# Patient Record
Sex: MALE | Race: WHITE | NOT HISPANIC OR LATINO | URBAN - METROPOLITAN AREA
[De-identification: names, ages, dates, MRNs, and addresses within clinical notes are randomized per-mention and may not be internally consistent; named-entity substitution may affect disease eponyms.]

---

## 2018-04-10 VITALS
DIASTOLIC BLOOD PRESSURE: 73 MMHG | HEART RATE: 82 BPM | OXYGEN SATURATION: 95 % | TEMPERATURE: 99 F | SYSTOLIC BLOOD PRESSURE: 158 MMHG | RESPIRATION RATE: 18 BRPM

## 2018-04-10 LAB
ALBUMIN SERPL ELPH-MCNC: 3.7 G/DL — SIGNIFICANT CHANGE UP (ref 3.3–5)
ALP SERPL-CCNC: 85 U/L — SIGNIFICANT CHANGE UP (ref 40–120)
ALT FLD-CCNC: 40 U/L — SIGNIFICANT CHANGE UP (ref 10–45)
ANION GAP SERPL CALC-SCNC: 22 MMOL/L — HIGH (ref 5–17)
APTT BLD: 28.4 SEC — SIGNIFICANT CHANGE UP (ref 27.5–37.4)
AST SERPL-CCNC: 32 U/L — SIGNIFICANT CHANGE UP (ref 10–40)
BILIRUB SERPL-MCNC: 0.4 MG/DL — SIGNIFICANT CHANGE UP (ref 0.2–1.2)
BUN SERPL-MCNC: 81 MG/DL — HIGH (ref 7–23)
CALCIUM SERPL-MCNC: 9 MG/DL — SIGNIFICANT CHANGE UP (ref 8.4–10.5)
CHLORIDE SERPL-SCNC: 91 MMOL/L — LOW (ref 96–108)
CK SERPL-CCNC: 49 U/L — SIGNIFICANT CHANGE UP (ref 30–200)
CO2 SERPL-SCNC: 19 MMOL/L — LOW (ref 22–31)
CREAT SERPL-MCNC: 3.55 MG/DL — HIGH (ref 0.5–1.3)
GLUCOSE SERPL-MCNC: 250 MG/DL — HIGH (ref 70–99)
HCT VFR BLD CALC: 27 % — LOW (ref 39–50)
HGB BLD-MCNC: 9.1 G/DL — LOW (ref 13–17)
INR BLD: 1.31 — HIGH (ref 0.88–1.16)
LIDOCAIN IGE QN: 22 U/L — SIGNIFICANT CHANGE UP (ref 7–60)
MCHC RBC-ENTMCNC: 28.3 PG — SIGNIFICANT CHANGE UP (ref 27–34)
MCHC RBC-ENTMCNC: 33.7 G/DL — SIGNIFICANT CHANGE UP (ref 32–36)
MCV RBC AUTO: 83.9 FL — SIGNIFICANT CHANGE UP (ref 80–100)
PLATELET # BLD AUTO: 137 K/UL — LOW (ref 150–400)
POTASSIUM SERPL-MCNC: 3.4 MMOL/L — LOW (ref 3.5–5.3)
POTASSIUM SERPL-SCNC: 3.4 MMOL/L — LOW (ref 3.5–5.3)
PROT SERPL-MCNC: 7.2 G/DL — SIGNIFICANT CHANGE UP (ref 6–8.3)
PROTHROM AB SERPL-ACNC: 14.6 SEC — HIGH (ref 9.8–12.7)
RBC # BLD: 3.22 M/UL — LOW (ref 4.2–5.8)
RBC # FLD: 13.6 % — SIGNIFICANT CHANGE UP (ref 10.3–16.9)
SODIUM SERPL-SCNC: 132 MMOL/L — LOW (ref 135–145)
TROPONIN T SERPL-MCNC: 0.02 NG/ML — HIGH (ref 0–0.01)
WBC # BLD: 7.8 K/UL — SIGNIFICANT CHANGE UP (ref 3.8–10.5)
WBC # FLD AUTO: 7.8 K/UL — SIGNIFICANT CHANGE UP (ref 3.8–10.5)

## 2018-04-10 PROCEDURE — 93010 ELECTROCARDIOGRAM REPORT: CPT

## 2018-04-10 PROCEDURE — 99285 EMERGENCY DEPT VISIT HI MDM: CPT | Mod: 25

## 2018-04-10 RX ORDER — SODIUM CHLORIDE 9 MG/ML
1000 INJECTION INTRAMUSCULAR; INTRAVENOUS; SUBCUTANEOUS ONCE
Qty: 0 | Refills: 0 | Status: COMPLETED | OUTPATIENT
Start: 2018-04-10 | End: 2018-04-10

## 2018-04-10 RX ORDER — IOHEXOL 300 MG/ML
50 INJECTION, SOLUTION INTRAVENOUS ONCE
Qty: 0 | Refills: 0 | Status: COMPLETED | OUTPATIENT
Start: 2018-04-10 | End: 2018-04-11

## 2018-04-10 NOTE — ED ADULT NURSE NOTE - CHIEF COMPLAINT QUOTE
Patient BIBA from [facility] for weakness x 2 days. Patient states he was dealing with a stomach virus and was nauseous and having intermittent bouts of diarrhea.

## 2018-04-10 NOTE — ED ADULT NURSE NOTE - OBJECTIVE STATEMENT
pt. presented with c/o nausea, poor po intake, diarrhea and weakness since last thursday. pt. is A&Ox3, communicates w/o difficulty, skin warm, dry, yellowish tint noted, ppt. states he has been jaundiced for "several weeks". pt. presented with c/o nausea, poor po intake, diarrhea and weakness since last thursday. pt. is A&Ox3, communicates w/o difficulty, skin warm, dry, yellowish tint noted, pt. states he has been jaundiced for "several weeks". pt. denies chest pain, shortness of breath, vomiting, fever, chills, blood in stool or urine.

## 2018-04-10 NOTE — ED ADULT TRIAGE NOTE - CHIEF COMPLAINT QUOTE
Patient BIBA from [facility] for weakness x 2 days. Patient states he was dealing with a stomach virus and was nauseous and having intermittent bouts of diarrhea.

## 2018-04-10 NOTE — ED ADULT NURSE NOTE - CHPI ED SYMPTOMS NEG
no headache/no dizziness/no fever/no chills/no pain/no vomiting/no loss of consciousness/no back pain

## 2018-04-11 ENCOUNTER — INPATIENT (INPATIENT)
Facility: HOSPITAL | Age: 73
LOS: 7 days | Discharge: HOME HEALTH | DRG: 683 | End: 2018-04-19
Attending: INTERNAL MEDICINE | Admitting: INTERNAL MEDICINE
Payer: MEDICARE

## 2018-04-11 ENCOUNTER — TRANSCRIPTION ENCOUNTER (OUTPATIENT)
Age: 73
End: 2018-04-11

## 2018-04-11 DIAGNOSIS — E87.1 HYPO-OSMOLALITY AND HYPONATREMIA: ICD-10-CM

## 2018-04-11 DIAGNOSIS — N17.9 ACUTE KIDNEY FAILURE, UNSPECIFIED: ICD-10-CM

## 2018-04-11 DIAGNOSIS — Z98.890 OTHER SPECIFIED POSTPROCEDURAL STATES: Chronic | ICD-10-CM

## 2018-04-11 DIAGNOSIS — D64.9 ANEMIA, UNSPECIFIED: ICD-10-CM

## 2018-04-11 DIAGNOSIS — N40.0 BENIGN PROSTATIC HYPERPLASIA WITHOUT LOWER URINARY TRACT SYMPTOMS: ICD-10-CM

## 2018-04-11 DIAGNOSIS — E11.9 TYPE 2 DIABETES MELLITUS WITHOUT COMPLICATIONS: ICD-10-CM

## 2018-04-11 DIAGNOSIS — I10 ESSENTIAL (PRIMARY) HYPERTENSION: ICD-10-CM

## 2018-04-11 DIAGNOSIS — R63.8 OTHER SYMPTOMS AND SIGNS CONCERNING FOOD AND FLUID INTAKE: ICD-10-CM

## 2018-04-11 DIAGNOSIS — N39.0 URINARY TRACT INFECTION, SITE NOT SPECIFIED: ICD-10-CM

## 2018-04-11 DIAGNOSIS — E87.2 ACIDOSIS: ICD-10-CM

## 2018-04-11 DIAGNOSIS — N13.9 OBSTRUCTIVE AND REFLUX UROPATHY, UNSPECIFIED: ICD-10-CM

## 2018-04-11 DIAGNOSIS — Z29.9 ENCOUNTER FOR PROPHYLACTIC MEASURES, UNSPECIFIED: ICD-10-CM

## 2018-04-11 LAB
ALBUMIN SERPL ELPH-MCNC: 3.6 G/DL — SIGNIFICANT CHANGE UP (ref 3.3–5)
ALP SERPL-CCNC: 68 U/L — SIGNIFICANT CHANGE UP (ref 40–120)
ALT FLD-CCNC: 35 U/L — SIGNIFICANT CHANGE UP (ref 10–45)
ANION GAP SERPL CALC-SCNC: 15 MMOL/L — SIGNIFICANT CHANGE UP (ref 5–17)
ANION GAP SERPL CALC-SCNC: 19 MMOL/L — HIGH (ref 5–17)
ANION GAP SERPL CALC-SCNC: 21 MMOL/L — HIGH (ref 5–17)
ANION GAP SERPL CALC-SCNC: 22 MMOL/L — HIGH (ref 5–17)
ANISOCYTOSIS BLD QL: SLIGHT — SIGNIFICANT CHANGE UP
APPEARANCE UR: CLEAR — SIGNIFICANT CHANGE UP
APPEARANCE UR: CLEAR — SIGNIFICANT CHANGE UP
AST SERPL-CCNC: 22 U/L — SIGNIFICANT CHANGE UP (ref 10–40)
B-OH-BUTYR SERPL-SCNC: 2.1 MMOL/L — HIGH
BASOPHILS NFR BLD AUTO: 0.1 % — SIGNIFICANT CHANGE UP (ref 0–2)
BILIRUB SERPL-MCNC: 0.3 MG/DL — SIGNIFICANT CHANGE UP (ref 0.2–1.2)
BILIRUB UR-MCNC: NEGATIVE — SIGNIFICANT CHANGE UP
BILIRUB UR-MCNC: NEGATIVE — SIGNIFICANT CHANGE UP
BLD GP AB SCN SERPL QL: NEGATIVE — SIGNIFICANT CHANGE UP
BUN SERPL-MCNC: 57 MG/DL — HIGH (ref 7–23)
BUN SERPL-MCNC: 67 MG/DL — HIGH (ref 7–23)
BUN SERPL-MCNC: 72 MG/DL — HIGH (ref 7–23)
BUN SERPL-MCNC: 76 MG/DL — HIGH (ref 7–23)
CALCIUM SERPL-MCNC: 8 MG/DL — LOW (ref 8.4–10.5)
CALCIUM SERPL-MCNC: 8.6 MG/DL — SIGNIFICANT CHANGE UP (ref 8.4–10.5)
CALCIUM SERPL-MCNC: 8.8 MG/DL — SIGNIFICANT CHANGE UP (ref 8.4–10.5)
CALCIUM SERPL-MCNC: 8.8 MG/DL — SIGNIFICANT CHANGE UP (ref 8.4–10.5)
CHLORIDE SERPL-SCNC: 90 MMOL/L — LOW (ref 96–108)
CHLORIDE SERPL-SCNC: 95 MMOL/L — LOW (ref 96–108)
CHLORIDE SERPL-SCNC: 96 MMOL/L — SIGNIFICANT CHANGE UP (ref 96–108)
CHLORIDE SERPL-SCNC: 97 MMOL/L — SIGNIFICANT CHANGE UP (ref 96–108)
CHLORIDE UR-SCNC: 53 MMOL/L — SIGNIFICANT CHANGE UP
CK MB CFR SERPL CALC: 1.6 NG/ML — SIGNIFICANT CHANGE UP (ref 0–6.7)
CK SERPL-CCNC: 33 U/L — SIGNIFICANT CHANGE UP (ref 30–200)
CO2 SERPL-SCNC: 17 MMOL/L — LOW (ref 22–31)
CO2 SERPL-SCNC: 18 MMOL/L — LOW (ref 22–31)
CO2 SERPL-SCNC: 19 MMOL/L — LOW (ref 22–31)
CO2 SERPL-SCNC: 21 MMOL/L — LOW (ref 22–31)
COLOR SPEC: YELLOW — SIGNIFICANT CHANGE UP
COLOR SPEC: YELLOW — SIGNIFICANT CHANGE UP
CREAT ?TM UR-MCNC: 64 MG/DL — SIGNIFICANT CHANGE UP
CREAT SERPL-MCNC: 2.43 MG/DL — HIGH (ref 0.5–1.3)
CREAT SERPL-MCNC: 2.77 MG/DL — HIGH (ref 0.5–1.3)
CREAT SERPL-MCNC: 3.19 MG/DL — HIGH (ref 0.5–1.3)
CREAT SERPL-MCNC: 3.21 MG/DL — HIGH (ref 0.5–1.3)
DIFF PNL FLD: (no result)
DIFF PNL FLD: (no result)
E COLI DNA BLD POS QL NAA+NON-PROBE: SIGNIFICANT CHANGE UP
EOSINOPHIL NFR BLD AUTO: 0.8 % — SIGNIFICANT CHANGE UP (ref 0–6)
FERRITIN SERPL-MCNC: 81 NG/ML — SIGNIFICANT CHANGE UP (ref 30–400)
GAS PNL BLDV: SIGNIFICANT CHANGE UP
GLUCOSE BLDC GLUCOMTR-MCNC: 201 MG/DL — HIGH (ref 70–99)
GLUCOSE BLDC GLUCOMTR-MCNC: 210 MG/DL — HIGH (ref 70–99)
GLUCOSE BLDC GLUCOMTR-MCNC: 218 MG/DL — HIGH (ref 70–99)
GLUCOSE BLDC GLUCOMTR-MCNC: 238 MG/DL — HIGH (ref 70–99)
GLUCOSE BLDC GLUCOMTR-MCNC: 253 MG/DL — HIGH (ref 70–99)
GLUCOSE BLDC GLUCOMTR-MCNC: 283 MG/DL — HIGH (ref 70–99)
GLUCOSE SERPL-MCNC: 201 MG/DL — HIGH (ref 70–99)
GLUCOSE SERPL-MCNC: 210 MG/DL — HIGH (ref 70–99)
GLUCOSE SERPL-MCNC: 214 MG/DL — HIGH (ref 70–99)
GLUCOSE SERPL-MCNC: 263 MG/DL — HIGH (ref 70–99)
GLUCOSE UR QL: NEGATIVE — SIGNIFICANT CHANGE UP
GLUCOSE UR QL: NEGATIVE — SIGNIFICANT CHANGE UP
GRAM STN FLD: SIGNIFICANT CHANGE UP
HBA1C BLD-MCNC: 7.2 % — HIGH (ref 4–5.6)
HCT VFR BLD CALC: 25.5 % — LOW (ref 39–50)
HCT VFR BLD CALC: 26.2 % — LOW (ref 39–50)
HCT VFR BLD CALC: 26.9 % — LOW (ref 39–50)
HGB BLD-MCNC: 8.7 G/DL — LOW (ref 13–17)
HGB BLD-MCNC: 8.8 G/DL — LOW (ref 13–17)
HGB BLD-MCNC: 9.1 G/DL — LOW (ref 13–17)
HYPOCHROMIA BLD QL: SLIGHT — SIGNIFICANT CHANGE UP
INR BLD: 1.4 — HIGH (ref 0.88–1.16)
IRON SATN MFR SERPL: 23 UG/DL — LOW (ref 45–165)
IRON SATN MFR SERPL: 8 % — LOW (ref 16–55)
KETONES UR-MCNC: NEGATIVE — SIGNIFICANT CHANGE UP
KETONES UR-MCNC: NEGATIVE — SIGNIFICANT CHANGE UP
LACTATE SERPL-SCNC: 1.1 MMOL/L — SIGNIFICANT CHANGE UP (ref 0.5–2)
LEUKOCYTE ESTERASE UR-ACNC: (no result)
LEUKOCYTE ESTERASE UR-ACNC: (no result)
LYMPHOCYTES # BLD AUTO: 8.1 % — LOW (ref 13–44)
LYMPHOCYTES # BLD AUTO: 9 % — LOW (ref 13–44)
MACROCYTES BLD QL: SLIGHT — SIGNIFICANT CHANGE UP
MAGNESIUM SERPL-MCNC: 1.6 MG/DL — SIGNIFICANT CHANGE UP (ref 1.6–2.6)
MAGNESIUM SERPL-MCNC: 1.7 MG/DL — SIGNIFICANT CHANGE UP (ref 1.6–2.6)
MAGNESIUM SERPL-MCNC: 1.9 MG/DL — SIGNIFICANT CHANGE UP (ref 1.6–2.6)
MANUAL DIF COMMENT BLD-IMP: SIGNIFICANT CHANGE UP
MANUAL SMEAR VERIFICATION: SIGNIFICANT CHANGE UP
MCHC RBC-ENTMCNC: 27.7 PG — SIGNIFICANT CHANGE UP (ref 27–34)
MCHC RBC-ENTMCNC: 28.2 PG — SIGNIFICANT CHANGE UP (ref 27–34)
MCHC RBC-ENTMCNC: 28.8 PG — SIGNIFICANT CHANGE UP (ref 27–34)
MCHC RBC-ENTMCNC: 33.2 G/DL — SIGNIFICANT CHANGE UP (ref 32–36)
MCHC RBC-ENTMCNC: 33.8 G/DL — SIGNIFICANT CHANGE UP (ref 32–36)
MCHC RBC-ENTMCNC: 34.5 G/DL — SIGNIFICANT CHANGE UP (ref 32–36)
MCV RBC AUTO: 83.3 FL — SIGNIFICANT CHANGE UP (ref 80–100)
MCV RBC AUTO: 83.3 FL — SIGNIFICANT CHANGE UP (ref 80–100)
MCV RBC AUTO: 83.4 FL — SIGNIFICANT CHANGE UP (ref 80–100)
METHOD TYPE: SIGNIFICANT CHANGE UP
MICROCYTES BLD QL: SLIGHT — SIGNIFICANT CHANGE UP
MONOCYTES NFR BLD AUTO: 10 % — SIGNIFICANT CHANGE UP (ref 2–14)
MONOCYTES NFR BLD AUTO: 8 % — SIGNIFICANT CHANGE UP (ref 2–14)
NEUTROPHILS NFR BLD AUTO: 77 % — SIGNIFICANT CHANGE UP (ref 43–77)
NEUTROPHILS NFR BLD AUTO: 81 % — HIGH (ref 43–77)
NEUTS BAND # BLD: 6 % — SIGNIFICANT CHANGE UP
NITRITE UR-MCNC: POSITIVE
NITRITE UR-MCNC: POSITIVE
OSMOLALITY SERPL: 306 MOSM/KG — HIGH (ref 280–301)
OSMOLALITY UR: 318 MOSMOL/KG — SIGNIFICANT CHANGE UP (ref 100–650)
OVALOCYTES BLD QL SMEAR: SLIGHT — SIGNIFICANT CHANGE UP
PH UR: 5 — SIGNIFICANT CHANGE UP (ref 5–8)
PH UR: 5 — SIGNIFICANT CHANGE UP (ref 5–8)
PHOSPHATE SERPL-MCNC: 3 MG/DL — SIGNIFICANT CHANGE UP (ref 2.5–4.5)
PHOSPHATE SERPL-MCNC: 3.6 MG/DL — SIGNIFICANT CHANGE UP (ref 2.5–4.5)
PLAT MORPH BLD: (no result)
PLATELET # BLD AUTO: 121 K/UL — LOW (ref 150–400)
PLATELET # BLD AUTO: 123 K/UL — LOW (ref 150–400)
PLATELET # BLD AUTO: 128 K/UL — LOW (ref 150–400)
PLATELET CLUMP BLD QL SMEAR: PRESENT
POIKILOCYTOSIS BLD QL AUTO: SLIGHT — SIGNIFICANT CHANGE UP
POLYCHROMASIA BLD QL SMEAR: SLIGHT — SIGNIFICANT CHANGE UP
POTASSIUM SERPL-MCNC: 3 MMOL/L — LOW (ref 3.5–5.3)
POTASSIUM SERPL-MCNC: 3.4 MMOL/L — LOW (ref 3.5–5.3)
POTASSIUM SERPL-MCNC: 3.7 MMOL/L — SIGNIFICANT CHANGE UP (ref 3.5–5.3)
POTASSIUM SERPL-MCNC: 3.7 MMOL/L — SIGNIFICANT CHANGE UP (ref 3.5–5.3)
POTASSIUM SERPL-SCNC: 3 MMOL/L — LOW (ref 3.5–5.3)
POTASSIUM SERPL-SCNC: 3.4 MMOL/L — LOW (ref 3.5–5.3)
POTASSIUM SERPL-SCNC: 3.7 MMOL/L — SIGNIFICANT CHANGE UP (ref 3.5–5.3)
POTASSIUM SERPL-SCNC: 3.7 MMOL/L — SIGNIFICANT CHANGE UP (ref 3.5–5.3)
POTASSIUM UR-SCNC: 19 MMOL/L — SIGNIFICANT CHANGE UP
PROT SERPL-MCNC: 6.8 G/DL — SIGNIFICANT CHANGE UP (ref 6–8.3)
PROT UR-MCNC: NEGATIVE MG/DL — SIGNIFICANT CHANGE UP
PROT UR-MCNC: NEGATIVE MG/DL — SIGNIFICANT CHANGE UP
PROTHROM AB SERPL-ACNC: 15.6 SEC — HIGH (ref 9.8–12.7)
RBC # BLD: 3.06 M/UL — LOW (ref 4.2–5.8)
RBC # BLD: 3.14 M/UL — LOW (ref 4.2–5.8)
RBC # BLD: 3.23 M/UL — LOW (ref 4.2–5.8)
RBC # BLD: 3.23 M/UL — LOW (ref 4.2–5.8)
RBC # FLD: 13.3 % — SIGNIFICANT CHANGE UP (ref 10.3–16.9)
RBC # FLD: 13.5 % — SIGNIFICANT CHANGE UP (ref 10.3–16.9)
RBC # FLD: 13.6 % — SIGNIFICANT CHANGE UP (ref 10.3–16.9)
RBC BLD AUTO: (no result)
RETICS/RBC NFR: 2.2 % — SIGNIFICANT CHANGE UP (ref 0.5–2.5)
RH IG SCN BLD-IMP: POSITIVE — SIGNIFICANT CHANGE UP
SODIUM SERPL-SCNC: 129 MMOL/L — LOW (ref 135–145)
SODIUM SERPL-SCNC: 132 MMOL/L — LOW (ref 135–145)
SODIUM SERPL-SCNC: 134 MMOL/L — LOW (ref 135–145)
SODIUM SERPL-SCNC: 135 MMOL/L — SIGNIFICANT CHANGE UP (ref 135–145)
SODIUM UR-SCNC: 65 MMOL/L — SIGNIFICANT CHANGE UP
SP GR SPEC: 1.01 — SIGNIFICANT CHANGE UP (ref 1–1.03)
SP GR SPEC: <=1.005 — SIGNIFICANT CHANGE UP (ref 1–1.03)
SPHEROCYTES BLD QL SMEAR: SLIGHT — SIGNIFICANT CHANGE UP
STOMATOCYTES BLD QL SMEAR: SLIGHT — SIGNIFICANT CHANGE UP
TIBC SERPL-MCNC: 271 UG/DL — SIGNIFICANT CHANGE UP (ref 220–430)
TRANSFERRIN SERPL-MCNC: 238 MG/DL — SIGNIFICANT CHANGE UP (ref 200–360)
TROPONIN T SERPL-MCNC: 0.02 NG/ML — HIGH (ref 0–0.01)
TSH SERPL-MCNC: 0.96 UIU/ML — SIGNIFICANT CHANGE UP (ref 0.35–4.94)
UIBC SERPL-MCNC: 248 UG/DL — SIGNIFICANT CHANGE UP (ref 110–370)
UROBILINOGEN FLD QL: 0.2 E.U./DL — SIGNIFICANT CHANGE UP
UROBILINOGEN FLD QL: 0.2 E.U./DL — SIGNIFICANT CHANGE UP
UUN UR-MCNC: 338 MG/DL — SIGNIFICANT CHANGE UP
WBC # BLD: 7.8 K/UL — SIGNIFICANT CHANGE UP (ref 3.8–10.5)
WBC # BLD: 9.2 K/UL — SIGNIFICANT CHANGE UP (ref 3.8–10.5)
WBC # BLD: 9.3 K/UL — SIGNIFICANT CHANGE UP (ref 3.8–10.5)
WBC # FLD AUTO: 7.8 K/UL — SIGNIFICANT CHANGE UP (ref 3.8–10.5)
WBC # FLD AUTO: 9.2 K/UL — SIGNIFICANT CHANGE UP (ref 3.8–10.5)
WBC # FLD AUTO: 9.3 K/UL — SIGNIFICANT CHANGE UP (ref 3.8–10.5)

## 2018-04-11 PROCEDURE — 93306 TTE W/DOPPLER COMPLETE: CPT | Mod: 26

## 2018-04-11 PROCEDURE — 74176 CT ABD & PELVIS W/O CONTRAST: CPT | Mod: 26

## 2018-04-11 PROCEDURE — 99223 1ST HOSP IP/OBS HIGH 75: CPT | Mod: GC

## 2018-04-11 PROCEDURE — 76770 US EXAM ABDO BACK WALL COMP: CPT | Mod: 26

## 2018-04-11 RX ORDER — POTASSIUM CHLORIDE 20 MEQ
10 PACKET (EA) ORAL
Qty: 0 | Refills: 0 | Status: COMPLETED | OUTPATIENT
Start: 2018-04-11 | End: 2018-04-11

## 2018-04-11 RX ORDER — LANOLIN ALCOHOL/MO/W.PET/CERES
5 CREAM (GRAM) TOPICAL AT BEDTIME
Qty: 0 | Refills: 0 | Status: DISCONTINUED | OUTPATIENT
Start: 2018-04-11 | End: 2018-04-19

## 2018-04-11 RX ORDER — INSULIN LISPRO 100/ML
VIAL (ML) SUBCUTANEOUS
Qty: 0 | Refills: 0 | Status: DISCONTINUED | OUTPATIENT
Start: 2018-04-11 | End: 2018-04-19

## 2018-04-11 RX ORDER — CARVEDILOL PHOSPHATE 80 MG/1
3.12 CAPSULE, EXTENDED RELEASE ORAL EVERY 12 HOURS
Qty: 0 | Refills: 0 | Status: DISCONTINUED | OUTPATIENT
Start: 2018-04-11 | End: 2018-04-11

## 2018-04-11 RX ORDER — TAMSULOSIN HYDROCHLORIDE 0.4 MG/1
0.4 CAPSULE ORAL AT BEDTIME
Qty: 0 | Refills: 0 | Status: DISCONTINUED | OUTPATIENT
Start: 2018-04-11 | End: 2018-04-19

## 2018-04-11 RX ORDER — DEXTROSE 50 % IN WATER 50 %
25 SYRINGE (ML) INTRAVENOUS ONCE
Qty: 0 | Refills: 0 | Status: DISCONTINUED | OUTPATIENT
Start: 2018-04-11 | End: 2018-04-19

## 2018-04-11 RX ORDER — HEPARIN SODIUM 5000 [USP'U]/ML
5000 INJECTION INTRAVENOUS; SUBCUTANEOUS EVERY 8 HOURS
Qty: 0 | Refills: 0 | Status: DISCONTINUED | OUTPATIENT
Start: 2018-04-11 | End: 2018-04-11

## 2018-04-11 RX ORDER — CEFTRIAXONE 500 MG/1
1000 INJECTION, POWDER, FOR SOLUTION INTRAMUSCULAR; INTRAVENOUS ONCE
Qty: 0 | Refills: 0 | Status: COMPLETED | OUTPATIENT
Start: 2018-04-11 | End: 2018-04-11

## 2018-04-11 RX ORDER — SODIUM CHLORIDE 9 MG/ML
1000 INJECTION INTRAMUSCULAR; INTRAVENOUS; SUBCUTANEOUS ONCE
Qty: 0 | Refills: 0 | Status: COMPLETED | OUTPATIENT
Start: 2018-04-11 | End: 2018-04-11

## 2018-04-11 RX ORDER — CEFTRIAXONE 500 MG/1
1000 INJECTION, POWDER, FOR SOLUTION INTRAMUSCULAR; INTRAVENOUS EVERY 24 HOURS
Qty: 0 | Refills: 0 | Status: DISCONTINUED | OUTPATIENT
Start: 2018-04-12 | End: 2018-04-13

## 2018-04-11 RX ORDER — DEXTROSE 50 % IN WATER 50 %
1 SYRINGE (ML) INTRAVENOUS ONCE
Qty: 0 | Refills: 0 | Status: DISCONTINUED | OUTPATIENT
Start: 2018-04-11 | End: 2018-04-19

## 2018-04-11 RX ORDER — POTASSIUM CHLORIDE 20 MEQ
20 PACKET (EA) ORAL ONCE
Qty: 0 | Refills: 0 | Status: COMPLETED | OUTPATIENT
Start: 2018-04-11 | End: 2018-04-11

## 2018-04-11 RX ORDER — CEFTRIAXONE 500 MG/1
INJECTION, POWDER, FOR SOLUTION INTRAMUSCULAR; INTRAVENOUS
Qty: 0 | Refills: 0 | Status: DISCONTINUED | OUTPATIENT
Start: 2018-04-11 | End: 2018-04-11

## 2018-04-11 RX ORDER — CEFTRIAXONE 500 MG/1
INJECTION, POWDER, FOR SOLUTION INTRAMUSCULAR; INTRAVENOUS
Qty: 0 | Refills: 0 | Status: DISCONTINUED | OUTPATIENT
Start: 2018-04-11 | End: 2018-04-13

## 2018-04-11 RX ORDER — POTASSIUM CHLORIDE 20 MEQ
40 PACKET (EA) ORAL ONCE
Qty: 0 | Refills: 0 | Status: COMPLETED | OUTPATIENT
Start: 2018-04-11 | End: 2018-04-11

## 2018-04-11 RX ORDER — SODIUM CHLORIDE 9 MG/ML
1000 INJECTION, SOLUTION INTRAVENOUS
Qty: 0 | Refills: 0 | Status: DISCONTINUED | OUTPATIENT
Start: 2018-04-11 | End: 2018-04-19

## 2018-04-11 RX ORDER — ASPIRIN/CALCIUM CARB/MAGNESIUM 324 MG
81 TABLET ORAL DAILY
Qty: 0 | Refills: 0 | Status: DISCONTINUED | OUTPATIENT
Start: 2018-04-11 | End: 2018-04-19

## 2018-04-11 RX ORDER — ACETAMINOPHEN 500 MG
650 TABLET ORAL ONCE
Qty: 0 | Refills: 0 | Status: COMPLETED | OUTPATIENT
Start: 2018-04-11 | End: 2018-04-11

## 2018-04-11 RX ORDER — POLYETHYLENE GLYCOL 3350 17 G/17G
17 POWDER, FOR SOLUTION ORAL
Qty: 0 | Refills: 0 | Status: DISCONTINUED | OUTPATIENT
Start: 2018-04-11 | End: 2018-04-11

## 2018-04-11 RX ORDER — SIMVASTATIN 20 MG/1
10 TABLET, FILM COATED ORAL AT BEDTIME
Qty: 0 | Refills: 0 | Status: DISCONTINUED | OUTPATIENT
Start: 2018-04-11 | End: 2018-04-19

## 2018-04-11 RX ORDER — MAGNESIUM SULFATE 500 MG/ML
2 VIAL (ML) INJECTION ONCE
Qty: 0 | Refills: 0 | Status: COMPLETED | OUTPATIENT
Start: 2018-04-11 | End: 2018-04-11

## 2018-04-11 RX ORDER — INSULIN HUMAN 100 [IU]/ML
4 INJECTION, SOLUTION SUBCUTANEOUS ONCE
Qty: 0 | Refills: 0 | Status: COMPLETED | OUTPATIENT
Start: 2018-04-11 | End: 2018-04-11

## 2018-04-11 RX ORDER — SODIUM CHLORIDE 9 MG/ML
1000 INJECTION INTRAMUSCULAR; INTRAVENOUS; SUBCUTANEOUS
Qty: 0 | Refills: 0 | Status: DISCONTINUED | OUTPATIENT
Start: 2018-04-11 | End: 2018-04-14

## 2018-04-11 RX ORDER — SENNA PLUS 8.6 MG/1
2 TABLET ORAL AT BEDTIME
Qty: 0 | Refills: 0 | Status: DISCONTINUED | OUTPATIENT
Start: 2018-04-11 | End: 2018-04-11

## 2018-04-11 RX ORDER — POTASSIUM CHLORIDE 20 MEQ
40 PACKET (EA) ORAL ONCE
Qty: 0 | Refills: 0 | Status: DISCONTINUED | OUTPATIENT
Start: 2018-04-11 | End: 2018-04-11

## 2018-04-11 RX ORDER — FERROUS SULFATE 325(65) MG
325 TABLET ORAL THREE TIMES A DAY
Qty: 0 | Refills: 0 | Status: DISCONTINUED | OUTPATIENT
Start: 2018-04-11 | End: 2018-04-15

## 2018-04-11 RX ORDER — INSULIN HUMAN 100 [IU]/ML
4 INJECTION, SOLUTION SUBCUTANEOUS ONCE
Qty: 0 | Refills: 0 | Status: DISCONTINUED | OUTPATIENT
Start: 2018-04-11 | End: 2018-04-11

## 2018-04-11 RX ORDER — INFLUENZA VIRUS VACCINE 15; 15; 15; 15 UG/.5ML; UG/.5ML; UG/.5ML; UG/.5ML
0.5 SUSPENSION INTRAMUSCULAR ONCE
Qty: 0 | Refills: 0 | Status: DISCONTINUED | OUTPATIENT
Start: 2018-04-11 | End: 2018-04-19

## 2018-04-11 RX ORDER — POTASSIUM CHLORIDE 20 MEQ
20 PACKET (EA) ORAL ONCE
Qty: 0 | Refills: 0 | Status: DISCONTINUED | OUTPATIENT
Start: 2018-04-11 | End: 2018-04-11

## 2018-04-11 RX ORDER — GLUCAGON INJECTION, SOLUTION 0.5 MG/.1ML
1 INJECTION, SOLUTION SUBCUTANEOUS ONCE
Qty: 0 | Refills: 0 | Status: DISCONTINUED | OUTPATIENT
Start: 2018-04-11 | End: 2018-04-19

## 2018-04-11 RX ORDER — DEXTROSE 50 % IN WATER 50 %
12.5 SYRINGE (ML) INTRAVENOUS ONCE
Qty: 0 | Refills: 0 | Status: DISCONTINUED | OUTPATIENT
Start: 2018-04-11 | End: 2018-04-19

## 2018-04-11 RX ADMIN — Medication 20 MILLIEQUIVALENT(S): at 03:27

## 2018-04-11 RX ADMIN — Medication 100 MILLIEQUIVALENT(S): at 08:07

## 2018-04-11 RX ADMIN — CARVEDILOL PHOSPHATE 3.12 MILLIGRAM(S): 80 CAPSULE, EXTENDED RELEASE ORAL at 12:29

## 2018-04-11 RX ADMIN — Medication 25 GRAM(S): at 12:30

## 2018-04-11 RX ADMIN — Medication 40 MILLIEQUIVALENT(S): at 05:59

## 2018-04-11 RX ADMIN — Medication 6: at 13:33

## 2018-04-11 RX ADMIN — Medication 100 MILLIEQUIVALENT(S): at 15:02

## 2018-04-11 RX ADMIN — SODIUM CHLORIDE 1000 MILLILITER(S): 9 INJECTION INTRAMUSCULAR; INTRAVENOUS; SUBCUTANEOUS at 00:48

## 2018-04-11 RX ADMIN — CEFTRIAXONE 1000 MILLIGRAM(S): 500 INJECTION, POWDER, FOR SOLUTION INTRAMUSCULAR; INTRAVENOUS at 08:07

## 2018-04-11 RX ADMIN — Medication 4: at 21:45

## 2018-04-11 RX ADMIN — Medication 325 MILLIGRAM(S): at 21:44

## 2018-04-11 RX ADMIN — SIMVASTATIN 10 MILLIGRAM(S): 20 TABLET, FILM COATED ORAL at 22:58

## 2018-04-11 RX ADMIN — TAMSULOSIN HYDROCHLORIDE 0.4 MILLIGRAM(S): 0.4 CAPSULE ORAL at 13:34

## 2018-04-11 RX ADMIN — Medication 5 MILLIGRAM(S): at 22:58

## 2018-04-11 RX ADMIN — Medication 100 MILLIEQUIVALENT(S): at 21:45

## 2018-04-11 RX ADMIN — SODIUM CHLORIDE 1000 MILLILITER(S): 9 INJECTION INTRAMUSCULAR; INTRAVENOUS; SUBCUTANEOUS at 03:13

## 2018-04-11 RX ADMIN — SODIUM CHLORIDE 100 MILLILITER(S): 9 INJECTION INTRAMUSCULAR; INTRAVENOUS; SUBCUTANEOUS at 23:37

## 2018-04-11 RX ADMIN — Medication 100 MILLIEQUIVALENT(S): at 23:35

## 2018-04-11 RX ADMIN — Medication 6: at 17:59

## 2018-04-11 RX ADMIN — TAMSULOSIN HYDROCHLORIDE 0.4 MILLIGRAM(S): 0.4 CAPSULE ORAL at 21:44

## 2018-04-11 RX ADMIN — IOHEXOL 50 MILLILITER(S): 300 INJECTION, SOLUTION INTRAVENOUS at 00:44

## 2018-04-11 RX ADMIN — Medication 650 MILLIGRAM(S): at 05:59

## 2018-04-11 RX ADMIN — Medication 4: at 10:26

## 2018-04-11 RX ADMIN — Medication 100 MILLIEQUIVALENT(S): at 13:34

## 2018-04-11 RX ADMIN — Medication 100 MILLIEQUIVALENT(S): at 22:58

## 2018-04-11 RX ADMIN — Medication 81 MILLIGRAM(S): at 12:35

## 2018-04-11 RX ADMIN — SODIUM CHLORIDE 100 MILLILITER(S): 9 INJECTION INTRAMUSCULAR; INTRAVENOUS; SUBCUTANEOUS at 12:30

## 2018-04-11 RX ADMIN — INSULIN HUMAN 4 UNIT(S): 100 INJECTION, SOLUTION SUBCUTANEOUS at 03:40

## 2018-04-11 NOTE — ED PROVIDER NOTE - OBJECTIVE STATEMENT
72M with 1 month of weakness, states he has yellowing of his skin, abd distension. Has vomiting, diarrhea, inability to dudley PO, came in today because he feels weak. No blood in stool, No dysuria. No vomiting. Pt denies alcohol, denies hepatitis. Pt feels increasingly weak, denies cp, sob. No hx of abd surg States he can't get up out of bed any more lives alone. Pt has hx of diabetes, on glipizide, not on insulin.

## 2018-04-11 NOTE — PROGRESS NOTE ADULT - PROBLEM SELECTOR PLAN 7
-Intermittently hypertensive, asymptomatic.  Continue holding home lasix in the setting of AAMIR.  Holding HCTZ in the setting of hyponatremia.  -C/w coreg.

## 2018-04-11 NOTE — DISCHARGE NOTE ADULT - ADDITIONAL INSTRUCTIONS
You are being discharged with an indwelling palacio catheter.  IT IS IMPERATIVE THAT YOU FOLLOW UP WITH UROLOGY (Dr. Courtney) WITHIN 2 WEEKS.  INDWELLING CATHETERS CAN PREDISPOSE YOU TO RECURRENT URINARY TRACT INFECTIONS.  Continue taking your Proscar and flomax.  Follow up with your PCP as well as with Dr. Roberts. Continue taking your Proscar and flomax.  Follow up with your PCP as well as with Dr. Roberts.

## 2018-04-11 NOTE — CONSULT NOTE ADULT - SUBJECTIVE AND OBJECTIVE BOX
HPI:  73yo M with PMH of HTN, HLD, DMT2, prostatomegaly, h/o SVT s/p ablation (2011) presents  here with progressive weakness for one month a/w abdominal distension. Pt reports no known history of kidney disease and was asked by his PMD to see a urologist that he never did.   +urinary frequency, urgency, and hesitancy at home prior to admission. No dysuria/hematuria. No fever/chills.   CT abd/pelvis was performed. (11 Apr 2018 06:02)- c/w pancolitis, prostomegally, bilat severe hydro, and prostomegally.  Had palacio placed this am by nurse- #[id number] fr. Drained out 900 cc clear urine initially and then 2 litres over next two hours (clear). Patient now noted to have +heme. Patient c/o some discomfort with palacio catheter. Patient takes supplement beta prostate at home.     PAST MEDICAL & SURGICAL HISTORY:  Enlarged prostate  DM (diabetes mellitus)  HTN (hypertension)  H/O prior ablation treatment      MEDICATIONS  (STANDING):  aspirin  chewable 81 milliGRAM(s) Oral daily  carvedilol 3.125 milliGRAM(s) Oral every 12 hours  cefTRIAXone Injectable.      dextrose 5%. 1000 milliLiter(s) (50 mL/Hr) IV Continuous <Continuous>  dextrose 50% Injectable 12.5 Gram(s) IV Push once  dextrose 50% Injectable 25 Gram(s) IV Push once  dextrose 50% Injectable 25 Gram(s) IV Push once  influenza   Vaccine 0.5 milliLiter(s) IntraMuscular once  insulin lispro (HumaLOG) corrective regimen sliding scale   SubCutaneous Before meals and at bedtime  magnesium sulfate  IVPB 2 Gram(s) IV Intermittent once  potassium chloride  10 mEq/100 mL IVPB 10 milliEquivalent(s) IV Intermittent every 1 hour  simvastatin 10 milliGRAM(s) Oral at bedtime  sodium chloride 0.9%. 1000 milliLiter(s) (100 mL/Hr) IV Continuous <Continuous>  tamsulosin 0.4 milliGRAM(s) Oral at bedtime    MEDICATIONS  (PRN):  dextrose Gel 1 Dose(s) Oral once PRN Blood Glucose LESS THAN 70 milliGRAM(s)/deciliter  glucagon  Injectable 1 milliGRAM(s) IntraMuscular once PRN Glucose LESS THAN 70 milligrams/deciliter      Allergies    No Known Allergies    Intolerances        SOCIAL HISTORY:    FAMILY HISTORY:  No pertinent family history in first degree relatives      Vital Signs Last 24 Hrs  T(C): 37.4 (11 Apr 2018 11:27), Max: 38 (11 Apr 2018 05:44)  T(F): 99.3 (11 Apr 2018 11:27), Max: 100.4 (11 Apr 2018 05:44)  HR: 92 (11 Apr 2018 11:27) (82 - 92)  BP: 153/66 (11 Apr 2018 11:27) (149/68 - 160/74)  BP(mean): --  RR: 18 (11 Apr 2018 11:27) (18 - 18)  SpO2: 98% (11 Apr 2018 11:27) (95% - 99%)    On PE:  General: alert and awake  Abdomen: soft, mild lower abdominal discomfort    : 16fr palacio intact +heme    EXT: no edema    LABS:                        8.7    7.8   )-----------( 121      ( 11 Apr 2018 06:15 )             26.2     04-11    134<L>  |  95<L>  |  72<H>  ----------------------------<  201<H>  3.4<L>   |  18<L>  |  3.21<H>    Ca    8.6      11 Apr 2018 06:15  Phos  3.6     04-11  Mg     1.6     04-11    TPro  6.8  /  Alb  3.6  /  TBili  0.3  /  DBili  x   /  AST  22  /  ALT  35  /  AlkPhos  68  04-11    PT/INR - ( 10 Apr 2018 23:08 )   PT: 14.6 sec;   INR: 1.31          PTT - ( 10 Apr 2018 23:08 )  PTT:28.4 sec  Urinalysis Basic - ( 11 Apr 2018 09:09 )    Color: Yellow / Appearance: Clear / SG: <=1.005 / pH: x  Gluc: x / Ketone: NEGATIVE  / Bili: Negative / Urobili: 0.2 E.U./dL   Blood: x / Protein: NEGATIVE mg/dL / Nitrite: POSITIVE   Leuk Esterase: Moderate / RBC: Many /HPF / WBC Many /HPF   Sq Epi: x / Non Sq Epi: 0-5 /HPF / Bacteria: Many /HPF        RADIOLOGY & ADDITIONAL STUDIES:

## 2018-04-11 NOTE — PROGRESS NOTE ADULT - PROBLEM SELECTOR PLAN 2
Unclear baseline.  Creatinine on arrival 3.55, slightly improved with IVF.  Likely AAMIR on CKD.  Etiology of AAMIR likely postobstructive + prerenal (diarrhea + lasix).  Left message without PCP to obtain collateral on baseline.  -urology consulted.  started flomax.  c/w indwelling palacio.  -frequent BMPs, next at noon.  Watch for postobstructive diuresis.  On NS at 100/hr for now.  Will match output by 50%.  Cautious electrolyte repletion with AAMIR.  -f/u renal U/S   -f/u urine lytes  -hold lasix

## 2018-04-11 NOTE — PROGRESS NOTE ADULT - SUBJECTIVE AND OBJECTIVE BOX
INTERVAL/OVERNIGHT EVENTS:  Admitted for UTI + urinary retention leading to obstructive uropathy.  s/p palacio draining 900 cc's.        SUBJECTIVE:  Seen and examined at bedside.  Prior to palacio placement, patient reported no discomfort.  He said that he did not feel the urge to urinate.  He seems to be a questionable historian.  He says that he recalls being told he has prostate enlargement, but does not know the etiology.  He was recently taking a supplement called "beta prostate" to help him urinate, but he discontinued it 2/2 urinary frequency.  He reports dysuria.  Bilateral low back pain x 6 months.  No trauma.  He has lost ~15 lbs recently, unclear if diet related.  No night sweats.  No hematuria.  No hx UTIs.      He reports ~3-4 days loose stools.  He cannot quantify them but says it is >5/day.  No hematochezia or melena.  No abd pain, N/V.  No recent travel.  No hx c diff.  No recent abx.  No contacts with c diff.      His presenting complaint was weakness which he attributes to poor PO intake.  He says that he has not been eating because his BS was elevated (300s) ~5 days ago.  Since then, he has not eaten "anything."      He denies cough, runny nose, sore throat, SOB, CP, headache, neck stiffness, leg pain, edema.    ROS otherwise negative.    VITAL SIGNS:  T(F): 99.1 (18 @ 06:45)  HR: 83 (18 @ 06:45)  BP: 149/68 (18 @ 06:45)  RR: 18 (18 @ 06:45)  SpO2: 97% (18 @ 06:45)    PHYSICAL EXAM:    General:  NAD, nontoxic appearing, WDWN  HENT:  EOMI, R pupil reactive / deformed 2/2 cataract sx.  L pupil pinpoint, unclear if reactive.  No sinus tenderness.  oropharynx WNL.  MMM  Neck:  Trachea midline.  No JVD, LAD, or thyromegaly.  Heart:  S1S2 no M/R/G, regular  Lungs:  CTAB no wheezing, rhonchi or rales.  No accessory muscle use.  No respiratory distress.  Abdomen:  NABS.  soft, nontender, nondistended.  no guarding.  no ascites.  no organomegaly.  Vascular:  Peripheral pulses palpable  Extremities:  Trace RLE edema.  Calves nontender.    Back:  Mild bilateral low back TTP.  No CVA tenderness  Neuro:  AOx3, no facial asymmetry, nonfocal, no slurred speech  Skin:  No rash    MEDICATIONS  (STANDING):  aspirin  chewable 81 milliGRAM(s) Oral daily  cefTRIAXone Injectable.      dextrose 5%. 1000 milliLiter(s) (50 mL/Hr) IV Continuous <Continuous>  dextrose 50% Injectable 12.5 Gram(s) IV Push once  dextrose 50% Injectable 25 Gram(s) IV Push once  dextrose 50% Injectable 25 Gram(s) IV Push once  heparin  Injectable 5000 Unit(s) SubCutaneous every 8 hours  influenza   Vaccine 0.5 milliLiter(s) IntraMuscular once  insulin lispro (HumaLOG) corrective regimen sliding scale   SubCutaneous Before meals and at bedtime  magnesium sulfate  IVPB 2 Gram(s) IV Intermittent once  potassium chloride  10 mEq/100 mL IVPB 10 milliEquivalent(s) IV Intermittent every 1 hour  simvastatin 10 milliGRAM(s) Oral at bedtime    MEDICATIONS  (PRN):  dextrose Gel 1 Dose(s) Oral once PRN Blood Glucose LESS THAN 70 milliGRAM(s)/deciliter  glucagon  Injectable 1 milliGRAM(s) IntraMuscular once PRN Glucose LESS THAN 70 milligrams/deciliter      Allergies    No Known Allergies    Intolerances      aspirin  chewable 81 milliGRAM(s) Oral daily  cefTRIAXone Injectable.      dextrose 5%. 1000 milliLiter(s) IV Continuous <Continuous>  dextrose 50% Injectable 12.5 Gram(s) IV Push once  dextrose 50% Injectable 25 Gram(s) IV Push once  dextrose 50% Injectable 25 Gram(s) IV Push once  dextrose Gel 1 Dose(s) Oral once PRN  glucagon  Injectable 1 milliGRAM(s) IntraMuscular once PRN  heparin  Injectable 5000 Unit(s) SubCutaneous every 8 hours  influenza   Vaccine 0.5 milliLiter(s) IntraMuscular once  insulin lispro (HumaLOG) corrective regimen sliding scale   SubCutaneous Before meals and at bedtime  magnesium sulfate  IVPB 2 Gram(s) IV Intermittent once  potassium chloride  10 mEq/100 mL IVPB 10 milliEquivalent(s) IV Intermittent every 1 hour  simvastatin 10 milliGRAM(s) Oral at bedtime      LABS:                          8.7    7.8   )-----------( 121      ( 2018 06:15 )             26.2     04-11    134<L>  |  95<L>  |  72<H>  ----------------------------<  201<H>  3.4<L>   |  18<L>  |  3.21<H>    Ca    8.6      2018 06:15  Phos  3.6       Mg     1.6         TPro  6.8  /  Alb  3.6  /  TBili  0.3  /  DBili  x   /  AST  22  /  ALT  35  /  AlkPhos  68      PT/INR - ( 10 Apr 2018 23:08 )   PT: 14.6 sec;   INR: 1.31          PTT - ( 10 Apr 2018 23:08 )  PTT:28.4 sec  Urinalysis Basic - ( 2018 04:17 )    Color: Yellow / Appearance: Clear / S.015 / pH: x  Gluc: x / Ketone: NEGATIVE  / Bili: Negative / Urobili: 0.2 E.U./dL   Blood: x / Protein: NEGATIVE mg/dL / Nitrite: POSITIVE   Leuk Esterase: Large / RBC: < 5 /HPF / WBC Many /HPF   Sq Epi: x / Non Sq Epi: 0-5 /HPF / Bacteria: Many /HPF        RADIOLOGY & ADDITIONAL TESTS:  All imaging reviewed.

## 2018-04-11 NOTE — PROGRESS NOTE ADULT - PROBLEM SELECTOR PLAN 4
100.4 (oral) in ED with pyuria  - f/u blood and urine cultures  - c/w Ceftriaxone qd  - No CVA tenderness despite perirenal fat stranding on CT

## 2018-04-11 NOTE — H&P ADULT - NSHPREVIEWOFSYSTEMS_GEN_ALL_CORE
Review of system:  General: No malaise, fever, chills.  Eyes: No eye pain, visual disturbances, or discharge  ENMT:  No difficulty hearing, tinnitus, vertigo; No sinus or throat pain  Neck: No pain or stiffness  Respiratory: No cough, wheezing, chills or hemoptysis  Cardiovascular: (+) shortness of breath. No chest pain, palpitations, dizziness or leg swelling  Gastrointestinal: (+ abdominal distension,(+) diarrhea and constipation. No abdominal or epigastric pain. no nausea, vomiting or hematemesis;   No melena or hematochezia.  Genitourinary: Decreased urine flow and increased frequency  Skin: No lesion or rash

## 2018-04-11 NOTE — H&P ADULT - PROBLEM SELECTOR PLAN 2
Anion gap metabolic acidosis from elevated beta-hydroxybutyrate from starvation as pt reports not eating and drinking properly for the past month, also from insulin-resistance state with diabetes and decreased renal excretion from chronic kidney disease and likely tubular dysfunction from post-renal obstruction.  In addition, he takes Januvia which is known to cause euglycemic DKA  - Rodriguez placement  - IVF

## 2018-04-11 NOTE — DISCHARGE NOTE ADULT - PLAN OF CARE
prevent complications You are being discharged with an indwelling palacio catheter.  IT IS IMPERATIVE THAT YOU FOLLOW UP WITH UROLOGY WITHIN 2 WEEKS.  INDWELLING CATHETERS CAN PREDISPOSE YOU TO RECURRENT URINARY TRACT INFECTIONS.  Continue taking your Proscar and flomax. Continue taking your antibiotics as directed after discharge.  Follow up with urology (Dr. Courtney) Follow up with your PCP Dr. Quintero within one week.  Continue taking your medications as directed and check your fingersticks before meals and at bedtime.  Return to an ER with any blood sugar level >400.  Follow up with Dr. Campbell. You have anemia due to chronic disease.  Follow up with Dr. Roberts (hematology/oncology).  You will benefit from further erythropoeitin injections.  Continue taking iron supplement as directed. Continue taking your Proscar and flomax and please follow up with your urologist Dr. Courtney within one week. Continue taking your antibiotics as directed after discharge -  1 Ciprofloxacin 500 mg oral tablet twice a day for two more weeks.  Follow up with urology (Dr. Courtney) Continue taking your Proscar and Flomax and please follow up with your urologist Dr. Courtney within one week.

## 2018-04-11 NOTE — DISCHARGE NOTE ADULT - HOSPITAL COURSE
73yo M with PMH of HTN, HLD, DM2, prostatomegaly, CKD3, h/o SVT s/p ablation (2011) presented with progressive weakness for one month a/w abdominal distension.  He has had diarrhea for several weaks and was seen by his PCP recently.  He has a history of urinary retention and was referred to urology but he had not followed up.  He reported dysuria and frequency.  In the ED, VSS. Labs with anemia Hb 9.1/27, thrombocytopenia (137). mild 1yponatremia, hypokalemia, anion gap metabolic acidosis with elevated beta-hydroxybutyrate, lactate 1.1, UA with pyuria, leukocyte esterase with bacteria. Pt was given insulin 4u once and 2l NS bolus. A CT abd/pelvis was performed showing severe bladder distension, bilateral hydronephrosis, and prostatomegaly.  It also showed colitis.  He was started on Rocephin.  Palacio was placed draining 2L urine over 1 hour.  He only had one low grade temp of 100.4.  No leukocytosis.  His creatinine initially was ~3.5 but this improved with palacio placement and IVF.  He was monitored closely for post obstructive diuresis.  He was started on flomax.  His AGMA improved. 71yo M with PMH of HTN, HLD, DM2, prostatomegaly, CKD3, h/o SVT s/p ablation (2011) presented with progressive weakness for one month a/w abdominal distension.  He has had diarrhea for several weaks and was seen by his PCP recently.  He has a history of urinary retention and was referred to urology but he had not followed up.  He reported dysuria and frequency.  In the ED, VSS. Labs with anemia Hb 9.1/27, thrombocytopenia (137). mild 1yponatremia, hypokalemia, anion gap metabolic acidosis with elevated beta-hydroxybutyrate, lactate 1.1, UA with pyuria, leukocyte esterase with bacteria. Pt was given insulin 4u once and 2l NS bolus. A CT abd/pelvis was performed showing severe bladder distension, bilateral hydronephrosis, and prostatomegaly.  It also showed colitis.  He was started on Rocephin.  Palacio was placed draining 2L urine over 1 hour.  He only had one low grade temp of 100.4.  No leukocytosis.  His creatinine initially was ~3.5 but this improved with palacio placement and IVF.  He was monitored closely for post obstructive diuresis.  He was started on flomax.  His AGMA improved.  Endocrine was consulted for hyperglycemia and he was maintained on Lantus qam (14 units) + mealtime 6-8 units TID.  Urology was consulted recommending outpatient follow with discharge with indwelling palacio.  PSA elevated to 9, most likely in the setting of palacio manipulation.  Blood cx at admission and urine cx grew e coli sensitive to cipro.  Surveillance blood cx negative.  He was discharged on _____. 73yo M with PMH of HTN, HLD, DM2, prostatomegaly, CKD3, h/o SVT s/p ablation (2011) presented with progressive weakness for one month a/w abdominal distension.  He has had diarrhea for several weaks and was seen by his PCP recently.  He has a history of urinary retention and was referred to urology but he had not followed up.  He reported dysuria and frequency.  In the ED, VSS. Labs with anemia Hb 9.1/27, thrombocytopenia (137). mild 1yponatremia, hypokalemia, anion gap metabolic acidosis with elevated beta-hydroxybutyrate, lactate 1.1, UA with pyuria, leukocyte esterase with bacteria. Pt was given insulin 4u once and 2l NS bolus. A CT abd/pelvis was performed showing severe bladder distension, bilateral hydronephrosis, and prostatomegaly.  It also showed colitis.  He was started on Rocephin.  Palacio was placed draining 2L urine over 1 hour.  He only had one low grade temp of 100.4.  No leukocytosis.  His creatinine initially was ~3.5 but this improved with palacio placement and IVF.  He was monitored closely for post obstructive diuresis.  He was started on flomax.  His AGMA improved.  Endocrine was consulted for hyperglycemia and he was maintained on Lantus qam (14 units) + mealtime 6-8 units TID.  Urology was consulted recommending outpatient follow with discharge with indwelling palacio.  PSA elevated to 9, most likely in the setting of palacio manipulation.  Blood cx at admission and urine cx grew e coli sensitive to cipro.  Surveillance blood cx negative.  He underwent colonoscopy for colitis observed on CT which showed _____.   He was discharged on _____. 73yo M with PMH of HTN, HLD, DM2, prostatomegaly, CKD3, h/o SVT s/p ablation (2011) presented with progressive weakness for one month a/w abdominal distension.  He has had diarrhea for several weaks and was seen by his PCP recently.  He has a history of urinary retention and was referred to urology but he had not followed up.  He reported dysuria and frequency.  In the ED, VSS. Labs with anemia Hb 9.1/27, thrombocytopenia (137). mild 1yponatremia, hypokalemia, anion gap metabolic acidosis with elevated beta-hydroxybutyrate, lactate 1.1, UA with pyuria, leukocyte esterase with bacteria. Pt was given insulin 4u once and 2l NS bolus. A CT abd/pelvis was performed showing severe bladder distension, bilateral hydronephrosis, and prostatomegaly.  It also showed colitis.  He was started on Rocephin.  Palacio was placed draining 2L urine over 1 hour.  He only had one low grade temp of 100.4.  No leukocytosis.  His creatinine initially was ~3.5 but this improved with palacio placement and IVF.  He was monitored closely for post obstructive diuresis.  He was started on flomax.  His AGMA improved.  Endocrine was consulted for hyperglycemia and he was maintained on Lantus qam (14 units) + mealtime 6-8 units TID.  Urology was consulted recommending outpatient follow with discharge with indwelling palacio.  PSA elevated to 9, most likely in the setting of palacio manipulation.  Blood cx at admission and urine cx grew e coli sensitive to cipro.  Surveillance blood cx negative.  He underwent colonoscopy for colitis observed on CT which showed _____.   Patient was trial of voided and found not to have significant retention and safe for discharge without a Palacio. Patient was discharged home and will continue on po Cipro 500 mg q12h for two weeks for completion of treatment for bacteremia  2/2 UTI d/t E Coli. 73yo M with PMH of HTN, HLD, DM2, prostatomegaly, CKD3, h/o SVT s/p ablation (2011) presented with progressive weakness for one month a/w abdominal distension.  He has had diarrhea for several weaks and was seen by his PCP recently.  He has a history of urinary retention and was referred to urology but he had not followed up.  He reported dysuria and frequency.  In the ED, VSS. Labs with anemia Hb 9.1/27, thrombocytopenia (137). mild 1yponatremia, hypokalemia, anion gap metabolic acidosis with elevated beta-hydroxybutyrate, lactate 1.1, UA with pyuria, leukocyte esterase with bacteria. Pt was given insulin 4u once and 2l NS bolus. A CT abd/pelvis was performed showing severe bladder distension, bilateral hydronephrosis, and prostatomegaly.  It also showed colitis.  He was started on Rocephin.  Palacio was placed draining 2L urine over 1 hour.  He only had one low grade temp of 100.4.  No leukocytosis.  His creatinine initially was ~3.5 but this improved with palacio placement and IVF.  He was monitored closely for post obstructive diuresis.  He was started on flomax.  His AGMA improved. Blood cx at admission and urine cx grew e coli sensitive to cipro.  Surveillance blood cx negative.  Endocrine was consulted for hyperglycemia and he was maintained on Lantus qam (14 units initially, later 8 units) + mealtime 6-8 units TID.  Urology was consulted initially recommending outpatient follow with discharge with indwelling palacio.  PSA elevated to 9, most likely in the setting of palacio manipulation. However, patient was trial of voided and found not to have significant retention and deemed safe for discharge without a Palacio. He underwent colonoscopy for colitis observed on CT which showed mild segmental colitis and polyps. EGD showed gastritis and shallow ulcers. Patient was started on PPI per GI and has biopsy results pending.  Patient was discharged home with home PT and will continue on po Cipro 500 mg q12h for two weeks for completion of treatment for bacteremia  2/2 UTI d/t E Coli.

## 2018-04-11 NOTE — PROGRESS NOTE ADULT - PROBLEM SELECTOR PLAN 1
-Presenting with urinary retention.  severely enlarged prostate, bladder distension, and bilateral hydronephrosis observed on CT.    -s/p palacio  -unclear diagnosis, likely BPH.  Left message with PCP for collateral.  No outpt urologist according to pt.    -started flomax  -uro consulted  -monitor for postobstructive diuresis (see below)  -treatment of UTI as below

## 2018-04-11 NOTE — ED PROVIDER NOTE - PHYSICAL EXAMINATION
Gen: well appearing no acute distress conversant  HEENT: dry mucous membranes  CV: rrr no murmur  Resp: ctab  Abd: jaundice, mild abd distension, no rebound/guarding, no ttp  Ext: no edema to ext  MSK: no cva TTP, no midline back ttp c-s spine

## 2018-04-11 NOTE — H&P ADULT - ASSESSMENT
71yo M with PMH of HTN, HLD, DMT2, prostatomegaly, h/o SVT s/p ablation (2011) presents  here with progressive weakness for one month a/w abdominal distension, found to have AAMIR, bilateral nephrosis and urinary retention likely from an enlarged prostate

## 2018-04-11 NOTE — DISCHARGE NOTE ADULT - PATIENT PORTAL LINK FT
You can access the [facility] Patient Portal, offered by [facility], by registering with the following website: [email or web address]

## 2018-04-11 NOTE — DISCHARGE NOTE ADULT - SECONDARY DIAGNOSIS.
Essential hypertension Urinary tract infection with hematuria, site unspecified Type 2 diabetes mellitus with complication, without long-term current use of insulin Anemia, unspecified type

## 2018-04-11 NOTE — ED PROVIDER NOTE - PROGRESS NOTE DETAILS
Pt is back from cT scan pancolitis, hydronephrosis on prelim read of CT scan no surg intervention as per radiology.

## 2018-04-11 NOTE — PROGRESS NOTE ADULT - PROBLEM SELECTOR PLAN 3
Anion gap metabolic acidosis from uremia + starvation ketosis  -trend BMP  -c/w IVF.  Encourage PO.    -monitor FS.  SSI

## 2018-04-11 NOTE — H&P ADULT - PROBLEM SELECTOR PLAN 1
Elderly man with history of poorly controlled diabetes (as per pt), HTN, enlarged prostate here with generalized weakness likely from uremia. His AAMIR is likely multifactorial - poor oral intake plus being on diuretics plus Elderly man with history of poorly controlled diabetes (as per pt), HTN, enlarged prostate here with generalized weakness likely from uremia. His AAMIR is likely multifactorial - poor oral intake and being on diuretics and post-renal obstruction from enlarged prostate Elderly man with history of poorly controlled diabetes (as per pt), HTN, enlarged prostate here with generalized weakness likely from uremia. His AAMIR is likely multifactorial - poor oral intake and being on diuretics and post-renal obstruction from enlarged prostate  - Call urology consult  - Once palacio is placed, will start normal saline (See below for hyponatremia)  - f/u urine lytes  - Renal US post-palacio to see any improvement

## 2018-04-11 NOTE — H&P ADULT - HISTORY OF PRESENT ILLNESS
73yo M with PMH of HTN, HLD, DMT2, prostatomegaly, h/o SVT s/p ablation (2011) presents  here with progressive weakness for one month a/w abdominal distension. Pt reports no known history of kidney disease and was asked by his PMD to see a urologist that he never did. He reports increased dyspnea on exertion, usually can climb more than one flight of stairs but get SOB with less than one flight now a/w anorexia, denies any F/C, chest pain, cough, abdominal pain, N/V. He has constipation and small amount of non-bloody diarrhea. He denies alcohol abuse, never had colonoscopy.     ED course: VSS. Labs with anemia Hb 9.1/27, thrombocytopenia (137). Hyponatremia, hypokalemia, anion gap metabolic acidosis with elevated beta-hydroxybutyrate, lactate 1.1, UA with pyuria, leukocyte esterase with bacteria. Pt was given insulin 4u once and 2l NS bolus. A CT abd/pelvis was performed.

## 2018-04-11 NOTE — DISCHARGE NOTE ADULT - CARE PLAN
Principal Discharge DX:	Obstructive uropathy  Secondary Diagnosis:	Essential hypertension  Secondary Diagnosis:	Urinary tract infection with hematuria, site unspecified  Secondary Diagnosis:	Type 2 diabetes mellitus with complication, without long-term current use of insulin  Secondary Diagnosis:	Anemia, unspecified type Principal Discharge DX:	Obstructive uropathy  Goal:	prevent complications  Assessment and plan of treatment:	You are being discharged with an indwelling palacio catheter.  IT IS IMPERATIVE THAT YOU FOLLOW UP WITH UROLOGY WITHIN 2 WEEKS.  INDWELLING CATHETERS CAN PREDISPOSE YOU TO RECURRENT URINARY TRACT INFECTIONS.  Continue taking your Proscar and flomax.  Secondary Diagnosis:	Essential hypertension  Secondary Diagnosis:	Urinary tract infection with hematuria, site unspecified  Assessment and plan of treatment:	Continue taking your antibiotics as directed after discharge.  Follow up with urology (Dr. Courtney)  Secondary Diagnosis:	Type 2 diabetes mellitus with complication, without long-term current use of insulin  Assessment and plan of treatment:	Follow up with your PCP Dr. Quintero within one week.  Continue taking your medications as directed and check your fingersticks before meals and at bedtime.  Return to an ER with any blood sugar level >400.  Follow up with Dr. Campbell.  Secondary Diagnosis:	Anemia, unspecified type  Assessment and plan of treatment:	You have anemia due to chronic disease.  Follow up with Dr. Roberts (hematology/oncology).  You will benefit from further erythropoeitin injections.  Continue taking iron supplement as directed. Principal Discharge DX:	Obstructive uropathy  Goal:	prevent complications  Assessment and plan of treatment:	Continue taking your Proscar and flomax and please follow up with your urologist Dr. Courtney within one week.  Secondary Diagnosis:	Essential hypertension  Secondary Diagnosis:	Urinary tract infection with hematuria, site unspecified  Assessment and plan of treatment:	Continue taking your antibiotics as directed after discharge -  1 Ciprofloxacin 500 mg oral tablet twice a day for two more weeks.  Follow up with urology (Dr. Courtney)  Secondary Diagnosis:	Type 2 diabetes mellitus with complication, without long-term current use of insulin  Assessment and plan of treatment:	Follow up with your PCP Dr. Quintero within one week.  Continue taking your medications as directed and check your fingersticks before meals and at bedtime.  Return to an ER with any blood sugar level >400.  Follow up with Dr. Campbell.  Secondary Diagnosis:	Anemia, unspecified type  Assessment and plan of treatment:	You have anemia due to chronic disease.  Follow up with Dr. Roberts (hematology/oncology).  You will benefit from further erythropoeitin injections.  Continue taking iron supplement as directed.

## 2018-04-11 NOTE — H&P ADULT - NSHPPHYSICALEXAM_GEN_ALL_CORE
Vital Signs   T(C): 38 (11 Apr 2018 05:44), Max: 38 (11 Apr 2018 05:44)  T(F): 100.4 (11 Apr 2018 05:44), Max: 100.4 (11 Apr 2018 05:44)  HR: 87 (11 Apr 2018 05:44) (82 - 91)  BP: 160/74 (11 Apr 2018 05:44) (158/73 - 160/74)  RR: 18 (11 Apr 2018 05:44) (18 - 18)  SpO2: 99% (11 Apr 2018 05:44) (95% - 99%)    General: Tired appearing, NAD  Head: Normocephalic; atraumatic  Eyes: PERRL, EOM intact; conjunctiva and sclera clear  ENMT: No nasal discharge; airway clear  Neck: Supple; non tender; no masses  Respiratory: No wheezes, rales or rhonchi  Cardiovascular: Regular rate and rhythm. S1 and S2 Normal; No murmurs, gallops or rubs  Gastrointestinal: Soft, non-tender, distended with suprapubic fullness and dullness on percussion at LLQ; Normal bowel sounds; No hepatosplenomegaly  Genitourinary: No costovertebral angle tenderness  Extremities: Normal range of motion. Trace bipedal edema  Neuro -  - Mental Status:  Alert, awake, oriented to person, place, and time; Speech is fluent with intact comprehension  - Cranial Nerves II-XII intact. No focal deficit.

## 2018-04-11 NOTE — DISCHARGE NOTE ADULT - MEDICATION SUMMARY - MEDICATIONS TO TAKE
I will START or STAY ON the medications listed below when I get home from the hospital:    finasteride 5 mg oral tablet  -- 1 tab(s) by mouth once a day MDD:1 tab  -- Indication: For BPH    carvedilol 3.125 mg oral tablet  -- 1 tab(s) by mouth 2 times a day  -- Indication: For Essential hypertension    hydroCHLOROthiazide 25 mg oral tablet  -- 1 tab(s) by mouth once a day  -- Indication: For Essential hypertension    Lasix 40 mg oral tablet  -- 1 tab(s) by mouth once a day  -- Indication: For Essential hypertension    Lutein 20 mg oral capsule  -- 1 cap(s) by mouth once a day  -- Indication: For Prophylactic measure    Cipro 500 mg oral tablet  -- 1 tab(s) by mouth 2 times a day   -- Avoid prolonged or excessive exposure to direct and/or artificial sunlight while taking this medication.  Check with your doctor before becoming pregnant.  Do not take dairy products, antacids, or iron preparations within one hour of this medication.  Finish all this medication unless otherwise directed by prescriber.  Medication should be taken with plenty of water.    -- Indication: For UTI (urinary tract infection)

## 2018-04-11 NOTE — PROGRESS NOTE ADULT - PROBLEM SELECTOR PLAN 5
Normocytic.  Hgb 9.1 at presentation.  Repeat 8.7 (likely dilutional).  VSS.  Denies lightheadedness, SOB, CP, palpitations.    -No e/o bleeding.  Likely 2/2 anemia of chronic disease and possible iron deficiency.  f/u iron studies, retic count and EPO level.  obtain collateral from PCP. Normocytic.  Hgb 9.1 at presentation.  Repeat 8.7 (likely dilutional).  VSS.  Denies lightheadedness, SOB, CP, palpitations.    -Mild bleeding post-palacio placement but likely 2/2 anemia of chronic disease and possible iron deficiency.  f/u iron studies, retic count and EPO level.  obtain collateral from PCP.  -CBC at 12.  Hep SQ d/c'ed, unnecessary

## 2018-04-11 NOTE — CONSULT NOTE ADULT - PROBLEM SELECTOR RECOMMENDATION 9
-16 fr palacio removed. #18 fr 6 eye catheter placed and bladder irrigated. Moderate amount clots. #20 Turkmen then placed. Urine light pink.   -continue palacio  -irrigate PRN  -NO CBI at this time  -start flomax  -f/u Ucx's  -will follow

## 2018-04-11 NOTE — ED PROVIDER NOTE - MEDICAL DECISION MAKING DETAILS
72M with 1 month of decreased PO intake causing weakness, dehydration concern for malignancy with jaundice, e 72M with 1 month of decreased PO intake causing weakness, dehydration concern for malignancy with jaundice. Pt's abd nontender, able to dudley PO however pt with jaundice, weakness, found to have pancolitis on CT scan. Plan for IV fluids, no indication for IV abx at this time, no stool at this time. No active diarrhea. Doubt c. diff. Will admit for further w/u of abnormalities of labs, weakness.

## 2018-04-11 NOTE — H&P ADULT - PROBLEM SELECTOR PLAN 5
Hyperosmolar hyponatremia likely from hyperglycemia with patient's history of insulin resistance, and elevated BUN from CKD, his serum osmolal gap is 1, so there is low suspicion of additional toxins   - Will consider NS with 40meq KCl and monitor BMP q8h  - f/u urine lytes  - Hold any diuretics   - expect post-obstruction diuresis after palacio is placed, so pt will need aggressive IV fluid. With his severe hydronephrosis, we expect renal tubular damage and malfunction of sodium channel and vasopressin response. We should anticipate to keep his intake about 75% of his output

## 2018-04-12 DIAGNOSIS — R78.81 BACTEREMIA: ICD-10-CM

## 2018-04-12 LAB
ANION GAP SERPL CALC-SCNC: 19 MMOL/L — HIGH (ref 5–17)
BUN SERPL-MCNC: 49 MG/DL — HIGH (ref 7–23)
CALCIUM SERPL-MCNC: 8.8 MG/DL — SIGNIFICANT CHANGE UP (ref 8.4–10.5)
CHLORIDE SERPL-SCNC: 98 MMOL/L — SIGNIFICANT CHANGE UP (ref 96–108)
CO2 SERPL-SCNC: 17 MMOL/L — LOW (ref 22–31)
CREAT SERPL-MCNC: 2.24 MG/DL — HIGH (ref 0.5–1.3)
CULTURE RESULTS: NO GROWTH — SIGNIFICANT CHANGE UP
EPO SERPL-MCNC: 10.9 MIU/ML — SIGNIFICANT CHANGE UP (ref 2.6–18.5)
FIBRINOGEN PPP-MCNC: 936 MG/DL — HIGH (ref 310–510)
GLUCOSE BLDC GLUCOMTR-MCNC: 266 MG/DL — HIGH (ref 70–99)
GLUCOSE BLDC GLUCOMTR-MCNC: 280 MG/DL — HIGH (ref 70–99)
GLUCOSE BLDC GLUCOMTR-MCNC: 286 MG/DL — HIGH (ref 70–99)
GLUCOSE BLDC GLUCOMTR-MCNC: 341 MG/DL — HIGH (ref 70–99)
GLUCOSE SERPL-MCNC: 218 MG/DL — HIGH (ref 70–99)
HCT VFR BLD CALC: 25.4 % — LOW (ref 39–50)
HGB BLD-MCNC: 8.6 G/DL — LOW (ref 13–17)
INR BLD: 1.43 — HIGH (ref 0.88–1.16)
MAGNESIUM SERPL-MCNC: 1.9 MG/DL — SIGNIFICANT CHANGE UP (ref 1.6–2.6)
MCHC RBC-ENTMCNC: 27.8 PG — SIGNIFICANT CHANGE UP (ref 27–34)
MCHC RBC-ENTMCNC: 33.9 G/DL — SIGNIFICANT CHANGE UP (ref 32–36)
MCV RBC AUTO: 82.2 FL — SIGNIFICANT CHANGE UP (ref 80–100)
PHOSPHATE SERPL-MCNC: 2.8 MG/DL — SIGNIFICANT CHANGE UP (ref 2.5–4.5)
PLATELET # BLD AUTO: 120 K/UL — LOW (ref 150–400)
POTASSIUM SERPL-MCNC: 3.6 MMOL/L — SIGNIFICANT CHANGE UP (ref 3.5–5.3)
POTASSIUM SERPL-SCNC: 3.6 MMOL/L — SIGNIFICANT CHANGE UP (ref 3.5–5.3)
PROTHROM AB SERPL-ACNC: 16 SEC — HIGH (ref 9.8–12.7)
PT 50/50: 12.1 SECS — SIGNIFICANT CHANGE UP (ref 9.7–13.5)
RBC # BLD: 3.09 M/UL — LOW (ref 4.2–5.8)
RBC # FLD: 14 % — SIGNIFICANT CHANGE UP (ref 10.3–16.9)
SODIUM SERPL-SCNC: 134 MMOL/L — LOW (ref 135–145)
SPECIMEN SOURCE: SIGNIFICANT CHANGE UP
THROMBIN TIME: 22.8 SEC — SIGNIFICANT CHANGE UP (ref 17.6–24)
WBC # BLD: 9 K/UL — SIGNIFICANT CHANGE UP (ref 3.8–10.5)
WBC # FLD AUTO: 9 K/UL — SIGNIFICANT CHANGE UP (ref 3.8–10.5)

## 2018-04-12 RX ORDER — INSULIN LISPRO 100/ML
3 VIAL (ML) SUBCUTANEOUS
Qty: 0 | Refills: 0 | Status: DISCONTINUED | OUTPATIENT
Start: 2018-04-12 | End: 2018-04-13

## 2018-04-12 RX ORDER — ACETAMINOPHEN 500 MG
650 TABLET ORAL EVERY 6 HOURS
Qty: 0 | Refills: 0 | Status: DISCONTINUED | OUTPATIENT
Start: 2018-04-12 | End: 2018-04-19

## 2018-04-12 RX ORDER — MAGNESIUM SULFATE 500 MG/ML
1 VIAL (ML) INJECTION ONCE
Qty: 0 | Refills: 0 | Status: COMPLETED | OUTPATIENT
Start: 2018-04-12 | End: 2018-04-12

## 2018-04-12 RX ORDER — POTASSIUM CHLORIDE 20 MEQ
40 PACKET (EA) ORAL ONCE
Qty: 0 | Refills: 0 | Status: COMPLETED | OUTPATIENT
Start: 2018-04-12 | End: 2018-04-12

## 2018-04-12 RX ORDER — INSULIN GLARGINE 100 [IU]/ML
12 INJECTION, SOLUTION SUBCUTANEOUS EVERY MORNING
Qty: 0 | Refills: 0 | Status: DISCONTINUED | OUTPATIENT
Start: 2018-04-12 | End: 2018-04-15

## 2018-04-12 RX ORDER — INSULIN GLARGINE 100 [IU]/ML
6 INJECTION, SOLUTION SUBCUTANEOUS ONCE
Qty: 0 | Refills: 0 | Status: COMPLETED | OUTPATIENT
Start: 2018-04-12 | End: 2018-04-12

## 2018-04-12 RX ORDER — FINASTERIDE 5 MG/1
5 TABLET, FILM COATED ORAL DAILY
Qty: 0 | Refills: 0 | Status: DISCONTINUED | OUTPATIENT
Start: 2018-04-12 | End: 2018-04-19

## 2018-04-12 RX ORDER — DOCUSATE SODIUM 100 MG
100 CAPSULE ORAL
Qty: 0 | Refills: 0 | Status: DISCONTINUED | OUTPATIENT
Start: 2018-04-12 | End: 2018-04-19

## 2018-04-12 RX ADMIN — Medication 325 MILLIGRAM(S): at 06:27

## 2018-04-12 RX ADMIN — Medication 100 GRAM(S): at 07:42

## 2018-04-12 RX ADMIN — INSULIN GLARGINE 6 UNIT(S): 100 INJECTION, SOLUTION SUBCUTANEOUS at 08:58

## 2018-04-12 RX ADMIN — Medication 5 MILLIGRAM(S): at 21:42

## 2018-04-12 RX ADMIN — Medication 325 MILLIGRAM(S): at 21:42

## 2018-04-12 RX ADMIN — Medication 6: at 13:09

## 2018-04-12 RX ADMIN — Medication 6: at 08:58

## 2018-04-12 RX ADMIN — TAMSULOSIN HYDROCHLORIDE 0.4 MILLIGRAM(S): 0.4 CAPSULE ORAL at 21:42

## 2018-04-12 RX ADMIN — Medication 40 MILLIEQUIVALENT(S): at 07:42

## 2018-04-12 RX ADMIN — Medication 650 MILLIGRAM(S): at 22:37

## 2018-04-12 RX ADMIN — Medication 81 MILLIGRAM(S): at 13:10

## 2018-04-12 RX ADMIN — Medication 100 MILLIGRAM(S): at 18:37

## 2018-04-12 RX ADMIN — SIMVASTATIN 10 MILLIGRAM(S): 20 TABLET, FILM COATED ORAL at 21:42

## 2018-04-12 RX ADMIN — SODIUM CHLORIDE 100 MILLILITER(S): 9 INJECTION INTRAMUSCULAR; INTRAVENOUS; SUBCUTANEOUS at 23:40

## 2018-04-12 RX ADMIN — Medication 325 MILLIGRAM(S): at 13:10

## 2018-04-12 RX ADMIN — CEFTRIAXONE 1000 MILLIGRAM(S): 500 INJECTION, POWDER, FOR SOLUTION INTRAMUSCULAR; INTRAVENOUS at 06:27

## 2018-04-12 RX ADMIN — Medication 3 UNIT(S): at 21:42

## 2018-04-12 RX ADMIN — Medication 6: at 18:37

## 2018-04-12 RX ADMIN — FINASTERIDE 5 MILLIGRAM(S): 5 TABLET, FILM COATED ORAL at 13:10

## 2018-04-12 RX ADMIN — Medication 8: at 21:42

## 2018-04-12 NOTE — CONSULT NOTE ADULT - ASSESSMENT
73yo M with PMH of HTN, HLD, DMT2, prostatomegaly, h/o SVT s/p ablation (2011) presenting with progressive weakness admitted with obstructive uropathy, AAMIR, UTI, and colitis.    Problem/Plan - 1:  ·  Problem: Obstructive uropathy.  Plan: -Presenting with urinary retention.  severely enlarged prostate, bladder distension, and bilateral hydronephrosis observed on CT.    -s/p palacio  -unclear diagnosis, likely BPH.  Left message with PCP for collateral.  No outpt urologist according to pt.    -started flomax  -uro consulted  -monitor for postobstructive diuresis (see below)  -treatment of UTI as below.     Problem/Plan - 2:  ·  Problem: AAMIR (acute kidney injury).  Plan: Unclear baseline.  Creatinine on arrival 3.55, slightly improved with IVF.  Likely AAMIR on CKD.  Etiology of AAMIR likely postobstructive + prerenal (diarrhea + lasix).  Left message without PCP to obtain collateral on baseline.  -urology consulted.  started flomax.  c/w indwelling palacio.  -frequent BMPs, next at noon.  Watch for postobstructive diuresis.  On NS at 100/hr for now.  Will match output by 50%.  Cautious electrolyte repletion with AAMIR.  -f/u renal U/S   -f/u urine lytes  -hold lasix.     Problem/Plan - 3:  ·  Problem: Metabolic acidosis, increased anion gap.  Plan: Anion gap metabolic acidosis from uremia + starvation ketosis  -trend BMP  -c/w IVF.  Encourage PO.    -monitor FS.  SSI.     Problem/Plan - 4:  ·  Problem: UTI (urinary tract infection).  Plan: 100.4 (oral) in ED with pyuria  - f/u blood and urine cultures  - c/w Ceftriaxone qd  - No CVA tenderness despite perirenal fat stranding on CT.

## 2018-04-12 NOTE — CONSULT NOTE ADULT - PROBLEM SELECTOR RECOMMENDATION 9
1) Continue Ceftriaxone 1gr IV q24h  2) Follow Ucx, Bcx ID/sensitivity  3) Send Stool cx and C diff toxin

## 2018-04-12 NOTE — PROGRESS NOTE ADULT - PROBLEM SELECTOR PLAN 1
-Presenting with urinary retention.  severely enlarged prostate, bladder distension, and bilateral hydronephrosis observed on CT.    -s/p palacio  -unclear diagnosis, likely BPH.  Had been referred to uro outpt but did not follow up.  -started flomax and proscar  -f/u uro reccs  -monitor for postobstructive diuresis (see below)  -treatment of UTI as below  -f/u PSA

## 2018-04-12 NOTE — PROGRESS NOTE ADULT - PROBLEM SELECTOR PLAN 3
Anion gap metabolic acidosis from uremia + starvation ketosis  -trend BMP  -c/w IVF.  Encourage PO.    -monitor FS.  SSI Resolved, back at baseline Cr 2.2.  Creatinine on arrival 3.55, improved with IVF.  Likely AAMIR on CKD.  Etiology of AAMIR likely postobstructive + prerenal (diarrhea + lasix).   -urology following.  started flomax and proscar.  c/w indwelling palacio.  -frequent BMPs.  Watch for postobstructive diuresis.  On NS at 100/hr for now.  Will match output by 50%.  Cautious electrolyte repletion with CKD  -renal U/S with moderate b/l hydro.  -pre renal on lytes  -hold lasix

## 2018-04-12 NOTE — PROGRESS NOTE ADULT - SUBJECTIVE AND OBJECTIVE BOX
INTERVAL/OVERNIGHT EVENTS:        SUBJECTIVE:  Seen and examined at bedside.    ROS otherwise negative.    VITAL SIGNS:  T(F): 98 (04-11-18 @ 21:15)  HR: 82 (04-11-18 @ 21:15)  BP: 135/74 (04-11-18 @ 21:15)  RR: 18 (04-11-18 @ 21:15)  SpO2: 98% (04-11-18 @ 21:15)  Wt(kg): --    PHYSICAL EXAM:        MEDICATIONS  (STANDING):  aspirin  chewable 81 milliGRAM(s) Oral daily  cefTRIAXone Injectable. 1000 milliGRAM(s) IV Push every 24 hours  cefTRIAXone Injectable.      dextrose 5%. 1000 milliLiter(s) (50 mL/Hr) IV Continuous <Continuous>  dextrose 50% Injectable 12.5 Gram(s) IV Push once  dextrose 50% Injectable 25 Gram(s) IV Push once  dextrose 50% Injectable 25 Gram(s) IV Push once  ferrous    sulfate 325 milliGRAM(s) Oral three times a day  influenza   Vaccine 0.5 milliLiter(s) IntraMuscular once  insulin lispro (HumaLOG) corrective regimen sliding scale   SubCutaneous Before meals and at bedtime  melatonin 5 milliGRAM(s) Oral at bedtime  simvastatin 10 milliGRAM(s) Oral at bedtime  sodium chloride 0.9%. 1000 milliLiter(s) (100 mL/Hr) IV Continuous <Continuous>  tamsulosin 0.4 milliGRAM(s) Oral at bedtime    MEDICATIONS  (PRN):  dextrose Gel 1 Dose(s) Oral once PRN Blood Glucose LESS THAN 70 milliGRAM(s)/deciliter  glucagon  Injectable 1 milliGRAM(s) IntraMuscular once PRN Glucose LESS THAN 70 milligrams/deciliter      Allergies    No Known Allergies    Intolerances      aspirin  chewable 81 milliGRAM(s) Oral daily  cefTRIAXone Injectable. 1000 milliGRAM(s) IV Push every 24 hours  cefTRIAXone Injectable.      dextrose 5%. 1000 milliLiter(s) IV Continuous <Continuous>  dextrose 50% Injectable 12.5 Gram(s) IV Push once  dextrose 50% Injectable 25 Gram(s) IV Push once  dextrose 50% Injectable 25 Gram(s) IV Push once  dextrose Gel 1 Dose(s) Oral once PRN  ferrous    sulfate 325 milliGRAM(s) Oral three times a day  glucagon  Injectable 1 milliGRAM(s) IntraMuscular once PRN  influenza   Vaccine 0.5 milliLiter(s) IntraMuscular once  insulin lispro (HumaLOG) corrective regimen sliding scale   SubCutaneous Before meals and at bedtime  melatonin 5 milliGRAM(s) Oral at bedtime  simvastatin 10 milliGRAM(s) Oral at bedtime  sodium chloride 0.9%. 1000 milliLiter(s) IV Continuous <Continuous>  tamsulosin 0.4 milliGRAM(s) Oral at bedtime      LABS:                          8.8    9.3   )-----------( 123      ( 11 Apr 2018 20:11 )             25.5     04-11    132<L>  |  96  |  57<H>  ----------------------------<  210<H>  3.7   |  21<L>  |  2.43<H>    Ca    8.8      11 Apr 2018 20:11  Phos  3.0     04-11  Mg     1.9     04-11    TPro  6.8  /  Alb  3.6  /  TBili  0.3  /  DBili  x   /  AST  22  /  ALT  35  /  AlkPhos  68  04-11    PT/INR - ( 11 Apr 2018 12:56 )   PT: 15.6 sec;   INR: 1.40          PTT - ( 10 Apr 2018 23:08 )  PTT:28.4 sec  Urinalysis Basic - ( 11 Apr 2018 09:09 )    Color: Yellow / Appearance: Clear / SG: <=1.005 / pH: x  Gluc: x / Ketone: NEGATIVE  / Bili: Negative / Urobili: 0.2 E.U./dL   Blood: x / Protein: NEGATIVE mg/dL / Nitrite: POSITIVE   Leuk Esterase: Moderate / RBC: Many /HPF / WBC Many /HPF   Sq Epi: x / Non Sq Epi: 0-5 /HPF / Bacteria: Many /HPF        RADIOLOGY & ADDITIONAL TESTS:  All imaging reviewed. INTERVAL/OVERNIGHT EVENTS:  1.9 L output overnight.  Received 1.2L NS.  Hgb stable >8.  Urine colored improved.  BCx returned positive for Ecoli.    SUBJECTIVE:  Seen and examined at bedside.  Reports feeling weak and tired, same as before.  Able to sleep 3-4 hours overnight with melatonin.  Denies F/C, CP, SOB, cough, abd pain, N/V/D/C.  No BMs overnight.  (+) chronic LBP.  No leg pain.      ROS otherwise negative.    VITAL SIGNS:  T(F): 98 (04-11-18 @ 21:15)  HR: 82 (04-11-18 @ 21:15)  BP: 135/74 (04-11-18 @ 21:15)  RR: 18 (04-11-18 @ 21:15)  SpO2: 98% (04-11-18 @ 21:15)  Wt(kg): --    PHYSICAL EXAM:    General:  NAD, nontoxic appearing, WDWN  HENT:  EOMI, R pupil reactive / deformed 2/2 cataract sx.  L pupil pinpoint, unclear if reactive.  No sinus tenderness.  oropharynx WNL.  MMM  Neck:  Trachea midline.  No JVD, LAD, or thyromegaly.  Heart:  S1S2 no M/R/G, regular  Lungs:  CTAB no wheezing, rhonchi or rales.  No accessory muscle use.  No respiratory distress.  Abdomen:  NABS.  soft, nontender, nondistended.  no guarding.  no ascites.  no organomegaly.  Vascular:  Peripheral pulses palpable  Extremities:  Trace RLE edema.  Calves nontender.    Back:  Mild bilateral low back TTP.  No CVA tenderness  Neuro:  AOx3, no facial asymmetry, nonfocal, no slurred speech  Skin:  No rash  (+) palacio with yellow urine, mild blood    MEDICATIONS  (STANDING):  aspirin  chewable 81 milliGRAM(s) Oral daily  cefTRIAXone Injectable. 1000 milliGRAM(s) IV Push every 24 hours  cefTRIAXone Injectable.      dextrose 5%. 1000 milliLiter(s) (50 mL/Hr) IV Continuous <Continuous>  dextrose 50% Injectable 12.5 Gram(s) IV Push once  dextrose 50% Injectable 25 Gram(s) IV Push once  dextrose 50% Injectable 25 Gram(s) IV Push once  ferrous    sulfate 325 milliGRAM(s) Oral three times a day  influenza   Vaccine 0.5 milliLiter(s) IntraMuscular once  insulin lispro (HumaLOG) corrective regimen sliding scale   SubCutaneous Before meals and at bedtime  melatonin 5 milliGRAM(s) Oral at bedtime  simvastatin 10 milliGRAM(s) Oral at bedtime  sodium chloride 0.9%. 1000 milliLiter(s) (100 mL/Hr) IV Continuous <Continuous>  tamsulosin 0.4 milliGRAM(s) Oral at bedtime    MEDICATIONS  (PRN):  dextrose Gel 1 Dose(s) Oral once PRN Blood Glucose LESS THAN 70 milliGRAM(s)/deciliter  glucagon  Injectable 1 milliGRAM(s) IntraMuscular once PRN Glucose LESS THAN 70 milligrams/deciliter      Allergies    No Known Allergies    Intolerances      aspirin  chewable 81 milliGRAM(s) Oral daily  cefTRIAXone Injectable. 1000 milliGRAM(s) IV Push every 24 hours  cefTRIAXone Injectable.      dextrose 5%. 1000 milliLiter(s) IV Continuous <Continuous>  dextrose 50% Injectable 12.5 Gram(s) IV Push once  dextrose 50% Injectable 25 Gram(s) IV Push once  dextrose 50% Injectable 25 Gram(s) IV Push once  dextrose Gel 1 Dose(s) Oral once PRN  ferrous    sulfate 325 milliGRAM(s) Oral three times a day  glucagon  Injectable 1 milliGRAM(s) IntraMuscular once PRN  influenza   Vaccine 0.5 milliLiter(s) IntraMuscular once  insulin lispro (HumaLOG) corrective regimen sliding scale   SubCutaneous Before meals and at bedtime  melatonin 5 milliGRAM(s) Oral at bedtime  simvastatin 10 milliGRAM(s) Oral at bedtime  sodium chloride 0.9%. 1000 milliLiter(s) IV Continuous <Continuous>  tamsulosin 0.4 milliGRAM(s) Oral at bedtime      LABS:                          8.8    9.3   )-----------( 123      ( 11 Apr 2018 20:11 )             25.5     04-11    132<L>  |  96  |  57<H>  ----------------------------<  210<H>  3.7   |  21<L>  |  2.43<H>    Ca    8.8      11 Apr 2018 20:11  Phos  3.0     04-11  Mg     1.9     04-11    TPro  6.8  /  Alb  3.6  /  TBili  0.3  /  DBili  x   /  AST  22  /  ALT  35  /  AlkPhos  68  04-11    PT/INR - ( 11 Apr 2018 12:56 )   PT: 15.6 sec;   INR: 1.40          PTT - ( 10 Apr 2018 23:08 )  PTT:28.4 sec  Urinalysis Basic - ( 11 Apr 2018 09:09 )    Color: Yellow / Appearance: Clear / SG: <=1.005 / pH: x  Gluc: x / Ketone: NEGATIVE  / Bili: Negative / Urobili: 0.2 E.U./dL   Blood: x / Protein: NEGATIVE mg/dL / Nitrite: POSITIVE   Leuk Esterase: Moderate / RBC: Many /HPF / WBC Many /HPF   Sq Epi: x / Non Sq Epi: 0-5 /HPF / Bacteria: Many /HPF        RADIOLOGY & ADDITIONAL TESTS:  All imaging reviewed.

## 2018-04-12 NOTE — PHYSICAL THERAPY INITIAL EVALUATION ADULT - DIAGNOSIS, PT EVAL
5A: Primary Prevention/Risk Reduction for Loss of Balance and Falling; 6B: Impaired Aerobic Capacity/Endurance Associated with Deconditioning

## 2018-04-12 NOTE — PROGRESS NOTE ADULT - PROBLEM SELECTOR PLAN 2
Resolved, back at baseline Cr 2.2.  Creatinine on arrival 3.55, improved with IVF.  Likely AAMIR on CKD.  Etiology of AAMIR likely postobstructive + prerenal (diarrhea + lasix).   -urology following.  started flomax and proscar.  c/w indwelling palacio.  -frequent BMPs.  Watch for postobstructive diuresis.  On NS at 100/hr for now.  Will match output by 50%.  Cautious electrolyte repletion with CKD  -renal U/S with moderate b/l hydro.  -pre renal on lytes  -hold lasix -Positive blood cx on admission.  C/w Rocephin.  F/u sensitivities. Treatment of UTI as below.  -Afebrile.  Nontoxic appearing.  Normotensive.  Lactate <2 on admission.

## 2018-04-12 NOTE — CONSULT NOTE ADULT - SUBJECTIVE AND OBJECTIVE BOX
HPI:  71yo M with PMH of HTN, HLD, DMT2, prostatomegaly, h/o SVT s/p ablation (2011) presents  here with progressive weakness for one month a/w abdominal distension. Pt reports no known history of kidney disease and was asked by his PMD to see a urologist that he never did. He reports increased dyspnea on exertion, usually can climb more than one flight of stairs but get SOB with less than one flight now a/w anorexia, denies any F/C, chest pain, cough, abdominal pain, N/V. He has constipation and small amount of non-bloody diarrhea. He denies alcohol abuse, never had colonoscopy.     ED course: VSS. Labs with anemia Hb 9.1/27, thrombocytopenia (137). Hyponatremia, hypokalemia, anion gap metabolic acidosis with elevated beta-hydroxybutyrate, lactate 1.1, UA with pyuria, leukocyte esterase with bacteria. Pt was given insulin 4u once and 2l NS bolus. A CT abd/pelvis was performed. (11 Apr 2018 06:02)      PAST MEDICAL & SURGICAL HISTORY:  Enlarged prostate  DM (diabetes mellitus)  HTN (hypertension)  H/O prior ablation treatment        REVIEW OF SYSTEMS:    General: (+)weakness; no fevers, no chills  Skin/Breast: no rash  Respiratory and Thorax: (+) SOB, no cough  Cardiovascular:	No chest pain, (+) SHAFER  Gastrointestinal:	 no nausea, vomiting , diarrhea  Genitourinary:	no dysuria, (+) difficulty urinating, no hematuria  Musculoskeletal:	no weakness, no joint swelling/pain  Neurological:no focal weakness/numbness  Endocrine: no polyuria, no polydipsia      ANTIBIOTICS:  MEDICATIONS  (STANDING):  aspirin  chewable 81 milliGRAM(s) Oral daily  cefTRIAXone Injectable. 1000 milliGRAM(s) IV Push every 24 hours  cefTRIAXone Injectable.      dextrose 5%. 1000 milliLiter(s) (50 mL/Hr) IV Continuous <Continuous>  dextrose 50% Injectable 12.5 Gram(s) IV Push once  dextrose 50% Injectable 25 Gram(s) IV Push once  dextrose 50% Injectable 25 Gram(s) IV Push once  docusate sodium 100 milliGRAM(s) Oral two times a day  ferrous    sulfate 325 milliGRAM(s) Oral three times a day  finasteride 5 milliGRAM(s) Oral daily  influenza   Vaccine 0.5 milliLiter(s) IntraMuscular once  insulin lispro (HumaLOG) corrective regimen sliding scale   SubCutaneous Before meals and at bedtime  melatonin 5 milliGRAM(s) Oral at bedtime  simvastatin 10 milliGRAM(s) Oral at bedtime  sodium chloride 0.9%. 1000 milliLiter(s) (100 mL/Hr) IV Continuous <Continuous>  tamsulosin 0.4 milliGRAM(s) Oral at bedtime    MEDICATIONS  (PRN):  dextrose Gel 1 Dose(s) Oral once PRN Blood Glucose LESS THAN 70 milliGRAM(s)/deciliter  glucagon  Injectable 1 milliGRAM(s) IntraMuscular once PRN Glucose LESS THAN 70 milligrams/deciliter      Allergies: No Known Allergies    SOCIAL HISTORY: former smoker    FAMILY HISTORY:  No pertinent family history in first degree relatives      Vital Signs Last 24 Hrs  T(C): 37.1 (12 Apr 2018 15:58), Max: 37.3 (12 Apr 2018 06:25)  T(F): 98.7 (12 Apr 2018 15:58), Max: 99.2 (12 Apr 2018 06:25)  HR: 81 (12 Apr 2018 15:58) (81 - 90)  BP: 121/69 (12 Apr 2018 15:58) (121/69 - 135/74)  BP(mean): --  RR: 18 (12 Apr 2018 15:58) (18 - 19)  SpO2: 98% (12 Apr 2018 15:58) (98% - 99%)    04-11-18 @ 07:01  -  04-12-18 @ 07:00  --------------------------------------------------------  IN: 1400 mL / OUT: 5600 mL / NET: -4200 mL    04-12-18 @ 07:01  -  04-12-18 @ 16:12  --------------------------------------------------------  IN: 900 mL / OUT: 0 mL / NET: 900 mL        PHYSICAL EXAM:  Constitutional: Well-developed, well nourished  Eyes: POLO, EOMI  Ear/Nose/Throat: no oral lesion, no sinus tenderness on percussion	  Neck: no JVD, no lymphadenopathy, supple  Respiratory: CTA chuck  Cardiovascular: S1S2 RRR, no murmurs  Gastrointestinal: soft, (+) BS, no HSM  Extremities: no e/e/c  Vascular: DP Pulse:right normal; left normal            LABS:                        8.6    9.0   )-----------( 120      ( 12 Apr 2018 06:37 )             25.4     04-12    134<L>  |  98  |  49<H>  ----------------------------<  218<H>  3.6   |  17<L>  |  2.24<H>    Ca    8.8      12 Apr 2018 06:36  Phos  2.8     04-12  Mg     1.9     04-12    TPro  6.8  /  Alb  3.6  /  TBili  0.3  /  DBili  x   /  AST  22  /  ALT  35  /  AlkPhos  68  04-11    PT/INR - ( 12 Apr 2018 15:50 )   PT: 16.0 sec;   INR: 1.43          PTT - ( 10 Apr 2018 23:08 )  PTT:28.4 sec  Urinalysis Basic - ( 11 Apr 2018 09:09 )    Color: Yellow / Appearance: Clear / SG: <=1.005 / pH: x  Gluc: x / Ketone: NEGATIVE  / Bili: Negative / Urobili: 0.2 E.U./dL   Blood: x / Protein: NEGATIVE mg/dL / Nitrite: POSITIVE   Leuk Esterase: Moderate / RBC: Many /HPF / WBC Many /HPF   Sq Epi: x / Non Sq Epi: 0-5 /HPF / Bacteria: Many /HPF        MICROBIOLOGY:  Culture - Urine (04.11.18 @ 10:20)    Specimen Source: .Urine None    Culture Results:   >100,000 CFU/ml Gram Negative Rods  Identification and susceptibility to follow.    Culture - Blood (04.11.18 @ 08:08)    -  Escherichia coli: Detec    Gram Stain:   Aerobic Bottle: Gram Negative Rods  Result called to and read back by_ Ms. Katy Srena RN([id number])  04/11/2018 22:22:35  "Due to technical problems, Proteus sp. will Not be reported as part of  the BCID panel until further notice"  ***Blood Panel PCR results on this specimen are available  approximately 3 hours after the Gram stain result.***  Gram stain, PCR, and/or culture results may not always  correspond due to difference in methodologies.  ************************************************************  This PCR assay was performed using [employer].  The following targets are tested for: Enterococcus,  vancomycin resistant enterococci, Listeria monocytogenes,  coagulase negative staphylococci, S. aureus,  methicillin resistant S. aureus, Streptococcus agalactiae  (Group B), S. pneumoniae, S. pyogenes (Group A),  Acinetobacter baumannii, Enterobacter cloacae, E. coli,  Klebsiella oxytoca, K. pneumoniae, Proteus sp.,  Serratia marcescens, Haemophilus influenzae,  Neisseria meningitidis, Pseudomonas aeruginosa, Candida  albicans, C. glabrata, C krusei, C parapsilosis,  C. tropicalis and the KPC resistance gene.    Specimen Source: .Blood Blood-Peripheral    Organism: Blood Culture PCR    Culture Results:   Growth in aerobic bottle: Gram Negative Rods  Identification and susceptibility to follow.    Organism Identification: Blood Culture PCR    Method Type: PCR      RADIOLOGY & ADDITIONAL STUDIES:  < from: CT Abdomen and Pelvis w/ Oral Cont (04.11.18 @ 01:53) >    INTERPRETATION:  CT of the ABDOMEN and PELVIS with intravenous contrast   dated 4/11/2018 1:53 AM    INDICATION: Abdominal discomfort. Jaundice. Weakness.    TECHNIQUE: CT of the abdomen and pelvis with oral contrast only. Axial,   sagittal, and coronal images were obtained and reviewed.    COMPARISON: None.    FINDINGS:    Lower chest: Grossly clear lungs. Partially imaged trace pericardial  effusion. Partially imaged aortic valve calcifications    Liver: Smooth in contour. No definite focal lesion.    Biliary system: Gallbladder is normal in size. Few small gallstones. No   biliary ductal dilatation.    Pancreas: Atrophic. No main duct dilatation. No definite pancreatic mass   on this noncontrast study.    Spleen: Borderline splenomegaly with a length of 14 cm. No definite focal   lesion.    Adrenal glands: Unremarkable.    Kidneys: Severe bilateral hydroureteronephrosis. Mild bilateral   perinephric stranding. No renal or ureteral stone. 2.6 cm exophytic cyst   upper pole right kidney.     Urinary Bladder: Markedly distended urinary bladder.    Reproductive organs: Marked prostatomegaly with lobular contour   superiorly whichdeforms the bladder floor. Prostate measures   approximately 4.2 x 5.2 x 6.1 cm for volume of 70 cc. Grossly symmetric   seminal vesicles.    Bowel/Peritoneum: Marked diffuse bowel wall thickening is seen throughout   the colon, more pronounced in the rectum and sigmoid. Borderline terminal   ileal wall thickening. No adjacent fluid collection. No bowel   obstruction. Normal appendix. No ascites or extraluminal gas.     Lymph nodes: No lymphadenopathy.    < end of copied text >  < from: CT Abdomen and Pelvis w/ Oral Cont (04.11.18 @ 01:53) >    INTERPRETATION:  CT of the ABDOMEN and PELVIS with intravenous contrast   dated 4/11/2018 1:53 AM    INDICATION: Abdominal discomfort. Jaundice. Weakness.    TECHNIQUE: CT of the abdomen and pelvis with oral contrast only. Axial,   sagittal, and coronal images were obtained and reviewed.    COMPARISON: None.    FINDINGS:    Lower chest: Grossly clear lungs. Partially imaged trace pericardial  effusion. Partially imaged aortic valve calcifications    Liver: Smooth in contour. No definite focal lesion.    Biliary system: Gallbladder is normal in size. Few small gallstones. No   biliary ductal dilatation.    Pancreas: Atrophic. No main duct dilatation. No definite pancreatic mass   on this noncontrast study.    Spleen: Borderline splenomegaly with a length of 14 cm. No definite focal   lesion.    Adrenal glands: Unremarkable.    Kidneys: Severe bilateral hydroureteronephrosis. Mild bilateral   perinephric stranding. No renal or ureteral stone. 2.6 cm exophytic cyst   upper pole right kidney.     Urinary Bladder: Markedly distended urinary bladder.    Reproductive organs: Marked prostatomegaly with lobular contour   superiorly whichdeforms the bladder floor. Prostate measures   approximately 4.2 x 5.2 x 6.1 cm for volume of 70 cc. Grossly symmetric   seminal vesicles.    Bowel/Peritoneum: Marked diffuse bowel wall thickening is seen throughout   the colon, more pronounced in the rectum and sigmoid. Borderline terminal   ileal wall thickening. No adjacent fluid collection. No bowel   obstruction. Normal appendix. No ascites or extraluminal gas.     Lymph nodes: No lymphadenopathy.    Aorta/IVC: Normal caliber. Mild aortoiliac calcifications.    Abdominal wall: Small bilateral fat-containing inguinal hernias.    Bones/Soft tissues: No acute abnormality. Mild degenerative osseous   changes.      IMPRESSION:     Pancolitis of infectious or inflammatory etiology.    Bilateral severe hydroureteronephrosis, as well as markedly distended   urinary bladder. Findings consistent with chronic outlet obstruction.   Marked prostatomegaly.    No biliary dilatation.

## 2018-04-12 NOTE — PHYSICAL THERAPY INITIAL EVALUATION ADULT - ADDITIONAL COMMENTS
No steps, elevator buidling. No prior use of DME inside, however uses loftstrand crutch when ambulating within community. Denies hx of falls. To note, pt reports previous hx of Left achilles tear with no intervention causing persistent functional issues.

## 2018-04-12 NOTE — PHYSICAL THERAPY INITIAL EVALUATION ADULT - CRITERIA FOR SKILLED THERAPEUTIC INTERVENTIONS
anticipated discharge recommendation/impairments found/therapy frequency/risk reduction/prevention/rehab potential

## 2018-04-12 NOTE — PROGRESS NOTE ADULT - PROBLEM SELECTOR PLAN 6
-A1c 7.2.  Not insulin dependent.  Holding home janumet and glyburide.    -ISS.  monitor FS Normocytic.  Hgb 9.1 at presentation.  Repeat 8.7 (likely dilutional).  VSS.  Denies lightheadedness, SOB, CP, palpitations.    -Mild bleeding post-palacio placement but likely 2/2 anemia of chronic disease and possible iron deficiency.  f/u iron studies, retic count and EPO level.  obtain collateral from PCP.  -CBC at 12.  Hep SQ d/c'ed, unnecessary

## 2018-04-12 NOTE — CONSULT NOTE ADULT - SUBJECTIVE AND OBJECTIVE BOX
Patient is a 72y old  Male who presents with a chief complaint of Weakness (11 Apr 2018 19:12)       HPI:  71yo M with PMH of HTN, HLD, DMT2, prostatomegaly, h/o SVT s/p ablation (2011) presents  here with progressive weakness for one month a/w abdominal distension. Pt reports no known history of kidney disease and was asked by his PMD to see a urologist that he never did. He reports increased dyspnea on exertion, usually can climb more than one flight of stairs but get SOB with less than one flight now a/w anorexia, denies any F/C, chest pain, cough, abdominal pain, N/V. He has constipation and small amount of non-bloody diarrhea. He denies alcohol abuse, never had colonoscopy.     ED course: VSS. Labs with anemia Hb 9.1/27, thrombocytopenia (137). Hyponatremia, hypokalemia, anion gap metabolic acidosis with elevated beta-hydroxybutyrate, lactate 1.1, UA with pyuria, leukocyte esterase with bacteria. Pt was given insulin 4u once and 2l NS bolus. A CT abd/pelvis was performed. (11 Apr 2018 06:02)      PAST MEDICAL & SURGICAL HISTORY:  Enlarged prostate  DM (diabetes mellitus)  HTN (hypertension)  H/O prior ablation treatment      MEDICATIONS  (STANDING):  aspirin  chewable 81 milliGRAM(s) Oral daily  cefTRIAXone Injectable. 1000 milliGRAM(s) IV Push every 24 hours  cefTRIAXone Injectable.      dextrose 5%. 1000 milliLiter(s) (50 mL/Hr) IV Continuous <Continuous>  dextrose 50% Injectable 12.5 Gram(s) IV Push once  dextrose 50% Injectable 25 Gram(s) IV Push once  dextrose 50% Injectable 25 Gram(s) IV Push once  docusate sodium 100 milliGRAM(s) Oral two times a day  ferrous    sulfate 325 milliGRAM(s) Oral three times a day  influenza   Vaccine 0.5 milliLiter(s) IntraMuscular once  insulin lispro (HumaLOG) corrective regimen sliding scale   SubCutaneous Before meals and at bedtime  melatonin 5 milliGRAM(s) Oral at bedtime  simvastatin 10 milliGRAM(s) Oral at bedtime  sodium chloride 0.9%. 1000 milliLiter(s) (100 mL/Hr) IV Continuous <Continuous>  tamsulosin 0.4 milliGRAM(s) Oral at bedtime    MEDICATIONS  (PRN):  dextrose Gel 1 Dose(s) Oral once PRN Blood Glucose LESS THAN 70 milliGRAM(s)/deciliter  glucagon  Injectable 1 milliGRAM(s) IntraMuscular once PRN Glucose LESS THAN 70 milligrams/deciliter      Social History: retired , lives alone in an elevator accessible apartment building, no home care services      Functional Level Prior to Admission: ADL independent, walks without assistive devices    FAMILY HISTORY:  No pertinent family history in first degree relatives      CBC Full  -  ( 12 Apr 2018 06:37 )  WBC Count : 9.0 K/uL  Hemoglobin : 8.6 g/dL  Hematocrit : 25.4 %  Platelet Count - Automated : 120 K/uL  Mean Cell Volume : 82.2 fL  Mean Cell Hemoglobin : 27.8 pg  Mean Cell Hemoglobin Concentration : 33.9 g/dL  Auto Neutrophil # : x  Auto Lymphocyte # : x  Auto Monocyte # : x  Auto Eosinophil # : x  Auto Basophil # : x  Auto Neutrophil % : x  Auto Lymphocyte % : x  Auto Monocyte % : x  Auto Eosinophil % : x  Auto Basophil % : x      04-12    134<L>  |  98  |  49<H>  ----------------------------<  218<H>  3.6   |  17<L>  |  2.24<H>    Ca    8.8      12 Apr 2018 06:36  Phos  2.8     04-12  Mg     1.9     04-12    TPro  6.8  /  Alb  3.6  /  TBili  0.3  /  DBili  x   /  AST  22  /  ALT  35  /  AlkPhos  68  04-11      Urinalysis Basic - ( 11 Apr 2018 09:09 )    Color: Yellow / Appearance: Clear / SG: <=1.005 / pH: x  Gluc: x / Ketone: NEGATIVE  / Bili: Negative / Urobili: 0.2 E.U./dL   Blood: x / Protein: NEGATIVE mg/dL / Nitrite: POSITIVE   Leuk Esterase: Moderate / RBC: Many /HPF / WBC Many /HPF   Sq Epi: x / Non Sq Epi: 0-5 /HPF / Bacteria: Many /HPF          Radiology:    < from: CT Abdomen and Pelvis w/ Oral Cont (04.11.18 @ 01:53) >  EXAM:  CT ABDOMEN AND PELVIS OC                          PROCEDURE DATE:  04/11/2018                     INTERPRETATION:  CT of the ABDOMEN and PELVIS with intravenous contrast   dated 4/11/2018 1:53 AM    INDICATION: Abdominal discomfort. Jaundice. Weakness.    TECHNIQUE: CT of the abdomen and pelvis with oral contrast only. Axial,   sagittal, and coronal images were obtained and reviewed.    COMPARISON: None.    FINDINGS:    Lower chest: Grossly clear lungs. Partially imaged trace pericardial  effusion. Partially imaged aortic valve calcifications    Liver: Smooth in contour. No definite focal lesion.    Biliary system: Gallbladder is normal in size. Few small gallstones. No   biliary ductal dilatation.    Pancreas: Atrophic. No main duct dilatation. No definite pancreatic mass   on this noncontrast study.    Spleen: Borderline splenomegaly with a length of 14 cm. No definite focal   lesion.    Adrenal glands: Unremarkable.    Kidneys: Severe bilateral hydroureteronephrosis. Mild bilateral   perinephric stranding. No renal or ureteral stone. 2.6 cm exophytic cyst   upper pole right kidney.     Urinary Bladder: Markedly distended urinary bladder.    Reproductive organs: Marked prostatomegaly with lobular contour   superiorly whichdeforms the bladder floor. Prostate measures   approximately 4.2 x 5.2 x 6.1 cm for volume of 70 cc. Grossly symmetric   seminal vesicles.    Bowel/Peritoneum: Marked diffuse bowel wall thickening is seen throughout   the colon, more pronounced in the rectum and sigmoid. Borderline terminal   ileal wall thickening. No adjacent fluid collection. No bowel   obstruction. Normal appendix. No ascites or extraluminal gas.     Lymph nodes: No lymphadenopathy.    Aorta/IVC: Normal caliber. Mild aortoiliac calcifications.    Abdominal wall: Small bilateral fat-containing inguinal hernias.    Bones/Soft tissues: No acute abnormality. Mild degenerative osseous   changes.      IMPRESSION:     Pancolitis of infectious or inflammatory etiology.    Bilateral severe hydroureteronephrosis, as well as markedly distended   urinary bladder. Findings consistent with chronic outlet obstruction.   Marked prostatomegaly.    No biliary dilatation.            Vital Signs Last 24 Hrs  T(C): 37.3 (12 Apr 2018 06:25), Max: 37.4 (11 Apr 2018 11:27)  T(F): 99.2 (12 Apr 2018 06:25), Max: 99.3 (11 Apr 2018 11:27)  HR: 90 (12 Apr 2018 06:25) (80 - 92)  BP: 133/66 (12 Apr 2018 06:25) (133/66 - 153/66)  BP(mean): 92 (11 Apr 2018 15:00) (92 - 92)  RR: 19 (12 Apr 2018 06:25) (18 - 19)  SpO2: 99% (12 Apr 2018 06:25) (97% - 99%)    REVIEW OF SYSTEMS:    CONSTITUTIONAL:  fatigue/weakness  EYES: No eye pain, visual disturbances, or discharge  ENMT:  No difficulty hearing, tinnitus, vertigo; No sinus or throat pain  NECK: No pain or stiffness  BREASTS: No pain, masses, or nipple discharge  RESPIRATORY: No cough, wheezing, chills or hemoptysis; No shortness of breath  CARDIOVASCULAR: No chest pain, palpitations, dizziness, or leg swelling  GASTROINTESTINAL: No abdominal or epigastric pain. No nausea, vomiting, or hematemesis; No diarrhea or constipation. No melena or hematochezia.  GENITOURINARY: No dysuria, frequency, hematuria, or incontinence  NEUROLOGICAL: No headaches, memory loss, loss of strength, numbness, or tremors  SKIN: No itching, burning, rashes, or lesions   LYMPH NODES: No enlarged glands  ENDOCRINE: No heat or cold intolerance; No hair loss  MUSCULOSKELETAL: No joint pain or swelling; No muscle, back, or extremity pain  PSYCHIATRIC: No depression, anxiety, mood swings, or difficulty sleeping  HEME/LYMPH: No easy bruising, or bleeding gums  ALLERGY AND IMMUNOLOGIC: No hives or eczema  VASCULAR: no swelling, erythema    Physical Exam: WDWN  gentleman lying in semi Fowlers position, c/o generalized weakness    Head: normocephalic, atraumatic    Eyes: PERRLA, EOMI, no nystagmus, sclera anicteric    ENT: nasal discharge, uvula midline, no oropharyngeal erythema/exudate    Neck: supple, negative JVD, negative carotid bruits, no thyromegaly    Chest: CTA bilaterally, neg wheeze, rhonchi, rales, crackles, egophany    Cardiovascular: regular rate and rhythm, neg murmurs/rubs/gallops    Abdomen: softly distended, non tender, negative rebound/guarding, normal bowel sounds, neg hepatosplenomegaly    Extremities: trace pedal edema, negative calf tenderness to palpation, negative Darlene's sign    :  + palacio with yellow urine    Neurologic Exam:    Alert and oriented to person, place, date/year, speech fluent w/o dysarthria, repetition intact, comprehension intact,     Cranial Nerves:     II:                       pupils equal, round and reactive to light, visual fields intact   III/ IV/VI:             extraocular movements intact, neg nystagmus, ptosis  V:                       facial sensation intact, V1-3 normal  VII:                     face symmetric, no droop, normal eye closure and smile  VIII:                    hearing intact to finger rub bilaterally  IX/ X:                 soft palate rise symmetrical  XI:                      head turning, shoulder shrug normal  XII:                     tongue midline    Motor Exam:    Upper Extremities:        Right:    5/5 /intrinsics                                                 5/5 deltoid, 5/5 biceps, 5/5 triceps, 5/5 wrist extensors-flexors                                                 negative pronator drift                                        Left:      5/5 /intrinsics                                                 5/5 deltoid,  5/5 biceps,  5/5 triceps,   5/5 wrist extensors/flexors      Lower Extremities:         Right      5/5 hip flexors/adductors/abductors                                                   5/5 quadriceps/hamstrings                                                   5/5 dorsiflexors/plantar flexors/invertors-evertors                                       Left:       5/5 hip flexors/adductors/abductors                                                  5/5 quadriceps/hamstrings                                                  5/5 dorsiflexors/plantar flexors/invertors-evertors    Sensory:              intact to LT/PP in all UE/LE dermatomes    DTR:                   = biceps/     triceps/     brachioradialis                            = patella/   medial hamstring/    ankle                            neg clonus                            neg Babinski                            neg Hoffmans    Finger to Nose:  wnl    Heel to Shin:       wnl    Rapid Alternating movements:   wnl    Joint Position Sense:  intact    Romberg:  not tested    Tandem Walking:  not tested    Gait:  not tested        PM&R Impression:    1) deconditioned  2) no focal weakness  3_ AAMIR/ UTI/ hyponatremia/ obstructive uropathy      Recommendations:    1) Physical therapy focusing on therapeutic exercises, bed mobility/transfer out of bed evaluation, progressive ambulation with assistive devices.    2) Anticipated Disposition Plan/Recommendations: d/c home

## 2018-04-12 NOTE — PROGRESS NOTE ADULT - PROBLEM SELECTOR PLAN 4
100.4 (oral) in ED with pyuria  - f/u blood and urine cultures  - c/w Ceftriaxone qd  - No CVA tenderness despite perirenal fat stranding on CT Anion gap metabolic acidosis from uremia + starvation ketosis  -trend BMP  -c/w IVF.  Encourage PO.    -monitor FS.  SSI

## 2018-04-12 NOTE — CONSULT NOTE ADULT - ASSESSMENT
71yo M with PMH of HTN, HLD, DMT2, prostatomegaly, h/o SVT s/p ablation (2011) presents  here with progressive weakness for one month a/w abdominal distension, found to have AAMIR, bilateral nephrosis and urinary retention likely from an enlarged prostate, CT (+) Hydronephrosis and pancolitis, now E coli bacteremia, likely E coli UTI

## 2018-04-12 NOTE — PROGRESS NOTE ADULT - PROBLEM SELECTOR PLAN 7
-Intermittently hypertensive, asymptomatic.  Continue holding home lasix in the setting of AAMIR.  Holding HCTZ in the setting of hyponatremia.  -C/w coreg. -Intermittently hypertensive, asymptomatic.  Continue holding home lasix in the setting of AAMIR.  Holding HCTZ in the setting of hyponatremia.  -C/w coreg.    #DM  -A1c 7.2.  Not insulin dependent.  Holding home janumet and glyburide.    -ISS.  monitor FS

## 2018-04-12 NOTE — PROGRESS NOTE ADULT - PROBLEM SELECTOR PLAN 5
Normocytic.  Hgb 9.1 at presentation.  Repeat 8.7 (likely dilutional).  VSS.  Denies lightheadedness, SOB, CP, palpitations.    -Mild bleeding post-palacio placement but likely 2/2 anemia of chronic disease and possible iron deficiency.  f/u iron studies, retic count and EPO level.  obtain collateral from PCP.  -CBC at 12.  Hep SQ d/c'ed, unnecessary 100.4 (oral) in ED with pyuria  - f/u blood and urine cultures  - c/w Ceftriaxone qd  - No CVA tenderness despite perirenal fat stranding on CT

## 2018-04-12 NOTE — PHYSICAL THERAPY INITIAL EVALUATION ADULT - PERTINENT HX OF CURRENT PROBLEM, REHAB EVAL
72 year old male presents  here with progressive weakness for one month a/w abdominal distension.  He reports increased dyspnea on exertion, usually can climb more than one flight of stairs but get SOB with less than one flight now a/w anorexia.

## 2018-04-12 NOTE — PROGRESS NOTE ADULT - SUBJECTIVE AND OBJECTIVE BOX
HPI:  73yo M with PMH of HTN, HLD, DMT2, prostatomegaly, h/o SVT s/p ablation (2011) presents  here with progressive weakness for one month a/w abdominal distension. Pt reports no known history of kidney disease and was asked by his PMD to see a urologist that he never did. He reports increased dyspnea on exertion, usually can climb more than one flight of stairs but get SOB with less than one flight now a/w anorexia, denies any F/C, chest pain, cough, abdominal pain, N/V. He has constipation and small amount of non-bloody diarrhea. He denies alcohol abuse, never had colonoscopy.     ED course: VSS. Labs with anemia Hb 9.1/27, thrombocytopenia (137). Hyponatremia, hypokalemia, anion gap metabolic acidosis with elevated beta-hydroxybutyrate, lactate 1.1, UA with pyuria, leukocyte esterase with bacteria. Pt was given insulin 4u once and 2l NS bolus. A CT abd/pelvis was performed. (11 Apr 2018 06:02)    FAMILY HISTORY:  No pertinent family history in first degree relatives    MEDICATIONS  (STANDING):  aspirin  chewable 81 milliGRAM(s) Oral daily  cefTRIAXone Injectable. 1000 milliGRAM(s) IV Push every 24 hours  cefTRIAXone Injectable.      dextrose 5%. 1000 milliLiter(s) (50 mL/Hr) IV Continuous <Continuous>  dextrose 50% Injectable 12.5 Gram(s) IV Push once  dextrose 50% Injectable 25 Gram(s) IV Push once  dextrose 50% Injectable 25 Gram(s) IV Push once  docusate sodium 100 milliGRAM(s) Oral two times a day  ferrous    sulfate 325 milliGRAM(s) Oral three times a day  finasteride 5 milliGRAM(s) Oral daily  influenza   Vaccine 0.5 milliLiter(s) IntraMuscular once  insulin lispro (HumaLOG) corrective regimen sliding scale   SubCutaneous Before meals and at bedtime  melatonin 5 milliGRAM(s) Oral at bedtime  simvastatin 10 milliGRAM(s) Oral at bedtime  sodium chloride 0.9%. 1000 milliLiter(s) (100 mL/Hr) IV Continuous <Continuous>  tamsulosin 0.4 milliGRAM(s) Oral at bedtime    MEDICATIONS  (PRN):  dextrose Gel 1 Dose(s) Oral once PRN Blood Glucose LESS THAN 70 milliGRAM(s)/deciliter  glucagon  Injectable 1 milliGRAM(s) IntraMuscular once PRN Glucose LESS THAN 70 milligrams/deciliter    Vital Signs Last 24 Hrs  T(C): 37.3 (12 Apr 2018 06:25), Max: 37.4 (11 Apr 2018 11:27)  T(F): 99.2 (12 Apr 2018 06:25), Max: 99.3 (11 Apr 2018 11:27)  HR: 90 (12 Apr 2018 06:25) (80 - 92)  BP: 133/66 (12 Apr 2018 06:25) (133/66 - 153/66)  BP(mean): 92 (11 Apr 2018 15:00) (92 - 92)  RR: 19 (12 Apr 2018 06:25) (18 - 19)  SpO2: 99% (12 Apr 2018 06:25) (97% - 99%)    Physical exam:    Overall impression  Lymphadenopathy  Liver  spleen    Labs:  CBC Full  -  ( 12 Apr 2018 06:37 )  WBC Count : 9.0 K/uL  Hemoglobin : 8.6 g/dL  Hematocrit : 25.4 %  Platelet Count - Automated : 120 K/uL  Mean Cell Volume : 82.2 fL  Mean Cell Hemoglobin : 27.8 pg  Mean Cell Hemoglobin Concentration : 33.9 g/dL  Auto Neutrophil # : x  Auto Lymphocyte # : x  Auto Monocyte # : x  Auto Eosinophil # : x  Auto Basophil # : x  Auto Neutrophil % : x  Auto Lymphocyte % : x  Auto Monocyte % : x  Auto Eosinophil % : x  Auto Basophil % : x    04-12    134<L>  |  98  |  49<H>  ----------------------------<  218<H>  3.6   |  17<L>  |  2.24<H>    Ca    8.8      12 Apr 2018 06:36  Phos  2.8     04-12  Mg     1.9     04-12    TPro  6.8  /  Alb  3.6  /  TBili  0.3  /  DBili  x   /  AST  22  /  ALT  35  /  AlkPhos  68  04-11      Radiology:  HEALTH ISSUES - R/O PROBLEM Dx:      Assessmant / Problems  1)Urosepsis gram negative bacteria covered Ceftriaxone , low grade fever  Dr Dori Huerta called  2) Bilateral hydronephrosis. After relieving urinary retention Cr slowly coming down  Urology on case  3) waisting  tumor markers sent out    Thank you  Oilvia Roberts MD HPI:  73yo M with PMH of HTN, HLD, DMT2, prostatomegaly, h/o SVT s/p ablation (2011) presents  here with progressive weakness for one month a/w abdominal distension. Pt reports no known history of kidney disease and was asked by his PMD to see a urologist that he never did. He reports increased dyspnea on exertion, usually can climb more than one flight of stairs but get SOB with less than one flight now a/w anorexia, denies any F/C, chest pain, cough, abdominal pain, N/V. He has constipation and small amount of non-bloody diarrhea. He denies alcohol abuse, never had colonoscopy.     ED course: VSS. Labs with anemia Hb 9.1/27, thrombocytopenia (137). Hyponatremia, hypokalemia, anion gap metabolic acidosis with elevated beta-hydroxybutyrate, lactate 1.1, UA with pyuria, leukocyte esterase with bacteria. Pt was given insulin 4u once and 2l NS bolus. A CT abd/pelvis was performed. (11 Apr 2018 06:02)    FAMILY HISTORY:  No pertinent family history in first degree relatives    MEDICATIONS  (STANDING):  aspirin  chewable 81 milliGRAM(s) Oral daily  cefTRIAXone Injectable. 1000 milliGRAM(s) IV Push every 24 hours  cefTRIAXone Injectable.      dextrose 5%. 1000 milliLiter(s) (50 mL/Hr) IV Continuous <Continuous>  dextrose 50% Injectable 12.5 Gram(s) IV Push once  dextrose 50% Injectable 25 Gram(s) IV Push once  dextrose 50% Injectable 25 Gram(s) IV Push once  docusate sodium 100 milliGRAM(s) Oral two times a day  ferrous    sulfate 325 milliGRAM(s) Oral three times a day  finasteride 5 milliGRAM(s) Oral daily  influenza   Vaccine 0.5 milliLiter(s) IntraMuscular once  insulin lispro (HumaLOG) corrective regimen sliding scale   SubCutaneous Before meals and at bedtime  melatonin 5 milliGRAM(s) Oral at bedtime  simvastatin 10 milliGRAM(s) Oral at bedtime  sodium chloride 0.9%. 1000 milliLiter(s) (100 mL/Hr) IV Continuous <Continuous>  tamsulosin 0.4 milliGRAM(s) Oral at bedtime    MEDICATIONS  (PRN):  dextrose Gel 1 Dose(s) Oral once PRN Blood Glucose LESS THAN 70 milliGRAM(s)/deciliter  glucagon  Injectable 1 milliGRAM(s) IntraMuscular once PRN Glucose LESS THAN 70 milligrams/deciliter    Vital Signs Last 24 Hrs  T(C): 37.3 (12 Apr 2018 06:25), Max: 37.4 (11 Apr 2018 11:27)  T(F): 99.2 (12 Apr 2018 06:25), Max: 99.3 (11 Apr 2018 11:27)  HR: 90 (12 Apr 2018 06:25) (80 - 92)  BP: 133/66 (12 Apr 2018 06:25) (133/66 - 153/66)  BP(mean): 92 (11 Apr 2018 15:00) (92 - 92)  RR: 19 (12 Apr 2018 06:25) (18 - 19)  SpO2: 99% (12 Apr 2018 06:25) (97% - 99%)    Physical exam:    Overall impression  Lymphadenopathy  Liver  spleen    Labs:  CBC Full  -  ( 12 Apr 2018 06:37 )  WBC Count : 9.0 K/uL  Hemoglobin : 8.6 g/dL  Hematocrit : 25.4 %  Platelet Count - Automated : 120 K/uL  Mean Cell Volume : 82.2 fL  Mean Cell Hemoglobin : 27.8 pg  Mean Cell Hemoglobin Concentration : 33.9 g/dL  Auto Neutrophil # : x  Auto Lymphocyte # : x  Auto Monocyte # : x  Auto Eosinophil # : x  Auto Basophil # : x  Auto Neutrophil % : x  Auto Lymphocyte % : x  Auto Monocyte % : x  Auto Eosinophil % : x  Auto Basophil % : x    04-12    134<L>  |  98  |  49<H>  ----------------------------<  218<H>  3.6   |  17<L>  |  2.24<H>    Ca    8.8      12 Apr 2018 06:36  Phos  2.8     04-12  Mg     1.9     04-12    TPro  6.8  /  Alb  3.6  /  TBili  0.3  /  DBili  x   /  AST  22  /  ALT  35  /  AlkPhos  68  04-11      Radiology:  HEALTH ISSUES - R/O PROBLEM Dx:      Assessmant / Problems  1)Urosepsis gram negative bacteria covered Ceftriaxone , low grade fever  Dr Dori Huerta called  2) Bilateral hydronephrosis. After relieving urinary retention Cr slowly coming down  Urology on case  3) waisting  tumor markers sent out  4) DM  5) HTN    Thank you  Olivia Roberts MD

## 2018-04-13 DIAGNOSIS — K51.00 ULCERATIVE (CHRONIC) PANCOLITIS WITHOUT COMPLICATIONS: ICD-10-CM

## 2018-04-13 LAB
-  AMPICILLIN/SULBACTAM: SIGNIFICANT CHANGE UP
-  AMPICILLIN/SULBACTAM: SIGNIFICANT CHANGE UP
-  AMPICILLIN: SIGNIFICANT CHANGE UP
-  AMPICILLIN: SIGNIFICANT CHANGE UP
-  CEFAZOLIN: SIGNIFICANT CHANGE UP
-  CEFAZOLIN: SIGNIFICANT CHANGE UP
-  CEFTRIAXONE: SIGNIFICANT CHANGE UP
-  CEFTRIAXONE: SIGNIFICANT CHANGE UP
-  CIPROFLOXACIN: SIGNIFICANT CHANGE UP
-  CIPROFLOXACIN: SIGNIFICANT CHANGE UP
-  GENTAMICIN: SIGNIFICANT CHANGE UP
-  GENTAMICIN: SIGNIFICANT CHANGE UP
-  NITROFURANTOIN: SIGNIFICANT CHANGE UP
-  PIPERACILLIN/TAZOBACTAM: SIGNIFICANT CHANGE UP
-  PIPERACILLIN/TAZOBACTAM: SIGNIFICANT CHANGE UP
-  TOBRAMYCIN: SIGNIFICANT CHANGE UP
-  TOBRAMYCIN: SIGNIFICANT CHANGE UP
-  TRIMETHOPRIM/SULFAMETHOXAZOLE: SIGNIFICANT CHANGE UP
-  TRIMETHOPRIM/SULFAMETHOXAZOLE: SIGNIFICANT CHANGE UP
ANION GAP SERPL CALC-SCNC: 16 MMOL/L — SIGNIFICANT CHANGE UP (ref 5–17)
BUN SERPL-MCNC: 38 MG/DL — HIGH (ref 7–23)
CALCIUM SERPL-MCNC: 8.7 MG/DL — SIGNIFICANT CHANGE UP (ref 8.4–10.5)
CEA SERPL-MCNC: 3.6 NG/ML — SIGNIFICANT CHANGE UP (ref 0–3.8)
CHLORIDE SERPL-SCNC: 100 MMOL/L — SIGNIFICANT CHANGE UP (ref 96–108)
CO2 SERPL-SCNC: 19 MMOL/L — LOW (ref 22–31)
CREAT SERPL-MCNC: 1.9 MG/DL — HIGH (ref 0.5–1.3)
CULTURE RESULTS: SIGNIFICANT CHANGE UP
FACT II INHIB PPP-ACNC: 82 % — SIGNIFICANT CHANGE UP (ref 74–142)
FACT IX PPP CHRO-ACNC: 143 % — SIGNIFICANT CHANGE UP (ref 69–167)
FACT V ACT/NOR PPP: 79 % — SIGNIFICANT CHANGE UP (ref 70–143)
FACT VII ACT/NOR PPP: 43 % — LOW (ref 53–149)
FACT VIII ACT/NOR PPP: 252 % — HIGH (ref 51–138)
FACT X ACT/NOR PPP: 109 % — SIGNIFICANT CHANGE UP (ref 68–149)
GLUCOSE BLDC GLUCOMTR-MCNC: 235 MG/DL — HIGH (ref 70–99)
GLUCOSE BLDC GLUCOMTR-MCNC: 248 MG/DL — HIGH (ref 70–99)
GLUCOSE BLDC GLUCOMTR-MCNC: 260 MG/DL — HIGH (ref 70–99)
GLUCOSE BLDC GLUCOMTR-MCNC: 299 MG/DL — HIGH (ref 70–99)
GLUCOSE SERPL-MCNC: 238 MG/DL — HIGH (ref 70–99)
HCT VFR BLD CALC: 23.3 % — LOW (ref 39–50)
HGB BLD-MCNC: 7.8 G/DL — LOW (ref 13–17)
MAGNESIUM SERPL-MCNC: 1.8 MG/DL — SIGNIFICANT CHANGE UP (ref 1.6–2.6)
MCHC RBC-ENTMCNC: 28.4 PG — SIGNIFICANT CHANGE UP (ref 27–34)
MCHC RBC-ENTMCNC: 33.5 G/DL — SIGNIFICANT CHANGE UP (ref 32–36)
MCV RBC AUTO: 84.7 FL — SIGNIFICANT CHANGE UP (ref 80–100)
METHOD TYPE: SIGNIFICANT CHANGE UP
METHOD TYPE: SIGNIFICANT CHANGE UP
ORGANISM # SPEC MICROSCOPIC CNT: SIGNIFICANT CHANGE UP
ORGANISM # SPEC MICROSCOPIC CNT: SIGNIFICANT CHANGE UP
PHOSPHATE SERPL-MCNC: 2.6 MG/DL — SIGNIFICANT CHANGE UP (ref 2.5–4.5)
PLATELET # BLD AUTO: 120 K/UL — LOW (ref 150–400)
POTASSIUM SERPL-MCNC: 3.6 MMOL/L — SIGNIFICANT CHANGE UP (ref 3.5–5.3)
POTASSIUM SERPL-SCNC: 3.6 MMOL/L — SIGNIFICANT CHANGE UP (ref 3.5–5.3)
PSA FLD-MCNC: 9.85 NG/ML — HIGH (ref 0–4)
RBC # BLD: 2.75 M/UL — LOW (ref 4.2–5.8)
RBC # FLD: 13.6 % — SIGNIFICANT CHANGE UP (ref 10.3–16.9)
SODIUM SERPL-SCNC: 135 MMOL/L — SIGNIFICANT CHANGE UP (ref 135–145)
SPECIMEN SOURCE: SIGNIFICANT CHANGE UP
WBC # BLD: 5.5 K/UL — SIGNIFICANT CHANGE UP (ref 3.8–10.5)
WBC # FLD AUTO: 5.5 K/UL — SIGNIFICANT CHANGE UP (ref 3.8–10.5)

## 2018-04-13 PROCEDURE — 76770 US EXAM ABDO BACK WALL COMP: CPT | Mod: 26

## 2018-04-13 RX ORDER — MAGNESIUM SULFATE 500 MG/ML
1 VIAL (ML) INJECTION ONCE
Qty: 0 | Refills: 0 | Status: COMPLETED | OUTPATIENT
Start: 2018-04-13 | End: 2018-04-13

## 2018-04-13 RX ORDER — CEFTRIAXONE 500 MG/1
2 INJECTION, POWDER, FOR SOLUTION INTRAMUSCULAR; INTRAVENOUS EVERY 24 HOURS
Qty: 0 | Refills: 0 | Status: DISCONTINUED | OUTPATIENT
Start: 2018-04-14 | End: 2018-04-16

## 2018-04-13 RX ORDER — INSULIN LISPRO 100/ML
5 VIAL (ML) SUBCUTANEOUS
Qty: 0 | Refills: 0 | Status: DISCONTINUED | OUTPATIENT
Start: 2018-04-13 | End: 2018-04-14

## 2018-04-13 RX ORDER — CEFTRIAXONE 500 MG/1
1 INJECTION, POWDER, FOR SOLUTION INTRAMUSCULAR; INTRAVENOUS ONCE
Qty: 0 | Refills: 0 | Status: COMPLETED | OUTPATIENT
Start: 2018-04-13 | End: 2018-04-13

## 2018-04-13 RX ORDER — POTASSIUM CHLORIDE 20 MEQ
40 PACKET (EA) ORAL ONCE
Qty: 0 | Refills: 0 | Status: COMPLETED | OUTPATIENT
Start: 2018-04-13 | End: 2018-04-13

## 2018-04-13 RX ADMIN — Medication 4: at 21:43

## 2018-04-13 RX ADMIN — FINASTERIDE 5 MILLIGRAM(S): 5 TABLET, FILM COATED ORAL at 11:47

## 2018-04-13 RX ADMIN — TAMSULOSIN HYDROCHLORIDE 0.4 MILLIGRAM(S): 0.4 CAPSULE ORAL at 21:43

## 2018-04-13 RX ADMIN — Medication 4: at 08:37

## 2018-04-13 RX ADMIN — SODIUM CHLORIDE 100 MILLILITER(S): 9 INJECTION INTRAMUSCULAR; INTRAVENOUS; SUBCUTANEOUS at 21:43

## 2018-04-13 RX ADMIN — Medication 3 UNIT(S): at 08:36

## 2018-04-13 RX ADMIN — Medication 325 MILLIGRAM(S): at 21:43

## 2018-04-13 RX ADMIN — Medication 1 TABLET(S): at 17:38

## 2018-04-13 RX ADMIN — Medication 100 GRAM(S): at 10:44

## 2018-04-13 RX ADMIN — Medication 3 UNIT(S): at 17:38

## 2018-04-13 RX ADMIN — INSULIN GLARGINE 12 UNIT(S): 100 INJECTION, SOLUTION SUBCUTANEOUS at 09:36

## 2018-04-13 RX ADMIN — Medication 81 MILLIGRAM(S): at 11:46

## 2018-04-13 RX ADMIN — SIMVASTATIN 10 MILLIGRAM(S): 20 TABLET, FILM COATED ORAL at 21:43

## 2018-04-13 RX ADMIN — Medication 40 MILLIEQUIVALENT(S): at 09:40

## 2018-04-13 RX ADMIN — Medication 100 MILLIGRAM(S): at 06:14

## 2018-04-13 RX ADMIN — Medication 100 MILLIGRAM(S): at 17:38

## 2018-04-13 RX ADMIN — CEFTRIAXONE 100 GRAM(S): 500 INJECTION, POWDER, FOR SOLUTION INTRAMUSCULAR; INTRAVENOUS at 09:41

## 2018-04-13 RX ADMIN — Medication 325 MILLIGRAM(S): at 06:14

## 2018-04-13 RX ADMIN — Medication 6: at 17:39

## 2018-04-13 RX ADMIN — Medication 325 MILLIGRAM(S): at 14:37

## 2018-04-13 RX ADMIN — Medication 3 UNIT(S): at 12:47

## 2018-04-13 RX ADMIN — Medication 5 MILLIGRAM(S): at 21:43

## 2018-04-13 RX ADMIN — Medication 6: at 12:47

## 2018-04-13 RX ADMIN — CEFTRIAXONE 1000 MILLIGRAM(S): 500 INJECTION, POWDER, FOR SOLUTION INTRAMUSCULAR; INTRAVENOUS at 06:14

## 2018-04-13 RX ADMIN — SODIUM CHLORIDE 100 MILLILITER(S): 9 INJECTION INTRAMUSCULAR; INTRAVENOUS; SUBCUTANEOUS at 09:36

## 2018-04-13 NOTE — PROGRESS NOTE ADULT - SUBJECTIVE AND OBJECTIVE BOX
Patient seen and examined. Indwelling Leiva is now clear without hematuria. Labs and imaging reviewed, US after 48 hours still shows b/l hydronephrosis, likely needs more time for decompression. PSA 9.8, however this is likely elevated from multiple Leiva catheter insertions. Discussed need for followup and need for Leiva catheter. Leiva catheter should remain on discharge. Patient can follow up at urology clinic [phone]. Discussed with patient need for repeat PSA and possibility of surgical procedure to relieve obstruction. Continue abx for total 2 weeks for bacteremia. Dr. Greer is covering for weekend.     ICU Vital Signs Last 24 Hrs  T(C): 37.1 (13 Apr 2018 21:10), Max: 38.4 (12 Apr 2018 23:38)  T(F): 98.7 (13 Apr 2018 21:10), Max: 101.2 (12 Apr 2018 23:38)  HR: 79 (13 Apr 2018 21:10) (79 - 83)  BP: 137/63 (13 Apr 2018 21:10) (133/68 - 151/69)  BP(mean): --  ABP: --  ABP(mean): --  RR: 18 (13 Apr 2018 21:10) (17 - 18)  SpO2: 79% (13 Apr 2018 21:10) (79% - 100%)                            7.8    5.5   )-----------( 120      ( 13 Apr 2018 08:13 )             23.3       04-13    135  |  100  |  38<H>  ----------------------------<  238<H>  3.6   |  19<L>  |  1.90<H>    Ca    8.7      13 Apr 2018 08:13  Phos  2.6     04-13  Mg     1.8     04-13

## 2018-04-13 NOTE — PROGRESS NOTE ADULT - PROBLEM SELECTOR PLAN 1
-Presenting with urinary retention.  severely enlarged prostate, bladder distension, and bilateral hydronephrosis observed on CT.    -s/p palacio  -unclear diagnosis, likely BPH.  Had been referred to uro outpt but did not follow up.  -started flomax and proscar  -f/u uro reccs  -treatment of UTI as below  -PSA elevated 9.8.

## 2018-04-13 NOTE — PROGRESS NOTE ADULT - PROBLEM SELECTOR PLAN 6
Normocytic.  Hgb ~9 at presentation.  Now 7.8.  Dilutional.  VSS.  Denies lightheadedness, SOB, CP, palpitations.    -Mild bleeding post-palacio placement resolved.  iron studies c/w SERA.  started PO supplementation.  -EPO level 10.9 (WNL, inappropriately normal).  consider EPO. obtain collateral from PCP.

## 2018-04-13 NOTE — PROGRESS NOTE ADULT - SUBJECTIVE AND OBJECTIVE BOX
Physical Medicine and Rehabilitation Progress Note:    Patient is a 72y old  Male who presents with a chief complaint of Weakness (11 Apr 2018 19:12)      HPI:  73yo M with PMH of HTN, HLD, DMT2, prostatomegaly, h/o SVT s/p ablation (2011) presents  here with progressive weakness for one month a/w abdominal distension. Pt reports no known history of kidney disease and was asked by his PMD to see a urologist that he never did. He reports increased dyspnea on exertion, usually can climb more than one flight of stairs but get SOB with less than one flight now a/w anorexia, denies any F/C, chest pain, cough, abdominal pain, N/V. He has constipation and small amount of non-bloody diarrhea. He denies alcohol abuse, never had colonoscopy.     ED course: VSS. Labs with anemia Hb 9.1/27, thrombocytopenia (137). Hyponatremia, hypokalemia, anion gap metabolic acidosis with elevated beta-hydroxybutyrate, lactate 1.1, UA with pyuria, leukocyte esterase with bacteria. Pt was given insulin 4u once and 2l NS bolus. A CT abd/pelvis was performed. (11 Apr 2018 06:02)                            7.8    5.5   )-----------( 120      ( 13 Apr 2018 08:13 )             23.3       04-13    135  |  100  |  38<H>  ----------------------------<  238<H>  3.6   |  19<L>  |  1.90<H>    Ca    8.7      13 Apr 2018 08:13  Phos  2.6     04-13  Mg     1.8     04-13      Vital Signs Last 24 Hrs  T(C): 37.4 (13 Apr 2018 09:15), Max: 38.4 (12 Apr 2018 23:38)  T(F): 99.3 (13 Apr 2018 09:15), Max: 101.2 (12 Apr 2018 23:38)  HR: 83 (13 Apr 2018 09:15) (82 - 89)  BP: 133/68 (13 Apr 2018 09:15) (133/68 - 153/74)  BP(mean): --  RR: 18 (13 Apr 2018 09:15) (17 - 18)  SpO2: 98% (13 Apr 2018 09:15) (98% - 100%)    MEDICATIONS  (STANDING):  aspirin  chewable 81 milliGRAM(s) Oral daily  dextrose 5%. 1000 milliLiter(s) (50 mL/Hr) IV Continuous <Continuous>  dextrose 50% Injectable 12.5 Gram(s) IV Push once  dextrose 50% Injectable 25 Gram(s) IV Push once  dextrose 50% Injectable 25 Gram(s) IV Push once  docusate sodium 100 milliGRAM(s) Oral two times a day  ferrous    sulfate 325 milliGRAM(s) Oral three times a day  finasteride 5 milliGRAM(s) Oral daily  influenza   Vaccine 0.5 milliLiter(s) IntraMuscular once  insulin glargine Injectable (LANTUS) 12 Unit(s) SubCutaneous every morning  insulin lispro (HumaLOG) corrective regimen sliding scale   SubCutaneous Before meals and at bedtime  insulin lispro Injectable (HumaLOG) 3 Unit(s) SubCutaneous three times a day before meals  melatonin 5 milliGRAM(s) Oral at bedtime  multivitamin 1 Tablet(s) Oral daily  simvastatin 10 milliGRAM(s) Oral at bedtime  sodium chloride 0.9%. 1000 milliLiter(s) (100 mL/Hr) IV Continuous <Continuous>  tamsulosin 0.4 milliGRAM(s) Oral at bedtime    MEDICATIONS  (PRN):  acetaminophen   Tablet 650 milliGRAM(s) Oral every 6 hours PRN For Temp greater than 38 C (100.4 F)  dextrose Gel 1 Dose(s) Oral once PRN Blood Glucose LESS THAN 70 milliGRAM(s)/deciliter  glucagon  Injectable 1 milliGRAM(s) IntraMuscular once PRN Glucose LESS THAN 70 milligrams/deciliter    Currently Undergoing Physical Therapy at bedside.    Functional Status Assessment:    Previous Level of Function:     · Ambulation Skills	independent; needs device	  · Transfer Skills	independent; needs device	  · ADL Skills	independent; needs device	  · Work/Leisure Activity	independent; needs device	  · Additional Comments	No steps, elevator buidling. No prior use of DME inside, however uses loftstrand crutch when ambulating within community. Denies hx of falls. To note, pt reports previous hx of Left achilles tear with no intervention causing persistent functional issues.	    Cognitive Status Examination:   · Orientation	oriented to person, place, time and situation	  · Level of Consciousness	alert	  · Follows Commands and Answers Questions	100% of the time	  · Personal Safety and Judgment	intact	  · Short Term Memory	intact	  · Long Term Memory	intact	    Peripheral Neurovascular:     Neurovascular Assessment:   · LLE	warm; no discoloration; no tingling; no numbness	  · RLE	warm; no discoloration; no numbness; no tingling	    Skin:   Skin:  · Skin	WDL	    Range of Motion Exam:   · Active Range of Motion Examination	bilateral  lower extremity Active ROM was WFL (within functional limits); bilateral upper extremity Active ROM was WFL (within functional limits)	    Manual Muscle Testing:   · Manual Muscle Testing Results	no strength deficits were identified	    Bed Mobility: Rolling/Turning:     · Level of Brooke	independent	    Bed Mobility: Scooting/Bridging:     · Level of Brooke	independent	    Bed Mobility: Sit to Supine:     · Level of Brooke	Pt left OOB in chair	    Bed Mobility: Supine to Sit:     · Level of Brooke	independent	    Transfer: Sit to Stand:     · Level of Brooke	supervision	  · Weight-Bearing Restrictions	full weight-bearing	    Transfer: Stand to Sit:     · Level of Brooke	supervision	  · Weight-Bearing Restrictions	full weight-bearing	    Sit/Stand Transfer Safety Analysis:     · Transfer Safety Concerns Noted	losing balance	  · Impairments Contributing to Impaired Transfers	impaired balance	    Gait Skills:     · Level of Brooke	supervision; contact guard	  · Weight-Bearing Restrictions	full weight-bearing	  · Assistive Device	unilateral loftstrand crutch	  · Gait Distance	75 feet	    Gait Analysis:     · Gait Pattern Used	3-point gait	  · Gait Deviations Noted	decreased lissette; decreased stride length; decreased weight-shifting ability	  · Impairments Contributing to Gait Deviations	impaired balance	    Balance Skills Assessment:     · Sitting Balance: Static	normal balance	  · Sitting Balance: Dynamic	normal balance	  · Sit-to-Stand Balance	good balance	  · Standing Balance: Static	fair minus	  · Standing Balance: Dynamic	fair minus	  · Systems Impairment Contributing to Balance Disturbance	musculoskeletal	  · Identified Impairments Contributing to Balance Disturbance	impaired postural control	    Sensory Examination:   Sensory Examination:    Grossly Intact:   · Gross Sensory Examination	Grossly Intact	      Clinical Impressions:   · Criteria for Skilled Therapeutic Interventions	impairments found; rehab potential; therapy frequency; risk reduction/prevention; anticipated discharge recommendation	  · Impairments Found (describe specific impairments)	gait, locomotion, and balance	  · Risk Reduction/Prevention (Describe Specific Areas of risk reduction/prevention)	risk factors	  · Risk Areas	fall	  · Rehab Potential	good, to achieve stated therapy goals	  · Therapy Frequency	2-3x/week	      PM&R Impression: as above    Disposition Plan Recommendations: d/c home with home physical therapy

## 2018-04-13 NOTE — CONSULT NOTE ADULT - SUBJECTIVE AND OBJECTIVE BOX
HPI:  73yo M with PMH of HTN, HLD, DMT2, prostatomegaly, h/o SVT s/p ablation (2011) presents  here with progressive weakness for one month a/w abdominal distension. Pt reports no known history of kidney disease and was asked by his PMD to see a urologist that he never did. He reports increased dyspnea on exertion, usually can climb more than one flight of stairs but get SOB with less than one flight now a/w anorexia, denies any F/C, chest pain, cough, abdominal pain, N/V. He has constipation and small amount of non-bloody diarrhea. He denies alcohol abuse, never had colonoscopy.     ED course: VSS. Labs with anemia Hb 9.1/27, thrombocytopenia (137). Hyponatremia, hypokalemia, anion gap metabolic acidosis with elevated beta-hydroxybutyrate, lactate 1.1, UA with pyuria, leukocyte esterase with bacteria. Pt was given insulin 4u once and 2l NS bolus. A CT abd/pelvis was performed. (11 Apr 2018 06:02)      PAST MEDICAL & SURGICAL HISTORY:  Enlarged prostate  DM (diabetes mellitus)  HTN (hypertension)  H/O prior ablation treatment      FAMILY HISTORY:  No pertinent family history in first degree relatives      SOCIAL HISTORY:  Smoking:  NONE  ETOH:  NONE  Caffeine:  2 cups of coffee/day  Illicit drug use: NONE    HOME MEDICATIONS:  Janumet 50/500 mg po bid  Glyburide 2.5 mg po qAM    MEDICATIONS  (STANDING):  aspirin  chewable 81 milliGRAM(s) Oral daily  dextrose 5%. 1000 milliLiter(s) (50 mL/Hr) IV Continuous <Continuous>  dextrose 50% Injectable 12.5 Gram(s) IV Push once  dextrose 50% Injectable 25 Gram(s) IV Push once  dextrose 50% Injectable 25 Gram(s) IV Push once  docusate sodium 100 milliGRAM(s) Oral two times a day  ferrous    sulfate 325 milliGRAM(s) Oral three times a day  finasteride 5 milliGRAM(s) Oral daily  influenza   Vaccine 0.5 milliLiter(s) IntraMuscular once  insulin glargine Injectable (LANTUS) 12 Unit(s) SubCutaneous every morning  insulin lispro (HumaLOG) corrective regimen sliding scale   SubCutaneous Before meals and at bedtime  insulin lispro Injectable (HumaLOG) 5 Unit(s) SubCutaneous three times a day before meals  melatonin 5 milliGRAM(s) Oral at bedtime  multivitamin 1 Tablet(s) Oral daily  simvastatin 10 milliGRAM(s) Oral at bedtime  sodium chloride 0.9%. 1000 milliLiter(s) (100 mL/Hr) IV Continuous <Continuous>  tamsulosin 0.4 milliGRAM(s) Oral at bedtime    MEDICATIONS  (PRN):  acetaminophen   Tablet 650 milliGRAM(s) Oral every 6 hours PRN For Temp greater than 38 C (100.4 F)  dextrose Gel 1 Dose(s) Oral once PRN Blood Glucose LESS THAN 70 milliGRAM(s)/deciliter  glucagon  Injectable 1 milliGRAM(s) IntraMuscular once PRN Glucose LESS THAN 70 milligrams/deciliter      Allergies    No Known Allergies    Intolerances        REVIEW OF SYSTEMS  CONSTITUTIONAL:  Negative fever or chills  EYES:  Negative visual field deficit, blurry vision or double vision  ENT:  Negative for sore throat, runny nose, or earache  CARDIOVASCULAR:  Negative for chest pain or palpitations  RESPIRATORY:  Negative for cough, wheezing, sputum production, or SOB   GASTROINTESTINAL:  Negative for nausea, vomiting, diarrhea, or abdominal pain  GENITOURINARY:  Negative frequency, urgency or dysuria  MUSCULOSKELETAL:  No joint pain or stiffness  INTEGUMENTARY: no rashes  NEUROLOGIC:  No headache, confusion, syncope or seizures  PSYCHIATRIC: No depression or anxiety  HEMATOLOGY AND ONCOLOGY:  No unusual infections or bleeding    CAPILLARY GLUCOSE:  CAPILLARY BLOOD GLUCOSE        LABS:                        7.8    5.5   )-----------( 120      ( 13 Apr 2018 08:13 )             23.3     04-13    135  |  100  |  38<H>  ----------------------------<  238<H>  3.6   |  19<L>  |  1.90<H>    Ca    8.7      13 Apr 2018 08:13  Phos  2.6     04-13  Mg     1.8     04-13      PT/INR - ( 12 Apr 2018 15:50 )   PT: 16.0 sec;   INR: 1.43                RADIOLOGY & ADDITIONAL STUDIES:      Physical Examination:  Vital Signs Last 24 Hrs  T(C): 37.1 (13 Apr 2018 21:10), Max: 38.4 (12 Apr 2018 23:38)  T(F): 98.7 (13 Apr 2018 21:10), Max: 101.2 (12 Apr 2018 23:38)  HR: 79 (13 Apr 2018 21:10) (79 - 83)  BP: 137/63 (13 Apr 2018 21:10) (133/68 - 151/69)  BP(mean): --  RR: 18 (13 Apr 2018 21:10) (17 - 18)  SpO2: 79% (13 Apr 2018 21:10) (79% - 100%)    Constitutional: wn/wd in NAD.   HEENT: NCAT, MMM, OP clear, EOMI, no proptosis or lid retraction  Neck: no thyromegaly or palpable thyroid nodules   Respiratory: lungs CTAB.  Cardiovascular: regular rhythm, normal S1 and S2, no audible murmurs, no peripheral edema  GI: soft, NT/ND  Neurology: no tremors, legally blind.   Skin: no visible rashes/lesions  Psychiatric: AAO x 3, normal affect/mood.      ASSESSMENT/RECOMMENDATIONS:  Patient is a 72y old  Male who presents with a chief complaint of Weakness (11 Apr 2018 19:12) S/p food poisoning, urinary retention with prostatic hypertrophy.  Consulted for management of diabetes type 2.    Recommend:  Diabetic diet  Hold oral antiidabetic medications in hospital  Lantus 12 units sc qAM  Lispro 6 units sc tid premeals  Lispro moderate dose sliding scale for fingersticks glucose qid AC and HS  Glycemic target  mg/dl  Outpatient f/u in endocrine practice with Dr. Campbell, tel [phone]        Plan discussed with medical housestaff.  Management of other medical issues per primary team.    Attending attestation:  I have seen and evaluated the patient at the bedside.  Nurse Practitioner/Fellow/Resident’s note reviewed, including vital signs, PE and laboratory results.  Direct personal management and extensive interpretation of the data conducted.   Assessment and recommendations as above.

## 2018-04-13 NOTE — PROGRESS NOTE ADULT - SUBJECTIVE AND OBJECTIVE BOX
INTERVAL/OVERNIGHT EVENTS:  Febrile to 101.1 last night.  Given Tylenol.  Defervesced.  UCx with Ecoli sensitive to cipro    SUBJECTIVE:  Seen and examined at bedside.  Reports dissatisfaction with medical team.  He says that he feels that he is getting the run-around regarding his urinary retention.  He would like to speak with a urologist as soon as possible.  Told him that a urologist is coming this morning.  He denies current F/C, cough, SOB, CP, palpitations, abd pain, N/V/D.  No BM in ~2 days.  Persistent mild LBP (chronic).  Denies LE pain/swelling.    ROS otherwise negative.    VITAL SIGNS:  T(F): 99.3 (04-13-18 @ 09:15)  HR: 83 (04-13-18 @ 09:15)  BP: 133/68 (04-13-18 @ 09:15)  RR: 18 (04-13-18 @ 09:15)  SpO2: 98% (04-13-18 @ 09:15)  Wt(kg): --    PHYSICAL EXAM:    General:  NAD, nontoxic appearing, WDWN  HENT:  EOMI, R pupil reactive / deformed 2/2 cataract sx.  L pupil pinpoint, unclear if reactive.  No sinus tenderness.  oropharynx WNL.  MMM  Neck:  Trachea midline.  No JVD, LAD, or thyromegaly.  Heart:  S1S2 no M/R/G, regular  Lungs:  CTAB no wheezing, rhonchi or rales.  No accessory muscle use.  No respiratory distress.  Abdomen:  NABS.  soft, nontender, nondistended.  no guarding.  no ascites.  no organomegaly.  Vascular:  Peripheral pulses palpable  Extremities:  Trace pitting edema BLE.  Calves nontender.    Back:  Mild bilateral low back TTP.  No CVA tenderness  Neuro:  AOx3, no facial asymmetry, nonfocal, no slurred speech  Skin:  No rash  (+) palacio with yellow urine    MEDICATIONS  (STANDING):  aspirin  chewable 81 milliGRAM(s) Oral daily  dextrose 5%. 1000 milliLiter(s) (50 mL/Hr) IV Continuous <Continuous>  dextrose 50% Injectable 12.5 Gram(s) IV Push once  dextrose 50% Injectable 25 Gram(s) IV Push once  dextrose 50% Injectable 25 Gram(s) IV Push once  docusate sodium 100 milliGRAM(s) Oral two times a day  ferrous    sulfate 325 milliGRAM(s) Oral three times a day  finasteride 5 milliGRAM(s) Oral daily  influenza   Vaccine 0.5 milliLiter(s) IntraMuscular once  insulin glargine Injectable (LANTUS) 12 Unit(s) SubCutaneous every morning  insulin lispro (HumaLOG) corrective regimen sliding scale   SubCutaneous Before meals and at bedtime  insulin lispro Injectable (HumaLOG) 3 Unit(s) SubCutaneous three times a day before meals  magnesium sulfate  IVPB 1 Gram(s) IV Intermittent once  melatonin 5 milliGRAM(s) Oral at bedtime  simvastatin 10 milliGRAM(s) Oral at bedtime  sodium chloride 0.9%. 1000 milliLiter(s) (100 mL/Hr) IV Continuous <Continuous>  tamsulosin 0.4 milliGRAM(s) Oral at bedtime    MEDICATIONS  (PRN):  acetaminophen   Tablet 650 milliGRAM(s) Oral every 6 hours PRN For Temp greater than 38 C (100.4 F)  dextrose Gel 1 Dose(s) Oral once PRN Blood Glucose LESS THAN 70 milliGRAM(s)/deciliter  glucagon  Injectable 1 milliGRAM(s) IntraMuscular once PRN Glucose LESS THAN 70 milligrams/deciliter      Allergies    No Known Allergies    Intolerances      acetaminophen   Tablet 650 milliGRAM(s) Oral every 6 hours PRN  aspirin  chewable 81 milliGRAM(s) Oral daily  dextrose 5%. 1000 milliLiter(s) IV Continuous <Continuous>  dextrose 50% Injectable 12.5 Gram(s) IV Push once  dextrose 50% Injectable 25 Gram(s) IV Push once  dextrose 50% Injectable 25 Gram(s) IV Push once  dextrose Gel 1 Dose(s) Oral once PRN  docusate sodium 100 milliGRAM(s) Oral two times a day  ferrous    sulfate 325 milliGRAM(s) Oral three times a day  finasteride 5 milliGRAM(s) Oral daily  glucagon  Injectable 1 milliGRAM(s) IntraMuscular once PRN  influenza   Vaccine 0.5 milliLiter(s) IntraMuscular once  insulin glargine Injectable (LANTUS) 12 Unit(s) SubCutaneous every morning  insulin lispro (HumaLOG) corrective regimen sliding scale   SubCutaneous Before meals and at bedtime  insulin lispro Injectable (HumaLOG) 3 Unit(s) SubCutaneous three times a day before meals  magnesium sulfate  IVPB 1 Gram(s) IV Intermittent once  melatonin 5 milliGRAM(s) Oral at bedtime  simvastatin 10 milliGRAM(s) Oral at bedtime  sodium chloride 0.9%. 1000 milliLiter(s) IV Continuous <Continuous>  tamsulosin 0.4 milliGRAM(s) Oral at bedtime      LABS:                          7.8    5.5   )-----------( 120      ( 13 Apr 2018 08:13 )             23.3     04-13    135  |  100  |  38<H>  ----------------------------<  238<H>  3.6   |  19<L>  |  1.90<H>    Ca    8.7      13 Apr 2018 08:13  Phos  2.6     04-13  Mg     1.8     04-13      PT/INR - ( 12 Apr 2018 15:50 )   PT: 16.0 sec;   INR: 1.43                RADIOLOGY & ADDITIONAL TESTS:  All imaging reviewed.

## 2018-04-13 NOTE — PROGRESS NOTE ADULT - PROBLEM SELECTOR PLAN 2
-Positive blood cx on admission.  C/w Rocephin for now.  Sensitive to cipro.  Treatment of UTI as below.  increased to rocephin 2g daily.  -Afebrile.  Nontoxic appearing.  Normotensive.  Lactate <2 on admission.  -f/u surveillance cx

## 2018-04-13 NOTE — PROGRESS NOTE ADULT - SUBJECTIVE AND OBJECTIVE BOX
INTERVAL HPI/OVERNIGHT EVENTS: Afebrile, no flank pain,     CONSTITUTIONAL:  Negative fever or chills, feels better  EYES:  Negative  blurry vision or double vision  CARDIOVASCULAR:  Negative for chest pain or palpitations  RESPIRATORY:  Negative for cough, wheezing, or SOB   GASTROINTESTINAL:  Negative for nausea, vomiting, diarrhea, constipation, or abdominal pain  GENITOURINARY:  Negative frequency, urgency or dysuria  NEUROLOGIC:  No headache, confusion, dizziness, lightheadedness      ANTIBIOTICS/RELEVANT:    MEDICATIONS  (STANDING):  aspirin  chewable 81 milliGRAM(s) Oral daily  dextrose 5%. 1000 milliLiter(s) (50 mL/Hr) IV Continuous <Continuous>  dextrose 50% Injectable 12.5 Gram(s) IV Push once  dextrose 50% Injectable 25 Gram(s) IV Push once  dextrose 50% Injectable 25 Gram(s) IV Push once  docusate sodium 100 milliGRAM(s) Oral two times a day  ferrous    sulfate 325 milliGRAM(s) Oral three times a day  finasteride 5 milliGRAM(s) Oral daily  influenza   Vaccine 0.5 milliLiter(s) IntraMuscular once  insulin glargine Injectable (LANTUS) 12 Unit(s) SubCutaneous every morning  insulin lispro (HumaLOG) corrective regimen sliding scale   SubCutaneous Before meals and at bedtime  insulin lispro Injectable (HumaLOG) 3 Unit(s) SubCutaneous three times a day before meals  melatonin 5 milliGRAM(s) Oral at bedtime  multivitamin 1 Tablet(s) Oral daily  simvastatin 10 milliGRAM(s) Oral at bedtime  sodium chloride 0.9%. 1000 milliLiter(s) (100 mL/Hr) IV Continuous <Continuous>  tamsulosin 0.4 milliGRAM(s) Oral at bedtime    MEDICATIONS  (PRN):  acetaminophen   Tablet 650 milliGRAM(s) Oral every 6 hours PRN For Temp greater than 38 C (100.4 F)  dextrose Gel 1 Dose(s) Oral once PRN Blood Glucose LESS THAN 70 milliGRAM(s)/deciliter  glucagon  Injectable 1 milliGRAM(s) IntraMuscular once PRN Glucose LESS THAN 70 milligrams/deciliter        Vital Signs Last 24 Hrs  T(C): 37.4 (13 Apr 2018 09:15), Max: 38.4 (12 Apr 2018 23:38)  T(F): 99.3 (13 Apr 2018 09:15), Max: 101.2 (12 Apr 2018 23:38)  HR: 83 (13 Apr 2018 09:15) (81 - 89)  BP: 133/68 (13 Apr 2018 09:15) (121/69 - 153/74)    RR: 18 (13 Apr 2018 09:15) (17 - 18)  SpO2: 98% (13 Apr 2018 09:15) (98% - 100%)    04-12-18 @ 07:01  -  04-13-18 @ 07:00  --------------------------------------------------------  IN: 2400 mL / OUT: 3700 mL / NET: -1300 mL    04-13-18 @ 07:01  -  04-13-18 @ 15:12  --------------------------------------------------------  IN: 0 mL / OUT: 1500 mL / NET: -1500 mL      PHYSICAL EXAM:  Constitutional: Well-developed, well nourished  Eyes: POLO, EOMI  Ear/Nose/Throat: no oral lesion, no sinus tenderness on percussion	  Neck: no JVD, no lymphadenopathy, supple  Respiratory: CTA chuck  Cardiovascular: S1S2 RRR, no murmurs  Gastrointestinal: soft, (+) BS, no HSM, Rodriguez in place  Extremities: no e/e/c  Vascular: DP Pulse: right normal; left normal      LABS:                        7.8    5.5   )-----------( 120      ( 13 Apr 2018 08:13 )             23.3     04-13    135  |  100  |  38<H>  ----------------------------<  238<H>  3.6   |  19<L>  |  1.90<H>    Ca    8.7      13 Apr 2018 08:13  Phos  2.6     04-13  Mg     1.8     04-13      PT/INR - ( 12 Apr 2018 15:50 )   PT: 16.0 sec;   INR: 1.43          MICROBIOLOGY:  Culture - Blood (04.12.18 @ 11:37)    Specimen Source: .Blood One Set    Culture Results:   No growth at 1 day.    Culture - Urine (04.11.18 @ 10:20)    -  Ampicillin: R >16 These ampicillin results predict results for amoxicillin    -  Ampicillin/Sulbactam: R >16/8    -  Cefazolin: S <=8 This predicts results for oral agents cefaclor, cefdinir, cefpodoxime, cefprozil, cefuroxime axetil, cephalexin and locarbef for uncomplicated UTI. Note that some isolates may be susceptible to these agents while testing resistant to cefazolin.    -  Ceftriaxone: S <=1 Enterobacter, Citrobacter, and Serratia may develop resistance during prolonged therapy    -  Ciprofloxacin: S <=1    -  Gentamicin: S <=4    -  Nitrofurantoin: S <=32 Should not be used to treat pyelonephritis    -  Piperacillin/Tazobactam: S <=16    -  Tobramycin: S <=4    -  Trimethoprim/Sulfamethoxazole: S <=2/38    Specimen Source: .Urine None    Culture Results:   >100,000 CFU/ml Escherichia coli    Organism Identification: Escherichia coli    Organism: Escherichia coli    Method Type: IMAN    Culture - Blood (04.11.18 @ 08:08)    Gram Stain:   Aerobic Bottle: Gram Negative Rods  Result called to and read back by_ Ms. Katy Serna RN([id number])  04/11/2018 22:22:35  "Due to technical problems, Proteus sp. will Not be reported as part of  the BCID panel until further notice"  ***Blood Panel PCR results on this specimen are available  approximately 3 hours after the Gram stain result.***  Gram stain, PCR, and/or culture results may not always  correspond due to difference in methodologies.  ************************************************************  This PCR assay was performed using [employer].  The following targets are tested for: Enterococcus,  vancomycin resistant enterococci, Listeria monocytogenes,  coagulase negative staphylococci, S. aureus,  methicillin resistant S. aureus, Streptococcus agalactiae  (Group B), S. pneumoniae, S. pyogenes (Group A),  Acinetobacter baumannii, Enterobacter cloacae, E. coli,  Klebsiella oxytoca, K. pneumoniae, Proteus sp.,  Serratia marcescens, Haemophilus influenzae,  Neisseria meningitidis, Pseudomonas aeruginosa, Candida  albicans, C. glabrata, C krusei, C parapsilosis,  C. tropicalis and the KPC resistance gene.    -  Ampicillin: R >16 These ampicillin results predict results for amoxicillin    -  Cefazolin: S <=8    -  Ampicillin/Sulbactam: R >16/8    -  Escherichia coli: Detec    -  Trimethoprim/Sulfamethoxazole: S <=2/38    -  Tobramycin: S <=4    -  Piperacillin/Tazobactam: S <=16    -  Gentamicin: S <=4    -  Ciprofloxacin: S <=1    -  Ceftriaxone: S <=1 Enterobacter, Citrobacter, and Serratia may develop resistance during prolonged therapy    Specimen Source: .Blood Blood-Peripheral    Organism: Blood Culture PCR    Organism: Escherichia coli    Culture Results:   Growth in aerobic bottle: Escherichia coli    Organism Identification: Escherichia coli  Blood Culture PCR    Method Type: PCR    Method Type: IMAN        RADIOLOGY & ADDITIONAL STUDIES:   CT Abdomen and Pelvis w/ Oral Cont (04.11.18 @ 01:53)  IMPRESSION:     Pancolitis of infectious or inflammatory etiology.    Bilateral severe hydroureteronephrosis, as well as markedly distended   urinary bladder. Findings consistent with chronic outlet obstruction.   Marked prostatomegaly.    No biliary dilatation.

## 2018-04-13 NOTE — PROGRESS NOTE ADULT - PROBLEM SELECTOR PLAN 7
-Intermittently hypertensive, asymptomatic.  Continue holding home lasix in the setting of AAMIR.  Restart HCTZ PRN    #DM  -A1c 7.2.  Not insulin dependent.  Holding home janumet and glyburide.    -Lantus 12 qam + 3 units mealtime TID.  ISS.  monitor FS

## 2018-04-13 NOTE — PROGRESS NOTE ADULT - PROBLEM SELECTOR PLAN 1
1) Continue  Ceftriaxone 1 gr I q24h; may switch to oral Ciprofloxacin in 2-3 days if diarrhea resolves

## 2018-04-13 NOTE — PROGRESS NOTE ADULT - PROBLEM SELECTOR PLAN 5
-with E.coli bacteremia.  c/w rocephin for now.  sensitive to cipro  - f/u surveillance blood cultures  - No CVA tenderness despite perirenal fat stranding on CT

## 2018-04-14 LAB
ANION GAP SERPL CALC-SCNC: 12 MMOL/L — SIGNIFICANT CHANGE UP (ref 5–17)
APPEARANCE UR: CLEAR — SIGNIFICANT CHANGE UP
BACTERIA # UR AUTO: PRESENT /HPF
BILIRUB UR-MCNC: NEGATIVE — SIGNIFICANT CHANGE UP
BLD GP AB SCN SERPL QL: NEGATIVE — SIGNIFICANT CHANGE UP
BUN SERPL-MCNC: 32 MG/DL — HIGH (ref 7–23)
CALCIUM SERPL-MCNC: 8.5 MG/DL — SIGNIFICANT CHANGE UP (ref 8.4–10.5)
CHLORIDE SERPL-SCNC: 104 MMOL/L — SIGNIFICANT CHANGE UP (ref 96–108)
CO2 SERPL-SCNC: 21 MMOL/L — LOW (ref 22–31)
COLOR SPEC: YELLOW — SIGNIFICANT CHANGE UP
COMMENT - URINE: SIGNIFICANT CHANGE UP
CREAT SERPL-MCNC: 1.67 MG/DL — HIGH (ref 0.5–1.3)
DIFF PNL FLD: (no result)
EPI CELLS # UR: SIGNIFICANT CHANGE UP /HPF (ref 0–5)
FOLATE SERPL-MCNC: 12.1 NG/ML — SIGNIFICANT CHANGE UP (ref 4.8–24.2)
GLUCOSE BLDC GLUCOMTR-MCNC: 194 MG/DL — HIGH (ref 70–99)
GLUCOSE BLDC GLUCOMTR-MCNC: 221 MG/DL — HIGH (ref 70–99)
GLUCOSE BLDC GLUCOMTR-MCNC: 243 MG/DL — HIGH (ref 70–99)
GLUCOSE BLDC GLUCOMTR-MCNC: 247 MG/DL — HIGH (ref 70–99)
GLUCOSE SERPL-MCNC: 211 MG/DL — HIGH (ref 70–99)
GLUCOSE UR QL: NEGATIVE — SIGNIFICANT CHANGE UP
HCT VFR BLD CALC: 23.1 % — LOW (ref 39–50)
HGB BLD-MCNC: 7.7 G/DL — LOW (ref 13–17)
KETONES UR-MCNC: (no result) MG/DL
LEUKOCYTE ESTERASE UR-ACNC: (no result)
MAGNESIUM SERPL-MCNC: 1.6 MG/DL — SIGNIFICANT CHANGE UP (ref 1.6–2.6)
MCHC RBC-ENTMCNC: 28.3 PG — SIGNIFICANT CHANGE UP (ref 27–34)
MCHC RBC-ENTMCNC: 33.3 G/DL — SIGNIFICANT CHANGE UP (ref 32–36)
MCV RBC AUTO: 84.9 FL — SIGNIFICANT CHANGE UP (ref 80–100)
NITRITE UR-MCNC: NEGATIVE — SIGNIFICANT CHANGE UP
PH UR: 5.5 — SIGNIFICANT CHANGE UP (ref 5–8)
PHOSPHATE SERPL-MCNC: 3 MG/DL — SIGNIFICANT CHANGE UP (ref 2.5–4.5)
PLATELET # BLD AUTO: 134 K/UL — LOW (ref 150–400)
POTASSIUM SERPL-MCNC: 3.6 MMOL/L — SIGNIFICANT CHANGE UP (ref 3.5–5.3)
POTASSIUM SERPL-SCNC: 3.6 MMOL/L — SIGNIFICANT CHANGE UP (ref 3.5–5.3)
PROT UR-MCNC: 30 MG/DL
RBC # BLD: 2.72 M/UL — LOW (ref 4.2–5.8)
RBC # FLD: 13.7 % — SIGNIFICANT CHANGE UP (ref 10.3–16.9)
RBC CASTS # UR COMP ASSIST: (no result) /HPF
RH IG SCN BLD-IMP: POSITIVE — SIGNIFICANT CHANGE UP
SODIUM SERPL-SCNC: 137 MMOL/L — SIGNIFICANT CHANGE UP (ref 135–145)
SP GR SPEC: 1.01 — SIGNIFICANT CHANGE UP (ref 1–1.03)
UROBILINOGEN FLD QL: 0.2 E.U./DL — SIGNIFICANT CHANGE UP
VIT B12 SERPL-MCNC: 1130 PG/ML — SIGNIFICANT CHANGE UP (ref 232–1245)
WBC # BLD: 4.2 K/UL — SIGNIFICANT CHANGE UP (ref 3.8–10.5)
WBC # FLD AUTO: 4.2 K/UL — SIGNIFICANT CHANGE UP (ref 3.8–10.5)
WBC UR QL: > 10 /HPF

## 2018-04-14 RX ORDER — HEPARIN SODIUM 5000 [USP'U]/ML
5000 INJECTION INTRAVENOUS; SUBCUTANEOUS EVERY 12 HOURS
Qty: 0 | Refills: 0 | Status: DISCONTINUED | OUTPATIENT
Start: 2018-04-14 | End: 2018-04-14

## 2018-04-14 RX ORDER — ERYTHROPOIETIN 10000 [IU]/ML
10000 INJECTION, SOLUTION INTRAVENOUS; SUBCUTANEOUS
Qty: 0 | Refills: 0 | Status: DISCONTINUED | OUTPATIENT
Start: 2018-04-14 | End: 2018-04-19

## 2018-04-14 RX ORDER — INSULIN LISPRO 100/ML
6 VIAL (ML) SUBCUTANEOUS
Qty: 0 | Refills: 0 | Status: DISCONTINUED | OUTPATIENT
Start: 2018-04-14 | End: 2018-04-15

## 2018-04-14 RX ORDER — POTASSIUM CHLORIDE 20 MEQ
40 PACKET (EA) ORAL ONCE
Qty: 0 | Refills: 0 | Status: COMPLETED | OUTPATIENT
Start: 2018-04-14 | End: 2018-04-14

## 2018-04-14 RX ORDER — SENNA PLUS 8.6 MG/1
1 TABLET ORAL AT BEDTIME
Qty: 0 | Refills: 0 | Status: DISCONTINUED | OUTPATIENT
Start: 2018-04-14 | End: 2018-04-15

## 2018-04-14 RX ADMIN — Medication 6 UNIT(S): at 08:37

## 2018-04-14 RX ADMIN — Medication 5 MILLIGRAM(S): at 11:16

## 2018-04-14 RX ADMIN — CEFTRIAXONE 100 GRAM(S): 500 INJECTION, POWDER, FOR SOLUTION INTRAMUSCULAR; INTRAVENOUS at 00:14

## 2018-04-14 RX ADMIN — SIMVASTATIN 10 MILLIGRAM(S): 20 TABLET, FILM COATED ORAL at 22:43

## 2018-04-14 RX ADMIN — Medication 6 UNIT(S): at 11:50

## 2018-04-14 RX ADMIN — ERYTHROPOIETIN 10000 UNIT(S): 10000 INJECTION, SOLUTION INTRAVENOUS; SUBCUTANEOUS at 11:16

## 2018-04-14 RX ADMIN — Medication 4: at 08:37

## 2018-04-14 RX ADMIN — CEFTRIAXONE 100 GRAM(S): 500 INJECTION, POWDER, FOR SOLUTION INTRAMUSCULAR; INTRAVENOUS at 23:55

## 2018-04-14 RX ADMIN — Medication 4: at 18:01

## 2018-04-14 RX ADMIN — Medication 6 UNIT(S): at 18:00

## 2018-04-14 RX ADMIN — Medication 5 MILLIGRAM(S): at 23:21

## 2018-04-14 RX ADMIN — INSULIN GLARGINE 12 UNIT(S): 100 INJECTION, SOLUTION SUBCUTANEOUS at 08:38

## 2018-04-14 RX ADMIN — Medication 40 MILLIEQUIVALENT(S): at 11:16

## 2018-04-14 RX ADMIN — FINASTERIDE 5 MILLIGRAM(S): 5 TABLET, FILM COATED ORAL at 11:16

## 2018-04-14 RX ADMIN — Medication 2: at 22:43

## 2018-04-14 RX ADMIN — Medication 325 MILLIGRAM(S): at 13:23

## 2018-04-14 RX ADMIN — Medication 100 MILLIGRAM(S): at 18:00

## 2018-04-14 RX ADMIN — Medication 100 MILLIGRAM(S): at 05:23

## 2018-04-14 RX ADMIN — Medication 1 TABLET(S): at 11:16

## 2018-04-14 RX ADMIN — Medication 5 MILLIGRAM(S): at 23:59

## 2018-04-14 RX ADMIN — Medication 4: at 11:50

## 2018-04-14 RX ADMIN — SENNA PLUS 1 TABLET(S): 8.6 TABLET ORAL at 23:21

## 2018-04-14 RX ADMIN — Medication 325 MILLIGRAM(S): at 22:42

## 2018-04-14 RX ADMIN — TAMSULOSIN HYDROCHLORIDE 0.4 MILLIGRAM(S): 0.4 CAPSULE ORAL at 22:42

## 2018-04-14 RX ADMIN — Medication 81 MILLIGRAM(S): at 13:23

## 2018-04-14 RX ADMIN — SODIUM CHLORIDE 100 MILLILITER(S): 9 INJECTION INTRAMUSCULAR; INTRAVENOUS; SUBCUTANEOUS at 08:17

## 2018-04-14 RX ADMIN — Medication 325 MILLIGRAM(S): at 05:23

## 2018-04-14 NOTE — PROGRESS NOTE ADULT - PROBLEM SELECTOR PLAN 1
1) Continue  Ceftriaxone 2 gr q24h; may switch to oral Ciprofloxacin in 2-3 days  2) Patient claims diarrhea resolved, no need to send stool studies  3) Urology f/u

## 2018-04-14 NOTE — PROGRESS NOTE ADULT - SUBJECTIVE AND OBJECTIVE BOX
INTERVAL HPI/OVERNIGHT EVENTS:      Patient is a 72y old Male who presents with a chief complaint of Weakness (04-11-18) was seen and examined today.   Reports the following symptoms:    CONSTITUTIONAL:  Negative fever or chills, feels better, good appetite  EYES:  Decreased vision  CARDIOVASCULAR:  Negative for chest pain or palpitations  RESPIRATORY:  Negative for cough, wheezing, or SOB   GASTROINTESTINAL:  Constipation  GENITOURINARY:  Negative frequency, urgency or dysuria  NEUROLOGIC:  No headache, confusion, dizziness, lightheadedness    MEDICATIONS  (STANDING):  aspirin  chewable 81 milliGRAM(s) Oral daily  bisacodyl Suppository 10 milliGRAM(s) Rectal once  cefTRIAXone   IVPB 2 Gram(s) IV Intermittent every 24 hours  dextrose 5%. 1000 milliLiter(s) (50 mL/Hr) IV Continuous <Continuous>  dextrose 50% Injectable 12.5 Gram(s) IV Push once  dextrose 50% Injectable 25 Gram(s) IV Push once  dextrose 50% Injectable 25 Gram(s) IV Push once  docusate sodium 100 milliGRAM(s) Oral two times a day  epoetin kathi Injectable 97259 Unit(s) SubCutaneous every 48 hours  ferrous    sulfate 325 milliGRAM(s) Oral three times a day  finasteride 5 milliGRAM(s) Oral daily  influenza   Vaccine 0.5 milliLiter(s) IntraMuscular once  insulin glargine Injectable (LANTUS) 12 Unit(s) SubCutaneous every morning  insulin lispro (HumaLOG) corrective regimen sliding scale   SubCutaneous Before meals and at bedtime  insulin lispro Injectable (HumaLOG) 6 Unit(s) SubCutaneous three times a day before meals  melatonin 5 milliGRAM(s) Oral at bedtime  multivitamin 1 Tablet(s) Oral daily  simvastatin 10 milliGRAM(s) Oral at bedtime  tamsulosin 0.4 milliGRAM(s) Oral at bedtime    MEDICATIONS  (PRN):  acetaminophen   Tablet 650 milliGRAM(s) Oral every 6 hours PRN For Temp greater than 38 C (100.4 F)  bisacodyl 5 milliGRAM(s) Oral every 12 hours PRN Constipation  dextrose Gel 1 Dose(s) Oral once PRN Blood Glucose LESS THAN 70 milliGRAM(s)/deciliter  glucagon  Injectable 1 milliGRAM(s) IntraMuscular once PRN Glucose LESS THAN 70 milligrams/deciliter        LABS:                        7.7    4.2   )-----------( 134      ( 14 Apr 2018 07:05 )             23.1     04-14    137  |  104  |  32<H>  ----------------------------<  211<H>  3.6   |  21<L>  |  1.67<H>    Ca    8.5      14 Apr 2018 07:05  Phos  3.0     04-14  Mg     1.6     04-14      Hemoglobin A1C, Whole Blood: 7.2 % (04-11-18 @ 06:15)    PT/INR - ( 12 Apr 2018 15:50 )   PT: 16.0 sec;   INR: 1.43              Thyroid Stimulating Hormone, Serum: 0.956 uIU/mL (04-11 @ 06:15)      RADIOLOGY & ADDITIONAL TESTS:      Vital Signs Last 24 Hrs  T(C): 36.4 (14 Apr 2018 09:22), Max: 37.1 (13 Apr 2018 21:10)  T(F): 97.5 (14 Apr 2018 09:22), Max: 98.7 (13 Apr 2018 21:10)  HR: 76 (14 Apr 2018 09:22) (76 - 79)  BP: 152/76 (14 Apr 2018 09:22) (137/63 - 171/76)  BP(mean): --  RR: 19 (14 Apr 2018 09:22) (18 - 19)  SpO2: 99% (14 Apr 2018 09:22) (79% - 99%)    CAPILLARY BLOOD GLUCOSE      PHYSICAL EXAM:  Constitutional: wn/wd in NAD.   HEENT: NCAT, MMM, OP clear, EOMI, , no proptosis or lid retraction  Neck: supple, no acanthosis, no thyromegaly or palpable thyroid nodules   Respiratory: Lungs CTAB.  Cardiovascular: regular rhythm, normal S1 and S2, no audible murmurs, no peripheral edema  GI: soft, + bowel sounds, NT/ND, no masses/HSM appreciated.  Neurology: no tremors, DTR 2+  Skin: no visible rashes/lesions  Psychiatric: AAO x 3, normal affect/mood.        A/P: 72yMale admitted for weakness with high PSA.  Consulted and being followed for Diabetes Type 2.       Uncontrolled DM Type 2 in hospital - Hyperglycemia improved with intensive insulin regimen.    Please continue 12 units Lantus qAM,   premeal Lispro 6 units TID, and    Lispro moderate/low dose sliding scale 4 times daily (before meals and bedtime).    Continue consistent carbohydrate (Diabetic) diet  Target  - 150 mg/dl.    OOB with assistance  Outpatient f/u with endocrinologist    Case discussed with attending and primary team.      Attending attestation:  I have seen and evaluated the patient at the bedside.   Nurse Practitioner/Fellow/Resident’s note reviewed, including vital signs, PE and laboratory results.  Direct personal management and extensive interpretation of the data conducted.   Assessment and recommendations as above.

## 2018-04-14 NOTE — PROGRESS NOTE ADULT - SUBJECTIVE AND OBJECTIVE BOX
INTERVAL HPI/OVERNIGHT EVENTS: Afebrile, no loose BM    CONSTITUTIONAL:  Negative fever or chills, feels better, good appetite  EYES:  Negative  blurry vision or double vision  CARDIOVASCULAR:  Negative for chest pain or palpitations  RESPIRATORY:  Negative for cough, wheezing, or SOB   GASTROINTESTINAL:  Negative for nausea, vomiting, diarrhea,  or abdominal pain  GENITOURINARY:  Negative frequency, urgency or dysuria  NEUROLOGIC:  No headache, confusion, dizziness, lightheadedness      ANTIBIOTICS/RELEVANT:    MEDICATIONS  (STANDING):  aspirin  chewable 81 milliGRAM(s) Oral daily  cefTRIAXone   IVPB 2 Gram(s) IV Intermittent every 24 hours  dextrose 5%. 1000 milliLiter(s) (50 mL/Hr) IV Continuous <Continuous>  dextrose 50% Injectable 12.5 Gram(s) IV Push once  dextrose 50% Injectable 25 Gram(s) IV Push once  dextrose 50% Injectable 25 Gram(s) IV Push once  docusate sodium 100 milliGRAM(s) Oral two times a day  epoetin kathi Injectable 12930 Unit(s) SubCutaneous every 48 hours  ferrous    sulfate 325 milliGRAM(s) Oral three times a day  finasteride 5 milliGRAM(s) Oral daily  influenza   Vaccine 0.5 milliLiter(s) IntraMuscular once  insulin glargine Injectable (LANTUS) 12 Unit(s) SubCutaneous every morning  insulin lispro (HumaLOG) corrective regimen sliding scale   SubCutaneous Before meals and at bedtime  insulin lispro Injectable (HumaLOG) 6 Unit(s) SubCutaneous three times a day before meals  melatonin 5 milliGRAM(s) Oral at bedtime  multivitamin 1 Tablet(s) Oral daily  simvastatin 10 milliGRAM(s) Oral at bedtime  tamsulosin 0.4 milliGRAM(s) Oral at bedtime    MEDICATIONS  (PRN):  acetaminophen   Tablet 650 milliGRAM(s) Oral every 6 hours PRN For Temp greater than 38 C (100.4 F)  bisacodyl 5 milliGRAM(s) Oral every 12 hours PRN Constipation  dextrose Gel 1 Dose(s) Oral once PRN Blood Glucose LESS THAN 70 milliGRAM(s)/deciliter  glucagon  Injectable 1 milliGRAM(s) IntraMuscular once PRN Glucose LESS THAN 70 milligrams/deciliter        Vital Signs Last 24 Hrs  T(C): 36.7 (2018 15:23), Max: 37.1 (2018 21:10)  T(F): 98.1 (2018 15:23), Max: 98.7 (2018 21:10)  HR: 76 (2018 15:23) (76 - 79)  BP: 162/73 (2018 15:23) (137/63 - 171/76)  BP(mean): --  RR: 16 (2018 15:23) (16 - 19)  SpO2: 99% (2018 15:23) (79% - 99%)    18 @ 07:01  -  18 @ 07:00  --------------------------------------------------------  IN: 1100 mL / OUT: 2500 mL / NET: -1400 mL    18 @ 07:01  -  18 @ 17:53  --------------------------------------------------------  IN: 0 mL / OUT: 975 mL / NET: -975 mL      PHYSICAL EXAM:  Constitutional: Well-developed, well nourished, elderly male, no distress  Eyes:POLO, EOMI  Ear/Nose/Throat: no oral lesion, no sinus tenderness on percussion	  Neck:no JVD, no lymphadenopathy, supple  Respiratory: CTA chuck  Cardiovascular: S1S2 RRR, no murmurs  Gastrointestinal: soft, (+) BS, no HSM, Rodriguez in place  Extremities: no e/e/c  Vascular: DP Pulse: right normal; left normal      LABS:  Prostate Ca Screen, PSA Total (18 @ 15:20)    Prostate Ca Screen, PSA Total: 9.85: Method: Roche Chemiluminescence  Values obtained with different assay methods or kits cannot be  used interchangeably.  Results cannot be interpreted as absolute evidence of the presence  or absence of malignant disease ng/mL                            7.7    4.2   )-----------( 134      ( 2018 07:05 )             23.1         137  |  104  |  32<H>  ----------------------------<  211<H>  3.6   |  21<L>  |  1.67<H>    Ca    8.5      2018 07:05  Phos  3.0     04-14  Mg     1.6     04-14        Urinalysis Basic - ( 2018 12:38 )    Color: Yellow / Appearance: Clear / S.010 / pH: x  Gluc: x / Ketone: Trace mg/dL  / Bili: Negative / Urobili: 0.2 E.U./dL   Blood: x / Protein: 30 mg/dL / Nitrite: NEGATIVE   Leuk Esterase: Moderate / RBC: Many /HPF / WBC > 10 /HPF   Sq Epi: x / Non Sq Epi: 0-5 /HPF / Bacteria: Present /HPF        MICROBIOLOGY:  Culture - Blood (18 @ 11:37)    Specimen Source: .Blood One Set    Culture Results:   No growth at 2 days.    Culture - Urine (18 @ 18:18)    Specimen Source: .Urine Clean Catch (Midstream)    Culture Results:   No growth    Culture - Urine (18 @ 10:20)    -  Tobramycin: S <=4    -  Trimethoprim/Sulfamethoxazole: S <=2/38    -  Ampicillin: R >16 These ampicillin results predict results for amoxicillin    -  Ampicillin/Sulbactam: R >16/8    -  Cefazolin: S <=8 This predicts results for oral agents cefaclor, cefdinir, cefpodoxime, cefprozil, cefuroxime axetil, cephalexin and locarbef for uncomplicated UTI. Note that some isolates may be susceptible to these agents while testing resistant to cefazolin.    -  Ceftriaxone: S <=1 Enterobacter, Citrobacter, and Serratia may develop resistance during prolonged therapy    -  Ciprofloxacin: S <=1    -  Gentamicin: S <=4    -  Nitrofurantoin: S <=32 Should not be used to treat pyelonephritis    -  Piperacillin/Tazobactam: S <=16    Specimen Source: .Urine None    Culture Results:   >100,000 CFU/ml Escherichia coli    Organism Identification: Escherichia coli    Organism: Escherichia coli    Method Type: IMAN    Culture - Blood (18 @ 08:08)    Gram Stain:   Aerobic Bottle: Gram Negative Rods  Result called to and read back by_ Ms. Testanya Serna RN([id number])  2018 22:22:35  "Due to technical problems, Proteus sp. will Not be reported as part of  the BCID panel until further notice"  ***Blood Panel PCR results on this specimen are available  approximately 3 hours after the Gram stain result.***  Gram stain, PCR, and/or culture results may not always  correspond due to difference in methodologies.  ************************************************************  This PCR assay was performed using [employer].  The following targets are tested for: Enterococcus,  vancomycin resistant enterococci, Listeria monocytogenes,  coagulase negative staphylococci, S. aureus,  methicillin resistant S. aureus, Streptococcus agalactiae  (Group B), S. pneumoniae, S. pyogenes (Group A),  Acinetobacter baumannii, Enterobacter cloacae, E. coli,  Klebsiella oxytoca, K. pneumoniae, Proteus sp.,  Serratia marcescens, Haemophilus influenzae,  Neisseria meningitidis, Pseudomonas aeruginosa, Candida  albicans, C. glabrata, C krusei, C parapsilosis,  C. tropicalis and the KPC resistance gene.    -  Ampicillin: R >16 These ampicillin results predict results for amoxicillin    -  Ampicillin/Sulbactam: R >16/8    -  Ceftriaxone: S <=1 Enterobacter, Citrobacter, and Serratia may develop resistance during prolonged therapy    -  Cefazolin: S <=8    -  Tobramycin: S <=4    -  Piperacillin/Tazobactam: S <=16    -  Gentamicin: S <=4    -  Ciprofloxacin: S <=1    -  Trimethoprim/Sulfamethoxazole: S <=2/38    -  Escherichia coli: Detec    Specimen Source: .Blood Blood-Peripheral    Organism: Blood Culture PCR    Organism: Escherichia coli    Culture Results:   Growth in aerobic bottle: Escherichia coli    Organism Identification: Escherichia coli  Blood Culture PCR    Method Type: PCR    Method Type: IMAN

## 2018-04-14 NOTE — PROGRESS NOTE ADULT - SUBJECTIVE AND OBJECTIVE BOX
HPI:  71yo M with PMH of HTN, HLD, DMT2, prostatomegaly, h/o SVT s/p ablation (2011) presents  here with progressive weakness for one month a/w abdominal distension. Pt reports no known history of kidney disease and was asked by his PMD to see a urologist that he never did. He reports increased dyspnea on exertion, usually can climb more than one flight of stairs but get SOB with less than one flight now a/w anorexia, denies any F/C, chest pain, cough, abdominal pain, N/V. He has constipation and small amount of non-bloody diarrhea. He denies alcohol abuse, never had colonoscopy.     ED course: VSS. Labs with anemia Hb 9.1/27, thrombocytopenia (137). Hyponatremia, hypokalemia, anion gap metabolic acidosis with elevated beta-hydroxybutyrate, lactate 1.1, UA with pyuria, leukocyte esterase with bacteria. Pt was given insulin 4u once and 2l NS bolus. A CT abd/pelvis was performed. (11 Apr 2018 06:02)    FAMILY HISTORY:  No pertinent family history in first degree relatives    MEDICATIONS  (STANDING):  aspirin  chewable 81 milliGRAM(s) Oral daily  bisacodyl Suppository 10 milliGRAM(s) Rectal once  cefTRIAXone   IVPB 2 Gram(s) IV Intermittent every 24 hours  dextrose 5%. 1000 milliLiter(s) (50 mL/Hr) IV Continuous <Continuous>  dextrose 50% Injectable 12.5 Gram(s) IV Push once  dextrose 50% Injectable 25 Gram(s) IV Push once  dextrose 50% Injectable 25 Gram(s) IV Push once  docusate sodium 100 milliGRAM(s) Oral two times a day  epoetin kathi Injectable 30715 Unit(s) SubCutaneous every 48 hours  ferrous    sulfate 325 milliGRAM(s) Oral three times a day  finasteride 5 milliGRAM(s) Oral daily  influenza   Vaccine 0.5 milliLiter(s) IntraMuscular once  insulin glargine Injectable (LANTUS) 12 Unit(s) SubCutaneous every morning  insulin lispro (HumaLOG) corrective regimen sliding scale   SubCutaneous Before meals and at bedtime  insulin lispro Injectable (HumaLOG) 6 Unit(s) SubCutaneous three times a day before meals  melatonin 5 milliGRAM(s) Oral at bedtime  multivitamin 1 Tablet(s) Oral daily  simvastatin 10 milliGRAM(s) Oral at bedtime  tamsulosin 0.4 milliGRAM(s) Oral at bedtime    MEDICATIONS  (PRN):  acetaminophen   Tablet 650 milliGRAM(s) Oral every 6 hours PRN For Temp greater than 38 C (100.4 F)  bisacodyl 5 milliGRAM(s) Oral every 12 hours PRN Constipation  dextrose Gel 1 Dose(s) Oral once PRN Blood Glucose LESS THAN 70 milliGRAM(s)/deciliter  glucagon  Injectable 1 milliGRAM(s) IntraMuscular once PRN Glucose LESS THAN 70 milligrams/deciliter    Vital Signs Last 24 Hrs  T(C): 36.4 (14 Apr 2018 09:22), Max: 37.1 (13 Apr 2018 21:10)  T(F): 97.5 (14 Apr 2018 09:22), Max: 98.7 (13 Apr 2018 21:10)  HR: 76 (14 Apr 2018 09:22) (76 - 79)  BP: 152/76 (14 Apr 2018 09:22) (137/63 - 171/76)  BP(mean): --  RR: 19 (14 Apr 2018 09:22) (18 - 19)  SpO2: 99% (14 Apr 2018 09:22) (79% - 99%)    Physical exam:    Overall impression  Lymphadenopathy  Liver  spleen    Labs:  CBC Full  -  ( 14 Apr 2018 07:05 )  WBC Count : 4.2 K/uL  Hemoglobin : 7.7 g/dL  Hematocrit : 23.1 %  Platelet Count - Automated : 134 K/uL  Mean Cell Volume : 84.9 fL  Mean Cell Hemoglobin : 28.3 pg  Mean Cell Hemoglobin Concentration : 33.3 g/dL  Auto Neutrophil # : x  Auto Lymphocyte # : x  Auto Monocyte # : x  Auto Eosinophil # : x  Auto Basophil # : x  Auto Neutrophil % : x  Auto Lymphocyte % : x  Auto Monocyte % : x  Auto Eosinophil % : x  Auto Basophil % : x    04-14    137  |  104  |  32<H>  ----------------------------<  211<H>  3.6   |  21<L>  |  1.67<H>    Ca    8.5      14 Apr 2018 07:05  Phos  3.0     04-14  Mg     1.6     04-14        Radiology:  HEALTH ISSUES - R/O PROBLEM Dx:      Assessmant / Problems  1) Urosepsis with E coli 2/2 urinary retention responding to Ceftriaxone   Dr VERONIKA Huerta on case  2) Prostate enlargement PSA 9.8  3) Bilateral hydronephrosis Urology Dr Courtney on case  4) Anemia of chronic disease  5) 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

## 2018-04-15 LAB
ANION GAP SERPL CALC-SCNC: 11 MMOL/L — SIGNIFICANT CHANGE UP (ref 5–17)
BUN SERPL-MCNC: 34 MG/DL — HIGH (ref 7–23)
CALCIUM SERPL-MCNC: 9.1 MG/DL — SIGNIFICANT CHANGE UP (ref 8.4–10.5)
CHLORIDE SERPL-SCNC: 103 MMOL/L — SIGNIFICANT CHANGE UP (ref 96–108)
CO2 SERPL-SCNC: 25 MMOL/L — SIGNIFICANT CHANGE UP (ref 22–31)
CREAT SERPL-MCNC: 1.75 MG/DL — HIGH (ref 0.5–1.3)
GLUCOSE BLDC GLUCOMTR-MCNC: 146 MG/DL — HIGH (ref 70–99)
GLUCOSE BLDC GLUCOMTR-MCNC: 182 MG/DL — HIGH (ref 70–99)
GLUCOSE BLDC GLUCOMTR-MCNC: 219 MG/DL — HIGH (ref 70–99)
GLUCOSE BLDC GLUCOMTR-MCNC: 303 MG/DL — HIGH (ref 70–99)
GLUCOSE BLDC GLUCOMTR-MCNC: 309 MG/DL — HIGH (ref 70–99)
GLUCOSE BLDC GLUCOMTR-MCNC: 311 MG/DL — HIGH (ref 70–99)
GLUCOSE SERPL-MCNC: 175 MG/DL — HIGH (ref 70–99)
HCT VFR BLD CALC: 24.1 % — LOW (ref 39–50)
HGB BLD-MCNC: 8.2 G/DL — LOW (ref 13–17)
MAGNESIUM SERPL-MCNC: 1.5 MG/DL — LOW (ref 1.6–2.6)
MCHC RBC-ENTMCNC: 28.1 PG — SIGNIFICANT CHANGE UP (ref 27–34)
MCHC RBC-ENTMCNC: 34 G/DL — SIGNIFICANT CHANGE UP (ref 32–36)
MCV RBC AUTO: 82.5 FL — SIGNIFICANT CHANGE UP (ref 80–100)
PLATELET # BLD AUTO: 136 K/UL — LOW (ref 150–400)
POTASSIUM SERPL-MCNC: 4.7 MMOL/L — SIGNIFICANT CHANGE UP (ref 3.5–5.3)
POTASSIUM SERPL-SCNC: 4.7 MMOL/L — SIGNIFICANT CHANGE UP (ref 3.5–5.3)
RBC # BLD: 2.92 M/UL — LOW (ref 4.2–5.8)
RBC # FLD: 14 % — SIGNIFICANT CHANGE UP (ref 10.3–16.9)
SODIUM SERPL-SCNC: 139 MMOL/L — SIGNIFICANT CHANGE UP (ref 135–145)
WBC # BLD: 4.8 K/UL — SIGNIFICANT CHANGE UP (ref 3.8–10.5)
WBC # FLD AUTO: 4.8 K/UL — SIGNIFICANT CHANGE UP (ref 3.8–10.5)

## 2018-04-15 RX ORDER — SENNA PLUS 8.6 MG/1
1 TABLET ORAL
Qty: 0 | Refills: 0 | Status: DISCONTINUED | OUTPATIENT
Start: 2018-04-15 | End: 2018-04-19

## 2018-04-15 RX ORDER — INSULIN GLARGINE 100 [IU]/ML
14 INJECTION, SOLUTION SUBCUTANEOUS EVERY MORNING
Qty: 0 | Refills: 0 | Status: DISCONTINUED | OUTPATIENT
Start: 2018-04-15 | End: 2018-04-16

## 2018-04-15 RX ORDER — INSULIN LISPRO 100/ML
8 VIAL (ML) SUBCUTANEOUS
Qty: 0 | Refills: 0 | Status: DISCONTINUED | OUTPATIENT
Start: 2018-04-15 | End: 2018-04-19

## 2018-04-15 RX ORDER — SENNA PLUS 8.6 MG/1
1 TABLET ORAL ONCE
Qty: 0 | Refills: 0 | Status: COMPLETED | OUTPATIENT
Start: 2018-04-15 | End: 2018-04-15

## 2018-04-15 RX ORDER — MAGNESIUM SULFATE 500 MG/ML
2 VIAL (ML) INJECTION ONCE
Qty: 0 | Refills: 0 | Status: COMPLETED | OUTPATIENT
Start: 2018-04-15 | End: 2018-04-15

## 2018-04-15 RX ORDER — POLYETHYLENE GLYCOL 3350 17 G/17G
17 POWDER, FOR SOLUTION ORAL ONCE
Qty: 0 | Refills: 0 | Status: COMPLETED | OUTPATIENT
Start: 2018-04-15 | End: 2018-04-15

## 2018-04-15 RX ADMIN — Medication 8 UNIT(S): at 18:56

## 2018-04-15 RX ADMIN — Medication 6 UNIT(S): at 11:25

## 2018-04-15 RX ADMIN — INSULIN GLARGINE 12 UNIT(S): 100 INJECTION, SOLUTION SUBCUTANEOUS at 11:21

## 2018-04-15 RX ADMIN — SIMVASTATIN 10 MILLIGRAM(S): 20 TABLET, FILM COATED ORAL at 22:23

## 2018-04-15 RX ADMIN — Medication 8: at 11:22

## 2018-04-15 RX ADMIN — Medication 25 GRAM(S): at 11:29

## 2018-04-15 RX ADMIN — Medication 1 TABLET(S): at 11:29

## 2018-04-15 RX ADMIN — CEFTRIAXONE 100 GRAM(S): 500 INJECTION, POWDER, FOR SOLUTION INTRAMUSCULAR; INTRAVENOUS at 23:48

## 2018-04-15 RX ADMIN — POLYETHYLENE GLYCOL 3350 17 GRAM(S): 17 POWDER, FOR SOLUTION ORAL at 11:32

## 2018-04-15 RX ADMIN — SENNA PLUS 1 TABLET(S): 8.6 TABLET ORAL at 07:16

## 2018-04-15 RX ADMIN — Medication 5 MILLIGRAM(S): at 22:23

## 2018-04-15 RX ADMIN — Medication 10 MILLIGRAM(S): at 18:59

## 2018-04-15 RX ADMIN — FINASTERIDE 5 MILLIGRAM(S): 5 TABLET, FILM COATED ORAL at 22:22

## 2018-04-15 RX ADMIN — Medication 8: at 12:58

## 2018-04-15 RX ADMIN — Medication 81 MILLIGRAM(S): at 11:29

## 2018-04-15 RX ADMIN — Medication 100 MILLIGRAM(S): at 18:57

## 2018-04-15 RX ADMIN — Medication 100 MILLIGRAM(S): at 06:04

## 2018-04-15 RX ADMIN — Medication 2: at 18:57

## 2018-04-15 RX ADMIN — TAMSULOSIN HYDROCHLORIDE 0.4 MILLIGRAM(S): 0.4 CAPSULE ORAL at 22:22

## 2018-04-15 RX ADMIN — SENNA PLUS 1 TABLET(S): 8.6 TABLET ORAL at 18:57

## 2018-04-15 RX ADMIN — Medication 5 MILLIGRAM(S): at 11:32

## 2018-04-15 NOTE — PROGRESS NOTE ADULT - PROBLEM SELECTOR PLAN 1
1) Continue  Ceftriaxone 2 gr q24h; may switch to oral Ciprofloxacin in 2-3 days  2) Diarrhea resolved, c/o constipation ( ? pancolitis on CT Abdomen)  3) Urology f/u, repeat PSA

## 2018-04-15 NOTE — PROGRESS NOTE ADULT - PROBLEM SELECTOR PLAN 6
Normocytic.  Hgb ~9 at presentation.  Now 8.2.  Dilutional.  VSS.  Denies lightheadedness, SOB, CP, palpitations.    -Mild bleeding post-palacio placement resolved.  iron studies c/w SERA.  started PO supplementation.  -EPO level 10.9 (WNL, inappropriately normal).  started EPO. obtain collateral from PCP.

## 2018-04-15 NOTE — PROGRESS NOTE ADULT - PROBLEM SELECTOR PLAN 4
Anion gap metabolic acidosis from uremia + starvation ketosis, improved  -trend BMP  -d/c'ed IVF.  Encourage PO.    -monitor FS.  SSI

## 2018-04-15 NOTE — PROGRESS NOTE ADULT - SUBJECTIVE AND OBJECTIVE BOX
INTERVAL/OVERNIGHT EVENTS:      SUBJECTIVE:  Seen and examined at bedside.    ROS otherwise negative.    VITAL SIGNS:  T(F): 98.1 (04-15-18 @ 05:40)  HR: 73 (04-15-18 @ 05:40)  BP: 146/72 (04-15-18 @ 05:40)  RR: 18 (04-15-18 @ 05:40)  SpO2: 98% (04-15-18 @ 05:40)  Wt(kg): --    PHYSICAL EXAM:        MEDICATIONS  (STANDING):  aspirin  chewable 81 milliGRAM(s) Oral daily  cefTRIAXone   IVPB 2 Gram(s) IV Intermittent every 24 hours  dextrose 5%. 1000 milliLiter(s) (50 mL/Hr) IV Continuous <Continuous>  dextrose 50% Injectable 12.5 Gram(s) IV Push once  dextrose 50% Injectable 25 Gram(s) IV Push once  dextrose 50% Injectable 25 Gram(s) IV Push once  docusate sodium 100 milliGRAM(s) Oral two times a day  epoetin kathi Injectable 86854 Unit(s) SubCutaneous every 48 hours  ferrous    sulfate 325 milliGRAM(s) Oral three times a day  finasteride 5 milliGRAM(s) Oral daily  influenza   Vaccine 0.5 milliLiter(s) IntraMuscular once  insulin glargine Injectable (LANTUS) 12 Unit(s) SubCutaneous every morning  insulin lispro (HumaLOG) corrective regimen sliding scale   SubCutaneous Before meals and at bedtime  insulin lispro Injectable (HumaLOG) 6 Unit(s) SubCutaneous three times a day before meals  melatonin 5 milliGRAM(s) Oral at bedtime  multivitamin 1 Tablet(s) Oral daily  senna 1 Tablet(s) Oral at bedtime  simvastatin 10 milliGRAM(s) Oral at bedtime  tamsulosin 0.4 milliGRAM(s) Oral at bedtime    MEDICATIONS  (PRN):  acetaminophen   Tablet 650 milliGRAM(s) Oral every 6 hours PRN For Temp greater than 38 C (100.4 F)  bisacodyl 5 milliGRAM(s) Oral every 12 hours PRN Constipation  dextrose Gel 1 Dose(s) Oral once PRN Blood Glucose LESS THAN 70 milliGRAM(s)/deciliter  glucagon  Injectable 1 milliGRAM(s) IntraMuscular once PRN Glucose LESS THAN 70 milligrams/deciliter      Allergies    No Known Allergies    Intolerances      acetaminophen   Tablet 650 milliGRAM(s) Oral every 6 hours PRN  aspirin  chewable 81 milliGRAM(s) Oral daily  bisacodyl 5 milliGRAM(s) Oral every 12 hours PRN  cefTRIAXone   IVPB 2 Gram(s) IV Intermittent every 24 hours  dextrose 5%. 1000 milliLiter(s) IV Continuous <Continuous>  dextrose 50% Injectable 12.5 Gram(s) IV Push once  dextrose 50% Injectable 25 Gram(s) IV Push once  dextrose 50% Injectable 25 Gram(s) IV Push once  dextrose Gel 1 Dose(s) Oral once PRN  docusate sodium 100 milliGRAM(s) Oral two times a day  epoetin kathi Injectable 01383 Unit(s) SubCutaneous every 48 hours  ferrous    sulfate 325 milliGRAM(s) Oral three times a day  finasteride 5 milliGRAM(s) Oral daily  glucagon  Injectable 1 milliGRAM(s) IntraMuscular once PRN  influenza   Vaccine 0.5 milliLiter(s) IntraMuscular once  insulin glargine Injectable (LANTUS) 12 Unit(s) SubCutaneous every morning  insulin lispro (HumaLOG) corrective regimen sliding scale   SubCutaneous Before meals and at bedtime  insulin lispro Injectable (HumaLOG) 6 Unit(s) SubCutaneous three times a day before meals  melatonin 5 milliGRAM(s) Oral at bedtime  multivitamin 1 Tablet(s) Oral daily  senna 1 Tablet(s) Oral at bedtime  simvastatin 10 milliGRAM(s) Oral at bedtime  tamsulosin 0.4 milliGRAM(s) Oral at bedtime      LABS:                          7.7    4.2   )-----------( 134      ( 2018 07:05 )             23.1     04-14    137  |  104  |  32<H>  ----------------------------<  211<H>  3.6   |  21<L>  |  1.67<H>    Ca    8.5      2018 07:05  Phos  3.0     -14  Mg     1.6     -14        Urinalysis Basic - ( 2018 12:38 )    Color: Yellow / Appearance: Clear / S.010 / pH: x  Gluc: x / Ketone: Trace mg/dL  / Bili: Negative / Urobili: 0.2 E.U./dL   Blood: x / Protein: 30 mg/dL / Nitrite: NEGATIVE   Leuk Esterase: Moderate / RBC: Many /HPF / WBC > 10 /HPF   Sq Epi: x / Non Sq Epi: 0-5 /HPF / Bacteria: Present /HPF        RADIOLOGY & ADDITIONAL TESTS:  All imaging reviewed. INTERVAL/OVERNIGHT EVENTS:  KRYS.  Ordered for senna for constipation.    SUBJECTIVE:  Seen and examined at bedside.  Patient reports persistent constipation.  Says he takes senna, two tabs, TID in addition to docusate two tabs TID.     ROS otherwise negative.    VITAL SIGNS:  T(F): 98.1 (04-15-18 @ 05:40)  HR: 73 (04-15-18 @ 05:40)  BP: 146/72 (04-15-18 @ 05:40)  RR: 18 (04-15-18 @ 05:40)  SpO2: 98% (04-15-18 @ 05:40)  Wt(kg): --    PHYSICAL EXAM:        MEDICATIONS  (STANDING):  aspirin  chewable 81 milliGRAM(s) Oral daily  cefTRIAXone   IVPB 2 Gram(s) IV Intermittent every 24 hours  dextrose 5%. 1000 milliLiter(s) (50 mL/Hr) IV Continuous <Continuous>  dextrose 50% Injectable 12.5 Gram(s) IV Push once  dextrose 50% Injectable 25 Gram(s) IV Push once  dextrose 50% Injectable 25 Gram(s) IV Push once  docusate sodium 100 milliGRAM(s) Oral two times a day  epoetin kathi Injectable 16405 Unit(s) SubCutaneous every 48 hours  ferrous    sulfate 325 milliGRAM(s) Oral three times a day  finasteride 5 milliGRAM(s) Oral daily  influenza   Vaccine 0.5 milliLiter(s) IntraMuscular once  insulin glargine Injectable (LANTUS) 12 Unit(s) SubCutaneous every morning  insulin lispro (HumaLOG) corrective regimen sliding scale   SubCutaneous Before meals and at bedtime  insulin lispro Injectable (HumaLOG) 6 Unit(s) SubCutaneous three times a day before meals  melatonin 5 milliGRAM(s) Oral at bedtime  multivitamin 1 Tablet(s) Oral daily  senna 1 Tablet(s) Oral at bedtime  simvastatin 10 milliGRAM(s) Oral at bedtime  tamsulosin 0.4 milliGRAM(s) Oral at bedtime    MEDICATIONS  (PRN):  acetaminophen   Tablet 650 milliGRAM(s) Oral every 6 hours PRN For Temp greater than 38 C (100.4 F)  bisacodyl 5 milliGRAM(s) Oral every 12 hours PRN Constipation  dextrose Gel 1 Dose(s) Oral once PRN Blood Glucose LESS THAN 70 milliGRAM(s)/deciliter  glucagon  Injectable 1 milliGRAM(s) IntraMuscular once PRN Glucose LESS THAN 70 milligrams/deciliter      Allergies    No Known Allergies    Intolerances      acetaminophen   Tablet 650 milliGRAM(s) Oral every 6 hours PRN  aspirin  chewable 81 milliGRAM(s) Oral daily  bisacodyl 5 milliGRAM(s) Oral every 12 hours PRN  cefTRIAXone   IVPB 2 Gram(s) IV Intermittent every 24 hours  dextrose 5%. 1000 milliLiter(s) IV Continuous <Continuous>  dextrose 50% Injectable 12.5 Gram(s) IV Push once  dextrose 50% Injectable 25 Gram(s) IV Push once  dextrose 50% Injectable 25 Gram(s) IV Push once  dextrose Gel 1 Dose(s) Oral once PRN  docusate sodium 100 milliGRAM(s) Oral two times a day  epoetin kathi Injectable 03112 Unit(s) SubCutaneous every 48 hours  ferrous    sulfate 325 milliGRAM(s) Oral three times a day  finasteride 5 milliGRAM(s) Oral daily  glucagon  Injectable 1 milliGRAM(s) IntraMuscular once PRN  influenza   Vaccine 0.5 milliLiter(s) IntraMuscular once  insulin glargine Injectable (LANTUS) 12 Unit(s) SubCutaneous every morning  insulin lispro (HumaLOG) corrective regimen sliding scale   SubCutaneous Before meals and at bedtime  insulin lispro Injectable (HumaLOG) 6 Unit(s) SubCutaneous three times a day before meals  melatonin 5 milliGRAM(s) Oral at bedtime  multivitamin 1 Tablet(s) Oral daily  senna 1 Tablet(s) Oral at bedtime  simvastatin 10 milliGRAM(s) Oral at bedtime  tamsulosin 0.4 milliGRAM(s) Oral at bedtime      LABS:                          7.7    4.2   )-----------( 134      ( 2018 07:05 )             23.1     04-14    137  |  104  |  32<H>  ----------------------------<  211<H>  3.6   |  21<L>  |  1.67<H>    Ca    8.5      2018 07:05  Phos  3.0     04-14  Mg     1.6     04-14        Urinalysis Basic - ( 2018 12:38 )    Color: Yellow / Appearance: Clear / S.010 / pH: x  Gluc: x / Ketone: Trace mg/dL  / Bili: Negative / Urobili: 0.2 E.U./dL   Blood: x / Protein: 30 mg/dL / Nitrite: NEGATIVE   Leuk Esterase: Moderate / RBC: Many /HPF / WBC > 10 /HPF   Sq Epi: x / Non Sq Epi: 0-5 /HPF / Bacteria: Present /HPF        RADIOLOGY & ADDITIONAL TESTS:  All imaging reviewed. INTERVAL/OVERNIGHT EVENTS:  KRYS.  Ordered for senna for constipation.    SUBJECTIVE:  Seen and examined at bedside.  Patient reports persistent constipation.  Says he takes senna, two tabs, TID in addition to docusate two tabs TID.  Reports no F/C, CP, SOB, cough, abd pain, N/V/D.  Persistent chronic LBP.  No leg pain.    ROS otherwise negative.    VITAL SIGNS:  T(F): 98.1 (04-15-18 @ 05:40)  HR: 73 (04-15-18 @ 05:40)  BP: 146/72 (04-15-18 @ 05:40)  RR: 18 (04-15-18 @ 05:40)  SpO2: 98% (04-15-18 @ 05:40)  Wt(kg): --    PHYSICAL EXAM:    General:  NAD, nontoxic appearing, WDWN  HENT:  EOMI, R pupil reactive / deformed 2/2 cataract sx.  L pupil pinpoint, unclear if reactive.  No sinus tenderness.  oropharynx WNL.  MMM  Neck:  Trachea midline.  No JVD, LAD, or thyromegaly.  Heart:  S1S2 no M/R/G, regular  Lungs:  CTAB no wheezing, rhonchi or rales.  No accessory muscle use.  No respiratory distress.  Abdomen:  NABS.  soft, nontender, nondistended.  no guarding.  no ascites.  no organomegaly.  Vascular:  Peripheral pulses palpable  Extremities:  Trace pitting edema BLE.  Calves nontender.    Back:  Mild bilateral low back TTP.  No CVA tenderness  Neuro:  AOx3, no facial asymmetry, nonfocal, no slurred speech  Skin:  No rash  (+) palacio with yellow urine    MEDICATIONS  (STANDING):  aspirin  chewable 81 milliGRAM(s) Oral daily  cefTRIAXone   IVPB 2 Gram(s) IV Intermittent every 24 hours  dextrose 5%. 1000 milliLiter(s) (50 mL/Hr) IV Continuous <Continuous>  dextrose 50% Injectable 12.5 Gram(s) IV Push once  dextrose 50% Injectable 25 Gram(s) IV Push once  dextrose 50% Injectable 25 Gram(s) IV Push once  docusate sodium 100 milliGRAM(s) Oral two times a day  epoetin kathi Injectable 51959 Unit(s) SubCutaneous every 48 hours  ferrous    sulfate 325 milliGRAM(s) Oral three times a day  finasteride 5 milliGRAM(s) Oral daily  influenza   Vaccine 0.5 milliLiter(s) IntraMuscular once  insulin glargine Injectable (LANTUS) 12 Unit(s) SubCutaneous every morning  insulin lispro (HumaLOG) corrective regimen sliding scale   SubCutaneous Before meals and at bedtime  insulin lispro Injectable (HumaLOG) 6 Unit(s) SubCutaneous three times a day before meals  melatonin 5 milliGRAM(s) Oral at bedtime  multivitamin 1 Tablet(s) Oral daily  senna 1 Tablet(s) Oral at bedtime  simvastatin 10 milliGRAM(s) Oral at bedtime  tamsulosin 0.4 milliGRAM(s) Oral at bedtime    MEDICATIONS  (PRN):  acetaminophen   Tablet 650 milliGRAM(s) Oral every 6 hours PRN For Temp greater than 38 C (100.4 F)  bisacodyl 5 milliGRAM(s) Oral every 12 hours PRN Constipation  dextrose Gel 1 Dose(s) Oral once PRN Blood Glucose LESS THAN 70 milliGRAM(s)/deciliter  glucagon  Injectable 1 milliGRAM(s) IntraMuscular once PRN Glucose LESS THAN 70 milligrams/deciliter      Allergies    No Known Allergies    Intolerances      acetaminophen   Tablet 650 milliGRAM(s) Oral every 6 hours PRN  aspirin  chewable 81 milliGRAM(s) Oral daily  bisacodyl 5 milliGRAM(s) Oral every 12 hours PRN  cefTRIAXone   IVPB 2 Gram(s) IV Intermittent every 24 hours  dextrose 5%. 1000 milliLiter(s) IV Continuous <Continuous>  dextrose 50% Injectable 12.5 Gram(s) IV Push once  dextrose 50% Injectable 25 Gram(s) IV Push once  dextrose 50% Injectable 25 Gram(s) IV Push once  dextrose Gel 1 Dose(s) Oral once PRN  docusate sodium 100 milliGRAM(s) Oral two times a day  epoetin kathi Injectable 21164 Unit(s) SubCutaneous every 48 hours  ferrous    sulfate 325 milliGRAM(s) Oral three times a day  finasteride 5 milliGRAM(s) Oral daily  glucagon  Injectable 1 milliGRAM(s) IntraMuscular once PRN  influenza   Vaccine 0.5 milliLiter(s) IntraMuscular once  insulin glargine Injectable (LANTUS) 12 Unit(s) SubCutaneous every morning  insulin lispro (HumaLOG) corrective regimen sliding scale   SubCutaneous Before meals and at bedtime  insulin lispro Injectable (HumaLOG) 6 Unit(s) SubCutaneous three times a day before meals  melatonin 5 milliGRAM(s) Oral at bedtime  multivitamin 1 Tablet(s) Oral daily  senna 1 Tablet(s) Oral at bedtime  simvastatin 10 milliGRAM(s) Oral at bedtime  tamsulosin 0.4 milliGRAM(s) Oral at bedtime      LABS:                          7.7    4.2   )-----------( 134      ( 2018 07:05 )             23.1     04-14    137  |  104  |  32<H>  ----------------------------<  211<H>  3.6   |  21<L>  |  1.67<H>    Ca    8.5      2018 07:05  Phos  3.0     -14  Mg     1.6             Urinalysis Basic - ( 2018 12:38 )    Color: Yellow / Appearance: Clear / S.010 / pH: x  Gluc: x / Ketone: Trace mg/dL  / Bili: Negative / Urobili: 0.2 E.U./dL   Blood: x / Protein: 30 mg/dL / Nitrite: NEGATIVE   Leuk Esterase: Moderate / RBC: Many /HPF / WBC > 10 /HPF   Sq Epi: x / Non Sq Epi: 0-5 /HPF / Bacteria: Present /HPF        RADIOLOGY & ADDITIONAL TESTS:  All imaging reviewed.

## 2018-04-15 NOTE — PROGRESS NOTE ADULT - PROBLEM SELECTOR PLAN 3
Resolved, creatinine below baseline.  1.75, baseline 2.2.  Creatinine on arrival 3.55, improved with IVF.  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  -urology following.  started flomax and proscar.  c/w indwelling palacio.  -renal U/S with moderate b/l hydro, persistent on repeat  -hold lasix

## 2018-04-15 NOTE — PROGRESS NOTE ADULT - SUBJECTIVE AND OBJECTIVE BOX
INTERVAL HPI/OVERNIGHT EVENTS: No diarrhea, no flank pain, Rodriguez in place    CONSTITUTIONAL:  Negative fever or chills, feels well, good appetite  EYES:  Negative  blurry vision or double vision  CARDIOVASCULAR:  Negative for chest pain or palpitations  RESPIRATORY:  Negative for cough, wheezing, or SOB   GASTROINTESTINAL:  Negative for nausea, vomiting, diarrhea, constipation, or abdominal pain  GENITOURINARY:  Negative frequency, urgency or dysuria  NEUROLOGIC:  No headache, confusion, dizziness, lightheadedness      ANTIBIOTICS/RELEVANT:    MEDICATIONS  (STANDING):  aspirin  chewable 81 milliGRAM(s) Oral daily  cefTRIAXone   IVPB 2 Gram(s) IV Intermittent every 24 hours  dextrose 5%. 1000 milliLiter(s) (50 mL/Hr) IV Continuous <Continuous>  dextrose 50% Injectable 12.5 Gram(s) IV Push once  dextrose 50% Injectable 25 Gram(s) IV Push once  dextrose 50% Injectable 25 Gram(s) IV Push once  docusate sodium 100 milliGRAM(s) Oral two times a day  epoetin kathi Injectable 93639 Unit(s) SubCutaneous every 48 hours  finasteride 5 milliGRAM(s) Oral daily  influenza   Vaccine 0.5 milliLiter(s) IntraMuscular once  insulin glargine Injectable (LANTUS) 14 Unit(s) SubCutaneous every morning  insulin lispro (HumaLOG) corrective regimen sliding scale   SubCutaneous Before meals and at bedtime  insulin lispro Injectable (HumaLOG) 8 Unit(s) SubCutaneous three times a day before meals  melatonin 5 milliGRAM(s) Oral at bedtime  multivitamin 1 Tablet(s) Oral daily  senna 1 Tablet(s) Oral <User Schedule>  simvastatin 10 milliGRAM(s) Oral at bedtime  tamsulosin 0.4 milliGRAM(s) Oral at bedtime    MEDICATIONS  (PRN):  acetaminophen   Tablet 650 milliGRAM(s) Oral every 6 hours PRN For Temp greater than 38 C (100.4 F)  dextrose Gel 1 Dose(s) Oral once PRN Blood Glucose LESS THAN 70 milliGRAM(s)/deciliter  glucagon  Injectable 1 milliGRAM(s) IntraMuscular once PRN Glucose LESS THAN 70 milligrams/deciliter        Vital Signs Last 24 Hrs  T(C): 37.1 (15 Apr 2018 21:27), Max: 37.1 (15 Apr 2018 21:27)  T(F): 98.7 (15 Apr 2018 21:27), Max: 98.7 (15 Apr 2018 21:27)  HR: 76 (15 Apr 2018 21:27) (73 - 80)  BP: 114/67 (15 Apr 2018 21:27) (114/67 - 156/76)  RR: 16 (15 Apr 2018 21:27) (16 - 18)  SpO2: 98% (15 Apr 2018 21:27) (97% - 99%)    18 @ 07:01  -  04-15-18 @ 07:00  --------------------------------------------------------  IN: 0 mL / OUT: 2675 mL / NET: -2675 mL    04-15-18 @ 07:01  -  04-15-18 @ 22:27  --------------------------------------------------------  IN: 0 mL / OUT: 900 mL / NET: -900 mL      PHYSICAL EXAM:  Constitutional: Well-developed, well nourished, 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  Eyes: POLO, EOMI  Ear/Nose/Throat: no oral lesion, no sinus tenderness on percussion	  Neck:no JVD, no lymphadenopathy, supple  Respiratory: CTA chuck  Cardiovascular: S1S2 RRR, no murmurs  Gastrointestinal:soft, (+) BS, no HSM, Rodriguez in place  Extremities: no e/e/c  Vascular: DP Pulse: right normal; left normal      LABS:                        8.2    4.8   )-----------( 136      ( 15 Apr 2018 06:57 )             24.1     04-15    139  |  103  |  34<H>  ----------------------------<  175<H>  4.7   |  25  |  1.75<H>    Ca    9.1      15 Apr 2018 06:56  Phos  3.0     04-14  Mg     1.5     04-15        Urinalysis Basic - ( 2018 12:38 )    Color: Yellow / Appearance: Clear / S.010 / pH: x  Gluc: x / Ketone: Trace mg/dL  / Bili: Negative / Urobili: 0.2 E.U./dL   Blood: x / Protein: 30 mg/dL / Nitrite: NEGATIVE   Leuk Esterase: Moderate / RBC: Many /HPF / WBC > 10 /HPF   Sq Epi: x / Non Sq Epi: 0-5 /HPF / Bacteria: Present /HPF        MICROBIOLOGY:  Culture - Blood (18 @ 11:37)    Specimen Source: .Blood One Set    Culture Results:   No growth at 3 days.        RADIOLOGY & ADDITIONAL STUDIES:

## 2018-04-15 NOTE — PROGRESS NOTE ADULT - SUBJECTIVE AND OBJECTIVE BOX
INTERVAL HPI/OVERNIGHT EVENTS:      Patient is a 72y old  Male who presents with a chief complaint of Weakness (18) was seen and examined today. Reports the following symptoms:    CONSTITUTIONAL:  Negative fever or chills, feels better, good appetite  EYES:  Negative  blurry vision or double vision  CARDIOVASCULAR:  Negative for chest pain or palpitations  RESPIRATORY:  Negative for cough, wheezing, or SOB   GASTROINTESTINAL:  Negative for nausea, vomiting, diarrhea, constipation, or abdominal pain  GENITOURINARY:  Rodriguez catheter  NEUROLOGIC:  No headache, confusion, dizziness, lightheadedness    MEDICATIONS  (STANDING):  aspirin  chewable 81 milliGRAM(s) Oral daily  cefTRIAXone   IVPB 2 Gram(s) IV Intermittent every 24 hours  dextrose 5%. 1000 milliLiter(s) (50 mL/Hr) IV Continuous <Continuous>  dextrose 50% Injectable 12.5 Gram(s) IV Push once  dextrose 50% Injectable 25 Gram(s) IV Push once  dextrose 50% Injectable 25 Gram(s) IV Push once  docusate sodium 100 milliGRAM(s) Oral two times a day  epoetin kathi Injectable 55000 Unit(s) SubCutaneous every 48 hours  finasteride 5 milliGRAM(s) Oral daily  influenza   Vaccine 0.5 milliLiter(s) IntraMuscular once  insulin glargine Injectable (LANTUS) 14 Unit(s) SubCutaneous every morning  insulin lispro (HumaLOG) corrective regimen sliding scale   SubCutaneous Before meals and at bedtime  insulin lispro Injectable (HumaLOG) 8 Unit(s) SubCutaneous three times a day before meals  melatonin 5 milliGRAM(s) Oral at bedtime  multivitamin 1 Tablet(s) Oral daily  senna 1 Tablet(s) Oral <User Schedule>  simvastatin 10 milliGRAM(s) Oral at bedtime  tamsulosin 0.4 milliGRAM(s) Oral at bedtime    MEDICATIONS  (PRN):  acetaminophen   Tablet 650 milliGRAM(s) Oral every 6 hours PRN For Temp greater than 38 C (100.4 F)  dextrose Gel 1 Dose(s) Oral once PRN Blood Glucose LESS THAN 70 milliGRAM(s)/deciliter  glucagon  Injectable 1 milliGRAM(s) IntraMuscular once PRN Glucose LESS THAN 70 milligrams/deciliter        LABS:                        8.2    4.8   )-----------( 136      ( 15 Apr 2018 06:57 )             24.1     -15    139  |  103  |  34<H>  ----------------------------<  175<H>  4.7   |  25  |  1.75<H>    Ca    9.1      15 Apr 2018 06:56  Phos  3.0     -14  Mg     1.5     04-15      Hemoglobin A1C, Whole Blood: 7.2 % (18 @ 06:15)      Urinalysis Basic - ( 2018 12:38 )    Color: Yellow / Appearance: Clear / S.010 / pH: x  Gluc: x / Ketone: Trace mg/dL  / Bili: Negative / Urobili: 0.2 E.U./dL   Blood: x / Protein: 30 mg/dL / Nitrite: NEGATIVE   Leuk Esterase: Moderate / RBC: Many /HPF / WBC > 10 /HPF   Sq Epi: x / Non Sq Epi: 0-5 /HPF / Bacteria: Present /HPF      Thyroid Stimulating Hormone, Serum: 0.956 uIU/mL ( @ 06:15)      RADIOLOGY & ADDITIONAL TESTS:      Vital Signs Last 24 Hrs  T(C): 37.1 (15 Apr 2018 21:27), Max: 37.1 (15 Apr 2018 21:27)  T(F): 98.7 (15 Apr 2018 21:27), Max: 98.7 (15 Apr 2018 21:27)  HR: 76 (15 Apr 2018 21:27) (73 - 80)  BP: 114/67 (15 Apr 2018 21:27) (114/67 - 156/76)  BP(mean): --  RR: 16 (15 Apr 2018 21:27) (16 - 18)  SpO2: 98% (15 Apr 2018 21:27) (97% - 99%)    CAPILLARY BLOOD GLUCOSE      PHYSICAL EXAM:  Constitutional: Elderly male in NAD.   HEENT: NCAT, MMM, OP clear, EOMI, no proptosis or lid retraction  Neck: supple, no acanthosis, no thyromegaly or palpable thyroid nodules   Respiratory: Lungs CTAB.  Cardiovascular: regular rhythm, normal S1 and S2, no audible murmurs, no peripheral edema  GI: soft, + bowel sounds, NT  Neurology: no tremors, DTR 2+  Skin: no visible rashes/lesions  Psychiatric: AAO x 3, normal affect/mood.        A/P: 72yMale admitted for weakness, diagnosed with UTI, enlarged prostate. Consulted and being followed for Diabetes Type 2.    DM Type 2 with inpatient hyperglycemia secondary to infection    Please increase to 14 units Lantus AM,   start premeal Lispro 8 units TID, and    continue Lispro moderate/low dose sliding scale 4 times daily (before meals and bedtime).    Also continue consistent carbohydrate (Diabetic) diet  Target  - 150 mg/dl.    Outpatient f/u with endocrinologist.    Case discussed with attending and primary team      Attending attestation:  I have seen and evaluated the patient at the bedside.   Nurse Practitioner/Fellow/Resident’s note reviewed, including vital signs, PE and laboratory results.  Direct personal management and extensive interpretation of the data conducted.  Assessment and recommendations as above.

## 2018-04-16 LAB
ANION GAP SERPL CALC-SCNC: 16 MMOL/L — SIGNIFICANT CHANGE UP (ref 5–17)
APPEARANCE UR: CLEAR — SIGNIFICANT CHANGE UP
BACTERIA # UR AUTO: PRESENT /HPF
BILIRUB UR-MCNC: NEGATIVE — SIGNIFICANT CHANGE UP
BUN SERPL-MCNC: 35 MG/DL — HIGH (ref 7–23)
CALCIUM SERPL-MCNC: 8.8 MG/DL — SIGNIFICANT CHANGE UP (ref 8.4–10.5)
CHLORIDE SERPL-SCNC: 100 MMOL/L — SIGNIFICANT CHANGE UP (ref 96–108)
CO2 SERPL-SCNC: 21 MMOL/L — LOW (ref 22–31)
COLOR SPEC: YELLOW — SIGNIFICANT CHANGE UP
CREAT SERPL-MCNC: 1.76 MG/DL — HIGH (ref 0.5–1.3)
CULTURE RESULTS: SIGNIFICANT CHANGE UP
CULTURE RESULTS: SIGNIFICANT CHANGE UP
DIFF PNL FLD: (no result)
EPI CELLS # UR: SIGNIFICANT CHANGE UP /HPF (ref 0–5)
GLUCOSE BLDC GLUCOMTR-MCNC: 190 MG/DL — HIGH (ref 70–99)
GLUCOSE BLDC GLUCOMTR-MCNC: 214 MG/DL — HIGH (ref 70–99)
GLUCOSE BLDC GLUCOMTR-MCNC: 240 MG/DL — HIGH (ref 70–99)
GLUCOSE BLDC GLUCOMTR-MCNC: 242 MG/DL — HIGH (ref 70–99)
GLUCOSE SERPL-MCNC: 149 MG/DL — HIGH (ref 70–99)
GLUCOSE UR QL: NEGATIVE — SIGNIFICANT CHANGE UP
HCT VFR BLD CALC: 24 % — LOW (ref 39–50)
HGB BLD-MCNC: 7.8 G/DL — LOW (ref 13–17)
KETONES UR-MCNC: NEGATIVE — SIGNIFICANT CHANGE UP
LEUKOCYTE ESTERASE UR-ACNC: NEGATIVE — SIGNIFICANT CHANGE UP
MAGNESIUM SERPL-MCNC: 1.9 MG/DL — SIGNIFICANT CHANGE UP (ref 1.6–2.6)
MCHC RBC-ENTMCNC: 27.8 PG — SIGNIFICANT CHANGE UP (ref 27–34)
MCHC RBC-ENTMCNC: 32.5 G/DL — SIGNIFICANT CHANGE UP (ref 32–36)
MCV RBC AUTO: 85.4 FL — SIGNIFICANT CHANGE UP (ref 80–100)
NITRITE UR-MCNC: NEGATIVE — SIGNIFICANT CHANGE UP
ORGANISM # SPEC MICROSCOPIC CNT: SIGNIFICANT CHANGE UP
PH UR: 6 — SIGNIFICANT CHANGE UP (ref 5–8)
PHOSPHATE SERPL-MCNC: 3.7 MG/DL — SIGNIFICANT CHANGE UP (ref 2.5–4.5)
PLATELET # BLD AUTO: 141 K/UL — LOW (ref 150–400)
POTASSIUM SERPL-MCNC: 3.7 MMOL/L — SIGNIFICANT CHANGE UP (ref 3.5–5.3)
POTASSIUM SERPL-SCNC: 3.7 MMOL/L — SIGNIFICANT CHANGE UP (ref 3.5–5.3)
PROT UR-MCNC: 30 MG/DL
PSA FLD-MCNC: 6.52 NG/ML — HIGH (ref 0–4)
RBC # BLD: 2.81 M/UL — LOW (ref 4.2–5.8)
RBC # FLD: 13.5 % — SIGNIFICANT CHANGE UP (ref 10.3–16.9)
RBC CASTS # UR COMP ASSIST: (no result) /HPF
SODIUM SERPL-SCNC: 137 MMOL/L — SIGNIFICANT CHANGE UP (ref 135–145)
SP GR SPEC: 1.01 — SIGNIFICANT CHANGE UP (ref 1–1.03)
SPECIMEN SOURCE: SIGNIFICANT CHANGE UP
SPECIMEN SOURCE: SIGNIFICANT CHANGE UP
UROBILINOGEN FLD QL: 0.2 E.U./DL — SIGNIFICANT CHANGE UP
WBC # BLD: 5.8 K/UL — SIGNIFICANT CHANGE UP (ref 3.8–10.5)
WBC # FLD AUTO: 5.8 K/UL — SIGNIFICANT CHANGE UP (ref 3.8–10.5)
WBC UR QL: (no result) /HPF

## 2018-04-16 RX ORDER — DOCUSATE SODIUM 100 MG
1 CAPSULE ORAL
Qty: 0 | Refills: 0 | COMMUNITY
Start: 2018-04-16

## 2018-04-16 RX ORDER — POTASSIUM CHLORIDE 20 MEQ
20 PACKET (EA) ORAL ONCE
Qty: 0 | Refills: 0 | Status: COMPLETED | OUTPATIENT
Start: 2018-04-16 | End: 2018-04-16

## 2018-04-16 RX ORDER — INSULIN GLARGINE 100 [IU]/ML
16 INJECTION, SOLUTION SUBCUTANEOUS EVERY MORNING
Qty: 0 | Refills: 0 | Status: DISCONTINUED | OUTPATIENT
Start: 2018-04-16 | End: 2018-04-18

## 2018-04-16 RX ORDER — CEFTRIAXONE 500 MG/1
2000 INJECTION, POWDER, FOR SOLUTION INTRAMUSCULAR; INTRAVENOUS ONCE
Qty: 0 | Refills: 0 | Status: DISCONTINUED | OUTPATIENT
Start: 2018-04-16 | End: 2018-04-16

## 2018-04-16 RX ORDER — CEFUROXIME AXETIL 250 MG
500 TABLET ORAL EVERY 12 HOURS
Qty: 0 | Refills: 0 | Status: DISCONTINUED | OUTPATIENT
Start: 2018-04-16 | End: 2018-04-16

## 2018-04-16 RX ORDER — CEFTRIAXONE 500 MG/1
2000 INJECTION, POWDER, FOR SOLUTION INTRAMUSCULAR; INTRAVENOUS EVERY 24 HOURS
Qty: 0 | Refills: 0 | Status: DISCONTINUED | OUTPATIENT
Start: 2018-04-16 | End: 2018-04-18

## 2018-04-16 RX ORDER — FINASTERIDE 5 MG/1
1 TABLET, FILM COATED ORAL
Qty: 30 | Refills: 0
Start: 2018-04-16 | End: 2018-05-15

## 2018-04-16 RX ORDER — MAGNESIUM SULFATE 500 MG/ML
1 VIAL (ML) INJECTION ONCE
Qty: 0 | Refills: 0 | Status: COMPLETED | OUTPATIENT
Start: 2018-04-16 | End: 2018-04-16

## 2018-04-16 RX ORDER — SENNA PLUS 8.6 MG/1
1 TABLET ORAL
Qty: 0 | Refills: 0 | COMMUNITY
Start: 2018-04-16

## 2018-04-16 RX ORDER — CEFTRIAXONE 500 MG/1
2 INJECTION, POWDER, FOR SOLUTION INTRAMUSCULAR; INTRAVENOUS EVERY 24 HOURS
Qty: 0 | Refills: 0 | Status: DISCONTINUED | OUTPATIENT
Start: 2018-04-16 | End: 2018-04-16

## 2018-04-16 RX ADMIN — Medication 100 MILLIGRAM(S): at 17:34

## 2018-04-16 RX ADMIN — Medication 8 UNIT(S): at 17:35

## 2018-04-16 RX ADMIN — Medication 8 UNIT(S): at 08:55

## 2018-04-16 RX ADMIN — INSULIN GLARGINE 14 UNIT(S): 100 INJECTION, SOLUTION SUBCUTANEOUS at 08:56

## 2018-04-16 RX ADMIN — Medication 1 TABLET(S): at 12:28

## 2018-04-16 RX ADMIN — SENNA PLUS 1 TABLET(S): 8.6 TABLET ORAL at 17:34

## 2018-04-16 RX ADMIN — SENNA PLUS 1 TABLET(S): 8.6 TABLET ORAL at 08:55

## 2018-04-16 RX ADMIN — Medication 8 UNIT(S): at 12:41

## 2018-04-16 RX ADMIN — CEFTRIAXONE 2000 MILLIGRAM(S): 500 INJECTION, POWDER, FOR SOLUTION INTRAMUSCULAR; INTRAVENOUS at 23:38

## 2018-04-16 RX ADMIN — Medication 20 MILLIEQUIVALENT(S): at 07:04

## 2018-04-16 RX ADMIN — Medication 100 GRAM(S): at 07:04

## 2018-04-16 RX ADMIN — Medication 81 MILLIGRAM(S): at 12:28

## 2018-04-16 RX ADMIN — ERYTHROPOIETIN 10000 UNIT(S): 10000 INJECTION, SOLUTION INTRAVENOUS; SUBCUTANEOUS at 16:26

## 2018-04-16 RX ADMIN — Medication 2: at 08:55

## 2018-04-16 RX ADMIN — SIMVASTATIN 10 MILLIGRAM(S): 20 TABLET, FILM COATED ORAL at 22:16

## 2018-04-16 RX ADMIN — Medication 5 MILLIGRAM(S): at 23:38

## 2018-04-16 RX ADMIN — Medication 100 MILLIGRAM(S): at 08:55

## 2018-04-16 RX ADMIN — FINASTERIDE 5 MILLIGRAM(S): 5 TABLET, FILM COATED ORAL at 22:16

## 2018-04-16 RX ADMIN — Medication 4: at 12:40

## 2018-04-16 RX ADMIN — TAMSULOSIN HYDROCHLORIDE 0.4 MILLIGRAM(S): 0.4 CAPSULE ORAL at 22:16

## 2018-04-16 RX ADMIN — Medication 4: at 17:35

## 2018-04-16 RX ADMIN — Medication 4: at 22:16

## 2018-04-16 NOTE — DIETITIAN INITIAL EVALUATION ADULT. - OTHER INFO
71 y/o male admitted with weakness and poorly controlled DM.As per patient has had diminished appetite.No N/V/D or pain.Skin intact. Resides alone and tries to avoid concentrated sweets.Claims pancakes at hospital breakfast in too high in CHO,however,requesting increased fruits.Patient was never on insulin before, and feels since being on insulin his FS are improved.

## 2018-04-16 NOTE — PROGRESS NOTE ADULT - SUBJECTIVE AND OBJECTIVE BOX
INTERVAL HPI/OVERNIGHT EVENTS:    Patient is a 72y old  Male who presents with a chief complaint of weakness (18) was seen and examined today. Reports the following symptoms:    CONSTITUTIONAL:  Negative fever or chills, feels better, good appetite  EYES:  Negative  blurry vision or double vision  CARDIOVASCULAR:  Negative for chest pain or palpitations  RESPIRATORY:  Negative for cough, wheezing, or SOB   GASTROINTESTINAL:  Negative for nausea, vomiting, diarrhea, constipation, or abdominal pain  GENITOURINARY:  Rodriguez catheter  NEUROLOGIC:  No headache, confusion, dizziness, lightheadedness    MEDICATIONS  (STANDING):  aspirin  chewable 81 milliGRAM(s) Oral daily  cefTRIAXone Injectable. 2000 milliGRAM(s) IV Push every 24 hours  dextrose 5%. 1000 milliLiter(s) (50 mL/Hr) IV Continuous <Continuous>  dextrose 50% Injectable 12.5 Gram(s) IV Push once  dextrose 50% Injectable 25 Gram(s) IV Push once  dextrose 50% Injectable 25 Gram(s) IV Push once  docusate sodium 100 milliGRAM(s) Oral two times a day  epoetin kathi Injectable 54485 Unit(s) SubCutaneous every 48 hours  finasteride 5 milliGRAM(s) Oral daily  influenza   Vaccine 0.5 milliLiter(s) IntraMuscular once  insulin glargine Injectable (LANTUS) 16 Unit(s) SubCutaneous every morning  insulin lispro (HumaLOG) corrective regimen sliding scale   SubCutaneous Before meals and at bedtime  insulin lispro Injectable (HumaLOG) 8 Unit(s) SubCutaneous three times a day before meals  melatonin 5 milliGRAM(s) Oral at bedtime  multivitamin 1 Tablet(s) Oral daily  senna 1 Tablet(s) Oral <User Schedule>  simvastatin 10 milliGRAM(s) Oral at bedtime  tamsulosin 0.4 milliGRAM(s) Oral at bedtime    MEDICATIONS  (PRN):  acetaminophen   Tablet 650 milliGRAM(s) Oral every 6 hours PRN For Temp greater than 38 C (100.4 F)  dextrose Gel 1 Dose(s) Oral once PRN Blood Glucose LESS THAN 70 milliGRAM(s)/deciliter  glucagon  Injectable 1 milliGRAM(s) IntraMuscular once PRN Glucose LESS THAN 70 milligrams/deciliter        LABS:                        7.8    5.8   )-----------( 141      ( 2018 06:10 )             24.0     04-16    137  |  100  |  35<H>  ----------------------------<  149<H>  3.7   |  21<L>  |  1.76<H>    Ca    8.8      2018 06:09  Phos  3.7       Mg     1.9           Hemoglobin A1C, Whole Blood: 7.2 % (18 @ 06:15)      Urinalysis Basic - ( 2018 16:04 )    Color: Yellow / Appearance: Clear / S.015 / pH: x  Gluc: x / Ketone: NEGATIVE  / Bili: Negative / Urobili: 0.2 E.U./dL   Blood: x / Protein: 30 mg/dL / Nitrite: NEGATIVE   Leuk Esterase: NEGATIVE / RBC: Many /HPF / WBC 5-10 /HPF   Sq Epi: x / Non Sq Epi: 0-5 /HPF / Bacteria: Present /HPF      Thyroid Stimulating Hormone, Serum: 0.956 uIU/mL ( @ 06:15)      RADIOLOGY & ADDITIONAL TESTS:      Vital Signs Last 24 Hrs  T(C): 36.4 (2018 21:32), Max: 36.9 (2018 09:35)  T(F): 97.5 (2018 21:32), Max: 98.4 (2018 09:35)  HR: 76 (2018 21:32) (70 - 80)  BP: 106/63 (2018 21:32) (106/63 - 148/73)  BP(mean): --  RR: 16 (2018 21:32) (16 - 17)  SpO2: 98% (2018 21:32) (98% - 100%)    CAPILLARY BLOOD GLUCOSE      PHYSICAL EXAM:  Constitutional: wn/wd in NAD.   HEENT: NCAT, MMM, OP clear, EOMI, , no proptosis or lid retraction  Neck: supple, no acanthosis, no thyromegaly or palpable thyroid nodules   Respiratory: Lungs CTAB.  Cardiovascular: regular rhythm, normal S1 and S2, no audible murmurs, no peripheral edema  GI: soft, + bowel sounds, NT/ND, no masses/HSM appreciated.  Neurology: no tremors, DTR 2+  Skin: no visible rashes/lesions  Psychiatric: AAO x 3, normal affect/mood.        A/P: 72yMale admitted for weakness with high PSA. Consulted and being followed for Diabetes Type 2.     DM Type 2 with hyperglycemia.  Glycemic target 100-180 mg/dl     Please increase to 20 units Lantus HS,   increase premeal Lispro to 10 units TID, and    continue Lispro moderate/low dose sliding scale 4 times daily (before meals and bedtime).    Also continue consistent carbohydrate (Diabetic) diet     Case discussed with attending and primary team.      Attending attestation:  I have seen and evaluated the patient at the bedside.  Nurse Practitioner/Fellow/Resident’s note reviewed, including vital signs, PE and laboratory results.  Direct personal management and extensive interpretation of the data conducted.   Assessment and recommendations as above.

## 2018-04-16 NOTE — PROGRESS NOTE ADULT - PROBLEM SELECTOR PLAN 1
1) Continue  Ceftriaxone 2 gr q24h; may switch to oral Ciprofloxacin prior to discharge, to continue 3-4 weeks of Ciprofloxacin  2) Diarrhea resolved, c/o constipation ( ? pancolitis on CT Abdomen)

## 2018-04-16 NOTE — PROGRESS NOTE ADULT - PROBLEM SELECTOR PLAN 3
Resolved, creatinine below baseline.  1.76, baseline 2.2.  Creatinine on arrival 3.55, improved with IVF.  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  -urology following.  started flomax and proscar.  c/w indwelling palacio.  -renal U/S with moderate b/l hydro, persistent on repeat  -hold lasix

## 2018-04-16 NOTE — PROGRESS NOTE ADULT - PROBLEM SELECTOR PLAN 5
-with E.coli bacteremia.  c/w rocephin for now.  sensitive to cipro  - f/u surveillance blood cultures NGTD  - No CVA tenderness despite perirenal fat stranding on CT

## 2018-04-16 NOTE — PROGRESS NOTE ADULT - PROBLEM SELECTOR PLAN 6
Normocytic.  Hgb ~9 at presentation.  Now 7.8.  Dilutional.  VSS.  Denies lightheadedness, SOB, CP, palpitations.    -Mild bleeding post-palacio placement resolved.  iron studies c/w SERA.  started PO supplementation.  -EPO level 10.9 (WNL, inappropriately normal).  started EPO. obtain collateral from PCP.

## 2018-04-16 NOTE — DIETITIAN INITIAL EVALUATION ADULT. - PROBLEM SELECTOR PLAN 1
Elderly man with history of poorly controlled diabetes (as per pt), HTN, enlarged prostate here with generalized weakness likely from uremia. His AAMIR is likely multifactorial - poor oral intake and being on diuretics and post-renal obstruction from enlarged prostate  - Call urology consult  - Once palacio is placed, will start normal saline (See below for hyponatremia)  - f/u urine lytes  - Renal US post-palacio to see any improvement

## 2018-04-16 NOTE — PROGRESS NOTE ADULT - PROBLEM SELECTOR PLAN 1
-Presenting with urinary retention.  severely enlarged prostate, bladder distension, and bilateral hydronephrosis observed on CT.    -s/p palacio  -unclear diagnosis, likely BPH.  Had been referred to uro outpt but did not follow up.  -started flomax and proscar  -uro recc discharge with palacio and outpt f/u / biopsy  -treatment of UTI as below  -PSA elevated 9.8.

## 2018-04-16 NOTE — PROGRESS NOTE ADULT - SUBJECTIVE AND OBJECTIVE BOX
HPI:  71yo M with PMH of HTN, HLD, DMT2, prostatomegaly, h/o SVT s/p ablation (2011) presents  here with progressive weakness for one month a/w abdominal distension. Pt reports no known history of kidney disease and was asked by his PMD to see a urologist that he never did. He reports increased dyspnea on exertion, usually can climb more than one flight of stairs but get SOB with less than one flight now a/w anorexia, denies any F/C, chest pain, cough, abdominal pain, N/V. He has constipation and small amount of non-bloody diarrhea. He denies alcohol abuse, never had colonoscopy.     ED course: VSS. Labs with anemia Hb 9.1/27, thrombocytopenia (137). Hyponatremia, hypokalemia, anion gap metabolic acidosis with elevated beta-hydroxybutyrate, lactate 1.1, UA with pyuria, leukocyte esterase with bacteria. Pt was given insulin 4u once and 2l NS bolus. A CT abd/pelvis was performed. (11 Apr 2018 06:02)    FAMILY HISTORY:  No pertinent family history in first degree relatives    MEDICATIONS  (STANDING):  aspirin  chewable 81 milliGRAM(s) Oral daily  cefTRIAXone Injectable. 2000 milliGRAM(s) IV Push once  dextrose 5%. 1000 milliLiter(s) (50 mL/Hr) IV Continuous <Continuous>  dextrose 50% Injectable 12.5 Gram(s) IV Push once  dextrose 50% Injectable 25 Gram(s) IV Push once  dextrose 50% Injectable 25 Gram(s) IV Push once  docusate sodium 100 milliGRAM(s) Oral two times a day  epoetin kathi Injectable 70766 Unit(s) SubCutaneous every 48 hours  finasteride 5 milliGRAM(s) Oral daily  influenza   Vaccine 0.5 milliLiter(s) IntraMuscular once  insulin glargine Injectable (LANTUS) 14 Unit(s) SubCutaneous every morning  insulin lispro (HumaLOG) corrective regimen sliding scale   SubCutaneous Before meals and at bedtime  insulin lispro Injectable (HumaLOG) 8 Unit(s) SubCutaneous three times a day before meals  melatonin 5 milliGRAM(s) Oral at bedtime  multivitamin 1 Tablet(s) Oral daily  senna 1 Tablet(s) Oral <User Schedule>  simvastatin 10 milliGRAM(s) Oral at bedtime  tamsulosin 0.4 milliGRAM(s) Oral at bedtime    MEDICATIONS  (PRN):  acetaminophen   Tablet 650 milliGRAM(s) Oral every 6 hours PRN For Temp greater than 38 C (100.4 F)  dextrose Gel 1 Dose(s) Oral once PRN Blood Glucose LESS THAN 70 milliGRAM(s)/deciliter  glucagon  Injectable 1 milliGRAM(s) IntraMuscular once PRN Glucose LESS THAN 70 milligrams/deciliter    Vital Signs Last 24 Hrs  T(C): 36.7 (16 Apr 2018 05:05), Max: 37.1 (15 Apr 2018 21:27)  T(F): 98.1 (16 Apr 2018 05:05), Max: 98.7 (15 Apr 2018 21:27)  HR: 74 (16 Apr 2018 05:05) (74 - 80)  BP: 137/77 (16 Apr 2018 05:05) (114/67 - 156/76)  BP(mean): --  RR: 16 (16 Apr 2018 05:05) (16 - 17)  SpO2: 100% (16 Apr 2018 05:05) (97% - 100%)    Physical exam:    Overall impression  Lymphadenopathy  Liver  spleen    Labs:  CBC Full  -  ( 16 Apr 2018 06:10 )  WBC Count : 5.8 K/uL  Hemoglobin : 7.8 g/dL  Hematocrit : 24.0 %  Platelet Count - Automated : 141 K/uL  Mean Cell Volume : 85.4 fL  Mean Cell Hemoglobin : 27.8 pg  Mean Cell Hemoglobin Concentration : 32.5 g/dL  Auto Neutrophil # : x  Auto Lymphocyte # : x  Auto Monocyte # : x  Auto Eosinophil # : x  Auto Basophil # : x  Auto Neutrophil % : x  Auto Lymphocyte % : x  Auto Monocyte % : x  Auto Eosinophil % : x  Auto Basophil % : x    04-16    137  |  100  |  35<H>  ----------------------------<  149<H>  3.7   |  21<L>  |  1.76<H>    Ca    8.8      16 Apr 2018 06:09  Phos  3.7     04-16  Mg     1.9     04-16        Radiology:  HEALTH ISSUES - R/O PROBLEM Dx:      Assessmant / Problems  1) urosepsis on Ceftriaxone responding   Repeat UA ordered  2) PSA 9.8 , needs prostate biopsy  Will discuss with Dr Courtney  3)DM improved , Dr Mezitis on the case  4) HTN controlled  5) Increased PT/INR 2/2 factor Vll deficiency likely 2/2 antibiotic tx  No need to correct    Thank you  Olivia Roberts MD HPI:  71yo M with PMH of HTN, HLD, DMT2, prostatomegaly, h/o SVT s/p ablation (2011) presents  here with progressive weakness for one month a/w abdominal distension. Pt reports no known history of kidney disease and was asked by his PMD to see a urologist that he never did. He reports increased dyspnea on exertion, usually can climb more than one flight of stairs but get SOB with less than one flight now a/w anorexia, denies any F/C, chest pain, cough, abdominal pain, N/V. He has constipation and small amount of non-bloody diarrhea. He denies alcohol abuse, never had colonoscopy.     ED course: VSS. Labs with anemia Hb 9.1/27, thrombocytopenia (137). Hyponatremia, hypokalemia, anion gap metabolic acidosis with elevated beta-hydroxybutyrate, lactate 1.1, UA with pyuria, leukocyte esterase with bacteria. Pt was given insulin 4u once and 2l NS bolus. A CT abd/pelvis was performed. (11 Apr 2018 06:02)    FAMILY HISTORY:  No pertinent family history in first degree relatives    MEDICATIONS  (STANDING):  aspirin  chewable 81 milliGRAM(s) Oral daily  cefTRIAXone Injectable. 2000 milliGRAM(s) IV Push once  dextrose 5%. 1000 milliLiter(s) (50 mL/Hr) IV Continuous <Continuous>  dextrose 50% Injectable 12.5 Gram(s) IV Push once  dextrose 50% Injectable 25 Gram(s) IV Push once  dextrose 50% Injectable 25 Gram(s) IV Push once  docusate sodium 100 milliGRAM(s) Oral two times a day  epoetin kathi Injectable 79887 Unit(s) SubCutaneous every 48 hours  finasteride 5 milliGRAM(s) Oral daily  influenza   Vaccine 0.5 milliLiter(s) IntraMuscular once  insulin glargine Injectable (LANTUS) 14 Unit(s) SubCutaneous every morning  insulin lispro (HumaLOG) corrective regimen sliding scale   SubCutaneous Before meals and at bedtime  insulin lispro Injectable (HumaLOG) 8 Unit(s) SubCutaneous three times a day before meals  melatonin 5 milliGRAM(s) Oral at bedtime  multivitamin 1 Tablet(s) Oral daily  senna 1 Tablet(s) Oral <User Schedule>  simvastatin 10 milliGRAM(s) Oral at bedtime  tamsulosin 0.4 milliGRAM(s) Oral at bedtime    MEDICATIONS  (PRN):  acetaminophen   Tablet 650 milliGRAM(s) Oral every 6 hours PRN For Temp greater than 38 C (100.4 F)  dextrose Gel 1 Dose(s) Oral once PRN Blood Glucose LESS THAN 70 milliGRAM(s)/deciliter  glucagon  Injectable 1 milliGRAM(s) IntraMuscular once PRN Glucose LESS THAN 70 milligrams/deciliter    Vital Signs Last 24 Hrs  T(C): 36.7 (16 Apr 2018 05:05), Max: 37.1 (15 Apr 2018 21:27)  T(F): 98.1 (16 Apr 2018 05:05), Max: 98.7 (15 Apr 2018 21:27)  HR: 74 (16 Apr 2018 05:05) (74 - 80)  BP: 137/77 (16 Apr 2018 05:05) (114/67 - 156/76)  BP(mean): --  RR: 16 (16 Apr 2018 05:05) (16 - 17)  SpO2: 100% (16 Apr 2018 05:05) (97% - 100%)    Physical exam:    Overall impression  Lymphadenopathy  Liver  spleen    Labs:  CBC Full  -  ( 16 Apr 2018 06:10 )  WBC Count : 5.8 K/uL  Hemoglobin : 7.8 g/dL  Hematocrit : 24.0 %  Platelet Count - Automated : 141 K/uL  Mean Cell Volume : 85.4 fL  Mean Cell Hemoglobin : 27.8 pg  Mean Cell Hemoglobin Concentration : 32.5 g/dL  Auto Neutrophil # : x  Auto Lymphocyte # : x  Auto Monocyte # : x  Auto Eosinophil # : x  Auto Basophil # : x  Auto Neutrophil % : x  Auto Lymphocyte % : x  Auto Monocyte % : x  Auto Eosinophil % : x  Auto Basophil % : x    04-16    137  |  100  |  35<H>  ----------------------------<  149<H>  3.7   |  21<L>  |  1.76<H>    Ca    8.8      16 Apr 2018 06:09  Phos  3.7     04-16  Mg     1.9     04-16        Radiology:  HEALTH ISSUES - R/O PROBLEM Dx:      Assessmant / Problems  Note written from my office  1) urosepsis on Ceftriaxone responding   Repeat UA ordered  2) PSA 9.8 , needs prostate biopsy  Will discuss with Dr Courtney  3)DM improved , Dr Campbell on the case  4) HTN controlled  5) Increased PT/INR 2/2 factor Vll deficiency likely 2/2 antibiotic tx  No need to correct    Thank you  Olivia Roberts MD HPI:  73yo M with PMH of HTN, HLD, DMT2, prostatomegaly, h/o SVT s/p ablation (2011) presents  here with progressive weakness for one month a/w abdominal distension. Pt reports no known history of kidney disease and was asked by his PMD to see a urologist that he never did. He reports increased dyspnea on exertion, usually can climb more than one flight of stairs but get SOB with less than one flight now a/w anorexia, denies any F/C, chest pain, cough, abdominal pain, N/V. He has constipation and small amount of non-bloody diarrhea. He denies alcohol abuse, never had colonoscopy.     ED course: VSS. Labs with anemia Hb 9.1/27, thrombocytopenia (137). Hyponatremia, hypokalemia, anion gap metabolic acidosis with elevated beta-hydroxybutyrate, lactate 1.1, UA with pyuria, leukocyte esterase with bacteria. Pt was given insulin 4u once and 2l NS bolus. A CT abd/pelvis was performed. (11 Apr 2018 06:02)    FAMILY HISTORY:  No pertinent family history in first degree relatives    MEDICATIONS  (STANDING):  aspirin  chewable 81 milliGRAM(s) Oral daily  cefTRIAXone Injectable. 2000 milliGRAM(s) IV Push once  dextrose 5%. 1000 milliLiter(s) (50 mL/Hr) IV Continuous <Continuous>  dextrose 50% Injectable 12.5 Gram(s) IV Push once  dextrose 50% Injectable 25 Gram(s) IV Push once  dextrose 50% Injectable 25 Gram(s) IV Push once  docusate sodium 100 milliGRAM(s) Oral two times a day  epoetin kathi Injectable 70175 Unit(s) SubCutaneous every 48 hours  finasteride 5 milliGRAM(s) Oral daily  influenza   Vaccine 0.5 milliLiter(s) IntraMuscular once  insulin glargine Injectable (LANTUS) 14 Unit(s) SubCutaneous every morning  insulin lispro (HumaLOG) corrective regimen sliding scale   SubCutaneous Before meals and at bedtime  insulin lispro Injectable (HumaLOG) 8 Unit(s) SubCutaneous three times a day before meals  melatonin 5 milliGRAM(s) Oral at bedtime  multivitamin 1 Tablet(s) Oral daily  senna 1 Tablet(s) Oral <User Schedule>  simvastatin 10 milliGRAM(s) Oral at bedtime  tamsulosin 0.4 milliGRAM(s) Oral at bedtime    MEDICATIONS  (PRN):  acetaminophen   Tablet 650 milliGRAM(s) Oral every 6 hours PRN For Temp greater than 38 C (100.4 F)  dextrose Gel 1 Dose(s) Oral once PRN Blood Glucose LESS THAN 70 milliGRAM(s)/deciliter  glucagon  Injectable 1 milliGRAM(s) IntraMuscular once PRN Glucose LESS THAN 70 milligrams/deciliter    Vital Signs Last 24 Hrs  T(C): 36.7 (16 Apr 2018 05:05), Max: 37.1 (15 Apr 2018 21:27)  T(F): 98.1 (16 Apr 2018 05:05), Max: 98.7 (15 Apr 2018 21:27)  HR: 74 (16 Apr 2018 05:05) (74 - 80)  BP: 137/77 (16 Apr 2018 05:05) (114/67 - 156/76)  BP(mean): --  RR: 16 (16 Apr 2018 05:05) (16 - 17)  SpO2: 100% (16 Apr 2018 05:05) (97% - 100%)    Physical exam:    Overall impression  Lymphadenopathy  Liver  spleen    Labs:  CBC Full  -  ( 16 Apr 2018 06:10 )  WBC Count : 5.8 K/uL  Hemoglobin : 7.8 g/dL  Hematocrit : 24.0 %  Platelet Count - Automated : 141 K/uL  Mean Cell Volume : 85.4 fL  Mean Cell Hemoglobin : 27.8 pg  Mean Cell Hemoglobin Concentration : 32.5 g/dL  Auto Neutrophil # : x  Auto Lymphocyte # : x  Auto Monocyte # : x  Auto Eosinophil # : x  Auto Basophil # : x  Auto Neutrophil % : x  Auto Lymphocyte % : x  Auto Monocyte % : x  Auto Eosinophil % : x  Auto Basophil % : x    04-16    137  |  100  |  35<H>  ----------------------------<  149<H>  3.7   |  21<L>  |  1.76<H>    Ca    8.8      16 Apr 2018 06:09  Phos  3.7     04-16  Mg     1.9     04-16        Radiology:  HEALTH ISSUES - R/O PROBLEM Dx:      Assessmant / Problems  Note written from my office  1) urosepsis on Ceftriaxone responding   Repeat UA ordered  Switch to Ceclor 500 mg bid and observe for 2 days  2) PSA 9.8 , needs prostate biopsy  Will discuss with Dr Courtney ( did so)  3)DM improved , Dr Campbell on the case  4) HTN controlled  5) Increased PT/INR 2/2 factor Vll deficiency likely 2/2 antibiotic tx  6) thickened colonic wall , sugestive of colitis , constipation  Dr Ronnie Hogan called in consultation      Thank you  Olviia Roberts MD

## 2018-04-16 NOTE — PROGRESS NOTE ADULT - PROBLEM SELECTOR PLAN 2
-Positive blood cx on admission.  C/w Rocephin for now.  Sensitive to cipro.  Treatment of UTI as below.  increased to rocephin 2g daily.  -Afebrile.  Nontoxic appearing.  Normotensive.  Lactate <2 on admission.  -f/u surveillance cx NGTD

## 2018-04-16 NOTE — CONSULT NOTE ADULT - SUBJECTIVE AND OBJECTIVE BOX
73yo M with PMH of HTN, HLD, DMT2, prostatomegaly, h/o SVT s/p ablation (2011) presents  here with progressive weakness for one month a/w abdominal distension. Admitted with E. coli sepsis. Called because CT scan positive for colitis    REVIEW OF SYSTEMS:  Constitutional: No fever, weight loss or fatigue  ENMT:  No difficulty hearing, tinnitus, vertigo; No sinus or throat pain  Respiratory: No cough, wheezing, chills or hemoptysis  Cardiovascular: No chest pain, palpitations, dizziness or leg swelling  Gastrointestinal: distension better  Skin: No itching, burning, rashes or lesions   Musculoskeletal: No joint pain or swelling; No muscle, back or extremity pain    PAST MEDICAL & SURGICAL HISTORY:  Enlarged prostate  DM (diabetes mellitus)  HTN (hypertension)  H/O prior ablation treatment      FAMILY HISTORY:  No pertinent family history in first degree relatives      SOCIAL HISTORY:  Smoking Status: [ ] Current, [ ] Former, [ ] Never  Pack Years:    MEDICATIONS:  MEDICATIONS  (STANDING):  aspirin  chewable 81 milliGRAM(s) Oral daily  dextrose 5%. 1000 milliLiter(s) (50 mL/Hr) IV Continuous <Continuous>  dextrose 50% Injectable 12.5 Gram(s) IV Push once  dextrose 50% Injectable 25 Gram(s) IV Push once  dextrose 50% Injectable 25 Gram(s) IV Push once  docusate sodium 100 milliGRAM(s) Oral two times a day  epoetin kathi Injectable 98585 Unit(s) SubCutaneous every 48 hours  finasteride 5 milliGRAM(s) Oral daily  influenza   Vaccine 0.5 milliLiter(s) IntraMuscular once  insulin glargine Injectable (LANTUS) 14 Unit(s) SubCutaneous every morning  insulin lispro (HumaLOG) corrective regimen sliding scale   SubCutaneous Before meals and at bedtime  insulin lispro Injectable (HumaLOG) 8 Unit(s) SubCutaneous three times a day before meals  melatonin 5 milliGRAM(s) Oral at bedtime  multivitamin 1 Tablet(s) Oral daily  senna 1 Tablet(s) Oral <User Schedule>  simvastatin 10 milliGRAM(s) Oral at bedtime  tamsulosin 0.4 milliGRAM(s) Oral at bedtime    MEDICATIONS  (PRN):  acetaminophen   Tablet 650 milliGRAM(s) Oral every 6 hours PRN For Temp greater than 38 C (100.4 F)  dextrose Gel 1 Dose(s) Oral once PRN Blood Glucose LESS THAN 70 milliGRAM(s)/deciliter  glucagon  Injectable 1 milliGRAM(s) IntraMuscular once PRN Glucose LESS THAN 70 milligrams/deciliter      Allergies    No Known Allergies    Intolerances        Vital Signs Last 24 Hrs  T(C): 36.9 (16 Apr 2018 09:35), Max: 37.1 (15 Apr 2018 21:27)  T(F): 98.4 (16 Apr 2018 09:35), Max: 98.7 (15 Apr 2018 21:27)  HR: 70 (16 Apr 2018 09:35) (70 - 76)  BP: 148/73 (16 Apr 2018 09:35) (114/67 - 156/76)  BP(mean): --  RR: 16 (16 Apr 2018 09:35) (16 - 17)  SpO2: 98% (16 Apr 2018 09:35) (98% - 100%)    04-15 @ 07:01  -  04-16 @ 07:00  --------------------------------------------------------  IN: 0 mL / OUT: 900 mL / NET: -900 mL    04-16 @ 07:01  -  04-16 @ 14:39  --------------------------------------------------------  IN: 0 mL / OUT: 1450 mL / NET: -1450 mL          PHYSICAL EXAM:    General: Well developed; well nourished; in no acute distress  HEENT: MMM, conjunctiva and sclera clear  Gastrointestinal: Soft, non-tender non-distended; Normal bowel sounds; No rebound or guarding  Extremities: Normal range of motion, No clubbing, cyanosis or edema  Neurological: Alert and oriented x3  Skin: Warm and dry. No obvious rash      LABS:                        7.8    5.8   )-----------( 141      ( 16 Apr 2018 06:10 )             24.0     04-16    137  |  100  |  35<H>  ----------------------------<  149<H>  3.7   |  21<L>  |  1.76<H>    Ca    8.8      16 Apr 2018 06:09  Phos  3.7     04-16  Mg     1.9     04-16            RADIOLOGY & ADDITIONAL STUDIES: 73yo M with PMH of HTN, HLD, DMT2, prostatomegaly, h/o SVT s/p ablation (2011) presents  here with progressive weakness for one month a/w abdominal distension. Admitted with E. coli sepsis. Called because CT scan positive for colitis    REVIEW OF SYSTEMS:  Constitutional: No fever, weight loss or fatigue  ENMT:  No difficulty hearing, tinnitus, vertigo; No sinus or throat pain  Respiratory: No cough, wheezing, chills or hemoptysis  Cardiovascular: No chest pain, palpitations, dizziness or leg swelling  Gastrointestinal: distension better  Skin: No itching, burning, rashes or lesions   Musculoskeletal: No joint pain or swelling; No muscle, back or extremity pain    PAST MEDICAL & SURGICAL HISTORY:  Enlarged prostate  DM (diabetes mellitus)  HTN (hypertension)  H/O prior ablation treatment      FAMILY HISTORY:  No pertinent family history in first degree relatives      SOCIAL HISTORY:  Smoking Status: [ ] Current, [ ] Former, [ ] Never  Pack Years:    MEDICATIONS:  MEDICATIONS  (STANDING):  aspirin  chewable 81 milliGRAM(s) Oral daily  dextrose 5%. 1000 milliLiter(s) (50 mL/Hr) IV Continuous <Continuous>  dextrose 50% Injectable 12.5 Gram(s) IV Push once  dextrose 50% Injectable 25 Gram(s) IV Push once  dextrose 50% Injectable 25 Gram(s) IV Push once  docusate sodium 100 milliGRAM(s) Oral two times a day  epoetin kathi Injectable 02131 Unit(s) SubCutaneous every 48 hours  finasteride 5 milliGRAM(s) Oral daily  influenza   Vaccine 0.5 milliLiter(s) IntraMuscular once  insulin glargine Injectable (LANTUS) 14 Unit(s) SubCutaneous every morning  insulin lispro (HumaLOG) corrective regimen sliding scale   SubCutaneous Before meals and at bedtime  insulin lispro Injectable (HumaLOG) 8 Unit(s) SubCutaneous three times a day before meals  melatonin 5 milliGRAM(s) Oral at bedtime  multivitamin 1 Tablet(s) Oral daily  senna 1 Tablet(s) Oral <User Schedule>  simvastatin 10 milliGRAM(s) Oral at bedtime  tamsulosin 0.4 milliGRAM(s) Oral at bedtime    MEDICATIONS  (PRN):  acetaminophen   Tablet 650 milliGRAM(s) Oral every 6 hours PRN For Temp greater than 38 C (100.4 F)  dextrose Gel 1 Dose(s) Oral once PRN Blood Glucose LESS THAN 70 milliGRAM(s)/deciliter  glucagon  Injectable 1 milliGRAM(s) IntraMuscular once PRN Glucose LESS THAN 70 milligrams/deciliter      Allergies    No Known Allergies    Intolerances        Vital Signs Last 24 Hrs  T(C): 36.9 (16 Apr 2018 09:35), Max: 37.1 (15 Apr 2018 21:27)  T(F): 98.4 (16 Apr 2018 09:35), Max: 98.7 (15 Apr 2018 21:27)  HR: 70 (16 Apr 2018 09:35) (70 - 76)  BP: 148/73 (16 Apr 2018 09:35) (114/67 - 156/76)  BP(mean): --  RR: 16 (16 Apr 2018 09:35) (16 - 17)  SpO2: 98% (16 Apr 2018 09:35) (98% - 100%)    04-15 @ 07:01  -  04-16 @ 07:00  --------------------------------------------------------  IN: 0 mL / OUT: 900 mL / NET: -900 mL    04-16 @ 07:01  -  04-16 @ 14:39  --------------------------------------------------------  IN: 0 mL / OUT: 1450 mL / NET: -1450 mL          PHYSICAL EXAM:    General: Well developed; well nourished; in no acute distress  HEENT: MMM, conjunctiva and sclera clear  Gastrointestinal: Soft, non-tender + diastasis recti non-distended; Normal bowel sounds; No rebound or guarding  Extremities: Normal range of motion, No clubbing, cyanosis or edema  Neurological: Alert and oriented x3  Skin: Warm and dry. No obvious rash      LABS:                        7.8    5.8   )-----------( 141      ( 16 Apr 2018 06:10 )             24.0     04-16    137  |  100  |  35<H>  ----------------------------<  149<H>  3.7   |  21<L>  |  1.76<H>    Ca    8.8      16 Apr 2018 06:09  Phos  3.7     04-16  Mg     1.9     04-16            RADIOLOGY & ADDITIONAL STUDIES:

## 2018-04-16 NOTE — PROGRESS NOTE ADULT - SUBJECTIVE AND OBJECTIVE BOX
INTERVAL HPI/OVERNIGHT EVENTS: No diarrhea or constipation, still Rodriguez catheter in place    CONSTITUTIONAL:  Negative fever or chills, feels well, good appetite  EYES:  Negative  blurry vision or double vision  CARDIOVASCULAR:  Negative for chest pain or palpitations  RESPIRATORY:  Negative for cough, wheezing, or SOB   GASTROINTESTINAL:  Negative for nausea, vomiting, diarrhea, constipation, or abdominal pain  GENITOURINARY:  Negative frequency, urgency or dysuria  NEUROLOGIC:  No headache, confusion, dizziness, lightheadedness      ANTIBIOTICS/RELEVANT:    MEDICATIONS  (STANDING):  aspirin  chewable 81 milliGRAM(s) Oral daily  dextrose 5%. 1000 milliLiter(s) (50 mL/Hr) IV Continuous <Continuous>  dextrose 50% Injectable 12.5 Gram(s) IV Push once  dextrose 50% Injectable 25 Gram(s) IV Push once  dextrose 50% Injectable 25 Gram(s) IV Push once  docusate sodium 100 milliGRAM(s) Oral two times a day  epoetin kathi Injectable 65867 Unit(s) SubCutaneous every 48 hours  finasteride 5 milliGRAM(s) Oral daily  influenza   Vaccine 0.5 milliLiter(s) IntraMuscular once  insulin glargine Injectable (LANTUS) 14 Unit(s) SubCutaneous every morning  insulin lispro (HumaLOG) corrective regimen sliding scale   SubCutaneous Before meals and at bedtime  insulin lispro Injectable (HumaLOG) 8 Unit(s) SubCutaneous three times a day before meals  melatonin 5 milliGRAM(s) Oral at bedtime  multivitamin 1 Tablet(s) Oral daily  senna 1 Tablet(s) Oral <User Schedule>  simvastatin 10 milliGRAM(s) Oral at bedtime  tamsulosin 0.4 milliGRAM(s) Oral at bedtime    MEDICATIONS  (PRN):  acetaminophen   Tablet 650 milliGRAM(s) Oral every 6 hours PRN For Temp greater than 38 C (100.4 F)  dextrose Gel 1 Dose(s) Oral once PRN Blood Glucose LESS THAN 70 milliGRAM(s)/deciliter  glucagon  Injectable 1 milliGRAM(s) IntraMuscular once PRN Glucose LESS THAN 70 milligrams/deciliter        Vital Signs Last 24 Hrs  T(C): 36.9 (2018 09:35), Max: 37.1 (15 Apr 2018 21:27)  T(F): 98.4 (2018 09:35), Max: 98.7 (15 Apr 2018 21:27)  HR: 70 (2018 09:35) (70 - 76)  BP: 148/73 (2018 09:35) (114/67 - 156/76)  BP(mean): --  RR: 16 (2018 09:35) (16 - 17)  SpO2: 98% (2018 09:35) (98% - 100%)    04-15-18 @ 07:01  -  18 @ 07:00  --------------------------------------------------------  IN: 0 mL / OUT: 900 mL / NET: -900 mL    18 @ 07:01  -  18 @ 12:11  --------------------------------------------------------  IN: 0 mL / OUT: 550 mL / NET: -550 mL      PHYSICAL EXAM:  Constitutional:Well-developed, well nourished  Eyes:POLO, EOMI  Ear/Nose/Throat: no oral lesion, no sinus tenderness on percussion	  Neck:no JVD, no lymphadenopathy, supple  Respiratory: CTA chuck  Cardiovascular: S1S2 RRR, no murmurs  Gastrointestinal: soft, (+) BS, no HSM, Rodriguez in place  Extremities: no e/e/c  Vascular: DP Pulse:right normal; left normal      LABS:                        7.8    5.8   )-----------( 141      ( 2018 06:10 )             24.0     04-16    137  |  100  |  35<H>  ----------------------------<  149<H>  3.7   |  21<L>  |  1.76<H>    Ca    8.8      2018 06:09  Phos  3.7     04-16  Mg     1.9     04-16        Urinalysis Basic - ( 2018 12:38 )    Color: Yellow / Appearance: Clear / S.010 / pH: x  Gluc: x / Ketone: Trace mg/dL  / Bili: Negative / Urobili: 0.2 E.U./dL   Blood: x / Protein: 30 mg/dL / Nitrite: NEGATIVE   Leuk Esterase: Moderate / RBC: Many /HPF / WBC > 10 /HPF   Sq Epi: x / Non Sq Epi: 0-5 /HPF / Bacteria: Present /HPF        MICROBIOLOGY:  Culture - Blood (18 @ 11:37)    Specimen Source: .Blood One Set    Culture Results:   No growth at 4 days.    Culture - Urine (18 @ 18:18)    Specimen Source: .Urine Clean Catch (Midstream)    Culture Results:   No growth        RADIOLOGY & ADDITIONAL STUDIES:

## 2018-04-16 NOTE — PROGRESS NOTE ADULT - SUBJECTIVE AND OBJECTIVE BOX
INTERVAL/OVERNIGHT EVENTS:      SUBJECTIVE:  Seen and examined at bedside.    ROS otherwise negative.    VITAL SIGNS:  T(F): 98.1 (18 @ 05:05)  HR: 74 (18 @ 05:05)  BP: 137/77 (18 @ 05:05)  RR: 16 (18 @ 05:05)  SpO2: 100% (18 @ 05:05)  Wt(kg): --    PHYSICAL EXAM:        MEDICATIONS  (STANDING):  aspirin  chewable 81 milliGRAM(s) Oral daily  cefTRIAXone Injectable. 2000 milliGRAM(s) IV Push once  dextrose 5%. 1000 milliLiter(s) (50 mL/Hr) IV Continuous <Continuous>  dextrose 50% Injectable 12.5 Gram(s) IV Push once  dextrose 50% Injectable 25 Gram(s) IV Push once  dextrose 50% Injectable 25 Gram(s) IV Push once  docusate sodium 100 milliGRAM(s) Oral two times a day  epoetin kathi Injectable 57048 Unit(s) SubCutaneous every 48 hours  finasteride 5 milliGRAM(s) Oral daily  influenza   Vaccine 0.5 milliLiter(s) IntraMuscular once  insulin glargine Injectable (LANTUS) 14 Unit(s) SubCutaneous every morning  insulin lispro (HumaLOG) corrective regimen sliding scale   SubCutaneous Before meals and at bedtime  insulin lispro Injectable (HumaLOG) 8 Unit(s) SubCutaneous three times a day before meals  melatonin 5 milliGRAM(s) Oral at bedtime  multivitamin 1 Tablet(s) Oral daily  senna 1 Tablet(s) Oral <User Schedule>  simvastatin 10 milliGRAM(s) Oral at bedtime  tamsulosin 0.4 milliGRAM(s) Oral at bedtime    MEDICATIONS  (PRN):  acetaminophen   Tablet 650 milliGRAM(s) Oral every 6 hours PRN For Temp greater than 38 C (100.4 F)  dextrose Gel 1 Dose(s) Oral once PRN Blood Glucose LESS THAN 70 milliGRAM(s)/deciliter  glucagon  Injectable 1 milliGRAM(s) IntraMuscular once PRN Glucose LESS THAN 70 milligrams/deciliter      Allergies    No Known Allergies    Intolerances      acetaminophen   Tablet 650 milliGRAM(s) Oral every 6 hours PRN  aspirin  chewable 81 milliGRAM(s) Oral daily  cefTRIAXone Injectable. 2000 milliGRAM(s) IV Push once  dextrose 5%. 1000 milliLiter(s) IV Continuous <Continuous>  dextrose 50% Injectable 12.5 Gram(s) IV Push once  dextrose 50% Injectable 25 Gram(s) IV Push once  dextrose 50% Injectable 25 Gram(s) IV Push once  dextrose Gel 1 Dose(s) Oral once PRN  docusate sodium 100 milliGRAM(s) Oral two times a day  epoetin kathi Injectable 03058 Unit(s) SubCutaneous every 48 hours  finasteride 5 milliGRAM(s) Oral daily  glucagon  Injectable 1 milliGRAM(s) IntraMuscular once PRN  influenza   Vaccine 0.5 milliLiter(s) IntraMuscular once  insulin glargine Injectable (LANTUS) 14 Unit(s) SubCutaneous every morning  insulin lispro (HumaLOG) corrective regimen sliding scale   SubCutaneous Before meals and at bedtime  insulin lispro Injectable (HumaLOG) 8 Unit(s) SubCutaneous three times a day before meals  melatonin 5 milliGRAM(s) Oral at bedtime  multivitamin 1 Tablet(s) Oral daily  senna 1 Tablet(s) Oral <User Schedule>  simvastatin 10 milliGRAM(s) Oral at bedtime  tamsulosin 0.4 milliGRAM(s) Oral at bedtime      LABS:                          7.8    5.8   )-----------( 141      ( 2018 06:10 )             24.0     04-16    137  |  100  |  35<H>  ----------------------------<  149<H>  3.7   |  21<L>  |  1.76<H>    Ca    8.8      2018 06:09  Phos  3.7     04-16  Mg     1.9     04-16        Urinalysis Basic - ( 2018 12:38 )    Color: Yellow / Appearance: Clear / S.010 / pH: x  Gluc: x / Ketone: Trace mg/dL  / Bili: Negative / Urobili: 0.2 E.U./dL   Blood: x / Protein: 30 mg/dL / Nitrite: NEGATIVE   Leuk Esterase: Moderate / RBC: Many /HPF / WBC > 10 /HPF   Sq Epi: x / Non Sq Epi: 0-5 /HPF / Bacteria: Present /HPF        RADIOLOGY & ADDITIONAL TESTS:  All imaging reviewed. INTERVAL/OVERNIGHT EVENTS:  2-3 BMs after receiving dulcolax suppository.      SUBJECTIVE:  Seen and examined at bedside. 2-3 BMs overnight after suppository.  Reports no F/C, CP, SOB, cough, abd pain, N/V/C.  Persistent chronic LBP.  No leg pain.    ROS otherwise negative.    VITAL SIGNS:  T(F): 98.1 (18 @ 05:05)  HR: 74 (18 @ 05:05)  BP: 137/77 (18 @ 05:05)  RR: 16 (18 @ 05:05)  SpO2: 100% (18 @ 05:05)  Wt(kg): --    PHYSICAL EXAM:    General:  NAD, nontoxic appearing, WDWN  HENT:  EOMI, R pupil reactive / deformed 2/2 cataract sx.  L pupil pinpoint, unclear if reactive.  No sinus tenderness.  oropharynx WNL.  MMM  Neck:  Trachea midline.  No JVD, LAD, or thyromegaly.  Heart:  S1S2 no M/R/G, regular  Lungs:  CTAB no wheezing, rhonchi or rales.  No accessory muscle use.  No respiratory distress.  Abdomen:  NABS.  soft, nontender, nondistended.  no guarding.  no ascites.  no organomegaly.  Vascular:  Peripheral pulses palpable  Extremities:  Trace pitting edema BLE.  Calves nontender.    Back:  Mild bilateral low back TTP.  No CVA tenderness  Neuro:  AOx3, no facial asymmetry, nonfocal, no slurred speech  Skin:  No rash  (+) palacio with yellow urine    MEDICATIONS  (STANDING):  aspirin  chewable 81 milliGRAM(s) Oral daily  cefTRIAXone Injectable. 2000 milliGRAM(s) IV Push once  dextrose 5%. 1000 milliLiter(s) (50 mL/Hr) IV Continuous <Continuous>  dextrose 50% Injectable 12.5 Gram(s) IV Push once  dextrose 50% Injectable 25 Gram(s) IV Push once  dextrose 50% Injectable 25 Gram(s) IV Push once  docusate sodium 100 milliGRAM(s) Oral two times a day  epoetin kathi Injectable 37219 Unit(s) SubCutaneous every 48 hours  finasteride 5 milliGRAM(s) Oral daily  influenza   Vaccine 0.5 milliLiter(s) IntraMuscular once  insulin glargine Injectable (LANTUS) 14 Unit(s) SubCutaneous every morning  insulin lispro (HumaLOG) corrective regimen sliding scale   SubCutaneous Before meals and at bedtime  insulin lispro Injectable (HumaLOG) 8 Unit(s) SubCutaneous three times a day before meals  melatonin 5 milliGRAM(s) Oral at bedtime  multivitamin 1 Tablet(s) Oral daily  senna 1 Tablet(s) Oral <User Schedule>  simvastatin 10 milliGRAM(s) Oral at bedtime  tamsulosin 0.4 milliGRAM(s) Oral at bedtime    MEDICATIONS  (PRN):  acetaminophen   Tablet 650 milliGRAM(s) Oral every 6 hours PRN For Temp greater than 38 C (100.4 F)  dextrose Gel 1 Dose(s) Oral once PRN Blood Glucose LESS THAN 70 milliGRAM(s)/deciliter  glucagon  Injectable 1 milliGRAM(s) IntraMuscular once PRN Glucose LESS THAN 70 milligrams/deciliter      Allergies    No Known Allergies    Intolerances      acetaminophen   Tablet 650 milliGRAM(s) Oral every 6 hours PRN  aspirin  chewable 81 milliGRAM(s) Oral daily  cefTRIAXone Injectable. 2000 milliGRAM(s) IV Push once  dextrose 5%. 1000 milliLiter(s) IV Continuous <Continuous>  dextrose 50% Injectable 12.5 Gram(s) IV Push once  dextrose 50% Injectable 25 Gram(s) IV Push once  dextrose 50% Injectable 25 Gram(s) IV Push once  dextrose Gel 1 Dose(s) Oral once PRN  docusate sodium 100 milliGRAM(s) Oral two times a day  epoetin kathi Injectable 75289 Unit(s) SubCutaneous every 48 hours  finasteride 5 milliGRAM(s) Oral daily  glucagon  Injectable 1 milliGRAM(s) IntraMuscular once PRN  influenza   Vaccine 0.5 milliLiter(s) IntraMuscular once  insulin glargine Injectable (LANTUS) 14 Unit(s) SubCutaneous every morning  insulin lispro (HumaLOG) corrective regimen sliding scale   SubCutaneous Before meals and at bedtime  insulin lispro Injectable (HumaLOG) 8 Unit(s) SubCutaneous three times a day before meals  melatonin 5 milliGRAM(s) Oral at bedtime  multivitamin 1 Tablet(s) Oral daily  senna 1 Tablet(s) Oral <User Schedule>  simvastatin 10 milliGRAM(s) Oral at bedtime  tamsulosin 0.4 milliGRAM(s) Oral at bedtime      LABS:                          7.8    5.8   )-----------( 141      ( 2018 06:10 )             24.0     04-16    137  |  100  |  35<H>  ----------------------------<  149<H>  3.7   |  21<L>  |  1.76<H>    Ca    8.8      2018 06:09  Phos  3.7     04-16  Mg     1.9     04-16        Urinalysis Basic - ( 2018 12:38 )    Color: Yellow / Appearance: Clear / S.010 / pH: x  Gluc: x / Ketone: Trace mg/dL  / Bili: Negative / Urobili: 0.2 E.U./dL   Blood: x / Protein: 30 mg/dL / Nitrite: NEGATIVE   Leuk Esterase: Moderate / RBC: Many /HPF / WBC > 10 /HPF   Sq Epi: x / Non Sq Epi: 0-5 /HPF / Bacteria: Present /HPF        RADIOLOGY & ADDITIONAL TESTS:  All imaging reviewed.

## 2018-04-17 LAB
ANION GAP SERPL CALC-SCNC: 13 MMOL/L — SIGNIFICANT CHANGE UP (ref 5–17)
BUN SERPL-MCNC: 33 MG/DL — HIGH (ref 7–23)
CALCIUM SERPL-MCNC: 9.1 MG/DL — SIGNIFICANT CHANGE UP (ref 8.4–10.5)
CHLORIDE SERPL-SCNC: 101 MMOL/L — SIGNIFICANT CHANGE UP (ref 96–108)
CO2 SERPL-SCNC: 24 MMOL/L — SIGNIFICANT CHANGE UP (ref 22–31)
CREAT SERPL-MCNC: 1.73 MG/DL — HIGH (ref 0.5–1.3)
CULTURE RESULTS: SIGNIFICANT CHANGE UP
GLUCOSE BLDC GLUCOMTR-MCNC: 113 MG/DL — HIGH (ref 70–99)
GLUCOSE BLDC GLUCOMTR-MCNC: 169 MG/DL — HIGH (ref 70–99)
GLUCOSE BLDC GLUCOMTR-MCNC: 182 MG/DL — HIGH (ref 70–99)
GLUCOSE BLDC GLUCOMTR-MCNC: 217 MG/DL — HIGH (ref 70–99)
GLUCOSE SERPL-MCNC: 156 MG/DL — HIGH (ref 70–99)
HCT VFR BLD CALC: 25.4 % — LOW (ref 39–50)
HGB BLD-MCNC: 8.2 G/DL — LOW (ref 13–17)
MAGNESIUM SERPL-MCNC: 1.8 MG/DL — SIGNIFICANT CHANGE UP (ref 1.6–2.6)
MCHC RBC-ENTMCNC: 27.6 PG — SIGNIFICANT CHANGE UP (ref 27–34)
MCHC RBC-ENTMCNC: 32.3 G/DL — SIGNIFICANT CHANGE UP (ref 32–36)
MCV RBC AUTO: 85.5 FL — SIGNIFICANT CHANGE UP (ref 80–100)
PHOSPHATE SERPL-MCNC: 4.2 MG/DL — SIGNIFICANT CHANGE UP (ref 2.5–4.5)
PLATELET # BLD AUTO: 143 K/UL — LOW (ref 150–400)
POTASSIUM SERPL-MCNC: 4 MMOL/L — SIGNIFICANT CHANGE UP (ref 3.5–5.3)
POTASSIUM SERPL-SCNC: 4 MMOL/L — SIGNIFICANT CHANGE UP (ref 3.5–5.3)
RBC # BLD: 2.97 M/UL — LOW (ref 4.2–5.8)
RBC # FLD: 13.6 % — SIGNIFICANT CHANGE UP (ref 10.3–16.9)
SODIUM SERPL-SCNC: 138 MMOL/L — SIGNIFICANT CHANGE UP (ref 135–145)
SPECIMEN SOURCE: SIGNIFICANT CHANGE UP
WBC # BLD: 6.4 K/UL — SIGNIFICANT CHANGE UP (ref 3.8–10.5)
WBC # FLD AUTO: 6.4 K/UL — SIGNIFICANT CHANGE UP (ref 3.8–10.5)

## 2018-04-17 RX ORDER — MAGNESIUM SULFATE 500 MG/ML
1 VIAL (ML) INJECTION ONCE
Qty: 0 | Refills: 0 | Status: COMPLETED | OUTPATIENT
Start: 2018-04-17 | End: 2018-04-17

## 2018-04-17 RX ORDER — SOD SULF/SODIUM/NAHCO3/KCL/PEG
4000 SOLUTION, RECONSTITUTED, ORAL ORAL ONCE
Qty: 0 | Refills: 0 | Status: COMPLETED | OUTPATIENT
Start: 2018-04-17 | End: 2018-04-17

## 2018-04-17 RX ADMIN — Medication 4: at 22:27

## 2018-04-17 RX ADMIN — Medication 8 UNIT(S): at 13:04

## 2018-04-17 RX ADMIN — Medication 2: at 13:04

## 2018-04-17 RX ADMIN — CEFTRIAXONE 2000 MILLIGRAM(S): 500 INJECTION, POWDER, FOR SOLUTION INTRAMUSCULAR; INTRAVENOUS at 23:17

## 2018-04-17 RX ADMIN — Medication 100 MILLIGRAM(S): at 06:35

## 2018-04-17 RX ADMIN — Medication 8 UNIT(S): at 08:51

## 2018-04-17 RX ADMIN — TAMSULOSIN HYDROCHLORIDE 0.4 MILLIGRAM(S): 0.4 CAPSULE ORAL at 22:27

## 2018-04-17 RX ADMIN — Medication 2: at 08:49

## 2018-04-17 RX ADMIN — Medication 100 GRAM(S): at 08:50

## 2018-04-17 RX ADMIN — Medication 8 UNIT(S): at 17:35

## 2018-04-17 RX ADMIN — SENNA PLUS 1 TABLET(S): 8.6 TABLET ORAL at 06:35

## 2018-04-17 RX ADMIN — Medication 81 MILLIGRAM(S): at 12:20

## 2018-04-17 RX ADMIN — SIMVASTATIN 10 MILLIGRAM(S): 20 TABLET, FILM COATED ORAL at 22:27

## 2018-04-17 RX ADMIN — Medication 1 TABLET(S): at 12:20

## 2018-04-17 RX ADMIN — INSULIN GLARGINE 16 UNIT(S): 100 INJECTION, SOLUTION SUBCUTANEOUS at 08:50

## 2018-04-17 RX ADMIN — FINASTERIDE 5 MILLIGRAM(S): 5 TABLET, FILM COATED ORAL at 22:27

## 2018-04-17 RX ADMIN — Medication 4000 MILLILITER(S): at 17:18

## 2018-04-17 NOTE — PROGRESS NOTE ADULT - PROBLEM SELECTOR PLAN 3
Resolved, creatinine below baseline.  1.73, baseline 2.2.  Creatinine on arrival 3.55, improved with IVF.  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  -urology following.  started flomax and proscar.  c/w indwelling palacio.  -renal U/S with moderate b/l hydro, persistent on repeat  -hold lasix

## 2018-04-17 NOTE — PROGRESS NOTE ADULT - SUBJECTIVE AND OBJECTIVE BOX
Pt seen and examined No GI complaints    REVIEW OF SYSTEMS:  Constitutional: No fever, weight loss or fatigue  Cardiovascular: No chest pain, palpitations, dizziness or leg swelling  Gastrointestinal: No abdominal or epigastric pain. No nausea, vomiting or hematemesis; No diarrhea or constipation. No melena or hematochezia.  Skin: No itching, burning, rashes or lesions       MEDICATIONS:  MEDICATIONS  (STANDING):  aspirin  chewable 81 milliGRAM(s) Oral daily  cefTRIAXone Injectable. 2000 milliGRAM(s) IV Push every 24 hours  dextrose 5%. 1000 milliLiter(s) (50 mL/Hr) IV Continuous <Continuous>  dextrose 50% Injectable 12.5 Gram(s) IV Push once  dextrose 50% Injectable 25 Gram(s) IV Push once  dextrose 50% Injectable 25 Gram(s) IV Push once  docusate sodium 100 milliGRAM(s) Oral two times a day  epoetin kathi Injectable 88373 Unit(s) SubCutaneous every 48 hours  finasteride 5 milliGRAM(s) Oral daily  influenza   Vaccine 0.5 milliLiter(s) IntraMuscular once  insulin glargine Injectable (LANTUS) 16 Unit(s) SubCutaneous every morning  insulin lispro (HumaLOG) corrective regimen sliding scale   SubCutaneous Before meals and at bedtime  insulin lispro Injectable (HumaLOG) 8 Unit(s) SubCutaneous three times a day before meals  melatonin 5 milliGRAM(s) Oral at bedtime  multivitamin 1 Tablet(s) Oral daily  senna 1 Tablet(s) Oral <User Schedule>  simvastatin 10 milliGRAM(s) Oral at bedtime  tamsulosin 0.4 milliGRAM(s) Oral at bedtime    MEDICATIONS  (PRN):  acetaminophen   Tablet 650 milliGRAM(s) Oral every 6 hours PRN For Temp greater than 38 C (100.4 F)  dextrose Gel 1 Dose(s) Oral once PRN Blood Glucose LESS THAN 70 milliGRAM(s)/deciliter  glucagon  Injectable 1 milliGRAM(s) IntraMuscular once PRN Glucose LESS THAN 70 milligrams/deciliter      Allergies    No Known Allergies    Intolerances        Vital Signs Last 24 Hrs  T(C): 36.5 (2018 08:32), Max: 36.8 (2018 16:44)  T(F): 97.7 (2018 08:32), Max: 98.2 (2018 16:44)  HR: 72 (2018 08:32) (72 - 80)  BP: 148/76 (2018 08:32) (106/63 - 148/76)  BP(mean): --  RR: 16 (2018 08:32) (16 - 17)  SpO2: 99% (2018 08:32) (95% - 99%)    16 @ 07:01  -  - @ 07:00  --------------------------------------------------------  IN: 0 mL / OUT: 3450 mL / NET: -3450 mL        PHYSICAL EXAM:    General: Well developed; well nourished; in no acute distress  HEENT: MMM, conjunctiva and sclera clear  Gastrointestinal: Soft non-tender non-distended; Normal bowel sounds; No hepatosplenomegaly  Skin: Warm and dry. No obvious rash    LABS:      CBC Full  -  ( 2018 07:15 )  WBC Count : 6.4 K/uL  Hemoglobin : 8.2 g/dL  Hematocrit : 25.4 %  Platelet Count - Automated : 143 K/uL  Mean Cell Volume : 85.5 fL  Mean Cell Hemoglobin : 27.6 pg  Mean Cell Hemoglobin Concentration : 32.3 g/dL  Auto Neutrophil # : x  Auto Lymphocyte # : x  Auto Monocyte # : x  Auto Eosinophil # : x  Auto Basophil # : x  Auto Neutrophil % : x  Auto Lymphocyte % : x  Auto Monocyte % : x  Auto Eosinophil % : x  Auto Basophil % : x        138  |  101  |  33<H>  ----------------------------<  156<H>  4.0   |  24  |  1.73<H>    Ca    9.1      2018 07:15  Phos  4.2     -17  Mg     1.8     -17            Urinalysis Basic - ( 2018 16:04 )    Color: Yellow / Appearance: Clear / S.015 / pH: x  Gluc: x / Ketone: NEGATIVE  / Bili: Negative / Urobili: 0.2 E.U./dL   Blood: x / Protein: 30 mg/dL / Nitrite: NEGATIVE   Leuk Esterase: NEGATIVE / RBC: Many /HPF / WBC 5-10 /HPF   Sq Epi: x / Non Sq Epi: 0-5 /HPF / Bacteria: Present /HPF                RADIOLOGY & ADDITIONAL STUDIES (The following images were personally reviewed):

## 2018-04-17 NOTE — CHART NOTE - NSCHARTNOTEFT_GEN_A_CORE
INTERN SIGNING OFF.    71yo M with PMH of HTN, HLD, DM2, prostatomegaly, CKD3, h/o SVT s/p ablation (2011) presented with progressive weakness for one month a/w abdominal distension.  He has a history of urinary retention and was referred to urology but he had not followed up.  He reported dysuria and frequency.  In the ED, VSS. Labs with anemia Hb 9.1/27, thrombocytopenia (137). mild hyponatremia, hypokalemia, anion gap metabolic acidosis with elevated beta-hydroxybutyrate, lactate 1.1, UA with pyuria, leukocyte esterase with bacteria. Pt was given insulin 4u once and 2l NS bolus. A CT abd/pelvis was performed showing severe bladder distension, bilateral hydronephrosis, and prostatomegaly.  It also showed colitis.  He was started on Rocephin.  Palacio was placed draining 2L urine over 1 hour.  He only had one low grade temp of 100.4.  No leukocytosis.  His creatinine initially was ~3.5 but this improved with palacio placement and IVF.  He was monitored closely for post obstructive diuresis.  He was started on flomax and proscar.  His AGMA improved.  Endocrine was consulted for hyperglycemia and he was maintained on Lantus qam + mealtime humalog TID.  Urology was consulted recommending outpatient follow with discharge with indwelling palacio.  PSA elevated to 9, but trended down, and was most likely in the setting of palacio manipulation.  Blood cx at admission and urine cx grew e coli sensitive to cipro.  Surveillance blood cx negative.  He will be maintained on rocephin until discharge, at which time he will leave on Cipro.  GI was consulted for colitis.  Cause unclear, however patient will undergo colonoscopy on 4/18.      Discharge note started 4/11. INTERN SIGNING OFF.    71yo M with PMH of HTN, HLD, DM2, prostatomegaly, CKD3, h/o SVT s/p ablation (2011) presented with progressive weakness for one month a/w abdominal distension.  He has a history of urinary retention and was referred to urology but he had not followed up.  He reported dysuria and frequency.  In the ED, VSS. Labs with anemia Hb 9.1/27, thrombocytopenia (137). mild hyponatremia, hypokalemia, anion gap metabolic acidosis with elevated beta-hydroxybutyrate, lactate 1.1, UA with pyuria, leukocyte esterase with bacteria. Pt was given insulin 4u once and 2l NS bolus. A CT abd/pelvis was performed showing severe bladder distension, bilateral hydronephrosis, and prostatomegaly.  It also showed colitis.  He was started on Rocephin.  Palacio was placed draining 2L urine over 1 hour.  He only had one low grade temp of 100.4.  No leukocytosis.  His creatinine initially was ~3.5 but this improved with palacio placement and IVF.  He was monitored closely for post obstructive diuresis.  He was started on flomax and proscar.  His AGMA improved.  Endocrine was consulted for hyperglycemia and he was maintained on Lantus qam + mealtime humalog TID.  Urology was consulted recommending outpatient follow with discharge with indwelling palacio.  PSA elevated to 9, but trended down, and was most likely in the setting of palacio manipulation.  Blood cx at admission and urine cx grew e coli sensitive to cipro.  Surveillance blood cx negative.  He will be maintained on rocephin until discharge, at which time he will leave on Cipro.  GI was consulted for colitis.  Cause unclear, however patient will undergo colonoscopy on 4/18.  He underwent TOV starting 4/17.    Discharge note started 4/11.

## 2018-04-17 NOTE — PROGRESS NOTE ADULT - SUBJECTIVE AND OBJECTIVE BOX
INTERVAL HPI/OVERNIGHT EVENTS: No fever or chills no pain    CONSTITUTIONAL:  Negative fever or chills, feels well, good appetite  EYES:  Legally bind  CARDIOVASCULAR:  Negative for chest pain or palpitations  RESPIRATORY:  Negative for cough, wheezing, or SOB   GASTROINTESTINAL:  Negative for nausea, vomiting, diarrhea, constipation, or abdominal pain  GENITOURINARY:  Negative frequency, urgency or dysuria  NEUROLOGIC:  No headache, confusion, dizziness, lightheadedness      ANTIBIOTICS/RELEVANT:    MEDICATIONS  (STANDING):  aspirin  chewable 81 milliGRAM(s) Oral daily  cefTRIAXone Injectable. 2000 milliGRAM(s) IV Push every 24 hours  dextrose 5%. 1000 milliLiter(s) (50 mL/Hr) IV Continuous <Continuous>  dextrose 50% Injectable 12.5 Gram(s) IV Push once  dextrose 50% Injectable 25 Gram(s) IV Push once  dextrose 50% Injectable 25 Gram(s) IV Push once  docusate sodium 100 milliGRAM(s) Oral two times a day  epoetin kathi Injectable 00374 Unit(s) SubCutaneous every 48 hours  finasteride 5 milliGRAM(s) Oral daily  influenza   Vaccine 0.5 milliLiter(s) IntraMuscular once  insulin glargine Injectable (LANTUS) 16 Unit(s) SubCutaneous every morning  insulin lispro (HumaLOG) corrective regimen sliding scale   SubCutaneous Before meals and at bedtime  insulin lispro Injectable (HumaLOG) 8 Unit(s) SubCutaneous three times a day before meals  melatonin 5 milliGRAM(s) Oral at bedtime  multivitamin 1 Tablet(s) Oral daily  senna 1 Tablet(s) Oral <User Schedule>  simvastatin 10 milliGRAM(s) Oral at bedtime  tamsulosin 0.4 milliGRAM(s) Oral at bedtime    MEDICATIONS  (PRN):  acetaminophen   Tablet 650 milliGRAM(s) Oral every 6 hours PRN For Temp greater than 38 C (100.4 F)  dextrose Gel 1 Dose(s) Oral once PRN Blood Glucose LESS THAN 70 milliGRAM(s)/deciliter  glucagon  Injectable 1 milliGRAM(s) IntraMuscular once PRN Glucose LESS THAN 70 milligrams/deciliter        Vital Signs Last 24 Hrs  T(C): 36.6 (2018 15:55), Max: 36.6 (2018 05:43)  T(F): 97.8 (2018 15:55), Max: 97.8 (2018 05:43)  HR: 71 (2018 15:55) (71 - 76)  BP: 138/75 (2018 15:55) (106/63 - 148/76)  BP(mean): --  RR: 15 (2018 15:55) (15 - 17)  SpO2: 99% (2018 15:55) (95% - 99%)    18 @ 07:01  -  18 @ 07:00  --------------------------------------------------------  IN: 0 mL / OUT: 3450 mL / NET: -3450 mL    18 @ 07:01  -  18 @ 17:43  --------------------------------------------------------  IN: 0 mL / OUT: 900 mL / NET: -900 mL      PHYSICAL EXAM:  Constitutional: Well-developed, well nourished  Eyes: POLO, EOMI, legally blind  Ear/Nose/Throat: no oral lesion, no sinus tenderness on percussion	  Neck:no JVD, no lymphadenopathy, supple  Respiratory: CTA chuck  Cardiovascular: S1S2 RRR, no murmurs  Gastrointestinal: soft, (+) BS, no HSM, Rodriguez in plece  Extremities: no e/e/c  Vascular: DP Pulse: right normal; left normal      LABS:                        8.2    6.4   )-----------( 143      ( 2018 07:15 )             25.4     04-17    138  |  101  |  33<H>  ----------------------------<  156<H>  4.0   |  24  |  1.73<H>    Ca    9.1      2018 07:15  Phos  4.2     -  Mg     1.8     17    Prostate Ca Screen, PSA Total (04.16.18 @ 15:17)    Prostate Ca Screen, PSA Total: 6.52: Method: Roche Chemiluminescence  Values obtained with different assay methods or kits cannot be  used interchangeably.  Results cannot be interpreted as absolute evidence of the presence  or absence of malignant disease ng/mL        Urinalysis Basic - ( 2018 16:04 )    Color: Yellow / Appearance: Clear / S.015 / pH: x  Gluc: x / Ketone: NEGATIVE  / Bili: Negative / Urobili: 0.2 E.U./dL   Blood: x / Protein: 30 mg/dL / Nitrite: NEGATIVE   Leuk Esterase: NEGATIVE / RBC: Many /HPF / WBC 5-10 /HPF   Sq Epi: x / Non Sq Epi: 0-5 /HPF / Bacteria: Present /HPF        MICROBIOLOGY:  Culture - Blood (18 @ 11:37)    Specimen Source: .Blood One Set    Culture Results:   No growth at 5 days.    Culture - Urine (18 @ 18:18)    Specimen Source: .Urine Clean Catch (Midstream)    Culture Results:   No growth        RADIOLOGY & ADDITIONAL STUDIES:  < from: US Retroperitoneal Complete (18 @ 16:08) >    INTERPRETATION:  RENAL and PELVIC ULTRASOUND dated 2018 4:08 PM    Indication: 72-year-old male with chronic urinary retention status post   Rodriguez, assess for persistent hydronephrosis.    Prior studies: Retroperitoneal ultrasound 2018    Findings:    RIGHT KIDNEY:  Length: 11.1 cm  Lesions: Upper pole simple cyst measuring 3.1 cm.  Hydronephrosis: Moderate, unchanged  Stones: None  Echogenicity: Normal    LEFT KIDNEY:  Length: 10.6 cm  Lesions: None  Hydronephrosis: Moderate, unchanged  Stones: None  Echogenicity: Normal    URINARY BLADDER:  There is a Rodriguez catheter within a decompressed urinary bladder   precluding adequate bladder evaluation. The urinary bladder wall is   thickened. INTERVAL HPI/OVERNIGHT EVENTS: No fever or chills no pain, Rodriguez removed    CONSTITUTIONAL:  Negative fever or chills, feels well, good appetite  EYES:  Legally bind  CARDIOVASCULAR:  Negative for chest pain or palpitations  RESPIRATORY:  Negative for cough, wheezing, or SOB   GASTROINTESTINAL:  Negative for nausea, vomiting, diarrhea, constipation, or abdominal pain  GENITOURINARY:  Negative frequency, urgency or dysuria  NEUROLOGIC:  No headache, confusion, dizziness, lightheadedness      ANTIBIOTICS/RELEVANT:    MEDICATIONS  (STANDING):  aspirin  chewable 81 milliGRAM(s) Oral daily  cefTRIAXone Injectable. 2000 milliGRAM(s) IV Push every 24 hours  dextrose 5%. 1000 milliLiter(s) (50 mL/Hr) IV Continuous <Continuous>  dextrose 50% Injectable 12.5 Gram(s) IV Push once  dextrose 50% Injectable 25 Gram(s) IV Push once  dextrose 50% Injectable 25 Gram(s) IV Push once  docusate sodium 100 milliGRAM(s) Oral two times a day  epoetin kathi Injectable 92877 Unit(s) SubCutaneous every 48 hours  finasteride 5 milliGRAM(s) Oral daily  influenza   Vaccine 0.5 milliLiter(s) IntraMuscular once  insulin glargine Injectable (LANTUS) 16 Unit(s) SubCutaneous every morning  insulin lispro (HumaLOG) corrective regimen sliding scale   SubCutaneous Before meals and at bedtime  insulin lispro Injectable (HumaLOG) 8 Unit(s) SubCutaneous three times a day before meals  melatonin 5 milliGRAM(s) Oral at bedtime  multivitamin 1 Tablet(s) Oral daily  senna 1 Tablet(s) Oral <User Schedule>  simvastatin 10 milliGRAM(s) Oral at bedtime  tamsulosin 0.4 milliGRAM(s) Oral at bedtime    MEDICATIONS  (PRN):  acetaminophen   Tablet 650 milliGRAM(s) Oral every 6 hours PRN For Temp greater than 38 C (100.4 F)  dextrose Gel 1 Dose(s) Oral once PRN Blood Glucose LESS THAN 70 milliGRAM(s)/deciliter  glucagon  Injectable 1 milliGRAM(s) IntraMuscular once PRN Glucose LESS THAN 70 milligrams/deciliter        Vital Signs Last 24 Hrs  T(C): 36.6 (2018 15:55), Max: 36.6 (2018 05:43)  T(F): 97.8 (2018 15:55), Max: 97.8 (2018 05:43)  HR: 71 (2018 15:55) (71 - 76)  BP: 138/75 (2018 15:55) (106/63 - 148/76)  BP(mean): --  RR: 15 (2018 15:55) (15 - 17)  SpO2: 99% (2018 15:55) (95% - 99%)    18 @ 07:01  -  18 @ 07:00  --------------------------------------------------------  IN: 0 mL / OUT: 3450 mL / NET: -3450 mL    18 @ 07:01  -  18 @ 17:43  --------------------------------------------------------  IN: 0 mL / OUT: 900 mL / NET: -900 mL      PHYSICAL EXAM:  Constitutional: Well-developed, well nourished  Eyes: POLO, EOMI, legally blind  Ear/Nose/Throat: no oral lesion, no sinus tenderness on percussion	  Neck:no JVD, no lymphadenopathy, supple  Respiratory: CTA chuck  Cardiovascular: S1S2 RRR, no murmurs  Gastrointestinal: soft, (+) BS, no HSM  Extremities: no e/e/c  Vascular: DP Pulse: right normal; left normal      LABS:                        8.2    6.4   )-----------( 143      ( 2018 07:15 )             25.4     04-17    138  |  101  |  33<H>  ----------------------------<  156<H>  4.0   |  24  |  1.73<H>    Ca    9.1      2018 07:15  Phos  4.2     -17  Mg     1.8     17    Prostate Ca Screen, PSA Total (04.16.18 @ 15:17)    Prostate Ca Screen, PSA Total: 6.52: Method: Roche Chemiluminescence  Values obtained with different assay methods or kits cannot be  used interchangeably.  Results cannot be interpreted as absolute evidence of the presence  or absence of malignant disease ng/mL        Urinalysis Basic - ( 2018 16:04 )    Color: Yellow / Appearance: Clear / S.015 / pH: x  Gluc: x / Ketone: NEGATIVE  / Bili: Negative / Urobili: 0.2 E.U./dL   Blood: x / Protein: 30 mg/dL / Nitrite: NEGATIVE   Leuk Esterase: NEGATIVE / RBC: Many /HPF / WBC 5-10 /HPF   Sq Epi: x / Non Sq Epi: 0-5 /HPF / Bacteria: Present /HPF        MICROBIOLOGY:  Culture - Blood (18 @ 11:37)    Specimen Source: .Blood One Set    Culture Results:   No growth at 5 days.    Culture - Urine (18 @ 18:18)    Specimen Source: .Urine Clean Catch (Midstream)    Culture Results:   No growth        RADIOLOGY & ADDITIONAL STUDIES:  < from: US Retroperitoneal Complete (18 @ 16:08) >    INTERPRETATION:  RENAL and PELVIC ULTRASOUND dated 2018 4:08 PM    Indication: 72-year-old male with chronic urinary retention status post   Rodriguez, assess for persistent hydronephrosis.    Prior studies: Retroperitoneal ultrasound 2018    Findings:    RIGHT KIDNEY:  Length: 11.1 cm  Lesions: Upper pole simple cyst measuring 3.1 cm.  Hydronephrosis: Moderate, unchanged  Stones: None  Echogenicity: Normal    LEFT KIDNEY:  Length: 10.6 cm  Lesions: None  Hydronephrosis: Moderate, unchanged  Stones: None  Echogenicity: Normal    URINARY BLADDER:  There is a Rodriguez catheter within a decompressed urinary bladder   precluding adequate bladder evaluation. The urinary bladder wall is   thickened.

## 2018-04-17 NOTE — PROGRESS NOTE ADULT - PROBLEM SELECTOR PLAN 2
-Positive blood cx on admission.  C/w Rocephin for now.  Sensitive to cipro.  Treatment of UTI as below.  increased to rocephin 2g daily.  continue rocephin until discharge, then cipro.  -Afebrile.  Nontoxic appearing.  Normotensive.  Lactate <2 on admission.  -f/u surveillance cx NGTD

## 2018-04-17 NOTE — PROGRESS NOTE ADULT - PROBLEM SELECTOR PLAN 1
1) Continue  Ceftriaxone 2 gr q24h; may switch to oral Ciprofloxacin prior to discharge, to continue 3-4 weeks of Ciprofloxacin(better oral absorption and penetration into Prostate then oral cephalosporins)  2) Diarrhea resolved, c/o constipation ( ? pancolitis on CT Abdomen)  3) Urology consult-Dr Tipton, possible Bx in future  4) Continue Flomax and Proscar  5) New PSA improved fro 9.5 to 6.5 1) Continue  Ceftriaxone 2 gr q24h; may switch to oral Ciprofloxacin prior to discharge, to continue 3-4 weeks of Ciprofloxacin(better oral absorption and penetration into Prostate then oral cephalosporins)  2) Diarrhea resolved, c/o constipation ( ? pancolitis on CT Abdomen)  3) Urology consult-Dr Tipton, possible Bx in future  4) Continue Flomax and Proscar  5) New PSA improved fro 9.5 to 6.5  6) TOV

## 2018-04-17 NOTE — PROGRESS NOTE ADULT - PROBLEM SELECTOR PLAN 6
Normocytic.  Hgb ~9 at presentation.  Now 8.2.  Dilutional.  VSS.  Denies lightheadedness, SOB, CP, palpitations.    -Mild bleeding post-palacio placement resolved.  iron studies c/w SERA.  started PO supplementation.  -EPO level 10.9 (WNL, inappropriately normal).  started EPO.

## 2018-04-17 NOTE — PROGRESS NOTE ADULT - SUBJECTIVE AND OBJECTIVE BOX
INTERVAL HPI/OVERNIGHT EVENTS:      Patient is a 72y old  Male who presents with a chief complaint of Weakness (18) was seen and examined today. Reports the following symptoms:    CONSTITUTIONAL:  Negative fever or chills, feels well, good appetite  EYES:  Negative  blurry vision or double vision  CARDIOVASCULAR:  Negative for chest pain or palpitations  RESPIRATORY:  Negative for cough, wheezing, or SOB   GASTROINTESTINAL:  Negative for nausea, vomiting, diarrhea, constipation, or abdominal pain  GENITOURINARY:  Negative frequency, urgency or dysuria  NEUROLOGIC:  No headache, confusion, dizziness, lightheadedness    MEDICATIONS  (STANDING):  aspirin  chewable 81 milliGRAM(s) Oral daily  cefTRIAXone Injectable. 2000 milliGRAM(s) IV Push every 24 hours  dextrose 5%. 1000 milliLiter(s) (50 mL/Hr) IV Continuous <Continuous>  dextrose 50% Injectable 12.5 Gram(s) IV Push once  dextrose 50% Injectable 25 Gram(s) IV Push once  dextrose 50% Injectable 25 Gram(s) IV Push once  docusate sodium 100 milliGRAM(s) Oral two times a day  epoetin kathi Injectable 62905 Unit(s) SubCutaneous every 48 hours  finasteride 5 milliGRAM(s) Oral daily  influenza   Vaccine 0.5 milliLiter(s) IntraMuscular once  insulin glargine Injectable (LANTUS) 16 Unit(s) SubCutaneous every morning  insulin lispro (HumaLOG) corrective regimen sliding scale   SubCutaneous Before meals and at bedtime  insulin lispro Injectable (HumaLOG) 8 Unit(s) SubCutaneous three times a day before meals  melatonin 5 milliGRAM(s) Oral at bedtime  multivitamin 1 Tablet(s) Oral daily  senna 1 Tablet(s) Oral <User Schedule>  simvastatin 10 milliGRAM(s) Oral at bedtime  tamsulosin 0.4 milliGRAM(s) Oral at bedtime    MEDICATIONS  (PRN):  acetaminophen   Tablet 650 milliGRAM(s) Oral every 6 hours PRN For Temp greater than 38 C (100.4 F)  dextrose Gel 1 Dose(s) Oral once PRN Blood Glucose LESS THAN 70 milliGRAM(s)/deciliter  glucagon  Injectable 1 milliGRAM(s) IntraMuscular once PRN Glucose LESS THAN 70 milligrams/deciliter        LABS:                        8.2    6.4   )-----------( 143      ( 2018 07:15 )             25.4         138  |  101  |  33<H>  ----------------------------<  156<H>  4.0   |  24  |  1.73<H>    Ca    9.1      2018 07:15  Phos  4.2       Mg     1.8           Hemoglobin A1C, Whole Blood: 7.2 % (18 @ 06:15)      Urinalysis Basic - ( 2018 16:04 )    Color: Yellow / Appearance: Clear / S.015 / pH: x  Gluc: x / Ketone: NEGATIVE  / Bili: Negative / Urobili: 0.2 E.U./dL   Blood: x / Protein: 30 mg/dL / Nitrite: NEGATIVE   Leuk Esterase: NEGATIVE / RBC: Many /HPF / WBC 5-10 /HPF   Sq Epi: x / Non Sq Epi: 0-5 /HPF / Bacteria: Present /HPF      Thyroid Stimulating Hormone, Serum: 0.956 uIU/mL ( @ 06:15)      RADIOLOGY & ADDITIONAL TESTS:      Vital Signs Last 24 Hrs  T(C): 36.9 (2018 21:21), Max: 36.9 (2018 21:21)  T(F): 98.4 (2018 21:21), Max: 98.4 (2018 21:21)  HR: 77 (2018 21:21) (71 - 77)  BP: 136/76 (2018 21:21) (111/50 - 148/76)  BP(mean): --  RR: 16 (2018 21:21) (15 - 17)  SpO2: 96% (2018 21:21) (95% - 99%)    CAPILLARY BLOOD GLUCOSE      PHYSICAL EXAM:  Constitutional: wn/wd in NAD.   HEENT: NCAT, MMM, OP clear, EOMI, , no proptosis or lid retraction  Neck: supple, no acanthosis, no thyromegaly or palpable thyroid nodules   Respiratory: Lungs CTAB.  Cardiovascular: regular rhythm, normal S1 and S2, no audible murmurs, no peripheral edema  GI: soft, + bowel sounds, NT/ND, no masses/HSM appreciated.  Neurology: no tremors, DTR 2+  Skin: no visible rashes/lesions  Psychiatric: AAO x 3, normal affect/mood.        A/P: 72yMale admitted for (    ). Consulted and being followed for Diabetes Type (  ).       Uncontrolled DM Type (  ) -     Please continue           units lantus AM/PM, premeal Lispro  (  ) units TID, and  Lispro moderate/low dose sliding scale 4 times daily (before meals and bedtime).  Please continue consistent carbohydrate (Diabetic) diet, FS ac & hs. Target  - 150.      Case discussed with attending and primary team      Attending attestation:  I have seen and evaluated the patient at the bedside.   Endocrine Nurse Practitioner/Fellow/Resident’s note reviewed, including vital signs, PE and laboratory results.  Direct personal management and extensive interpretation of the data conducted.   I agree with the Nurse Practitioner/Fellow/Resident’s assessment and recommendations as above. INTERVAL HPI/OVERNIGHT EVENTS:      Patient is a 72y old male who presents with a chief complaint of Weakness (18) was seen and examined today. Reports the following symptoms:    CONSTITUTIONAL:  Negative fever or chills, feels better, good appetite  EYES:  Negative  blurry vision or double vision  CARDIOVASCULAR:  Negative for chest pain or palpitations  RESPIRATORY:  Negative for cough, wheezing, or SOB   GASTROINTESTINAL:  Negative for nausea, vomiting, diarrhea, constipation, or abdominal pain  GENITOURINARY:  Negative frequency, urgency or dysuria  NEUROLOGIC:  No headache, confusion, dizziness, lightheadedness    MEDICATIONS  (STANDING):  aspirin  chewable 81 milliGRAM(s) Oral daily  cefTRIAXone Injectable. 2000 milliGRAM(s) IV Push every 24 hours  dextrose 5%. 1000 milliLiter(s) (50 mL/Hr) IV Continuous <Continuous>  dextrose 50% Injectable 12.5 Gram(s) IV Push once  dextrose 50% Injectable 25 Gram(s) IV Push once  dextrose 50% Injectable 25 Gram(s) IV Push once  docusate sodium 100 milliGRAM(s) Oral two times a day  epoetin kathi Injectable 82449 Unit(s) SubCutaneous every 48 hours  finasteride 5 milliGRAM(s) Oral daily  influenza   Vaccine 0.5 milliLiter(s) IntraMuscular once  insulin glargine Injectable (LANTUS) 16 Unit(s) SubCutaneous every morning  insulin lispro (HumaLOG) corrective regimen sliding scale   SubCutaneous Before meals and at bedtime  insulin lispro Injectable (HumaLOG) 8 Unit(s) SubCutaneous three times a day before meals  melatonin 5 milliGRAM(s) Oral at bedtime  multivitamin 1 Tablet(s) Oral daily  senna 1 Tablet(s) Oral <User Schedule>  simvastatin 10 milliGRAM(s) Oral at bedtime  tamsulosin 0.4 milliGRAM(s) Oral at bedtime    MEDICATIONS  (PRN):  acetaminophen   Tablet 650 milliGRAM(s) Oral every 6 hours PRN For Temp greater than 38 C (100.4 F)  dextrose Gel 1 Dose(s) Oral once PRN Blood Glucose LESS THAN 70 milliGRAM(s)/deciliter  glucagon  Injectable 1 milliGRAM(s) IntraMuscular once PRN Glucose LESS THAN 70 milligrams/deciliter        LABS:                        8.2    6.4   )-----------( 143      ( 2018 07:15 )             25.4         138  |  101  |  33<H>  ----------------------------<  156<H>  4.0   |  24  |  1.73<H>    Ca    9.1      2018 07:15  Phos  4.2       Mg     1.8           Hemoglobin A1C, Whole Blood: 7.2 % (18 @ 06:15)      Urinalysis Basic - ( 2018 16:04 )    Color: Yellow / Appearance: Clear / S.015 / pH: x  Gluc: x / Ketone: NEGATIVE  / Bili: Negative / Urobili: 0.2 E.U./dL   Blood: x / Protein: 30 mg/dL / Nitrite: NEGATIVE   Leuk Esterase: NEGATIVE / RBC: Many /HPF / WBC 5-10 /HPF   Sq Epi: x / Non Sq Epi: 0-5 /HPF / Bacteria: Present /HPF      Thyroid Stimulating Hormone, Serum: 0.956 uIU/mL ( @ 06:15)      RADIOLOGY & ADDITIONAL TESTS:      Vital Signs Last 24 Hrs  T(C): 36.9 (2018 21:21), Max: 36.9 (2018 21:21)  T(F): 98.4 (2018 21:21), Max: 98.4 (2018 21:21)  HR: 77 (2018 21:21) (71 - 77)  BP: 136/76 (2018 21:21) (111/50 - 148/76)  BP(mean): --  RR: 16 (2018 21:21) (15 - 17)  SpO2: 96% (2018 21:21) (95% - 99%)    CAPILLARY BLOOD GLUCOSE      PHYSICAL EXAM:  Constitutional: wn/wd in NAD.   HEENT: NCAT, MMM, OP clear, EOMI, no proptosis or lid retraction  Neck: supple, no acanthosis, no thyromegaly or palpable thyroid nodules   Respiratory: Lungs CTAB.  Cardiovascular: regular rhythm, normal S1 and S2, no audible murmurs, no peripheral edema  GI: soft, + bowel sounds, NT/ND.  Neurology: no tremors, DTR 2+  Skin: no visible rashes/lesions  Psychiatric: AAO x 3, normal affect/mood.        A/P: 72yMale with Htn, HLD, prostate hypertrophy admitted for weakness.  Consulted and being followed for Diabetes Type 2.     DM Type 2 (HbA1c 7.2%) controlled in hospital with intensive insulin regimen -     Please continue 16 units Lantus HS, premeal Lispro 8 units TID, and Lispro moderate/low dose sliding scale 4 times daily (before meals and bedtime).  Please continue consistent carbohydrate (Diabetic) diet, FS ac & hs. Target  - 150 mg/dl.    Outpatient f/u with endocrinologist.    Case discussed with attending and primary team.      Attending attestation:  I have seen and evaluated the patient at the bedside.   Nurse Practitioner/Fellow/Resident’s note reviewed, including vital signs, PE and laboratory results.  Direct personal management and extensive interpretation of the data conducted.   Assessment and recommendations as above.

## 2018-04-17 NOTE — PROGRESS NOTE ADULT - PROBLEM SELECTOR PLAN 1
-Presenting with urinary retention.  severely enlarged prostate, bladder distension, and bilateral hydronephrosis observed on CT.    -s/p palacio  -unclear diagnosis, likely BPH.  Had been referred to uro outpt but did not follow up.  -started flomax and proscar  -uro recc discharge with palacio and outpt f/u / biopsy  -treatment of UTI as below  -PSA elevated 9.8 downtrending to 6, likely 2/2 palacio manipulation

## 2018-04-17 NOTE — PROGRESS NOTE ADULT - SUBJECTIVE AND OBJECTIVE BOX
INTERVAL/OVERNIGHT EVENTS:        SUBJECTIVE:  Seen and examined at bedside.    ROS otherwise negative.    VITAL SIGNS:  T(F): 97.8 (18 @ 05:43)  HR: 72 (18 @ 05:43)  BP: 111/50 (18 @ 05:43)  RR: 17 (18 @ 05:43)  SpO2: 95% (18 @ 05:43)  Wt(kg): --    PHYSICAL EXAM:        MEDICATIONS  (STANDING):  aspirin  chewable 81 milliGRAM(s) Oral daily  cefTRIAXone Injectable. 2000 milliGRAM(s) IV Push every 24 hours  dextrose 5%. 1000 milliLiter(s) (50 mL/Hr) IV Continuous <Continuous>  dextrose 50% Injectable 12.5 Gram(s) IV Push once  dextrose 50% Injectable 25 Gram(s) IV Push once  dextrose 50% Injectable 25 Gram(s) IV Push once  docusate sodium 100 milliGRAM(s) Oral two times a day  epoetin kathi Injectable 57657 Unit(s) SubCutaneous every 48 hours  finasteride 5 milliGRAM(s) Oral daily  influenza   Vaccine 0.5 milliLiter(s) IntraMuscular once  insulin glargine Injectable (LANTUS) 16 Unit(s) SubCutaneous every morning  insulin lispro (HumaLOG) corrective regimen sliding scale   SubCutaneous Before meals and at bedtime  insulin lispro Injectable (HumaLOG) 8 Unit(s) SubCutaneous three times a day before meals  melatonin 5 milliGRAM(s) Oral at bedtime  multivitamin 1 Tablet(s) Oral daily  senna 1 Tablet(s) Oral <User Schedule>  simvastatin 10 milliGRAM(s) Oral at bedtime  tamsulosin 0.4 milliGRAM(s) Oral at bedtime    MEDICATIONS  (PRN):  acetaminophen   Tablet 650 milliGRAM(s) Oral every 6 hours PRN For Temp greater than 38 C (100.4 F)  dextrose Gel 1 Dose(s) Oral once PRN Blood Glucose LESS THAN 70 milliGRAM(s)/deciliter  glucagon  Injectable 1 milliGRAM(s) IntraMuscular once PRN Glucose LESS THAN 70 milligrams/deciliter      Allergies    No Known Allergies    Intolerances      acetaminophen   Tablet 650 milliGRAM(s) Oral every 6 hours PRN  aspirin  chewable 81 milliGRAM(s) Oral daily  cefTRIAXone Injectable. 2000 milliGRAM(s) IV Push every 24 hours  dextrose 5%. 1000 milliLiter(s) IV Continuous <Continuous>  dextrose 50% Injectable 12.5 Gram(s) IV Push once  dextrose 50% Injectable 25 Gram(s) IV Push once  dextrose 50% Injectable 25 Gram(s) IV Push once  dextrose Gel 1 Dose(s) Oral once PRN  docusate sodium 100 milliGRAM(s) Oral two times a day  epoetin kathi Injectable 13338 Unit(s) SubCutaneous every 48 hours  finasteride 5 milliGRAM(s) Oral daily  glucagon  Injectable 1 milliGRAM(s) IntraMuscular once PRN  influenza   Vaccine 0.5 milliLiter(s) IntraMuscular once  insulin glargine Injectable (LANTUS) 16 Unit(s) SubCutaneous every morning  insulin lispro (HumaLOG) corrective regimen sliding scale   SubCutaneous Before meals and at bedtime  insulin lispro Injectable (HumaLOG) 8 Unit(s) SubCutaneous three times a day before meals  melatonin 5 milliGRAM(s) Oral at bedtime  multivitamin 1 Tablet(s) Oral daily  senna 1 Tablet(s) Oral <User Schedule>  simvastatin 10 milliGRAM(s) Oral at bedtime  tamsulosin 0.4 milliGRAM(s) Oral at bedtime      LABS:                          7.8    5.8   )-----------( 141      ( 2018 06:10 )             24.0     04-16    137  |  100  |  35<H>  ----------------------------<  149<H>  3.7   |  21<L>  |  1.76<H>    Ca    8.8      2018 06:09  Phos  3.7     04-16  Mg     1.9     04-16        Urinalysis Basic - ( 2018 16:04 )    Color: Yellow / Appearance: Clear / S.015 / pH: x  Gluc: x / Ketone: NEGATIVE  / Bili: Negative / Urobili: 0.2 E.U./dL   Blood: x / Protein: 30 mg/dL / Nitrite: NEGATIVE   Leuk Esterase: NEGATIVE / RBC: Many /HPF / WBC 5-10 /HPF   Sq Epi: x / Non Sq Epi: 0-5 /HPF / Bacteria: Present /HPF        RADIOLOGY & ADDITIONAL TESTS:  All imaging reviewed. INTERVAL/OVERNIGHT EVENTS:  Melatonin for insomnia.  Seen by GI recc colonoscopy for Wed.     SUBJECTIVE:  Seen and examined at bedside.  Patient reports feeling weak but otherwise fine.  Denies F/C, CP, SOB, cough, abd pain, N/V/D/C, flank/back pain, leg pain.    ROS otherwise negative.    VITAL SIGNS:  T(F): 97.8 (18 @ 05:43)  HR: 72 (18 @ 05:43)  BP: 111/50 (18 @ 05:43)  RR: 17 (18 @ 05:43)  SpO2: 95% (18 @ 05:43)  Wt(kg): --    PHYSICAL EXAM:    General:  NAD, nontoxic appearing, WDWN  HENT:  EOMI, R pupil reactive / deformed 2/2 cataract sx.  L pupil pinpoint, unclear if reactive.  No sinus tenderness.  oropharynx WNL.  MMM  Neck:  Trachea midline.  No JVD, LAD, or thyromegaly.  Heart:  S1S2 no M/R/G, regular  Lungs:  CTAB no wheezing, rhonchi or rales.  No accessory muscle use.  No respiratory distress.  Abdomen:  NABS.  soft, nontender, nondistended.  no guarding.  no ascites.  no organomegaly.  Vascular:  Peripheral pulses palpable  Extremities:  Trace pitting edema BLE.  Calves nontender.    Back:  Mild bilateral low back TTP.  No CVA tenderness  Neuro:  AOx3, no facial asymmetry, nonfocal, no slurred speech  Skin:  No rash  (+) palacio with yellow urine    MEDICATIONS  (STANDING):  aspirin  chewable 81 milliGRAM(s) Oral daily  cefTRIAXone Injectable. 2000 milliGRAM(s) IV Push every 24 hours  dextrose 5%. 1000 milliLiter(s) (50 mL/Hr) IV Continuous <Continuous>  dextrose 50% Injectable 12.5 Gram(s) IV Push once  dextrose 50% Injectable 25 Gram(s) IV Push once  dextrose 50% Injectable 25 Gram(s) IV Push once  docusate sodium 100 milliGRAM(s) Oral two times a day  epoetin kathi Injectable 70672 Unit(s) SubCutaneous every 48 hours  finasteride 5 milliGRAM(s) Oral daily  influenza   Vaccine 0.5 milliLiter(s) IntraMuscular once  insulin glargine Injectable (LANTUS) 16 Unit(s) SubCutaneous every morning  insulin lispro (HumaLOG) corrective regimen sliding scale   SubCutaneous Before meals and at bedtime  insulin lispro Injectable (HumaLOG) 8 Unit(s) SubCutaneous three times a day before meals  melatonin 5 milliGRAM(s) Oral at bedtime  multivitamin 1 Tablet(s) Oral daily  senna 1 Tablet(s) Oral <User Schedule>  simvastatin 10 milliGRAM(s) Oral at bedtime  tamsulosin 0.4 milliGRAM(s) Oral at bedtime    MEDICATIONS  (PRN):  acetaminophen   Tablet 650 milliGRAM(s) Oral every 6 hours PRN For Temp greater than 38 C (100.4 F)  dextrose Gel 1 Dose(s) Oral once PRN Blood Glucose LESS THAN 70 milliGRAM(s)/deciliter  glucagon  Injectable 1 milliGRAM(s) IntraMuscular once PRN Glucose LESS THAN 70 milligrams/deciliter      Allergies    No Known Allergies    Intolerances      acetaminophen   Tablet 650 milliGRAM(s) Oral every 6 hours PRN  aspirin  chewable 81 milliGRAM(s) Oral daily  cefTRIAXone Injectable. 2000 milliGRAM(s) IV Push every 24 hours  dextrose 5%. 1000 milliLiter(s) IV Continuous <Continuous>  dextrose 50% Injectable 12.5 Gram(s) IV Push once  dextrose 50% Injectable 25 Gram(s) IV Push once  dextrose 50% Injectable 25 Gram(s) IV Push once  dextrose Gel 1 Dose(s) Oral once PRN  docusate sodium 100 milliGRAM(s) Oral two times a day  epoetin kathi Injectable 35192 Unit(s) SubCutaneous every 48 hours  finasteride 5 milliGRAM(s) Oral daily  glucagon  Injectable 1 milliGRAM(s) IntraMuscular once PRN  influenza   Vaccine 0.5 milliLiter(s) IntraMuscular once  insulin glargine Injectable (LANTUS) 16 Unit(s) SubCutaneous every morning  insulin lispro (HumaLOG) corrective regimen sliding scale   SubCutaneous Before meals and at bedtime  insulin lispro Injectable (HumaLOG) 8 Unit(s) SubCutaneous three times a day before meals  melatonin 5 milliGRAM(s) Oral at bedtime  multivitamin 1 Tablet(s) Oral daily  senna 1 Tablet(s) Oral <User Schedule>  simvastatin 10 milliGRAM(s) Oral at bedtime  tamsulosin 0.4 milliGRAM(s) Oral at bedtime      LABS:                          7.8    5.8   )-----------( 141      ( 2018 06:10 )             24.0     04-16    137  |  100  |  35<H>  ----------------------------<  149<H>  3.7   |  21<L>  |  1.76<H>    Ca    8.8      2018 06:09  Phos  3.7     04-16  Mg     1.9     -16        Urinalysis Basic - ( 2018 16:04 )    Color: Yellow / Appearance: Clear / S.015 / pH: x  Gluc: x / Ketone: NEGATIVE  / Bili: Negative / Urobili: 0.2 E.U./dL   Blood: x / Protein: 30 mg/dL / Nitrite: NEGATIVE   Leuk Esterase: NEGATIVE / RBC: Many /HPF / WBC 5-10 /HPF   Sq Epi: x / Non Sq Epi: 0-5 /HPF / Bacteria: Present /HPF        RADIOLOGY & ADDITIONAL TESTS:  All imaging reviewed.

## 2018-04-17 NOTE — PROGRESS NOTE ADULT - SUBJECTIVE AND OBJECTIVE BOX
HPI:  73yo M with PMH of HTN, HLD, DMT2, prostatomegaly, h/o SVT s/p ablation (2011) presents  here with progressive weakness for one month a/w abdominal distension. Pt reports no known history of kidney disease and was asked by his PMD to see a urologist that he never did. He reports increased dyspnea on exertion, usually can climb more than one flight of stairs but get SOB with less than one flight now a/w anorexia, denies any F/C, chest pain, cough, abdominal pain, N/V. He has constipation and small amount of non-bloody diarrhea. He denies alcohol abuse, never had colonoscopy.     ED course: VSS. Labs with anemia Hb 9.1/27, thrombocytopenia (137). Hyponatremia, hypokalemia, anion gap metabolic acidosis with elevated beta-hydroxybutyrate, lactate 1.1, UA with pyuria, leukocyte esterase with bacteria. Pt was given insulin 4u once and 2l NS bolus. A CT abd/pelvis was performed. (11 Apr 2018 06:02)    FAMILY HISTORY:  No pertinent family history in first degree relatives    MEDICATIONS  (STANDING):  aspirin  chewable 81 milliGRAM(s) Oral daily  cefTRIAXone Injectable. 2000 milliGRAM(s) IV Push every 24 hours  dextrose 5%. 1000 milliLiter(s) (50 mL/Hr) IV Continuous <Continuous>  dextrose 50% Injectable 12.5 Gram(s) IV Push once  dextrose 50% Injectable 25 Gram(s) IV Push once  dextrose 50% Injectable 25 Gram(s) IV Push once  docusate sodium 100 milliGRAM(s) Oral two times a day  epoetin kathi Injectable 67980 Unit(s) SubCutaneous every 48 hours  finasteride 5 milliGRAM(s) Oral daily  influenza   Vaccine 0.5 milliLiter(s) IntraMuscular once  insulin glargine Injectable (LANTUS) 16 Unit(s) SubCutaneous every morning  insulin lispro (HumaLOG) corrective regimen sliding scale   SubCutaneous Before meals and at bedtime  insulin lispro Injectable (HumaLOG) 8 Unit(s) SubCutaneous three times a day before meals  melatonin 5 milliGRAM(s) Oral at bedtime  multivitamin 1 Tablet(s) Oral daily  polyethylene glycol/electrolyte Solution. 4000 milliLiter(s) Oral once  senna 1 Tablet(s) Oral <User Schedule>  simvastatin 10 milliGRAM(s) Oral at bedtime  tamsulosin 0.4 milliGRAM(s) Oral at bedtime    MEDICATIONS  (PRN):  acetaminophen   Tablet 650 milliGRAM(s) Oral every 6 hours PRN For Temp greater than 38 C (100.4 F)  dextrose Gel 1 Dose(s) Oral once PRN Blood Glucose LESS THAN 70 milliGRAM(s)/deciliter  glucagon  Injectable 1 milliGRAM(s) IntraMuscular once PRN Glucose LESS THAN 70 milligrams/deciliter    Vital Signs Last 24 Hrs  T(C): 36.5 (17 Apr 2018 08:32), Max: 36.8 (16 Apr 2018 16:44)  T(F): 97.7 (17 Apr 2018 08:32), Max: 98.2 (16 Apr 2018 16:44)  HR: 72 (17 Apr 2018 08:32) (72 - 80)  BP: 148/76 (17 Apr 2018 08:32) (106/63 - 148/76)  BP(mean): --  RR: 16 (17 Apr 2018 08:32) (16 - 17)  SpO2: 99% (17 Apr 2018 08:32) (95% - 99%)    Physical exam:    Overall impression  Lymphadenopathy  Liver  spleen    Labs:  CBC Full  -  ( 17 Apr 2018 07:15 )  WBC Count : 6.4 K/uL  Hemoglobin : 8.2 g/dL  Hematocrit : 25.4 %  Platelet Count - Automated : 143 K/uL  Mean Cell Volume : 85.5 fL  Mean Cell Hemoglobin : 27.6 pg  Mean Cell Hemoglobin Concentration : 32.3 g/dL  Auto Neutrophil # : x  Auto Lymphocyte # : x  Auto Monocyte # : x  Auto Eosinophil # : x  Auto Basophil # : x  Auto Neutrophil % : x  Auto Lymphocyte % : x  Auto Monocyte % : x  Auto Eosinophil % : x  Auto Basophil % : x    04-17    138  |  101  |  33<H>  ----------------------------<  156<H>  4.0   |  24  |  1.73<H>    Ca    9.1      17 Apr 2018 07:15  Phos  4.2     04-17  Mg     1.8     04-17        Radiology:  HEALTH ISSUES - R/O PROBLEM Dx:      Assessmant / Problems  1)urosepsis on Ceftiaxone  2) Colitis  3) Anemia chronic disease  Plan:  1)GI w/u  2)iv eftriaxone  3) Procrit sq    Thank you  Olivia Roberts MD HPI:  71yo M with PMH of HTN, HLD, DMT2, prostatomegaly, h/o SVT s/p ablation (2011) presents  here with progressive weakness for one month a/w abdominal distension. Pt reports no known history of kidney disease and was asked by his PMD to see a urologist that he never did. He reports increased dyspnea on exertion, usually can climb more than one flight of stairs but get SOB with less than one flight now a/w anorexia, denies any F/C, chest pain, cough, abdominal pain, N/V. He has constipation and small amount of non-bloody diarrhea. He denies alcohol abuse, never had colonoscopy.     ED course: VSS. Labs with anemia Hb 9.1/27, thrombocytopenia (137). Hyponatremia, hypokalemia, anion gap metabolic acidosis with elevated beta-hydroxybutyrate, lactate 1.1, UA with pyuria, leukocyte esterase with bacteria. Pt was given insulin 4u once and 2l NS bolus. A CT abd/pelvis was performed. (11 Apr 2018 06:02)    FAMILY HISTORY:  No pertinent family history in first degree relatives    MEDICATIONS  (STANDING):  aspirin  chewable 81 milliGRAM(s) Oral daily  cefTRIAXone Injectable. 2000 milliGRAM(s) IV Push every 24 hours  dextrose 5%. 1000 milliLiter(s) (50 mL/Hr) IV Continuous <Continuous>  dextrose 50% Injectable 12.5 Gram(s) IV Push once  dextrose 50% Injectable 25 Gram(s) IV Push once  dextrose 50% Injectable 25 Gram(s) IV Push once  docusate sodium 100 milliGRAM(s) Oral two times a day  epoetin kathi Injectable 28263 Unit(s) SubCutaneous every 48 hours  finasteride 5 milliGRAM(s) Oral daily  influenza   Vaccine 0.5 milliLiter(s) IntraMuscular once  insulin glargine Injectable (LANTUS) 16 Unit(s) SubCutaneous every morning  insulin lispro (HumaLOG) corrective regimen sliding scale   SubCutaneous Before meals and at bedtime  insulin lispro Injectable (HumaLOG) 8 Unit(s) SubCutaneous three times a day before meals  melatonin 5 milliGRAM(s) Oral at bedtime  multivitamin 1 Tablet(s) Oral daily  polyethylene glycol/electrolyte Solution. 4000 milliLiter(s) Oral once  senna 1 Tablet(s) Oral <User Schedule>  simvastatin 10 milliGRAM(s) Oral at bedtime  tamsulosin 0.4 milliGRAM(s) Oral at bedtime    MEDICATIONS  (PRN):  acetaminophen   Tablet 650 milliGRAM(s) Oral every 6 hours PRN For Temp greater than 38 C (100.4 F)  dextrose Gel 1 Dose(s) Oral once PRN Blood Glucose LESS THAN 70 milliGRAM(s)/deciliter  glucagon  Injectable 1 milliGRAM(s) IntraMuscular once PRN Glucose LESS THAN 70 milligrams/deciliter    Vital Signs Last 24 Hrs  T(C): 36.5 (17 Apr 2018 08:32), Max: 36.8 (16 Apr 2018 16:44)  T(F): 97.7 (17 Apr 2018 08:32), Max: 98.2 (16 Apr 2018 16:44)  HR: 72 (17 Apr 2018 08:32) (72 - 80)  BP: 148/76 (17 Apr 2018 08:32) (106/63 - 148/76)  BP(mean): --  RR: 16 (17 Apr 2018 08:32) (16 - 17)  SpO2: 99% (17 Apr 2018 08:32) (95% - 99%)    Physical exam:    Overall impression  Lymphadenopathy  Liver  spleen    Labs:  CBC Full  -  ( 17 Apr 2018 07:15 )  WBC Count : 6.4 K/uL  Hemoglobin : 8.2 g/dL  Hematocrit : 25.4 %  Platelet Count - Automated : 143 K/uL  Mean Cell Volume : 85.5 fL  Mean Cell Hemoglobin : 27.6 pg  Mean Cell Hemoglobin Concentration : 32.3 g/dL  Auto Neutrophil # : x  Auto Lymphocyte # : x  Auto Monocyte # : x  Auto Eosinophil # : x  Auto Basophil # : x  Auto Neutrophil % : x  Auto Lymphocyte % : x  Auto Monocyte % : x  Auto Eosinophil % : x  Auto Basophil % : x    04-17    138  |  101  |  33<H>  ----------------------------<  156<H>  4.0   |  24  |  1.73<H>    Ca    9.1      17 Apr 2018 07:15  Phos  4.2     04-17  Mg     1.8     04-17        Radiology:  HEALTH ISSUES - R/O PROBLEM Dx:      Assessmant / Problems  1)urosepsis on Ceftiaxone  2) Colitis  3) Anemia chronic disease  4) urinary retention, prostate enlargement, elevated PSA  PSA coming down  As discussed with Dr Courtney indwelling cath will be removed and voiding test done  Plan:  1)GI w/u  2)iv Ceftriaxone  3) Procrit sq    Thank you  Olivia Roberts MD

## 2018-04-17 NOTE — PROGRESS NOTE ADULT - PROBLEM SELECTOR PLAN 4
Resolved.  Anion gap metabolic acidosis from uremia + starvation ketosis    #colitis  observed on CT.  Questionable diarrhea x 3 weeks.  Will go for colonoscopy with Dr. Mallory in am.  Bowel prep tonight.  Clear liquids today.

## 2018-04-18 ENCOUNTER — RESULT REVIEW (OUTPATIENT)
Age: 73
End: 2018-04-18

## 2018-04-18 LAB
ANION GAP SERPL CALC-SCNC: 16 MMOL/L — SIGNIFICANT CHANGE UP (ref 5–17)
BUN SERPL-MCNC: 25 MG/DL — HIGH (ref 7–23)
CALCIUM SERPL-MCNC: 8.6 MG/DL — SIGNIFICANT CHANGE UP (ref 8.4–10.5)
CHLORIDE SERPL-SCNC: 98 MMOL/L — SIGNIFICANT CHANGE UP (ref 96–108)
CO2 SERPL-SCNC: 21 MMOL/L — LOW (ref 22–31)
CREAT SERPL-MCNC: 1.57 MG/DL — HIGH (ref 0.5–1.3)
GLUCOSE BLDC GLUCOMTR-MCNC: 127 MG/DL — HIGH (ref 70–99)
GLUCOSE BLDC GLUCOMTR-MCNC: 162 MG/DL — HIGH (ref 70–99)
GLUCOSE BLDC GLUCOMTR-MCNC: 167 MG/DL — HIGH (ref 70–99)
GLUCOSE BLDC GLUCOMTR-MCNC: 180 MG/DL — HIGH (ref 70–99)
GLUCOSE SERPL-MCNC: 132 MG/DL — HIGH (ref 70–99)
HCT VFR BLD CALC: 24 % — LOW (ref 39–50)
HGB BLD-MCNC: 7.9 G/DL — LOW (ref 13–17)
MAGNESIUM SERPL-MCNC: 1.8 MG/DL — SIGNIFICANT CHANGE UP (ref 1.6–2.6)
MCHC RBC-ENTMCNC: 27.7 PG — SIGNIFICANT CHANGE UP (ref 27–34)
MCHC RBC-ENTMCNC: 32.9 G/DL — SIGNIFICANT CHANGE UP (ref 32–36)
MCV RBC AUTO: 84.2 FL — SIGNIFICANT CHANGE UP (ref 80–100)
PHOSPHATE SERPL-MCNC: 3.2 MG/DL — SIGNIFICANT CHANGE UP (ref 2.5–4.5)
PLATELET # BLD AUTO: 145 K/UL — LOW (ref 150–400)
POTASSIUM SERPL-MCNC: 3.5 MMOL/L — SIGNIFICANT CHANGE UP (ref 3.5–5.3)
POTASSIUM SERPL-SCNC: 3.5 MMOL/L — SIGNIFICANT CHANGE UP (ref 3.5–5.3)
RBC # BLD: 2.85 M/UL — LOW (ref 4.2–5.8)
RBC # FLD: 14.2 % — SIGNIFICANT CHANGE UP (ref 10.3–16.9)
SODIUM SERPL-SCNC: 135 MMOL/L — SIGNIFICANT CHANGE UP (ref 135–145)
WBC # BLD: 6.2 K/UL — SIGNIFICANT CHANGE UP (ref 3.8–10.5)
WBC # FLD AUTO: 6.2 K/UL — SIGNIFICANT CHANGE UP (ref 3.8–10.5)

## 2018-04-18 RX ORDER — MOXIFLOXACIN HYDROCHLORIDE TABLETS, 400 MG 400 MG/1
1 TABLET, FILM COATED ORAL
Qty: 28 | Refills: 0 | OUTPATIENT
Start: 2018-04-18 | End: 2018-05-01

## 2018-04-18 RX ORDER — LIDOCAINE HCL 20 MG/ML
1 VIAL (ML) INJECTION ONCE
Qty: 0 | Refills: 0 | Status: COMPLETED | OUTPATIENT
Start: 2018-04-18 | End: 2018-04-18

## 2018-04-18 RX ORDER — TAMSULOSIN HYDROCHLORIDE 0.4 MG/1
1 CAPSULE ORAL
Qty: 30 | Refills: 0 | OUTPATIENT
Start: 2018-04-18 | End: 2018-05-17

## 2018-04-18 RX ORDER — SODIUM FERRIC GLUCONAT/SUCROSE 62.5MG/5ML
100 AMPUL (ML) INTRAVENOUS ONCE
Qty: 0 | Refills: 0 | Status: COMPLETED | OUTPATIENT
Start: 2018-04-18 | End: 2018-04-18

## 2018-04-18 RX ORDER — SODIUM FERRIC GLUCONAT/SUCROSE 62.5MG/5ML
25 AMPUL (ML) INTRAVENOUS ONCE
Qty: 0 | Refills: 0 | Status: COMPLETED | OUTPATIENT
Start: 2018-04-18 | End: 2018-04-18

## 2018-04-18 RX ORDER — FINASTERIDE 5 MG/1
1 TABLET, FILM COATED ORAL
Qty: 30 | Refills: 0 | OUTPATIENT
Start: 2018-04-18 | End: 2018-05-17

## 2018-04-18 RX ORDER — INSULIN GLARGINE 100 [IU]/ML
8 INJECTION, SOLUTION SUBCUTANEOUS EVERY MORNING
Qty: 0 | Refills: 0 | Status: DISCONTINUED | OUTPATIENT
Start: 2018-04-18 | End: 2018-04-19

## 2018-04-18 RX ORDER — POTASSIUM CHLORIDE 20 MEQ
40 PACKET (EA) ORAL ONCE
Qty: 0 | Refills: 0 | Status: COMPLETED | OUTPATIENT
Start: 2018-04-18 | End: 2018-04-18

## 2018-04-18 RX ORDER — CIPROFLOXACIN LACTATE 400MG/40ML
500 VIAL (ML) INTRAVENOUS EVERY 12 HOURS
Qty: 0 | Refills: 0 | Status: DISCONTINUED | OUTPATIENT
Start: 2018-04-18 | End: 2018-04-19

## 2018-04-18 RX ORDER — MAGNESIUM SULFATE 500 MG/ML
1 VIAL (ML) INJECTION ONCE
Qty: 0 | Refills: 0 | Status: COMPLETED | OUTPATIENT
Start: 2018-04-18 | End: 2018-04-18

## 2018-04-18 RX ADMIN — Medication 2: at 17:49

## 2018-04-18 RX ADMIN — TAMSULOSIN HYDROCHLORIDE 0.4 MILLIGRAM(S): 0.4 CAPSULE ORAL at 21:04

## 2018-04-18 RX ADMIN — Medication 54 MILLIGRAM(S): at 23:17

## 2018-04-18 RX ADMIN — Medication 5 MILLIGRAM(S): at 00:18

## 2018-04-18 RX ADMIN — Medication 8 UNIT(S): at 17:49

## 2018-04-18 RX ADMIN — Medication 40 MILLIEQUIVALENT(S): at 09:59

## 2018-04-18 RX ADMIN — INSULIN GLARGINE 8 UNIT(S): 100 INJECTION, SOLUTION SUBCUTANEOUS at 09:59

## 2018-04-18 RX ADMIN — Medication 2: at 13:05

## 2018-04-18 RX ADMIN — Medication 8 UNIT(S): at 13:05

## 2018-04-18 RX ADMIN — FINASTERIDE 5 MILLIGRAM(S): 5 TABLET, FILM COATED ORAL at 21:04

## 2018-04-18 RX ADMIN — Medication 1 TABLET(S): at 13:06

## 2018-04-18 RX ADMIN — Medication 500 MILLIGRAM(S): at 17:48

## 2018-04-18 RX ADMIN — SENNA PLUS 1 TABLET(S): 8.6 TABLET ORAL at 17:48

## 2018-04-18 RX ADMIN — ERYTHROPOIETIN 10000 UNIT(S): 10000 INJECTION, SOLUTION INTRAVENOUS; SUBCUTANEOUS at 13:32

## 2018-04-18 RX ADMIN — Medication 100 MILLIGRAM(S): at 17:48

## 2018-04-18 RX ADMIN — Medication 81 MILLIGRAM(S): at 13:06

## 2018-04-18 RX ADMIN — Medication 2: at 10:00

## 2018-04-18 RX ADMIN — Medication 1 MILLILITER(S): at 06:38

## 2018-04-18 RX ADMIN — Medication 208 MILLIGRAM(S): at 21:03

## 2018-04-18 RX ADMIN — Medication 5 MILLIGRAM(S): at 21:03

## 2018-04-18 RX ADMIN — SIMVASTATIN 10 MILLIGRAM(S): 20 TABLET, FILM COATED ORAL at 21:04

## 2018-04-18 RX ADMIN — Medication 8 UNIT(S): at 10:00

## 2018-04-18 RX ADMIN — Medication 100 GRAM(S): at 10:00

## 2018-04-18 NOTE — PROGRESS NOTE ADULT - SUBJECTIVE AND OBJECTIVE BOX
INTERVAL HPI/OVERNIGHT EVENTS:      Patient is a 72y old  Male who presents with a chief complaint of Weakness (18) was seen and examined today. Reports the following symptoms:    CONSTITUTIONAL:  Negative fever or chills, feels better, good appetite  EYES:  Negative  blurry vision or double vision  CARDIOVASCULAR:  Negative for chest pain or palpitations  RESPIRATORY:  Negative for cough, wheezing, or SOB   GASTROINTESTINAL:  Negative for nausea, vomiting, diarrhea, constipation, or abdominal pain  GENITOURINARY:  Negative frequency, urgency or dysuria  NEUROLOGIC:  No headache, confusion, dizziness, lightheadedness    MEDICATIONS  (STANDING):  aspirin  chewable 81 milliGRAM(s) Oral daily  cefTRIAXone Injectable. 2000 milliGRAM(s) IV Push every 24 hours  dextrose 5%. 1000 milliLiter(s) (50 mL/Hr) IV Continuous <Continuous>  dextrose 50% Injectable 12.5 Gram(s) IV Push once  dextrose 50% Injectable 25 Gram(s) IV Push once  dextrose 50% Injectable 25 Gram(s) IV Push once  docusate sodium 100 milliGRAM(s) Oral two times a day  epoetin kathi Injectable 99490 Unit(s) SubCutaneous every 48 hours  finasteride 5 milliGRAM(s) Oral daily  influenza   Vaccine 0.5 milliLiter(s) IntraMuscular once  insulin glargine Injectable (LANTUS) 8 Unit(s) SubCutaneous every morning  insulin lispro (HumaLOG) corrective regimen sliding scale   SubCutaneous Before meals and at bedtime  insulin lispro Injectable (HumaLOG) 8 Unit(s) SubCutaneous three times a day before meals  melatonin 5 milliGRAM(s) Oral at bedtime  multivitamin 1 Tablet(s) Oral daily  senna 1 Tablet(s) Oral <User Schedule>  simvastatin 10 milliGRAM(s) Oral at bedtime  tamsulosin 0.4 milliGRAM(s) Oral at bedtime    MEDICATIONS  (PRN):  acetaminophen   Tablet 650 milliGRAM(s) Oral every 6 hours PRN For Temp greater than 38 C (100.4 F)  dextrose Gel 1 Dose(s) Oral once PRN Blood Glucose LESS THAN 70 milliGRAM(s)/deciliter  glucagon  Injectable 1 milliGRAM(s) IntraMuscular once PRN Glucose LESS THAN 70 milligrams/deciliter        LABS:                        7.9    6.2   )-----------( 145      ( 2018 06:34 )             24.0     18    135  |  98  |  25<H>  ----------------------------<  132<H>  3.5   |  21<L>  |  1.57<H>    Ca    8.6      2018 06:34  Phos  3.2       Mg     1.8           Hemoglobin A1C, Whole Blood: 7.2 % (18 @ 06:15)      Urinalysis Basic - ( 2018 16:04 )    Color: Yellow / Appearance: Clear / S.015 / pH: x  Gluc: x / Ketone: NEGATIVE  / Bili: Negative / Urobili: 0.2 E.U./dL   Blood: x / Protein: 30 mg/dL / Nitrite: NEGATIVE   Leuk Esterase: NEGATIVE / RBC: Many /HPF / WBC 5-10 /HPF   Sq Epi: x / Non Sq Epi: 0-5 /HPF / Bacteria: Present /HPF      Thyroid Stimulating Hormone, Serum: 0.956 uIU/mL ( @ 06:15)      RADIOLOGY & ADDITIONAL TESTS:      Vital Signs Last 24 Hrs  T(C): 36.5 (2018 06:33), Max: 36.9 (2018 21:21)  T(F): 97.7 (2018 06:33), Max: 98.4 (2018 21:21)  HR: 76 (2018 06:33) (71 - 77)  BP: 131/73 (2018 06:33) (131/73 - 148/76)  BP(mean): --  RR: 16 (2018 06:33) (15 - 16)  SpO2: 96% (2018 06:33) (96% - 99%)    CAPILLARY BLOOD GLUCOSE      PHYSICAL EXAM:  Constitutional: Elderly male in NAD.   HEENT: NCAT, MMM, OP clear, EOMI, no proptosis or lid retraction  Neck: supple, no acanthosis, no thyromegaly or palpable thyroid nodules   Respiratory: Lungs CTAB.  Cardiovascular: regular rhythm, normal S1 and S2, no audible murmurs, no peripheral edema  GI: soft, + bowel sounds  Neurology: no tremors, DTR 2+  Skin: no visible rashes/lesions  Psychiatric: AAO x 3, normal affect/mood.        A/P: 72yMale admitted for weakness.  Consulted and being followed for Diabetes Type 2.     DM Type 2 managed with intensive insulin regimen. -     Please continue 8 units Lantus HS, premeal Lispro 8 units TID, and Lispro moderate/low dose sliding scale 4 times daily (before meals and bedtime).    Please continue consistent carbohydrate (Diabetic) diet, FS ac & hs. Target  - 150 mg/dl.    Outpatient f/u with endocrinologist.    Case discussed with attending and primary team.      Attending attestation:  I have seen and evaluated the patient at the bedside.   Nurse Practitioner/Fellow/Resident’s note reviewed, including vital signs, PE and laboratory results.  Direct personal management and extensive interpretation of the data conducted.   Assessment and recommendations as above.

## 2018-04-18 NOTE — PROGRESS NOTE ADULT - PROBLEM SELECTOR PLAN 4
Resolved.  Anion gap metabolic acidosis from uremia + starvation ketosis    #colitis  observed on CT.  Questionable diarrhea x 3 weeks.  S/p colonoscopy and EGD with Dr. Mallory   Per GI: EGD showed questionable varices, gastritis and shallow ulcres;  colonoscopy  showed mild segmental colitis and polyps  - f/u biopsy results  - c/w PPI  - liver sonogram as outpatient

## 2018-04-18 NOTE — PROGRESS NOTE ADULT - PROBLEM SELECTOR PLAN 1
1) Continue  Ceftriaxone 2 gr q24h; may switch to oral Ciprofloxacin prior to discharge, to continue 3-4 weeks of Ciprofloxacin(better oral absorption and penetration into Prostate then oral cephalosporins)  2) Diarrhea resolved, c/o constipation ( ? pancolitis on CT Abdomen)  3) Urology consult-Dr Tipton, possible Bx in future  4) Continue Flomax and Proscar  5) New PSA improved fro 9.5 to 6.5  6) TOV.

## 2018-04-18 NOTE — PROGRESS NOTE ADULT - PROBLEM SELECTOR PLAN 5
-with E.coli bacteremia.   rocephin d/c'ed, started on po cipro 500 mg q12h  - f/u surveillance blood cultures NGTD  - No CVA tenderness despite perirenal fat stranding on CT

## 2018-04-18 NOTE — PROGRESS NOTE ADULT - PROBLEM SELECTOR PLAN 3
Resolved, creatinine below baseline.  1.53, baseline 2.2.  Creatinine on arrival 3.55, improved with IVF.  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  -urology following.  started flomax and proscar.  c/w indwelling palacio.  -renal U/S with moderate b/l hydro, persistent on repeat  -holding  lasix

## 2018-04-18 NOTE — PROGRESS NOTE ADULT - SUBJECTIVE AND OBJECTIVE BOX
Patient seen and examined. Patient passed trial of void. Still feels like he may not be emptying 100% but feels well and creat continues to come down. Patient should be discharged on Flomax and follow up with Dr. Courtney or at resident clinic for further follow up of BPH and elevated PSA of 9. Reconsult as needed.    ICU Vital Signs Last 24 Hrs  T(C): 37 (18 Apr 2018 09:56), Max: 37 (18 Apr 2018 09:56)  T(F): 98.6 (18 Apr 2018 09:56), Max: 98.6 (18 Apr 2018 09:56)  HR: 83 (18 Apr 2018 09:56) (71 - 83)  BP: 116/69 (18 Apr 2018 09:56) (116/69 - 138/75)  BP(mean): --  ABP: --  ABP(mean): --  RR: 15 (18 Apr 2018 09:56) (15 - 16)  SpO2: 96% (18 Apr 2018 09:56) (96% - 99%)                          7.9    6.2   )-----------( 145      ( 18 Apr 2018 06:34 )             24.0     04-18    135  |  98  |  25<H>  ----------------------------<  132<H>  3.5   |  21<L>  |  1.57<H>    Ca    8.6      18 Apr 2018 06:34  Phos  3.2     04-18  Mg     1.8     04-18

## 2018-04-18 NOTE — PROGRESS NOTE ADULT - PROBLEM SELECTOR PLAN 6
Normocytic.  Hgb ~9 at presentation.  Now 7.9.  Dilutional.  VSS.  Denies lightheadedness, SOB, CP, palpitations.    -Mild bleeding post-palacio placement resolved.  iron studies c/w SERA.  started PO supplementation.  -EPO level 10.9 (WNL, inappropriately normal).  started EPO.

## 2018-04-18 NOTE — PROGRESS NOTE ADULT - SUBJECTIVE AND OBJECTIVE BOX
HPI:  71yo M with PMH of HTN, HLD, DMT2, prostatomegaly, h/o SVT s/p ablation (2011) presents  here with progressive weakness for one month a/w abdominal distension. Pt reports no known history of kidney disease and was asked by his PMD to see a urologist that he never did. He reports increased dyspnea on exertion, usually can climb more than one flight of stairs but get SOB with less than one flight now a/w anorexia, denies any F/C, chest pain, cough, abdominal pain, N/V. He has constipation and small amount of non-bloody diarrhea. He denies alcohol abuse, never had colonoscopy.     ED course: VSS. Labs with anemia Hb 9.1/27, thrombocytopenia (137). Hyponatremia, hypokalemia, anion gap metabolic acidosis with elevated beta-hydroxybutyrate, lactate 1.1, UA with pyuria, leukocyte esterase with bacteria. Pt was given insulin 4u once and 2l NS bolus. A CT abd/pelvis was performed. (11 Apr 2018 06:02)    FAMILY HISTORY:  No pertinent family history in first degree relatives    MEDICATIONS  (STANDING):  aspirin  chewable 81 milliGRAM(s) Oral daily  ciprofloxacin     Tablet 500 milliGRAM(s) Oral every 12 hours  dextrose 5%. 1000 milliLiter(s) (50 mL/Hr) IV Continuous <Continuous>  dextrose 50% Injectable 12.5 Gram(s) IV Push once  dextrose 50% Injectable 25 Gram(s) IV Push once  dextrose 50% Injectable 25 Gram(s) IV Push once  docusate sodium 100 milliGRAM(s) Oral two times a day  epoetin kathi Injectable 26803 Unit(s) SubCutaneous every 48 hours  finasteride 5 milliGRAM(s) Oral daily  influenza   Vaccine 0.5 milliLiter(s) IntraMuscular once  insulin glargine Injectable (LANTUS) 8 Unit(s) SubCutaneous every morning  insulin lispro (HumaLOG) corrective regimen sliding scale   SubCutaneous Before meals and at bedtime  insulin lispro Injectable (HumaLOG) 8 Unit(s) SubCutaneous three times a day before meals  melatonin 5 milliGRAM(s) Oral at bedtime  multivitamin 1 Tablet(s) Oral daily  senna 1 Tablet(s) Oral <User Schedule>  simvastatin 10 milliGRAM(s) Oral at bedtime  tamsulosin 0.4 milliGRAM(s) Oral at bedtime    MEDICATIONS  (PRN):  acetaminophen   Tablet 650 milliGRAM(s) Oral every 6 hours PRN For Temp greater than 38 C (100.4 F)  dextrose Gel 1 Dose(s) Oral once PRN Blood Glucose LESS THAN 70 milliGRAM(s)/deciliter  glucagon  Injectable 1 milliGRAM(s) IntraMuscular once PRN Glucose LESS THAN 70 milligrams/deciliter    Vital Signs Last 24 Hrs  T(C): 37 (18 Apr 2018 09:56), Max: 37 (18 Apr 2018 09:56)  T(F): 98.6 (18 Apr 2018 09:56), Max: 98.6 (18 Apr 2018 09:56)  HR: 83 (18 Apr 2018 09:56) (76 - 83)  BP: 116/69 (18 Apr 2018 09:56) (116/69 - 136/76)  BP(mean): --  RR: 15 (18 Apr 2018 09:56) (15 - 16)  SpO2: 96% (18 Apr 2018 09:56) (96% - 96%)    Physical exam:    Overall impression  Lymphadenopathy  Liver  spleen    Labs:  CBC Full  -  ( 18 Apr 2018 06:34 )  WBC Count : 6.2 K/uL  Hemoglobin : 7.9 g/dL  Hematocrit : 24.0 %  Platelet Count - Automated : 145 K/uL  Mean Cell Volume : 84.2 fL  Mean Cell Hemoglobin : 27.7 pg  Mean Cell Hemoglobin Concentration : 32.9 g/dL  Auto Neutrophil # : x  Auto Lymphocyte # : x  Auto Monocyte # : x  Auto Eosinophil # : x  Auto Basophil # : x  Auto Neutrophil % : x  Auto Lymphocyte % : x  Auto Monocyte % : x  Auto Eosinophil % : x  Auto Basophil % : x    04-18    135  |  98  |  25<H>  ----------------------------<  132<H>  3.5   |  21<L>  |  1.57<H>    Ca    8.6      18 Apr 2018 06:34  Phos  3.2     04-18  Mg     1.8     04-18        Radiology:  HEALTH ISSUES - R/O PROBLEM Dx:      Assessmant / Problems  Appreciate Dr Huerta, Dr barroso, Dr Campbell input  1) As discussed with Dr Huerta  Prostatitis/ UTI/ Urosepsis improving  Will switch to Ciprofloxacilin   2) Anemia CRF on Procrit  Hg 7.9  Will transfuse 1 U PRBC in view of d/c  IV iron  3) GI w/u seen       Thank you  Olivia Roberts MD

## 2018-04-18 NOTE — PROGRESS NOTE ADULT - PROBLEM SELECTOR PLAN 7
-Intermittently hypertensive, asymptomatic.  Continue holding home lasix in the setting of AAMIR.  Restart HCTZ PRN    #DM  -A1c 7.2.  Not insulin dependent.  Holding home janumet and glyburide.    -Lantus 8 qam + 8 units mealtime TID.  ISS.  monitor FS

## 2018-04-18 NOTE — PROGRESS NOTE ADULT - SUBJECTIVE AND OBJECTIVE BOX
INTERVAL HPI/OVERNIGHT EVENTS: s/p Rodriguez removal yesterday, voiding    CONSTITUTIONAL:  Negative fever or chills, feels well, good appetite  EYES:  Negative  blurry vision or double vision  CARDIOVASCULAR:  Negative for chest pain or palpitations  RESPIRATORY:  Negative for cough, wheezing, or SOB   GASTROINTESTINAL:  Negative for nausea, vomiting, diarrhea, constipation, or abdominal pain  GENITOURINARY:  Negative frequency, urgency or dysuria  NEUROLOGIC:  No headache, confusion, dizziness, lightheadedness      ANTIBIOTICS/RELEVANT:    MEDICATIONS  (STANDING):  aspirin  chewable 81 milliGRAM(s) Oral daily  ciprofloxacin     Tablet 500 milliGRAM(s) Oral every 12 hours  dextrose 5%. 1000 milliLiter(s) (50 mL/Hr) IV Continuous <Continuous>  dextrose 50% Injectable 12.5 Gram(s) IV Push once  dextrose 50% Injectable 25 Gram(s) IV Push once  dextrose 50% Injectable 25 Gram(s) IV Push once  docusate sodium 100 milliGRAM(s) Oral two times a day  epoetin kathi Injectable 85652 Unit(s) SubCutaneous every 48 hours  finasteride 5 milliGRAM(s) Oral daily  influenza   Vaccine 0.5 milliLiter(s) IntraMuscular once  insulin glargine Injectable (LANTUS) 8 Unit(s) SubCutaneous every morning  insulin lispro (HumaLOG) corrective regimen sliding scale   SubCutaneous Before meals and at bedtime  insulin lispro Injectable (HumaLOG) 8 Unit(s) SubCutaneous three times a day before meals  melatonin 5 milliGRAM(s) Oral at bedtime  multivitamin 1 Tablet(s) Oral daily  senna 1 Tablet(s) Oral <User Schedule>  simvastatin 10 milliGRAM(s) Oral at bedtime  sodium ferric gluconate complex IVPB 25 milliGRAM(s) IV Intermittent once  sodium ferric gluconate complex IVPB 100 milliGRAM(s) IV Intermittent once  tamsulosin 0.4 milliGRAM(s) Oral at bedtime    MEDICATIONS  (PRN):  acetaminophen   Tablet 650 milliGRAM(s) Oral every 6 hours PRN For Temp greater than 38 C (100.4 F)  dextrose Gel 1 Dose(s) Oral once PRN Blood Glucose LESS THAN 70 milliGRAM(s)/deciliter  glucagon  Injectable 1 milliGRAM(s) IntraMuscular once PRN Glucose LESS THAN 70 milligrams/deciliter        Vital Signs Last 24 Hrs  T(C): 36.6 (18 Apr 2018 16:21), Max: 37 (18 Apr 2018 09:56)  T(F): 97.9 (18 Apr 2018 16:21), Max: 98.6 (18 Apr 2018 09:56)  HR: 85 (18 Apr 2018 16:21) (76 - 85)  BP: 132/74 (18 Apr 2018 16:21) (116/69 - 136/76)  BP(mean): --  RR: 16 (18 Apr 2018 16:21) (15 - 16)  SpO2: 96% (18 Apr 2018 16:21) (96% - 96%)    04-17-18 @ 07:01  -  04-18-18 @ 07:00  --------------------------------------------------------  IN: 0 mL / OUT: 1755 mL / NET: -1755 mL    04-18-18 @ 07:01  -  04-18-18 @ 17:21  --------------------------------------------------------  IN: 0 mL / OUT: 800 mL / NET: -800 mL      PHYSICAL EXAM:  Constitutional: Well-developed, well nourished  Eyes: legally blind  Ear/Nose/Throat: no oral lesion, no sinus tenderness on percussion	  Neck:no JVD, no lymphadenopathy, supple  Respiratory: CTA chuck  Cardiovascular: S1S2 RRR, no murmurs  Gastrointestinal:soft, (+) BS, no HSM  Extremities:no e/e/c  Vascular: DP Pulse: right normal; left normal      LABS:                        7.9    6.2   )-----------( 145      ( 18 Apr 2018 06:34 )             24.0     04-18    135  |  98  |  25<H>  ----------------------------<  132<H>  3.5   |  21<L>  |  1.57<H>    Ca    8.6      18 Apr 2018 06:34  Phos  3.2     04-18  Mg     1.8     04-18      MICROBIOLOGY:  Culture - Stool (04.17.18 @ 22:43)    Specimen Source: .Stool Feces    Culture Results:   No growth to date  Culture in progress    Culture - Blood (04.12.18 @ 11:37)    Specimen Source: .Blood One Set    Culture Results:   No growth at 5 days.        RADIOLOGY & ADDITIONAL STUDIES:

## 2018-04-18 NOTE — PROVIDER CONTACT NOTE (OTHER) - SITUATION
Blader ckym=[id number] at 12:05 am. Patient voided 50 ml at 10:40 pm. Bladder scan repeated at 1:10 am after voiding 100 dg=988. Patient denies any suprapubic pain, but does report soreness of penis.

## 2018-04-18 NOTE — PROGRESS NOTE ADULT - SUBJECTIVE AND OBJECTIVE BOX
INTERVAL HPI/OVERNIGHT EVENTS: 72yMale  Bladder scanned overnight, initially 0cc. Later Post void residual recorded as 594. Urology called early in a.m. and re-checked and found to be 0cc, and reported no need for Palacio placement.   Bowel prepped overnight for colonoscopy today    S/p EGD and colonoscopy this morning    SUBJECTIVE: Patient seen and examined this morning at bedside prior to double scope. Denied any acute complaints. Denied F/C, CP, SOB, cough, abd pain, N/V/D/C, flank/back pain, leg pain.    ROS: 10 system ROS otherwise negative except for those noted in HPI    VITAL SIGNS:  T(F): 97.9 (04-18-18 @ 16:21)  HR: 85 (04-18-18 @ 16:21)  BP: 132/74 (04-18-18 @ 16:21)  RR: 16 (04-18-18 @ 16:21)  SpO2: 96% (04-18-18 @ 16:21)      PHYSICAL EXAM:  General:  NAD, nontoxic appearing, WDWN  HENT:  EOMI,  oropharynx WNL.  MMM  Neck:  Trachea midline.  No JVD, LAD, or thyromegaly.  Heart:  S1S2 no M/R/G, regular  Lungs:  CTAB no wheezing, rhonchi or rales.  No accessory muscle use.  No respiratory distress.  Abdomen:  NABS.  soft, nontender, nondistended.  no guarding.  no ascites.  no organomegaly.  Vascular:  Peripheral pulses palpable  Extremities:  Trace pitting edema BLE.  Calves nontender.    Back:  Mild bilateral low back TTP.  No CVA tenderness  Neuro:  AOx3, no facial asymmetry, nonfocal, no slurred speech  Skin:  No rash  (+) palacio with yellow urine      MEDICATIONS  (STANDING):  aspirin  chewable 81 milliGRAM(s) Oral daily  ciprofloxacin     Tablet 500 milliGRAM(s) Oral every 12 hours  dextrose 5%. 1000 milliLiter(s) (50 mL/Hr) IV Continuous <Continuous>  dextrose 50% Injectable 12.5 Gram(s) IV Push once  dextrose 50% Injectable 25 Gram(s) IV Push once  dextrose 50% Injectable 25 Gram(s) IV Push once  docusate sodium 100 milliGRAM(s) Oral two times a day  epoetin kathi Injectable 90162 Unit(s) SubCutaneous every 48 hours  finasteride 5 milliGRAM(s) Oral daily  influenza   Vaccine 0.5 milliLiter(s) IntraMuscular once  insulin glargine Injectable (LANTUS) 8 Unit(s) SubCutaneous every morning  insulin lispro (HumaLOG) corrective regimen sliding scale   SubCutaneous Before meals and at bedtime  insulin lispro Injectable (HumaLOG) 8 Unit(s) SubCutaneous three times a day before meals  melatonin 5 milliGRAM(s) Oral at bedtime  multivitamin 1 Tablet(s) Oral daily  senna 1 Tablet(s) Oral <User Schedule>  simvastatin 10 milliGRAM(s) Oral at bedtime  sodium ferric gluconate complex IVPB 25 milliGRAM(s) IV Intermittent once  sodium ferric gluconate complex IVPB 100 milliGRAM(s) IV Intermittent once  tamsulosin 0.4 milliGRAM(s) Oral at bedtime    MEDICATIONS  (PRN):  acetaminophen   Tablet 650 milliGRAM(s) Oral every 6 hours PRN For Temp greater than 38 C (100.4 F)  dextrose Gel 1 Dose(s) Oral once PRN Blood Glucose LESS THAN 70 milliGRAM(s)/deciliter  glucagon  Injectable 1 milliGRAM(s) IntraMuscular once PRN Glucose LESS THAN 70 milligrams/deciliter      Allergies    No Known Allergies    Intolerances        LABS:                        7.9    6.2   )-----------( 145      ( 18 Apr 2018 06:34 )             24.0     04-18    135  |  98  |  25<H>  ----------------------------<  132<H>  3.5   |  21<L>  |  1.57<H>    Ca    8.6      18 Apr 2018 06:34  Phos  3.2     04-18  Mg     1.8     04-18            RADIOLOGY & ADDITIONAL TESTS:

## 2018-04-18 NOTE — PROGRESS NOTE ADULT - PROBLEM SELECTOR PLAN 2
-Positive blood cx on admission.  C/w Rocephin for now.  Sensitive to cipro.  Treatment of UTI as below.  switched from Rocephin to Cipro 500 mg q12h today  -Afebrile.  Nontoxic appearing.  Normotensive.  Lactate <2 on admission.  -f/u surveillance cx NGTD

## 2018-04-18 NOTE — PROGRESS NOTE ADULT - PROBLEM SELECTOR PLAN 1
-Presenting with urinary retention.  severely enlarged prostate, bladder distension, and bilateral hydronephrosis observed on CT.    -s/p palacio  -unclear diagnosis, likely BPH.  Had been referred to uro outpt but did not follow up.  -started flomax and proscar  - TOV successful and uro rec no need for Palacio  and outpt f/u / biopsy  -treatment of UTI as below  -PSA elevated 9.8 downtrending to 6, likely 2/2 palacio manipulation

## 2018-04-18 NOTE — PROGRESS NOTE ADULT - SUBJECTIVE AND OBJECTIVE BOX
Pt seen and examined EGD/ colonoscopy today    REVIEW OF SYSTEMS:  Constitutional: No fever, weight loss or fatigue  Cardiovascular: No chest pain, palpitations, dizziness or leg swelling  Gastrointestinal: No abdominal or epigastric pain. No nausea, vomiting or hematemesis; No diarrhea or constipation. No melena or hematochezia.  Skin: No itching, burning, rashes or lesions       MEDICATIONS:  MEDICATIONS  (STANDING):  aspirin  chewable 81 milliGRAM(s) Oral daily  cefTRIAXone Injectable. 2000 milliGRAM(s) IV Push every 24 hours  dextrose 5%. 1000 milliLiter(s) (50 mL/Hr) IV Continuous <Continuous>  dextrose 50% Injectable 12.5 Gram(s) IV Push once  dextrose 50% Injectable 25 Gram(s) IV Push once  dextrose 50% Injectable 25 Gram(s) IV Push once  docusate sodium 100 milliGRAM(s) Oral two times a day  epoetin kathi Injectable 79653 Unit(s) SubCutaneous every 48 hours  finasteride 5 milliGRAM(s) Oral daily  influenza   Vaccine 0.5 milliLiter(s) IntraMuscular once  insulin glargine Injectable (LANTUS) 8 Unit(s) SubCutaneous every morning  insulin lispro (HumaLOG) corrective regimen sliding scale   SubCutaneous Before meals and at bedtime  insulin lispro Injectable (HumaLOG) 8 Unit(s) SubCutaneous three times a day before meals  magnesium sulfate  IVPB 1 Gram(s) IV Intermittent once  melatonin 5 milliGRAM(s) Oral at bedtime  multivitamin 1 Tablet(s) Oral daily  potassium chloride    Tablet ER 40 milliEquivalent(s) Oral once  senna 1 Tablet(s) Oral <User Schedule>  simvastatin 10 milliGRAM(s) Oral at bedtime  tamsulosin 0.4 milliGRAM(s) Oral at bedtime    MEDICATIONS  (PRN):  acetaminophen   Tablet 650 milliGRAM(s) Oral every 6 hours PRN For Temp greater than 38 C (100.4 F)  dextrose Gel 1 Dose(s) Oral once PRN Blood Glucose LESS THAN 70 milliGRAM(s)/deciliter  glucagon  Injectable 1 milliGRAM(s) IntraMuscular once PRN Glucose LESS THAN 70 milligrams/deciliter      Allergies    No Known Allergies    Intolerances        Vital Signs Last 24 Hrs  T(C): 36.5 (2018 06:33), Max: 36.9 (2018 21:21)  T(F): 97.7 (2018 06:33), Max: 98.4 (2018 21:21)  HR: 76 (2018 06:33) (71 - 77)  BP: 131/73 (2018 06:33) (131/73 - 138/75)  BP(mean): --  RR: 16 (2018 06:33) (15 - 16)  SpO2: 96% (2018 06:33) (96% - 99%)    04-17 @ 07:01  -  04-18 @ 07:00  --------------------------------------------------------  IN: 0 mL / OUT: 1755 mL / NET: -1755 mL        PHYSICAL EXAM:    General: Well developed; well nourished; in no acute distress  HEENT: MMM, conjunctiva and sclera clear  Gastrointestinal: Soft non-tender non-distended; Normal bowel sounds; No hepatosplenomegaly  Skin: Warm and dry. No obvious rash    LABS:      CBC Full  -  ( 2018 06:34 )  WBC Count : 6.2 K/uL  Hemoglobin : 7.9 g/dL  Hematocrit : 24.0 %  Platelet Count - Automated : 145 K/uL  Mean Cell Volume : 84.2 fL  Mean Cell Hemoglobin : 27.7 pg  Mean Cell Hemoglobin Concentration : 32.9 g/dL  Auto Neutrophil # : x  Auto Lymphocyte # : x  Auto Monocyte # : x  Auto Eosinophil # : x  Auto Basophil # : x  Auto Neutrophil % : x  Auto Lymphocyte % : x  Auto Monocyte % : x  Auto Eosinophil % : x  Auto Basophil % : x    18    135  |  98  |  25<H>  ----------------------------<  132<H>  3.5   |  21<L>  |  1.57<H>    Ca    8.6      2018 06:34  Phos  3.2     04-18  Mg     1.8     04-18            Urinalysis Basic - ( 2018 16:04 )    Color: Yellow / Appearance: Clear / S.015 / pH: x  Gluc: x / Ketone: NEGATIVE  / Bili: Negative / Urobili: 0.2 E.U./dL   Blood: x / Protein: 30 mg/dL / Nitrite: NEGATIVE   Leuk Esterase: NEGATIVE / RBC: Many /HPF / WBC 5-10 /HPF   Sq Epi: x / Non Sq Epi: 0-5 /HPF / Bacteria: Present /HPF                RADIOLOGY & ADDITIONAL STUDIES (The following images were personally reviewed):

## 2018-04-19 VITALS
OXYGEN SATURATION: 98 % | RESPIRATION RATE: 15 BRPM | DIASTOLIC BLOOD PRESSURE: 73 MMHG | TEMPERATURE: 98 F | HEART RATE: 76 BPM | SYSTOLIC BLOOD PRESSURE: 131 MMHG

## 2018-04-19 DIAGNOSIS — N39.0 URINARY TRACT INFECTION, SITE NOT SPECIFIED: ICD-10-CM

## 2018-04-19 LAB
ANION GAP SERPL CALC-SCNC: 11 MMOL/L — SIGNIFICANT CHANGE UP (ref 5–17)
BUN SERPL-MCNC: 25 MG/DL — HIGH (ref 7–23)
CALCIUM SERPL-MCNC: 9.4 MG/DL — SIGNIFICANT CHANGE UP (ref 8.4–10.5)
CHLORIDE SERPL-SCNC: 101 MMOL/L — SIGNIFICANT CHANGE UP (ref 96–108)
CO2 SERPL-SCNC: 26 MMOL/L — SIGNIFICANT CHANGE UP (ref 22–31)
CREAT SERPL-MCNC: 1.74 MG/DL — HIGH (ref 0.5–1.3)
CULTURE RESULTS: SIGNIFICANT CHANGE UP
GLUCOSE BLDC GLUCOMTR-MCNC: 145 MG/DL — HIGH (ref 70–99)
GLUCOSE BLDC GLUCOMTR-MCNC: 210 MG/DL — HIGH (ref 70–99)
GLUCOSE SERPL-MCNC: 178 MG/DL — HIGH (ref 70–99)
HCT VFR BLD CALC: 29.8 % — LOW (ref 39–50)
HGB BLD-MCNC: 9.7 G/DL — LOW (ref 13–17)
MAGNESIUM SERPL-MCNC: 2 MG/DL — SIGNIFICANT CHANGE UP (ref 1.6–2.6)
MCHC RBC-ENTMCNC: 28.2 PG — SIGNIFICANT CHANGE UP (ref 27–34)
MCHC RBC-ENTMCNC: 32.6 G/DL — SIGNIFICANT CHANGE UP (ref 32–36)
MCV RBC AUTO: 86.6 FL — SIGNIFICANT CHANGE UP (ref 80–100)
PLATELET # BLD AUTO: 166 K/UL — SIGNIFICANT CHANGE UP (ref 150–400)
POTASSIUM SERPL-MCNC: 4.8 MMOL/L — SIGNIFICANT CHANGE UP (ref 3.5–5.3)
POTASSIUM SERPL-SCNC: 4.8 MMOL/L — SIGNIFICANT CHANGE UP (ref 3.5–5.3)
RBC # BLD: 3.44 M/UL — LOW (ref 4.2–5.8)
RBC # FLD: 14.2 % — SIGNIFICANT CHANGE UP (ref 10.3–16.9)
SODIUM SERPL-SCNC: 138 MMOL/L — SIGNIFICANT CHANGE UP (ref 135–145)
SPECIMEN SOURCE: SIGNIFICANT CHANGE UP
SURGICAL PATHOLOGY STUDY: SIGNIFICANT CHANGE UP
WBC # BLD: 5.4 K/UL — SIGNIFICANT CHANGE UP (ref 3.8–10.5)
WBC # FLD AUTO: 5.4 K/UL — SIGNIFICANT CHANGE UP (ref 3.8–10.5)

## 2018-04-19 RX ORDER — SENNA PLUS 8.6 MG/1
1 TABLET ORAL
Qty: 30 | Refills: 0
Start: 2018-04-19 | End: 2018-05-18

## 2018-04-19 RX ORDER — GLYBURIDE 5 MG
1 TABLET ORAL
Qty: 30 | Refills: 0
Start: 2018-04-19 | End: 2018-05-18

## 2018-04-19 RX ORDER — CARVEDILOL PHOSPHATE 80 MG/1
1 CAPSULE, EXTENDED RELEASE ORAL
Qty: 60 | Refills: 0 | OUTPATIENT
Start: 2018-04-19 | End: 2018-05-18

## 2018-04-19 RX ORDER — MOXIFLOXACIN HYDROCHLORIDE TABLETS, 400 MG 400 MG/1
1 TABLET, FILM COATED ORAL
Qty: 26 | Refills: 0 | OUTPATIENT
Start: 2018-04-19 | End: 2018-05-01

## 2018-04-19 RX ORDER — SITAGLIPTIN AND METFORMIN HYDROCHLORIDE 500; 50 MG/1; MG/1
1 TABLET, FILM COATED ORAL
Qty: 60 | Refills: 0 | OUTPATIENT
Start: 2018-04-19 | End: 2018-05-18

## 2018-04-19 RX ORDER — PANTOPRAZOLE SODIUM 20 MG/1
1 TABLET, DELAYED RELEASE ORAL
Qty: 30 | Refills: 0 | OUTPATIENT
Start: 2018-04-19 | End: 2018-05-18

## 2018-04-19 RX ORDER — FUROSEMIDE 40 MG
1 TABLET ORAL
Qty: 30 | Refills: 0
Start: 2018-04-19 | End: 2018-05-18

## 2018-04-19 RX ORDER — DOCUSATE SODIUM 100 MG
1 CAPSULE ORAL
Qty: 60 | Refills: 0
Start: 2018-04-19 | End: 2018-05-18

## 2018-04-19 RX ORDER — ASPIRIN/CALCIUM CARB/MAGNESIUM 324 MG
1 TABLET ORAL
Qty: 30 | Refills: 0 | OUTPATIENT
Start: 2018-04-19 | End: 2018-05-18

## 2018-04-19 RX ORDER — ASCORBIC ACID 60 MG
1 TABLET,CHEWABLE ORAL
Qty: 30 | Refills: 0 | OUTPATIENT
Start: 2018-04-19 | End: 2018-05-18

## 2018-04-19 RX ORDER — SIMVASTATIN 20 MG/1
1 TABLET, FILM COATED ORAL
Qty: 30 | Refills: 0
Start: 2018-04-19 | End: 2018-05-18

## 2018-04-19 RX ADMIN — Medication 8 UNIT(S): at 08:23

## 2018-04-19 RX ADMIN — Medication 1 TABLET(S): at 11:39

## 2018-04-19 RX ADMIN — Medication 81 MILLIGRAM(S): at 11:39

## 2018-04-19 RX ADMIN — INSULIN GLARGINE 8 UNIT(S): 100 INJECTION, SOLUTION SUBCUTANEOUS at 08:24

## 2018-04-19 RX ADMIN — Medication 500 MILLIGRAM(S): at 05:32

## 2018-04-19 RX ADMIN — Medication 4: at 12:41

## 2018-04-19 RX ADMIN — SENNA PLUS 1 TABLET(S): 8.6 TABLET ORAL at 05:32

## 2018-04-19 RX ADMIN — Medication 100 MILLIGRAM(S): at 05:32

## 2018-04-19 RX ADMIN — Medication 8 UNIT(S): at 12:41

## 2018-04-19 NOTE — PROGRESS NOTE ADULT - PROVIDER SPECIALTY LIST ADULT
Endocrinology
Gastroenterology
Gastroenterology
Heme/Onc
Infectious Disease
Internal Medicine
Rehab Medicine
Urology
Urology
Endocrinology
Gastroenterology
Internal Medicine
Internal Medicine
Endocrinology
Infectious Disease

## 2018-04-19 NOTE — PROGRESS NOTE ADULT - PROBLEM SELECTOR PROBLEM 1
Bacteremia due to Escherichia coli
Obstructive uropathy
Urinary tract infection with hematuria, site unspecified
Obstructive uropathy

## 2018-04-19 NOTE — PROGRESS NOTE ADULT - PROBLEM SELECTOR PLAN 1
1) Continue  Ceftriaxone 2 gr q24h; may switch to oral Ciprofloxacin prior to discharge, to continue 3-4 weeks of Ciprofloxacin(better oral absorption and penetration into Prostate then oral cephalosporins)  2) Diarrhea resolved, c/o constipation ( ? pancolitis on CT Abdomen); stool culture negative  3) Urology consult-Dr Tipton, possible Bx in future  4) Continue Flomax and Proscar  5) New PSA improved fro 9.5 to 6.5

## 2018-04-19 NOTE — PROGRESS NOTE ADULT - ASSESSMENT
colonoscopy tomorrow.  prep  today
HPI:  73yo M with PMH of HTN, HLD, DMT2, prostatomegaly, h/o SVT s/p ablation (2011) presents  here with progressive weakness for one month a/w abdominal distension. Pt reports no known history of kidney disease and was asked by his PMD to see a urologist that he never did. He reports increased dyspnea on exertion, usually can climb more than one flight of stairs but get SOB with less than one flight now a/w anorexia, denies any F/C, chest pain, cough, abdominal pain, N/V. He has constipation and small amount of non-bloody diarrhea. He denies alcohol abuse, never had colonoscopy.     ED course: VSS. Labs with anemia Hb 9.1/27, thrombocytopenia (137). Hyponatremia, hypokalemia, anion gap metabolic acidosis with elevated beta-hydroxybutyrate, lactate 1.1, UA with pyuria, leukocyte esterase with bacteria. Pt was given insulin 4u once and 2l NS bolus. A CT abd/pelvis was performed. (11 Apr 2018 06:02)    FAMILY HISTORY:  No pertinent family history in first degree relatives    MEDICATIONS  (STANDING):  aspirin  chewable 81 milliGRAM(s) Oral daily  ciprofloxacin     Tablet 500 milliGRAM(s) Oral every 12 hours  dextrose 5%. 1000 milliLiter(s) (50 mL/Hr) IV Continuous <Continuous>  dextrose 50% Injectable 12.5 Gram(s) IV Push once  dextrose 50% Injectable 25 Gram(s) IV Push once  dextrose 50% Injectable 25 Gram(s) IV Push once  docusate sodium 100 milliGRAM(s) Oral two times a day  epoetin kathi Injectable 08817 Unit(s) SubCutaneous every 48 hours  finasteride 5 milliGRAM(s) Oral daily  influenza   Vaccine 0.5 milliLiter(s) IntraMuscular once  insulin glargine Injectable (LANTUS) 8 Unit(s) SubCutaneous every morning  insulin lispro (HumaLOG) corrective regimen sliding scale   SubCutaneous Before meals and at bedtime  insulin lispro Injectable (HumaLOG) 8 Unit(s) SubCutaneous three times a day before meals  melatonin 5 milliGRAM(s) Oral at bedtime  multivitamin 1 Tablet(s) Oral daily  senna 1 Tablet(s) Oral <User Schedule>  simvastatin 10 milliGRAM(s) Oral at bedtime  tamsulosin 0.4 milliGRAM(s) Oral at bedtime    MEDICATIONS  (PRN):  acetaminophen   Tablet 650 milliGRAM(s) Oral every 6 hours PRN For Temp greater than 38 C (100.4 F)  dextrose Gel 1 Dose(s) Oral once PRN Blood Glucose LESS THAN 70 milliGRAM(s)/deciliter  glucagon  Injectable 1 milliGRAM(s) IntraMuscular once PRN Glucose LESS THAN 70 milligrams/deciliter    Vital Signs Last 24 Hrs  T(C): 36.6 (19 Apr 2018 05:37), Max: 37 (18 Apr 2018 09:56)  T(F): 97.9 (19 Apr 2018 05:37), Max: 98.6 (18 Apr 2018 09:56)  HR: 79 (19 Apr 2018 05:37) (71 - 85)  BP: 148/77 (19 Apr 2018 05:37) (116/69 - 151/72)  BP(mean): --  RR: 18 (19 Apr 2018 05:37) (15 - 18)  SpO2: 96% (19 Apr 2018 05:37) (96% - 98%)    Physical exam:    Overall impression  Lymphadenopathy  Liver  spleen    Labs:  CBC Full  -  ( 18 Apr 2018 06:34 )  WBC Count : 6.2 K/uL  Hemoglobin : 7.9 g/dL  Hematocrit : 24.0 %  Platelet Count - Automated : 145 K/uL  Mean Cell Volume : 84.2 fL  Mean Cell Hemoglobin : 27.7 pg  Mean Cell Hemoglobin Concentration : 32.9 g/dL  Auto Neutrophil # : x  Auto Lymphocyte # : x  Auto Monocyte # : x  Auto Eosinophil # : x  Auto Basophil # : x  Auto Neutrophil % : x  Auto Lymphocyte % : x  Auto Monocyte % : x  Auto Eosinophil % : x  Auto Basophil % : x    04-18    135  |  98  |  25<H>  ----------------------------<  132<H>  3.5   |  21<L>  |  1.57<H>    Ca    8.6      18 Apr 2018 06:34  Phos  3.2     04-18  Mg     1.8     04-18        Radiology:  HEALTH ISSUES - R/O PROBLEM Dx:      Assessmant / Problems  1) Urinary retention resolved per Urology resident  2) Anemia s/p 1 U PRBC, on Procrit and iv iron  3)DM better controlled  4)UTI currently on Ciprofloxacillin per ID remains afebrile  Plan:  repeat CBC, PSA, SMA  Thank you  Olivia Roberts MD
71yo M with PMH of HTN, HLD, DM Type-2, prostatomegaly, h/o SVT s/p ablation (2011) presents  here with progressive weakness for one month a/w abdominal distension, found to have AAMIR, bilateral nephrosis and urinary retention likely from an enlarged prostate, CT (+) chuck Hydronephrosis and pancolitis, now E coli bacteremia and E coli UTI
71yo M with PMH of HTN, HLD, DM Type-2, prostatomegaly, h/o SVT s/p ablation (2011) presents  here with progressive weakness for one month a/w abdominal distension, found to have AAMIR, bilateral nephrosis and urinary retention likely from an enlarged prostate, CT (+) chuck Hydronephrosis and pancolitis, now E coli bacteremia and E coli UTI, improved
71yo M with PMH of HTN, HLD, DM Type-2, prostatomegaly, h/o SVT s/p ablation (2011) presents  here with progressive weakness for one month a/w abdominal distension, found to have AAMIR, bilateral nephrosis and urinary retention likely from an enlarged prostate, CT (+) chuck Hydronephrosis and pancolitis, now E coli bacteremia and E coli UTI, improved
71yo M with PMH of HTN, HLD, DM Type-2, prostatomegaly, h/o SVT s/p ablation (2011) presents  here with progressive weakness for one month a/w abdominal distension, found to have AAMIR, bilateral nephrosis and urinary retention likely from an enlarged prostate, CT (+) chuck Hydronephrosis and pancolitis, now E coli bacteremia and E coli UTI, improved renal function and urinary retention, PSA also decreased
71yo M with PMH of HTN, HLD, DMT2, prostatomegaly, h/o SVT s/p ablation (2011) presenting with progressive weakness admitted with obstructive uropathy, AAMIR, UTI, and colitis.
71yo M with PMH of HTN, HLD, DMT2, prostatomegaly, h/o SVT s/p ablation (2011) presenting with progressive weakness admitted with obstructive uropathy, AAMIR, UTI, and colitis.
71yo M with PMH of HTN, HLD, DMT2, prostatomegaly, h/o SVT s/p ablation (2011) presents  here with progressive weakness for one month a/w abdominal distension, found to have AAMIR, bilateral nephrosis and urinary retention likely from an enlarged prostate, CT (+) chuck Hydronephrosis and pancolitis, now E coli bacteremia and E coli UTI
73yo M with PMH of HTN, HLD, DM Type-2, prostatomegaly, h/o SVT s/p ablation (2011) presents  here with progressive weakness for one month a/w abdominal distension, found to have AAMIR, bilateral nephrosis and urinary retention likely from an enlarged prostate, CT (+) chuck Hydronephrosis and pancolitis, now E coli bacteremia and E coli UTI, improved , PSA also decreased
73yo M with PMH of HTN, HLD, DMT2, prostatomegaly, h/o SVT s/p ablation (2011) presenting with progressive weakness admitted with obstructive uropathy, AAMIR, UTI, and colitis.
73yo M with PMH of HTN, HLD, DMT2, prostatomegaly, h/o SVT s/p ablation (2011) presenting with progressive weakness admitted with obstructive uropathy, AAMIR, UTI, and colitis.      Problem/Plan - 1:  ·  Problem: Obstructive uropathy.  Plan: -Presenting with urinary retention.  severely enlarged prostate, bladder distension, and bilateral hydronephrosis observed on CT.    -s/p palacio  -unclear diagnosis, likely BPH.  Had been referred to uro outpt but did not follow up.  -started flomax and proscar  -f/u uro reccs  -treatment of UTI as below  -PSA elevated 9.8.     Problem/Plan - 2:  ·  Problem: Bacteremia due to Escherichia coli.  Plan: -Positive blood cx on admission.  C/w Rocephin for now.  Sensitive to cipro.  Treatment of UTI as below.  increased to rocephin 2g daily.  -Afebrile.  Nontoxic appearing.  Normotensive.  Lactate <2 on admission.  -f/u surveillance cx.     Problem/Plan - 3:  ·  Problem: AAMIR (acute kidney injury).  Plan: Resolved, creatinine below baseline.  1.9, baseline 2.2.  Creatinine on arrival 3.55, improved with IVF.  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  -urology following.  started flomax and proscar.  c/w indwelling palacio.  -renal U/S with moderate b/l hydro.  -hold lasix.     Problem/Plan - 4:  ·  Problem: Metabolic acidosis, increased anion gap.  Plan: Anion gap metabolic acidosis from uremia + starvation ketosis  -trend BMP  -c/w IVF.  Encourage PO.    -monitor FS.  SSI.     Problem/Plan - 5:  ·  Problem: UTI (urinary tract infection).  Plan: -with E.coli bacteremia.  c/w rocephin for now.  sensitive to cipro  - f/u surveillance blood cultures  - No CVA tenderness despite perirenal fat stranding on CT.
EGD ? varices, gastritis and shallow ulcres,  colonoscopy = mild segmental colitis nd polyps  Recommend: await biopsy results, PPI.  Sonogram of liver
results of EGD and colonoscopy discussed with patient.  follow up as out patient
71yo M with PMH of HTN, HLD, DMT2, prostatomegaly, h/o SVT s/p ablation (2011) presenting with progressive weakness admitted with obstructive uropathy, AAMIR, UTI, and colitis.
73yo M with PMH of HTN, HLD, DMT2, prostatomegaly, h/o SVT s/p ablation (2011) presenting with progressive weakness admitted with obstructive uropathy, AAMIR, UTI, and colitis.
73yo M with PMH of HTN, HLD, DMT2, prostatomegaly, h/o SVT s/p ablation (2011) presenting with progressive weakness admitted with obstructive uropathy, AAMIR, UTI, and colitis.
71yo M with PMH of HTN, HLD, DM Type-2, prostatomegaly, h/o SVT s/p ablation (2011) presents  here with progressive weakness for one month a/w abdominal distension, found to have AAMIR, bilateral nephrosis and urinary retention likely from an enlarged prostate, CT (+) chuck Hydronephrosis and pancolitis, now E coli bacteremia and E coli UTI, improved , PSA also decreased

## 2018-04-19 NOTE — PROGRESS NOTE ADULT - PROBLEM SELECTOR PLAN 9
DASH/Carb consistent
F: no IVF  E: Replete PRN  N: DASH/Carb consistent
DASH/Carb consistent

## 2018-04-19 NOTE — PROGRESS NOTE ADULT - PROBLEM SELECTOR PLAN 1
-Presented with urinary retention. severely enlarged prostate, bladder distension, and bilateral hydronephrosis observed on CT.    -s/p palacio  -unclear diagnosis, likely BPH.  Had been referred to uro outpt but did not follow up.  -started flomax and proscar  - TOV successful and uro rec no need for Palacio  and outpt f/u / biopsy  -treatment of UTI as below  -PSA elevated 9.8 downtrending to 6, likely 2/2 palacio manipulation

## 2018-04-19 NOTE — PROGRESS NOTE ADULT - PROBLEM SELECTOR PLAN 8
Resolved.  likely 2/2 HCTZ +lasix and dehydration.   - c/w NS at 100/hr for now  - hold HCTZ and lasix
Resolved.  likely 2/2 HCTZ +lasix and dehydration.   - d/c'ed IVF  - hold HCTZ and lasix
Resolving.  likely 2/2 HCTZ +lasix and dehydration.  Na+ most recently 134.   - c/w NS at 100/hr for now  - hold HCTZ and lasix
Resolved.  likely 2/2 HCTZ +lasix and dehydration.   - d/c'ed IVF  - hold HCTZ and lasix
Resolving.  likely 2/2 HCTZ +lasix and dehydration.  Na+ most recently 134.   - c/w NS at 100/hr for now  - hold HCTZ and lasix
Resolved.  likely 2/2 HCTZ +lasix and dehydration.   - d/c'ed IVF  - hold HCTZ and lasix

## 2018-04-19 NOTE — PROGRESS NOTE ADULT - SUBJECTIVE AND OBJECTIVE BOX
CC:    INTERVAL HPI/OVERNIGHT EVENTS: 72yMale    ROS: 10 system ROS otherwise negative except for those noted in HPI    VITAL SIGNS:  T(F): 98.2 (04-19-18 @ 08:37)  HR: 76 (04-19-18 @ 08:37)  BP: 131/73 (04-19-18 @ 08:37)  RR: 15 (04-19-18 @ 08:37)  SpO2: 98% (04-19-18 @ 08:37)  Wt(kg): --    PHYSICAL EXAM:  Constitutional: NAD, lying in bed comfortably  HEENT: no eye discharge, no nasal discharge, no pharyngeal erythema, moist membranes  Neck: no lymphadenopathy, no JVD,  no carotid bruit  Cardiovascular: S1 and S2, RRR,  no M/R/G, non-displaced PMI  Respiratory: no wheezing, no rhonchi, no cough, no crackles   Gastrointestinal: BS+, soft, non-distended, non-tender, no ascites  Extremities: warm to touch, no edema  Vascular: 2+ peripheral pulses, normal capillary refill  Neurological: AAO x 4, PERRLA, EOMI, no nystagmus, 5/5 muscle strength, sensation intact   Psychiatric: normal mood, normal affect  Musculoskeletal: no joint swelling  Skin: No rashes, no skin breakdown        MEDICATIONS  (STANDING):  aspirin  chewable 81 milliGRAM(s) Oral daily  ciprofloxacin     Tablet 500 milliGRAM(s) Oral every 12 hours  dextrose 5%. 1000 milliLiter(s) (50 mL/Hr) IV Continuous <Continuous>  dextrose 50% Injectable 12.5 Gram(s) IV Push once  dextrose 50% Injectable 25 Gram(s) IV Push once  dextrose 50% Injectable 25 Gram(s) IV Push once  docusate sodium 100 milliGRAM(s) Oral two times a day  epoetin kathi Injectable 50148 Unit(s) SubCutaneous every 48 hours  finasteride 5 milliGRAM(s) Oral daily  influenza   Vaccine 0.5 milliLiter(s) IntraMuscular once  insulin glargine Injectable (LANTUS) 8 Unit(s) SubCutaneous every morning  insulin lispro (HumaLOG) corrective regimen sliding scale   SubCutaneous Before meals and at bedtime  insulin lispro Injectable (HumaLOG) 8 Unit(s) SubCutaneous three times a day before meals  melatonin 5 milliGRAM(s) Oral at bedtime  multivitamin 1 Tablet(s) Oral daily  senna 1 Tablet(s) Oral <User Schedule>  simvastatin 10 milliGRAM(s) Oral at bedtime  tamsulosin 0.4 milliGRAM(s) Oral at bedtime    MEDICATIONS  (PRN):  acetaminophen   Tablet 650 milliGRAM(s) Oral every 6 hours PRN For Temp greater than 38 C (100.4 F)  dextrose Gel 1 Dose(s) Oral once PRN Blood Glucose LESS THAN 70 milliGRAM(s)/deciliter  glucagon  Injectable 1 milliGRAM(s) IntraMuscular once PRN Glucose LESS THAN 70 milligrams/deciliter      Allergies    No Known Allergies    Intolerances        LABS:                        7.9    6.2   )-----------( 145      ( 18 Apr 2018 06:34 )             24.0     04-18    135  |  98  |  25<H>  ----------------------------<  132<H>  3.5   |  21<L>  |  1.57<H>    Ca    8.6      18 Apr 2018 06:34  Phos  3.2     04-18  Mg     1.8     04-18            RADIOLOGY & ADDITIONAL TESTS: INTERVAL HPI/OVERNIGHT EVENTS: 72yMale  S/p 1 unit prbc's in the evening yesterday for symptomatic anemia. No acute events overnight per nursing or staff.    SUBJECTIVE:  Patient seen and examined at bedside this morning. Complains of bloating sensation. Otherwise denies complaints at this time. Denies chest pain, palpitations, SOB, cough, fever/chills, C/D/N/V, lightheadedness or weakness. He reports urinating 4 times overnight.      ROS: 10 system ROS otherwise negative except for those noted in HPI    VITAL SIGNS:  T(F): 98.2 (04-19-18 @ 08:37)  HR: 76 (04-19-18 @ 08:37)  BP: 131/73 (04-19-18 @ 08:37)  RR: 15 (04-19-18 @ 08:37)  SpO2: 98% (04-19-18 @ 08:37)    PHYSICAL EXAM:  General:  NAD, nontoxic appearing, WDWN  HENT:  EOMI,  oropharynx WNL.  MMM  Neck:  Trachea midline.  No JVD, LAD, or thyromegaly.  Heart:  S1S2 no M/R/G, regular  Lungs:  CTAB no wheezing, rhonchi or rales.  No accessory muscle use.  No respiratory distress.  Abdomen:  NABS.  soft, nontender, mildly distended.  no guarding.  no ascites.  no organomegaly.  Vascular:  Peripheral pulses palpable  Extremities:  Trace pitting edema BLE.  Calves nontender.    Back:  Mild bilateral low back TTP.  No CVA tenderness  Neuro:  AOx3, no facial asymmetry, nonfocal, no slurred speech  Skin:  No rash      MEDICATIONS  (STANDING):  aspirin  chewable 81 milliGRAM(s) Oral daily  ciprofloxacin     Tablet 500 milliGRAM(s) Oral every 12 hours  dextrose 5%. 1000 milliLiter(s) (50 mL/Hr) IV Continuous <Continuous>  dextrose 50% Injectable 12.5 Gram(s) IV Push once  dextrose 50% Injectable 25 Gram(s) IV Push once  dextrose 50% Injectable 25 Gram(s) IV Push once  docusate sodium 100 milliGRAM(s) Oral two times a day  epoetin kathi Injectable 89482 Unit(s) SubCutaneous every 48 hours  finasteride 5 milliGRAM(s) Oral daily  influenza   Vaccine 0.5 milliLiter(s) IntraMuscular once  insulin glargine Injectable (LANTUS) 8 Unit(s) SubCutaneous every morning  insulin lispro (HumaLOG) corrective regimen sliding scale   SubCutaneous Before meals and at bedtime  insulin lispro Injectable (HumaLOG) 8 Unit(s) SubCutaneous three times a day before meals  melatonin 5 milliGRAM(s) Oral at bedtime  multivitamin 1 Tablet(s) Oral daily  senna 1 Tablet(s) Oral <User Schedule>  simvastatin 10 milliGRAM(s) Oral at bedtime  tamsulosin 0.4 milliGRAM(s) Oral at bedtime    MEDICATIONS  (PRN):  acetaminophen   Tablet 650 milliGRAM(s) Oral every 6 hours PRN For Temp greater than 38 C (100.4 F)  dextrose Gel 1 Dose(s) Oral once PRN Blood Glucose LESS THAN 70 milliGRAM(s)/deciliter  glucagon  Injectable 1 milliGRAM(s) IntraMuscular once PRN Glucose LESS THAN 70 milligrams/deciliter      Allergies    No Known Allergies    Intolerances        LABS:                        7.9    6.2   )-----------( 145      ( 18 Apr 2018 06:34 )             24.0     04-18    135  |  98  |  25<H>  ----------------------------<  132<H>  3.5   |  21<L>  |  1.57<H>    Ca    8.6      18 Apr 2018 06:34  Phos  3.2     04-18  Mg     1.8     04-18            RADIOLOGY & ADDITIONAL TESTS:

## 2018-04-19 NOTE — PROGRESS NOTE ADULT - SUBJECTIVE AND OBJECTIVE BOX
Pt seen and examined No GI complaints    REVIEW OF SYSTEMS:  Constitutional: No fever, weight loss or fatigue  Cardiovascular: No chest pain, palpitations, dizziness or leg swelling  Gastrointestinal: No abdominal or epigastric pain. No nausea, vomiting or hematemesis; No diarrhea or constipation. No melena or hematochezia.  Skin: No itching, burning, rashes or lesions       MEDICATIONS:  MEDICATIONS  (STANDING):  aspirin  chewable 81 milliGRAM(s) Oral daily  ciprofloxacin     Tablet 500 milliGRAM(s) Oral every 12 hours  dextrose 5%. 1000 milliLiter(s) (50 mL/Hr) IV Continuous <Continuous>  dextrose 50% Injectable 12.5 Gram(s) IV Push once  dextrose 50% Injectable 25 Gram(s) IV Push once  dextrose 50% Injectable 25 Gram(s) IV Push once  docusate sodium 100 milliGRAM(s) Oral two times a day  epoetin kathi Injectable 55652 Unit(s) SubCutaneous every 48 hours  finasteride 5 milliGRAM(s) Oral daily  influenza   Vaccine 0.5 milliLiter(s) IntraMuscular once  insulin glargine Injectable (LANTUS) 8 Unit(s) SubCutaneous every morning  insulin lispro (HumaLOG) corrective regimen sliding scale   SubCutaneous Before meals and at bedtime  insulin lispro Injectable (HumaLOG) 8 Unit(s) SubCutaneous three times a day before meals  melatonin 5 milliGRAM(s) Oral at bedtime  multivitamin 1 Tablet(s) Oral daily  senna 1 Tablet(s) Oral <User Schedule>  simvastatin 10 milliGRAM(s) Oral at bedtime  tamsulosin 0.4 milliGRAM(s) Oral at bedtime    MEDICATIONS  (PRN):  acetaminophen   Tablet 650 milliGRAM(s) Oral every 6 hours PRN For Temp greater than 38 C (100.4 F)  dextrose Gel 1 Dose(s) Oral once PRN Blood Glucose LESS THAN 70 milliGRAM(s)/deciliter  glucagon  Injectable 1 milliGRAM(s) IntraMuscular once PRN Glucose LESS THAN 70 milligrams/deciliter      Allergies    No Known Allergies    Intolerances        Vital Signs Last 24 Hrs  T(C): 36.8 (19 Apr 2018 08:37), Max: 36.8 (18 Apr 2018 20:20)  T(F): 98.2 (19 Apr 2018 08:37), Max: 98.3 (18 Apr 2018 20:20)  HR: 76 (19 Apr 2018 08:37) (71 - 79)  BP: 131/73 (19 Apr 2018 08:37) (131/73 - 151/72)  BP(mean): --  RR: 15 (19 Apr 2018 08:37) (15 - 18)  SpO2: 98% (19 Apr 2018 08:37) (96% - 98%)    04-18 @ 07:01  -  04-19 @ 07:00  --------------------------------------------------------  IN: 0 mL / OUT: 1400 mL / NET: -1400 mL        PHYSICAL EXAM:    General: Well developed; well nourished; in no acute distress  HEENT: MMM, conjunctiva and sclera clear  Gastrointestinal: Soft non-tender non-distended; Normal bowel sounds; No hepatosplenomegaly  Skin: Warm and dry. No obvious rash    LABS:      CBC Full  -  ( 19 Apr 2018 11:19 )  WBC Count : 5.4 K/uL  Hemoglobin : 9.7 g/dL  Hematocrit : 29.8 %  Platelet Count - Automated : 166 K/uL  Mean Cell Volume : 86.6 fL  Mean Cell Hemoglobin : 28.2 pg  Mean Cell Hemoglobin Concentration : 32.6 g/dL  Auto Neutrophil # : x  Auto Lymphocyte # : x  Auto Monocyte # : x  Auto Eosinophil # : x  Auto Basophil # : x  Auto Neutrophil % : x  Auto Lymphocyte % : x  Auto Monocyte % : x  Auto Eosinophil % : x  Auto Basophil % : x    04-19    138  |  101  |  25<H>  ----------------------------<  178<H>  4.8   |  26  |  1.74<H>    Ca    9.4      19 Apr 2018 11:19  Phos  3.2     04-18  Mg     2.0     04-19                        RADIOLOGY & ADDITIONAL STUDIES (The following images were personally reviewed):

## 2018-04-19 NOTE — PROGRESS NOTE ADULT - NSHPATTENDINGPLANDISCUSS_GEN_ALL_CORE
primary team.
primary team.
Dr Hawley
primary team.
Primary team
Primary team and patient
Dr Roberts
Primary team

## 2018-04-19 NOTE — PROGRESS NOTE ADULT - PROBLEM SELECTOR PLAN 2
-Positive blood cx on admission.   switched from Rocephin to Cipro 500 mg q12h on 04/18  -Afebrile.  Nontoxic appearing.  Normotensive.  Lactate <2 on admission.  -f/u surveillance cx NGTD

## 2018-04-19 NOTE — PROGRESS NOTE ADULT - SUBJECTIVE AND OBJECTIVE BOX
INTERVAL HPI/OVERNIGHT EVENTS: Patient voiding, no fever    CONSTITUTIONAL:  Negative fever or chills, feels well, good appetite  EYES:  Negative  blurry vision or double vision  CARDIOVASCULAR:  Negative for chest pain or palpitations  RESPIRATORY:  Negative for cough, wheezing, or SOB   GASTROINTESTINAL:  Negative for nausea, vomiting, diarrhea, constipation, or abdominal pain  GENITOURINARY:  Negative frequency, urgency or dysuria  NEUROLOGIC:  No headache, confusion, dizziness, lightheadedness      ANTIBIOTICS/RELEVANT:    MEDICATIONS  (STANDING):  aspirin  chewable 81 milliGRAM(s) Oral daily  ciprofloxacin     Tablet 500 milliGRAM(s) Oral every 12 hours  dextrose 5%. 1000 milliLiter(s) (50 mL/Hr) IV Continuous <Continuous>  dextrose 50% Injectable 12.5 Gram(s) IV Push once  dextrose 50% Injectable 25 Gram(s) IV Push once  dextrose 50% Injectable 25 Gram(s) IV Push once  docusate sodium 100 milliGRAM(s) Oral two times a day  epoetin kathi Injectable 19699 Unit(s) SubCutaneous every 48 hours  finasteride 5 milliGRAM(s) Oral daily  influenza   Vaccine 0.5 milliLiter(s) IntraMuscular once  insulin glargine Injectable (LANTUS) 8 Unit(s) SubCutaneous every morning  insulin lispro (HumaLOG) corrective regimen sliding scale   SubCutaneous Before meals and at bedtime  insulin lispro Injectable (HumaLOG) 8 Unit(s) SubCutaneous three times a day before meals  melatonin 5 milliGRAM(s) Oral at bedtime  multivitamin 1 Tablet(s) Oral daily  senna 1 Tablet(s) Oral <User Schedule>  simvastatin 10 milliGRAM(s) Oral at bedtime  tamsulosin 0.4 milliGRAM(s) Oral at bedtime    MEDICATIONS  (PRN):  acetaminophen   Tablet 650 milliGRAM(s) Oral every 6 hours PRN For Temp greater than 38 C (100.4 F)  dextrose Gel 1 Dose(s) Oral once PRN Blood Glucose LESS THAN 70 milliGRAM(s)/deciliter  glucagon  Injectable 1 milliGRAM(s) IntraMuscular once PRN Glucose LESS THAN 70 milligrams/deciliter        Vital Signs Last 24 Hrs  T(C): 36.8 (19 Apr 2018 08:37), Max: 36.8 (18 Apr 2018 20:20)  T(F): 98.2 (19 Apr 2018 08:37), Max: 98.3 (18 Apr 2018 20:20)  HR: 76 (19 Apr 2018 08:37) (71 - 85)  BP: 131/73 (19 Apr 2018 08:37) (131/73 - 151/72)  BP(mean): --  RR: 15 (19 Apr 2018 08:37) (15 - 18)  SpO2: 98% (19 Apr 2018 08:37) (96% - 98%)    04-18-18 @ 07:01  -  04-19-18 @ 07:00  --------------------------------------------------------  IN: 0 mL / OUT: 1400 mL / NET: -1400 mL      PHYSICAL EXAM:  Constitutional: Well-developed, well nourished, legally blind  Eyes: POLO, EOMI  Ear/Nose/Throat: no oral lesion, no sinus tenderness on percussion	  Neck: no JVD, no lymphadenopathy, supple  Respiratory: CTA chuck  Cardiovascular: S1S2 RRR, no murmurs  Gastrointestinal: soft, (+) BS, no HSM  Culture - Blood (04.12.18 @ 11:37)    Specimen Source: .Blood One Set    Culture Results:   No growth at 5 days.    Extremities: no e/e/c  Vascular: DP Pulse: right normal; left normal      LABS:  Culture - Urine (04.11.18 @ 18:18)    Specimen Source: .Urine Clean Catch (Midstream)    Culture Results:   No growth                          7.9    6.2   )-----------( 145      ( 18 Apr 2018 06:34 )             24.0     04-18    135  |  98  |  25<H>  ----------------------------<  132<H>  3.5   |  21<L>  |  1.57<H>    Ca    8.6      18 Apr 2018 06:34  Phos  3.2     04-18  Mg     1.8     04-18            MICROBIOLOGY:  Culture - Stool (04.17.18 @ 22:43)    Specimen Source: .Stool Feces    Culture Results:   No growth  No enteric pathogens recovered. Specimen screened for  Salmonella, Shigella, Campylobacter, E.Coli 0157:H7 and Shiga-like  producing organisms.    Culture - Blood (04.12.18 @ 11:37)    Specimen Source: .Blood One Set    Culture Results:   No growth at 5 days.    Culture - Urine (04.11.18 @ 18:18)    Specimen Source: .Urine Clean Catch (Midstream)    Culture Results:   No growth        RADIOLOGY & ADDITIONAL STUDIES:

## 2018-04-19 NOTE — PROGRESS NOTE ADULT - PROBLEM SELECTOR PLAN 3
Resolved, creatinine below baseline.  most recent Cr 1.53, baseline 2.2.  Creatinine on arrival 3.55, improved with IVF.  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  -urology following.  started flomax and proscar.  c/w indwelling palacio.  -renal U/S with moderate b/l hydro, persistent on repeat  -holding  lasix

## 2018-04-20 LAB — PSA FLD-MCNC: 6.11 NG/ML — HIGH (ref 0–4)

## 2018-04-23 DIAGNOSIS — R33.8 OTHER RETENTION OF URINE: ICD-10-CM

## 2018-04-23 DIAGNOSIS — E87.1 HYPO-OSMOLALITY AND HYPONATREMIA: ICD-10-CM

## 2018-04-23 DIAGNOSIS — K31.9 DISEASE OF STOMACH AND DUODENUM, UNSPECIFIED: ICD-10-CM

## 2018-04-23 DIAGNOSIS — N18.3 CHRONIC KIDNEY DISEASE, STAGE 3 (MODERATE): ICD-10-CM

## 2018-04-23 DIAGNOSIS — N17.9 ACUTE KIDNEY FAILURE, UNSPECIFIED: ICD-10-CM

## 2018-04-23 DIAGNOSIS — N39.0 URINARY TRACT INFECTION, SITE NOT SPECIFIED: ICD-10-CM

## 2018-04-23 DIAGNOSIS — D63.8 ANEMIA IN OTHER CHRONIC DISEASES CLASSIFIED ELSEWHERE: ICD-10-CM

## 2018-04-23 DIAGNOSIS — N40.1 BENIGN PROSTATIC HYPERPLASIA WITH LOWER URINARY TRACT SYMPTOMS: ICD-10-CM

## 2018-04-23 DIAGNOSIS — E11.65 TYPE 2 DIABETES MELLITUS WITH HYPERGLYCEMIA: ICD-10-CM

## 2018-04-23 DIAGNOSIS — I85.00 ESOPHAGEAL VARICES WITHOUT BLEEDING: ICD-10-CM

## 2018-04-23 DIAGNOSIS — B96.20 UNSPECIFIED ESCHERICHIA COLI [E. COLI] AS THE CAUSE OF DISEASES CLASSIFIED ELSEWHERE: ICD-10-CM

## 2018-04-23 DIAGNOSIS — R31.9 HEMATURIA, UNSPECIFIED: ICD-10-CM

## 2018-04-23 DIAGNOSIS — D69.6 THROMBOCYTOPENIA, UNSPECIFIED: ICD-10-CM

## 2018-04-23 DIAGNOSIS — E11.22 TYPE 2 DIABETES MELLITUS WITH DIABETIC CHRONIC KIDNEY DISEASE: ICD-10-CM

## 2018-04-23 DIAGNOSIS — R17 UNSPECIFIED JAUNDICE: ICD-10-CM

## 2018-04-23 DIAGNOSIS — N13.30 UNSPECIFIED HYDRONEPHROSIS: ICD-10-CM

## 2018-04-23 DIAGNOSIS — K29.60 OTHER GASTRITIS WITHOUT BLEEDING: ICD-10-CM

## 2018-04-23 DIAGNOSIS — K25.9 GASTRIC ULCER, UNSPECIFIED AS ACUTE OR CHRONIC, WITHOUT HEMORRHAGE OR PERFORATION: ICD-10-CM

## 2018-04-23 DIAGNOSIS — E87.6 HYPOKALEMIA: ICD-10-CM

## 2018-04-23 DIAGNOSIS — E86.0 DEHYDRATION: ICD-10-CM

## 2018-04-23 DIAGNOSIS — E87.2 ACIDOSIS: ICD-10-CM

## 2018-04-23 DIAGNOSIS — K52.9 NONINFECTIVE GASTROENTERITIS AND COLITIS, UNSPECIFIED: ICD-10-CM

## 2018-04-23 DIAGNOSIS — K44.9 DIAPHRAGMATIC HERNIA WITHOUT OBSTRUCTION OR GANGRENE: ICD-10-CM

## 2018-04-23 DIAGNOSIS — I12.9 HYPERTENSIVE CHRONIC KIDNEY DISEASE WITH STAGE 1 THROUGH STAGE 4 CHRONIC KIDNEY DISEASE, OR UNSPECIFIED CHRONIC KIDNEY DISEASE: ICD-10-CM

## 2018-05-04 ENCOUNTER — OUTPATIENT (OUTPATIENT)
Dept: OUTPATIENT SERVICES | Facility: HOSPITAL | Age: 73
LOS: 1 days | End: 2018-05-04
Payer: MEDICARE

## 2018-05-04 ENCOUNTER — APPOINTMENT (OUTPATIENT)
Dept: ULTRASOUND IMAGING | Facility: HOSPITAL | Age: 73
End: 2018-05-04

## 2018-05-04 DIAGNOSIS — Z98.890 OTHER SPECIFIED POSTPROCEDURAL STATES: Chronic | ICD-10-CM

## 2018-05-04 PROBLEM — Z00.00 ENCOUNTER FOR PREVENTIVE HEALTH EXAMINATION: Noted: 2018-05-04

## 2018-05-04 PROCEDURE — 76856 US EXAM PELVIC COMPLETE: CPT

## 2018-05-04 PROCEDURE — 76700 US EXAM ABDOM COMPLETE: CPT

## 2018-05-04 PROCEDURE — 76856 US EXAM PELVIC COMPLETE: CPT | Mod: 26

## 2018-05-04 PROCEDURE — 76700 US EXAM ABDOM COMPLETE: CPT | Mod: 26

## 2018-06-06 RX ORDER — DOCUSATE SODIUM 100 MG
1 CAPSULE ORAL
Qty: 0 | Refills: 0 | COMMUNITY

## 2018-06-06 RX ORDER — ASPIRIN/CALCIUM CARB/MAGNESIUM 324 MG
1 TABLET ORAL
Qty: 0 | Refills: 0 | COMMUNITY

## 2018-06-06 RX ORDER — FUROSEMIDE 40 MG
1 TABLET ORAL
Qty: 0 | Refills: 0 | COMMUNITY

## 2018-06-06 RX ORDER — CINNAMON BARK 500 MG
2 CAPSULE ORAL
Qty: 0 | Refills: 0 | COMMUNITY

## 2018-06-06 RX ORDER — GLYBURIDE 5 MG
1 TABLET ORAL
Qty: 0 | Refills: 0 | COMMUNITY

## 2018-06-06 RX ORDER — SITAGLIPTIN AND METFORMIN HYDROCHLORIDE 500; 50 MG/1; MG/1
1 TABLET, FILM COATED ORAL
Qty: 0 | Refills: 0 | COMMUNITY

## 2018-06-06 RX ORDER — SIMVASTATIN 20 MG/1
1 TABLET, FILM COATED ORAL
Qty: 0 | Refills: 0 | COMMUNITY

## 2018-06-06 RX ORDER — CARVEDILOL PHOSPHATE 80 MG/1
1 CAPSULE, EXTENDED RELEASE ORAL
Qty: 0 | Refills: 0 | COMMUNITY

## 2018-06-06 RX ORDER — ASCORBIC ACID 60 MG
1 TABLET,CHEWABLE ORAL
Qty: 0 | Refills: 0 | COMMUNITY

## 2018-06-06 RX ORDER — SENNA PLUS 8.6 MG/1
1 TABLET ORAL
Qty: 0 | Refills: 0 | COMMUNITY

## 2018-12-17 PROBLEM — N40.0 BENIGN PROSTATIC HYPERPLASIA WITHOUT LOWER URINARY TRACT SYMPTOMS: Chronic | Status: ACTIVE | Noted: 2018-04-11

## 2018-12-17 PROBLEM — I10 ESSENTIAL (PRIMARY) HYPERTENSION: Chronic | Status: ACTIVE | Noted: 2018-04-11

## 2018-12-17 PROBLEM — E11.9 TYPE 2 DIABETES MELLITUS WITHOUT COMPLICATIONS: Chronic | Status: ACTIVE | Noted: 2018-04-11

## 2018-12-19 ENCOUNTER — OUTPATIENT (OUTPATIENT)
Dept: OUTPATIENT SERVICES | Facility: HOSPITAL | Age: 73
LOS: 1 days | End: 2018-12-19
Payer: MEDICARE

## 2018-12-19 ENCOUNTER — APPOINTMENT (OUTPATIENT)
Dept: INFUSION THERAPY | Facility: HOSPITAL | Age: 73
End: 2018-12-19

## 2018-12-19 VITALS
OXYGEN SATURATION: 99 % | HEART RATE: 86 BPM | RESPIRATION RATE: 18 BRPM | SYSTOLIC BLOOD PRESSURE: 134 MMHG | TEMPERATURE: 99 F | DIASTOLIC BLOOD PRESSURE: 75 MMHG

## 2018-12-19 DIAGNOSIS — D50.9 IRON DEFICIENCY ANEMIA, UNSPECIFIED: ICD-10-CM

## 2018-12-19 DIAGNOSIS — Z98.890 OTHER SPECIFIED POSTPROCEDURAL STATES: Chronic | ICD-10-CM

## 2018-12-19 PROCEDURE — 36415 COLL VENOUS BLD VENIPUNCTURE: CPT

## 2018-12-19 PROCEDURE — 86901 BLOOD TYPING SEROLOGIC RH(D): CPT

## 2018-12-19 PROCEDURE — P9016: CPT

## 2018-12-19 PROCEDURE — 86900 BLOOD TYPING SEROLOGIC ABO: CPT

## 2018-12-19 PROCEDURE — 86850 RBC ANTIBODY SCREEN: CPT

## 2018-12-19 PROCEDURE — 36430 TRANSFUSION BLD/BLD COMPNT: CPT

## 2018-12-19 PROCEDURE — 86923 COMPATIBILITY TEST ELECTRIC: CPT

## 2018-12-19 RX ORDER — ACETAMINOPHEN 500 MG
650 TABLET ORAL ONCE
Qty: 0 | Refills: 0 | Status: COMPLETED | OUTPATIENT
Start: 2018-12-19 | End: 2018-12-19

## 2018-12-19 RX ORDER — DIPHENHYDRAMINE HCL 50 MG
25 CAPSULE ORAL ONCE
Qty: 0 | Refills: 0 | Status: COMPLETED | OUTPATIENT
Start: 2018-12-19 | End: 2018-12-19

## 2018-12-19 RX ORDER — FUROSEMIDE 40 MG
40 TABLET ORAL ONCE
Qty: 0 | Refills: 0 | Status: COMPLETED | OUTPATIENT
Start: 2018-12-19 | End: 2018-12-19

## 2018-12-19 RX ADMIN — Medication 650 MILLIGRAM(S): at 14:29

## 2018-12-19 RX ADMIN — Medication 25 MILLIGRAM(S): at 14:29

## 2018-12-19 RX ADMIN — Medication 40 MILLIGRAM(S): at 14:29

## 2018-12-20 DIAGNOSIS — D64.9 ANEMIA, UNSPECIFIED: ICD-10-CM

## 2018-12-20 DIAGNOSIS — Z51.89 ENCOUNTER FOR OTHER SPECIFIED AFTERCARE: ICD-10-CM

## 2019-01-01 ENCOUNTER — APPOINTMENT (OUTPATIENT)
Age: 74
End: 2019-01-01

## 2019-01-01 ENCOUNTER — EMERGENCY (EMERGENCY)
Facility: HOSPITAL | Age: 74
LOS: 1 days | Discharge: HOME | End: 2019-01-01
Attending: EMERGENCY MEDICINE | Admitting: EMERGENCY MEDICINE
Payer: MEDICARE

## 2019-01-01 ENCOUNTER — OUTPATIENT (OUTPATIENT)
Dept: OUTPATIENT SERVICES | Facility: HOSPITAL | Age: 74
LOS: 1 days | End: 2019-01-01
Payer: MEDICARE

## 2019-01-01 VITALS
OXYGEN SATURATION: 98 % | DIASTOLIC BLOOD PRESSURE: 76 MMHG | TEMPERATURE: 98 F | HEART RATE: 89 BPM | WEIGHT: 205.03 LBS | RESPIRATION RATE: 17 BRPM | HEIGHT: 70 IN | SYSTOLIC BLOOD PRESSURE: 167 MMHG

## 2019-01-01 VITALS
SYSTOLIC BLOOD PRESSURE: 144 MMHG | HEART RATE: 77 BPM | TEMPERATURE: 98 F | RESPIRATION RATE: 17 BRPM | OXYGEN SATURATION: 98 % | DIASTOLIC BLOOD PRESSURE: 65 MMHG

## 2019-01-01 DIAGNOSIS — M79.661 PAIN IN RIGHT LOWER LEG: ICD-10-CM

## 2019-01-01 DIAGNOSIS — Z79.899 OTHER LONG TERM (CURRENT) DRUG THERAPY: ICD-10-CM

## 2019-01-01 DIAGNOSIS — Z98.890 OTHER SPECIFIED POSTPROCEDURAL STATES: Chronic | ICD-10-CM

## 2019-01-01 DIAGNOSIS — M25.571 PAIN IN RIGHT ANKLE AND JOINTS OF RIGHT FOOT: ICD-10-CM

## 2019-01-01 DIAGNOSIS — Z51.89 ENCOUNTER FOR OTHER SPECIFIED AFTERCARE: ICD-10-CM

## 2019-01-01 DIAGNOSIS — I10 ESSENTIAL (PRIMARY) HYPERTENSION: ICD-10-CM

## 2019-01-01 DIAGNOSIS — D64.9 ANEMIA, UNSPECIFIED: ICD-10-CM

## 2019-01-01 LAB
ALBUMIN SERPL ELPH-MCNC: 3.8 G/DL — SIGNIFICANT CHANGE UP (ref 3.3–5)
ALP SERPL-CCNC: 80 U/L — SIGNIFICANT CHANGE UP (ref 40–120)
ALT FLD-CCNC: 17 U/L — SIGNIFICANT CHANGE UP (ref 10–45)
ANION GAP SERPL CALC-SCNC: 10 MMOL/L — SIGNIFICANT CHANGE UP (ref 5–17)
APTT BLD: 34.9 SEC — SIGNIFICANT CHANGE UP (ref 27.5–36.3)
AST SERPL-CCNC: 20 U/L — SIGNIFICANT CHANGE UP (ref 10–40)
BASOPHILS # BLD AUTO: 0.02 K/UL — SIGNIFICANT CHANGE UP (ref 0–0.2)
BASOPHILS NFR BLD AUTO: 0.5 % — SIGNIFICANT CHANGE UP (ref 0–2)
BILIRUB SERPL-MCNC: 0.2 MG/DL — SIGNIFICANT CHANGE UP (ref 0.2–1.2)
BUN SERPL-MCNC: 21 MG/DL — SIGNIFICANT CHANGE UP (ref 7–23)
CALCIUM SERPL-MCNC: 8.9 MG/DL — SIGNIFICANT CHANGE UP (ref 8.4–10.5)
CHLORIDE SERPL-SCNC: 108 MMOL/L — SIGNIFICANT CHANGE UP (ref 96–108)
CO2 SERPL-SCNC: 22 MMOL/L — SIGNIFICANT CHANGE UP (ref 22–31)
CREAT SERPL-MCNC: 1.45 MG/DL — HIGH (ref 0.5–1.3)
EOSINOPHIL # BLD AUTO: 0.43 K/UL — SIGNIFICANT CHANGE UP (ref 0–0.5)
EOSINOPHIL NFR BLD AUTO: 10.9 % — HIGH (ref 0–6)
GLUCOSE SERPL-MCNC: 155 MG/DL — HIGH (ref 70–99)
HCT VFR BLD CALC: 25.9 % — LOW (ref 39–50)
HGB BLD-MCNC: 7.7 G/DL — LOW (ref 13–17)
IMM GRANULOCYTES NFR BLD AUTO: 0.3 % — SIGNIFICANT CHANGE UP (ref 0–1.5)
INR BLD: 1.22 — HIGH (ref 0.88–1.16)
LYMPHOCYTES # BLD AUTO: 0.86 K/UL — LOW (ref 1–3.3)
LYMPHOCYTES # BLD AUTO: 21.9 % — SIGNIFICANT CHANGE UP (ref 13–44)
MCHC RBC-ENTMCNC: 26.5 PG — LOW (ref 27–34)
MCHC RBC-ENTMCNC: 29.7 GM/DL — LOW (ref 32–36)
MCV RBC AUTO: 89 FL — SIGNIFICANT CHANGE UP (ref 80–100)
MONOCYTES # BLD AUTO: 0.31 K/UL — SIGNIFICANT CHANGE UP (ref 0–0.9)
MONOCYTES NFR BLD AUTO: 7.9 % — SIGNIFICANT CHANGE UP (ref 2–14)
NEUTROPHILS # BLD AUTO: 2.3 K/UL — SIGNIFICANT CHANGE UP (ref 1.8–7.4)
NEUTROPHILS NFR BLD AUTO: 58.5 % — SIGNIFICANT CHANGE UP (ref 43–77)
NRBC # BLD: 0 /100 WBCS — SIGNIFICANT CHANGE UP (ref 0–0)
PLATELET # BLD AUTO: 115 K/UL — LOW (ref 150–400)
POTASSIUM SERPL-MCNC: 5.5 MMOL/L — HIGH (ref 3.5–5.3)
POTASSIUM SERPL-SCNC: 5.5 MMOL/L — HIGH (ref 3.5–5.3)
PROT SERPL-MCNC: 6.6 G/DL — SIGNIFICANT CHANGE UP (ref 6–8.3)
PROTHROM AB SERPL-ACNC: 13.9 SEC — HIGH (ref 10–12.9)
RBC # BLD: 2.91 M/UL — LOW (ref 4.2–5.8)
RBC # FLD: 17.1 % — HIGH (ref 10.3–14.5)
SODIUM SERPL-SCNC: 140 MMOL/L — SIGNIFICANT CHANGE UP (ref 135–145)
WBC # BLD: 3.93 K/UL — SIGNIFICANT CHANGE UP (ref 3.8–10.5)
WBC # FLD AUTO: 3.93 K/UL — SIGNIFICANT CHANGE UP (ref 3.8–10.5)

## 2019-01-01 PROCEDURE — 86850 RBC ANTIBODY SCREEN: CPT

## 2019-01-01 PROCEDURE — 36415 COLL VENOUS BLD VENIPUNCTURE: CPT

## 2019-01-01 PROCEDURE — 93971 EXTREMITY STUDY: CPT

## 2019-01-01 PROCEDURE — 73610 X-RAY EXAM OF ANKLE: CPT

## 2019-01-01 PROCEDURE — 73610 X-RAY EXAM OF ANKLE: CPT | Mod: 26

## 2019-01-01 PROCEDURE — 85610 PROTHROMBIN TIME: CPT

## 2019-01-01 PROCEDURE — 86901 BLOOD TYPING SEROLOGIC RH(D): CPT

## 2019-01-01 PROCEDURE — 85025 COMPLETE CBC W/AUTO DIFF WBC: CPT

## 2019-01-01 PROCEDURE — 86923 COMPATIBILITY TEST ELECTRIC: CPT

## 2019-01-01 PROCEDURE — 80053 COMPREHEN METABOLIC PANEL: CPT

## 2019-01-01 PROCEDURE — 36430 TRANSFUSION BLD/BLD COMPNT: CPT

## 2019-01-01 PROCEDURE — 73630 X-RAY EXAM OF FOOT: CPT | Mod: 26,RT

## 2019-01-01 PROCEDURE — 99284 EMERGENCY DEPT VISIT MOD MDM: CPT | Mod: 25

## 2019-01-01 PROCEDURE — 86900 BLOOD TYPING SEROLOGIC ABO: CPT

## 2019-01-01 PROCEDURE — 85730 THROMBOPLASTIN TIME PARTIAL: CPT

## 2019-01-01 PROCEDURE — 73630 X-RAY EXAM OF FOOT: CPT

## 2019-01-01 PROCEDURE — 93971 EXTREMITY STUDY: CPT | Mod: 26,RT

## 2019-01-01 PROCEDURE — 73610 X-RAY EXAM OF ANKLE: CPT | Mod: 26,RT

## 2019-01-01 PROCEDURE — P9016: CPT

## 2019-01-01 RX ORDER — DIPHENHYDRAMINE HCL 50 MG
25 CAPSULE ORAL ONCE
Refills: 0 | Status: COMPLETED | OUTPATIENT
Start: 2019-01-01 | End: 2019-01-01

## 2019-01-01 RX ORDER — ACETAMINOPHEN 500 MG
650 TABLET ORAL ONCE
Refills: 0 | Status: COMPLETED | OUTPATIENT
Start: 2019-01-01 | End: 2019-01-01

## 2019-01-01 RX ORDER — FUROSEMIDE 40 MG
40 TABLET ORAL ONCE
Refills: 0 | Status: COMPLETED | OUTPATIENT
Start: 2019-01-01 | End: 2019-01-01

## 2019-01-01 RX ADMIN — Medication 650 MILLIGRAM(S): at 16:11

## 2019-01-01 RX ADMIN — Medication 25 MILLIGRAM(S): at 16:12

## 2019-01-01 RX ADMIN — Medication 40 MILLIGRAM(S): at 16:12

## 2019-03-06 NOTE — ED PROVIDER NOTE - DATE/TIME 1
Elizabeth Lopez is a 50 y.o. female.  Three month follow-up.  Anxiety is controlled with Lexapro and Ativan.  Currently sees Dr. Dwaine Elizabeth for her Graves' disease.  Reports low back pain is worsening and now has intermittent numbness in her left fourth and fifth toes.  Sees Dr. Saini for pain management.    Anxiety   Presents for follow-up visit. Patient reports no chest pain, compulsions, confusion, decreased concentration, depressed mood, dizziness, dry mouth, excessive worry, feeling of choking, hyperventilation, impotence, insomnia, irritability, malaise, muscle tension, nausea, obsessions, palpitations, panic, restlessness, shortness of breath or suicidal ideas. Symptoms occur occasionally. The severity of symptoms is mild. The quality of sleep is fair. Nighttime awakenings: several.     Compliance with medications is %.   Thyroid Problem   Presents for follow-up visit. Patient reports no depressed mood, diaphoresis, fatigue or palpitations. The symptoms have been stable.   Lower Extremity Issue   This is a chronic problem. The current episode started more than 1 year ago. The problem occurs constantly. The problem has been unchanged. Associated symptoms include arthralgias, joint swelling and myalgias. Pertinent negatives include no chest pain, chills, diaphoresis, fatigue, fever or nausea. The symptoms are aggravated by walking. The treatment provided no relief.   Joint Swelling   This is a chronic problem. The current episode started more than 1 year ago. The problem occurs constantly. The problem has been waxing and waning. Associated symptoms include arthralgias, joint swelling and myalgias. Pertinent negatives include no chest pain, chills, diaphoresis, fatigue, fever or nausea. Nothing aggravates the symptoms. She has tried acetaminophen and NSAIDs (PT) for the symptoms. The treatment provided mild relief.   Back Pain   This is a chronic problem. The current episode started more 
than 1 year ago. The problem occurs constantly. The problem has been gradually worsening since onset. The pain is present in the lumbar spine and thoracic spine. The pain radiates to the left foot. Pertinent negatives include no chest pain or fever. She has tried muscle relaxant, NSAIDs and analgesics for the symptoms. The treatment provided moderate relief.        The following portions of the patient's history were reviewed and updated as appropriate:   Current Outpatient Medications   Medication Sig Dispense Refill   • calcium carbonate 1250 MG capsule Take 600 mg by mouth.     • escitalopram (LEXAPRO) 10 MG tablet Take 1 tablet by mouth Daily. 90 tablet 1   • GLUCOSAMINE-CHONDROITIN PO Take  by mouth.     • HYDROcodone-acetaminophen (NORCO) 5-325 MG per tablet Take 1 tablet by mouth 2 (Two) Times a Day As Needed for Moderate Pain . (Patient taking differently: Take 1 tablet by mouth Every 6 (Six) Hours As Needed for Moderate Pain .) 30 tablet 0   • LORazepam (ATIVAN) 0.5 MG tablet Take 1 tablet by mouth Daily As Needed for Anxiety. 30 tablet 0   • magnesium oxide (MAGOX) 400 (241.3 Mg) MG tablet tablet Take 800 mg by mouth Daily.     • metoprolol succinate XL (TOPROL XL) 25 MG 24 hr tablet Take 12.5 mg by mouth daily.     • Multiple Vitamins-Minerals (MULTIVITAMIN PO) Take 1 tablet by mouth daily.     • naproxen (NAPROSYN) 375 MG tablet Take 1 tablet by mouth 2 (Two) Times a Day With Meals. 60 tablet 3   • Omega-3 Fatty Acids (FISH OIL CONCENTRATE) 1000 MG capsule Take 1 capsule by mouth 2 (two) times a day.     • omeprazole (priLOSEC) 20 MG capsule Take 1 capsule by mouth Daily. 90 capsule 1   • tiZANidine (ZANAFLEX) 4 MG tablet Take 1 tablet by mouth Every 6 (Six) Hours As Needed for Muscle Spasms. 120 tablet 1   • vitamin E 400 UNIT capsule Take 400 Units by mouth daily.     • levothyroxine (SYNTHROID) 25 MCG tablet Take 1 tablet by mouth Daily. 30 tablet 11     No current facility-administered medications 
"for this visit.      Current Outpatient Medications on File Prior to Visit   Medication Sig   • calcium carbonate 1250 MG capsule Take 600 mg by mouth.   • GLUCOSAMINE-CHONDROITIN PO Take  by mouth.   • HYDROcodone-acetaminophen (NORCO) 5-325 MG per tablet Take 1 tablet by mouth 2 (Two) Times a Day As Needed for Moderate Pain . (Patient taking differently: Take 1 tablet by mouth Every 6 (Six) Hours As Needed for Moderate Pain .)   • LORazepam (ATIVAN) 0.5 MG tablet Take 1 tablet by mouth Daily As Needed for Anxiety.   • magnesium oxide (MAGOX) 400 (241.3 Mg) MG tablet tablet Take 800 mg by mouth Daily.   • metoprolol succinate XL (TOPROL XL) 25 MG 24 hr tablet Take 12.5 mg by mouth daily.   • Multiple Vitamins-Minerals (MULTIVITAMIN PO) Take 1 tablet by mouth daily.   • naproxen (NAPROSYN) 375 MG tablet Take 1 tablet by mouth 2 (Two) Times a Day With Meals.   • Omega-3 Fatty Acids (FISH OIL CONCENTRATE) 1000 MG capsule Take 1 capsule by mouth 2 (two) times a day.   • vitamin E 400 UNIT capsule Take 400 Units by mouth daily.   • levothyroxine (SYNTHROID) 25 MCG tablet Take 1 tablet by mouth Daily.     No current facility-administered medications on file prior to visit.      She has No Known Allergies..    Review of Systems   Constitutional: Negative for chills, diaphoresis, fatigue, fever and irritability.   Respiratory: Negative for shortness of breath.    Cardiovascular: Negative for chest pain and palpitations.   Gastrointestinal: Negative for nausea.   Genitourinary: Negative for impotence.   Musculoskeletal: Positive for arthralgias, back pain, joint swelling and myalgias.   Neurological: Negative for dizziness.   Psychiatric/Behavioral: Negative for confusion, decreased concentration and suicidal ideas. The patient does not have insomnia.        Objective    Visit Vitals  /72   Ht 165.1 cm (65\")   Wt 71.2 kg (157 lb)   LMP  (LMP Unknown)   BMI 26.13 kg/m²       Physical Exam   Constitutional: She is "
GI bleed
oriented to person, place, and time. She appears well-developed and well-nourished.   HENT:   Head: Normocephalic.   Right Ear: External ear normal.   Left Ear: External ear normal.   Eyes: EOM are normal. Pupils are equal, round, and reactive to light.   Neck: Normal range of motion. Neck supple.   Cardiovascular: Normal rate, regular rhythm and normal heart sounds.   Pulmonary/Chest: Effort normal and breath sounds normal.   Abdominal: Soft. Bowel sounds are normal.   Musculoskeletal: Normal range of motion.   Neurological: She is alert and oriented to person, place, and time.   Skin: Skin is warm. Capillary refill takes less than 2 seconds.   Psychiatric: She has a normal mood and affect. Her behavior is normal.   Nursing note and vitals reviewed.      Assessment/Plan   Problems Addressed this Visit        Digestive    Gastroesophageal reflux disease    Relevant Medications    omeprazole (priLOSEC) 20 MG capsule       Endocrine    Graves' disease       Nervous and Auditory    Lumbar back pain    Relevant Medications    tiZANidine (ZANAFLEX) 4 MG tablet       Other    Generalized anxiety disorder - Primary    Relevant Medications    escitalopram (LEXAPRO) 10 MG tablet        1.  Generalized anxiety disorder:  Continue on Lexapro as previously prescribed and refill prescription sent to pharmacy  Continue on Ativan as previously prescribed  Educated on possible side effects of that medication including but not limited to increased risk for drowsiness, sedation and dependency    2.  Graves' disease:  Continue on Synthroid as previously prescribed  We will continue endocrinology follow-up with Dr. Dwaine Elizabeth    3.  Gastroesophageal reflux disease:  Continue on Prilosec as previously prescribed and refill prescription sent to pharmacy  Encouraged to reduce fried, fatty, spicy and greasy foods and diet in order to reduce gastrointestinal discomfort  May elevate head of bed 15 degrees to reduce nighttime 
flareups    4.  Lumbar back pain:  Begin Zanaflex as prescribed to be taken 1 p.o. 3 times daily as needed for muscle spasms  Educated on possible sedative side effects of this medication and encouraged not to take this medication prior to working or driving  Continue on Norco as previously prescribed  Will continue follow-up for pain medication with pain management    Continue on current medications as previously prescribed   Return in about 3 months (around 6/6/2019) for Annual physical.      This document has been electronically signed by YOANA Arguello on March 8, 2019 3:12 PM        
sma stenosis
11-Apr-2018 01:52

## 2019-03-13 ENCOUNTER — TRANSCRIPTION ENCOUNTER (OUTPATIENT)
Age: 74
End: 2019-03-13

## 2019-03-13 ENCOUNTER — INPATIENT (INPATIENT)
Facility: HOSPITAL | Age: 74
LOS: 0 days | Discharge: HOME | DRG: 810 | End: 2019-03-14
Attending: INTERNAL MEDICINE | Admitting: INTERNAL MEDICINE
Payer: COMMERCIAL

## 2019-03-13 VITALS
SYSTOLIC BLOOD PRESSURE: 178 MMHG | OXYGEN SATURATION: 100 % | RESPIRATION RATE: 18 BRPM | DIASTOLIC BLOOD PRESSURE: 75 MMHG | HEART RATE: 76 BPM | TEMPERATURE: 98 F | WEIGHT: 210.1 LBS

## 2019-03-13 DIAGNOSIS — Z91.89 OTHER SPECIFIED PERSONAL RISK FACTORS, NOT ELSEWHERE CLASSIFIED: ICD-10-CM

## 2019-03-13 DIAGNOSIS — N40.0 BENIGN PROSTATIC HYPERPLASIA WITHOUT LOWER URINARY TRACT SYMPTOMS: ICD-10-CM

## 2019-03-13 DIAGNOSIS — Z86.69 PERSONAL HISTORY OF OTHER DISEASES OF THE NERVOUS SYSTEM AND SENSE ORGANS: ICD-10-CM

## 2019-03-13 DIAGNOSIS — Z29.9 ENCOUNTER FOR PROPHYLACTIC MEASURES, UNSPECIFIED: ICD-10-CM

## 2019-03-13 DIAGNOSIS — I10 ESSENTIAL (PRIMARY) HYPERTENSION: ICD-10-CM

## 2019-03-13 DIAGNOSIS — E11.9 TYPE 2 DIABETES MELLITUS WITHOUT COMPLICATIONS: ICD-10-CM

## 2019-03-13 DIAGNOSIS — E78.5 HYPERLIPIDEMIA, UNSPECIFIED: ICD-10-CM

## 2019-03-13 DIAGNOSIS — D61.818 OTHER PANCYTOPENIA: ICD-10-CM

## 2019-03-13 DIAGNOSIS — N18.3 CHRONIC KIDNEY DISEASE, STAGE 3 (MODERATE): ICD-10-CM

## 2019-03-13 DIAGNOSIS — R63.8 OTHER SYMPTOMS AND SIGNS CONCERNING FOOD AND FLUID INTAKE: ICD-10-CM

## 2019-03-13 DIAGNOSIS — Z98.890 OTHER SPECIFIED POSTPROCEDURAL STATES: Chronic | ICD-10-CM

## 2019-03-13 DIAGNOSIS — E87.5 HYPERKALEMIA: ICD-10-CM

## 2019-03-13 LAB
ANION GAP SERPL CALC-SCNC: 11 MMOL/L — SIGNIFICANT CHANGE UP (ref 5–17)
BUN SERPL-MCNC: 33 MG/DL — HIGH (ref 7–23)
CALCIUM SERPL-MCNC: 8.6 MG/DL — SIGNIFICANT CHANGE UP (ref 8.4–10.5)
CHLORIDE SERPL-SCNC: 105 MMOL/L — SIGNIFICANT CHANGE UP (ref 96–108)
CO2 SERPL-SCNC: 21 MMOL/L — LOW (ref 22–31)
CREAT SERPL-MCNC: 1.68 MG/DL — HIGH (ref 0.5–1.3)
GLUCOSE BLDC GLUCOMTR-MCNC: 205 MG/DL — HIGH (ref 70–99)
GLUCOSE SERPL-MCNC: 158 MG/DL — HIGH (ref 70–99)
POTASSIUM SERPL-MCNC: 5.7 MMOL/L — HIGH (ref 3.5–5.3)
POTASSIUM SERPL-SCNC: 5.7 MMOL/L — HIGH (ref 3.5–5.3)
SODIUM SERPL-SCNC: 137 MMOL/L — SIGNIFICANT CHANGE UP (ref 135–145)

## 2019-03-13 PROCEDURE — 99285 EMERGENCY DEPT VISIT HI MDM: CPT | Mod: 25

## 2019-03-13 PROCEDURE — 71045 X-RAY EXAM CHEST 1 VIEW: CPT | Mod: 26

## 2019-03-13 PROCEDURE — 93010 ELECTROCARDIOGRAM REPORT: CPT

## 2019-03-13 RX ORDER — DEXTROSE 50 % IN WATER 50 %
100 SYRINGE (ML) INTRAVENOUS ONCE
Qty: 0 | Refills: 0 | Status: COMPLETED | OUTPATIENT
Start: 2019-03-13 | End: 2019-03-13

## 2019-03-13 RX ORDER — CALCIUM GLUCONATE 100 MG/ML
1 VIAL (ML) INTRAVENOUS ONCE
Qty: 0 | Refills: 0 | Status: COMPLETED | OUTPATIENT
Start: 2019-03-13 | End: 2019-03-13

## 2019-03-13 RX ORDER — SODIUM CHLORIDE 9 MG/ML
1000 INJECTION INTRAMUSCULAR; INTRAVENOUS; SUBCUTANEOUS ONCE
Qty: 0 | Refills: 0 | Status: COMPLETED | OUTPATIENT
Start: 2019-03-13 | End: 2019-03-13

## 2019-03-13 RX ORDER — DEXTROSE 50 % IN WATER 50 %
100 SYRINGE (ML) INTRAVENOUS ONCE
Qty: 0 | Refills: 0 | Status: DISCONTINUED | OUTPATIENT
Start: 2019-03-13 | End: 2019-03-13

## 2019-03-13 RX ORDER — CALCIUM GLUCONATE 100 MG/ML
1 VIAL (ML) INTRAVENOUS ONCE
Qty: 0 | Refills: 0 | Status: DISCONTINUED | OUTPATIENT
Start: 2019-03-13 | End: 2019-03-13

## 2019-03-13 RX ORDER — INSULIN HUMAN 100 [IU]/ML
10 INJECTION, SOLUTION SUBCUTANEOUS ONCE
Qty: 0 | Refills: 0 | Status: COMPLETED | OUTPATIENT
Start: 2019-03-13 | End: 2019-03-13

## 2019-03-13 RX ORDER — INSULIN HUMAN 100 [IU]/ML
10 INJECTION, SOLUTION SUBCUTANEOUS ONCE
Qty: 0 | Refills: 0 | Status: DISCONTINUED | OUTPATIENT
Start: 2019-03-13 | End: 2019-03-13

## 2019-03-13 RX ORDER — LUTEIN 20 MG
1 CAPSULE ORAL
Qty: 0 | Refills: 0 | COMMUNITY

## 2019-03-13 RX ADMIN — Medication 100 MILLILITER(S): at 19:08

## 2019-03-13 RX ADMIN — SODIUM CHLORIDE 1000 MILLILITER(S): 9 INJECTION INTRAMUSCULAR; INTRAVENOUS; SUBCUTANEOUS at 18:00

## 2019-03-13 RX ADMIN — Medication 200 GRAM(S): at 19:10

## 2019-03-13 RX ADMIN — INSULIN HUMAN 10 UNIT(S): 100 INJECTION, SOLUTION SUBCUTANEOUS at 19:09

## 2019-03-13 RX ADMIN — SODIUM CHLORIDE 1000 MILLILITER(S): 9 INJECTION INTRAMUSCULAR; INTRAVENOUS; SUBCUTANEOUS at 16:44

## 2019-03-13 NOTE — DISCHARGE NOTE PROVIDER - HOSPITAL COURSE
73 year old male with PMH HTN, DM, BPH, HLD, SVT with ablation, CKD 3 and anemia (baseline hemoglobin 8-10) who was sent in by Dr. Roberts after it was found he had a hemoglobin in the 5s yesterday at her office.  In the past, the patient has had episodes of weakness, was found to be anemic and received blood transfusions (endorses most recently in November).  Recently, the patient has been feeling similarly weak and went to see Dr. Roberts yesterday who performed blood work.  Today, it was found that the patient had a hemoglobin in the 5s and was sent to ED for blood transfusions.  Upon arrival to the ED, patient's vital signs were /75, HR 76, RR 18, temperature 97.9 degrees Farenheit and saturating 100% on room air.  Labs were significant for anemia with hemoglobin 5.9, WBC 3.14 and platelets of 90.  Potassium was initially 6.6 (which came down to 5.7 after receiving 1g calcium gluconate and 10 units of insulin) and creatinine of 1.68 (appears baseline).  Patient was admitted for blood transfusions. 73 year old male with PMH HTN, DM, BPH, HLD, SVT with ablation, CKD 3 and anemia (baseline hemoglobin 8-10) who was sent in by Dr. Roberts after it was found he had a hemoglobin in the 5s yesterday at her office.  In the past, the patient has had episodes of weakness, was found to be anemic and received blood transfusions (endorses most recently in November).  Recently, the patient has been feeling similarly weak and went to see Dr. Roberts yesterday who performed blood work.  Today, it was found that the patient had a hemoglobin in the 5s and was sent to ED for blood transfusions.  Upon arrival to the ED, patient's vital signs were /75, HR 76, RR 18, temperature 97.9 degrees Farenheit and saturating 100% on room air.  Labs were significant for anemia with hemoglobin 5.9, WBC 3.14 and platelets of 90.  Potassium was initially 6.6 (which came down to 5.7 after receiving 1g calcium gluconate and 10 units of insulin) and creatinine of 1.68 (appears baseline).  Patient was admitted for blood transfusions.  After 2 units of PRBC, patient's hemoglobin increased to 7.4 and patient was completely asymptomatic.  Patient was seen walking around the ER without any difficulties.  Patient was deemed medically table for discharge.  Patient was instructed to call Dr. Roberts's office immediately in the morning for follow up.  Plan of care and return precautions were discussed and patient verbalized understanding. 73 year old male with PMH HTN, DM, BPH, HLD, SVT with ablation, CKD 3 and anemia (baseline hemoglobin 8-10) who was sent in by Dr. Roberts after it was found he had a hemoglobin in the 5s yesterday at her office.  In the past, the patient has had episodes of weakness, was found to be anemic and received blood transfusions (endorses most recently in November).  Recently, the patient has been feeling similarly weak and went to see Dr. Roberts yesterday who performed blood work.  Today, it was found that the patient had a hemoglobin in the 5s and was sent to ED for blood transfusions.  Upon arrival to the ED, patient's vital signs were /75, HR 76, RR 18, temperature 97.9 degrees Farenheit and saturating 100% on room air.  Labs were significant for anemia with hemoglobin 5.9, WBC 3.14 and platelets of 90.  Potassium was initially 6.6 (which came down to 5.7 after receiving 1g calcium gluconate and 10 units of insulin) and creatinine of 1.68 (appears baseline).  Patient was admitted for blood transfusions.  After 2 units of PRBC, patient's hemoglobin increased to 7.4 and patient was completely asymptomatic.  Patient was seen walking around the ER without any difficulties and was deemed medically stable for discharge.  Patient was instructed to call Dr. Roberts's office immediately in the morning for follow up.  Plan of care and return precautions were discussed and patient verbalized understanding.

## 2019-03-13 NOTE — ED ADULT NURSE NOTE - OBJECTIVE STATEMENT
Pt. sent to ED by hematologist Yamileth for 5.9 hgb yesterday. Pt. reports weakness x several weeks. Pt. had one prior transfusion 11/18 w/ no adverse rxn. Pt. also c/o worsening urinary Sx x 2 years, c/o on/off retention, dysuria, urinary incontinence. Denies hematuria, blood in stool. Pt. takes 81 mg aspirin daily.

## 2019-03-13 NOTE — ED ADULT NURSE NOTE - NSIMPLEMENTINTERV_GEN_ALL_ED
Implemented All Universal Safety Interventions:  Outing to call system. Call bell, personal items and telephone within reach. Instruct patient to call for assistance. Room bathroom lighting operational. Non-slip footwear when patient is off stretcher. Physically safe environment: no spills, clutter or unnecessary equipment. Stretcher in lowest position, wheels locked, appropriate side rails in place.

## 2019-03-13 NOTE — H&P ADULT - NSHPLABSRESULTS_GEN_ALL_CORE
.  LABS:                         5.9    3.14  )-----------( 90       ( 13 Mar 2019 16:29 )             20.1     03-13    137  |  105  |  33<H>  ----------------------------<  158<H>  5.7<H>   |  21<L>  |  1.68<H>    Ca    8.6      13 Mar 2019 17:57    TPro  6.7  /  Alb  4.0  /  TBili  0.2  /  DBili  x   /  AST  22  /  ALT  17  /  AlkPhos  88  03-13    PT/INR - ( 13 Mar 2019 16:29 )   PT: 14.8 sec;   INR: 1.30          PTT - ( 13 Mar 2019 16:29 )  PTT:35.6 sec              RADIOLOGY, EKG & ADDITIONAL TESTS: Reviewed.

## 2019-03-13 NOTE — ED ADULT NURSE REASSESSMENT NOTE - NS ED NURSE REASSESS COMMENT FT1
blood bank called and as per blood bank, type and screen will be ready in approximately one hour. will follow up with blood bank. rita schwartz notified

## 2019-03-13 NOTE — H&P ADULT - NSHPREVIEWOFSYSTEMS_GEN_ALL_CORE
REVIEW OF SYSTEMS:    CONSTITUTIONAL: No weakness, fevers or chills  EYES/ENT: No visual changes;  No vertigo or throat pain   NECK: No pain or stiffness  RESPIRATORY: No cough, wheezing, hemoptysis; No shortness of breath  CARDIOVASCULAR: No chest pain or palpitations  GASTROINTESTINAL: No abdominal or epigastric pain. No nausea, vomiting, or hematemesis; No diarrhea or constipation. No melena or hematochezia.  GENITOURINARY: No dysuria, frequency or hematuria  NEUROLOGICAL: No numbness or weakness  SKIN: No itching, burning, rashes, or lesions   All other review of systems is negative unless indicated above. REVIEW OF SYSTEMS:    CONSTITUTIONAL: Endorses weakness, fevers or chills  EYES/ENT: No visual changes;  No vertigo or throat pain   NECK: No pain or stiffness  RESPIRATORY: No cough, wheezing, hemoptysis; No shortness of breath  CARDIOVASCULAR: No chest pain or palpitations  GASTROINTESTINAL: No abdominal or epigastric pain. No nausea, vomiting, or hematemesis; No diarrhea but some constipation. No melena or hematochezia.  GENITOURINARY: No dysuria, frequency or hematuria  NEUROLOGICAL: No numbness or weakness  SKIN: No itching, burning, rashes, or lesions   All other review of systems is negative unless indicated above. CONSTITUTIONAL: Endorses weakness, fevers or chills  EYES/ENT: No visual changes;  No vertigo or throat pain   NECK: No pain or stiffness  RESPIRATORY: No cough, wheezing, hemoptysis; No shortness of breath  CARDIOVASCULAR: No chest pain or palpitations  GASTROINTESTINAL: No abdominal or epigastric pain. No nausea, vomiting, or hematemesis; No diarrhea but some constipation. No melena or hematochezia.  GENITOURINARY: No dysuria, frequency or hematuria  NEUROLOGICAL: No numbness or weakness  SKIN: No itching, burning, rashes, or lesions   All other review of systems is negative unless indicated above. CONSTITUTIONAL: Endorses weakness, denies fevers or chills  EYES/ENT: No visual changes;  No vertigo or throat pain   NECK: No pain or stiffness  RESPIRATORY: No cough, wheezing, hemoptysis; No shortness of breath  CARDIOVASCULAR: No chest pain or palpitations  GASTROINTESTINAL: No abdominal or epigastric pain. No nausea, vomiting, or hematemesis; No diarrhea but some constipation. No melena or hematochezia.  GENITOURINARY: No dysuria, frequency or hematuria  NEUROLOGICAL: No numbness or weakness  SKIN: No itching, burning, rashes, or lesions   All other review of systems is negative unless indicated above.

## 2019-03-13 NOTE — ED PROVIDER NOTE - ATTENDING CONTRIBUTION TO CARE
Pt hx of anemia, referred to ED for blood transfusion, otherwise no complaints, no GI bleed sx, Well appearing, nad, nc/at, lung cta, heart reg, abd soft, nt, ext no gross deformity, no gross neuro deficits, EKG wo hyperkalemia changes, disc w pmd care, will treat hyperK and reeval.

## 2019-03-13 NOTE — H&P ADULT - NSICDXFAMILYHX_GEN_ALL_CORE_FT
FAMILY HISTORY:  No pertinent family history in first degree relatives FAMILY HISTORY:  FH: stomach cancer  FHx: breast cancer  FHx: stroke

## 2019-03-13 NOTE — H&P ADULT - NSHPPHYSICALEXAM_GEN_ALL_CORE
Constitutional: WDWN resting comfortably in bed; NAD  Head: NC/AT  Eyes: PERRL, EOMI, anicteric sclera  ENT: no nasal discharge; uvula midline, no oropharyngeal erythema or exudates; MMM  Neck: supple; no JVD or thyromegaly  Respiratory: CTA B/L; no W/R/R, no retractions  Cardiac: +S1/S2; RRR; no M/R/G; PMI non-displaced  Gastrointestinal: soft, NT/ND; no rebound or guarding; +BSx4  Genitourinary: normal external genitalia  Back: spine midline, no bony tenderness or step-offs; no CVAT B/L  Extremities: WWP, no clubbing or cyanosis; no peripheral edema  Musculoskeletal: NROM x4; no joint swelling, tenderness or erythema  Vascular: 2+ radial, femoral, DP/PT pulses B/L  Dermatologic: skin warm, dry and intact; no rashes, wounds, or scars  Lymphatic: no submandibular or cervical LAD  Neurologic: AAOx3; CNII-XII grossly intact; no focal deficits  Psychiatric: affect and characteristics of appearance, verbalizations, behaviors are appropriate Constitutional: Pale elderly male laying in bed; NAD  Head: NC/AT  Eyes: PERRL, EOMI, pale conjunctiva  ENT: no nasal discharge; uvula midline, no oropharyngeal erythema or exudates; poor dentition  Neck: supple; no JVD or thyromegaly  Respiratory: CTA B/L; no W/R/R, no retractions  Cardiac: +S1/S2; RRR; no M/R/G; PMI non-displaced  Gastrointestinal: soft, mildly distended (patient endorses constipation)  Extremities: WWP, 3+ pitting edema of LE bilaterally  Musculoskeletal: NROM x4; no joint swelling, tenderness or erythema  Vascular: 2+ radial, femoral, DP/PT pulses B/L  Dermatologic: skin pale, but warm, dry and intact  Neurologic: AAOx3; CNII-XII grossly intact; no focal deficits

## 2019-03-13 NOTE — ED ADULT TRIAGE NOTE - CHIEF COMPLAINT QUOTE
Pt presents on referral of PMD for stated low HB.  Pt is pale in triage, but in NAD.  Pt denies CP, but elicits dyspnea on exertion.  Pt denies LOC.  Pt states:  "I've needed blood transfusions in the past."

## 2019-03-13 NOTE — H&P ADULT - ASSESSMENT
73 year old male with PMH HTN, DM, BPH and anemia who was sent in by Dr. Roberts after it was found he had a hemoglobin of 5.6. 73 year old male with PMH HTN, DM, BPH, HLD, SVT with ablation, CKD 3 and anemia (baseline 7-9) who was sent in by Dr. Roberts after it was found he had a hemoglobin in the 5s yesterday at her office. 73 year old male with PMH HTN, DM, BPH, HLD, SVT with ablation, CKD 3 and anemia (baseline hemoglobin 8-10) who was sent in by Dr. Roberts after it was found he had a hemoglobin in the 5s yesterday at her office.

## 2019-03-13 NOTE — H&P ADULT - NSICDXPASTMEDICALHX_GEN_ALL_CORE_FT
PAST MEDICAL HISTORY:  DM (diabetes mellitus)     Enlarged prostate     HTN (hypertension) PAST MEDICAL HISTORY:  Anemia     DM (diabetes mellitus)     Enlarged prostate     H/O hydrocephalus     HLD (hyperlipidemia)     HTN (hypertension)

## 2019-03-13 NOTE — H&P ADULT - HISTORY OF PRESENT ILLNESS
73 year old male with PMH HTN, DM, BPH and anemia who was sent in by Dr. Roberts after it was found he had a hemoglobin of 5.6.  Patient completely asymptomatic.  Upon arrival to the ED, patient's vital signs were /75, HR 76, RR 18, temperature 97.9 degrees [place] and saturating 100% on room air.  Labs were signifcant for anemia wit hemoglobin 5.9, WBC 3.14 and platelets of 90.  Potassium was initially 6.6 which came down to 5.7 after receiving 1g calcium gluconate and 10 units of insulin and craetinine of 1.68 (appears baseline).  Patient was admitted for blood transfusions. 73 year old male with PMH HTN, DM, BPH, HLD, SVT with ablation, CKD 3 and anemia (baseline 7-9) who was sent in by Dr. Roberts after it was found he had a hemoglobin in the 5s yesterday at her office.  The patient previously had episodes of weakness, was found to be anemic and received blood transfusions (endorses most recent in November).  Recently, the patient has been feeling similarly weak and went to see Dr. Roberts yesterday who performed blood work.  Today, it was found that the patient had a hemoglobin in the 5s and was sent to ED for blood transfusions.  Upon arrival to the ED, patient's vital signs were /75, HR 76, RR 18, temperature 97.9 degrees Farenheit and saturating 100% on room air.  Labs were significant for anemia with hemoglobin 5.9, WBC 3.14 and platelets of 90.  Potassium was initially 6.6 (which came down to 5.7 after receiving 1g calcium gluconate and 10 units of insulin) and creatinine of 1.68 (appears baseline).  Patient was admitted for blood transfusions. 73 year old male with PMH HTN, DM, BPH, HLD, SVT with ablation, CKD 3 and anemia (baseline hemoglobin 8-10) who was sent in by Dr. Roberts after it was found he had a hemoglobin in the 5s yesterday at her office.  In the past, the patient has had episodes of weakness, was found to be anemic and received blood transfusions (endorses most recently in November).  Recently, the patient has been feeling similarly weak and went to see Dr. Roberts yesterday who performed blood work.  Today, it was found that the patient had a hemoglobin in the 5s and was sent to ED for blood transfusions.  Upon arrival to the ED, patient's vital signs were /75, HR 76, RR 18, temperature 97.9 degrees Farenheit and saturating 100% on room air.  Labs were significant for anemia with hemoglobin 5.9, WBC 3.14 and platelets of 90.  Potassium was initially 6.6 (which came down to 5.7 after receiving 1g calcium gluconate and 10 units of insulin) and creatinine of 1.68 (appears baseline).  Patient was admitted for blood transfusions.

## 2019-03-13 NOTE — ED PROVIDER NOTE - OBJECTIVE STATEMENT
74 y/o male with a PMHx of HTN, DM, BPH and anemia (unknown cause) is present in the ED who was referred by his hematologist for low hgb of 5.6. Pt here in the ED without complaints. He denies the following: syncope, lightheadedness, dizziness, chest pain, sob, weakness, chest pain, sob, difficulty breathing, blood in urine/stool. Pt was referred to the ED for blood transfusion.

## 2019-03-13 NOTE — ED PROVIDER NOTE - CLINICAL SUMMARY MEDICAL DECISION MAKING FREE TEXT BOX
74 y/o male with hx of anemia here with low hgb who was sent in by Dr. Chavira for blood transfusion. Pt asymptomatic. VS nml. noted hyperkalemia. no chest pain. EKG nml. Will admit for transfusion. Discussed case with Dr. Chavira.

## 2019-03-13 NOTE — H&P ADULT - NSICDXPROBLEM_GEN_ALL_CORE_FT
PROBLEM DIAGNOSES  Problem: Transition of care performed with sharing of clinical summary  Assessment and Plan:     Problem: Nutrition, metabolism, and development symptoms  Assessment and Plan:     Problem: Need for prophylactic measure  Assessment and Plan:     Problem: BPH (benign prostatic hyperplasia)  Assessment and Plan:     Problem: Diabetes mellitus  Assessment and Plan:     Problem: HTN (hypertension)  Assessment and Plan:     Problem: Pancytopenia  Assessment and Plan: PROBLEM DIAGNOSES  Problem: H/O hydrocephalus  Assessment and Plan:     Problem: CKD (chronic kidney disease) stage 3, GFR 30-59 ml/min  Assessment and Plan:     Problem: HLD (hyperlipidemia)  Assessment and Plan: -Patient with history of HLD, continue with simvastatin 10mg daily    Problem: Transition of care performed with sharing of clinical summary  Assessment and Plan:     Problem: Nutrition, metabolism, and development symptoms  Assessment and Plan: -No IVF indicated  -Will replete to K>4 and Mg>2  -DASH/TLC consistent carbohydrate diet    Problem: Need for prophylactic measure  Assessment and Plan: -No anticoagulation as patient ambulatory  -No GI prophylaxis indicated    Problem: BPH (benign prostatic hyperplasia)  Assessment and Plan: -Patient with history of BPH, continue home finasteride 5mg daily    Problem: Diabetes mellitus  Assessment and Plan: -Patient with history of DM, takes glyburide 2.5mg daily and janumet 50-1000mg BID, will place on ISS while admitted    Problem: HTN (hypertension)  Assessment and Plan: -Patient with history of HTN, continue with home dose of lasix 40mg daily and carvedilol 6.25mg BID    Problem: Pancytopenia  Assessment and Plan: PROBLEM DIAGNOSES  Problem: H/O hydrocephalus  Assessment and Plan: -Patient endorses history of hydrocephalus, states this could be contributing to his weakness.  Following up with PCP Dr. Quintero.    Problem: CKD (chronic kidney disease) stage 3, GFR 30-59 ml/min  Assessment and Plan: -Patient with history of CKD stage 3, creatinine     Problem: HLD (hyperlipidemia)  Assessment and Plan: -Patient with history of HLD, continue with simvastatin 10mg daily    Problem: Transition of care performed with sharing of clinical summary  Assessment and Plan:     Problem: Nutrition, metabolism, and development symptoms  Assessment and Plan: -No IVF indicated  -Will replete to K>4 and Mg>2  -DASH/TLC consistent carbohydrate diet    Problem: Need for prophylactic measure  Assessment and Plan: -No anticoagulation as patient ambulatory  -No GI prophylaxis indicated    Problem: BPH (benign prostatic hyperplasia)  Assessment and Plan: -Patient with history of BPH, continue home finasteride 5mg daily    Problem: Diabetes mellitus  Assessment and Plan: -Patient with history of DM, takes glyburide 2.5mg daily and janumet 50-1000mg BID, will place on ISS while admitted    Problem: HTN (hypertension)  Assessment and Plan: -Patient with history of HTN, continue with home dose of lasix 40mg daily and carvedilol 6.25mg BID    Problem: Pancytopenia  Assessment and Plan: PROBLEM DIAGNOSES  Problem: Hyperkalemia  Assessment and Plan: -Patient presented with K of 6.6, asymptomatic and no EKG changes.  Received 10 units regular insulin and 1g calcium gluconate which brought K down to 5.7.  Continue to trend BMP.    Problem: H/O hydrocephalus  Assessment and Plan: -Patient endorses history of hydrocephalus, states this could be contributing to his weakness.  Following up with PCP Dr. Quintero.    Problem: CKD (chronic kidney disease) stage 3, GFR 30-59 ml/min  Assessment and Plan: -Patient with history of CKD stage 3, creatinine 1.68 upon admission (appears baseline per previous records), renally dose medications and continue to trend BMP    Problem: HLD (hyperlipidemia)  Assessment and Plan: -Patient with history of HLD, continue with simvastatin 10mg daily    Problem: Transition of care performed with sharing of clinical summary  Assessment and Plan: -PCP Dr. Lee Quintero [phone]    Problem: Nutrition, metabolism, and development symptoms  Assessment and Plan: -No IVF indicated  -Will replete to K>4 and Mg>2  -DASH/TLC consistent carbohydrate diet    Problem: Need for prophylactic measure  Assessment and Plan: -No anticoagulation as patient ambulatory  -No GI prophylaxis indicated    Problem: BPH (benign prostatic hyperplasia)  Assessment and Plan: -Patient with history of BPH, continue home finasteride 5mg daily    Problem: Diabetes mellitus  Assessment and Plan: -Patient with history of DM, takes glyburide 2.5mg daily and janumet 50-1000mg BID, will place on ISS while admitted    Problem: HTN (hypertension)  Assessment and Plan: -Patient with history of HTN, continue with home dose of lasix 40mg daily and carvedilol 6.25mg BID    Problem: Pancytopenia  Assessment and Plan: -Patient has history of anemia (baseline hemoglobin 8-10 based on previous records), low platelets (around 150) but WBC usually normal.  Patient now presenting with hemoglobin of 5.9 (was about the same as outpatient yesterday), WBC of 3.14 and platelets of 90.  No signs/symptoms of active bleed and hemodynamically stable.  -For the anemia, likely element of CKD and anemia of chronic disease however this 5.9 lower than baseline.  Patient has had episodes of low hemoglobin before requiring transfusion.  Does endorse weakness like when he previously had to be transfused.  Patient to receive 2 units PRBC and if hemoglobin above 7.5, patient can be discharged with follow with Dr. Roberts.  -For the thrombocytopenia, he presented with platelets of 90, usually his platelets are around 150.  He has no signs/symptoms of active bleeding and is hemodynamically stable.  Per Dr. Roberts, patient to follow up for bone marrow biopsy to look for underlying cause of low platelets.  -For the leukopenia, patient usually has normal WBC however presenting with WBC of 3.14.  Per Dr. Roberts, patient to have bone marrow biospy as outpatient for further workup. PROBLEM DIAGNOSES  Problem: Pancytopenia  Assessment and Plan: -Patient has history of anemia (baseline hemoglobin 8-10 based on previous records), low platelets (around 150) but WBC usually normal.  Patient now presenting with hemoglobin of 5.9 (was about the same as outpatient yesterday), WBC of 3.14 and platelets of 90.  No signs/symptoms of active bleed and hemodynamically stable.  -For the anemia, likely element of CKD and anemia of chronic disease however, this 5.9 is lower than his baseline.  Patient has had episodes of low hemoglobin before requiring transfusion.  Does endorse weakness recently which was similar to what he felt like when he previously had to be transfused.  Patient to receive 2 units PRBC and if hemoglobin above 7.5, patient can be discharged with follow up with Dr. Roberts.  -For the thrombocytopenia, he presented with platelets of 90, usually his platelets are around 150.  He has no signs/symptoms of active bleeding and is hemodynamically stable.  Per Dr. Roberts, patient to follow up for bone marrow biopsy to look for underlying cause of low platelets.  -For the leukopenia, patient usually has normal WBC however presenting with WBC of 3.14.  Per Dr. Roberts, patient to have bone marrow biospy as outpatient for further workup.    Problem: Hyperkalemia  Assessment and Plan: -Patient presented with K of 6.6, asymptomatic and no EKG changes.  Received 10 units regular insulin and 1g calcium gluconate which brought K down to 5.7.  Continue to trend BMP.    Problem: H/O hydrocephalus  Assessment and Plan: -Patient endorses history of hydrocephalus, states this could be contributing to his weakness.  Following up with PCP Dr. Quintero.    Problem: CKD (chronic kidney disease) stage 3, GFR 30-59 ml/min  Assessment and Plan: -Patient with history of CKD stage 3, creatinine 1.68 upon admission (appears baseline per previous records), renally dose medications and continue to trend BMP    Problem: HLD (hyperlipidemia)  Assessment and Plan: -Patient with history of HLD, continue with simvastatin 10mg daily    Problem: HTN (hypertension)  Assessment and Plan: -Patient with history of HTN, continue with home dose of lasix 40mg daily and carvedilol 6.25mg BID    Problem: BPH (benign prostatic hyperplasia)  Assessment and Plan: -Patient with history of BPH, continue home finasteride 5mg daily    Problem: Diabetes mellitus  Assessment and Plan: -Patient with history of DM, takes glyburide 2.5mg daily and janumet 50-1000mg BID, will place on ISS while admitted    Problem: Transition of care performed with sharing of clinical summary  Assessment and Plan: -PCP Dr. Lee Quintero [phone]    Problem: Nutrition, metabolism, and development symptoms  Assessment and Plan: -No IVF indicated  -Will replete to K>4 and Mg>2  -DASH/TLC consistent carbohydrate diet    Problem: Need for prophylactic measure  Assessment and Plan: -No anticoagulation as patient ambulatory  -No GI prophylaxis indicated PROBLEM DIAGNOSES  Problem: Pancytopenia  Assessment and Plan: -Patient has history of anemia (baseline hemoglobin 8-10 based on previous records), low platelets (around 150) but WBC usually normal.  Patient now presenting with hemoglobin of 5.9 (was about the same as outpatient yesterday), WBC of 3.14 and platelets of 90.  No signs/symptoms of active bleed and hemodynamically stable.  -For the anemia, likely element of CKD and anemia of chronic disease however, this 5.9 is lower than his baseline.  Patient has had episodes of low hemoglobin before requiring transfusion.  Does endorse weakness recently which was similar to what he felt like when he previously had to be transfused.  Patient to receive 2 units PRBC and if hemoglobin above 7.5, patient can be discharged with follow up with Dr. Roberts.  -For the thrombocytopenia, he presented with platelets of 90, usually his platelets are around 150.  He has no signs/symptoms of active bleeding and is hemodynamically stable.  Per Dr. Roberts, patient to follow up for bone marrow biopsy to look for underlying cause of low platelets.  -For the leukopenia, patient usually has normal WBC however presenting with WBC of 3.14.  Per Dr. Roberts, patient to have bone marrow biospy as outpatient for further workup.    Problem: Hyperkalemia  Assessment and Plan: -Patient presented with K of 6.6, asymptomatic and no EKG changes.  Received 10 units regular insulin and 1g calcium gluconate which brought K down to 5.7.  Continue to trend BMP.    Problem: H/O hydrocephalus  Assessment and Plan: -Patient endorses history of hydrocephalus, states this could be contributing to his weakness.  Following up with PCP Dr. Quintero for further workup.    Problem: CKD (chronic kidney disease) stage 3, GFR 30-59 ml/min  Assessment and Plan: -Patient with history of CKD stage 3, creatinine 1.68 upon admission (appears baseline per previous records), renally dose medications and continue to trend BMP    Problem: HLD (hyperlipidemia)  Assessment and Plan: -Patient with history of HLD, continue with simvastatin 10mg daily    Problem: HTN (hypertension)  Assessment and Plan: -Patient with history of HTN, continue with home dose of lasix 40mg daily and carvedilol 6.25mg BID    Problem: BPH (benign prostatic hyperplasia)  Assessment and Plan: -Patient with history of BPH, continue home finasteride 5mg daily    Problem: Diabetes mellitus  Assessment and Plan: -Patient with history of DM, takes glyburide 2.5mg daily and janumet 50-1000mg BID, will place on ISS while admitted    Problem: Transition of care performed with sharing of clinical summary  Assessment and Plan: -PCP Dr. Lee Quintero [phone]    Problem: Nutrition, metabolism, and development symptoms  Assessment and Plan: -No IVF indicated  -Will replete to K>4 and Mg>2  -DASH/TLC consistent carbohydrate diet  -Dispo RMF    Problem: Need for prophylactic measure  Assessment and Plan: -No anticoagulation as patient ambulatory  -No GI prophylaxis indicated PROBLEM DIAGNOSES  Problem: Pancytopenia  Assessment and Plan: -Patient has history of anemia (baseline hemoglobin 8-10 based on previous records), low platelets (around 150) but WBC usually normal.  Patient now presenting with hemoglobin of 5.9 (was about the same as outpatient yesterday), WBC of 3.14 and platelets of 90.  No signs/symptoms of active bleed and hemodynamically stable.  -For the anemia, likely element of CKD and anemia of chronic disease however, this 5.9 is lower than his baseline.  Patient has had episodes of low hemoglobin before requiring transfusion.  Does endorse weakness recently which was similar to what he felt like when he previously had to be transfused.  Patient to receive 2 units PRBC and if hemoglobin above 7.5, patient can be discharged with follow up with Dr. Roberts.  -For the thrombocytopenia, he presented with platelets of 90, usually his platelets are around 150.  He has no signs/symptoms of active bleeding and is hemodynamically stable.  Per Dr. Roberts, patient to follow up for bone marrow biopsy to look for underlying cause of low platelets.  -For the leukopenia, patient usually has normal WBC however presenting with WBC of 3.14.  Per Dr. Roberts, patient to have bone marrow biopsy as outpatient for further workup.    Problem: Hyperkalemia  Assessment and Plan: -Patient presented with K of 6.6, asymptomatic and no EKG changes.  Received 10 units regular insulin and 1g calcium gluconate which brought K down to 5.7.  Continue to trend BMP.    Problem: H/O hydrocephalus  Assessment and Plan: -Patient endorses history of hydrocephalus, states this could be contributing to his weakness.  Following up with PCP Dr. Quintero for further workup.    Problem: CKD (chronic kidney disease) stage 3, GFR 30-59 ml/min  Assessment and Plan: -Patient with history of CKD stage 3, creatinine 1.68 upon admission (appears baseline per previous records), renally dose medications and continue to trend BMP.    Problem: HLD (hyperlipidemia)  Assessment and Plan: -Patient with history of HLD, continue with simvastatin 10mg daily    Problem: HTN (hypertension)  Assessment and Plan: -Patient with history of HTN, continue with home dose of lasix 40mg daily and carvedilol 6.25mg BID    Problem: BPH (benign prostatic hyperplasia)  Assessment and Plan: -Patient with history of BPH, continue home finasteride 5mg daily    Problem: Diabetes mellitus  Assessment and Plan: -Patient with history of DM, takes glyburide 2.5mg daily and janumet 50-1000mg BID, will place on ISS while admitted    Problem: Transition of care performed with sharing of clinical summary  Assessment and Plan: -PCP Dr. Lee Quintero [phone]    Problem: Nutrition, metabolism, and development symptoms  Assessment and Plan: -No IVF indicated  -Will replete to K>4 and Mg>2  -DASH/TLC consistent carbohydrate diet  -Dispo RMF    Problem: Need for prophylactic measure  Assessment and Plan: -No anticoagulation as patient ambulatory  -No GI prophylaxis indicated

## 2019-03-13 NOTE — DISCHARGE NOTE PROVIDER - CARE PROVIDER_API CALL
Olivia Roberts)  Medicine  [address] [place] [address], [address] [place] [address]  Phone: [phone]  Fax: [phone]  Follow Up Time:

## 2019-03-13 NOTE — ED ADULT TRIAGE NOTE - CADM TRG TX PRIOR TO ARRIVAL
Telephone Encounter by Cassi Haney at 02/13/17 10:06 AM     Author:  Cassi Haney Service:  (none) Author Type:       Filed:  02/13/17 10:09 AM Encounter Date:  2/13/2017 Status:  Signed     :  Cassi Haney ()              ROBERT PATEL    Patient Age: 62 year old    ACCT STATUS:   MESSAGE:[CC1.1T]   Received call from [facility] calling to advise that the patient dressing got wet this weekend during a shower and the dressing was changed.  Dr. Valentino, medical director, looked over it and the dressing looks good, soft cast is dry.  Was calling to let know of what happened.  Patient had surgery on 2/8/2017. Message routed to Dr. Cruz team for fyi.[CC1.1M]      Next and Last Visit with Provider and Department  Next visit with ISRAEL CRUZ is on No match found  Next visit with [facility] is on 02/20/2017 at  2:30 PM in [facility]  Last visit with ISRAEL CRUZ was on 02/02/2017 at 12:30 PM in [facility]  Last visit with [facility] was on 02/02/2017 at 12:30 PM in [facility]     WEIGHT AND HEIGHT: As of 02/02/2017 weight is 167 lbs.(75.751 kg). Height is 5' 3\"(1.600 m).   BMI is 29.59 kg/(m^2) calculated from:     Height 5' 3\" (1.6 m) as of 2/2/17     Weight 167 lb (75.751 kg) as of 2/2/17      Allergies     Allergen  Reactions   • Penicillin G Hives     Current Outpatient Prescriptions     Medication  Sig   • Enoxaparin Sodium 40 MG/0.4ML SOLN Inject 40 mg into the skin.   • hydrocodone-acetaminophen (NORCO) 5-325 MG per tablet Take  by mouth.   • oxcarbazepine (TRILEPTAL) 300 MG tablet Take 300 mg by mouth.   • zolpidem (AMBIEN) 5 MG tablet Take 5 mg by mouth.   • Cholecalciferol 2000 UNITS TABS Take 2,000 Units by mouth.      PHARMACY to use:[CC1.1T] n/a[CC1.1M]          Pharmacy preference(s) on file: Data Unavailable    CALL BACK INFO:[CC1.1T] Ok to leave response (including medical information) with  family member or on answering machine[CC1.1M]  ROUTING:[CC1.1T] Patient's physician/staff[CC1.1M]        PCP: No primary care provider on file.         INS: Payor: [employer] OPEN ACCESS / Plan: N/A / Product Type: *No Product type* / Note: This is the primary coverage, but no account was found for this location or the patient's primary location.   ADDRESS:  [address] [address][CC1.1T]       Revision History        User Key Date/Time User Provider Type Action    > CC1.1 02/13/17 10:09 AM Cassi Haney  Sign    M - Manual, T - Template             none

## 2019-03-14 ENCOUNTER — TRANSCRIPTION ENCOUNTER (OUTPATIENT)
Age: 74
End: 2019-03-14

## 2019-03-14 VITALS
OXYGEN SATURATION: 99 % | RESPIRATION RATE: 18 BRPM | TEMPERATURE: 98 F | DIASTOLIC BLOOD PRESSURE: 75 MMHG | HEART RATE: 72 BPM | SYSTOLIC BLOOD PRESSURE: 155 MMHG

## 2019-03-14 LAB
ANION GAP SERPL CALC-SCNC: 12 MMOL/L — SIGNIFICANT CHANGE UP (ref 5–17)
ANION GAP SERPL CALC-SCNC: 9 MMOL/L — SIGNIFICANT CHANGE UP (ref 5–17)
BUN SERPL-MCNC: 31 MG/DL — HIGH (ref 7–23)
BUN SERPL-MCNC: 31 MG/DL — HIGH (ref 7–23)
CALCIUM SERPL-MCNC: 8.8 MG/DL — SIGNIFICANT CHANGE UP (ref 8.4–10.5)
CALCIUM SERPL-MCNC: 9.2 MG/DL — SIGNIFICANT CHANGE UP (ref 8.4–10.5)
CHLORIDE SERPL-SCNC: 107 MMOL/L — SIGNIFICANT CHANGE UP (ref 96–108)
CHLORIDE SERPL-SCNC: 109 MMOL/L — HIGH (ref 96–108)
CO2 SERPL-SCNC: 20 MMOL/L — LOW (ref 22–31)
CO2 SERPL-SCNC: 20 MMOL/L — LOW (ref 22–31)
CREAT SERPL-MCNC: 1.65 MG/DL — HIGH (ref 0.5–1.3)
CREAT SERPL-MCNC: 1.74 MG/DL — HIGH (ref 0.5–1.3)
GLUCOSE SERPL-MCNC: 130 MG/DL — HIGH (ref 70–99)
GLUCOSE SERPL-MCNC: 179 MG/DL — HIGH (ref 70–99)
HCT VFR BLD CALC: 25.4 % — LOW (ref 39–50)
HCT VFR BLD CALC: 25.8 % — LOW (ref 39–50)
HGB BLD-MCNC: 7.4 G/DL — LOW (ref 13–17)
HGB BLD-MCNC: 7.9 G/DL — LOW (ref 13–17)
MAGNESIUM SERPL-MCNC: 2.2 MG/DL — SIGNIFICANT CHANGE UP (ref 1.6–2.6)
MAGNESIUM SERPL-MCNC: 2.3 MG/DL — SIGNIFICANT CHANGE UP (ref 1.6–2.6)
MCHC RBC-ENTMCNC: 24.8 PG — LOW (ref 27–34)
MCHC RBC-ENTMCNC: 25.7 PG — LOW (ref 27–34)
MCHC RBC-ENTMCNC: 29.1 GM/DL — LOW (ref 32–36)
MCHC RBC-ENTMCNC: 30.6 GM/DL — LOW (ref 32–36)
MCV RBC AUTO: 84 FL — SIGNIFICANT CHANGE UP (ref 80–100)
MCV RBC AUTO: 85.2 FL — SIGNIFICANT CHANGE UP (ref 80–100)
NRBC # BLD: 0 /100 WBCS — SIGNIFICANT CHANGE UP (ref 0–0)
NRBC # BLD: 0 /100 WBCS — SIGNIFICANT CHANGE UP (ref 0–0)
PLATELET # BLD AUTO: 88 K/UL — LOW (ref 150–400)
PLATELET # BLD AUTO: 91 K/UL — LOW (ref 150–400)
POTASSIUM SERPL-MCNC: 5.2 MMOL/L — SIGNIFICANT CHANGE UP (ref 3.5–5.3)
POTASSIUM SERPL-MCNC: 5.5 MMOL/L — HIGH (ref 3.5–5.3)
POTASSIUM SERPL-SCNC: 5.2 MMOL/L — SIGNIFICANT CHANGE UP (ref 3.5–5.3)
POTASSIUM SERPL-SCNC: 5.5 MMOL/L — HIGH (ref 3.5–5.3)
RBC # BLD: 2.98 M/UL — LOW (ref 4.2–5.8)
RBC # BLD: 3.07 M/UL — LOW (ref 4.2–5.8)
RBC # FLD: 16.2 % — HIGH (ref 10.3–14.5)
RBC # FLD: 16.6 % — HIGH (ref 10.3–14.5)
SODIUM SERPL-SCNC: 138 MMOL/L — SIGNIFICANT CHANGE UP (ref 135–145)
SODIUM SERPL-SCNC: 139 MMOL/L — SIGNIFICANT CHANGE UP (ref 135–145)
VIT B12 SERPL-MCNC: >2000 PG/ML — HIGH (ref 232–1245)
WBC # BLD: 3.18 K/UL — LOW (ref 3.8–10.5)
WBC # BLD: 3.75 K/UL — LOW (ref 3.8–10.5)
WBC # FLD AUTO: 3.18 K/UL — LOW (ref 3.8–10.5)
WBC # FLD AUTO: 3.75 K/UL — LOW (ref 3.8–10.5)

## 2019-03-14 PROCEDURE — 86923 COMPATIBILITY TEST ELECTRIC: CPT

## 2019-03-14 PROCEDURE — 96375 TX/PRO/DX INJ NEW DRUG ADDON: CPT

## 2019-03-14 PROCEDURE — 93005 ELECTROCARDIOGRAM TRACING: CPT

## 2019-03-14 PROCEDURE — 80053 COMPREHEN METABOLIC PANEL: CPT

## 2019-03-14 PROCEDURE — 82962 GLUCOSE BLOOD TEST: CPT

## 2019-03-14 PROCEDURE — 86901 BLOOD TYPING SEROLOGIC RH(D): CPT

## 2019-03-14 PROCEDURE — 86900 BLOOD TYPING SEROLOGIC ABO: CPT

## 2019-03-14 PROCEDURE — 99285 EMERGENCY DEPT VISIT HI MDM: CPT | Mod: 25

## 2019-03-14 PROCEDURE — 85025 COMPLETE CBC W/AUTO DIFF WBC: CPT

## 2019-03-14 PROCEDURE — 85610 PROTHROMBIN TIME: CPT

## 2019-03-14 PROCEDURE — 82607 VITAMIN B-12: CPT

## 2019-03-14 PROCEDURE — 71045 X-RAY EXAM CHEST 1 VIEW: CPT

## 2019-03-14 PROCEDURE — 36415 COLL VENOUS BLD VENIPUNCTURE: CPT

## 2019-03-14 PROCEDURE — 85730 THROMBOPLASTIN TIME PARTIAL: CPT

## 2019-03-14 PROCEDURE — 83735 ASSAY OF MAGNESIUM: CPT

## 2019-03-14 PROCEDURE — 96374 THER/PROPH/DIAG INJ IV PUSH: CPT

## 2019-03-14 PROCEDURE — 80048 BASIC METABOLIC PNL TOTAL CA: CPT

## 2019-03-14 PROCEDURE — 36430 TRANSFUSION BLD/BLD COMPNT: CPT

## 2019-03-14 PROCEDURE — 96361 HYDRATE IV INFUSION ADD-ON: CPT

## 2019-03-14 PROCEDURE — 86850 RBC ANTIBODY SCREEN: CPT

## 2019-03-14 PROCEDURE — 85027 COMPLETE CBC AUTOMATED: CPT

## 2019-03-14 PROCEDURE — P9016: CPT

## 2019-03-14 RX ORDER — FUROSEMIDE 40 MG
40 TABLET ORAL DAILY
Qty: 0 | Refills: 0 | Status: DISCONTINUED | OUTPATIENT
Start: 2019-03-14 | End: 2019-03-14

## 2019-03-14 RX ORDER — INSULIN LISPRO 100/ML
VIAL (ML) SUBCUTANEOUS
Qty: 0 | Refills: 0 | Status: DISCONTINUED | OUTPATIENT
Start: 2019-03-14 | End: 2019-03-14

## 2019-03-14 RX ORDER — DEXTROSE 50 % IN WATER 50 %
12.5 SYRINGE (ML) INTRAVENOUS ONCE
Qty: 0 | Refills: 0 | Status: DISCONTINUED | OUTPATIENT
Start: 2019-03-14 | End: 2019-03-14

## 2019-03-14 RX ORDER — DEXTROSE 50 % IN WATER 50 %
25 SYRINGE (ML) INTRAVENOUS ONCE
Qty: 0 | Refills: 0 | Status: DISCONTINUED | OUTPATIENT
Start: 2019-03-14 | End: 2019-03-14

## 2019-03-14 RX ORDER — FINASTERIDE 5 MG/1
5 TABLET, FILM COATED ORAL DAILY
Qty: 0 | Refills: 0 | Status: DISCONTINUED | OUTPATIENT
Start: 2019-03-14 | End: 2019-03-14

## 2019-03-14 RX ORDER — CARVEDILOL PHOSPHATE 80 MG/1
6.25 CAPSULE, EXTENDED RELEASE ORAL EVERY 12 HOURS
Qty: 0 | Refills: 0 | Status: DISCONTINUED | OUTPATIENT
Start: 2019-03-14 | End: 2019-03-14

## 2019-03-14 RX ORDER — GLUCAGON INJECTION, SOLUTION 0.5 MG/.1ML
1 INJECTION, SOLUTION SUBCUTANEOUS ONCE
Qty: 0 | Refills: 0 | Status: DISCONTINUED | OUTPATIENT
Start: 2019-03-14 | End: 2019-03-14

## 2019-03-14 RX ORDER — SIMVASTATIN 20 MG/1
10 TABLET, FILM COATED ORAL AT BEDTIME
Qty: 0 | Refills: 0 | Status: DISCONTINUED | OUTPATIENT
Start: 2019-03-14 | End: 2019-03-14

## 2019-03-14 RX ORDER — SODIUM CHLORIDE 9 MG/ML
1000 INJECTION, SOLUTION INTRAVENOUS
Qty: 0 | Refills: 0 | Status: DISCONTINUED | OUTPATIENT
Start: 2019-03-14 | End: 2019-03-14

## 2019-03-14 RX ORDER — DEXTROSE 50 % IN WATER 50 %
15 SYRINGE (ML) INTRAVENOUS ONCE
Qty: 0 | Refills: 0 | Status: DISCONTINUED | OUTPATIENT
Start: 2019-03-14 | End: 2019-03-14

## 2019-03-14 NOTE — DISCHARGE NOTE NURSING/CASE MANAGEMENT/SOCIAL WORK - NSDCDPATPORTLINK_GEN_ALL_CORE
You can access the [facility] Portal, offered by [facility], by registering with the following website: [email or web address]

## 2019-03-19 DIAGNOSIS — D61.818 OTHER PANCYTOPENIA: ICD-10-CM

## 2019-03-19 DIAGNOSIS — E87.5 HYPERKALEMIA: ICD-10-CM

## 2019-03-19 DIAGNOSIS — I12.9 HYPERTENSIVE CHRONIC KIDNEY DISEASE WITH STAGE 1 THROUGH STAGE 4 CHRONIC KIDNEY DISEASE, OR UNSPECIFIED CHRONIC KIDNEY DISEASE: ICD-10-CM

## 2019-03-19 DIAGNOSIS — Z80.0 FAMILY HISTORY OF MALIGNANT NEOPLASM OF DIGESTIVE ORGANS: ICD-10-CM

## 2019-03-19 DIAGNOSIS — N40.0 BENIGN PROSTATIC HYPERPLASIA WITHOUT LOWER URINARY TRACT SYMPTOMS: ICD-10-CM

## 2019-03-19 DIAGNOSIS — E11.22 TYPE 2 DIABETES MELLITUS WITH DIABETIC CHRONIC KIDNEY DISEASE: ICD-10-CM

## 2019-03-19 DIAGNOSIS — Z79.82 LONG TERM (CURRENT) USE OF ASPIRIN: ICD-10-CM

## 2019-03-19 DIAGNOSIS — D63.1 ANEMIA IN CHRONIC KIDNEY DISEASE: ICD-10-CM

## 2019-03-19 DIAGNOSIS — Z87.891 PERSONAL HISTORY OF NICOTINE DEPENDENCE: ICD-10-CM

## 2019-03-19 DIAGNOSIS — Z82.3 FAMILY HISTORY OF STROKE: ICD-10-CM

## 2019-03-19 DIAGNOSIS — N18.3 CHRONIC KIDNEY DISEASE, STAGE 3 (MODERATE): ICD-10-CM

## 2019-03-19 DIAGNOSIS — Z79.84 LONG TERM (CURRENT) USE OF ORAL HYPOGLYCEMIC DRUGS: ICD-10-CM

## 2019-03-20 PROBLEM — Z86.69 PERSONAL HISTORY OF OTHER DISEASES OF THE NERVOUS SYSTEM AND SENSE ORGANS: Chronic | Status: ACTIVE | Noted: 2019-03-13

## 2019-03-20 PROBLEM — E78.5 HYPERLIPIDEMIA, UNSPECIFIED: Chronic | Status: ACTIVE | Noted: 2019-03-13

## 2019-03-20 PROBLEM — D64.9 ANEMIA, UNSPECIFIED: Chronic | Status: ACTIVE | Noted: 2019-03-13

## 2019-03-26 ENCOUNTER — APPOINTMENT (OUTPATIENT)
Dept: CT IMAGING | Facility: HOSPITAL | Age: 74
End: 2019-03-26

## 2019-04-20 ENCOUNTER — INPATIENT (INPATIENT)
Facility: HOSPITAL | Age: 74
LOS: 0 days | Discharge: LEFT BEFORE DISCHARGE | DRG: 812 | End: 2019-04-21
Attending: SPECIALIST | Admitting: SPECIALIST
Payer: MEDICARE

## 2019-04-20 VITALS
OXYGEN SATURATION: 99 % | HEART RATE: 81 BPM | SYSTOLIC BLOOD PRESSURE: 180 MMHG | DIASTOLIC BLOOD PRESSURE: 73 MMHG | RESPIRATION RATE: 16 BRPM | TEMPERATURE: 98 F

## 2019-04-20 DIAGNOSIS — Z86.2 PERSONAL HISTORY OF DISEASES OF THE BLOOD AND BLOOD-FORMING ORGANS AND CERTAIN DISORDERS INVOLVING THE IMMUNE MECHANISM: ICD-10-CM

## 2019-04-20 DIAGNOSIS — Z91.89 OTHER SPECIFIED PERSONAL RISK FACTORS, NOT ELSEWHERE CLASSIFIED: ICD-10-CM

## 2019-04-20 DIAGNOSIS — N18.3 CHRONIC KIDNEY DISEASE, STAGE 3 (MODERATE): ICD-10-CM

## 2019-04-20 DIAGNOSIS — D64.9 ANEMIA, UNSPECIFIED: ICD-10-CM

## 2019-04-20 DIAGNOSIS — I10 ESSENTIAL (PRIMARY) HYPERTENSION: ICD-10-CM

## 2019-04-20 DIAGNOSIS — Z98.890 OTHER SPECIFIED POSTPROCEDURAL STATES: Chronic | ICD-10-CM

## 2019-04-20 DIAGNOSIS — R63.8 OTHER SYMPTOMS AND SIGNS CONCERNING FOOD AND FLUID INTAKE: ICD-10-CM

## 2019-04-20 DIAGNOSIS — N40.0 BENIGN PROSTATIC HYPERPLASIA WITHOUT LOWER URINARY TRACT SYMPTOMS: ICD-10-CM

## 2019-04-20 DIAGNOSIS — E11.9 TYPE 2 DIABETES MELLITUS WITHOUT COMPLICATIONS: ICD-10-CM

## 2019-04-20 DIAGNOSIS — E78.5 HYPERLIPIDEMIA, UNSPECIFIED: ICD-10-CM

## 2019-04-20 LAB — GLUCOSE BLDC GLUCOMTR-MCNC: 205 MG/DL — HIGH (ref 70–99)

## 2019-04-20 PROCEDURE — 99285 EMERGENCY DEPT VISIT HI MDM: CPT | Mod: 25

## 2019-04-20 PROCEDURE — 93010 ELECTROCARDIOGRAM REPORT: CPT

## 2019-04-20 RX ORDER — HYDRALAZINE HCL 50 MG
10 TABLET ORAL ONCE
Qty: 0 | Refills: 0 | Status: COMPLETED | OUTPATIENT
Start: 2019-04-20 | End: 2019-04-20

## 2019-04-20 RX ORDER — FUROSEMIDE 40 MG
20 TABLET ORAL ONCE
Qty: 0 | Refills: 0 | Status: COMPLETED | OUTPATIENT
Start: 2019-04-20 | End: 2019-04-21

## 2019-04-20 RX ORDER — CARVEDILOL PHOSPHATE 80 MG/1
6.25 CAPSULE, EXTENDED RELEASE ORAL EVERY 12 HOURS
Qty: 0 | Refills: 0 | Status: DISCONTINUED | OUTPATIENT
Start: 2019-04-20 | End: 2019-04-21

## 2019-04-20 RX ORDER — SIMVASTATIN 20 MG/1
10 TABLET, FILM COATED ORAL AT BEDTIME
Qty: 0 | Refills: 0 | Status: DISCONTINUED | OUTPATIENT
Start: 2019-04-20 | End: 2019-04-21

## 2019-04-20 RX ORDER — FINASTERIDE 5 MG/1
5 TABLET, FILM COATED ORAL EVERY 24 HOURS
Qty: 0 | Refills: 0 | Status: DISCONTINUED | OUTPATIENT
Start: 2019-04-20 | End: 2019-04-21

## 2019-04-20 RX ORDER — INSULIN LISPRO 100/ML
VIAL (ML) SUBCUTANEOUS
Qty: 0 | Refills: 0 | Status: DISCONTINUED | OUTPATIENT
Start: 2019-04-20 | End: 2019-04-21

## 2019-04-20 RX ADMIN — Medication 10 MILLIGRAM(S): at 20:32

## 2019-04-20 RX ADMIN — Medication 4: at 22:44

## 2019-04-20 RX ADMIN — CARVEDILOL PHOSPHATE 6.25 MILLIGRAM(S): 80 CAPSULE, EXTENDED RELEASE ORAL at 18:17

## 2019-04-20 RX ADMIN — FINASTERIDE 5 MILLIGRAM(S): 5 TABLET, FILM COATED ORAL at 22:29

## 2019-04-20 RX ADMIN — SIMVASTATIN 10 MILLIGRAM(S): 20 TABLET, FILM COATED ORAL at 22:29

## 2019-04-20 NOTE — H&P ADULT - NSHPLABSRESULTS_GEN_ALL_CORE
.  LABS:                         6.2    3.30  )-----------( 114      ( 20 Apr 2019 16:04 )             22.3     04-20    142  |  111<H>  |  24<H>  ----------------------------<  176<H>  6.1<H>   |  21<L>  |  1.74<H>    Ca    8.9      20 Apr 2019 16:04    TPro  6.8  /  Alb  4.1  /  TBili  0.3  /  DBili  x   /  AST  19  /  ALT  17  /  AlkPhos  77  04-20    PT/INR - ( 20 Apr 2019 16:04 )   PT: 14.1 sec;   INR: 1.24          PTT - ( 20 Apr 2019 16:04 )  PTT:34.8 sec        RADIOLOGY, EKG & ADDITIONAL TESTS: Reviewed.

## 2019-04-20 NOTE — H&P ADULT - PROBLEM SELECTOR PLAN 1
Pt presented with Hb 5.9 symptomatic found to have SOB/fatigue. Prior hx of anemia on last admission in march s/p 2U as well with appropriate response. Etiology unknown as patient with no active sx of bleeding, and with concurrent pancytopenia, concerning for infiltrative process in marrow/ malignancy.  - Transfuse 2U pRBCs, followed by lasix 20 IV   - F/u with CBC for response after transfusions   - CBC qd  - Active Type and screens  - Bedrest for now in setting of acute anemia   - Maintain 2 large bore IVs

## 2019-04-20 NOTE — ED PROVIDER NOTE - OBJECTIVE STATEMENT
here with generalized weakness/fatigue/shortness of breath with exertion.  History of anemia of unknown etiology, followed by hematologist and reports plan for bone marrow biopsy.  Had transfusion a little over a month ago for same.  Says he had labs drawn last week and called yesterday and told to come here for repeat transfusion as was low again (doesn't know number).  Denies blood in stool/ black stool, fever/chills, pain.  Symptoms gradually worsening

## 2019-04-20 NOTE — ED PROVIDER NOTE - CLINICAL SUMMARY MEDICAL DECISION MAKING FREE TEXT BOX
recurrent symptomatic anemia.  will admit for transfusion. undergoing outpatient workup for etiology still unknown.  no active bleeding. recurrent symptomatic anemia.  will admit for transfusion. undergoing outpatient workup for etiology still unknown.  no active bleeding.  discussed with Dr. Roberts.  would like to transfuse 2 units today with lasix 20mg in between units and repeat tomorrow with 2 more units also with 20 lasix between.  admit under dr. Mallory.

## 2019-04-20 NOTE — ED PROVIDER NOTE - CONSTITUTIONAL, MLM
normal... pale appearing, well nourished, awake, alert, oriented to person, place, time/situation and in no apparent distress.

## 2019-04-20 NOTE — ED ADULT TRIAGE NOTE - OTHER COMPLAINTS
hx blood transfusion approx 1 month ago. presents to ed for another transfusion. sent r/t low h/h. c.o weakness

## 2019-04-20 NOTE — H&P ADULT - HISTORY OF PRESENT ILLNESS
73YOM with PMH HTN, DM2, BPH, HLD, SVT with ablation, CKD 3 and anemia (baseline hemoglobin 8-10) with recent discharge March 13th for Acute anemia (Hb 5.9 s/p 2U pRBC) presenting 4/20/19 from Dr. Roberts's for low hemoglobin again. Patient mentions after discharge, he continued to have fatigue and progressive SOB, but was unable to undergo further workup for pancytopenia due to personal problems. Patient was advised by Dr. Roberts to come back to ED due to low hemoglobin seen again on labs drawn this week. Patient denies any hemoptysis, epigastric pain, recent NSAID use, melena, ash BRBPR, or hematuria. Patient denies any bleeding disorders in family, endorses breast CA in mother. As per Dr. Roberts pt still pending bone marrow biopsy for further workup.   At ED vitals afebrile 97.5, HR 80, /70s , R 16 99% RA 73YOM with PMH HTN, DM2, BPH, HLD, SVT with ablation, CKD 3 and anemia (baseline hemoglobin 8-10) with recent discharge March 13th for Acute anemia (Hb 5.9 s/p 2U pRBC) presenting 4/20/19 from Dr. Roberts's for low hemoglobin again. Patient mentions after discharge, he continued to have fatigue and progressive SOB, but was unable to undergo further workup for pancytopenia due to personal problems. Patient was advised by Dr. Roberts to come back to ED due to low hemoglobin seen again on labs drawn this week. Patient denies any hemoptysis, epigastric pain, recent NSAID use, melena, ash BRBPR, or hematuria. Patient denies any bleeding disorders in family, endorses breast CA in mother. As per Dr. Roberts pt still pending bone marrow biopsy for further workup.   At ED vitals afebrile 97.5, HR 80, -190/70s , R 16 99% RA  Labs s/f: WBC 3.3 (similar to last mo.) Hb 6.2, , BUN/Cr 24/1.74 (known CKD stage 3, similar baseline range)  Patient ordered for 2U pRBCs with plan to follow up with Lasix 20 IV as per Dr. Roberts

## 2019-04-20 NOTE — H&P ADULT - PROBLEM SELECTOR PLAN 2
Pt found to have pancytopenia in march of unknown etiology, as above, concerning for infiltrative/malignancy process, pending bone marrow biopsy with Dr. Roberts.   - C/w CBCqd  - Monitor for sx of bleeding

## 2019-04-20 NOTE — H&P ADULT - NSHPSOCIALHISTORY_GEN_ALL_CORE
Former smoker 10 cigs/qd from for 20 years, denies alcohol or ilicit drug use. Patient lives alone currently .

## 2019-04-20 NOTE — H&P ADULT - PROBLEM SELECTOR PLAN 3
PMH of HTN   - c/w home coreg 6.25 BID   - Patient found to have sBP 180-190s, given 1 x Hydralazine 10

## 2019-04-20 NOTE — H&P ADULT - NSHPPHYSICALEXAM_GEN_ALL_CORE
VITAL SIGNS:  T(F): 97.8 (04-20-19 @ 17:55), Max: 97.8 (04-20-19 @ 17:55)  HR: 79 (04-20-19 @ 17:55) (79 - 81)  BP: 179/78 (04-20-19 @ 17:55) (178/82 - 190/71)  BP(mean): --  RR: 18 (04-20-19 @ 17:55) (16 - 18)  SpO2: 98% (04-20-19 @ 17:55) (98% - 99%)    PHYSICAL EXAM:  Constitutional: Pale  elderly male in NAD   HEENT: NC/AT, PERRL, Mucus membranes dry; conjunctival pallor   Neck: supple;  Respiratory: CTA B/L good respiratory effort on RA   Cardiac: +S1/S2; RRR ejection murmur heard at LSB and apex   Gastrointestinal: soft, NT/ND; +BSx4  Extremities: WWP, b/l pitting edema 2+ up to mid calfs   Vascular: 2+ radial, femoral, 1+ DP pulses B/L  Neurologic: AAOx3; no focal deficits

## 2019-04-20 NOTE — H&P ADULT - NSICDXPASTMEDICALHX_GEN_ALL_CORE_FT
PAST MEDICAL HISTORY:  Anemia     DM (diabetes mellitus)     Enlarged prostate     H/O hydrocephalus     History of pancytopenia     HLD (hyperlipidemia)     HTN (hypertension)

## 2019-04-20 NOTE — H&P ADULT - ASSESSMENT
73YOM with PMH HTN, DM2, BPH, HLD, SVT with ablation, CKD 3 and anemia (baseline hemoglobin 8-10) with recent discharge March 13th for Acute anemia (Hb 5.9 s/p 2U pRBC) presenting 4/20/19 from Dr. Roberts's for low hemoglobin, admitted for transfusion of acute anemia and monitoring of pancytopenia.

## 2019-04-20 NOTE — ED PROVIDER NOTE - PMH
Anemia    DM (diabetes mellitus)    Enlarged prostate    H/O hydrocephalus    HLD (hyperlipidemia)    HTN (hypertension)

## 2019-04-20 NOTE — H&P ADULT - PROBLEM SELECTOR PLAN 9
1) PCP Contacted on Admission: (Y/N) --> Name & Phone #:  Dr. Roberts aware spoke to ED team   2) Date of Contact with PCP:  3) PCP Contacted at Discharge: (Y/N, N/A)  4) Summary of Handoff Given to PCP:   5) Post-Discharge Appointment Date and Location:

## 2019-04-20 NOTE — H&P ADULT - PROBLEM SELECTOR PLAN 7
PMH of BPH endorses episodes of straining and incomplete emptying.   - C/w home finasteride   - C/w monitoring for UOP/ and scan if concern for retention

## 2019-04-20 NOTE — ED ADULT NURSE NOTE - OBJECTIVE STATEMENT
Pt came to ED complaining of fatigue and abnormal labs from PCP. PCP directed pt to come to ED for blood transfusion. Pt has hx of anemia. Pt currently denies chest pain, SOB, abd pain, /GI symptoms, D/N/V, fever or chills. Skin is pale.  Pt is alert and oriented x3. Ambulatory with even gait.

## 2019-04-21 VITALS
OXYGEN SATURATION: 95 % | RESPIRATION RATE: 18 BRPM | HEART RATE: 82 BPM | SYSTOLIC BLOOD PRESSURE: 181 MMHG | DIASTOLIC BLOOD PRESSURE: 76 MMHG | TEMPERATURE: 99 F

## 2019-04-21 LAB
ANION GAP SERPL CALC-SCNC: 10 MMOL/L — SIGNIFICANT CHANGE UP (ref 5–17)
BUN SERPL-MCNC: 25 MG/DL — HIGH (ref 7–23)
CALCIUM SERPL-MCNC: 8.8 MG/DL — SIGNIFICANT CHANGE UP (ref 8.4–10.5)
CHLORIDE SERPL-SCNC: 108 MMOL/L — SIGNIFICANT CHANGE UP (ref 96–108)
CO2 SERPL-SCNC: 20 MMOL/L — LOW (ref 22–31)
CREAT SERPL-MCNC: 1.84 MG/DL — HIGH (ref 0.5–1.3)
GLUCOSE BLDC GLUCOMTR-MCNC: 141 MG/DL — HIGH (ref 70–99)
GLUCOSE SERPL-MCNC: 132 MG/DL — HIGH (ref 70–99)
HCT VFR BLD CALC: 24.3 % — LOW (ref 39–50)
HCT VFR BLD CALC: 24.6 % — LOW (ref 39–50)
HGB BLD-MCNC: 7.3 G/DL — LOW (ref 13–17)
HGB BLD-MCNC: 7.5 G/DL — LOW (ref 13–17)
MAGNESIUM SERPL-MCNC: 2.1 MG/DL — SIGNIFICANT CHANGE UP (ref 1.6–2.6)
MCHC RBC-ENTMCNC: 24.4 PG — LOW (ref 27–34)
MCHC RBC-ENTMCNC: 24.7 PG — LOW (ref 27–34)
MCHC RBC-ENTMCNC: 30 GM/DL — LOW (ref 32–36)
MCHC RBC-ENTMCNC: 30.5 GM/DL — LOW (ref 32–36)
MCV RBC AUTO: 80.9 FL — SIGNIFICANT CHANGE UP (ref 80–100)
MCV RBC AUTO: 81.3 FL — SIGNIFICANT CHANGE UP (ref 80–100)
NRBC # BLD: 0 /100 WBCS — SIGNIFICANT CHANGE UP (ref 0–0)
NRBC # BLD: 0 /100 WBCS — SIGNIFICANT CHANGE UP (ref 0–0)
PLATELET # BLD AUTO: 98 K/UL — LOW (ref 150–400)
PLATELET # BLD AUTO: 99 K/UL — LOW (ref 150–400)
POTASSIUM SERPL-MCNC: 5.7 MMOL/L — HIGH (ref 3.5–5.3)
POTASSIUM SERPL-SCNC: 5.7 MMOL/L — HIGH (ref 3.5–5.3)
RBC # BLD: 2.99 M/UL — LOW (ref 4.2–5.8)
RBC # BLD: 3.04 M/UL — LOW (ref 4.2–5.8)
RBC # FLD: 17.2 % — HIGH (ref 10.3–14.5)
RBC # FLD: 17.3 % — HIGH (ref 10.3–14.5)
SODIUM SERPL-SCNC: 138 MMOL/L — SIGNIFICANT CHANGE UP (ref 135–145)
WBC # BLD: 3.37 K/UL — LOW (ref 3.8–10.5)
WBC # BLD: 3.44 K/UL — LOW (ref 3.8–10.5)
WBC # FLD AUTO: 3.37 K/UL — LOW (ref 3.8–10.5)
WBC # FLD AUTO: 3.44 K/UL — LOW (ref 3.8–10.5)

## 2019-04-21 PROCEDURE — 86850 RBC ANTIBODY SCREEN: CPT

## 2019-04-21 PROCEDURE — P9016: CPT

## 2019-04-21 PROCEDURE — 99285 EMERGENCY DEPT VISIT HI MDM: CPT | Mod: 25

## 2019-04-21 PROCEDURE — 82962 GLUCOSE BLOOD TEST: CPT

## 2019-04-21 PROCEDURE — 80048 BASIC METABOLIC PNL TOTAL CA: CPT

## 2019-04-21 PROCEDURE — P9021: CPT

## 2019-04-21 PROCEDURE — 36430 TRANSFUSION BLD/BLD COMPNT: CPT

## 2019-04-21 PROCEDURE — 71045 X-RAY EXAM CHEST 1 VIEW: CPT | Mod: 26

## 2019-04-21 PROCEDURE — 86923 COMPATIBILITY TEST ELECTRIC: CPT

## 2019-04-21 PROCEDURE — 86900 BLOOD TYPING SEROLOGIC ABO: CPT

## 2019-04-21 PROCEDURE — 85610 PROTHROMBIN TIME: CPT

## 2019-04-21 PROCEDURE — 85730 THROMBOPLASTIN TIME PARTIAL: CPT

## 2019-04-21 PROCEDURE — 86901 BLOOD TYPING SEROLOGIC RH(D): CPT

## 2019-04-21 PROCEDURE — 85025 COMPLETE CBC W/AUTO DIFF WBC: CPT

## 2019-04-21 PROCEDURE — 83735 ASSAY OF MAGNESIUM: CPT

## 2019-04-21 PROCEDURE — 86945 BLOOD PRODUCT/IRRADIATION: CPT

## 2019-04-21 PROCEDURE — 80053 COMPREHEN METABOLIC PANEL: CPT

## 2019-04-21 PROCEDURE — 85027 COMPLETE CBC AUTOMATED: CPT

## 2019-04-21 PROCEDURE — 71045 X-RAY EXAM CHEST 1 VIEW: CPT

## 2019-04-21 PROCEDURE — 93005 ELECTROCARDIOGRAM TRACING: CPT

## 2019-04-21 PROCEDURE — 36415 COLL VENOUS BLD VENIPUNCTURE: CPT

## 2019-04-21 RX ADMIN — Medication 20 MILLIGRAM(S): at 02:40

## 2019-04-21 RX ADMIN — CARVEDILOL PHOSPHATE 6.25 MILLIGRAM(S): 80 CAPSULE, EXTENDED RELEASE ORAL at 07:10

## 2019-05-02 DIAGNOSIS — N40.0 BENIGN PROSTATIC HYPERPLASIA WITHOUT LOWER URINARY TRACT SYMPTOMS: ICD-10-CM

## 2019-05-02 DIAGNOSIS — E11.22 TYPE 2 DIABETES MELLITUS WITH DIABETIC CHRONIC KIDNEY DISEASE: ICD-10-CM

## 2019-05-02 DIAGNOSIS — I12.9 HYPERTENSIVE CHRONIC KIDNEY DISEASE WITH STAGE 1 THROUGH STAGE 4 CHRONIC KIDNEY DISEASE, OR UNSPECIFIED CHRONIC KIDNEY DISEASE: ICD-10-CM

## 2019-05-02 DIAGNOSIS — Z87.891 PERSONAL HISTORY OF NICOTINE DEPENDENCE: ICD-10-CM

## 2019-05-02 DIAGNOSIS — Z82.3 FAMILY HISTORY OF STROKE: ICD-10-CM

## 2019-05-02 DIAGNOSIS — E78.5 HYPERLIPIDEMIA, UNSPECIFIED: ICD-10-CM

## 2019-05-02 DIAGNOSIS — D64.9 ANEMIA, UNSPECIFIED: ICD-10-CM

## 2019-05-02 DIAGNOSIS — D61.818 OTHER PANCYTOPENIA: ICD-10-CM

## 2019-05-02 DIAGNOSIS — Z85.00 PERSONAL HISTORY OF MALIGNANT NEOPLASM OF UNSPECIFIED DIGESTIVE ORGAN: ICD-10-CM

## 2019-05-02 DIAGNOSIS — N18.3 CHRONIC KIDNEY DISEASE, STAGE 3 (MODERATE): ICD-10-CM

## 2019-05-02 DIAGNOSIS — Z79.84 LONG TERM (CURRENT) USE OF ORAL HYPOGLYCEMIC DRUGS: ICD-10-CM

## 2019-05-03 PROBLEM — Z86.2 PERSONAL HISTORY OF DISEASES OF THE BLOOD AND BLOOD-FORMING ORGANS AND CERTAIN DISORDERS INVOLVING THE IMMUNE MECHANISM: Chronic | Status: ACTIVE | Noted: 2019-04-20

## 2019-05-15 ENCOUNTER — APPOINTMENT (OUTPATIENT)
Dept: MRI IMAGING | Facility: HOSPITAL | Age: 74
End: 2019-05-15

## 2019-09-11 NOTE — ED PROVIDER NOTE - CARE PROVIDER_API CALL
Olivia Roberts)  Medicine  [address] [place] [address], 3rd [place] [address]  Phone: [phone]  Fax: [phone]  Follow Up Time:

## 2019-09-11 NOTE — ED PROVIDER NOTE - ATTENDING CONTRIBUTION TO CARE
73M svt, ckd stage III, htn, dm c/b dm neuropathy, anema (baseline hb 8, transfused <6), bph, recently successfully tx for R plantar puncture wound s/p augmentin (9/4/19) now c/o mechanical injury to same foot w/ noted ecchymoses/pain (9/7/19). no systemic sx. no erythema, leg pain, phx dvt/pe, no prior fx. avss. nontoxic. no e/o compartment syndrome. RLE doppler neg for dvt. found to have R 1st and 2nd proximal phalanx fx on xray. incidentally also found to have hb 7.7, however asymptomatic. will dc home w/ outpatient ortho fu, postop shoe, strict return precautions. pt agrees w/ plan. questions answered.     I saw and discussed the care of the pt directly with the ACP while the pt was in the ED. i have reviewed the ACP note and agree w/ the history, exam and plan of care other than as noted above.

## 2019-09-11 NOTE — ED ADULT TRIAGE NOTE - OTHER COMPLAINTS
pt reports fx to 1st and 2nd toe, dx [facility]. s/p mechanical fall 1 week ago. sent by md for r/o dvt and ankle fx. reports worsening swelling/pain to ankle and calf. hx DM.

## 2019-09-11 NOTE — ED PROVIDER NOTE - PATIENT PORTAL LINK FT
You can access the FollowMyHealth Patient Portal offered by [facility] by registering at the following website: [email or web address] By joining [email or web address] FollowMyHealth portal, you will also be able to view your health information using other applications (apps) compatible with our system.

## 2019-09-11 NOTE — ED PROVIDER NOTE - NSFOLLOWUPINSTRUCTIONS_ED_ALL_ED_FT
Wear ace wrap for compression and swelling.  Elevate right leg when at home.  Wear post op shoe for toe fractures.  Follow up with your podiatrist and Dr. Roberts.    Return to the Emergency Department if you develop fever>100.4F, lightheadedness, chest pain, shortness of breath, blood in stool, worsening swelling, numbness, weakness or any other concerns.

## 2019-09-11 NOTE — ED ADULT NURSE NOTE - OBJECTIVE STATEMENT
pt received sitting in bed, A&O x 3. hx DM, MRSA, anemia. pt arrived to ED with initial c/o Rt ankle pain, Rt calf pain. S/P toe fracture last week, was seen at [facility] and referred to ED to r/o DVT and possible ankle fracture. pt endorses pain to Rt ankle with new onset edema and pain to Rt calf exacerbated with movement. Pt denies CP, SOB, n/v/d/fever. NAD, Will continue to monitor.

## 2019-09-11 NOTE — ED ADULT TRIAGE NOTE - BMI (KG/M2)
[facility] [address]    [address] [address]    Phone:  [phone]    Fax:  [phone]       Thank You for choosing us for your health care visit. We are glad to serve you and happy to provide you with this summary of your visit. Please help us to ensure we have accurate records. If you find anything that needs to be changed, please let our staff know as soon as possible.          Your Demographic Information     Patient Name Sex     Abebe Badillo III Male 1978       Ethnic Group Patient Race    Not of  or  Origin Black/      Your Visit Details     Date & Time Provider Department    2017 3:00 PM Reggie Siddiqi PA-C [facility] [address]      Your Upcoming Appointment*(Max 10)       9:00 AM CDT   Follow-up Visit with Angela Bourgeois MD   [facility]rology ([facility])    [address] [facility] [address] [phone]            Tuesday May 02, 2017  9:15 AM CDT   Follow-up Visit with Glenna Sanchez MD   [facility] [address] ([facility])    [address] [address] [phone]            2017  8:15 AM CST   Follow-up Visit with Farooq Liu MD   [facility] Cardiology ([facility])    [address] [facility] [address] [phone]              Your Vitals Were     BP Pulse Temp Resp Height Weight    118/72 (BP Location: [facility], Patient Position: Sitting, Cuff Size: Large Adult) 76 97.4 °F (36.3 °C) (Oral) 18 6' 3\" (1.905 m) 267 lb (121.1 kg)    SpO2 BMI Smoking Status             98% 33.37 kg/m2 Former Smoker         Medications Prescribed or Re-Ordered Today     metoPROLOL (LOPRESSOR) 50 MG tablet    Sig - Route: Take 1 tablet by mouth 2 times daily. - Oral    Class: Eprescribe    Pharmacy: [facility] [address] [place] [address] [address] Ph #:  [phone]    promethazine-dextromethorphan (PROMETHAZINE-DM) 6.25-15 MG/5ML syrup    Sig - Route: Take 5 mLs by mouth 4 times daily as needed for Cough. - Oral    Class: Eprescribe    Pharmacy: [facility] [id number]  [place] [address] AT [facility] [address] Ph #: [phone]      Your Current Medications Are        Disp Refills Start End    metoPROLOL (LOPRESSOR) 50 MG tablet 180 tablet 1 2017     Sig - Route: Take 1 tablet by mouth 2 times daily. - Oral    Class: Eprescribe    triamterene-hydrochlorothiazide (DYAZIDE) 37.5-25 MG per capsule 90 capsule 1 2017     Sig - Route: Take 1 capsule by mouth daily. - Oral    Class: Eprescribe    dicyclomine (BENTYL) 10 MG capsule 60 capsule 3 2017     Sig - Route: Take 1 capsule by mouth 2 times daily. - Oral    Class: Eprescribe    losartan (COZAAR) 50 MG tablet 90 tablet 3 12/15/2016     Sig - Route: Take 1 tablet by mouth daily. - Oral    Class: Eprescribe    ALPRAZolam (XANAX) 0.5 MG tablet 60 tablet 3 2016     Sig: Every 8-12 hours as needed for anxiety and insomnia    amLODIPine (NORVASC) 10 MG tablet 90 tablet 1 2016     Sig: TAKE 1 TABLET BY MOUTH DAILY    Class: Eprescribe    HYDROcodone-acetaminophen (NORCO) 5-325 MG per tablet 60 tablet 0 10/17/2016     Sig: Take 1 tablet every 12 hours as needed for pain.    Notes to Pharmacy: Called into [facility] on [address]    latanoprost (XALATAN) 0.005 % ophthalmic solution        Sig - Route: Place 1 drop into both eyes nightly. - Both Eyes    Class: Historical Med    sertraline (ZOLOFT) 25 MG tablet 90 tablet 1 2016     Sig - Route: Take 1 tablet by mouth nightly. - Oral    Class: Eprescribe    topiramate (TOPAMAX) 50 MG tablet 270 tablet 1 2016     Sig - Route: Take 1 tablet by mouth 3 times daily. - Oral    Class: Eprescribe    baclofen (LIORESAL) 10 MG tablet 270 tablet 1 2016     Si/2-1 tab every 6 to 8 hours as needed for muscle spasms and  tension    Class: Eprescribe    pantoprazole (PROTONIX) 40 MG tablet        Sig - Route: Take 40 mg by mouth 2 times daily. Indications: Gastroesophageal Reflux Disease - Oral    Class: Historical Med    polyethylene glycol (MIRALAX) powder 527 g 1 4/14/2015     Sig: Mix 17 grams with 8 ounce of fluids and drink daily as directed.    Class: Eprescribe    Cosign for Ordering: Accepted by Glenna Sanchez MD on 4/14/2015  2:38 PM    potassium chloride (K-DUR,KLOR-CON) 20 MEQ CR tablet 270 tablet 1 2/16/2015     Sig - Route: Take 1 tablet by mouth 3 times daily. - Oral    Class: Eprescribe    aspirin 81 MG tablet        Sig - Route: Take 81 mg by mouth daily. - Oral    Class: Historical Med    b complex vitamins tablet 30 tablet 3 6/30/2014     Sig - Route: Take 1 tablet by mouth daily. - Oral    Class: Eprescribe    Brimonidine Tartrate-Timolol (COMBIGAN) 0.2-0.5 % SOLN        Sig - Route: Place 1 drop into right eye every 12 hours. - Right Eye    Class: Historical Med    promethazine-dextromethorphan (PROMETHAZINE-DM) 6.25-15 MG/5ML syrup 118 mL 0 2/8/2017     Sig - Route: Take 5 mLs by mouth 4 times daily as needed for Cough. - Oral    Class: Eprescribe      Allergies     No Known Allergies      Problem List as of 2/8/2017     Hypertension    GERD (gastroesophageal reflux disease)    Lumbago    Chronic headaches    Backache, unspecified    Intervertebral disc extrusion    Fatigue    Disturbance of skin sensation    Vitamin D deficiency    Chronic cough    Tobacco abuse    Mold exposure    SOB (shortness of breath)    Myalgia    Insomnia    Migraine    Dysphagia    Epistaxis              Patient Instructions    If you had any lab work or radiology testing ordered at your visit today, please allow 7-10 business days from the date your test was performed for the results to be reviewed and sent by our office. If you utilize [email or web address], please know that your test results may be posted online within a few hours or a few  weeks, depending on the type of test you have done. Keep in mind, your doctor may not always have had the opportunity to review your results before they are released to your [email or web address] account.       In the next few weeks you may receive a Press Ganey survey regarding your most recent clinic visit with us.  Please take a few moments out of your day to accurately evaluate your visit.  We strive to provide you with the best medical care and hope you are happy with our services 100% of the time. Again, thank you for your time and we look forward to your next visit.             29.4

## 2019-09-11 NOTE — ED PROVIDER NOTE - PHYSICAL EXAMINATION
VITAL SIGNS: I have reviewed nursing notes and confirm.  CONSTITUTIONAL: Well-developed; well-nourished; in no acute distress.   SKIN:  warm and dry, no acute rash.   HEAD:  normocephalic, atraumatic.  EYES: PERRL, EOM intact; conjunctiva and sclera clear.  ENT: No nasal discharge; airway clear.   NECK: Supple; non tender.  CARD: S1, S2 normal; no murmurs, gallops, or rubs. Regular rate and rhythm.   RESP:  Clear to auscultation b/l, no wheezes, rales or rhonchi.  ABD: Normal bowel sounds; soft; non-distended; non-tender; no guarding/ rebound.  EXT: Normal ROM. No clubbing, cyanosis or edema. 2+ pulses to b/l ue/le.  NEURO: Alert, oriented, grossly unremarkable  PSYCH: Cooperative, mood and affect appropriate.    RLE: Ecchymosis over dorsal aspect of right foot at base of toes with generalized ttp over 1st and 2nd toes. FROM of toes. 1+ pitting edema from right foot to lower calf without erythema, warmth or tenderness. 2+ DP pulse. Nttp of bilateral malleoli. FROM of right ankle without pain. Nttp of proximal fibula. No knee effusion or tenderness.

## 2019-09-11 NOTE — ED PROVIDER NOTE - PMH
Anemia    DM (diabetes mellitus)    Enlarged prostate    H/O hydrocephalus    History of pancytopenia    HLD (hyperlipidemia)    HTN (hypertension)

## 2019-09-11 NOTE — ED ADULT NURSE NOTE - NSIMPLEMENTINTERV_GEN_ALL_ED
Implemented All Fall Risk Interventions:  [place] to call system. Call bell, personal items and telephone within reach. Instruct patient to call for assistance. Room bathroom lighting operational. Non-slip footwear when patient is off stretcher. Physically safe environment: no spills, clutter or unnecessary equipment. Stretcher in lowest position, wheels locked, appropriate side rails in place. Provide visual cue, wrist band, yellow gown, etc. Monitor gait and stability. Monitor for mental status changes and reorient to person, place, and time. Review medications for side effects contributing to fall risk. Reinforce activity limits and safety measures with patient and family.

## 2019-09-11 NOTE — ED PROVIDER NOTE - OBJECTIVE STATEMENT
74yo male with pmhx of HTN, DM2 c/b diabetic neuropathy, BPH, SVT, CKD stage 3, anemia (baseline Hgb 8, last admitted and transfused in April 2018) presents with RLE swelling. Pt seen 9/4 by his podiatrist Dr. Maryam Cota for puncture wound on plantar aspect of right foot after stepping on push pin. Provided wound care and taking augmentin with improvement. States he tripped in the dark on 9/7 and noted bruising and pain in the right 1st and 2nd toes. Seen at [facility] where he had XR foot notable for fractures of the right 1st and 2nd proximal phalanx. Has been going back for reassessment, seen today and told to come to ED d/t pain and swelling in right ankle and calf. Otherwise denies fever>100.4F, knee pain, chest pain, shortness of breath, h/o DVT/PE, recent surgery or travel.

## 2019-09-11 NOTE — ED PROVIDER NOTE - NSFOLLOWUPCLINICS_GEN_ALL_ED_FT
[facility]  Podiatry  [address] [place] [address]  Phone: [phone]  Fax:   Follow Up Time:

## 2019-09-11 NOTE — ED ADULT NURSE NOTE - CHPI ED NUR SYMPTOMS NEG
no bruising/no back pain/no weakness/no abrasion/no tingling/no fever/no numbness/no stiffness/no deformity/no difficulty bearing weight

## 2019-09-11 NOTE — ED PROVIDER NOTE - CLINICAL SUMMARY MEDICAL DECISION MAKING FREE TEXT BOX
ED course unremarkable - afebrile and hemodynamically stable. XR R foot reviewed with fracture of 1st and 2nd proximal phalanx, no evidence of additional bony injury. XR R ankle without bony injury or dislocation. US venous RLE without evidence of DVT. CBC with baseline anemia, improved from prior hospitalization in April 2019. Pt reports he is transfused at Hgb 6. Currently asymptomatic. He has baseline renal dysfunction, Cr 1.4 today and improved from previous studies. Has close follow up with Dr. Roberts and podiatrist. Continue use of post-op shoe, compression and elevation. Return precautions given.

## 2019-09-18 NOTE — ED PROVIDER NOTE - NS ED MD DISPO SPECIAL CONSIDERATION1
[place] website --- [email or web address] Tobacco [email or web address] website --- [email or web address]

## 2019-09-27 NOTE — PROGRESS NOTE ADULT - PROBLEM SELECTOR PLAN 4
Patient:   MARISLE GALE            MRN: [id number]            FIN: [id number]              Age:   89 years     Sex:  FEMALE     :  26   Associated Diagnoses:   None   Author:   JAMILA COVARRUBIAS     Subjective    Chief Complaint(s): AMS, weakness, bleeding   Reason for consultation: Managment of MARCO ANTONIO on CKD stage IV   Patient seen and examined today, no acute events reported overnight. Eating well, enjoyed her breakfast of pancakes and sausage this morning. Permacath in place, undergoing HD thrice weekly. No complaints presently; denies nausea, vomiting, chest pain, abdominal pain. No additional concerns or complaints at this time.     Objective   _   VS/Measurements     Vitals between:   20-AUG-2019 13:17:49   TO   21-AUG-2019 13:17:49                   LAST RESULT MINIMUM MAXIMUM  Temperature 36.7 36.7 37.1  Heart Rate 73 72 73  NISBP           132 121 132  NIDBP           80 75 80  SpO2                    96 95 96    General:  Patient is elderly appearing, alert and oriented x 2. Pleasant and conversational.   Eye:  Extraocular movements are intact, Normal conjunctiva, Vision unchanged.   HENT:  Normocephalic, Normal hearing, Oral mucosa is moist.    Neck:  Supple, Non-tender, No jugular venous distention.    Respiratory:  Breath sounds are equal, Symmetrical chest wall expansion, No chest wall tenderness, Slightly decreased breath sounds at the bases, no crackles.   Cardiovascular:  Normal rate, No murmur, No gallop.    Gastrointestinal:  Soft, Non-tender, Non-distended, Normal bowel sounds.   Musculoskeletal     Normal range of motion.     There is bilateral soft tissue edema in LE, with dressings in place. Areas of exposed skin appear non-erythematous.    Integumentary:  Warm, Dry, Pink.    Neurologic:  Alert, Oriented x 2.    Psychiatric:  Cooperative.      Health Status   Allergies:    Allergies (3) Active Reaction  clindamycin rash, urticaria  Latex None Documented  Vasotec Facial  swelling    Current medications:    Medications (20) Active  Scheduled: (12)  AMIODArone 200 mg tab  200 mg 1 tab, Oral, Daily  ApixaBAN 2.5 mg tab  2.5 mg 1 tab, Oral, Q12H  Brimonidine 0.2% ophth soln 5 mL  1 drop, Each Eye, Q12H  Ferrous sulfate 325 mg tab [Fe 65 mg]  325 mg 1 tab, Oral, BID  Heparin flush PF 10 unit/mL inj 5 mL SDV  30 unit 3 mL, Flush, Q12H  Latanoprost 0.005% ophth soln 2.5 mL  1 drop, Each Eye, Q Bedtime  Levothyroxine 125 mcg tab  125 mcg 1 tab, Oral, QAM at 6  Magnesium oxide 400 mg tab  400 mg 1 tab, Oral, Daily  Polyethylene glycol 3350 oral recon powder 17 gm packet UD  17 gm 1 packet, Oral, Daily  Sodium chloride PF 0.9% flush inj 10 mL  10 mL, Flush, Q12H  Sodium hypochlorite 0.25% (1/2 str) irrigation 473 mL  1 application, Topical, BID  Timolol-dorzolamide 0.5%-2% ophth soln 10 mL  1 drop, Each Eye, BID  Continuous: (1)  Sodium Chloride 0.9% 500 mL  500 mL, IV, 500 mL/hr  PRN: (7)  Acetaminophen 500 mg tab  500 mg 1 tab, Oral, Q4H  Haloperidol 5 mg/1 mL inj SDV  2 mg 0.4 mL, IM, Q8H  Hydrocodone-acetaminophen 5-325 mg tab  1 tab, Oral, Q6H  Morphine 5 mg/2.5 mL oral liquid repack  2.5 mg 1.25 mL, Oral, Q4H  Ondansetron 4 mg disintegrating tab  4 mg 1 tab, Oral, Q4H  Sodium chloride PF 0.9% flush inj 10 mL  10 mL, Flush, As Directed PRN  Sodium chloride PF 0.9% flush inj 10 mL  20 mL, Flush, As Directed PRN      Results Review   Labs between:  20-AUG-2019 13:17 to 21-AUG-2019 13:17  CBC:                 WBC  HgB  Hct  Plt  MCV  RDW   21-AUG-2019 7.7  (L) 7.9  (L) 26.3  235  95.6  14.7   DIFF:                 Seg  Neutroph//ABS  Lymph//ABS  Mono//ABS  EOS/ABS  21-AUG-2019 NOT APPLICABLE  67 // 5.2 16 // 1.2 11 // 0.8 4 // 0.3  BMP:                 Na  Cl  BUN  Glu   21-AUG-2019 140  103  (H) 42  78                              K  CO2  Cr  Ca                              4.5  30  (H) 5.52  (L) 8.2                        Impression and Plan    Patient is a 89 year old female with PMH of  HTN, CHF, hypothyroidism, and CKD stage IV, presenting from SNF for persistent bleeding after fall; found to have hemorrhagic shock with calf hematoma. Nephrology has been following for MARCO ANTONIO on CKD and hyperkalemia.  #Acute Kidney Injury on Chronic Kidney Disease:  - Chronic kidney disease stage IV at baseline; now requiring HD throughout admission.  - Patient and family wish to continue with HD: Last HD 08/19/2019: with removeal of approx. 2L. Continue on thrice weekly schedule: M/W/F - plan for HD today.  - Access: permacath in place.  - Daily Nephrovite.   #Hx of HTN:  #Hemorrhagic Shock - resolved:  - Cardiology following.  - BP has been well controlled - not currently on BP medications.   - Hemorrhagic shock upon presenation - resolved.   - Following I/O - no urine output documented in last 10+ days.   #Normocytic Anemia:  - In setting of likely anemia of chronic disease from CKD, exacerbated by acute trauma with resultant blood loss anemia.  - Hb 7.9 g/dl, stable - no active bleeding.  #Hypothyroidism:  - Continue home Synthroid.  #Hyperlipidemia:  -On statin, continue current management.  Corina Vidal,   Internal Medicine - PGY-3    Thank you very much for consulting us.  Patient was seen and examined today by myself. I did obtain my own history and performed my own exam.  I did review the A/P stated above with the medical resident and agree with the above.  Patient to continue HD thrice weekly.  DW family.  Thank you very much for consulting us.  Will follow up on daily bases.   Jr Arriaga MD, MSCR  [facility]   Resolved.  Anion gap metabolic acidosis from uremia + starvation ketosis    #colitis  observed on CT.  Questionable diarrhea x 3 weeks.  S/p colonoscopy and EGD with Dr. Mallory   Per GI: EGD showed questionable varices, gastritis and shallow ulcres;  colonoscopy  showed mild segmental colitis and polyps  - f/u biopsy results  - c/w PPI  - liver sonogram as outpatient

## 2020-01-01 ENCOUNTER — TRANSCRIPTION ENCOUNTER (OUTPATIENT)
Age: 75
End: 2020-01-01

## 2020-01-01 ENCOUNTER — OUTPATIENT (OUTPATIENT)
Dept: OUTPATIENT SERVICES | Facility: HOSPITAL | Age: 75
LOS: 1 days | End: 2020-01-01
Payer: MEDICARE

## 2020-01-01 ENCOUNTER — INPATIENT (INPATIENT)
Facility: HOSPITAL | Age: 75
LOS: 7 days | Discharge: NURSING, REHAB OR LONG-TERM CARE | DRG: 871 | End: 2020-02-21
Attending: INTERNAL MEDICINE | Admitting: INTERNAL MEDICINE
Payer: MEDICARE

## 2020-01-01 ENCOUNTER — APPOINTMENT (OUTPATIENT)
Dept: ENDOCRINOLOGY | Facility: CLINIC | Age: 75
End: 2020-01-01

## 2020-01-01 ENCOUNTER — EMERGENCY (EMERGENCY)
Facility: HOSPITAL | Age: 75
LOS: 1 days | Discharge: HOME | End: 2020-01-01
Attending: EMERGENCY MEDICINE | Admitting: INTERNAL MEDICINE
Payer: MEDICARE

## 2020-01-01 ENCOUNTER — INPATIENT (INPATIENT)
Facility: HOSPITAL | Age: 75
LOS: 37 days | DRG: 673 | End: 2020-06-15
Attending: INTERNAL MEDICINE | Admitting: INTERNAL MEDICINE
Payer: MEDICARE

## 2020-01-01 VITALS
DIASTOLIC BLOOD PRESSURE: 65 MMHG | SYSTOLIC BLOOD PRESSURE: 111 MMHG | RESPIRATION RATE: 18 BRPM | OXYGEN SATURATION: 99 % | HEART RATE: 80 BPM | TEMPERATURE: 98 F

## 2020-01-01 VITALS
SYSTOLIC BLOOD PRESSURE: 106 MMHG | RESPIRATION RATE: 16 BRPM | TEMPERATURE: 97 F | OXYGEN SATURATION: 96 % | HEART RATE: 87 BPM | DIASTOLIC BLOOD PRESSURE: 63 MMHG

## 2020-01-01 VITALS
TEMPERATURE: 98 F | HEART RATE: 94 BPM | RESPIRATION RATE: 167 BRPM | SYSTOLIC BLOOD PRESSURE: 92 MMHG | OXYGEN SATURATION: 96 % | DIASTOLIC BLOOD PRESSURE: 52 MMHG

## 2020-01-01 VITALS
DIASTOLIC BLOOD PRESSURE: 52 MMHG | SYSTOLIC BLOOD PRESSURE: 98 MMHG | WEIGHT: 199.96 LBS | HEIGHT: 70 IN | RESPIRATION RATE: 18 BRPM | TEMPERATURE: 98 F | HEART RATE: 91 BPM | OXYGEN SATURATION: 100 %

## 2020-01-01 VITALS
SYSTOLIC BLOOD PRESSURE: 91 MMHG | OXYGEN SATURATION: 100 % | DIASTOLIC BLOOD PRESSURE: 59 MMHG | HEIGHT: 70 IN | RESPIRATION RATE: 20 BRPM | WEIGHT: 199.96 LBS | TEMPERATURE: 98 F | HEART RATE: 95 BPM

## 2020-01-01 VITALS — TEMPERATURE: 98 F

## 2020-01-01 DIAGNOSIS — N28.9 DISORDER OF KIDNEY AND URETER, UNSPECIFIED: ICD-10-CM

## 2020-01-01 DIAGNOSIS — N17.9 ACUTE KIDNEY FAILURE, UNSPECIFIED: ICD-10-CM

## 2020-01-01 DIAGNOSIS — Z71.89 OTHER SPECIFIED COUNSELING: ICD-10-CM

## 2020-01-01 DIAGNOSIS — N40.0 BENIGN PROSTATIC HYPERPLASIA WITHOUT LOWER URINARY TRACT SYMPTOMS: ICD-10-CM

## 2020-01-01 DIAGNOSIS — E11.9 TYPE 2 DIABETES MELLITUS WITHOUT COMPLICATIONS: ICD-10-CM

## 2020-01-01 DIAGNOSIS — E87.0 HYPEROSMOLALITY AND HYPERNATREMIA: ICD-10-CM

## 2020-01-01 DIAGNOSIS — G91.9 HYDROCEPHALUS, UNSPECIFIED: ICD-10-CM

## 2020-01-01 DIAGNOSIS — I10 ESSENTIAL (PRIMARY) HYPERTENSION: ICD-10-CM

## 2020-01-01 DIAGNOSIS — Z51.5 ENCOUNTER FOR PALLIATIVE CARE: ICD-10-CM

## 2020-01-01 DIAGNOSIS — N39.0 URINARY TRACT INFECTION, SITE NOT SPECIFIED: ICD-10-CM

## 2020-01-01 DIAGNOSIS — F32.9 MAJOR DEPRESSIVE DISORDER, SINGLE EPISODE, UNSPECIFIED: ICD-10-CM

## 2020-01-01 DIAGNOSIS — E78.5 HYPERLIPIDEMIA, UNSPECIFIED: ICD-10-CM

## 2020-01-01 DIAGNOSIS — N10 ACUTE PYELONEPHRITIS: ICD-10-CM

## 2020-01-01 DIAGNOSIS — A41.9 SEPSIS, UNSPECIFIED ORGANISM: ICD-10-CM

## 2020-01-01 DIAGNOSIS — E11.10 TYPE 2 DIABETES MELLITUS WITH KETOACIDOSIS WITHOUT COMA: ICD-10-CM

## 2020-01-01 DIAGNOSIS — N18.4 CHRONIC KIDNEY DISEASE, STAGE 4 (SEVERE): ICD-10-CM

## 2020-01-01 DIAGNOSIS — R53.1 WEAKNESS: ICD-10-CM

## 2020-01-01 DIAGNOSIS — N18.6 END STAGE RENAL DISEASE: ICD-10-CM

## 2020-01-01 DIAGNOSIS — R33.8 OTHER RETENTION OF URINE: ICD-10-CM

## 2020-01-01 DIAGNOSIS — D61.818 OTHER PANCYTOPENIA: ICD-10-CM

## 2020-01-01 DIAGNOSIS — D72.829 ELEVATED WHITE BLOOD CELL COUNT, UNSPECIFIED: ICD-10-CM

## 2020-01-01 DIAGNOSIS — R63.8 OTHER SYMPTOMS AND SIGNS CONCERNING FOOD AND FLUID INTAKE: ICD-10-CM

## 2020-01-01 DIAGNOSIS — E44.0 MODERATE PROTEIN-CALORIE MALNUTRITION: ICD-10-CM

## 2020-01-01 DIAGNOSIS — E87.1 HYPO-OSMOLALITY AND HYPONATREMIA: ICD-10-CM

## 2020-01-01 DIAGNOSIS — Z98.890 OTHER SPECIFIED POSTPROCEDURAL STATES: Chronic | ICD-10-CM

## 2020-01-01 DIAGNOSIS — Z29.9 ENCOUNTER FOR PROPHYLACTIC MEASURES, UNSPECIFIED: ICD-10-CM

## 2020-01-01 DIAGNOSIS — D50.9 IRON DEFICIENCY ANEMIA, UNSPECIFIED: ICD-10-CM

## 2020-01-01 DIAGNOSIS — Z91.89 OTHER SPECIFIED PERSONAL RISK FACTORS, NOT ELSEWHERE CLASSIFIED: ICD-10-CM

## 2020-01-01 DIAGNOSIS — Z79.899 OTHER LONG TERM (CURRENT) DRUG THERAPY: ICD-10-CM

## 2020-01-01 DIAGNOSIS — I82.409 ACUTE EMBOLISM AND THROMBOSIS OF UNSPECIFIED DEEP VEINS OF UNSPECIFIED LOWER EXTREMITY: ICD-10-CM

## 2020-01-01 DIAGNOSIS — Z74.09 OTHER REDUCED MOBILITY: ICD-10-CM

## 2020-01-01 DIAGNOSIS — B96.1 KLEBSIELLA PNEUMONIAE [K. PNEUMONIAE] AS THE CAUSE OF DISEASES CLASSIFIED ELSEWHERE: ICD-10-CM

## 2020-01-01 DIAGNOSIS — F32.2 MAJOR DEPRESSIVE DISORDER, SINGLE EPISODE, SEVERE WITHOUT PSYCHOTIC FEATURES: ICD-10-CM

## 2020-01-01 DIAGNOSIS — I12.9 HYPERTENSIVE CHRONIC KIDNEY DISEASE WITH STAGE 1 THROUGH STAGE 4 CHRONIC KIDNEY DISEASE, OR UNSPECIFIED CHRONIC KIDNEY DISEASE: ICD-10-CM

## 2020-01-01 DIAGNOSIS — N13.30 UNSPECIFIED HYDRONEPHROSIS: ICD-10-CM

## 2020-01-01 DIAGNOSIS — Z78.9 OTHER SPECIFIED HEALTH STATUS: ICD-10-CM

## 2020-01-01 DIAGNOSIS — D64.9 ANEMIA, UNSPECIFIED: ICD-10-CM

## 2020-01-01 DIAGNOSIS — I95.9 HYPOTENSION, UNSPECIFIED: ICD-10-CM

## 2020-01-01 DIAGNOSIS — D46.9 MYELODYSPLASTIC SYNDROME, UNSPECIFIED: ICD-10-CM

## 2020-01-01 DIAGNOSIS — E87.5 HYPERKALEMIA: ICD-10-CM

## 2020-01-01 DIAGNOSIS — H54.8 LEGAL BLINDNESS, AS DEFINED IN USA: ICD-10-CM

## 2020-01-01 DIAGNOSIS — R65.20 SEVERE SEPSIS WITHOUT SEPTIC SHOCK: ICD-10-CM

## 2020-01-01 DIAGNOSIS — D68.9 COAGULATION DEFECT, UNSPECIFIED: ICD-10-CM

## 2020-01-01 DIAGNOSIS — R13.10 DYSPHAGIA, UNSPECIFIED: ICD-10-CM

## 2020-01-01 DIAGNOSIS — R40.0 SOMNOLENCE: ICD-10-CM

## 2020-01-01 DIAGNOSIS — R41.0 DISORIENTATION, UNSPECIFIED: ICD-10-CM

## 2020-01-01 DIAGNOSIS — R74.0 NONSPECIFIC ELEVATION OF LEVELS OF TRANSAMINASE AND LACTIC ACID DEHYDROGENASE [LDH]: ICD-10-CM

## 2020-01-01 DIAGNOSIS — R32 UNSPECIFIED URINARY INCONTINENCE: ICD-10-CM

## 2020-01-01 DIAGNOSIS — Z66 DO NOT RESUSCITATE: ICD-10-CM

## 2020-01-01 DIAGNOSIS — E11.22 TYPE 2 DIABETES MELLITUS WITH DIABETIC CHRONIC KIDNEY DISEASE: ICD-10-CM

## 2020-01-01 DIAGNOSIS — N40.1 BENIGN PROSTATIC HYPERPLASIA WITH LOWER URINARY TRACT SYMPTOMS: ICD-10-CM

## 2020-01-01 DIAGNOSIS — R62.7 ADULT FAILURE TO THRIVE: ICD-10-CM

## 2020-01-01 DIAGNOSIS — E86.1 HYPOVOLEMIA: ICD-10-CM

## 2020-01-01 DIAGNOSIS — E87.2 ACIDOSIS: ICD-10-CM

## 2020-01-01 DIAGNOSIS — F05 DELIRIUM DUE TO KNOWN PHYSIOLOGICAL CONDITION: ICD-10-CM

## 2020-01-01 DIAGNOSIS — N13.8 OTHER OBSTRUCTIVE AND REFLUX UROPATHY: ICD-10-CM

## 2020-01-01 DIAGNOSIS — I48.91 UNSPECIFIED ATRIAL FIBRILLATION: ICD-10-CM

## 2020-01-01 DIAGNOSIS — E43 UNSPECIFIED SEVERE PROTEIN-CALORIE MALNUTRITION: ICD-10-CM

## 2020-01-01 LAB
% GAMMA, URINE: 22.4 % — SIGNIFICANT CHANGE UP
-  AMIKACIN: SIGNIFICANT CHANGE UP
-  AMPICILLIN/SULBACTAM: SIGNIFICANT CHANGE UP
-  AMPICILLIN: SIGNIFICANT CHANGE UP
-  AZTREONAM: SIGNIFICANT CHANGE UP
-  CEFAZOLIN: SIGNIFICANT CHANGE UP
-  CEFEPIME: SIGNIFICANT CHANGE UP
-  CEFOTAXIME: SIGNIFICANT CHANGE UP
-  CEFOXITIN: SIGNIFICANT CHANGE UP
-  CEFTAZIDIME: SIGNIFICANT CHANGE UP
-  CEFTRIAXONE: SIGNIFICANT CHANGE UP
-  CEFUROXIME: SIGNIFICANT CHANGE UP
-  CEPHALOTHIN: SIGNIFICANT CHANGE UP
-  CLINDAMYCIN: SIGNIFICANT CHANGE UP
-  DAPTOMYCIN: SIGNIFICANT CHANGE UP
-  ERTAPENEM: SIGNIFICANT CHANGE UP
-  ERYTHROMYCIN: SIGNIFICANT CHANGE UP
-  GENTAMICIN: SIGNIFICANT CHANGE UP
-  K. PNEUMONIAE GROUP: SIGNIFICANT CHANGE UP
-  KPC RESISTANCE GENE: SIGNIFICANT CHANGE UP
-  LINEZOLID: SIGNIFICANT CHANGE UP
-  MEROPENEM: SIGNIFICANT CHANGE UP
-  NITROFURANTOIN: SIGNIFICANT CHANGE UP
-  OXACILLIN: SIGNIFICANT CHANGE UP
-  PIPERACILLIN/TAZOBACTAM: SIGNIFICANT CHANGE UP
-  RIFAMPIN: SIGNIFICANT CHANGE UP
-  TETRACYCLINE: SIGNIFICANT CHANGE UP
-  TIGECYCLINE: SIGNIFICANT CHANGE UP
-  TOBRAMYCIN: SIGNIFICANT CHANGE UP
-  TRIMETHOPRIM/SULFAMETHOXAZOLE: SIGNIFICANT CHANGE UP
-  VANCOMYCIN: SIGNIFICANT CHANGE UP
A1AT SERPL-MCNC: 136 MG/DL — SIGNIFICANT CHANGE UP (ref 90–200)
A1C WITH ESTIMATED AVERAGE GLUCOSE RESULT: 6.4 % — HIGH (ref 4–5.6)
ALBUMIN 24H MFR UR ELPH: 31 % — SIGNIFICANT CHANGE UP
ALBUMIN FLD-MCNC: 0.4 G/DL — SIGNIFICANT CHANGE UP
ALBUMIN FLD-MCNC: 0.9 G/DL — SIGNIFICANT CHANGE UP
ALBUMIN SERPL ELPH-MCNC: 1.9 G/DL — LOW (ref 3.3–5)
ALBUMIN SERPL ELPH-MCNC: 1.9 G/DL — LOW (ref 3.3–5)
ALBUMIN SERPL ELPH-MCNC: 2 G/DL — LOW (ref 3.3–5)
ALBUMIN SERPL ELPH-MCNC: 2.3 G/DL — LOW (ref 3.3–5)
ALBUMIN SERPL ELPH-MCNC: 2.3 G/DL — LOW (ref 3.3–5)
ALBUMIN SERPL ELPH-MCNC: 2.4 G/DL — LOW (ref 3.3–5)
ALBUMIN SERPL ELPH-MCNC: 2.4 G/DL — LOW (ref 3.3–5)
ALBUMIN SERPL ELPH-MCNC: 2.5 G/DL — LOW (ref 3.3–5)
ALBUMIN SERPL ELPH-MCNC: 2.6 G/DL — LOW (ref 3.3–5)
ALBUMIN SERPL ELPH-MCNC: 2.7 G/DL — LOW (ref 3.3–5)
ALBUMIN SERPL ELPH-MCNC: 2.8 G/DL — LOW (ref 3.3–5)
ALBUMIN SERPL ELPH-MCNC: 2.9 G/DL — LOW (ref 3.3–5)
ALBUMIN SERPL ELPH-MCNC: 3.1 G/DL — LOW (ref 3.3–5)
ALBUMIN SERPL ELPH-MCNC: 3.1 G/DL — LOW (ref 3.3–5)
ALBUMIN SERPL ELPH-MCNC: 3.2 G/DL — LOW (ref 3.3–5)
ALBUMIN SERPL ELPH-MCNC: 3.4 G/DL — SIGNIFICANT CHANGE UP (ref 3.3–5)
ALDOST SERPL-MCNC: 21.4 NG/DL — SIGNIFICANT CHANGE UP
ALP SERPL-CCNC: 101 U/L — SIGNIFICANT CHANGE UP (ref 40–120)
ALP SERPL-CCNC: 101 U/L — SIGNIFICANT CHANGE UP (ref 40–120)
ALP SERPL-CCNC: 103 U/L — SIGNIFICANT CHANGE UP (ref 40–120)
ALP SERPL-CCNC: 103 U/L — SIGNIFICANT CHANGE UP (ref 40–120)
ALP SERPL-CCNC: 105 U/L — SIGNIFICANT CHANGE UP (ref 40–120)
ALP SERPL-CCNC: 105 U/L — SIGNIFICANT CHANGE UP (ref 40–120)
ALP SERPL-CCNC: 114 U/L — SIGNIFICANT CHANGE UP (ref 40–120)
ALP SERPL-CCNC: 54 U/L — SIGNIFICANT CHANGE UP (ref 40–120)
ALP SERPL-CCNC: 57 U/L — SIGNIFICANT CHANGE UP (ref 40–120)
ALP SERPL-CCNC: 59 U/L — SIGNIFICANT CHANGE UP (ref 40–120)
ALP SERPL-CCNC: 61 U/L — SIGNIFICANT CHANGE UP (ref 40–120)
ALP SERPL-CCNC: 62 U/L — SIGNIFICANT CHANGE UP (ref 40–120)
ALP SERPL-CCNC: 63 U/L — SIGNIFICANT CHANGE UP (ref 40–120)
ALP SERPL-CCNC: 64 U/L — SIGNIFICANT CHANGE UP (ref 40–120)
ALP SERPL-CCNC: 65 U/L — SIGNIFICANT CHANGE UP (ref 40–120)
ALP SERPL-CCNC: 66 U/L — SIGNIFICANT CHANGE UP (ref 40–120)
ALP SERPL-CCNC: 67 U/L — SIGNIFICANT CHANGE UP (ref 40–120)
ALP SERPL-CCNC: 67 U/L — SIGNIFICANT CHANGE UP (ref 40–120)
ALP SERPL-CCNC: 68 U/L — SIGNIFICANT CHANGE UP (ref 40–120)
ALP SERPL-CCNC: 68 U/L — SIGNIFICANT CHANGE UP (ref 40–120)
ALP SERPL-CCNC: 69 U/L — SIGNIFICANT CHANGE UP (ref 40–120)
ALP SERPL-CCNC: 69 U/L — SIGNIFICANT CHANGE UP (ref 40–120)
ALP SERPL-CCNC: 72 U/L — SIGNIFICANT CHANGE UP (ref 40–120)
ALP SERPL-CCNC: 73 U/L — SIGNIFICANT CHANGE UP (ref 40–120)
ALP SERPL-CCNC: 74 U/L — SIGNIFICANT CHANGE UP (ref 40–120)
ALP SERPL-CCNC: 74 U/L — SIGNIFICANT CHANGE UP (ref 40–120)
ALP SERPL-CCNC: 75 U/L — SIGNIFICANT CHANGE UP (ref 40–120)
ALP SERPL-CCNC: 76 U/L — SIGNIFICANT CHANGE UP (ref 40–120)
ALP SERPL-CCNC: 77 U/L — SIGNIFICANT CHANGE UP (ref 40–120)
ALP SERPL-CCNC: 79 U/L — SIGNIFICANT CHANGE UP (ref 40–120)
ALP SERPL-CCNC: 80 U/L — SIGNIFICANT CHANGE UP (ref 40–120)
ALP SERPL-CCNC: 80 U/L — SIGNIFICANT CHANGE UP (ref 40–120)
ALP SERPL-CCNC: 82 U/L — SIGNIFICANT CHANGE UP (ref 40–120)
ALP SERPL-CCNC: 83 U/L — SIGNIFICANT CHANGE UP (ref 40–120)
ALP SERPL-CCNC: 84 U/L — SIGNIFICANT CHANGE UP (ref 40–120)
ALP SERPL-CCNC: 84 U/L — SIGNIFICANT CHANGE UP (ref 40–120)
ALP SERPL-CCNC: 90 U/L — SIGNIFICANT CHANGE UP (ref 40–120)
ALP SERPL-CCNC: 91 U/L — SIGNIFICANT CHANGE UP (ref 40–120)
ALP SERPL-CCNC: 93 U/L — SIGNIFICANT CHANGE UP (ref 40–120)
ALP SERPL-CCNC: 94 U/L — SIGNIFICANT CHANGE UP (ref 40–120)
ALP SERPL-CCNC: 96 U/L — SIGNIFICANT CHANGE UP (ref 40–120)
ALPHA1 GLOB 24H MFR UR ELPH: 13.8 % — SIGNIFICANT CHANGE UP
ALPHA2 GLOB 24H MFR UR ELPH: 15.4 % — SIGNIFICANT CHANGE UP
ALT FLD-CCNC: 10 U/L — SIGNIFICANT CHANGE UP (ref 10–45)
ALT FLD-CCNC: 12 U/L — SIGNIFICANT CHANGE UP (ref 10–45)
ALT FLD-CCNC: 12 U/L — SIGNIFICANT CHANGE UP (ref 10–45)
ALT FLD-CCNC: 14 U/L — SIGNIFICANT CHANGE UP (ref 10–45)
ALT FLD-CCNC: 15 U/L — SIGNIFICANT CHANGE UP (ref 10–45)
ALT FLD-CCNC: 15 U/L — SIGNIFICANT CHANGE UP (ref 10–45)
ALT FLD-CCNC: 17 U/L — SIGNIFICANT CHANGE UP (ref 10–45)
ALT FLD-CCNC: 18 U/L — SIGNIFICANT CHANGE UP (ref 10–45)
ALT FLD-CCNC: 18 U/L — SIGNIFICANT CHANGE UP (ref 10–45)
ALT FLD-CCNC: 20 U/L — SIGNIFICANT CHANGE UP (ref 10–45)
ALT FLD-CCNC: 21 U/L — SIGNIFICANT CHANGE UP (ref 10–45)
ALT FLD-CCNC: 25 U/L — SIGNIFICANT CHANGE UP (ref 10–45)
ALT FLD-CCNC: 43 U/L — SIGNIFICANT CHANGE UP (ref 10–45)
ALT FLD-CCNC: 52 U/L — HIGH (ref 10–45)
ALT FLD-CCNC: 6 U/L — LOW (ref 10–45)
ALT FLD-CCNC: 7 U/L — LOW (ref 10–45)
ALT FLD-CCNC: 8 U/L — LOW (ref 10–45)
ALT FLD-CCNC: 9 U/L — LOW (ref 10–45)
ALT FLD-CCNC: 9 U/L — LOW (ref 10–45)
ALT FLD-CCNC: <5 U/L — LOW (ref 10–45)
ALT FLD-CCNC: SIGNIFICANT CHANGE UP U/L (ref 10–45)
ALT FLD-CCNC: SIGNIFICANT CHANGE UP U/L (ref 10–45)
AMMONIA BLD-MCNC: <10 UMOL/L — LOW (ref 11–55)
AMMONIA BLD-MCNC: <10 UMOL/L — LOW (ref 11–55)
ANA TITR SER: NEGATIVE — SIGNIFICANT CHANGE UP
ANION GAP SERPL CALC-SCNC: 10 MMOL/L — SIGNIFICANT CHANGE UP (ref 5–17)
ANION GAP SERPL CALC-SCNC: 10 MMOL/L — SIGNIFICANT CHANGE UP (ref 5–17)
ANION GAP SERPL CALC-SCNC: 11 MMOL/L — SIGNIFICANT CHANGE UP (ref 5–17)
ANION GAP SERPL CALC-SCNC: 11 MMOL/L — SIGNIFICANT CHANGE UP (ref 5–17)
ANION GAP SERPL CALC-SCNC: 12 MMOL/L — SIGNIFICANT CHANGE UP (ref 5–17)
ANION GAP SERPL CALC-SCNC: 12 MMOL/L — SIGNIFICANT CHANGE UP (ref 5–17)
ANION GAP SERPL CALC-SCNC: 13 MMOL/L — SIGNIFICANT CHANGE UP (ref 5–17)
ANION GAP SERPL CALC-SCNC: 14 MMOL/L — SIGNIFICANT CHANGE UP (ref 5–17)
ANION GAP SERPL CALC-SCNC: 15 MMOL/L — SIGNIFICANT CHANGE UP (ref 5–17)
ANION GAP SERPL CALC-SCNC: 16 MMOL/L — SIGNIFICANT CHANGE UP (ref 5–17)
ANION GAP SERPL CALC-SCNC: 17 MMOL/L — SIGNIFICANT CHANGE UP (ref 5–17)
ANION GAP SERPL CALC-SCNC: 18 MMOL/L — HIGH (ref 5–17)
ANION GAP SERPL CALC-SCNC: 19 MMOL/L — HIGH (ref 5–17)
ANION GAP SERPL CALC-SCNC: 20 MMOL/L — HIGH (ref 5–17)
ANION GAP SERPL CALC-SCNC: 21 MMOL/L — HIGH (ref 5–17)
ANION GAP SERPL CALC-SCNC: 22 MMOL/L — HIGH (ref 5–17)
ANION GAP SERPL CALC-SCNC: 23 MMOL/L — HIGH (ref 5–17)
ANION GAP SERPL CALC-SCNC: 23 MMOL/L — HIGH (ref 5–17)
ANION GAP SERPL CALC-SCNC: 24 MMOL/L — HIGH (ref 5–17)
ANION GAP SERPL CALC-SCNC: 26 MMOL/L — HIGH (ref 5–17)
ANISOCYTOSIS BLD QL: SLIGHT — SIGNIFICANT CHANGE UP
APPEARANCE UR: ABNORMAL
APTT BLD: 26.9 SEC — LOW (ref 27.5–36.3)
APTT BLD: 29 SEC — SIGNIFICANT CHANGE UP (ref 27.5–36.3)
APTT BLD: 29.3 SEC — SIGNIFICANT CHANGE UP (ref 27.5–36.3)
APTT BLD: 34.5 SEC — SIGNIFICANT CHANGE UP (ref 27.5–36.3)
APTT BLD: 34.6 SEC — SIGNIFICANT CHANGE UP (ref 27.5–36.3)
APTT BLD: 34.6 SEC — SIGNIFICANT CHANGE UP (ref 27.5–36.3)
APTT BLD: 34.8 SEC — SIGNIFICANT CHANGE UP (ref 27.5–36.3)
APTT BLD: 35 SEC — SIGNIFICANT CHANGE UP (ref 27.5–36.3)
APTT BLD: 35.2 SEC — SIGNIFICANT CHANGE UP (ref 27.5–36.3)
APTT BLD: 35.4 SEC — SIGNIFICANT CHANGE UP (ref 27.5–36.3)
APTT BLD: 35.5 SEC — SIGNIFICANT CHANGE UP (ref 27.5–36.3)
APTT BLD: 35.5 SEC — SIGNIFICANT CHANGE UP (ref 27.5–36.3)
APTT BLD: 35.6 SEC — SIGNIFICANT CHANGE UP (ref 27.5–36.3)
APTT BLD: 36.7 SEC — HIGH (ref 27.5–36.3)
APTT BLD: 36.7 SEC — HIGH (ref 27.5–36.3)
APTT BLD: 37.2 SEC — HIGH (ref 27.5–36.3)
APTT BLD: 37.3 SEC — HIGH (ref 27.5–36.3)
APTT BLD: 37.3 SEC — HIGH (ref 27.5–36.3)
APTT BLD: 37.4 SEC — HIGH (ref 27.5–36.3)
APTT BLD: 38.2 SEC — HIGH (ref 27.5–36.3)
APTT BLD: 38.5 SEC — HIGH (ref 27.5–36.3)
APTT BLD: 38.7 SEC — HIGH (ref 27.5–36.3)
APTT BLD: 38.8 SEC — HIGH (ref 27.5–36.3)
APTT BLD: 38.9 SEC — HIGH (ref 27.5–36.3)
APTT BLD: 39 SEC — HIGH (ref 27.5–36.3)
APTT BLD: 39.3 SEC — HIGH (ref 27.5–36.3)
APTT BLD: 39.7 SEC — HIGH (ref 27.5–36.3)
APTT BLD: 40.4 SEC — HIGH (ref 27.5–36.3)
APTT BLD: 40.7 SEC — HIGH (ref 27.5–36.3)
APTT BLD: 41.2 SEC — HIGH (ref 27.5–36.3)
APTT BLD: 41.4 SEC — HIGH (ref 27.5–36.3)
APTT BLD: 42.8 SEC — HIGH (ref 27.5–36.3)
APTT BLD: 47.9 SEC — HIGH (ref 27.5–36.3)
APTT BLD: 53.3 SEC — HIGH (ref 27.5–36.3)
APTT BLD: 62.4 SEC — HIGH (ref 27.5–36.3)
AST SERPL-CCNC: 10 U/L — SIGNIFICANT CHANGE UP (ref 10–40)
AST SERPL-CCNC: 11 U/L — SIGNIFICANT CHANGE UP (ref 10–40)
AST SERPL-CCNC: 11 U/L — SIGNIFICANT CHANGE UP (ref 10–40)
AST SERPL-CCNC: 116 U/L — HIGH (ref 10–40)
AST SERPL-CCNC: 15 U/L — SIGNIFICANT CHANGE UP (ref 10–40)
AST SERPL-CCNC: 15 U/L — SIGNIFICANT CHANGE UP (ref 10–40)
AST SERPL-CCNC: 164 U/L — HIGH (ref 10–40)
AST SERPL-CCNC: 18 U/L — SIGNIFICANT CHANGE UP (ref 10–40)
AST SERPL-CCNC: 18 U/L — SIGNIFICANT CHANGE UP (ref 10–40)
AST SERPL-CCNC: 19 U/L — SIGNIFICANT CHANGE UP (ref 10–40)
AST SERPL-CCNC: 19 U/L — SIGNIFICANT CHANGE UP (ref 10–40)
AST SERPL-CCNC: 20 U/L — SIGNIFICANT CHANGE UP (ref 10–40)
AST SERPL-CCNC: 21 U/L — SIGNIFICANT CHANGE UP (ref 10–40)
AST SERPL-CCNC: 21 U/L — SIGNIFICANT CHANGE UP (ref 10–40)
AST SERPL-CCNC: 22 U/L — SIGNIFICANT CHANGE UP (ref 10–40)
AST SERPL-CCNC: 22 U/L — SIGNIFICANT CHANGE UP (ref 10–40)
AST SERPL-CCNC: 23 U/L — SIGNIFICANT CHANGE UP (ref 10–40)
AST SERPL-CCNC: 24 U/L — SIGNIFICANT CHANGE UP (ref 10–40)
AST SERPL-CCNC: 25 U/L — SIGNIFICANT CHANGE UP (ref 10–40)
AST SERPL-CCNC: 26 U/L — SIGNIFICANT CHANGE UP (ref 10–40)
AST SERPL-CCNC: 29 U/L — SIGNIFICANT CHANGE UP (ref 10–40)
AST SERPL-CCNC: 30 U/L — SIGNIFICANT CHANGE UP (ref 10–40)
AST SERPL-CCNC: 34 U/L — SIGNIFICANT CHANGE UP (ref 10–40)
AST SERPL-CCNC: 37 U/L — SIGNIFICANT CHANGE UP (ref 10–40)
AST SERPL-CCNC: 37 U/L — SIGNIFICANT CHANGE UP (ref 10–40)
AST SERPL-CCNC: 44 U/L — HIGH (ref 10–40)
AST SERPL-CCNC: 50 U/L — HIGH (ref 10–40)
AST SERPL-CCNC: 53 U/L — HIGH (ref 10–40)
AST SERPL-CCNC: 62 U/L — HIGH (ref 10–40)
AST SERPL-CCNC: 81 U/L — HIGH (ref 10–40)
AST SERPL-CCNC: 87 U/L — HIGH (ref 10–40)
AST SERPL-CCNC: 9 U/L — LOW (ref 10–40)
AST SERPL-CCNC: SIGNIFICANT CHANGE UP U/L (ref 10–40)
AST SERPL-CCNC: SIGNIFICANT CHANGE UP U/L (ref 10–40)
AUTO DIFF PNL BLD: NEGATIVE — SIGNIFICANT CHANGE UP
B PERT IGG+IGM PNL SER: CLEAR — SIGNIFICANT CHANGE UP
B PERT IGG+IGM PNL SER: SIGNIFICANT CHANGE UP
B-GLOBULIN 24H MFR UR ELPH: 17.4 % — SIGNIFICANT CHANGE UP
B-OH-BUTYR SERPL-SCNC: 3.8 MMOL/L — HIGH
BACTERIA # UR AUTO: ABNORMAL /HPF
BACTERIA # UR AUTO: PRESENT /HPF
BASE EXCESS BLDV CALC-SCNC: -15.7 MMOL/L — SIGNIFICANT CHANGE UP
BASOPHILS # BLD AUTO: 0 K/UL — SIGNIFICANT CHANGE UP (ref 0–0.2)
BASOPHILS # BLD AUTO: 0.01 K/UL — SIGNIFICANT CHANGE UP (ref 0–0.2)
BASOPHILS # BLD AUTO: 0.02 K/UL — SIGNIFICANT CHANGE UP (ref 0–0.2)
BASOPHILS # BLD AUTO: 0.02 K/UL — SIGNIFICANT CHANGE UP (ref 0–0.2)
BASOPHILS NFR BLD AUTO: 0 % — SIGNIFICANT CHANGE UP (ref 0–2)
BASOPHILS NFR BLD AUTO: 0.1 % — SIGNIFICANT CHANGE UP (ref 0–2)
BASOPHILS NFR BLD AUTO: 0.4 % — SIGNIFICANT CHANGE UP (ref 0–2)
BILIRUB SERPL-MCNC: 0.2 MG/DL — SIGNIFICANT CHANGE UP (ref 0.2–1.2)
BILIRUB SERPL-MCNC: 0.3 MG/DL — SIGNIFICANT CHANGE UP (ref 0.2–1.2)
BILIRUB SERPL-MCNC: 0.6 MG/DL — SIGNIFICANT CHANGE UP (ref 0.2–1.2)
BILIRUB SERPL-MCNC: 0.7 MG/DL — SIGNIFICANT CHANGE UP (ref 0.2–1.2)
BILIRUB SERPL-MCNC: 0.8 MG/DL — SIGNIFICANT CHANGE UP (ref 0.2–1.2)
BILIRUB SERPL-MCNC: 0.9 MG/DL — SIGNIFICANT CHANGE UP (ref 0.2–1.2)
BILIRUB SERPL-MCNC: 1 MG/DL — SIGNIFICANT CHANGE UP (ref 0.2–1.2)
BILIRUB SERPL-MCNC: 1.1 MG/DL — SIGNIFICANT CHANGE UP (ref 0.2–1.2)
BILIRUB SERPL-MCNC: 1.2 MG/DL — SIGNIFICANT CHANGE UP (ref 0.2–1.2)
BILIRUB SERPL-MCNC: 1.3 MG/DL — HIGH (ref 0.2–1.2)
BILIRUB SERPL-MCNC: 1.4 MG/DL — HIGH (ref 0.2–1.2)
BILIRUB SERPL-MCNC: 1.5 MG/DL — HIGH (ref 0.2–1.2)
BILIRUB UR-MCNC: NEGATIVE — SIGNIFICANT CHANGE UP
BLD GP AB SCN SERPL QL: NEGATIVE — SIGNIFICANT CHANGE UP
BUN SERPL-MCNC: 100 MG/DL — HIGH (ref 7–23)
BUN SERPL-MCNC: 101 MG/DL — HIGH (ref 7–23)
BUN SERPL-MCNC: 102 MG/DL — HIGH (ref 7–23)
BUN SERPL-MCNC: 102 MG/DL — HIGH (ref 7–23)
BUN SERPL-MCNC: 104 MG/DL — HIGH (ref 7–23)
BUN SERPL-MCNC: 107 MG/DL — HIGH (ref 7–23)
BUN SERPL-MCNC: 107 MG/DL — HIGH (ref 7–23)
BUN SERPL-MCNC: 108 MG/DL — HIGH (ref 7–23)
BUN SERPL-MCNC: 111 MG/DL — HIGH (ref 7–23)
BUN SERPL-MCNC: 112 MG/DL — HIGH (ref 7–23)
BUN SERPL-MCNC: 112 MG/DL — HIGH (ref 7–23)
BUN SERPL-MCNC: 113 MG/DL — HIGH (ref 7–23)
BUN SERPL-MCNC: 115 MG/DL — HIGH (ref 7–23)
BUN SERPL-MCNC: 118 MG/DL — HIGH (ref 7–23)
BUN SERPL-MCNC: 119 MG/DL — HIGH (ref 7–23)
BUN SERPL-MCNC: 121 MG/DL — HIGH (ref 7–23)
BUN SERPL-MCNC: 126 MG/DL — HIGH (ref 7–23)
BUN SERPL-MCNC: 129 MG/DL — HIGH (ref 7–23)
BUN SERPL-MCNC: 130 MG/DL — HIGH (ref 7–23)
BUN SERPL-MCNC: 134 MG/DL — HIGH (ref 7–23)
BUN SERPL-MCNC: 136 MG/DL — HIGH (ref 7–23)
BUN SERPL-MCNC: 150 MG/DL — HIGH (ref 7–23)
BUN SERPL-MCNC: 47 MG/DL — HIGH (ref 7–23)
BUN SERPL-MCNC: 55 MG/DL — HIGH (ref 7–23)
BUN SERPL-MCNC: 56 MG/DL — HIGH (ref 7–23)
BUN SERPL-MCNC: 57 MG/DL — HIGH (ref 7–23)
BUN SERPL-MCNC: 59 MG/DL — HIGH (ref 7–23)
BUN SERPL-MCNC: 59 MG/DL — HIGH (ref 7–23)
BUN SERPL-MCNC: 61 MG/DL — HIGH (ref 7–23)
BUN SERPL-MCNC: 64 MG/DL — HIGH (ref 7–23)
BUN SERPL-MCNC: 65 MG/DL — HIGH (ref 7–23)
BUN SERPL-MCNC: 68 MG/DL — HIGH (ref 7–23)
BUN SERPL-MCNC: 69 MG/DL — HIGH (ref 7–23)
BUN SERPL-MCNC: 70 MG/DL — HIGH (ref 7–23)
BUN SERPL-MCNC: 74 MG/DL — HIGH (ref 7–23)
BUN SERPL-MCNC: 77 MG/DL — HIGH (ref 7–23)
BUN SERPL-MCNC: 78 MG/DL — HIGH (ref 7–23)
BUN SERPL-MCNC: 79 MG/DL — HIGH (ref 7–23)
BUN SERPL-MCNC: 79 MG/DL — HIGH (ref 7–23)
BUN SERPL-MCNC: 81 MG/DL — HIGH (ref 7–23)
BUN SERPL-MCNC: 85 MG/DL — HIGH (ref 7–23)
BUN SERPL-MCNC: 87 MG/DL — HIGH (ref 7–23)
BUN SERPL-MCNC: 88 MG/DL — HIGH (ref 7–23)
BUN SERPL-MCNC: 88 MG/DL — HIGH (ref 7–23)
BUN SERPL-MCNC: 90 MG/DL — HIGH (ref 7–23)
BUN SERPL-MCNC: 91 MG/DL — HIGH (ref 7–23)
BUN SERPL-MCNC: 92 MG/DL — HIGH (ref 7–23)
BUN SERPL-MCNC: 92 MG/DL — HIGH (ref 7–23)
BUN SERPL-MCNC: 94 MG/DL — HIGH (ref 7–23)
BUN SERPL-MCNC: 95 MG/DL — HIGH (ref 7–23)
BUN SERPL-MCNC: 95 MG/DL — HIGH (ref 7–23)
BUN SERPL-MCNC: 97 MG/DL — HIGH (ref 7–23)
BUN SERPL-MCNC: 98 MG/DL — HIGH (ref 7–23)
BUN SERPL-MCNC: 99 MG/DL — HIGH (ref 7–23)
C DIFF GDH STL QL: NEGATIVE — SIGNIFICANT CHANGE UP
C DIFF GDH STL QL: NEGATIVE — SIGNIFICANT CHANGE UP
C DIFF GDH STL QL: SIGNIFICANT CHANGE UP
C DIFF GDH STL QL: SIGNIFICANT CHANGE UP
C TRACH RRNA SPEC QL NAA+PROBE: SIGNIFICANT CHANGE UP
C-ANCA SER-ACNC: NEGATIVE — SIGNIFICANT CHANGE UP
C3 SERPL-MCNC: 38 MG/DL — LOW (ref 81–157)
C4 SERPL-MCNC: 11 MG/DL — LOW (ref 13–39)
CA-I SERPL-SCNC: 1.13 MMOL/L — SIGNIFICANT CHANGE UP (ref 1.12–1.3)
CALCIUM SERPL-MCNC: 6.4 MG/DL — CRITICAL LOW (ref 8.4–10.5)
CALCIUM SERPL-MCNC: 7.5 MG/DL — LOW (ref 8.4–10.5)
CALCIUM SERPL-MCNC: 7.5 MG/DL — LOW (ref 8.4–10.5)
CALCIUM SERPL-MCNC: 7.6 MG/DL — LOW (ref 8.4–10.5)
CALCIUM SERPL-MCNC: 7.8 MG/DL — LOW (ref 8.4–10.5)
CALCIUM SERPL-MCNC: 8 MG/DL — LOW (ref 8.4–10.5)
CALCIUM SERPL-MCNC: 8.1 MG/DL — LOW (ref 8.4–10.5)
CALCIUM SERPL-MCNC: 8.2 MG/DL — LOW (ref 8.4–10.5)
CALCIUM SERPL-MCNC: 8.3 MG/DL — LOW (ref 8.4–10.5)
CALCIUM SERPL-MCNC: 8.4 MG/DL — SIGNIFICANT CHANGE UP (ref 8.4–10.5)
CALCIUM SERPL-MCNC: 8.5 MG/DL — SIGNIFICANT CHANGE UP (ref 8.4–10.5)
CALCIUM SERPL-MCNC: 8.6 MG/DL — SIGNIFICANT CHANGE UP (ref 8.4–10.5)
CALCIUM SERPL-MCNC: 8.7 MG/DL — SIGNIFICANT CHANGE UP (ref 8.4–10.5)
CALCIUM SERPL-MCNC: 8.8 MG/DL — SIGNIFICANT CHANGE UP (ref 8.4–10.5)
CALCIUM SERPL-MCNC: 8.9 MG/DL — SIGNIFICANT CHANGE UP (ref 8.4–10.5)
CALCIUM SERPL-MCNC: 9 MG/DL — SIGNIFICANT CHANGE UP (ref 8.4–10.5)
CALCIUM SERPL-MCNC: 9.1 MG/DL — SIGNIFICANT CHANGE UP (ref 8.4–10.5)
CARDIOLIPIN AB SER-ACNC: NEGATIVE — SIGNIFICANT CHANGE UP
CERULOPLASMIN SERPL-MCNC: 20 MG/DL — SIGNIFICANT CHANGE UP (ref 15–30)
CHLORIDE SERPL-SCNC: 100 MMOL/L — SIGNIFICANT CHANGE UP (ref 96–108)
CHLORIDE SERPL-SCNC: 101 MMOL/L — SIGNIFICANT CHANGE UP (ref 96–108)
CHLORIDE SERPL-SCNC: 102 MMOL/L — SIGNIFICANT CHANGE UP (ref 96–108)
CHLORIDE SERPL-SCNC: 103 MMOL/L — SIGNIFICANT CHANGE UP (ref 96–108)
CHLORIDE SERPL-SCNC: 104 MMOL/L — SIGNIFICANT CHANGE UP (ref 96–108)
CHLORIDE SERPL-SCNC: 105 MMOL/L — SIGNIFICANT CHANGE UP (ref 96–108)
CHLORIDE SERPL-SCNC: 106 MMOL/L — SIGNIFICANT CHANGE UP (ref 96–108)
CHLORIDE SERPL-SCNC: 107 MMOL/L — SIGNIFICANT CHANGE UP (ref 96–108)
CHLORIDE SERPL-SCNC: 108 MMOL/L — SIGNIFICANT CHANGE UP (ref 96–108)
CHLORIDE SERPL-SCNC: 108 MMOL/L — SIGNIFICANT CHANGE UP (ref 96–108)
CHLORIDE SERPL-SCNC: 110 MMOL/L — HIGH (ref 96–108)
CHLORIDE SERPL-SCNC: 111 MMOL/L — HIGH (ref 96–108)
CHLORIDE SERPL-SCNC: 113 MMOL/L — HIGH (ref 96–108)
CHLORIDE SERPL-SCNC: 115 MMOL/L — HIGH (ref 96–108)
CHLORIDE SERPL-SCNC: 118 MMOL/L — HIGH (ref 96–108)
CHLORIDE SERPL-SCNC: 120 MMOL/L — HIGH (ref 96–108)
CHLORIDE SERPL-SCNC: 120 MMOL/L — HIGH (ref 96–108)
CHLORIDE SERPL-SCNC: 121 MMOL/L — HIGH (ref 96–108)
CHLORIDE SERPL-SCNC: 122 MMOL/L — HIGH (ref 96–108)
CHLORIDE SERPL-SCNC: 90 MMOL/L — LOW (ref 96–108)
CHLORIDE SERPL-SCNC: 91 MMOL/L — LOW (ref 96–108)
CHLORIDE SERPL-SCNC: 92 MMOL/L — LOW (ref 96–108)
CHLORIDE SERPL-SCNC: 95 MMOL/L — LOW (ref 96–108)
CHLORIDE SERPL-SCNC: 96 MMOL/L — SIGNIFICANT CHANGE UP (ref 96–108)
CHLORIDE SERPL-SCNC: 97 MMOL/L — SIGNIFICANT CHANGE UP (ref 96–108)
CHLORIDE SERPL-SCNC: 99 MMOL/L — SIGNIFICANT CHANGE UP (ref 96–108)
CHLORIDE UR-SCNC: 78 MMOL/L — SIGNIFICANT CHANGE UP
CHOLEST SERPL-MCNC: 60 MG/DL — SIGNIFICANT CHANGE UP (ref 10–199)
CK MB CFR SERPL CALC: 23.8 NG/ML — HIGH (ref 0–6.7)
CK SERPL-CCNC: 10 U/L — LOW (ref 30–200)
CK SERPL-CCNC: 10 U/L — LOW (ref 30–200)
CK SERPL-CCNC: 13 U/L — LOW (ref 30–200)
CK SERPL-CCNC: 14 U/L — LOW (ref 30–200)
CK SERPL-CCNC: 16 U/L — LOW (ref 30–200)
CK SERPL-CCNC: 16 U/L — LOW (ref 30–200)
CK SERPL-CCNC: 18 U/L — LOW (ref 30–200)
CK SERPL-CCNC: 20 U/L — LOW (ref 30–200)
CK SERPL-CCNC: 26 U/L — LOW (ref 30–200)
CK SERPL-CCNC: 30 U/L — SIGNIFICANT CHANGE UP (ref 30–200)
CK SERPL-CCNC: 30 U/L — SIGNIFICANT CHANGE UP (ref 30–200)
CK SERPL-CCNC: 41 U/L — SIGNIFICANT CHANGE UP (ref 30–200)
CK SERPL-CCNC: 42 U/L — SIGNIFICANT CHANGE UP (ref 30–200)
CK SERPL-CCNC: 48 U/L — SIGNIFICANT CHANGE UP (ref 30–200)
CK SERPL-CCNC: 50 U/L — SIGNIFICANT CHANGE UP (ref 30–200)
CK SERPL-CCNC: 61 U/L — SIGNIFICANT CHANGE UP (ref 30–200)
CK SERPL-CCNC: 66 U/L — SIGNIFICANT CHANGE UP (ref 30–200)
CK SERPL-CCNC: 67 U/L — SIGNIFICANT CHANGE UP (ref 30–200)
CK SERPL-CCNC: <7 U/L — LOW (ref 30–200)
CK SERPL-CCNC: <7 U/L — LOW (ref 30–200)
CMV IGG FLD QL: <0.2 U/ML — SIGNIFICANT CHANGE UP
CMV IGG SERPL-IMP: NEGATIVE — SIGNIFICANT CHANGE UP
CO2 SERPL-SCNC: 11 MMOL/L — LOW (ref 22–31)
CO2 SERPL-SCNC: 12 MMOL/L — LOW (ref 22–31)
CO2 SERPL-SCNC: 13 MMOL/L — LOW (ref 22–31)
CO2 SERPL-SCNC: 14 MMOL/L — LOW (ref 22–31)
CO2 SERPL-SCNC: 15 MMOL/L — LOW (ref 22–31)
CO2 SERPL-SCNC: 16 MMOL/L — LOW (ref 22–31)
CO2 SERPL-SCNC: 17 MMOL/L — LOW (ref 22–31)
CO2 SERPL-SCNC: 18 MMOL/L — LOW (ref 22–31)
CO2 SERPL-SCNC: 19 MMOL/L — LOW (ref 22–31)
CO2 SERPL-SCNC: 20 MMOL/L — LOW (ref 22–31)
CO2 SERPL-SCNC: 21 MMOL/L — LOW (ref 22–31)
CO2 SERPL-SCNC: 22 MMOL/L — SIGNIFICANT CHANGE UP (ref 22–31)
CO2 SERPL-SCNC: 23 MMOL/L — SIGNIFICANT CHANGE UP (ref 22–31)
CO2 SERPL-SCNC: 23 MMOL/L — SIGNIFICANT CHANGE UP (ref 22–31)
CO2 SERPL-SCNC: 24 MMOL/L — SIGNIFICANT CHANGE UP (ref 22–31)
CO2 SERPL-SCNC: 24 MMOL/L — SIGNIFICANT CHANGE UP (ref 22–31)
CO2 SERPL-SCNC: 25 MMOL/L — SIGNIFICANT CHANGE UP (ref 22–31)
CO2 SERPL-SCNC: 8 MMOL/L — CRITICAL LOW (ref 22–31)
CO2 SERPL-SCNC: 8 MMOL/L — CRITICAL LOW (ref 22–31)
COLOR FLD: SIGNIFICANT CHANGE UP
COLOR FLD: YELLOW — SIGNIFICANT CHANGE UP
COLOR SPEC: YELLOW — SIGNIFICANT CHANGE UP
COMMENT - FLUIDS: SIGNIFICANT CHANGE UP
COMMENT - URINE: SIGNIFICANT CHANGE UP
CORTICOSTEROID BINDING GLOBULIN RESULT: 1.1 MG/DL — LOW
CORTICOSTEROID BINDING GLOBULIN RESULT: 1.2 MG/DL — LOW
CORTICOSTEROID BINDING GLOBULIN RESULT: 1.2 MG/DL — LOW
CORTIS AM PEAK SERPL-MCNC: 21.8 UG/DL — SIGNIFICANT CHANGE UP (ref 3.9–37.5)
CORTIS F PM SERPL-MCNC: 37.4 UG/DL — HIGH (ref 2.3–13.8)
CORTIS F/TOTAL MFR SERPL: 44 % — SIGNIFICANT CHANGE UP
CORTIS F/TOTAL MFR SERPL: 73 % — SIGNIFICANT CHANGE UP
CORTIS F/TOTAL MFR SERPL: 80 % — SIGNIFICANT CHANGE UP
CORTIS SERPL-MCNC: 14 UG/DL — SIGNIFICANT CHANGE UP
CORTIS SERPL-MCNC: 30 UG/DL — HIGH
CORTIS SERPL-MCNC: 37 UG/DL — HIGH
CORTISOL, FREE RESULT: 22 UG/DL — HIGH
CORTISOL, FREE RESULT: 30 UG/DL — HIGH
CORTISOL, FREE RESULT: 6.2 UG/DL — HIGH
CREAT ?TM UR-MCNC: 128 MG/DL — SIGNIFICANT CHANGE UP
CREAT ?TM UR-MCNC: 170 MG/DL — SIGNIFICANT CHANGE UP
CREAT ?TM UR-MCNC: 170 MG/DL — SIGNIFICANT CHANGE UP
CREAT ?TM UR-MCNC: 72 MG/DL — SIGNIFICANT CHANGE UP
CREAT ?TM UR-MCNC: 84 MG/DL — SIGNIFICANT CHANGE UP
CREAT ?TM UR-MCNC: 85 MG/DL — SIGNIFICANT CHANGE UP
CREAT SERPL-MCNC: 2.03 MG/DL — HIGH (ref 0.5–1.3)
CREAT SERPL-MCNC: 2.23 MG/DL — HIGH (ref 0.5–1.3)
CREAT SERPL-MCNC: 2.36 MG/DL — HIGH (ref 0.5–1.3)
CREAT SERPL-MCNC: 2.41 MG/DL — HIGH (ref 0.5–1.3)
CREAT SERPL-MCNC: 2.62 MG/DL — HIGH (ref 0.5–1.3)
CREAT SERPL-MCNC: 2.62 MG/DL — HIGH (ref 0.5–1.3)
CREAT SERPL-MCNC: 2.93 MG/DL — HIGH (ref 0.5–1.3)
CREAT SERPL-MCNC: 3.04 MG/DL — HIGH (ref 0.5–1.3)
CREAT SERPL-MCNC: 3.17 MG/DL — HIGH (ref 0.5–1.3)
CREAT SERPL-MCNC: 3.38 MG/DL — HIGH (ref 0.5–1.3)
CREAT SERPL-MCNC: 3.66 MG/DL — HIGH (ref 0.5–1.3)
CREAT SERPL-MCNC: 3.76 MG/DL — HIGH (ref 0.5–1.3)
CREAT SERPL-MCNC: 3.86 MG/DL — HIGH (ref 0.5–1.3)
CREAT SERPL-MCNC: 3.87 MG/DL — HIGH (ref 0.5–1.3)
CREAT SERPL-MCNC: 3.96 MG/DL — HIGH (ref 0.5–1.3)
CREAT SERPL-MCNC: 4.06 MG/DL — HIGH (ref 0.5–1.3)
CREAT SERPL-MCNC: 4.1 MG/DL — HIGH (ref 0.5–1.3)
CREAT SERPL-MCNC: 4.1 MG/DL — HIGH (ref 0.5–1.3)
CREAT SERPL-MCNC: 4.14 MG/DL — HIGH (ref 0.5–1.3)
CREAT SERPL-MCNC: 4.14 MG/DL — HIGH (ref 0.5–1.3)
CREAT SERPL-MCNC: 4.15 MG/DL — HIGH (ref 0.5–1.3)
CREAT SERPL-MCNC: 4.16 MG/DL — HIGH (ref 0.5–1.3)
CREAT SERPL-MCNC: 4.17 MG/DL — HIGH (ref 0.5–1.3)
CREAT SERPL-MCNC: 4.28 MG/DL — HIGH (ref 0.5–1.3)
CREAT SERPL-MCNC: 4.31 MG/DL — HIGH (ref 0.5–1.3)
CREAT SERPL-MCNC: 4.4 MG/DL — HIGH (ref 0.5–1.3)
CREAT SERPL-MCNC: 4.48 MG/DL — HIGH (ref 0.5–1.3)
CREAT SERPL-MCNC: 4.62 MG/DL — HIGH (ref 0.5–1.3)
CREAT SERPL-MCNC: 4.74 MG/DL — HIGH (ref 0.5–1.3)
CREAT SERPL-MCNC: 4.74 MG/DL — HIGH (ref 0.5–1.3)
CREAT SERPL-MCNC: 4.75 MG/DL — HIGH (ref 0.5–1.3)
CREAT SERPL-MCNC: 4.85 MG/DL — HIGH (ref 0.5–1.3)
CREAT SERPL-MCNC: 5.03 MG/DL — HIGH (ref 0.5–1.3)
CREAT SERPL-MCNC: 5.11 MG/DL — HIGH (ref 0.5–1.3)
CREAT SERPL-MCNC: 5.18 MG/DL — HIGH (ref 0.5–1.3)
CREAT SERPL-MCNC: 5.25 MG/DL — HIGH (ref 0.5–1.3)
CREAT SERPL-MCNC: 5.28 MG/DL — HIGH (ref 0.5–1.3)
CREAT SERPL-MCNC: 5.48 MG/DL — HIGH (ref 0.5–1.3)
CREAT SERPL-MCNC: 5.49 MG/DL — HIGH (ref 0.5–1.3)
CREAT SERPL-MCNC: 5.59 MG/DL — HIGH (ref 0.5–1.3)
CREAT SERPL-MCNC: 5.63 MG/DL — HIGH (ref 0.5–1.3)
CREAT SERPL-MCNC: 5.69 MG/DL — HIGH (ref 0.5–1.3)
CREAT SERPL-MCNC: 5.85 MG/DL — HIGH (ref 0.5–1.3)
CREAT SERPL-MCNC: 5.92 MG/DL — HIGH (ref 0.5–1.3)
CREAT SERPL-MCNC: 6 MG/DL — HIGH (ref 0.5–1.3)
CREAT SERPL-MCNC: 6.1 MG/DL — HIGH (ref 0.5–1.3)
CREAT SERPL-MCNC: 6.19 MG/DL — HIGH (ref 0.5–1.3)
CREAT SERPL-MCNC: 6.23 MG/DL — HIGH (ref 0.5–1.3)
CREAT SERPL-MCNC: 6.25 MG/DL — HIGH (ref 0.5–1.3)
CREAT SERPL-MCNC: 6.27 MG/DL — HIGH (ref 0.5–1.3)
CREAT SERPL-MCNC: 6.66 MG/DL — HIGH (ref 0.5–1.3)
CREAT SERPL-MCNC: 6.66 MG/DL — HIGH (ref 0.5–1.3)
CREAT SERPL-MCNC: 6.69 MG/DL — HIGH (ref 0.5–1.3)
CREAT SERPL-MCNC: 6.7 MG/DL — HIGH (ref 0.5–1.3)
CREAT SERPL-MCNC: 6.74 MG/DL — HIGH (ref 0.5–1.3)
CREAT SERPL-MCNC: 6.8 MG/DL — HIGH (ref 0.5–1.3)
CREAT SERPL-MCNC: 7.01 MG/DL — HIGH (ref 0.5–1.3)
CREAT SERPL-MCNC: 7.02 MG/DL — HIGH (ref 0.5–1.3)
CREAT SERPL-MCNC: 7.07 MG/DL — HIGH (ref 0.5–1.3)
CREAT SERPL-MCNC: 7.17 MG/DL — HIGH (ref 0.5–1.3)
CREAT SERPL-MCNC: 7.41 MG/DL — HIGH (ref 0.5–1.3)
CREAT SERPL-MCNC: 7.52 MG/DL — HIGH (ref 0.5–1.3)
CREAT SERPL-MCNC: 7.81 MG/DL — HIGH (ref 0.5–1.3)
CREAT SERPL-MCNC: 8.29 MG/DL — HIGH (ref 0.5–1.3)
CULTURE RESULTS: NO GROWTH — SIGNIFICANT CHANGE UP
CULTURE RESULTS: SIGNIFICANT CHANGE UP
D DIMER BLD IA.RAPID-MCNC: 2166 NG/ML DDU — HIGH
D DIMER BLD IA.RAPID-MCNC: 3090 NG/ML DDU — HIGH
DIFF PNL FLD: ABNORMAL
EBV EA AB SER IA-ACNC: 5 U/ML — SIGNIFICANT CHANGE UP
EBV EA AB TITR SER IF: POSITIVE
EBV EA IGG SER-ACNC: NEGATIVE — SIGNIFICANT CHANGE UP
EBV NA IGG SER IA-ACNC: 575 U/ML — HIGH
EBV PATRN SPEC IB-IMP: SIGNIFICANT CHANGE UP
EBV VCA IGG AVIDITY SER QL IA: POSITIVE
EBV VCA IGM SER IA-ACNC: 365 U/ML — HIGH
EBV VCA IGM SER IA-ACNC: <10 U/ML — SIGNIFICANT CHANGE UP
EBV VCA IGM TITR FLD: NEGATIVE — SIGNIFICANT CHANGE UP
ELLIPTOCYTES BLD QL SMEAR: SLIGHT — SIGNIFICANT CHANGE UP
EOSINOPHIL # BLD AUTO: 0 K/UL — SIGNIFICANT CHANGE UP (ref 0–0.5)
EOSINOPHIL # BLD AUTO: 0.01 K/UL — SIGNIFICANT CHANGE UP (ref 0–0.5)
EOSINOPHIL # BLD AUTO: 0.02 K/UL — SIGNIFICANT CHANGE UP (ref 0–0.5)
EOSINOPHIL # BLD AUTO: 0.02 K/UL — SIGNIFICANT CHANGE UP (ref 0–0.5)
EOSINOPHIL # BLD AUTO: 0.03 K/UL — SIGNIFICANT CHANGE UP (ref 0–0.5)
EOSINOPHIL # BLD AUTO: 0.04 K/UL — SIGNIFICANT CHANGE UP (ref 0–0.5)
EOSINOPHIL # BLD AUTO: 0.06 K/UL — SIGNIFICANT CHANGE UP (ref 0–0.5)
EOSINOPHIL # BLD AUTO: 0.06 K/UL — SIGNIFICANT CHANGE UP (ref 0–0.5)
EOSINOPHIL # BLD AUTO: 0.07 K/UL — SIGNIFICANT CHANGE UP (ref 0–0.5)
EOSINOPHIL # BLD AUTO: 0.07 K/UL — SIGNIFICANT CHANGE UP (ref 0–0.5)
EOSINOPHIL # BLD AUTO: 0.08 K/UL — SIGNIFICANT CHANGE UP (ref 0–0.5)
EOSINOPHIL # BLD AUTO: 0.09 K/UL — SIGNIFICANT CHANGE UP (ref 0–0.5)
EOSINOPHIL # BLD AUTO: 0.11 K/UL — SIGNIFICANT CHANGE UP (ref 0–0.5)
EOSINOPHIL # BLD AUTO: 0.16 K/UL — SIGNIFICANT CHANGE UP (ref 0–0.5)
EOSINOPHIL NFR BLD AUTO: 0 % — SIGNIFICANT CHANGE UP (ref 0–6)
EOSINOPHIL NFR BLD AUTO: 0.1 % — SIGNIFICANT CHANGE UP (ref 0–6)
EOSINOPHIL NFR BLD AUTO: 0.1 % — SIGNIFICANT CHANGE UP (ref 0–6)
EOSINOPHIL NFR BLD AUTO: 0.2 % — SIGNIFICANT CHANGE UP (ref 0–6)
EOSINOPHIL NFR BLD AUTO: 0.3 % — SIGNIFICANT CHANGE UP (ref 0–6)
EOSINOPHIL NFR BLD AUTO: 0.3 % — SIGNIFICANT CHANGE UP (ref 0–6)
EOSINOPHIL NFR BLD AUTO: 0.4 % — SIGNIFICANT CHANGE UP (ref 0–6)
EOSINOPHIL NFR BLD AUTO: 0.4 % — SIGNIFICANT CHANGE UP (ref 0–6)
EOSINOPHIL NFR BLD AUTO: 0.5 % — SIGNIFICANT CHANGE UP (ref 0–6)
EOSINOPHIL NFR BLD AUTO: 0.6 % — SIGNIFICANT CHANGE UP (ref 0–6)
EOSINOPHIL NFR BLD AUTO: 0.9 % — SIGNIFICANT CHANGE UP (ref 0–6)
EOSINOPHIL NFR BLD AUTO: 0.9 % — SIGNIFICANT CHANGE UP (ref 0–6)
EOSINOPHIL NFR BLD AUTO: 1.2 % — SIGNIFICANT CHANGE UP (ref 0–6)
EOSINOPHIL NFR BLD AUTO: 1.3 % — SIGNIFICANT CHANGE UP (ref 0–6)
EOSINOPHIL NFR BLD AUTO: 1.5 % — SIGNIFICANT CHANGE UP (ref 0–6)
EOSINOPHIL NFR BLD AUTO: 2.2 % — SIGNIFICANT CHANGE UP (ref 0–6)
EOSINOPHIL NFR BLD AUTO: 2.5 % — SIGNIFICANT CHANGE UP (ref 0–6)
EPI CELLS # UR: SIGNIFICANT CHANGE UP /HPF (ref 0–5)
EPI CELLS # UR: SIGNIFICANT CHANGE UP /HPF (ref 0–5)
ESTIMATED AVERAGE GLUCOSE: 137 MG/DL — HIGH (ref 68–114)
FERRITIN SERPL-MCNC: 173 NG/ML — SIGNIFICANT CHANGE UP (ref 30–400)
FERRITIN SERPL-MCNC: 222 NG/ML — SIGNIFICANT CHANGE UP (ref 30–400)
FERRITIN SERPL-MCNC: 278 NG/ML — SIGNIFICANT CHANGE UP (ref 30–400)
FIBRINOGEN PPP-MCNC: 104 MG/DL — LOW (ref 258–438)
FIBRINOGEN PPP-MCNC: 109 MG/DL — LOW (ref 258–438)
FIBRINOGEN PPP-MCNC: 113 MG/DL — LOW (ref 258–438)
FIBRINOGEN PPP-MCNC: 117 MG/DL — LOW (ref 258–438)
FIBRINOGEN PPP-MCNC: 118 MG/DL — LOW (ref 258–438)
FIBRINOGEN PPP-MCNC: 120 MG/DL — LOW (ref 258–438)
FIBRINOGEN PPP-MCNC: 121 MG/DL — LOW (ref 258–438)
FIBRINOGEN PPP-MCNC: 134 MG/DL — LOW (ref 258–438)
FIBRINOGEN PPP-MCNC: 140 MG/DL — LOW (ref 258–438)
FIBRINOGEN PPP-MCNC: 164 MG/DL — LOW (ref 258–438)
FIBRINOGEN PPP-MCNC: 178 MG/DL — LOW (ref 258–438)
FIBRINOGEN PPP-MCNC: 178 MG/DL — LOW (ref 258–438)
FIBRINOGEN PPP-MCNC: 199 MG/DL — LOW (ref 258–438)
FIBRINOGEN PPP-MCNC: 81 MG/DL — CRITICAL LOW (ref 258–438)
FIBRINOGEN PPP-MCNC: 90 MG/DL — CRITICAL LOW (ref 258–438)
FIBRINOGEN PPP-MCNC: 93 MG/DL — CRITICAL LOW (ref 258–438)
FIBRINOGEN PPP-MCNC: <80 MG/DL — CRITICAL LOW (ref 258–438)
FIBRINOGEN PPP-MCNC: <80 MG/DL — CRITICAL LOW (ref 258–438)
FLUID INTAKE SUBSTANCE CLASS: SIGNIFICANT CHANGE UP
FLUID INTAKE SUBSTANCE CLASS: SIGNIFICANT CHANGE UP
FLUID SEGMENTED GRANULOCYTES: 27 % — SIGNIFICANT CHANGE UP
FLUID SEGMENTED GRANULOCYTES: 43 % — SIGNIFICANT CHANGE UP
FSP PPP-MCNC: >=5 <20 UG/ML
GAS PNL BLDV: 123 MMOL/L — LOW (ref 138–146)
GAS PNL BLDV: SIGNIFICANT CHANGE UP
GGT SERPL-CCNC: 59 U/L — HIGH (ref 9–50)
GIANT PLATELETS BLD QL SMEAR: PRESENT — SIGNIFICANT CHANGE UP
GLUCOSE BLDC GLUCOMTR-MCNC: 100 MG/DL — HIGH (ref 70–99)
GLUCOSE BLDC GLUCOMTR-MCNC: 106 MG/DL — HIGH (ref 70–99)
GLUCOSE BLDC GLUCOMTR-MCNC: 110 MG/DL — HIGH (ref 70–99)
GLUCOSE BLDC GLUCOMTR-MCNC: 111 MG/DL — HIGH (ref 70–99)
GLUCOSE BLDC GLUCOMTR-MCNC: 111 MG/DL — HIGH (ref 70–99)
GLUCOSE BLDC GLUCOMTR-MCNC: 113 MG/DL — HIGH (ref 70–99)
GLUCOSE BLDC GLUCOMTR-MCNC: 113 MG/DL — HIGH (ref 70–99)
GLUCOSE BLDC GLUCOMTR-MCNC: 114 MG/DL — HIGH (ref 70–99)
GLUCOSE BLDC GLUCOMTR-MCNC: 120 MG/DL — HIGH (ref 70–99)
GLUCOSE BLDC GLUCOMTR-MCNC: 121 MG/DL — HIGH (ref 70–99)
GLUCOSE BLDC GLUCOMTR-MCNC: 126 MG/DL — HIGH (ref 70–99)
GLUCOSE BLDC GLUCOMTR-MCNC: 129 MG/DL — HIGH (ref 70–99)
GLUCOSE BLDC GLUCOMTR-MCNC: 130 MG/DL — HIGH (ref 70–99)
GLUCOSE BLDC GLUCOMTR-MCNC: 130 MG/DL — HIGH (ref 70–99)
GLUCOSE BLDC GLUCOMTR-MCNC: 131 MG/DL — HIGH (ref 70–99)
GLUCOSE BLDC GLUCOMTR-MCNC: 132 MG/DL — HIGH (ref 70–99)
GLUCOSE BLDC GLUCOMTR-MCNC: 133 MG/DL — HIGH (ref 70–99)
GLUCOSE BLDC GLUCOMTR-MCNC: 134 MG/DL — HIGH (ref 70–99)
GLUCOSE BLDC GLUCOMTR-MCNC: 135 MG/DL — HIGH (ref 70–99)
GLUCOSE BLDC GLUCOMTR-MCNC: 136 MG/DL — HIGH (ref 70–99)
GLUCOSE BLDC GLUCOMTR-MCNC: 136 MG/DL — HIGH (ref 70–99)
GLUCOSE BLDC GLUCOMTR-MCNC: 137 MG/DL — HIGH (ref 70–99)
GLUCOSE BLDC GLUCOMTR-MCNC: 138 MG/DL — HIGH (ref 70–99)
GLUCOSE BLDC GLUCOMTR-MCNC: 140 MG/DL — HIGH (ref 70–99)
GLUCOSE BLDC GLUCOMTR-MCNC: 141 MG/DL — HIGH (ref 70–99)
GLUCOSE BLDC GLUCOMTR-MCNC: 142 MG/DL — HIGH (ref 70–99)
GLUCOSE BLDC GLUCOMTR-MCNC: 143 MG/DL — HIGH (ref 70–99)
GLUCOSE BLDC GLUCOMTR-MCNC: 144 MG/DL — HIGH (ref 70–99)
GLUCOSE BLDC GLUCOMTR-MCNC: 145 MG/DL — HIGH (ref 70–99)
GLUCOSE BLDC GLUCOMTR-MCNC: 146 MG/DL — HIGH (ref 70–99)
GLUCOSE BLDC GLUCOMTR-MCNC: 148 MG/DL — HIGH (ref 70–99)
GLUCOSE BLDC GLUCOMTR-MCNC: 148 MG/DL — HIGH (ref 70–99)
GLUCOSE BLDC GLUCOMTR-MCNC: 149 MG/DL — HIGH (ref 70–99)
GLUCOSE BLDC GLUCOMTR-MCNC: 150 MG/DL — HIGH (ref 70–99)
GLUCOSE BLDC GLUCOMTR-MCNC: 150 MG/DL — HIGH (ref 70–99)
GLUCOSE BLDC GLUCOMTR-MCNC: 151 MG/DL — HIGH (ref 70–99)
GLUCOSE BLDC GLUCOMTR-MCNC: 153 MG/DL — HIGH (ref 70–99)
GLUCOSE BLDC GLUCOMTR-MCNC: 153 MG/DL — HIGH (ref 70–99)
GLUCOSE BLDC GLUCOMTR-MCNC: 154 MG/DL — HIGH (ref 70–99)
GLUCOSE BLDC GLUCOMTR-MCNC: 155 MG/DL — HIGH (ref 70–99)
GLUCOSE BLDC GLUCOMTR-MCNC: 156 MG/DL — HIGH (ref 70–99)
GLUCOSE BLDC GLUCOMTR-MCNC: 156 MG/DL — HIGH (ref 70–99)
GLUCOSE BLDC GLUCOMTR-MCNC: 157 MG/DL — HIGH (ref 70–99)
GLUCOSE BLDC GLUCOMTR-MCNC: 158 MG/DL — HIGH (ref 70–99)
GLUCOSE BLDC GLUCOMTR-MCNC: 159 MG/DL — HIGH (ref 70–99)
GLUCOSE BLDC GLUCOMTR-MCNC: 160 MG/DL — HIGH (ref 70–99)
GLUCOSE BLDC GLUCOMTR-MCNC: 161 MG/DL — HIGH (ref 70–99)
GLUCOSE BLDC GLUCOMTR-MCNC: 163 MG/DL — HIGH (ref 70–99)
GLUCOSE BLDC GLUCOMTR-MCNC: 163 MG/DL — HIGH (ref 70–99)
GLUCOSE BLDC GLUCOMTR-MCNC: 164 MG/DL — HIGH (ref 70–99)
GLUCOSE BLDC GLUCOMTR-MCNC: 166 MG/DL — HIGH (ref 70–99)
GLUCOSE BLDC GLUCOMTR-MCNC: 167 MG/DL — HIGH (ref 70–99)
GLUCOSE BLDC GLUCOMTR-MCNC: 168 MG/DL — HIGH (ref 70–99)
GLUCOSE BLDC GLUCOMTR-MCNC: 171 MG/DL — HIGH (ref 70–99)
GLUCOSE BLDC GLUCOMTR-MCNC: 173 MG/DL — HIGH (ref 70–99)
GLUCOSE BLDC GLUCOMTR-MCNC: 173 MG/DL — HIGH (ref 70–99)
GLUCOSE BLDC GLUCOMTR-MCNC: 175 MG/DL — HIGH (ref 70–99)
GLUCOSE BLDC GLUCOMTR-MCNC: 176 MG/DL — HIGH (ref 70–99)
GLUCOSE BLDC GLUCOMTR-MCNC: 176 MG/DL — HIGH (ref 70–99)
GLUCOSE BLDC GLUCOMTR-MCNC: 179 MG/DL — HIGH (ref 70–99)
GLUCOSE BLDC GLUCOMTR-MCNC: 181 MG/DL — HIGH (ref 70–99)
GLUCOSE BLDC GLUCOMTR-MCNC: 182 MG/DL — HIGH (ref 70–99)
GLUCOSE BLDC GLUCOMTR-MCNC: 183 MG/DL — HIGH (ref 70–99)
GLUCOSE BLDC GLUCOMTR-MCNC: 183 MG/DL — HIGH (ref 70–99)
GLUCOSE BLDC GLUCOMTR-MCNC: 184 MG/DL — HIGH (ref 70–99)
GLUCOSE BLDC GLUCOMTR-MCNC: 185 MG/DL — HIGH (ref 70–99)
GLUCOSE BLDC GLUCOMTR-MCNC: 185 MG/DL — HIGH (ref 70–99)
GLUCOSE BLDC GLUCOMTR-MCNC: 186 MG/DL — HIGH (ref 70–99)
GLUCOSE BLDC GLUCOMTR-MCNC: 186 MG/DL — HIGH (ref 70–99)
GLUCOSE BLDC GLUCOMTR-MCNC: 187 MG/DL — HIGH (ref 70–99)
GLUCOSE BLDC GLUCOMTR-MCNC: 188 MG/DL — HIGH (ref 70–99)
GLUCOSE BLDC GLUCOMTR-MCNC: 189 MG/DL — HIGH (ref 70–99)
GLUCOSE BLDC GLUCOMTR-MCNC: 190 MG/DL — HIGH (ref 70–99)
GLUCOSE BLDC GLUCOMTR-MCNC: 190 MG/DL — HIGH (ref 70–99)
GLUCOSE BLDC GLUCOMTR-MCNC: 191 MG/DL — HIGH (ref 70–99)
GLUCOSE BLDC GLUCOMTR-MCNC: 191 MG/DL — HIGH (ref 70–99)
GLUCOSE BLDC GLUCOMTR-MCNC: 192 MG/DL — HIGH (ref 70–99)
GLUCOSE BLDC GLUCOMTR-MCNC: 192 MG/DL — HIGH (ref 70–99)
GLUCOSE BLDC GLUCOMTR-MCNC: 195 MG/DL — HIGH (ref 70–99)
GLUCOSE BLDC GLUCOMTR-MCNC: 196 MG/DL — HIGH (ref 70–99)
GLUCOSE BLDC GLUCOMTR-MCNC: 197 MG/DL — HIGH (ref 70–99)
GLUCOSE BLDC GLUCOMTR-MCNC: 198 MG/DL — HIGH (ref 70–99)
GLUCOSE BLDC GLUCOMTR-MCNC: 199 MG/DL — HIGH (ref 70–99)
GLUCOSE BLDC GLUCOMTR-MCNC: 199 MG/DL — HIGH (ref 70–99)
GLUCOSE BLDC GLUCOMTR-MCNC: 201 MG/DL — HIGH (ref 70–99)
GLUCOSE BLDC GLUCOMTR-MCNC: 203 MG/DL — HIGH (ref 70–99)
GLUCOSE BLDC GLUCOMTR-MCNC: 204 MG/DL — HIGH (ref 70–99)
GLUCOSE BLDC GLUCOMTR-MCNC: 207 MG/DL — HIGH (ref 70–99)
GLUCOSE BLDC GLUCOMTR-MCNC: 208 MG/DL — HIGH (ref 70–99)
GLUCOSE BLDC GLUCOMTR-MCNC: 208 MG/DL — HIGH (ref 70–99)
GLUCOSE BLDC GLUCOMTR-MCNC: 209 MG/DL — HIGH (ref 70–99)
GLUCOSE BLDC GLUCOMTR-MCNC: 210 MG/DL — HIGH (ref 70–99)
GLUCOSE BLDC GLUCOMTR-MCNC: 211 MG/DL — HIGH (ref 70–99)
GLUCOSE BLDC GLUCOMTR-MCNC: 211 MG/DL — HIGH (ref 70–99)
GLUCOSE BLDC GLUCOMTR-MCNC: 216 MG/DL — HIGH (ref 70–99)
GLUCOSE BLDC GLUCOMTR-MCNC: 217 MG/DL — HIGH (ref 70–99)
GLUCOSE BLDC GLUCOMTR-MCNC: 218 MG/DL — HIGH (ref 70–99)
GLUCOSE BLDC GLUCOMTR-MCNC: 219 MG/DL — HIGH (ref 70–99)
GLUCOSE BLDC GLUCOMTR-MCNC: 219 MG/DL — HIGH (ref 70–99)
GLUCOSE BLDC GLUCOMTR-MCNC: 221 MG/DL — HIGH (ref 70–99)
GLUCOSE BLDC GLUCOMTR-MCNC: 221 MG/DL — HIGH (ref 70–99)
GLUCOSE BLDC GLUCOMTR-MCNC: 222 MG/DL — HIGH (ref 70–99)
GLUCOSE BLDC GLUCOMTR-MCNC: 227 MG/DL — HIGH (ref 70–99)
GLUCOSE BLDC GLUCOMTR-MCNC: 229 MG/DL — HIGH (ref 70–99)
GLUCOSE BLDC GLUCOMTR-MCNC: 230 MG/DL — HIGH (ref 70–99)
GLUCOSE BLDC GLUCOMTR-MCNC: 231 MG/DL — HIGH (ref 70–99)
GLUCOSE BLDC GLUCOMTR-MCNC: 235 MG/DL — HIGH (ref 70–99)
GLUCOSE BLDC GLUCOMTR-MCNC: 256 MG/DL — HIGH (ref 70–99)
GLUCOSE BLDC GLUCOMTR-MCNC: 257 MG/DL — HIGH (ref 70–99)
GLUCOSE BLDC GLUCOMTR-MCNC: 263 MG/DL — HIGH (ref 70–99)
GLUCOSE BLDC GLUCOMTR-MCNC: 268 MG/DL — HIGH (ref 70–99)
GLUCOSE BLDC GLUCOMTR-MCNC: 269 MG/DL — HIGH (ref 70–99)
GLUCOSE BLDC GLUCOMTR-MCNC: 278 MG/DL — HIGH (ref 70–99)
GLUCOSE BLDC GLUCOMTR-MCNC: 281 MG/DL — HIGH (ref 70–99)
GLUCOSE BLDC GLUCOMTR-MCNC: 302 MG/DL — HIGH (ref 70–99)
GLUCOSE BLDC GLUCOMTR-MCNC: 302 MG/DL — HIGH (ref 70–99)
GLUCOSE BLDC GLUCOMTR-MCNC: 312 MG/DL — HIGH (ref 70–99)
GLUCOSE BLDC GLUCOMTR-MCNC: 322 MG/DL — HIGH (ref 70–99)
GLUCOSE BLDC GLUCOMTR-MCNC: 329 MG/DL — HIGH (ref 70–99)
GLUCOSE BLDC GLUCOMTR-MCNC: 330 MG/DL — HIGH (ref 70–99)
GLUCOSE BLDC GLUCOMTR-MCNC: 338 MG/DL — HIGH (ref 70–99)
GLUCOSE BLDC GLUCOMTR-MCNC: 344 MG/DL — HIGH (ref 70–99)
GLUCOSE BLDC GLUCOMTR-MCNC: 346 MG/DL — HIGH (ref 70–99)
GLUCOSE BLDC GLUCOMTR-MCNC: 356 MG/DL — HIGH (ref 70–99)
GLUCOSE BLDC GLUCOMTR-MCNC: 362 MG/DL — HIGH (ref 70–99)
GLUCOSE BLDC GLUCOMTR-MCNC: 362 MG/DL — HIGH (ref 70–99)
GLUCOSE BLDC GLUCOMTR-MCNC: 368 MG/DL — HIGH (ref 70–99)
GLUCOSE BLDC GLUCOMTR-MCNC: 380 MG/DL — HIGH (ref 70–99)
GLUCOSE BLDC GLUCOMTR-MCNC: 408 MG/DL — HIGH (ref 70–99)
GLUCOSE BLDC GLUCOMTR-MCNC: 443 MG/DL — HIGH (ref 70–99)
GLUCOSE BLDC GLUCOMTR-MCNC: 76 MG/DL — SIGNIFICANT CHANGE UP (ref 70–99)
GLUCOSE BLDC GLUCOMTR-MCNC: 78 MG/DL — SIGNIFICANT CHANGE UP (ref 70–99)
GLUCOSE BLDC GLUCOMTR-MCNC: 79 MG/DL — SIGNIFICANT CHANGE UP (ref 70–99)
GLUCOSE BLDC GLUCOMTR-MCNC: 87 MG/DL — SIGNIFICANT CHANGE UP (ref 70–99)
GLUCOSE BLDC GLUCOMTR-MCNC: 89 MG/DL — SIGNIFICANT CHANGE UP (ref 70–99)
GLUCOSE BLDC GLUCOMTR-MCNC: 91 MG/DL — SIGNIFICANT CHANGE UP (ref 70–99)
GLUCOSE BLDC GLUCOMTR-MCNC: 91 MG/DL — SIGNIFICANT CHANGE UP (ref 70–99)
GLUCOSE BLDC GLUCOMTR-MCNC: 92 MG/DL — SIGNIFICANT CHANGE UP (ref 70–99)
GLUCOSE BLDC GLUCOMTR-MCNC: 93 MG/DL — SIGNIFICANT CHANGE UP (ref 70–99)
GLUCOSE FLD-MCNC: 172 MG/DL — SIGNIFICANT CHANGE UP
GLUCOSE SERPL-MCNC: 108 MG/DL — HIGH (ref 70–99)
GLUCOSE SERPL-MCNC: 110 MG/DL — HIGH (ref 70–99)
GLUCOSE SERPL-MCNC: 113 MG/DL — HIGH (ref 70–99)
GLUCOSE SERPL-MCNC: 125 MG/DL — HIGH (ref 70–99)
GLUCOSE SERPL-MCNC: 126 MG/DL — HIGH (ref 70–99)
GLUCOSE SERPL-MCNC: 134 MG/DL — HIGH (ref 70–99)
GLUCOSE SERPL-MCNC: 136 MG/DL — HIGH (ref 70–99)
GLUCOSE SERPL-MCNC: 141 MG/DL — HIGH (ref 70–99)
GLUCOSE SERPL-MCNC: 142 MG/DL — HIGH (ref 70–99)
GLUCOSE SERPL-MCNC: 142 MG/DL — HIGH (ref 70–99)
GLUCOSE SERPL-MCNC: 145 MG/DL — HIGH (ref 70–99)
GLUCOSE SERPL-MCNC: 146 MG/DL — HIGH (ref 70–99)
GLUCOSE SERPL-MCNC: 147 MG/DL — HIGH (ref 70–99)
GLUCOSE SERPL-MCNC: 149 MG/DL — HIGH (ref 70–99)
GLUCOSE SERPL-MCNC: 150 MG/DL — HIGH (ref 70–99)
GLUCOSE SERPL-MCNC: 151 MG/DL — HIGH (ref 70–99)
GLUCOSE SERPL-MCNC: 151 MG/DL — HIGH (ref 70–99)
GLUCOSE SERPL-MCNC: 153 MG/DL — HIGH (ref 70–99)
GLUCOSE SERPL-MCNC: 153 MG/DL — HIGH (ref 70–99)
GLUCOSE SERPL-MCNC: 157 MG/DL — HIGH (ref 70–99)
GLUCOSE SERPL-MCNC: 161 MG/DL — HIGH (ref 70–99)
GLUCOSE SERPL-MCNC: 163 MG/DL — HIGH (ref 70–99)
GLUCOSE SERPL-MCNC: 170 MG/DL — HIGH (ref 70–99)
GLUCOSE SERPL-MCNC: 170 MG/DL — HIGH (ref 70–99)
GLUCOSE SERPL-MCNC: 171 MG/DL — HIGH (ref 70–99)
GLUCOSE SERPL-MCNC: 174 MG/DL — HIGH (ref 70–99)
GLUCOSE SERPL-MCNC: 175 MG/DL — HIGH (ref 70–99)
GLUCOSE SERPL-MCNC: 178 MG/DL — HIGH (ref 70–99)
GLUCOSE SERPL-MCNC: 179 MG/DL — HIGH (ref 70–99)
GLUCOSE SERPL-MCNC: 180 MG/DL — HIGH (ref 70–99)
GLUCOSE SERPL-MCNC: 180 MG/DL — HIGH (ref 70–99)
GLUCOSE SERPL-MCNC: 182 MG/DL — HIGH (ref 70–99)
GLUCOSE SERPL-MCNC: 183 MG/DL — HIGH (ref 70–99)
GLUCOSE SERPL-MCNC: 185 MG/DL — HIGH (ref 70–99)
GLUCOSE SERPL-MCNC: 185 MG/DL — HIGH (ref 70–99)
GLUCOSE SERPL-MCNC: 186 MG/DL — HIGH (ref 70–99)
GLUCOSE SERPL-MCNC: 187 MG/DL — HIGH (ref 70–99)
GLUCOSE SERPL-MCNC: 187 MG/DL — HIGH (ref 70–99)
GLUCOSE SERPL-MCNC: 189 MG/DL — HIGH (ref 70–99)
GLUCOSE SERPL-MCNC: 193 MG/DL — HIGH (ref 70–99)
GLUCOSE SERPL-MCNC: 198 MG/DL — HIGH (ref 70–99)
GLUCOSE SERPL-MCNC: 199 MG/DL — HIGH (ref 70–99)
GLUCOSE SERPL-MCNC: 200 MG/DL — HIGH (ref 70–99)
GLUCOSE SERPL-MCNC: 202 MG/DL — HIGH (ref 70–99)
GLUCOSE SERPL-MCNC: 203 MG/DL — HIGH (ref 70–99)
GLUCOSE SERPL-MCNC: 209 MG/DL — HIGH (ref 70–99)
GLUCOSE SERPL-MCNC: 210 MG/DL — HIGH (ref 70–99)
GLUCOSE SERPL-MCNC: 212 MG/DL — HIGH (ref 70–99)
GLUCOSE SERPL-MCNC: 212 MG/DL — HIGH (ref 70–99)
GLUCOSE SERPL-MCNC: 220 MG/DL — HIGH (ref 70–99)
GLUCOSE SERPL-MCNC: 222 MG/DL — HIGH (ref 70–99)
GLUCOSE SERPL-MCNC: 223 MG/DL — HIGH (ref 70–99)
GLUCOSE SERPL-MCNC: 226 MG/DL — HIGH (ref 70–99)
GLUCOSE SERPL-MCNC: 238 MG/DL — HIGH (ref 70–99)
GLUCOSE SERPL-MCNC: 257 MG/DL — HIGH (ref 70–99)
GLUCOSE SERPL-MCNC: 287 MG/DL — HIGH (ref 70–99)
GLUCOSE SERPL-MCNC: 308 MG/DL — HIGH (ref 70–99)
GLUCOSE SERPL-MCNC: 321 MG/DL — HIGH (ref 70–99)
GLUCOSE SERPL-MCNC: 331 MG/DL — HIGH (ref 70–99)
GLUCOSE SERPL-MCNC: 354 MG/DL — HIGH (ref 70–99)
GLUCOSE SERPL-MCNC: 497 MG/DL — CRITICAL HIGH (ref 70–99)
GLUCOSE SERPL-MCNC: 643 MG/DL — CRITICAL HIGH (ref 70–99)
GLUCOSE SERPL-MCNC: 88 MG/DL — SIGNIFICANT CHANGE UP (ref 70–99)
GLUCOSE SERPL-MCNC: 99 MG/DL — SIGNIFICANT CHANGE UP (ref 70–99)
GLUCOSE UR QL: 100
GLUCOSE UR QL: NEGATIVE — SIGNIFICANT CHANGE UP
GLUCOSE UR QL: NEGATIVE — SIGNIFICANT CHANGE UP
GRAM STN FLD: SIGNIFICANT CHANGE UP
GRAN CASTS # UR COMP ASSIST: ABNORMAL /LPF
HAPTOGLOB SERPL-MCNC: 105 MG/DL — SIGNIFICANT CHANGE UP (ref 34–200)
HAPTOGLOB SERPL-MCNC: 109 MG/DL — SIGNIFICANT CHANGE UP (ref 34–200)
HAPTOGLOB SERPL-MCNC: 112 MG/DL — SIGNIFICANT CHANGE UP (ref 34–200)
HAPTOGLOB SERPL-MCNC: 114 MG/DL — SIGNIFICANT CHANGE UP (ref 34–200)
HAPTOGLOB SERPL-MCNC: 115 MG/DL — SIGNIFICANT CHANGE UP (ref 34–200)
HAPTOGLOB SERPL-MCNC: 117 MG/DL — SIGNIFICANT CHANGE UP (ref 34–200)
HAPTOGLOB SERPL-MCNC: 127 MG/DL — SIGNIFICANT CHANGE UP (ref 34–200)
HAPTOGLOB SERPL-MCNC: 158 MG/DL — SIGNIFICANT CHANGE UP (ref 34–200)
HAPTOGLOB SERPL-MCNC: 22 MG/DL — LOW (ref 34–200)
HAPTOGLOB SERPL-MCNC: 288 MG/DL — HIGH (ref 34–200)
HAPTOGLOB SERPL-MCNC: 48 MG/DL — SIGNIFICANT CHANGE UP (ref 34–200)
HAPTOGLOB SERPL-MCNC: 54 MG/DL — SIGNIFICANT CHANGE UP (ref 34–200)
HAPTOGLOB SERPL-MCNC: 66 MG/DL — SIGNIFICANT CHANGE UP (ref 34–200)
HAPTOGLOB SERPL-MCNC: 88 MG/DL — SIGNIFICANT CHANGE UP (ref 34–200)
HAPTOGLOB SERPL-MCNC: 89 MG/DL — SIGNIFICANT CHANGE UP (ref 34–200)
HAPTOGLOB SERPL-MCNC: <10 MG/DL — LOW (ref 34–200)
HAV IGM SER-ACNC: ABNORMAL
HBA1C BLD-MCNC: 8.4 % — HIGH (ref 4–5.6)
HBV CORE IGM SER-ACNC: SIGNIFICANT CHANGE UP
HBV SURFACE AB SER-ACNC: SIGNIFICANT CHANGE UP
HBV SURFACE AG SER-ACNC: SIGNIFICANT CHANGE UP
HBV SURFACE AG SER-ACNC: SIGNIFICANT CHANGE UP
HCO3 BLDV-SCNC: 11 MMOL/L — LOW (ref 20–27)
HCT VFR BLD CALC: 19.3 % — CRITICAL LOW (ref 39–50)
HCT VFR BLD CALC: 19.7 % — CRITICAL LOW (ref 39–50)
HCT VFR BLD CALC: 21.3 % — LOW (ref 39–50)
HCT VFR BLD CALC: 21.9 % — LOW (ref 39–50)
HCT VFR BLD CALC: 22.5 % — LOW (ref 39–50)
HCT VFR BLD CALC: 22.6 % — LOW (ref 39–50)
HCT VFR BLD CALC: 22.7 % — LOW (ref 39–50)
HCT VFR BLD CALC: 23.3 % — LOW (ref 39–50)
HCT VFR BLD CALC: 23.3 % — LOW (ref 39–50)
HCT VFR BLD CALC: 23.6 % — LOW (ref 39–50)
HCT VFR BLD CALC: 24 % — LOW (ref 39–50)
HCT VFR BLD CALC: 24.6 % — LOW (ref 39–50)
HCT VFR BLD CALC: 24.9 % — LOW (ref 39–50)
HCT VFR BLD CALC: 25.2 % — LOW (ref 39–50)
HCT VFR BLD CALC: 25.2 % — LOW (ref 39–50)
HCT VFR BLD CALC: 25.3 % — LOW (ref 39–50)
HCT VFR BLD CALC: 25.6 % — LOW (ref 39–50)
HCT VFR BLD CALC: 25.7 % — LOW (ref 39–50)
HCT VFR BLD CALC: 25.8 % — LOW (ref 39–50)
HCT VFR BLD CALC: 26 % — LOW (ref 39–50)
HCT VFR BLD CALC: 26.2 % — LOW (ref 39–50)
HCT VFR BLD CALC: 26.6 % — LOW (ref 39–50)
HCT VFR BLD CALC: 26.7 % — LOW (ref 39–50)
HCT VFR BLD CALC: 26.7 % — LOW (ref 39–50)
HCT VFR BLD CALC: 26.9 % — LOW (ref 39–50)
HCT VFR BLD CALC: 26.9 % — LOW (ref 39–50)
HCT VFR BLD CALC: 27.2 % — LOW (ref 39–50)
HCT VFR BLD CALC: 27.2 % — LOW (ref 39–50)
HCT VFR BLD CALC: 27.3 % — LOW (ref 39–50)
HCT VFR BLD CALC: 27.3 % — LOW (ref 39–50)
HCT VFR BLD CALC: 27.4 % — LOW (ref 39–50)
HCT VFR BLD CALC: 27.8 % — LOW (ref 39–50)
HCT VFR BLD CALC: 27.8 % — LOW (ref 39–50)
HCT VFR BLD CALC: 28.1 % — LOW (ref 39–50)
HCT VFR BLD CALC: 28.4 % — LOW (ref 39–50)
HCT VFR BLD CALC: 28.4 % — LOW (ref 39–50)
HCT VFR BLD CALC: 28.7 % — LOW (ref 39–50)
HCT VFR BLD CALC: 28.8 % — LOW (ref 39–50)
HCT VFR BLD CALC: 29 % — LOW (ref 39–50)
HCT VFR BLD CALC: 29.2 % — LOW (ref 39–50)
HCT VFR BLD CALC: 29.2 % — LOW (ref 39–50)
HCT VFR BLD CALC: 29.4 % — LOW (ref 39–50)
HCT VFR BLD CALC: 29.5 % — LOW (ref 39–50)
HCT VFR BLD CALC: 29.6 % — LOW (ref 39–50)
HCT VFR BLD CALC: 29.8 % — LOW (ref 39–50)
HCT VFR BLD CALC: 30.1 % — LOW (ref 39–50)
HCT VFR BLD CALC: 30.2 % — LOW (ref 39–50)
HCT VFR BLD CALC: 30.4 % — LOW (ref 39–50)
HCT VFR BLD CALC: 30.9 % — LOW (ref 39–50)
HCT VFR BLD CALC: 31 % — LOW (ref 39–50)
HCT VFR BLD CALC: 31.1 % — LOW (ref 39–50)
HCT VFR BLD CALC: 31.4 % — LOW (ref 39–50)
HCT VFR BLD CALC: 31.7 % — LOW (ref 39–50)
HCT VFR BLD CALC: 31.8 % — LOW (ref 39–50)
HCT VFR BLD CALC: 32 % — LOW (ref 39–50)
HCV AB S/CO SERPL IA: 0.13 S/CO — SIGNIFICANT CHANGE UP
HCV AB S/CO SERPL IA: 0.14 S/CO — SIGNIFICANT CHANGE UP
HCV AB SERPL-IMP: SIGNIFICANT CHANGE UP
HCV AB SERPL-IMP: SIGNIFICANT CHANGE UP
HDLC SERPL-MCNC: 18 MG/DL — LOW
HEPARIN-PF4 AB RESULT: <0.6 U/ML — SIGNIFICANT CHANGE UP (ref 0–0.9)
HGB BLD-MCNC: 10 G/DL — LOW (ref 13–17)
HGB BLD-MCNC: 6.2 G/DL — CRITICAL LOW (ref 13–17)
HGB BLD-MCNC: 6.2 G/DL — CRITICAL LOW (ref 13–17)
HGB BLD-MCNC: 6.5 G/DL — CRITICAL LOW (ref 13–17)
HGB BLD-MCNC: 6.5 G/DL — CRITICAL LOW (ref 13–17)
HGB BLD-MCNC: 6.7 G/DL — CRITICAL LOW (ref 13–17)
HGB BLD-MCNC: 6.7 G/DL — CRITICAL LOW (ref 13–17)
HGB BLD-MCNC: 6.8 G/DL — CRITICAL LOW (ref 13–17)
HGB BLD-MCNC: 7.2 G/DL — LOW (ref 13–17)
HGB BLD-MCNC: 7.2 G/DL — LOW (ref 13–17)
HGB BLD-MCNC: 7.5 G/DL — LOW (ref 13–17)
HGB BLD-MCNC: 7.5 G/DL — LOW (ref 13–17)
HGB BLD-MCNC: 7.6 G/DL — LOW (ref 13–17)
HGB BLD-MCNC: 7.6 G/DL — LOW (ref 13–17)
HGB BLD-MCNC: 7.7 G/DL — LOW (ref 13–17)
HGB BLD-MCNC: 7.8 G/DL — LOW (ref 13–17)
HGB BLD-MCNC: 7.9 G/DL — LOW (ref 13–17)
HGB BLD-MCNC: 8 G/DL — LOW (ref 13–17)
HGB BLD-MCNC: 8.1 G/DL — LOW (ref 13–17)
HGB BLD-MCNC: 8.2 G/DL — LOW (ref 13–17)
HGB BLD-MCNC: 8.3 G/DL — LOW (ref 13–17)
HGB BLD-MCNC: 8.4 G/DL — LOW (ref 13–17)
HGB BLD-MCNC: 8.4 G/DL — LOW (ref 13–17)
HGB BLD-MCNC: 8.5 G/DL — LOW (ref 13–17)
HGB BLD-MCNC: 8.6 G/DL — LOW (ref 13–17)
HGB BLD-MCNC: 8.8 G/DL — LOW (ref 13–17)
HGB BLD-MCNC: 8.8 G/DL — LOW (ref 13–17)
HGB BLD-MCNC: 8.9 G/DL — LOW (ref 13–17)
HGB BLD-MCNC: 8.9 G/DL — LOW (ref 13–17)
HGB BLD-MCNC: 9 G/DL — LOW (ref 13–17)
HGB BLD-MCNC: 9.1 G/DL — LOW (ref 13–17)
HGB BLD-MCNC: 9.1 G/DL — LOW (ref 13–17)
HGB BLD-MCNC: 9.2 G/DL — LOW (ref 13–17)
HGB BLD-MCNC: 9.3 G/DL — LOW (ref 13–17)
HGB BLD-MCNC: 9.4 G/DL — LOW (ref 13–17)
HGB BLD-MCNC: 9.6 G/DL — LOW (ref 13–17)
HGB BLD-MCNC: 9.7 G/DL — LOW (ref 13–17)
HYPOCHROMIA BLD QL: SIGNIFICANT CHANGE UP
IMM GRANULOCYTES NFR BLD AUTO: 0.3 % — SIGNIFICANT CHANGE UP (ref 0–1.5)
IMM GRANULOCYTES NFR BLD AUTO: 0.4 % — SIGNIFICANT CHANGE UP (ref 0–1.5)
IMM GRANULOCYTES NFR BLD AUTO: 0.5 % — SIGNIFICANT CHANGE UP (ref 0–1.5)
IMM GRANULOCYTES NFR BLD AUTO: 0.6 % — SIGNIFICANT CHANGE UP (ref 0–1.5)
IMM GRANULOCYTES NFR BLD AUTO: 0.7 % — SIGNIFICANT CHANGE UP (ref 0–1.5)
IMM GRANULOCYTES NFR BLD AUTO: 0.8 % — SIGNIFICANT CHANGE UP (ref 0–1.5)
IMM GRANULOCYTES NFR BLD AUTO: 0.9 % — SIGNIFICANT CHANGE UP (ref 0–1.5)
IMM GRANULOCYTES NFR BLD AUTO: 0.9 % — SIGNIFICANT CHANGE UP (ref 0–1.5)
IMM GRANULOCYTES NFR BLD AUTO: 1.3 % — SIGNIFICANT CHANGE UP (ref 0–1.5)
IMM GRANULOCYTES NFR BLD AUTO: 1.4 % — SIGNIFICANT CHANGE UP (ref 0–1.5)
IMM GRANULOCYTES NFR BLD AUTO: 1.4 % — SIGNIFICANT CHANGE UP (ref 0–1.5)
INR BLD: 1.45 — HIGH (ref 0.88–1.16)
INR BLD: 1.46 — HIGH (ref 0.88–1.16)
INR BLD: 1.49 — HIGH (ref 0.88–1.16)
INR BLD: 1.5 — HIGH (ref 0.88–1.16)
INR BLD: 1.58 — HIGH (ref 0.88–1.16)
INR BLD: 1.6 — HIGH (ref 0.88–1.16)
INR BLD: 1.61 — HIGH (ref 0.88–1.16)
INR BLD: 1.62 — HIGH (ref 0.88–1.16)
INR BLD: 1.65 — HIGH (ref 0.88–1.16)
INR BLD: 1.67 — HIGH (ref 0.88–1.16)
INR BLD: 1.67 — HIGH (ref 0.88–1.16)
INR BLD: 1.69 — HIGH (ref 0.88–1.16)
INR BLD: 1.73 — HIGH (ref 0.88–1.16)
INR BLD: 1.76 — HIGH (ref 0.88–1.16)
INR BLD: 1.78 — HIGH (ref 0.88–1.16)
INR BLD: 1.79 — HIGH (ref 0.88–1.16)
INR BLD: 1.86 — HIGH (ref 0.88–1.16)
INR BLD: 2.03 — HIGH (ref 0.88–1.16)
INR BLD: 2.04 — HIGH (ref 0.88–1.16)
INR BLD: 2.08 — HIGH (ref 0.88–1.16)
INR BLD: 2.17 — HIGH (ref 0.88–1.16)
INR BLD: 2.32 — HIGH (ref 0.88–1.16)
INR BLD: 2.61 — HIGH (ref 0.88–1.16)
INR BLD: 2.68 — HIGH (ref 0.88–1.16)
INR BLD: 2.75 — HIGH (ref 0.88–1.16)
INR BLD: 2.85 — HIGH (ref 0.88–1.16)
INR BLD: 2.9 — HIGH (ref 0.88–1.16)
INR BLD: 2.99 — HIGH (ref 0.88–1.16)
INR BLD: 3.26 — HIGH (ref 0.88–1.16)
INR BLD: 3.31 — HIGH (ref 0.88–1.16)
INTERPRETATION 24H UR IFE-IMP: SIGNIFICANT CHANGE UP
INTERPRETATION 24H UR IFE-IMP: SIGNIFICANT CHANGE UP
IRON SATN MFR SERPL: 14 % — LOW (ref 16–55)
IRON SATN MFR SERPL: 20 UG/DL — LOW (ref 45–165)
IRON SATN MFR SERPL: 29 % — SIGNIFICANT CHANGE UP (ref 16–55)
IRON SATN MFR SERPL: 32 UG/DL — LOW (ref 45–165)
KETONES UR-MCNC: NEGATIVE — SIGNIFICANT CHANGE UP
LACTATE SERPL-SCNC: 1.6 MMOL/L — SIGNIFICANT CHANGE UP (ref 0.5–2)
LACTATE SERPL-SCNC: 1.6 MMOL/L — SIGNIFICANT CHANGE UP (ref 0.5–2)
LACTATE SERPL-SCNC: 2 MMOL/L — SIGNIFICANT CHANGE UP (ref 0.5–2)
LACTATE SERPL-SCNC: 2.2 MMOL/L — HIGH (ref 0.5–2)
LACTATE SERPL-SCNC: 2.3 MMOL/L — HIGH (ref 0.5–2)
LACTATE SERPL-SCNC: 2.4 MMOL/L — HIGH (ref 0.5–2)
LACTATE SERPL-SCNC: 3.1 MMOL/L — HIGH (ref 0.5–2)
LACTATE SERPL-SCNC: 3.4 MMOL/L — HIGH (ref 0.5–2)
LACTATE SERPL-SCNC: 4.1 MMOL/L — CRITICAL HIGH (ref 0.5–2)
LDH SERPL L TO P-CCNC: 126 U/L — SIGNIFICANT CHANGE UP (ref 50–242)
LDH SERPL L TO P-CCNC: 180 U/L — SIGNIFICANT CHANGE UP (ref 50–242)
LDH SERPL L TO P-CCNC: 186 U/L — SIGNIFICANT CHANGE UP (ref 50–242)
LDH SERPL L TO P-CCNC: 204 U/L — SIGNIFICANT CHANGE UP
LDH SERPL L TO P-CCNC: 204 U/L — SIGNIFICANT CHANGE UP (ref 50–242)
LDH SERPL L TO P-CCNC: 205 U/L — SIGNIFICANT CHANGE UP (ref 50–242)
LDH SERPL L TO P-CCNC: 226 U/L — SIGNIFICANT CHANGE UP (ref 50–242)
LDH SERPL L TO P-CCNC: 238 U/L — SIGNIFICANT CHANGE UP (ref 50–242)
LDH SERPL L TO P-CCNC: 246 U/L — HIGH (ref 50–242)
LDH SERPL L TO P-CCNC: 250 U/L — HIGH (ref 50–242)
LDH SERPL L TO P-CCNC: 251 U/L — HIGH (ref 50–242)
LDH SERPL L TO P-CCNC: 257 U/L — HIGH (ref 50–242)
LDH SERPL L TO P-CCNC: 257 U/L — HIGH (ref 50–242)
LDH SERPL L TO P-CCNC: 331 U/L — HIGH (ref 50–242)
LDH SERPL L TO P-CCNC: 421 U/L — HIGH (ref 50–242)
LDH SERPL L TO P-CCNC: 46 U/L — SIGNIFICANT CHANGE UP
LEUKOCYTE ESTERASE UR-ACNC: ABNORMAL
LIPID PNL WITH DIRECT LDL SERPL: 15 MG/DL — SIGNIFICANT CHANGE UP
LYMPHOCYTES # BLD AUTO: 0 % — LOW (ref 13–44)
LYMPHOCYTES # BLD AUTO: 0 % — LOW (ref 13–44)
LYMPHOCYTES # BLD AUTO: 0 K/UL — LOW (ref 1–3.3)
LYMPHOCYTES # BLD AUTO: 0 K/UL — LOW (ref 1–3.3)
LYMPHOCYTES # BLD AUTO: 0.05 K/UL — LOW (ref 1–3.3)
LYMPHOCYTES # BLD AUTO: 0.07 K/UL — LOW (ref 1–3.3)
LYMPHOCYTES # BLD AUTO: 0.15 K/UL — LOW (ref 1–3.3)
LYMPHOCYTES # BLD AUTO: 0.17 K/UL — LOW (ref 1–3.3)
LYMPHOCYTES # BLD AUTO: 0.18 K/UL — LOW (ref 1–3.3)
LYMPHOCYTES # BLD AUTO: 0.2 K/UL — LOW (ref 1–3.3)
LYMPHOCYTES # BLD AUTO: 0.21 K/UL — LOW (ref 1–3.3)
LYMPHOCYTES # BLD AUTO: 0.22 K/UL — LOW (ref 1–3.3)
LYMPHOCYTES # BLD AUTO: 0.25 K/UL — LOW (ref 1–3.3)
LYMPHOCYTES # BLD AUTO: 0.25 K/UL — LOW (ref 1–3.3)
LYMPHOCYTES # BLD AUTO: 0.27 K/UL — LOW (ref 1–3.3)
LYMPHOCYTES # BLD AUTO: 0.28 K/UL — LOW (ref 1–3.3)
LYMPHOCYTES # BLD AUTO: 0.28 K/UL — LOW (ref 1–3.3)
LYMPHOCYTES # BLD AUTO: 0.3 K/UL — LOW (ref 1–3.3)
LYMPHOCYTES # BLD AUTO: 0.33 K/UL — LOW (ref 1–3.3)
LYMPHOCYTES # BLD AUTO: 0.34 K/UL — LOW (ref 1–3.3)
LYMPHOCYTES # BLD AUTO: 0.34 K/UL — LOW (ref 1–3.3)
LYMPHOCYTES # BLD AUTO: 0.35 K/UL — LOW (ref 1–3.3)
LYMPHOCYTES # BLD AUTO: 0.36 K/UL — LOW (ref 1–3.3)
LYMPHOCYTES # BLD AUTO: 0.37 K/UL — LOW (ref 1–3.3)
LYMPHOCYTES # BLD AUTO: 0.4 K/UL — LOW (ref 1–3.3)
LYMPHOCYTES # BLD AUTO: 0.44 K/UL — LOW (ref 1–3.3)
LYMPHOCYTES # BLD AUTO: 0.46 K/UL — LOW (ref 1–3.3)
LYMPHOCYTES # BLD AUTO: 0.48 K/UL — LOW (ref 1–3.3)
LYMPHOCYTES # BLD AUTO: 0.49 K/UL — LOW (ref 1–3.3)
LYMPHOCYTES # BLD AUTO: 0.51 K/UL — LOW (ref 1–3.3)
LYMPHOCYTES # BLD AUTO: 0.57 K/UL — LOW (ref 1–3.3)
LYMPHOCYTES # BLD AUTO: 0.6 K/UL — LOW (ref 1–3.3)
LYMPHOCYTES # BLD AUTO: 0.66 K/UL — LOW (ref 1–3.3)
LYMPHOCYTES # BLD AUTO: 0.71 K/UL — LOW (ref 1–3.3)
LYMPHOCYTES # BLD AUTO: 0.76 K/UL — LOW (ref 1–3.3)
LYMPHOCYTES # BLD AUTO: 0.8 K/UL — LOW (ref 1–3.3)
LYMPHOCYTES # BLD AUTO: 0.9 % — LOW (ref 13–44)
LYMPHOCYTES # BLD AUTO: 0.9 % — LOW (ref 13–44)
LYMPHOCYTES # BLD AUTO: 1.02 K/UL — SIGNIFICANT CHANGE UP (ref 1–3.3)
LYMPHOCYTES # BLD AUTO: 1.16 K/UL — SIGNIFICANT CHANGE UP (ref 1–3.3)
LYMPHOCYTES # BLD AUTO: 11.6 % — LOW (ref 13–44)
LYMPHOCYTES # BLD AUTO: 12.2 % — LOW (ref 13–44)
LYMPHOCYTES # BLD AUTO: 2 % — LOW (ref 13–44)
LYMPHOCYTES # BLD AUTO: 2.7 % — LOW (ref 13–44)
LYMPHOCYTES # BLD AUTO: 2.7 % — LOW (ref 13–44)
LYMPHOCYTES # BLD AUTO: 3 % — LOW (ref 13–44)
LYMPHOCYTES # BLD AUTO: 3.1 % — LOW (ref 13–44)
LYMPHOCYTES # BLD AUTO: 3.4 % — LOW (ref 13–44)
LYMPHOCYTES # BLD AUTO: 3.5 % — LOW (ref 13–44)
LYMPHOCYTES # BLD AUTO: 3.6 % — LOW (ref 13–44)
LYMPHOCYTES # BLD AUTO: 3.9 % — LOW (ref 13–44)
LYMPHOCYTES # BLD AUTO: 4 % — LOW (ref 13–44)
LYMPHOCYTES # BLD AUTO: 4.2 % — LOW (ref 13–44)
LYMPHOCYTES # BLD AUTO: 4.4 % — LOW (ref 13–44)
LYMPHOCYTES # BLD AUTO: 4.7 % — LOW (ref 13–44)
LYMPHOCYTES # BLD AUTO: 4.9 % — LOW (ref 13–44)
LYMPHOCYTES # BLD AUTO: 4.9 % — LOW (ref 13–44)
LYMPHOCYTES # BLD AUTO: 5.4 % — LOW (ref 13–44)
LYMPHOCYTES # BLD AUTO: 5.5 % — LOW (ref 13–44)
LYMPHOCYTES # BLD AUTO: 5.5 % — LOW (ref 13–44)
LYMPHOCYTES # BLD AUTO: 5.7 % — LOW (ref 13–44)
LYMPHOCYTES # BLD AUTO: 5.8 % — LOW (ref 13–44)
LYMPHOCYTES # BLD AUTO: 5.9 % — LOW (ref 13–44)
LYMPHOCYTES # BLD AUTO: 5.9 % — LOW (ref 13–44)
LYMPHOCYTES # BLD AUTO: 6.2 % — LOW (ref 13–44)
LYMPHOCYTES # BLD AUTO: 6.7 % — LOW (ref 13–44)
LYMPHOCYTES # BLD AUTO: 7 % — LOW (ref 13–44)
LYMPHOCYTES # BLD AUTO: 9.3 % — LOW (ref 13–44)
LYMPHOCYTES # BLD AUTO: 9.5 % — LOW (ref 13–44)
LYMPHOCYTES # FLD: 11 % — SIGNIFICANT CHANGE UP
LYMPHOCYTES # FLD: 15 % — SIGNIFICANT CHANGE UP
M PROTEIN 24H UR ELPH-MRATE: SIGNIFICANT CHANGE UP
MAGNESIUM SERPL-MCNC: 1.4 MG/DL — LOW (ref 1.6–2.6)
MAGNESIUM SERPL-MCNC: 1.5 MG/DL — LOW (ref 1.6–2.6)
MAGNESIUM SERPL-MCNC: 1.5 MG/DL — LOW (ref 1.6–2.6)
MAGNESIUM SERPL-MCNC: 1.6 MG/DL — SIGNIFICANT CHANGE UP (ref 1.6–2.6)
MAGNESIUM SERPL-MCNC: 1.7 MG/DL — SIGNIFICANT CHANGE UP (ref 1.6–2.6)
MAGNESIUM SERPL-MCNC: 1.8 MG/DL — SIGNIFICANT CHANGE UP (ref 1.6–2.6)
MAGNESIUM SERPL-MCNC: 1.9 MG/DL — SIGNIFICANT CHANGE UP (ref 1.6–2.6)
MAGNESIUM SERPL-MCNC: 2 MG/DL — SIGNIFICANT CHANGE UP (ref 1.6–2.6)
MAGNESIUM SERPL-MCNC: 2.1 MG/DL — SIGNIFICANT CHANGE UP (ref 1.6–2.6)
MAGNESIUM SERPL-MCNC: 2.2 MG/DL — SIGNIFICANT CHANGE UP (ref 1.6–2.6)
MAGNESIUM SERPL-MCNC: 2.3 MG/DL — SIGNIFICANT CHANGE UP (ref 1.6–2.6)
MAGNESIUM SERPL-MCNC: 2.3 MG/DL — SIGNIFICANT CHANGE UP (ref 1.6–2.6)
MANUAL SMEAR VERIFICATION: SIGNIFICANT CHANGE UP
MCHC RBC-ENTMCNC: 22.7 PG — LOW (ref 27–34)
MCHC RBC-ENTMCNC: 22.8 PG — LOW (ref 27–34)
MCHC RBC-ENTMCNC: 22.9 PG — LOW (ref 27–34)
MCHC RBC-ENTMCNC: 23 PG — LOW (ref 27–34)
MCHC RBC-ENTMCNC: 23.1 PG — LOW (ref 27–34)
MCHC RBC-ENTMCNC: 23.2 PG — LOW (ref 27–34)
MCHC RBC-ENTMCNC: 23.3 PG — LOW (ref 27–34)
MCHC RBC-ENTMCNC: 23.5 PG — LOW (ref 27–34)
MCHC RBC-ENTMCNC: 23.6 PG — LOW (ref 27–34)
MCHC RBC-ENTMCNC: 23.7 PG — LOW (ref 27–34)
MCHC RBC-ENTMCNC: 23.7 PG — LOW (ref 27–34)
MCHC RBC-ENTMCNC: 23.8 PG — LOW (ref 27–34)
MCHC RBC-ENTMCNC: 24 PG — LOW (ref 27–34)
MCHC RBC-ENTMCNC: 24 PG — LOW (ref 27–34)
MCHC RBC-ENTMCNC: 24.1 PG — LOW (ref 27–34)
MCHC RBC-ENTMCNC: 24.2 PG — LOW (ref 27–34)
MCHC RBC-ENTMCNC: 24.3 PG — LOW (ref 27–34)
MCHC RBC-ENTMCNC: 24.3 PG — LOW (ref 27–34)
MCHC RBC-ENTMCNC: 24.4 PG — LOW (ref 27–34)
MCHC RBC-ENTMCNC: 24.4 PG — LOW (ref 27–34)
MCHC RBC-ENTMCNC: 24.5 PG — LOW (ref 27–34)
MCHC RBC-ENTMCNC: 24.6 PG — LOW (ref 27–34)
MCHC RBC-ENTMCNC: 24.7 PG — LOW (ref 27–34)
MCHC RBC-ENTMCNC: 24.7 PG — LOW (ref 27–34)
MCHC RBC-ENTMCNC: 24.8 PG — LOW (ref 27–34)
MCHC RBC-ENTMCNC: 25.1 PG — LOW (ref 27–34)
MCHC RBC-ENTMCNC: 25.1 PG — LOW (ref 27–34)
MCHC RBC-ENTMCNC: 25.2 PG — LOW (ref 27–34)
MCHC RBC-ENTMCNC: 25.2 PG — LOW (ref 27–34)
MCHC RBC-ENTMCNC: 25.5 PG — LOW (ref 27–34)
MCHC RBC-ENTMCNC: 25.7 PG — LOW (ref 27–34)
MCHC RBC-ENTMCNC: 25.8 PG — LOW (ref 27–34)
MCHC RBC-ENTMCNC: 25.9 PG — LOW (ref 27–34)
MCHC RBC-ENTMCNC: 26 PG — LOW (ref 27–34)
MCHC RBC-ENTMCNC: 26.1 PG — LOW (ref 27–34)
MCHC RBC-ENTMCNC: 26.4 PG — LOW (ref 27–34)
MCHC RBC-ENTMCNC: 29 GM/DL — LOW (ref 32–36)
MCHC RBC-ENTMCNC: 29.5 GM/DL — LOW (ref 32–36)
MCHC RBC-ENTMCNC: 29.6 GM/DL — LOW (ref 32–36)
MCHC RBC-ENTMCNC: 29.7 GM/DL — LOW (ref 32–36)
MCHC RBC-ENTMCNC: 29.8 GM/DL — LOW (ref 32–36)
MCHC RBC-ENTMCNC: 29.8 GM/DL — LOW (ref 32–36)
MCHC RBC-ENTMCNC: 29.9 GM/DL — LOW (ref 32–36)
MCHC RBC-ENTMCNC: 29.9 GM/DL — LOW (ref 32–36)
MCHC RBC-ENTMCNC: 30 GM/DL — LOW (ref 32–36)
MCHC RBC-ENTMCNC: 30.2 GM/DL — LOW (ref 32–36)
MCHC RBC-ENTMCNC: 30.3 GM/DL — LOW (ref 32–36)
MCHC RBC-ENTMCNC: 30.4 GM/DL — LOW (ref 32–36)
MCHC RBC-ENTMCNC: 30.5 GM/DL — LOW (ref 32–36)
MCHC RBC-ENTMCNC: 30.6 GM/DL — LOW (ref 32–36)
MCHC RBC-ENTMCNC: 30.6 GM/DL — LOW (ref 32–36)
MCHC RBC-ENTMCNC: 30.7 GM/DL — LOW (ref 32–36)
MCHC RBC-ENTMCNC: 30.8 GM/DL — LOW (ref 32–36)
MCHC RBC-ENTMCNC: 30.9 GM/DL — LOW (ref 32–36)
MCHC RBC-ENTMCNC: 31 GM/DL — LOW (ref 32–36)
MCHC RBC-ENTMCNC: 31.2 GM/DL — LOW (ref 32–36)
MCHC RBC-ENTMCNC: 31.3 GM/DL — LOW (ref 32–36)
MCHC RBC-ENTMCNC: 31.4 GM/DL — LOW (ref 32–36)
MCHC RBC-ENTMCNC: 31.5 GM/DL — LOW (ref 32–36)
MCHC RBC-ENTMCNC: 31.6 GM/DL — LOW (ref 32–36)
MCHC RBC-ENTMCNC: 31.8 GM/DL — LOW (ref 32–36)
MCHC RBC-ENTMCNC: 31.9 GM/DL — LOW (ref 32–36)
MCHC RBC-ENTMCNC: 31.9 GM/DL — LOW (ref 32–36)
MCHC RBC-ENTMCNC: 32.1 GM/DL — SIGNIFICANT CHANGE UP (ref 32–36)
MCHC RBC-ENTMCNC: 32.1 GM/DL — SIGNIFICANT CHANGE UP (ref 32–36)
MCHC RBC-ENTMCNC: 32.2 GM/DL — SIGNIFICANT CHANGE UP (ref 32–36)
MCHC RBC-ENTMCNC: 32.4 GM/DL — SIGNIFICANT CHANGE UP (ref 32–36)
MCV RBC AUTO: 72.5 FL — LOW (ref 80–100)
MCV RBC AUTO: 72.9 FL — LOW (ref 80–100)
MCV RBC AUTO: 73.1 FL — LOW (ref 80–100)
MCV RBC AUTO: 73.5 FL — LOW (ref 80–100)
MCV RBC AUTO: 73.5 FL — LOW (ref 80–100)
MCV RBC AUTO: 73.9 FL — LOW (ref 80–100)
MCV RBC AUTO: 73.9 FL — LOW (ref 80–100)
MCV RBC AUTO: 74.1 FL — LOW (ref 80–100)
MCV RBC AUTO: 74.5 FL — LOW (ref 80–100)
MCV RBC AUTO: 74.6 FL — LOW (ref 80–100)
MCV RBC AUTO: 75 FL — LOW (ref 80–100)
MCV RBC AUTO: 75.1 FL — LOW (ref 80–100)
MCV RBC AUTO: 75.2 FL — LOW (ref 80–100)
MCV RBC AUTO: 75.7 FL — LOW (ref 80–100)
MCV RBC AUTO: 75.8 FL — LOW (ref 80–100)
MCV RBC AUTO: 75.8 FL — LOW (ref 80–100)
MCV RBC AUTO: 75.9 FL — LOW (ref 80–100)
MCV RBC AUTO: 76.1 FL — LOW (ref 80–100)
MCV RBC AUTO: 76.2 FL — LOW (ref 80–100)
MCV RBC AUTO: 76.2 FL — LOW (ref 80–100)
MCV RBC AUTO: 76.4 FL — LOW (ref 80–100)
MCV RBC AUTO: 76.7 FL — LOW (ref 80–100)
MCV RBC AUTO: 76.7 FL — LOW (ref 80–100)
MCV RBC AUTO: 76.9 FL — LOW (ref 80–100)
MCV RBC AUTO: 77.3 FL — LOW (ref 80–100)
MCV RBC AUTO: 77.3 FL — LOW (ref 80–100)
MCV RBC AUTO: 77.8 FL — LOW (ref 80–100)
MCV RBC AUTO: 78.2 FL — LOW (ref 80–100)
MCV RBC AUTO: 78.3 FL — LOW (ref 80–100)
MCV RBC AUTO: 78.3 FL — LOW (ref 80–100)
MCV RBC AUTO: 78.5 FL — LOW (ref 80–100)
MCV RBC AUTO: 78.8 FL — LOW (ref 80–100)
MCV RBC AUTO: 78.9 FL — LOW (ref 80–100)
MCV RBC AUTO: 78.9 FL — LOW (ref 80–100)
MCV RBC AUTO: 79 FL — LOW (ref 80–100)
MCV RBC AUTO: 79.1 FL — LOW (ref 80–100)
MCV RBC AUTO: 79.3 FL — LOW (ref 80–100)
MCV RBC AUTO: 79.4 FL — LOW (ref 80–100)
MCV RBC AUTO: 79.9 FL — LOW (ref 80–100)
MCV RBC AUTO: 79.9 FL — LOW (ref 80–100)
MCV RBC AUTO: 80.1 FL — SIGNIFICANT CHANGE UP (ref 80–100)
MCV RBC AUTO: 80.2 FL — SIGNIFICANT CHANGE UP (ref 80–100)
MCV RBC AUTO: 80.3 FL — SIGNIFICANT CHANGE UP (ref 80–100)
MCV RBC AUTO: 80.5 FL — SIGNIFICANT CHANGE UP (ref 80–100)
MCV RBC AUTO: 80.5 FL — SIGNIFICANT CHANGE UP (ref 80–100)
MCV RBC AUTO: 80.6 FL — SIGNIFICANT CHANGE UP (ref 80–100)
MCV RBC AUTO: 80.7 FL — SIGNIFICANT CHANGE UP (ref 80–100)
MCV RBC AUTO: 80.8 FL — SIGNIFICANT CHANGE UP (ref 80–100)
MCV RBC AUTO: 81.3 FL — SIGNIFICANT CHANGE UP (ref 80–100)
MCV RBC AUTO: 81.4 FL — SIGNIFICANT CHANGE UP (ref 80–100)
MCV RBC AUTO: 81.8 FL — SIGNIFICANT CHANGE UP (ref 80–100)
MCV RBC AUTO: 82.2 FL — SIGNIFICANT CHANGE UP (ref 80–100)
MCV RBC AUTO: 83.2 FL — SIGNIFICANT CHANGE UP (ref 80–100)
MCV RBC AUTO: 83.3 FL — SIGNIFICANT CHANGE UP (ref 80–100)
MCV RBC AUTO: 83.7 FL — SIGNIFICANT CHANGE UP (ref 80–100)
MCV RBC AUTO: 84 FL — SIGNIFICANT CHANGE UP (ref 80–100)
MCV RBC AUTO: 84.2 FL — SIGNIFICANT CHANGE UP (ref 80–100)
MCV RBC AUTO: 84.2 FL — SIGNIFICANT CHANGE UP (ref 80–100)
MCV RBC AUTO: 84.3 FL — SIGNIFICANT CHANGE UP (ref 80–100)
MCV RBC AUTO: 85.1 FL — SIGNIFICANT CHANGE UP (ref 80–100)
MCV RBC AUTO: 85.2 FL — SIGNIFICANT CHANGE UP (ref 80–100)
MCV RBC AUTO: 88.2 FL — SIGNIFICANT CHANGE UP (ref 80–100)
MESOTHL CELL # FLD: 12 % — SIGNIFICANT CHANGE UP
MESOTHL CELL # FLD: 3 % — SIGNIFICANT CHANGE UP
METHOD TYPE: SIGNIFICANT CHANGE UP
MICROCYTES BLD QL: SLIGHT — SIGNIFICANT CHANGE UP
MITOCHONDRIA AB SER-ACNC: SIGNIFICANT CHANGE UP
MONOCYTES # BLD AUTO: 0 K/UL — SIGNIFICANT CHANGE UP (ref 0–0.9)
MONOCYTES # BLD AUTO: 0.11 K/UL — SIGNIFICANT CHANGE UP (ref 0–0.9)
MONOCYTES # BLD AUTO: 0.12 K/UL — SIGNIFICANT CHANGE UP (ref 0–0.9)
MONOCYTES # BLD AUTO: 0.13 K/UL — SIGNIFICANT CHANGE UP (ref 0–0.9)
MONOCYTES # BLD AUTO: 0.15 K/UL — SIGNIFICANT CHANGE UP (ref 0–0.9)
MONOCYTES # BLD AUTO: 0.16 K/UL — SIGNIFICANT CHANGE UP (ref 0–0.9)
MONOCYTES # BLD AUTO: 0.17 K/UL — SIGNIFICANT CHANGE UP (ref 0–0.9)
MONOCYTES # BLD AUTO: 0.19 K/UL — SIGNIFICANT CHANGE UP (ref 0–0.9)
MONOCYTES # BLD AUTO: 0.2 K/UL — SIGNIFICANT CHANGE UP (ref 0–0.9)
MONOCYTES # BLD AUTO: 0.21 K/UL — SIGNIFICANT CHANGE UP (ref 0–0.9)
MONOCYTES # BLD AUTO: 0.21 K/UL — SIGNIFICANT CHANGE UP (ref 0–0.9)
MONOCYTES # BLD AUTO: 0.22 K/UL — SIGNIFICANT CHANGE UP (ref 0–0.9)
MONOCYTES # BLD AUTO: 0.23 K/UL — SIGNIFICANT CHANGE UP (ref 0–0.9)
MONOCYTES # BLD AUTO: 0.24 K/UL — SIGNIFICANT CHANGE UP (ref 0–0.9)
MONOCYTES # BLD AUTO: 0.25 K/UL — SIGNIFICANT CHANGE UP (ref 0–0.9)
MONOCYTES # BLD AUTO: 0.26 K/UL — SIGNIFICANT CHANGE UP (ref 0–0.9)
MONOCYTES # BLD AUTO: 0.26 K/UL — SIGNIFICANT CHANGE UP (ref 0–0.9)
MONOCYTES # BLD AUTO: 0.28 K/UL — SIGNIFICANT CHANGE UP (ref 0–0.9)
MONOCYTES # BLD AUTO: 0.29 K/UL — SIGNIFICANT CHANGE UP (ref 0–0.9)
MONOCYTES # BLD AUTO: 0.29 K/UL — SIGNIFICANT CHANGE UP (ref 0–0.9)
MONOCYTES # BLD AUTO: 0.3 K/UL — SIGNIFICANT CHANGE UP (ref 0–0.9)
MONOCYTES # BLD AUTO: 0.3 K/UL — SIGNIFICANT CHANGE UP (ref 0–0.9)
MONOCYTES # BLD AUTO: 0.35 K/UL — SIGNIFICANT CHANGE UP (ref 0–0.9)
MONOCYTES # BLD AUTO: 0.37 K/UL — SIGNIFICANT CHANGE UP (ref 0–0.9)
MONOCYTES # BLD AUTO: 0.41 K/UL — SIGNIFICANT CHANGE UP (ref 0–0.9)
MONOCYTES # BLD AUTO: 0.44 K/UL — SIGNIFICANT CHANGE UP (ref 0–0.9)
MONOCYTES # BLD AUTO: 0.46 K/UL — SIGNIFICANT CHANGE UP (ref 0–0.9)
MONOCYTES # BLD AUTO: 0.48 K/UL — SIGNIFICANT CHANGE UP (ref 0–0.9)
MONOCYTES # BLD AUTO: 0.54 K/UL — SIGNIFICANT CHANGE UP (ref 0–0.9)
MONOCYTES # BLD AUTO: 0.58 K/UL — SIGNIFICANT CHANGE UP (ref 0–0.9)
MONOCYTES # BLD AUTO: 0.64 K/UL — SIGNIFICANT CHANGE UP (ref 0–0.9)
MONOCYTES # BLD AUTO: 0.69 K/UL — SIGNIFICANT CHANGE UP (ref 0–0.9)
MONOCYTES # BLD AUTO: 0.86 K/UL — SIGNIFICANT CHANGE UP (ref 0–0.9)
MONOCYTES NFR BLD AUTO: 0 % — LOW (ref 2–14)
MONOCYTES NFR BLD AUTO: 1.5 % — LOW (ref 2–14)
MONOCYTES NFR BLD AUTO: 1.7 % — LOW (ref 2–14)
MONOCYTES NFR BLD AUTO: 1.7 % — LOW (ref 2–14)
MONOCYTES NFR BLD AUTO: 1.8 % — LOW (ref 2–14)
MONOCYTES NFR BLD AUTO: 1.9 % — LOW (ref 2–14)
MONOCYTES NFR BLD AUTO: 2 % — SIGNIFICANT CHANGE UP (ref 2–14)
MONOCYTES NFR BLD AUTO: 2.3 % — SIGNIFICANT CHANGE UP (ref 2–14)
MONOCYTES NFR BLD AUTO: 2.3 % — SIGNIFICANT CHANGE UP (ref 2–14)
MONOCYTES NFR BLD AUTO: 2.6 % — SIGNIFICANT CHANGE UP (ref 2–14)
MONOCYTES NFR BLD AUTO: 2.7 % — SIGNIFICANT CHANGE UP (ref 2–14)
MONOCYTES NFR BLD AUTO: 2.9 % — SIGNIFICANT CHANGE UP (ref 2–14)
MONOCYTES NFR BLD AUTO: 3.3 % — SIGNIFICANT CHANGE UP (ref 2–14)
MONOCYTES NFR BLD AUTO: 3.3 % — SIGNIFICANT CHANGE UP (ref 2–14)
MONOCYTES NFR BLD AUTO: 3.8 % — SIGNIFICANT CHANGE UP (ref 2–14)
MONOCYTES NFR BLD AUTO: 4.2 % — SIGNIFICANT CHANGE UP (ref 2–14)
MONOCYTES NFR BLD AUTO: 4.3 % — SIGNIFICANT CHANGE UP (ref 2–14)
MONOCYTES NFR BLD AUTO: 4.5 % — SIGNIFICANT CHANGE UP (ref 2–14)
MONOCYTES NFR BLD AUTO: 4.5 % — SIGNIFICANT CHANGE UP (ref 2–14)
MONOCYTES NFR BLD AUTO: 4.6 % — SIGNIFICANT CHANGE UP (ref 2–14)
MONOCYTES NFR BLD AUTO: 4.6 % — SIGNIFICANT CHANGE UP (ref 2–14)
MONOCYTES NFR BLD AUTO: 4.9 % — SIGNIFICANT CHANGE UP (ref 2–14)
MONOCYTES NFR BLD AUTO: 5.1 % — SIGNIFICANT CHANGE UP (ref 2–14)
MONOCYTES NFR BLD AUTO: 5.9 % — SIGNIFICANT CHANGE UP (ref 2–14)
MONOCYTES NFR BLD AUTO: 6.4 % — SIGNIFICANT CHANGE UP (ref 2–14)
MONOCYTES NFR BLD AUTO: 6.6 % — SIGNIFICANT CHANGE UP (ref 2–14)
MONOCYTES NFR BLD AUTO: 6.9 % — SIGNIFICANT CHANGE UP (ref 2–14)
MONOCYTES NFR BLD AUTO: 7.9 % — SIGNIFICANT CHANGE UP (ref 2–14)
MONOCYTES NFR BLD AUTO: 9.4 % — SIGNIFICANT CHANGE UP (ref 2–14)
MONOCYTES NFR BLD AUTO: 9.6 % — SIGNIFICANT CHANGE UP (ref 2–14)
MONOS+MACROS # FLD: 12 % — SIGNIFICANT CHANGE UP
MONOS+MACROS # FLD: 39 % — SIGNIFICANT CHANGE UP
MRSA SPEC QL CULT: SIGNIFICANT CHANGE UP
N GONORRHOEA RRNA SPEC QL NAA+PROBE: SIGNIFICANT CHANGE UP
NEUTROPHILS # BLD AUTO: 10.37 K/UL — HIGH (ref 1.8–7.4)
NEUTROPHILS # BLD AUTO: 10.95 K/UL — HIGH (ref 1.8–7.4)
NEUTROPHILS # BLD AUTO: 11.57 K/UL — HIGH (ref 1.8–7.4)
NEUTROPHILS # BLD AUTO: 11.67 K/UL — HIGH (ref 1.8–7.4)
NEUTROPHILS # BLD AUTO: 11.8 K/UL — HIGH (ref 1.8–7.4)
NEUTROPHILS # BLD AUTO: 11.81 K/UL — HIGH (ref 1.8–7.4)
NEUTROPHILS # BLD AUTO: 13.83 K/UL — HIGH (ref 1.8–7.4)
NEUTROPHILS # BLD AUTO: 15.34 K/UL — HIGH (ref 1.8–7.4)
NEUTROPHILS # BLD AUTO: 2.82 K/UL — SIGNIFICANT CHANGE UP (ref 1.8–7.4)
NEUTROPHILS # BLD AUTO: 25.2 K/UL — HIGH (ref 1.8–7.4)
NEUTROPHILS # BLD AUTO: 3.1 K/UL — SIGNIFICANT CHANGE UP (ref 1.8–7.4)
NEUTROPHILS # BLD AUTO: 3.66 K/UL — SIGNIFICANT CHANGE UP (ref 1.8–7.4)
NEUTROPHILS # BLD AUTO: 3.74 K/UL — SIGNIFICANT CHANGE UP (ref 1.8–7.4)
NEUTROPHILS # BLD AUTO: 3.93 K/UL — SIGNIFICANT CHANGE UP (ref 1.8–7.4)
NEUTROPHILS # BLD AUTO: 4.1 K/UL — SIGNIFICANT CHANGE UP (ref 1.8–7.4)
NEUTROPHILS # BLD AUTO: 4.16 K/UL — SIGNIFICANT CHANGE UP (ref 1.8–7.4)
NEUTROPHILS # BLD AUTO: 4.36 K/UL — SIGNIFICANT CHANGE UP (ref 1.8–7.4)
NEUTROPHILS # BLD AUTO: 4.37 K/UL — SIGNIFICANT CHANGE UP (ref 1.8–7.4)
NEUTROPHILS # BLD AUTO: 5.14 K/UL — SIGNIFICANT CHANGE UP (ref 1.8–7.4)
NEUTROPHILS # BLD AUTO: 5.47 K/UL — SIGNIFICANT CHANGE UP (ref 1.8–7.4)
NEUTROPHILS # BLD AUTO: 5.48 K/UL — SIGNIFICANT CHANGE UP (ref 1.8–7.4)
NEUTROPHILS # BLD AUTO: 5.72 K/UL — SIGNIFICANT CHANGE UP (ref 1.8–7.4)
NEUTROPHILS # BLD AUTO: 6.34 K/UL — SIGNIFICANT CHANGE UP (ref 1.8–7.4)
NEUTROPHILS # BLD AUTO: 6.37 K/UL — SIGNIFICANT CHANGE UP (ref 1.8–7.4)
NEUTROPHILS # BLD AUTO: 6.5 K/UL — SIGNIFICANT CHANGE UP (ref 1.8–7.4)
NEUTROPHILS # BLD AUTO: 6.76 K/UL — SIGNIFICANT CHANGE UP (ref 1.8–7.4)
NEUTROPHILS # BLD AUTO: 7.05 K/UL — SIGNIFICANT CHANGE UP (ref 1.8–7.4)
NEUTROPHILS # BLD AUTO: 7.14 K/UL — SIGNIFICANT CHANGE UP (ref 1.8–7.4)
NEUTROPHILS # BLD AUTO: 7.21 K/UL — SIGNIFICANT CHANGE UP (ref 1.8–7.4)
NEUTROPHILS # BLD AUTO: 7.33 K/UL — SIGNIFICANT CHANGE UP (ref 1.8–7.4)
NEUTROPHILS # BLD AUTO: 7.75 K/UL — HIGH (ref 1.8–7.4)
NEUTROPHILS # BLD AUTO: 7.77 K/UL — HIGH (ref 1.8–7.4)
NEUTROPHILS # BLD AUTO: 8.04 K/UL — HIGH (ref 1.8–7.4)
NEUTROPHILS # BLD AUTO: 8.28 K/UL — HIGH (ref 1.8–7.4)
NEUTROPHILS # BLD AUTO: 8.47 K/UL — HIGH (ref 1.8–7.4)
NEUTROPHILS # BLD AUTO: 8.5 K/UL — HIGH (ref 1.8–7.4)
NEUTROPHILS # BLD AUTO: 8.75 K/UL — HIGH (ref 1.8–7.4)
NEUTROPHILS # BLD AUTO: 9.05 K/UL — HIGH (ref 1.8–7.4)
NEUTROPHILS NFR BLD AUTO: 76.4 % — SIGNIFICANT CHANGE UP (ref 43–77)
NEUTROPHILS NFR BLD AUTO: 79.7 % — HIGH (ref 43–77)
NEUTROPHILS NFR BLD AUTO: 80 % — HIGH (ref 43–77)
NEUTROPHILS NFR BLD AUTO: 81.9 % — HIGH (ref 43–77)
NEUTROPHILS NFR BLD AUTO: 86.1 % — HIGH (ref 43–77)
NEUTROPHILS NFR BLD AUTO: 86.5 % — HIGH (ref 43–77)
NEUTROPHILS NFR BLD AUTO: 86.9 % — HIGH (ref 43–77)
NEUTROPHILS NFR BLD AUTO: 87.3 % — HIGH (ref 43–77)
NEUTROPHILS NFR BLD AUTO: 87.9 % — HIGH (ref 43–77)
NEUTROPHILS NFR BLD AUTO: 88.8 % — HIGH (ref 43–77)
NEUTROPHILS NFR BLD AUTO: 88.9 % — HIGH (ref 43–77)
NEUTROPHILS NFR BLD AUTO: 89.5 % — HIGH (ref 43–77)
NEUTROPHILS NFR BLD AUTO: 89.6 % — HIGH (ref 43–77)
NEUTROPHILS NFR BLD AUTO: 89.9 % — HIGH (ref 43–77)
NEUTROPHILS NFR BLD AUTO: 90 % — HIGH (ref 43–77)
NEUTROPHILS NFR BLD AUTO: 90.1 % — HIGH (ref 43–77)
NEUTROPHILS NFR BLD AUTO: 90.3 % — HIGH (ref 43–77)
NEUTROPHILS NFR BLD AUTO: 90.4 % — HIGH (ref 43–77)
NEUTROPHILS NFR BLD AUTO: 90.7 % — HIGH (ref 43–77)
NEUTROPHILS NFR BLD AUTO: 91.1 % — HIGH (ref 43–77)
NEUTROPHILS NFR BLD AUTO: 91.5 % — HIGH (ref 43–77)
NEUTROPHILS NFR BLD AUTO: 92.1 % — HIGH (ref 43–77)
NEUTROPHILS NFR BLD AUTO: 92.1 % — HIGH (ref 43–77)
NEUTROPHILS NFR BLD AUTO: 92.5 % — HIGH (ref 43–77)
NEUTROPHILS NFR BLD AUTO: 92.6 % — HIGH (ref 43–77)
NEUTROPHILS NFR BLD AUTO: 92.6 % — HIGH (ref 43–77)
NEUTROPHILS NFR BLD AUTO: 92.7 % — HIGH (ref 43–77)
NEUTROPHILS NFR BLD AUTO: 92.9 % — HIGH (ref 43–77)
NEUTROPHILS NFR BLD AUTO: 93 % — HIGH (ref 43–77)
NEUTROPHILS NFR BLD AUTO: 94.1 % — HIGH (ref 43–77)
NEUTROPHILS NFR BLD AUTO: 94.6 % — HIGH (ref 43–77)
NEUTROPHILS NFR BLD AUTO: 95.2 % — HIGH (ref 43–77)
NEUTROPHILS NFR BLD AUTO: 95.6 % — HIGH (ref 43–77)
NEUTROPHILS NFR BLD AUTO: 96.5 % — HIGH (ref 43–77)
NEUTROPHILS NFR BLD AUTO: 97.3 % — HIGH (ref 43–77)
NEUTROPHILS NFR BLD AUTO: 98.2 % — HIGH (ref 43–77)
NEUTS BAND # BLD: 3.5 % — SIGNIFICANT CHANGE UP (ref 0–8)
NITRITE UR-MCNC: NEGATIVE — SIGNIFICANT CHANGE UP
NITRITE UR-MCNC: NEGATIVE — SIGNIFICANT CHANGE UP
NITRITE UR-MCNC: POSITIVE
NRBC # BLD: 0 /100 WBCS — SIGNIFICANT CHANGE UP (ref 0–0)
NRBC # BLD: SIGNIFICANT CHANGE UP /100 WBCS (ref 0–0)
NRBC # BLD: SIGNIFICANT CHANGE UP /100 WBCS (ref 0–0)
NT-PROBNP SERPL-SCNC: 4243 PG/ML — HIGH (ref 0–300)
ORGANISM # SPEC MICROSCOPIC CNT: SIGNIFICANT CHANGE UP
OSMOLALITY SERPL: 303 MOSMOL/KG — HIGH (ref 280–301)
OSMOLALITY SERPL: 331 MOSMOL/KG — HIGH (ref 280–301)
OSMOLALITY UR: 292 MOSMOL/KG — SIGNIFICANT CHANGE UP (ref 100–650)
OSMOLALITY UR: 322 MOSMOL/KG — SIGNIFICANT CHANGE UP (ref 100–650)
OSMOLALITY UR: 323 MOSMOL/KG — SIGNIFICANT CHANGE UP (ref 100–650)
OSMOLALITY UR: 409 MOSMOL/KG — SIGNIFICANT CHANGE UP (ref 100–650)
OVALOCYTES BLD QL SMEAR: SIGNIFICANT CHANGE UP
P-ANCA SER-ACNC: NEGATIVE — SIGNIFICANT CHANGE UP
PCO2 BLDV: 29 MMHG — LOW (ref 41–51)
PF4 HEPARIN CMPLX AB SER-ACNC: NEGATIVE — SIGNIFICANT CHANGE UP
PH BLDV: 7.2 — CRITICAL LOW (ref 7.32–7.43)
PH UR: 6 — SIGNIFICANT CHANGE UP (ref 5–8)
PHOSPHATE SERPL-MCNC: 3.4 MG/DL — SIGNIFICANT CHANGE UP (ref 2.5–4.5)
PHOSPHATE SERPL-MCNC: 3.5 MG/DL — SIGNIFICANT CHANGE UP (ref 2.5–4.5)
PHOSPHATE SERPL-MCNC: 3.5 MG/DL — SIGNIFICANT CHANGE UP (ref 2.5–4.5)
PHOSPHATE SERPL-MCNC: 4 MG/DL — SIGNIFICANT CHANGE UP (ref 2.5–4.5)
PHOSPHATE SERPL-MCNC: 4.1 MG/DL — SIGNIFICANT CHANGE UP (ref 2.5–4.5)
PHOSPHATE SERPL-MCNC: 4.3 MG/DL — SIGNIFICANT CHANGE UP (ref 2.5–4.5)
PHOSPHATE SERPL-MCNC: 4.7 MG/DL — HIGH (ref 2.5–4.5)
PHOSPHATE SERPL-MCNC: 4.7 MG/DL — HIGH (ref 2.5–4.5)
PHOSPHATE SERPL-MCNC: 5 MG/DL — HIGH (ref 2.5–4.5)
PHOSPHATE SERPL-MCNC: 5.2 MG/DL — HIGH (ref 2.5–4.5)
PHOSPHATE SERPL-MCNC: 5.4 MG/DL — HIGH (ref 2.5–4.5)
PHOSPHATE SERPL-MCNC: 5.7 MG/DL — HIGH (ref 2.5–4.5)
PHOSPHATE SERPL-MCNC: 6.2 MG/DL — HIGH (ref 2.5–4.5)
PHOSPHATE SERPL-MCNC: 6.6 MG/DL — HIGH (ref 2.5–4.5)
PHOSPHATE SERPL-MCNC: 6.7 MG/DL — HIGH (ref 2.5–4.5)
PHOSPHATE SERPL-MCNC: 6.8 MG/DL — HIGH (ref 2.5–4.5)
PHOSPHATE SERPL-MCNC: 6.8 MG/DL — HIGH (ref 2.5–4.5)
PHOSPHATE SERPL-MCNC: 7 MG/DL — HIGH (ref 2.5–4.5)
PHOSPHATE SERPL-MCNC: 7.1 MG/DL — HIGH (ref 2.5–4.5)
PHOSPHATE SERPL-MCNC: 7.2 MG/DL — HIGH (ref 2.5–4.5)
PHOSPHATE SERPL-MCNC: 7.3 MG/DL — HIGH (ref 2.5–4.5)
PHOSPHATE SERPL-MCNC: 7.5 MG/DL — HIGH (ref 2.5–4.5)
PHOSPHATE SERPL-MCNC: 7.6 MG/DL — HIGH (ref 2.5–4.5)
PHOSPHATE SERPL-MCNC: 7.7 MG/DL — HIGH (ref 2.5–4.5)
PHOSPHATE SERPL-MCNC: 7.8 MG/DL — HIGH (ref 2.5–4.5)
PHOSPHATE SERPL-MCNC: 8 MG/DL — HIGH (ref 2.5–4.5)
PHOSPHATE SERPL-MCNC: 8.2 MG/DL — HIGH (ref 2.5–4.5)
PHOSPHATE SERPL-MCNC: 8.4 MG/DL — HIGH (ref 2.5–4.5)
PHOSPHATE SERPL-MCNC: 8.6 MG/DL — HIGH (ref 2.5–4.5)
PHOSPHATE SERPL-MCNC: 8.8 MG/DL — HIGH (ref 2.5–4.5)
PHOSPHATE SERPL-MCNC: 9.1 MG/DL — HIGH (ref 2.5–4.5)
PHOSPHATE SERPL-MCNC: 9.4 MG/DL — HIGH (ref 2.5–4.5)
PHOSPHATE SERPL-MCNC: 9.9 MG/DL — HIGH (ref 2.5–4.5)
PLAT MORPH BLD: ABNORMAL
PLATELET # BLD AUTO: 110 K/UL — LOW (ref 150–400)
PLATELET # BLD AUTO: 114 K/UL — LOW (ref 150–400)
PLATELET # BLD AUTO: 117 K/UL — LOW (ref 150–400)
PLATELET # BLD AUTO: 121 K/UL — LOW (ref 150–400)
PLATELET # BLD AUTO: 122 K/UL — LOW (ref 150–400)
PLATELET # BLD AUTO: 122 K/UL — LOW (ref 150–400)
PLATELET # BLD AUTO: 127 K/UL — LOW (ref 150–400)
PLATELET # BLD AUTO: 129 K/UL — LOW (ref 150–400)
PLATELET # BLD AUTO: 158 K/UL — SIGNIFICANT CHANGE UP (ref 150–400)
PLATELET # BLD AUTO: 16 K/UL — CRITICAL LOW (ref 150–400)
PLATELET # BLD AUTO: 165 K/UL — SIGNIFICANT CHANGE UP (ref 150–400)
PLATELET # BLD AUTO: 168 K/UL — SIGNIFICANT CHANGE UP (ref 150–400)
PLATELET # BLD AUTO: 17 K/UL — CRITICAL LOW (ref 150–400)
PLATELET # BLD AUTO: 17 K/UL — CRITICAL LOW (ref 150–400)
PLATELET # BLD AUTO: 19 K/UL — CRITICAL LOW (ref 150–400)
PLATELET # BLD AUTO: 20 K/UL — CRITICAL LOW (ref 150–400)
PLATELET # BLD AUTO: 225 K/UL — SIGNIFICANT CHANGE UP (ref 150–400)
PLATELET # BLD AUTO: 23 K/UL — LOW (ref 150–400)
PLATELET # BLD AUTO: 25 K/UL — LOW (ref 150–400)
PLATELET # BLD AUTO: 27 K/UL — LOW (ref 150–400)
PLATELET # BLD AUTO: 28 K/UL — LOW (ref 150–400)
PLATELET # BLD AUTO: 29 K/UL — LOW (ref 150–400)
PLATELET # BLD AUTO: 30 K/UL — LOW (ref 150–400)
PLATELET # BLD AUTO: 31 K/UL — LOW (ref 150–400)
PLATELET # BLD AUTO: 32 K/UL — LOW (ref 150–400)
PLATELET # BLD AUTO: 32 K/UL — LOW (ref 150–400)
PLATELET # BLD AUTO: 33 K/UL — LOW (ref 150–400)
PLATELET # BLD AUTO: 33 K/UL — LOW (ref 150–400)
PLATELET # BLD AUTO: 34 K/UL — LOW (ref 150–400)
PLATELET # BLD AUTO: 35 K/UL — LOW (ref 150–400)
PLATELET # BLD AUTO: 36 K/UL — LOW (ref 150–400)
PLATELET # BLD AUTO: 36 K/UL — LOW (ref 150–400)
PLATELET # BLD AUTO: 38 K/UL — LOW (ref 150–400)
PLATELET # BLD AUTO: 40 K/UL — LOW (ref 150–400)
PLATELET # BLD AUTO: 41 K/UL — LOW (ref 150–400)
PLATELET # BLD AUTO: 42 K/UL — LOW (ref 150–400)
PLATELET # BLD AUTO: 42 K/UL — LOW (ref 150–400)
PLATELET # BLD AUTO: 46 K/UL — LOW (ref 150–400)
PLATELET # BLD AUTO: 47 K/UL — LOW (ref 150–400)
PLATELET # BLD AUTO: 48 K/UL — LOW (ref 150–400)
PLATELET # BLD AUTO: 48 K/UL — LOW (ref 150–400)
PLATELET # BLD AUTO: 50 K/UL — LOW (ref 150–400)
PLATELET # BLD AUTO: 52 K/UL — LOW (ref 150–400)
PLATELET # BLD AUTO: 55 K/UL — LOW (ref 150–400)
PLATELET # BLD AUTO: 55 K/UL — LOW (ref 150–400)
PLATELET # BLD AUTO: 56 K/UL — LOW (ref 150–400)
PLATELET # BLD AUTO: 57 K/UL — LOW (ref 150–400)
PLATELET # BLD AUTO: 58 K/UL — LOW (ref 150–400)
PLATELET # BLD AUTO: 59 K/UL — LOW (ref 150–400)
PLATELET # BLD AUTO: 63 K/UL — LOW (ref 150–400)
PLATELET # BLD AUTO: 64 K/UL — LOW (ref 150–400)
PLATELET # BLD AUTO: 74 K/UL — LOW (ref 150–400)
PLATELET # BLD AUTO: 74 K/UL — LOW (ref 150–400)
PLATELET # BLD AUTO: 78 K/UL — LOW (ref 150–400)
PLATELET # BLD AUTO: 86 K/UL — LOW (ref 150–400)
PLATELET # BLD AUTO: 96 K/UL — LOW (ref 150–400)
PO2 BLDV: 37 MMHG — SIGNIFICANT CHANGE UP
POIKILOCYTOSIS BLD QL AUTO: SIGNIFICANT CHANGE UP
POTASSIUM BLDV-SCNC: 5.7 MMOL/L — HIGH (ref 3.5–4.9)
POTASSIUM SERPL-MCNC: 3.7 MMOL/L — SIGNIFICANT CHANGE UP (ref 3.5–5.3)
POTASSIUM SERPL-MCNC: 3.8 MMOL/L — SIGNIFICANT CHANGE UP (ref 3.5–5.3)
POTASSIUM SERPL-MCNC: 3.8 MMOL/L — SIGNIFICANT CHANGE UP (ref 3.5–5.3)
POTASSIUM SERPL-MCNC: 3.9 MMOL/L — SIGNIFICANT CHANGE UP (ref 3.5–5.3)
POTASSIUM SERPL-MCNC: 4 MMOL/L — SIGNIFICANT CHANGE UP (ref 3.5–5.3)
POTASSIUM SERPL-MCNC: 4.1 MMOL/L — SIGNIFICANT CHANGE UP (ref 3.5–5.3)
POTASSIUM SERPL-MCNC: 4.2 MMOL/L — SIGNIFICANT CHANGE UP (ref 3.5–5.3)
POTASSIUM SERPL-MCNC: 4.3 MMOL/L — SIGNIFICANT CHANGE UP (ref 3.5–5.3)
POTASSIUM SERPL-MCNC: 4.4 MMOL/L — SIGNIFICANT CHANGE UP (ref 3.5–5.3)
POTASSIUM SERPL-MCNC: 4.5 MMOL/L — SIGNIFICANT CHANGE UP (ref 3.5–5.3)
POTASSIUM SERPL-MCNC: 4.6 MMOL/L — SIGNIFICANT CHANGE UP (ref 3.5–5.3)
POTASSIUM SERPL-MCNC: 4.7 MMOL/L — SIGNIFICANT CHANGE UP (ref 3.5–5.3)
POTASSIUM SERPL-MCNC: 4.7 MMOL/L — SIGNIFICANT CHANGE UP (ref 3.5–5.3)
POTASSIUM SERPL-MCNC: 4.8 MMOL/L — SIGNIFICANT CHANGE UP (ref 3.5–5.3)
POTASSIUM SERPL-MCNC: 4.9 MMOL/L — SIGNIFICANT CHANGE UP (ref 3.5–5.3)
POTASSIUM SERPL-MCNC: 5 MMOL/L — SIGNIFICANT CHANGE UP (ref 3.5–5.3)
POTASSIUM SERPL-MCNC: 5.1 MMOL/L — SIGNIFICANT CHANGE UP (ref 3.5–5.3)
POTASSIUM SERPL-MCNC: 5.2 MMOL/L — SIGNIFICANT CHANGE UP (ref 3.5–5.3)
POTASSIUM SERPL-MCNC: 5.3 MMOL/L — SIGNIFICANT CHANGE UP (ref 3.5–5.3)
POTASSIUM SERPL-MCNC: 6 MMOL/L — HIGH (ref 3.5–5.3)
POTASSIUM SERPL-MCNC: SIGNIFICANT CHANGE UP MMOL/L (ref 3.5–5.3)
POTASSIUM SERPL-MCNC: SIGNIFICANT CHANGE UP MMOL/L (ref 3.5–5.3)
POTASSIUM SERPL-SCNC: 3.7 MMOL/L — SIGNIFICANT CHANGE UP (ref 3.5–5.3)
POTASSIUM SERPL-SCNC: 3.8 MMOL/L — SIGNIFICANT CHANGE UP (ref 3.5–5.3)
POTASSIUM SERPL-SCNC: 3.8 MMOL/L — SIGNIFICANT CHANGE UP (ref 3.5–5.3)
POTASSIUM SERPL-SCNC: 3.9 MMOL/L — SIGNIFICANT CHANGE UP (ref 3.5–5.3)
POTASSIUM SERPL-SCNC: 4 MMOL/L — SIGNIFICANT CHANGE UP (ref 3.5–5.3)
POTASSIUM SERPL-SCNC: 4.1 MMOL/L — SIGNIFICANT CHANGE UP (ref 3.5–5.3)
POTASSIUM SERPL-SCNC: 4.2 MMOL/L — SIGNIFICANT CHANGE UP (ref 3.5–5.3)
POTASSIUM SERPL-SCNC: 4.3 MMOL/L — SIGNIFICANT CHANGE UP (ref 3.5–5.3)
POTASSIUM SERPL-SCNC: 4.4 MMOL/L — SIGNIFICANT CHANGE UP (ref 3.5–5.3)
POTASSIUM SERPL-SCNC: 4.5 MMOL/L — SIGNIFICANT CHANGE UP (ref 3.5–5.3)
POTASSIUM SERPL-SCNC: 4.6 MMOL/L — SIGNIFICANT CHANGE UP (ref 3.5–5.3)
POTASSIUM SERPL-SCNC: 4.7 MMOL/L — SIGNIFICANT CHANGE UP (ref 3.5–5.3)
POTASSIUM SERPL-SCNC: 4.7 MMOL/L — SIGNIFICANT CHANGE UP (ref 3.5–5.3)
POTASSIUM SERPL-SCNC: 4.8 MMOL/L — SIGNIFICANT CHANGE UP (ref 3.5–5.3)
POTASSIUM SERPL-SCNC: 4.9 MMOL/L — SIGNIFICANT CHANGE UP (ref 3.5–5.3)
POTASSIUM SERPL-SCNC: 5 MMOL/L — SIGNIFICANT CHANGE UP (ref 3.5–5.3)
POTASSIUM SERPL-SCNC: 5.1 MMOL/L — SIGNIFICANT CHANGE UP (ref 3.5–5.3)
POTASSIUM SERPL-SCNC: 5.2 MMOL/L — SIGNIFICANT CHANGE UP (ref 3.5–5.3)
POTASSIUM SERPL-SCNC: 5.3 MMOL/L — SIGNIFICANT CHANGE UP (ref 3.5–5.3)
POTASSIUM SERPL-SCNC: 6 MMOL/L — HIGH (ref 3.5–5.3)
POTASSIUM SERPL-SCNC: SIGNIFICANT CHANGE UP MMOL/L (ref 3.5–5.3)
POTASSIUM SERPL-SCNC: SIGNIFICANT CHANGE UP MMOL/L (ref 3.5–5.3)
POTASSIUM UR-SCNC: 16 MMOL/L — SIGNIFICANT CHANGE UP
PROT ?TM UR-MCNC: 242 MG/DL — HIGH (ref 0–12)
PROT ?TM UR-MCNC: 252 MG/DL — HIGH (ref 0–12)
PROT ?TM UR-MCNC: 252 MG/DL — HIGH (ref 0–12)
PROT FLD-MCNC: 1 G/DL — SIGNIFICANT CHANGE UP
PROT FLD-MCNC: 1.4 G/DL — SIGNIFICANT CHANGE UP
PROT PATTERN 24H UR ELPH-IMP: SIGNIFICANT CHANGE UP
PROT SERPL-MCNC: 4.6 G/DL — LOW (ref 6–8.3)
PROT SERPL-MCNC: 4.6 G/DL — LOW (ref 6–8.3)
PROT SERPL-MCNC: 4.7 G/DL — LOW (ref 6–8.3)
PROT SERPL-MCNC: 4.7 G/DL — LOW (ref 6–8.3)
PROT SERPL-MCNC: 4.8 G/DL — LOW (ref 6–8.3)
PROT SERPL-MCNC: 4.8 G/DL — LOW (ref 6–8.3)
PROT SERPL-MCNC: 4.9 G/DL — LOW (ref 6–8.3)
PROT SERPL-MCNC: 4.9 G/DL — LOW (ref 6–8.3)
PROT SERPL-MCNC: 5 G/DL — LOW (ref 6–8.3)
PROT SERPL-MCNC: 5.1 G/DL — LOW (ref 6–8.3)
PROT SERPL-MCNC: 5.2 G/DL — LOW (ref 6–8.3)
PROT SERPL-MCNC: 5.2 G/DL — LOW (ref 6–8.3)
PROT SERPL-MCNC: 5.3 G/DL — LOW (ref 6–8.3)
PROT SERPL-MCNC: 5.4 G/DL — LOW (ref 6–8.3)
PROT SERPL-MCNC: 5.5 G/DL — LOW (ref 6–8.3)
PROT SERPL-MCNC: 5.6 G/DL — LOW (ref 6–8.3)
PROT SERPL-MCNC: 5.7 G/DL — LOW (ref 6–8.3)
PROT SERPL-MCNC: 5.7 G/DL — LOW (ref 6–8.3)
PROT SERPL-MCNC: 5.8 G/DL — LOW (ref 6–8.3)
PROT SERPL-MCNC: 5.8 G/DL — LOW (ref 6–8.3)
PROT SERPL-MCNC: 5.9 G/DL — LOW (ref 6–8.3)
PROT SERPL-MCNC: 6 G/DL — SIGNIFICANT CHANGE UP (ref 6–8.3)
PROT SERPL-MCNC: 6.1 G/DL — SIGNIFICANT CHANGE UP (ref 6–8.3)
PROT SERPL-MCNC: 6.1 G/DL — SIGNIFICANT CHANGE UP (ref 6–8.3)
PROT SERPL-MCNC: 6.2 G/DL — SIGNIFICANT CHANGE UP (ref 6–8.3)
PROT SERPL-MCNC: 6.4 G/DL — SIGNIFICANT CHANGE UP (ref 6–8.3)
PROT SERPL-MCNC: 6.4 G/DL — SIGNIFICANT CHANGE UP (ref 6–8.3)
PROT SERPL-MCNC: 6.6 G/DL — SIGNIFICANT CHANGE UP (ref 6–8.3)
PROT SERPL-MCNC: 6.8 G/DL — SIGNIFICANT CHANGE UP (ref 6–8.3)
PROT SERPL-MCNC: 7.1 G/DL — SIGNIFICANT CHANGE UP (ref 6–8.3)
PROT UR-MCNC: 30 MG/DL
PROT/CREAT UR-RTO: 1.4 RATIO — HIGH (ref 0–0.2)
PROTHROM AB SERPL-ACNC: 16.7 SEC — HIGH (ref 10–12.9)
PROTHROM AB SERPL-ACNC: 16.8 SEC — HIGH (ref 10–12.9)
PROTHROM AB SERPL-ACNC: 17.2 SEC — HIGH (ref 10–12.9)
PROTHROM AB SERPL-ACNC: 17.3 SEC — HIGH (ref 10–12.9)
PROTHROM AB SERPL-ACNC: 18.3 SEC — HIGH (ref 10–12.9)
PROTHROM AB SERPL-ACNC: 18.5 SEC — HIGH (ref 10–12.9)
PROTHROM AB SERPL-ACNC: 18.6 SEC — HIGH (ref 10–12.9)
PROTHROM AB SERPL-ACNC: 18.7 SEC — HIGH (ref 10–12.9)
PROTHROM AB SERPL-ACNC: 18.8 SEC — HIGH (ref 10–12.9)
PROTHROM AB SERPL-ACNC: 18.8 SEC — HIGH (ref 10–12.9)
PROTHROM AB SERPL-ACNC: 19.1 SEC — HIGH (ref 10–12.9)
PROTHROM AB SERPL-ACNC: 19.3 SEC — HIGH (ref 10–12.9)
PROTHROM AB SERPL-ACNC: 19.3 SEC — HIGH (ref 10–12.9)
PROTHROM AB SERPL-ACNC: 19.6 SEC — HIGH (ref 10–12.9)
PROTHROM AB SERPL-ACNC: 20 SEC — HIGH (ref 10–12.9)
PROTHROM AB SERPL-ACNC: 20.4 SEC — HIGH (ref 10–12.9)
PROTHROM AB SERPL-ACNC: 20.6 SEC — HIGH (ref 10–12.9)
PROTHROM AB SERPL-ACNC: 20.8 SEC — HIGH (ref 10–12.9)
PROTHROM AB SERPL-ACNC: 21.6 SEC — HIGH (ref 10–12.9)
PROTHROM AB SERPL-ACNC: 23.6 SEC — HIGH (ref 10–12.9)
PROTHROM AB SERPL-ACNC: 23.8 SEC — HIGH (ref 10–12.9)
PROTHROM AB SERPL-ACNC: 24.2 SEC — HIGH (ref 10–12.9)
PROTHROM AB SERPL-ACNC: 25.3 SEC — HIGH (ref 10–12.9)
PROTHROM AB SERPL-ACNC: 27.1 SEC — HIGH (ref 10–12.9)
PROTHROM AB SERPL-ACNC: 30.6 SEC — HIGH (ref 10–12.9)
PROTHROM AB SERPL-ACNC: 31.5 SEC — HIGH (ref 10–12.9)
PROTHROM AB SERPL-ACNC: 32.4 SEC — HIGH (ref 10–12.9)
PROTHROM AB SERPL-ACNC: 33.6 SEC — HIGH (ref 10–12.9)
PROTHROM AB SERPL-ACNC: 34.2 SEC — HIGH (ref 10–12.9)
PROTHROM AB SERPL-ACNC: 35.3 SEC — HIGH (ref 10–12.9)
PROTHROM AB SERPL-ACNC: 38.6 SEC — HIGH (ref 10–12.9)
PROTHROM AB SERPL-ACNC: 39.2 SEC — HIGH (ref 10–12.9)
PSA FLD-MCNC: 0.9 NG/ML — SIGNIFICANT CHANGE UP (ref 0–4)
RBC # BLD: 2.62 M/UL — LOW (ref 4.2–5.8)
RBC # BLD: 2.64 M/UL — LOW (ref 4.2–5.8)
RBC # BLD: 2.68 M/UL — LOW (ref 4.2–5.8)
RBC # BLD: 2.7 M/UL — LOW (ref 4.2–5.8)
RBC # BLD: 2.74 M/UL — LOW (ref 4.2–5.8)
RBC # BLD: 2.75 M/UL — LOW (ref 4.2–5.8)
RBC # BLD: 2.88 M/UL — LOW (ref 4.2–5.8)
RBC # BLD: 3.03 M/UL — LOW (ref 4.2–5.8)
RBC # BLD: 3.09 M/UL — LOW (ref 4.2–5.8)
RBC # BLD: 3.14 M/UL — LOW (ref 4.2–5.8)
RBC # BLD: 3.15 M/UL — LOW (ref 4.2–5.8)
RBC # BLD: 3.15 M/UL — LOW (ref 4.2–5.8)
RBC # BLD: 3.17 M/UL — LOW (ref 4.2–5.8)
RBC # BLD: 3.18 M/UL — LOW (ref 4.2–5.8)
RBC # BLD: 3.19 M/UL — LOW (ref 4.2–5.8)
RBC # BLD: 3.19 M/UL — LOW (ref 4.2–5.8)
RBC # BLD: 3.2 M/UL — LOW (ref 4.2–5.8)
RBC # BLD: 3.23 M/UL — LOW (ref 4.2–5.8)
RBC # BLD: 3.27 M/UL — LOW (ref 4.2–5.8)
RBC # BLD: 3.28 M/UL — LOW (ref 4.2–5.8)
RBC # BLD: 3.29 M/UL — LOW (ref 4.2–5.8)
RBC # BLD: 3.3 M/UL — LOW (ref 4.2–5.8)
RBC # BLD: 3.31 M/UL — LOW (ref 4.2–5.8)
RBC # BLD: 3.33 M/UL — LOW (ref 4.2–5.8)
RBC # BLD: 3.34 M/UL — LOW (ref 4.2–5.8)
RBC # BLD: 3.38 M/UL — LOW (ref 4.2–5.8)
RBC # BLD: 3.39 M/UL — LOW (ref 4.2–5.8)
RBC # BLD: 3.4 M/UL — LOW (ref 4.2–5.8)
RBC # BLD: 3.4 M/UL — LOW (ref 4.2–5.8)
RBC # BLD: 3.41 M/UL — LOW (ref 4.2–5.8)
RBC # BLD: 3.41 M/UL — LOW (ref 4.2–5.8)
RBC # BLD: 3.43 M/UL — LOW (ref 4.2–5.8)
RBC # BLD: 3.49 M/UL — LOW (ref 4.2–5.8)
RBC # BLD: 3.55 M/UL — LOW (ref 4.2–5.8)
RBC # BLD: 3.55 M/UL — LOW (ref 4.2–5.8)
RBC # BLD: 3.56 M/UL — LOW (ref 4.2–5.8)
RBC # BLD: 3.56 M/UL — LOW (ref 4.2–5.8)
RBC # BLD: 3.62 M/UL — LOW (ref 4.2–5.8)
RBC # BLD: 3.63 M/UL — LOW (ref 4.2–5.8)
RBC # BLD: 3.65 M/UL — LOW (ref 4.2–5.8)
RBC # BLD: 3.68 M/UL — LOW (ref 4.2–5.8)
RBC # BLD: 3.68 M/UL — LOW (ref 4.2–5.8)
RBC # BLD: 3.69 M/UL — LOW (ref 4.2–5.8)
RBC # BLD: 3.73 M/UL — LOW (ref 4.2–5.8)
RBC # BLD: 3.73 M/UL — LOW (ref 4.2–5.8)
RBC # BLD: 3.74 M/UL — LOW (ref 4.2–5.8)
RBC # BLD: 3.74 M/UL — LOW (ref 4.2–5.8)
RBC # BLD: 3.8 M/UL — LOW (ref 4.2–5.8)
RBC # BLD: 3.81 M/UL — LOW (ref 4.2–5.8)
RBC # BLD: 3.83 M/UL — LOW (ref 4.2–5.8)
RBC # BLD: 3.86 M/UL — LOW (ref 4.2–5.8)
RBC # BLD: 3.95 M/UL — LOW (ref 4.2–5.8)
RBC # BLD: 4 M/UL — LOW (ref 4.2–5.8)
RBC # BLD: 4.05 M/UL — LOW (ref 4.2–5.8)
RBC # BLD: 4.12 M/UL — LOW (ref 4.2–5.8)
RBC # BLD: 4.13 M/UL — LOW (ref 4.2–5.8)
RBC # BLD: 4.17 M/UL — LOW (ref 4.2–5.8)
RBC # BLD: 4.18 M/UL — LOW (ref 4.2–5.8)
RBC # BLD: 4.26 M/UL — SIGNIFICANT CHANGE UP (ref 4.2–5.8)
RBC # BLD: 4.33 M/UL — SIGNIFICANT CHANGE UP (ref 4.2–5.8)
RBC # FLD: 16.1 % — HIGH (ref 10.3–14.5)
RBC # FLD: 16.6 % — HIGH (ref 10.3–14.5)
RBC # FLD: 16.9 % — HIGH (ref 10.3–14.5)
RBC # FLD: 17 % — HIGH (ref 10.3–14.5)
RBC # FLD: 17.1 % — HIGH (ref 10.3–14.5)
RBC # FLD: 17.2 % — HIGH (ref 10.3–14.5)
RBC # FLD: 17.2 % — HIGH (ref 10.3–14.5)
RBC # FLD: 17.3 % — HIGH (ref 10.3–14.5)
RBC # FLD: 17.3 % — HIGH (ref 10.3–14.5)
RBC # FLD: 17.4 % — HIGH (ref 10.3–14.5)
RBC # FLD: 17.4 % — HIGH (ref 10.3–14.5)
RBC # FLD: 17.5 % — HIGH (ref 10.3–14.5)
RBC # FLD: 17.6 % — HIGH (ref 10.3–14.5)
RBC # FLD: 17.7 % — HIGH (ref 10.3–14.5)
RBC # FLD: 17.8 % — HIGH (ref 10.3–14.5)
RBC # FLD: 17.9 % — HIGH (ref 10.3–14.5)
RBC # FLD: 18 % — HIGH (ref 10.3–14.5)
RBC # FLD: 18.8 % — HIGH (ref 10.3–14.5)
RBC # FLD: 19 % — HIGH (ref 10.3–14.5)
RBC # FLD: 19.3 % — HIGH (ref 10.3–14.5)
RBC # FLD: 19.4 % — HIGH (ref 10.3–14.5)
RBC # FLD: 20 % — HIGH (ref 10.3–14.5)
RBC # FLD: 20 % — HIGH (ref 10.3–14.5)
RBC # FLD: 20.2 % — HIGH (ref 10.3–14.5)
RBC # FLD: 20.3 % — HIGH (ref 10.3–14.5)
RBC # FLD: 20.3 % — HIGH (ref 10.3–14.5)
RBC # FLD: 20.5 % — HIGH (ref 10.3–14.5)
RBC # FLD: 20.7 % — HIGH (ref 10.3–14.5)
RBC # FLD: 20.7 % — HIGH (ref 10.3–14.5)
RBC # FLD: 20.8 % — HIGH (ref 10.3–14.5)
RBC # FLD: 20.8 % — HIGH (ref 10.3–14.5)
RBC # FLD: 21 % — HIGH (ref 10.3–14.5)
RBC # FLD: 21 % — HIGH (ref 10.3–14.5)
RBC # FLD: 21.5 % — HIGH (ref 10.3–14.5)
RBC # FLD: 21.5 % — HIGH (ref 10.3–14.5)
RBC # FLD: 22.4 % — HIGH (ref 10.3–14.5)
RBC # FLD: 22.5 % — HIGH (ref 10.3–14.5)
RBC # FLD: 22.6 % — HIGH (ref 10.3–14.5)
RBC # FLD: 22.7 % — HIGH (ref 10.3–14.5)
RBC # FLD: 22.9 % — HIGH (ref 10.3–14.5)
RBC # FLD: 23.6 % — HIGH (ref 10.3–14.5)
RBC # FLD: 23.6 % — HIGH (ref 10.3–14.5)
RBC # FLD: 23.7 % — HIGH (ref 10.3–14.5)
RBC # FLD: 23.8 % — HIGH (ref 10.3–14.5)
RBC # FLD: 23.9 % — HIGH (ref 10.3–14.5)
RBC # FLD: 24.1 % — HIGH (ref 10.3–14.5)
RBC # FLD: 24.3 % — HIGH (ref 10.3–14.5)
RBC # FLD: 24.3 % — HIGH (ref 10.3–14.5)
RBC # FLD: 24.4 % — HIGH (ref 10.3–14.5)
RBC # FLD: 24.5 % — HIGH (ref 10.3–14.5)
RBC # FLD: 24.8 % — HIGH (ref 10.3–14.5)
RBC # FLD: 25 % — HIGH (ref 10.3–14.5)
RBC # FLD: 25.1 % — HIGH (ref 10.3–14.5)
RBC # FLD: 25.3 % — HIGH (ref 10.3–14.5)
RBC # FLD: 25.6 % — HIGH (ref 10.3–14.5)
RBC BLD AUTO: ABNORMAL
RBC CASTS # UR COMP ASSIST: < 5 /HPF — SIGNIFICANT CHANGE UP
RBC CASTS # UR COMP ASSIST: ABNORMAL /HPF
RCV VOL RI: 1000 /UL — HIGH (ref 0–0)
RCV VOL RI: 2000 /UL — HIGH (ref 0–0)
RETICS #: 26.1 K/UL — SIGNIFICANT CHANGE UP (ref 25–125)
RETICS #: 93.7 K/UL — SIGNIFICANT CHANGE UP (ref 25–125)
RETICS/RBC NFR: 0.8 % — SIGNIFICANT CHANGE UP (ref 0.5–2.5)
RETICS/RBC NFR: 3.3 % — HIGH (ref 0.5–2.5)
RH IG SCN BLD-IMP: POSITIVE — SIGNIFICANT CHANGE UP
SAO2 % BLDV: 62 % — SIGNIFICANT CHANGE UP
SARS-COV-2 RNA SPEC QL NAA+PROBE: SIGNIFICANT CHANGE UP
SMOOTH MUSCLE AB SER-ACNC: SIGNIFICANT CHANGE UP
SODIUM SERPL-SCNC: 125 MMOL/L — LOW (ref 135–145)
SODIUM SERPL-SCNC: 127 MMOL/L — LOW (ref 135–145)
SODIUM SERPL-SCNC: 128 MMOL/L — LOW (ref 135–145)
SODIUM SERPL-SCNC: 129 MMOL/L — LOW (ref 135–145)
SODIUM SERPL-SCNC: 129 MMOL/L — LOW (ref 135–145)
SODIUM SERPL-SCNC: 130 MMOL/L — LOW (ref 135–145)
SODIUM SERPL-SCNC: 130 MMOL/L — LOW (ref 135–145)
SODIUM SERPL-SCNC: 131 MMOL/L — LOW (ref 135–145)
SODIUM SERPL-SCNC: 131 MMOL/L — LOW (ref 135–145)
SODIUM SERPL-SCNC: 132 MMOL/L — LOW (ref 135–145)
SODIUM SERPL-SCNC: 132 MMOL/L — LOW (ref 135–145)
SODIUM SERPL-SCNC: 133 MMOL/L — LOW (ref 135–145)
SODIUM SERPL-SCNC: 134 MMOL/L — LOW (ref 135–145)
SODIUM SERPL-SCNC: 134 MMOL/L — LOW (ref 135–145)
SODIUM SERPL-SCNC: 135 MMOL/L — SIGNIFICANT CHANGE UP (ref 135–145)
SODIUM SERPL-SCNC: 136 MMOL/L — SIGNIFICANT CHANGE UP (ref 135–145)
SODIUM SERPL-SCNC: 137 MMOL/L — SIGNIFICANT CHANGE UP (ref 135–145)
SODIUM SERPL-SCNC: 138 MMOL/L — SIGNIFICANT CHANGE UP (ref 135–145)
SODIUM SERPL-SCNC: 139 MMOL/L — SIGNIFICANT CHANGE UP (ref 135–145)
SODIUM SERPL-SCNC: 140 MMOL/L — SIGNIFICANT CHANGE UP (ref 135–145)
SODIUM SERPL-SCNC: 141 MMOL/L — SIGNIFICANT CHANGE UP (ref 135–145)
SODIUM SERPL-SCNC: 142 MMOL/L — SIGNIFICANT CHANGE UP (ref 135–145)
SODIUM SERPL-SCNC: 143 MMOL/L — SIGNIFICANT CHANGE UP (ref 135–145)
SODIUM SERPL-SCNC: 144 MMOL/L — SIGNIFICANT CHANGE UP (ref 135–145)
SODIUM SERPL-SCNC: 145 MMOL/L — SIGNIFICANT CHANGE UP (ref 135–145)
SODIUM SERPL-SCNC: 146 MMOL/L — HIGH (ref 135–145)
SODIUM SERPL-SCNC: 147 MMOL/L — HIGH (ref 135–145)
SODIUM SERPL-SCNC: 147 MMOL/L — HIGH (ref 135–145)
SODIUM SERPL-SCNC: 148 MMOL/L — HIGH (ref 135–145)
SODIUM SERPL-SCNC: 152 MMOL/L — HIGH (ref 135–145)
SODIUM UR-SCNC: 44 MMOL/L — SIGNIFICANT CHANGE UP
SODIUM UR-SCNC: 57 MMOL/L — SIGNIFICANT CHANGE UP
SODIUM UR-SCNC: 85 MMOL/L — SIGNIFICANT CHANGE UP
SODIUM UR-SCNC: 90 MMOL/L — SIGNIFICANT CHANGE UP
SODIUM UR-SCNC: <20 MMOL/L — SIGNIFICANT CHANGE UP
SP GR SPEC: 1.01 — SIGNIFICANT CHANGE UP (ref 1–1.03)
SP GR SPEC: 1.02 — SIGNIFICANT CHANGE UP (ref 1–1.03)
SP GR SPEC: 1.02 — SIGNIFICANT CHANGE UP (ref 1–1.03)
SPECIMEN SOURCE FLD: SIGNIFICANT CHANGE UP
SPECIMEN SOURCE: SIGNIFICANT CHANGE UP
SPHEROCYTES BLD QL SMEAR: SLIGHT — SIGNIFICANT CHANGE UP
SRA INTERP SER-IMP: SIGNIFICANT CHANGE UP
T PALLIDUM AB TITR SER: NEGATIVE — SIGNIFICANT CHANGE UP
T3 SERPL-MCNC: 48 NG/DL — LOW (ref 80–200)
T3 SERPL-MCNC: 54 NG/DL — LOW (ref 80–200)
T4 FREE SERPL-MCNC: 0.54 NG/DL — LOW (ref 0.7–1.48)
T4 FREE SERPL-MCNC: 0.54 NG/DL — LOW (ref 0.7–1.48)
T4 FREE SERPL-MCNC: 0.69 NG/DL — LOW (ref 0.7–1.48)
THYROGLOB AB FLD IA-ACNC: <20 IU/ML — SIGNIFICANT CHANGE UP
THYROGLOB AB SERPL-ACNC: <20 IU/ML — SIGNIFICANT CHANGE UP
THYROPEROXIDASE AB SERPL-ACNC: <10 IU/ML — SIGNIFICANT CHANGE UP
TIBC SERPL-MCNC: 109 UG/DL — LOW (ref 220–430)
TIBC SERPL-MCNC: 145 UG/DL — LOW (ref 220–430)
TOTAL CHOLESTEROL/HDL RATIO MEASUREMENT: 3.3 RATIO — LOW (ref 3.4–9.6)
TOTAL NUCLEATED CELL COUNT, BODY FLUID: 211 /UL — SIGNIFICANT CHANGE UP
TOTAL NUCLEATED CELL COUNT, BODY FLUID: 62 /UL — SIGNIFICANT CHANGE UP
TRANSFERRIN SERPL-MCNC: 95 MG/DL — LOW (ref 200–360)
TRIGL SERPL-MCNC: 136 MG/DL — SIGNIFICANT CHANGE UP (ref 10–149)
TROPONIN T SERPL-MCNC: 0.05 NG/ML — CRITICAL HIGH (ref 0–0.01)
TSH SERPL-MCNC: 0.53 UIU/ML — SIGNIFICANT CHANGE UP (ref 0.35–4.94)
TSH SERPL-MCNC: 0.69 UIU/ML — SIGNIFICANT CHANGE UP (ref 0.35–4.94)
TSH SERPL-MCNC: 1.36 UIU/ML — SIGNIFICANT CHANGE UP (ref 0.35–4.94)
TSH SERPL-MCNC: 1.92 UIU/ML — SIGNIFICANT CHANGE UP (ref 0.35–4.94)
TUBE TYPE: SIGNIFICANT CHANGE UP
TUBE TYPE: SIGNIFICANT CHANGE UP
UIBC SERPL-MCNC: 125 UG/DL — SIGNIFICANT CHANGE UP (ref 110–370)
UIBC SERPL-MCNC: 77 UG/DL — LOW (ref 110–370)
UROBILINOGEN FLD QL: 0.2 E.U./DL — SIGNIFICANT CHANGE UP
UUN UR-MCNC: 116 MG/DL — SIGNIFICANT CHANGE UP
UUN UR-MCNC: 181 MG/DL — SIGNIFICANT CHANGE UP
UUN UR-MCNC: 200 MG/DL — SIGNIFICANT CHANGE UP
UUN UR-MCNC: 281 MG/DL — SIGNIFICANT CHANGE UP
VANCOMYCIN FLD-MCNC: 11.2 UG/ML — SIGNIFICANT CHANGE UP
VANCOMYCIN FLD-MCNC: 23.3 UG/ML — SIGNIFICANT CHANGE UP
VZV IGG FLD QL IA: 672.8 INDEX — SIGNIFICANT CHANGE UP
VZV IGG FLD QL IA: POSITIVE — SIGNIFICANT CHANGE UP
WBC # BLD: 10.36 K/UL — SIGNIFICANT CHANGE UP (ref 3.8–10.5)
WBC # BLD: 10.7 K/UL — HIGH (ref 3.8–10.5)
WBC # BLD: 11.15 K/UL — HIGH (ref 3.8–10.5)
WBC # BLD: 11.46 K/UL — HIGH (ref 3.8–10.5)
WBC # BLD: 11.88 K/UL — HIGH (ref 3.8–10.5)
WBC # BLD: 12.36 K/UL — HIGH (ref 3.8–10.5)
WBC # BLD: 12.4 K/UL — HIGH (ref 3.8–10.5)
WBC # BLD: 12.4 K/UL — HIGH (ref 3.8–10.5)
WBC # BLD: 12.72 K/UL — HIGH (ref 3.8–10.5)
WBC # BLD: 12.81 K/UL — HIGH (ref 3.8–10.5)
WBC # BLD: 15.39 K/UL — HIGH (ref 3.8–10.5)
WBC # BLD: 15.79 K/UL — HIGH (ref 3.8–10.5)
WBC # BLD: 17.02 K/UL — HIGH (ref 3.8–10.5)
WBC # BLD: 22.07 K/UL — HIGH (ref 3.8–10.5)
WBC # BLD: 26.36 K/UL — HIGH (ref 3.8–10.5)
WBC # BLD: 3.46 K/UL — LOW (ref 3.8–10.5)
WBC # BLD: 3.53 K/UL — LOW (ref 3.8–10.5)
WBC # BLD: 3.57 K/UL — LOW (ref 3.8–10.5)
WBC # BLD: 4.23 K/UL — SIGNIFICANT CHANGE UP (ref 3.8–10.5)
WBC # BLD: 4.41 K/UL — SIGNIFICANT CHANGE UP (ref 3.8–10.5)
WBC # BLD: 4.44 K/UL — SIGNIFICANT CHANGE UP (ref 3.8–10.5)
WBC # BLD: 4.48 K/UL — SIGNIFICANT CHANGE UP (ref 3.8–10.5)
WBC # BLD: 4.73 K/UL — SIGNIFICANT CHANGE UP (ref 3.8–10.5)
WBC # BLD: 4.75 K/UL — SIGNIFICANT CHANGE UP (ref 3.8–10.5)
WBC # BLD: 4.9 K/UL — SIGNIFICANT CHANGE UP (ref 3.8–10.5)
WBC # BLD: 4.93 K/UL — SIGNIFICANT CHANGE UP (ref 3.8–10.5)
WBC # BLD: 5.02 K/UL — SIGNIFICANT CHANGE UP (ref 3.8–10.5)
WBC # BLD: 5.15 K/UL — SIGNIFICANT CHANGE UP (ref 3.8–10.5)
WBC # BLD: 5.33 K/UL — SIGNIFICANT CHANGE UP (ref 3.8–10.5)
WBC # BLD: 5.71 K/UL — SIGNIFICANT CHANGE UP (ref 3.8–10.5)
WBC # BLD: 5.75 K/UL — SIGNIFICANT CHANGE UP (ref 3.8–10.5)
WBC # BLD: 5.77 K/UL — SIGNIFICANT CHANGE UP (ref 3.8–10.5)
WBC # BLD: 5.78 K/UL — SIGNIFICANT CHANGE UP (ref 3.8–10.5)
WBC # BLD: 5.83 K/UL — SIGNIFICANT CHANGE UP (ref 3.8–10.5)
WBC # BLD: 6.18 K/UL — SIGNIFICANT CHANGE UP (ref 3.8–10.5)
WBC # BLD: 6.35 K/UL — SIGNIFICANT CHANGE UP (ref 3.8–10.5)
WBC # BLD: 6.63 K/UL — SIGNIFICANT CHANGE UP (ref 3.8–10.5)
WBC # BLD: 6.65 K/UL — SIGNIFICANT CHANGE UP (ref 3.8–10.5)
WBC # BLD: 6.73 K/UL — SIGNIFICANT CHANGE UP (ref 3.8–10.5)
WBC # BLD: 6.81 K/UL — SIGNIFICANT CHANGE UP (ref 3.8–10.5)
WBC # BLD: 6.82 K/UL — SIGNIFICANT CHANGE UP (ref 3.8–10.5)
WBC # BLD: 6.83 K/UL — SIGNIFICANT CHANGE UP (ref 3.8–10.5)
WBC # BLD: 6.96 K/UL — SIGNIFICANT CHANGE UP (ref 3.8–10.5)
WBC # BLD: 7.19 K/UL — SIGNIFICANT CHANGE UP (ref 3.8–10.5)
WBC # BLD: 7.22 K/UL — SIGNIFICANT CHANGE UP (ref 3.8–10.5)
WBC # BLD: 7.23 K/UL — SIGNIFICANT CHANGE UP (ref 3.8–10.5)
WBC # BLD: 7.3 K/UL — SIGNIFICANT CHANGE UP (ref 3.8–10.5)
WBC # BLD: 7.45 K/UL — SIGNIFICANT CHANGE UP (ref 3.8–10.5)
WBC # BLD: 7.65 K/UL — SIGNIFICANT CHANGE UP (ref 3.8–10.5)
WBC # BLD: 7.67 K/UL — SIGNIFICANT CHANGE UP (ref 3.8–10.5)
WBC # BLD: 7.93 K/UL — SIGNIFICANT CHANGE UP (ref 3.8–10.5)
WBC # BLD: 7.96 K/UL — SIGNIFICANT CHANGE UP (ref 3.8–10.5)
WBC # BLD: 8.03 K/UL — SIGNIFICANT CHANGE UP (ref 3.8–10.5)
WBC # BLD: 8.11 K/UL — SIGNIFICANT CHANGE UP (ref 3.8–10.5)
WBC # BLD: 8.39 K/UL — SIGNIFICANT CHANGE UP (ref 3.8–10.5)
WBC # BLD: 8.42 K/UL — SIGNIFICANT CHANGE UP (ref 3.8–10.5)
WBC # BLD: 8.86 K/UL — SIGNIFICANT CHANGE UP (ref 3.8–10.5)
WBC # BLD: 9.04 K/UL — SIGNIFICANT CHANGE UP (ref 3.8–10.5)
WBC # BLD: 9.05 K/UL — SIGNIFICANT CHANGE UP (ref 3.8–10.5)
WBC # BLD: 9.15 K/UL — SIGNIFICANT CHANGE UP (ref 3.8–10.5)
WBC # BLD: 9.3 K/UL — SIGNIFICANT CHANGE UP (ref 3.8–10.5)
WBC # BLD: 9.3 K/UL — SIGNIFICANT CHANGE UP (ref 3.8–10.5)
WBC # BLD: 9.43 K/UL — SIGNIFICANT CHANGE UP (ref 3.8–10.5)
WBC # BLD: 9.43 K/UL — SIGNIFICANT CHANGE UP (ref 3.8–10.5)
WBC # FLD AUTO: 10.36 K/UL — SIGNIFICANT CHANGE UP (ref 3.8–10.5)
WBC # FLD AUTO: 10.7 K/UL — HIGH (ref 3.8–10.5)
WBC # FLD AUTO: 11.15 K/UL — HIGH (ref 3.8–10.5)
WBC # FLD AUTO: 11.46 K/UL — HIGH (ref 3.8–10.5)
WBC # FLD AUTO: 11.88 K/UL — HIGH (ref 3.8–10.5)
WBC # FLD AUTO: 12.36 K/UL — HIGH (ref 3.8–10.5)
WBC # FLD AUTO: 12.4 K/UL — HIGH (ref 3.8–10.5)
WBC # FLD AUTO: 12.4 K/UL — HIGH (ref 3.8–10.5)
WBC # FLD AUTO: 12.72 K/UL — HIGH (ref 3.8–10.5)
WBC # FLD AUTO: 12.81 K/UL — HIGH (ref 3.8–10.5)
WBC # FLD AUTO: 15.39 K/UL — HIGH (ref 3.8–10.5)
WBC # FLD AUTO: 15.79 K/UL — HIGH (ref 3.8–10.5)
WBC # FLD AUTO: 17.02 K/UL — HIGH (ref 3.8–10.5)
WBC # FLD AUTO: 22.07 K/UL — HIGH (ref 3.8–10.5)
WBC # FLD AUTO: 26.36 K/UL — HIGH (ref 3.8–10.5)
WBC # FLD AUTO: 3.46 K/UL — LOW (ref 3.8–10.5)
WBC # FLD AUTO: 3.53 K/UL — LOW (ref 3.8–10.5)
WBC # FLD AUTO: 3.57 K/UL — LOW (ref 3.8–10.5)
WBC # FLD AUTO: 4.23 K/UL — SIGNIFICANT CHANGE UP (ref 3.8–10.5)
WBC # FLD AUTO: 4.41 K/UL — SIGNIFICANT CHANGE UP (ref 3.8–10.5)
WBC # FLD AUTO: 4.44 K/UL — SIGNIFICANT CHANGE UP (ref 3.8–10.5)
WBC # FLD AUTO: 4.48 K/UL — SIGNIFICANT CHANGE UP (ref 3.8–10.5)
WBC # FLD AUTO: 4.73 K/UL — SIGNIFICANT CHANGE UP (ref 3.8–10.5)
WBC # FLD AUTO: 4.75 K/UL — SIGNIFICANT CHANGE UP (ref 3.8–10.5)
WBC # FLD AUTO: 4.9 K/UL — SIGNIFICANT CHANGE UP (ref 3.8–10.5)
WBC # FLD AUTO: 4.93 K/UL — SIGNIFICANT CHANGE UP (ref 3.8–10.5)
WBC # FLD AUTO: 5.02 K/UL — SIGNIFICANT CHANGE UP (ref 3.8–10.5)
WBC # FLD AUTO: 5.15 K/UL — SIGNIFICANT CHANGE UP (ref 3.8–10.5)
WBC # FLD AUTO: 5.33 K/UL — SIGNIFICANT CHANGE UP (ref 3.8–10.5)
WBC # FLD AUTO: 5.71 K/UL — SIGNIFICANT CHANGE UP (ref 3.8–10.5)
WBC # FLD AUTO: 5.75 K/UL — SIGNIFICANT CHANGE UP (ref 3.8–10.5)
WBC # FLD AUTO: 5.77 K/UL — SIGNIFICANT CHANGE UP (ref 3.8–10.5)
WBC # FLD AUTO: 5.78 K/UL — SIGNIFICANT CHANGE UP (ref 3.8–10.5)
WBC # FLD AUTO: 5.83 K/UL — SIGNIFICANT CHANGE UP (ref 3.8–10.5)
WBC # FLD AUTO: 6.18 K/UL — SIGNIFICANT CHANGE UP (ref 3.8–10.5)
WBC # FLD AUTO: 6.35 K/UL — SIGNIFICANT CHANGE UP (ref 3.8–10.5)
WBC # FLD AUTO: 6.63 K/UL — SIGNIFICANT CHANGE UP (ref 3.8–10.5)
WBC # FLD AUTO: 6.65 K/UL — SIGNIFICANT CHANGE UP (ref 3.8–10.5)
WBC # FLD AUTO: 6.73 K/UL — SIGNIFICANT CHANGE UP (ref 3.8–10.5)
WBC # FLD AUTO: 6.81 K/UL — SIGNIFICANT CHANGE UP (ref 3.8–10.5)
WBC # FLD AUTO: 6.82 K/UL — SIGNIFICANT CHANGE UP (ref 3.8–10.5)
WBC # FLD AUTO: 6.83 K/UL — SIGNIFICANT CHANGE UP (ref 3.8–10.5)
WBC # FLD AUTO: 6.96 K/UL — SIGNIFICANT CHANGE UP (ref 3.8–10.5)
WBC # FLD AUTO: 7.19 K/UL — SIGNIFICANT CHANGE UP (ref 3.8–10.5)
WBC # FLD AUTO: 7.22 K/UL — SIGNIFICANT CHANGE UP (ref 3.8–10.5)
WBC # FLD AUTO: 7.23 K/UL — SIGNIFICANT CHANGE UP (ref 3.8–10.5)
WBC # FLD AUTO: 7.3 K/UL — SIGNIFICANT CHANGE UP (ref 3.8–10.5)
WBC # FLD AUTO: 7.45 K/UL — SIGNIFICANT CHANGE UP (ref 3.8–10.5)
WBC # FLD AUTO: 7.65 K/UL — SIGNIFICANT CHANGE UP (ref 3.8–10.5)
WBC # FLD AUTO: 7.67 K/UL — SIGNIFICANT CHANGE UP (ref 3.8–10.5)
WBC # FLD AUTO: 7.93 K/UL — SIGNIFICANT CHANGE UP (ref 3.8–10.5)
WBC # FLD AUTO: 7.96 K/UL — SIGNIFICANT CHANGE UP (ref 3.8–10.5)
WBC # FLD AUTO: 8.03 K/UL — SIGNIFICANT CHANGE UP (ref 3.8–10.5)
WBC # FLD AUTO: 8.11 K/UL — SIGNIFICANT CHANGE UP (ref 3.8–10.5)
WBC # FLD AUTO: 8.39 K/UL — SIGNIFICANT CHANGE UP (ref 3.8–10.5)
WBC # FLD AUTO: 8.42 K/UL — SIGNIFICANT CHANGE UP (ref 3.8–10.5)
WBC # FLD AUTO: 8.86 K/UL — SIGNIFICANT CHANGE UP (ref 3.8–10.5)
WBC # FLD AUTO: 9.04 K/UL — SIGNIFICANT CHANGE UP (ref 3.8–10.5)
WBC # FLD AUTO: 9.05 K/UL — SIGNIFICANT CHANGE UP (ref 3.8–10.5)
WBC # FLD AUTO: 9.15 K/UL — SIGNIFICANT CHANGE UP (ref 3.8–10.5)
WBC # FLD AUTO: 9.3 K/UL — SIGNIFICANT CHANGE UP (ref 3.8–10.5)
WBC # FLD AUTO: 9.3 K/UL — SIGNIFICANT CHANGE UP (ref 3.8–10.5)
WBC # FLD AUTO: 9.43 K/UL — SIGNIFICANT CHANGE UP (ref 3.8–10.5)
WBC # FLD AUTO: 9.43 K/UL — SIGNIFICANT CHANGE UP (ref 3.8–10.5)
WBC UR QL: ABNORMAL /HPF
WBC UR QL: ABNORMAL /HPF

## 2020-01-01 PROCEDURE — P9045: CPT

## 2020-01-01 PROCEDURE — 82977 ASSAY OF GGT: CPT

## 2020-01-01 PROCEDURE — 99291 CRITICAL CARE FIRST HOUR: CPT

## 2020-01-01 PROCEDURE — 90935 HEMODIALYSIS ONE EVALUATION: CPT

## 2020-01-01 PROCEDURE — 99233 SBSQ HOSP IP/OBS HIGH 50: CPT | Mod: GC

## 2020-01-01 PROCEDURE — 80053 COMPREHEN METABOLIC PANEL: CPT

## 2020-01-01 PROCEDURE — 83930 ASSAY OF BLOOD OSMOLALITY: CPT

## 2020-01-01 PROCEDURE — 85045 AUTOMATED RETICULOCYTE COUNT: CPT

## 2020-01-01 PROCEDURE — 76937 US GUIDE VASCULAR ACCESS: CPT

## 2020-01-01 PROCEDURE — 99233 SBSQ HOSP IP/OBS HIGH 50: CPT

## 2020-01-01 PROCEDURE — 71045 X-RAY EXAM CHEST 1 VIEW: CPT | Mod: 26

## 2020-01-01 PROCEDURE — 84466 ASSAY OF TRANSFERRIN: CPT

## 2020-01-01 PROCEDURE — P9047: CPT

## 2020-01-01 PROCEDURE — P9035: CPT

## 2020-01-01 PROCEDURE — 92610 EVALUATE SWALLOWING FUNCTION: CPT

## 2020-01-01 PROCEDURE — 76937 US GUIDE VASCULAR ACCESS: CPT | Mod: 26

## 2020-01-01 PROCEDURE — 99232 SBSQ HOSP IP/OBS MODERATE 35: CPT

## 2020-01-01 PROCEDURE — 36430 TRANSFUSION BLD/BLD COMPNT: CPT

## 2020-01-01 PROCEDURE — 86901 BLOOD TYPING SEROLOGIC RH(D): CPT

## 2020-01-01 PROCEDURE — 93005 ELECTROCARDIOGRAM TRACING: CPT

## 2020-01-01 PROCEDURE — 83735 ASSAY OF MAGNESIUM: CPT

## 2020-01-01 PROCEDURE — 83935 ASSAY OF URINE OSMOLALITY: CPT

## 2020-01-01 PROCEDURE — 97110 THERAPEUTIC EXERCISES: CPT

## 2020-01-01 PROCEDURE — 85027 COMPLETE CBC AUTOMATED: CPT

## 2020-01-01 PROCEDURE — 99221 1ST HOSP IP/OBS SF/LOW 40: CPT

## 2020-01-01 PROCEDURE — 81332 SERPINA1 GENE: CPT

## 2020-01-01 PROCEDURE — 76775 US EXAM ABDO BACK WALL LIM: CPT

## 2020-01-01 PROCEDURE — 70450 CT HEAD/BRAIN W/O DYE: CPT | Mod: 26

## 2020-01-01 PROCEDURE — 82962 GLUCOSE BLOOD TEST: CPT

## 2020-01-01 PROCEDURE — 86923 COMPATIBILITY TEST ELECTRIC: CPT

## 2020-01-01 PROCEDURE — 93308 TTE F-UP OR LMTD: CPT | Mod: 26

## 2020-01-01 PROCEDURE — 84300 ASSAY OF URINE SODIUM: CPT

## 2020-01-01 PROCEDURE — 82945 GLUCOSE OTHER FLUID: CPT

## 2020-01-01 PROCEDURE — 81001 URINALYSIS AUTO W/SCOPE: CPT

## 2020-01-01 PROCEDURE — 82533 TOTAL CORTISOL: CPT

## 2020-01-01 PROCEDURE — 77001 FLUOROGUIDE FOR VEIN DEVICE: CPT | Mod: 26

## 2020-01-01 PROCEDURE — 99231 SBSQ HOSP IP/OBS SF/LOW 25: CPT

## 2020-01-01 PROCEDURE — 82436 ASSAY OF URINE CHLORIDE: CPT

## 2020-01-01 PROCEDURE — 84443 ASSAY THYROID STIM HORMONE: CPT

## 2020-01-01 PROCEDURE — 87340 HEPATITIS B SURFACE AG IA: CPT

## 2020-01-01 PROCEDURE — 80048 BASIC METABOLIC PNL TOTAL CA: CPT

## 2020-01-01 PROCEDURE — 74018 RADEX ABDOMEN 1 VIEW: CPT | Mod: 26,76

## 2020-01-01 PROCEDURE — 86706 HEP B SURFACE ANTIBODY: CPT

## 2020-01-01 PROCEDURE — 87070 CULTURE OTHR SPECIMN AEROBIC: CPT

## 2020-01-01 PROCEDURE — 80202 ASSAY OF VANCOMYCIN: CPT

## 2020-01-01 PROCEDURE — C1751: CPT

## 2020-01-01 PROCEDURE — 74176 CT ABD & PELVIS W/O CONTRAST: CPT

## 2020-01-01 PROCEDURE — 87150 DNA/RNA AMPLIFIED PROBE: CPT

## 2020-01-01 PROCEDURE — 85730 THROMBOPLASTIN TIME PARTIAL: CPT

## 2020-01-01 PROCEDURE — 93010 ELECTROCARDIOGRAM REPORT: CPT

## 2020-01-01 PROCEDURE — 83036 HEMOGLOBIN GLYCOSYLATED A1C: CPT

## 2020-01-01 PROCEDURE — 82803 BLOOD GASES ANY COMBINATION: CPT

## 2020-01-01 PROCEDURE — 86255 FLUORESCENT ANTIBODY SCREEN: CPT

## 2020-01-01 PROCEDURE — 87040 BLOOD CULTURE FOR BACTERIA: CPT

## 2020-01-01 PROCEDURE — 84484 ASSAY OF TROPONIN QUANT: CPT

## 2020-01-01 PROCEDURE — 96374 THER/PROPH/DIAG INJ IV PUSH: CPT

## 2020-01-01 PROCEDURE — 71045 X-RAY EXAM CHEST 1 VIEW: CPT

## 2020-01-01 PROCEDURE — 99285 EMERGENCY DEPT VISIT HI MDM: CPT

## 2020-01-01 PROCEDURE — 83880 ASSAY OF NATRIURETIC PEPTIDE: CPT

## 2020-01-01 PROCEDURE — 36573 INSJ PICC RS&I 5 YR+: CPT

## 2020-01-01 PROCEDURE — 87324 CLOSTRIDIUM AG IA: CPT

## 2020-01-01 PROCEDURE — 76770 US EXAM ABDO BACK WALL COMP: CPT

## 2020-01-01 PROCEDURE — 74230 X-RAY XM SWLNG FUNCJ C+: CPT | Mod: 26

## 2020-01-01 PROCEDURE — 76775 US EXAM ABDO BACK WALL LIM: CPT | Mod: 26

## 2020-01-01 PROCEDURE — 89051 BODY FLUID CELL COUNT: CPT

## 2020-01-01 PROCEDURE — 96376 TX/PRO/DX INJ SAME DRUG ADON: CPT

## 2020-01-01 PROCEDURE — 87186 SC STD MICRODIL/AGAR DIL: CPT

## 2020-01-01 PROCEDURE — 87449 NOS EACH ORGANISM AG IA: CPT

## 2020-01-01 PROCEDURE — 99223 1ST HOSP IP/OBS HIGH 75: CPT

## 2020-01-01 PROCEDURE — 86850 RBC ANTIBODY SCREEN: CPT

## 2020-01-01 PROCEDURE — 36415 COLL VENOUS BLD VENIPUNCTURE: CPT

## 2020-01-01 PROCEDURE — 33017 PRCRD DRG 6YR+ W/O CGEN CAR: CPT

## 2020-01-01 PROCEDURE — 96361 HYDRATE IV INFUSION ADD-ON: CPT

## 2020-01-01 PROCEDURE — 84439 ASSAY OF FREE THYROXINE: CPT

## 2020-01-01 PROCEDURE — 85384 FIBRINOGEN ACTIVITY: CPT

## 2020-01-01 PROCEDURE — 87491 CHLMYD TRACH DNA AMP PROBE: CPT

## 2020-01-01 PROCEDURE — 97162 PT EVAL MOD COMPLEX 30 MIN: CPT

## 2020-01-01 PROCEDURE — 36558 INSERT TUNNELED CV CATH: CPT

## 2020-01-01 PROCEDURE — 36569 INSJ PICC 5 YR+ W/O IMAGING: CPT

## 2020-01-01 PROCEDURE — 70450 CT HEAD/BRAIN W/O DYE: CPT

## 2020-01-01 PROCEDURE — 97116 GAIT TRAINING THERAPY: CPT

## 2020-01-01 PROCEDURE — 99232 SBSQ HOSP IP/OBS MODERATE 35: CPT | Mod: GC

## 2020-01-01 PROCEDURE — 86665 EPSTEIN-BARR CAPSID VCA: CPT

## 2020-01-01 PROCEDURE — 93321 DOPPLER ECHO F-UP/LMTD STD: CPT | Mod: 26

## 2020-01-01 PROCEDURE — 99358 PROLONG SERVICE W/O CONTACT: CPT

## 2020-01-01 PROCEDURE — P9012: CPT

## 2020-01-01 PROCEDURE — 85610 PROTHROMBIN TIME: CPT

## 2020-01-01 PROCEDURE — 84295 ASSAY OF SERUM SODIUM: CPT

## 2020-01-01 PROCEDURE — 80074 ACUTE HEPATITIS PANEL: CPT

## 2020-01-01 PROCEDURE — 86147 CARDIOLIPIN ANTIBODY EA IG: CPT

## 2020-01-01 PROCEDURE — 83010 ASSAY OF HAPTOGLOBIN QUANT: CPT

## 2020-01-01 PROCEDURE — 99498 ADVNCD CARE PLAN ADDL 30 MIN: CPT

## 2020-01-01 PROCEDURE — 86780 TREPONEMA PALLIDUM: CPT

## 2020-01-01 PROCEDURE — 76770 US EXAM ABDO BACK WALL COMP: CPT | Mod: 26

## 2020-01-01 PROCEDURE — 82330 ASSAY OF CALCIUM: CPT

## 2020-01-01 PROCEDURE — 87086 URINE CULTURE/COLONY COUNT: CPT

## 2020-01-01 PROCEDURE — 93970 EXTREMITY STUDY: CPT | Mod: 26

## 2020-01-01 PROCEDURE — 90791 PSYCH DIAGNOSTIC EVALUATION: CPT | Mod: 95

## 2020-01-01 PROCEDURE — 87635 SARS-COV-2 COVID-19 AMP PRB: CPT

## 2020-01-01 PROCEDURE — 83605 ASSAY OF LACTIC ACID: CPT

## 2020-01-01 PROCEDURE — 87205 SMEAR GRAM STAIN: CPT

## 2020-01-01 PROCEDURE — 84133 ASSAY OF URINE POTASSIUM: CPT

## 2020-01-01 PROCEDURE — 93306 TTE W/DOPPLER COMPLETE: CPT

## 2020-01-01 PROCEDURE — 82042 OTHER SOURCE ALBUMIN QUAN EA: CPT

## 2020-01-01 PROCEDURE — 84100 ASSAY OF PHOSPHORUS: CPT

## 2020-01-01 PROCEDURE — 86381 MITOCHONDRIAL ANTIBODY EACH: CPT

## 2020-01-01 PROCEDURE — 80061 LIPID PANEL: CPT

## 2020-01-01 PROCEDURE — 93975 VASCULAR STUDY: CPT | Mod: 26

## 2020-01-01 PROCEDURE — 84156 ASSAY OF PROTEIN URINE: CPT

## 2020-01-01 PROCEDURE — 99497 ADVNCD CARE PLAN 30 MIN: CPT

## 2020-01-01 PROCEDURE — 83615 LACTATE (LD) (LDH) ENZYME: CPT

## 2020-01-01 PROCEDURE — C1750: CPT

## 2020-01-01 PROCEDURE — 74018 RADEX ABDOMEN 1 VIEW: CPT

## 2020-01-01 PROCEDURE — 36010 PLACE CATHETER IN VEIN: CPT

## 2020-01-01 PROCEDURE — 99285 EMERGENCY DEPT VISIT HI MDM: CPT | Mod: 25

## 2020-01-01 PROCEDURE — 83550 IRON BINDING TEST: CPT

## 2020-01-01 PROCEDURE — 85379 FIBRIN DEGRADATION QUANT: CPT

## 2020-01-01 PROCEDURE — 86038 ANTINUCLEAR ANTIBODIES: CPT

## 2020-01-01 PROCEDURE — 96375 TX/PRO/DX INJ NEW DRUG ADDON: CPT

## 2020-01-01 PROCEDURE — 92611 MOTION FLUOROSCOPY/SWALLOW: CPT

## 2020-01-01 PROCEDURE — 93975 VASCULAR STUDY: CPT

## 2020-01-01 PROCEDURE — 78830 RP LOCLZJ TUM SPECT W/CT 1: CPT | Mod: 26

## 2020-01-01 PROCEDURE — 86664 EPSTEIN-BARR NUCLEAR ANTIGEN: CPT

## 2020-01-01 PROCEDURE — 86022 PLATELET ANTIBODIES: CPT

## 2020-01-01 PROCEDURE — 93308 TTE F-UP OR LMTD: CPT

## 2020-01-01 PROCEDURE — 86787 VARICELLA-ZOSTER ANTIBODY: CPT

## 2020-01-01 PROCEDURE — G0103: CPT

## 2020-01-01 PROCEDURE — 97161 PT EVAL LOW COMPLEX 20 MIN: CPT

## 2020-01-01 PROCEDURE — 92526 ORAL FUNCTION THERAPY: CPT

## 2020-01-01 PROCEDURE — 86160 COMPLEMENT ANTIGEN: CPT

## 2020-01-01 PROCEDURE — 97164 PT RE-EVAL EST PLAN CARE: CPT

## 2020-01-01 PROCEDURE — 97530 THERAPEUTIC ACTIVITIES: CPT

## 2020-01-01 PROCEDURE — 85025 COMPLETE CBC W/AUTO DIFF WBC: CPT

## 2020-01-01 PROCEDURE — 93970 EXTREMITY STUDY: CPT

## 2020-01-01 PROCEDURE — 74176 CT ABD & PELVIS W/O CONTRAST: CPT | Mod: 26

## 2020-01-01 PROCEDURE — P9017: CPT

## 2020-01-01 PROCEDURE — 87507 IADNA-DNA/RNA PROBE TQ 12-25: CPT

## 2020-01-01 PROCEDURE — 82728 ASSAY OF FERRITIN: CPT

## 2020-01-01 PROCEDURE — 86800 THYROGLOBULIN ANTIBODY: CPT

## 2020-01-01 PROCEDURE — 82570 ASSAY OF URINE CREATININE: CPT

## 2020-01-01 PROCEDURE — 84157 ASSAY OF PROTEIN OTHER: CPT

## 2020-01-01 PROCEDURE — 83540 ASSAY OF IRON: CPT

## 2020-01-01 PROCEDURE — C1769: CPT

## 2020-01-01 PROCEDURE — 77001 FLUOROGUIDE FOR VEIN DEVICE: CPT

## 2020-01-01 PROCEDURE — 85362 FIBRIN DEGRADATION PRODUCTS: CPT

## 2020-01-01 PROCEDURE — 92612 ENDOSCOPY SWALLOW (FEES) VID: CPT

## 2020-01-01 PROCEDURE — 84480 ASSAY TRIIODOTHYRONINE (T3): CPT

## 2020-01-01 PROCEDURE — 99291 CRITICAL CARE FIRST HOUR: CPT | Mod: 25

## 2020-01-01 PROCEDURE — 87075 CULTR BACTERIA EXCEPT BLOOD: CPT

## 2020-01-01 PROCEDURE — 82088 ASSAY OF ALDOSTERONE: CPT

## 2020-01-01 PROCEDURE — G9001: CPT

## 2020-01-01 PROCEDURE — 74230 X-RAY XM SWLNG FUNCJ C+: CPT

## 2020-01-01 PROCEDURE — 86663 EPSTEIN-BARR ANTIBODY: CPT

## 2020-01-01 PROCEDURE — 82010 KETONE BODYS QUAN: CPT

## 2020-01-01 PROCEDURE — 87102 FUNGUS ISOLATION CULTURE: CPT

## 2020-01-01 PROCEDURE — 90945 DIALYSIS ONE EVALUATION: CPT

## 2020-01-01 PROCEDURE — 84540 ASSAY OF URINE/UREA-N: CPT

## 2020-01-01 PROCEDURE — 99222 1ST HOSP IP/OBS MODERATE 55: CPT | Mod: GC

## 2020-01-01 PROCEDURE — 82553 CREATINE MB FRACTION: CPT

## 2020-01-01 PROCEDURE — 93306 TTE W/DOPPLER COMPLETE: CPT | Mod: 26

## 2020-01-01 PROCEDURE — 86803 HEPATITIS C AB TEST: CPT

## 2020-01-01 PROCEDURE — 86644 CMV ANTIBODY: CPT

## 2020-01-01 PROCEDURE — 87591 N.GONORRHOEAE DNA AMP PROB: CPT

## 2020-01-01 PROCEDURE — 82550 ASSAY OF CK (CPK): CPT

## 2020-01-01 PROCEDURE — 71045 X-RAY EXAM CHEST 1 VIEW: CPT | Mod: 26,77

## 2020-01-01 PROCEDURE — 84132 ASSAY OF SERUM POTASSIUM: CPT

## 2020-01-01 PROCEDURE — C1729: CPT

## 2020-01-01 PROCEDURE — U0003: CPT

## 2020-01-01 PROCEDURE — P9016: CPT

## 2020-01-01 PROCEDURE — 82390 ASSAY OF CERULOPLASMIN: CPT

## 2020-01-01 PROCEDURE — 82140 ASSAY OF AMMONIA: CPT

## 2020-01-01 RX ORDER — DEXTROSE 50 % IN WATER 50 %
25 SYRINGE (ML) INTRAVENOUS ONCE
Refills: 0 | Status: DISCONTINUED | OUTPATIENT
Start: 2020-01-01 | End: 2020-01-01

## 2020-01-01 RX ORDER — DEXTROSE 50 % IN WATER 50 %
25 SYRINGE (ML) INTRAVENOUS ONCE
Refills: 0 | Status: COMPLETED | OUTPATIENT
Start: 2020-01-01 | End: 2020-01-01

## 2020-01-01 RX ORDER — LIDOCAINE 4 G/100G
1 CREAM TOPICAL ONCE
Refills: 0 | Status: COMPLETED | OUTPATIENT
Start: 2020-01-01 | End: 2020-01-01

## 2020-01-01 RX ORDER — MIDODRINE HYDROCHLORIDE 2.5 MG/1
10 TABLET ORAL EVERY 8 HOURS
Refills: 0 | Status: DISCONTINUED | OUTPATIENT
Start: 2020-01-01 | End: 2020-01-01

## 2020-01-01 RX ORDER — SODIUM CHLORIDE 9 MG/ML
250 INJECTION INTRAMUSCULAR; INTRAVENOUS; SUBCUTANEOUS ONCE
Refills: 0 | Status: DISCONTINUED | OUTPATIENT
Start: 2020-01-01 | End: 2020-01-01

## 2020-01-01 RX ORDER — HYDROCORTISONE 20 MG
50 TABLET ORAL EVERY 8 HOURS
Refills: 0 | Status: DISCONTINUED | OUTPATIENT
Start: 2020-01-01 | End: 2020-01-01

## 2020-01-01 RX ORDER — INSULIN GLARGINE 100 [IU]/ML
20 INJECTION, SOLUTION SUBCUTANEOUS AT BEDTIME
Refills: 0 | Status: DISCONTINUED | OUTPATIENT
Start: 2020-01-01 | End: 2020-01-01

## 2020-01-01 RX ORDER — INFLUENZA VIRUS VACCINE 15; 15; 15; 15 UG/.5ML; UG/.5ML; UG/.5ML; UG/.5ML
0.5 SUSPENSION INTRAMUSCULAR ONCE
Refills: 0 | Status: DISCONTINUED | OUTPATIENT
Start: 2020-01-01 | End: 2020-01-01

## 2020-01-01 RX ORDER — PANTOPRAZOLE SODIUM 20 MG/1
40 TABLET, DELAYED RELEASE ORAL EVERY 24 HOURS
Refills: 0 | Status: DISCONTINUED | OUTPATIENT
Start: 2020-01-01 | End: 2020-01-01

## 2020-01-01 RX ORDER — PHYTONADIONE (VIT K1) 5 MG
10 TABLET ORAL ONCE
Refills: 0 | Status: COMPLETED | OUTPATIENT
Start: 2020-01-01 | End: 2020-01-01

## 2020-01-01 RX ORDER — PANTOPRAZOLE SODIUM 20 MG/1
40 TABLET, DELAYED RELEASE ORAL EVERY 12 HOURS
Refills: 0 | Status: DISCONTINUED | OUTPATIENT
Start: 2020-01-01 | End: 2020-01-01

## 2020-01-01 RX ORDER — SIMVASTATIN 20 MG/1
10 TABLET, FILM COATED ORAL AT BEDTIME
Refills: 0 | Status: DISCONTINUED | OUTPATIENT
Start: 2020-01-01 | End: 2020-01-01

## 2020-01-01 RX ORDER — CHLORHEXIDINE GLUCONATE 213 G/1000ML
1 SOLUTION TOPICAL DAILY
Refills: 0 | Status: DISCONTINUED | OUTPATIENT
Start: 2020-01-01 | End: 2020-01-01

## 2020-01-01 RX ORDER — ALBUMIN HUMAN 25 %
50 VIAL (ML) INTRAVENOUS
Refills: 0 | Status: COMPLETED | OUTPATIENT
Start: 2020-01-01 | End: 2020-01-01

## 2020-01-01 RX ORDER — NYSTATIN CREAM 100000 [USP'U]/G
1 CREAM TOPICAL
Refills: 0 | Status: DISCONTINUED | OUTPATIENT
Start: 2020-01-01 | End: 2020-01-01

## 2020-01-01 RX ORDER — NYSTATIN 500MM UNIT
500000 POWDER (EA) MISCELLANEOUS
Refills: 0 | Status: COMPLETED | OUTPATIENT
Start: 2020-01-01 | End: 2020-01-01

## 2020-01-01 RX ORDER — HUMAN INSULIN 100 [IU]/ML
8 INJECTION, SUSPENSION SUBCUTANEOUS ONCE
Refills: 0 | Status: COMPLETED | OUTPATIENT
Start: 2020-01-01 | End: 2020-01-01

## 2020-01-01 RX ORDER — INSULIN GLARGINE 100 [IU]/ML
12 INJECTION, SOLUTION SUBCUTANEOUS AT BEDTIME
Refills: 0 | Status: DISCONTINUED | OUTPATIENT
Start: 2020-01-01 | End: 2020-01-01

## 2020-01-01 RX ORDER — CEFTAROLINE FOSAMIL 600 MG/20ML
400 POWDER, FOR SOLUTION INTRAVENOUS EVERY 12 HOURS
Refills: 0 | Status: DISCONTINUED | OUTPATIENT
Start: 2020-01-01 | End: 2020-01-01

## 2020-01-01 RX ORDER — INSULIN LISPRO 100/ML
12 VIAL (ML) SUBCUTANEOUS
Refills: 0 | Status: DISCONTINUED | OUTPATIENT
Start: 2020-01-01 | End: 2020-01-01

## 2020-01-01 RX ORDER — TAMSULOSIN HYDROCHLORIDE 0.4 MG/1
1 CAPSULE ORAL
Qty: 0 | Refills: 0 | DISCHARGE
Start: 2020-01-01

## 2020-01-01 RX ORDER — CEFTRIAXONE 500 MG/1
2000 INJECTION, POWDER, FOR SOLUTION INTRAMUSCULAR; INTRAVENOUS EVERY 24 HOURS
Refills: 0 | Status: DISCONTINUED | OUTPATIENT
Start: 2020-01-01 | End: 2020-01-01

## 2020-01-01 RX ORDER — SODIUM CHLORIDE 9 MG/ML
1000 INJECTION, SOLUTION INTRAVENOUS
Refills: 0 | Status: DISCONTINUED | OUTPATIENT
Start: 2020-01-01 | End: 2020-01-01

## 2020-01-01 RX ORDER — MEGESTROL ACETATE 40 MG/ML
40 SUSPENSION ORAL EVERY 8 HOURS
Refills: 0 | Status: DISCONTINUED | OUTPATIENT
Start: 2020-01-01 | End: 2020-01-01

## 2020-01-01 RX ORDER — HYDROCORTISONE 20 MG
50 TABLET ORAL EVERY 12 HOURS
Refills: 0 | Status: DISCONTINUED | OUTPATIENT
Start: 2020-01-01 | End: 2020-01-01

## 2020-01-01 RX ORDER — LACTULOSE 10 G/15ML
20 SOLUTION ORAL EVERY 12 HOURS
Refills: 0 | Status: DISCONTINUED | OUTPATIENT
Start: 2020-01-01 | End: 2020-01-01

## 2020-01-01 RX ORDER — INSULIN GLARGINE 100 [IU]/ML
20 INJECTION, SOLUTION SUBCUTANEOUS EVERY MORNING
Refills: 0 | Status: DISCONTINUED | OUTPATIENT
Start: 2020-01-01 | End: 2020-01-01

## 2020-01-01 RX ORDER — VASOPRESSIN 20 [USP'U]/ML
0.04 INJECTION INTRAVENOUS
Qty: 50 | Refills: 0 | Status: DISCONTINUED | OUTPATIENT
Start: 2020-01-01 | End: 2020-01-01

## 2020-01-01 RX ORDER — INSULIN GLARGINE 100 [IU]/ML
22 INJECTION, SOLUTION SUBCUTANEOUS EVERY MORNING
Refills: 0 | Status: DISCONTINUED | OUTPATIENT
Start: 2020-01-01 | End: 2020-01-01

## 2020-01-01 RX ORDER — INSULIN LISPRO 100/ML
VIAL (ML) SUBCUTANEOUS
Refills: 0 | Status: DISCONTINUED | OUTPATIENT
Start: 2020-01-01 | End: 2020-01-01

## 2020-01-01 RX ORDER — CEFTRIAXONE 500 MG/1
1000 INJECTION, POWDER, FOR SOLUTION INTRAMUSCULAR; INTRAVENOUS EVERY 24 HOURS
Refills: 0 | Status: DISCONTINUED | OUTPATIENT
Start: 2020-01-01 | End: 2020-01-01

## 2020-01-01 RX ORDER — INSULIN GLARGINE 100 [IU]/ML
35 INJECTION, SOLUTION SUBCUTANEOUS AT BEDTIME
Refills: 0 | Status: DISCONTINUED | OUTPATIENT
Start: 2020-01-01 | End: 2020-01-01

## 2020-01-01 RX ORDER — ACETAMINOPHEN 500 MG
1000 TABLET ORAL ONCE
Refills: 0 | Status: COMPLETED | OUTPATIENT
Start: 2020-01-01 | End: 2020-01-01

## 2020-01-01 RX ORDER — PHENYLEPHRINE HYDROCHLORIDE 10 MG/ML
10.03 INJECTION INTRAVENOUS
Qty: 160 | Refills: 0 | Status: DISCONTINUED | OUTPATIENT
Start: 2020-01-01 | End: 2020-01-01

## 2020-01-01 RX ORDER — PIPERACILLIN AND TAZOBACTAM 4; .5 G/20ML; G/20ML
2.25 INJECTION, POWDER, LYOPHILIZED, FOR SOLUTION INTRAVENOUS ONCE
Refills: 0 | Status: COMPLETED | OUTPATIENT
Start: 2020-01-01 | End: 2020-01-01

## 2020-01-01 RX ORDER — INSULIN GLARGINE 100 [IU]/ML
10 INJECTION, SOLUTION SUBCUTANEOUS EVERY MORNING
Refills: 0 | Status: DISCONTINUED | OUTPATIENT
Start: 2020-01-01 | End: 2020-01-01

## 2020-01-01 RX ORDER — APIXABAN 2.5 MG/1
1 TABLET, FILM COATED ORAL
Qty: 0 | Refills: 0 | DISCHARGE

## 2020-01-01 RX ORDER — SCOPALAMINE 1 MG/3D
1 PATCH, EXTENDED RELEASE TRANSDERMAL
Refills: 0 | Status: DISCONTINUED | OUTPATIENT
Start: 2020-01-01 | End: 2020-01-01

## 2020-01-01 RX ORDER — VANCOMYCIN HCL 1 G
1250 VIAL (EA) INTRAVENOUS ONCE
Refills: 0 | Status: DISCONTINUED | OUTPATIENT
Start: 2020-01-01 | End: 2020-01-01

## 2020-01-01 RX ORDER — SIMETHICONE 80 MG/1
80 TABLET, CHEWABLE ORAL EVERY 6 HOURS
Refills: 0 | Status: DISCONTINUED | OUTPATIENT
Start: 2020-01-01 | End: 2020-01-01

## 2020-01-01 RX ORDER — GLUCAGON INJECTION, SOLUTION 0.5 MG/.1ML
1 INJECTION, SOLUTION SUBCUTANEOUS ONCE
Refills: 0 | Status: DISCONTINUED | OUTPATIENT
Start: 2020-01-01 | End: 2020-01-01

## 2020-01-01 RX ORDER — INSULIN HUMAN 100 [IU]/ML
5.5 INJECTION, SOLUTION SUBCUTANEOUS
Qty: 100 | Refills: 0 | Status: DISCONTINUED | OUTPATIENT
Start: 2020-01-01 | End: 2020-01-01

## 2020-01-01 RX ORDER — COLLAGENASE CLOSTRIDIUM HIST. 250 UNIT/G
1 OINTMENT (GRAM) TOPICAL DAILY
Refills: 0 | Status: DISCONTINUED | OUTPATIENT
Start: 2020-01-01 | End: 2020-01-01

## 2020-01-01 RX ORDER — PHENYLEPHRINE HYDROCHLORIDE 10 MG/ML
0.01 INJECTION INTRAVENOUS
Qty: 160 | Refills: 0 | Status: DISCONTINUED | OUTPATIENT
Start: 2020-01-01 | End: 2020-01-01

## 2020-01-01 RX ORDER — ALTEPLASE 100 MG
2 KIT INTRAVENOUS ONCE
Refills: 0 | Status: COMPLETED | OUTPATIENT
Start: 2020-01-01 | End: 2020-01-01

## 2020-01-01 RX ORDER — BENZOCAINE AND MENTHOL 5; 1 G/100ML; G/100ML
1 LIQUID ORAL
Refills: 0 | Status: DISCONTINUED | OUTPATIENT
Start: 2020-01-01 | End: 2020-01-01

## 2020-01-01 RX ORDER — MIDODRINE HYDROCHLORIDE 2.5 MG/1
5 TABLET ORAL ONCE
Refills: 0 | Status: COMPLETED | OUTPATIENT
Start: 2020-01-01 | End: 2020-01-01

## 2020-01-01 RX ORDER — INSULIN GLARGINE 100 [IU]/ML
15 INJECTION, SOLUTION SUBCUTANEOUS
Qty: 0 | Refills: 0 | DISCHARGE
Start: 2020-01-01

## 2020-01-01 RX ORDER — SODIUM ZIRCONIUM CYCLOSILICATE 10 G/10G
10 POWDER, FOR SUSPENSION ORAL ONCE
Refills: 0 | Status: COMPLETED | OUTPATIENT
Start: 2020-01-01 | End: 2020-01-01

## 2020-01-01 RX ORDER — CARVEDILOL PHOSPHATE 80 MG/1
1 CAPSULE, EXTENDED RELEASE ORAL
Qty: 0 | Refills: 0 | DISCHARGE

## 2020-01-01 RX ORDER — NOREPINEPHRINE BITARTRATE/D5W 8 MG/250ML
0.05 PLASTIC BAG, INJECTION (ML) INTRAVENOUS
Qty: 16 | Refills: 0 | Status: DISCONTINUED | OUTPATIENT
Start: 2020-01-01 | End: 2020-01-01

## 2020-01-01 RX ORDER — HEPARIN SODIUM 5000 [USP'U]/ML
5000 INJECTION INTRAVENOUS; SUBCUTANEOUS EVERY 8 HOURS
Refills: 0 | Status: DISCONTINUED | OUTPATIENT
Start: 2020-01-01 | End: 2020-01-01

## 2020-01-01 RX ORDER — SERTRALINE 25 MG/1
1 TABLET, FILM COATED ORAL
Qty: 30 | Refills: 2
Start: 2020-01-01 | End: 2020-01-01

## 2020-01-01 RX ORDER — INSULIN GLARGINE 100 [IU]/ML
28 INJECTION, SOLUTION SUBCUTANEOUS EVERY MORNING
Refills: 0 | Status: DISCONTINUED | OUTPATIENT
Start: 2020-01-01 | End: 2020-01-01

## 2020-01-01 RX ORDER — ALBUMIN HUMAN 25 %
250 VIAL (ML) INTRAVENOUS ONCE
Refills: 0 | Status: COMPLETED | OUTPATIENT
Start: 2020-01-01 | End: 2020-01-01

## 2020-01-01 RX ORDER — APIXABAN 2.5 MG/1
2.5 TABLET, FILM COATED ORAL EVERY 12 HOURS
Refills: 0 | Status: DISCONTINUED | OUTPATIENT
Start: 2020-01-01 | End: 2020-01-01

## 2020-01-01 RX ORDER — CEFTRIAXONE 500 MG/1
2000 INJECTION, POWDER, FOR SOLUTION INTRAMUSCULAR; INTRAVENOUS ONCE
Refills: 0 | Status: COMPLETED | OUTPATIENT
Start: 2020-01-01 | End: 2020-01-01

## 2020-01-01 RX ORDER — ACETAMINOPHEN 500 MG
650 TABLET ORAL EVERY 6 HOURS
Refills: 0 | Status: DISCONTINUED | OUTPATIENT
Start: 2020-01-01 | End: 2020-01-01

## 2020-01-01 RX ORDER — ACETAMINOPHEN 500 MG
650 TABLET ORAL ONCE
Refills: 0 | Status: COMPLETED | OUTPATIENT
Start: 2020-01-01 | End: 2020-01-01

## 2020-01-01 RX ORDER — SEVELAMER CARBONATE 2400 MG/1
800 POWDER, FOR SUSPENSION ORAL EVERY 8 HOURS
Refills: 0 | Status: DISCONTINUED | OUTPATIENT
Start: 2020-01-01 | End: 2020-01-01

## 2020-01-01 RX ORDER — PIPERACILLIN AND TAZOBACTAM 4; .5 G/20ML; G/20ML
4.5 INJECTION, POWDER, LYOPHILIZED, FOR SOLUTION INTRAVENOUS EVERY 12 HOURS
Refills: 0 | Status: DISCONTINUED | OUTPATIENT
Start: 2020-01-01 | End: 2020-01-01

## 2020-01-01 RX ORDER — SODIUM CHLORIDE 9 MG/ML
1000 INJECTION INTRAMUSCULAR; INTRAVENOUS; SUBCUTANEOUS ONCE
Refills: 0 | Status: COMPLETED | OUTPATIENT
Start: 2020-01-01 | End: 2020-01-01

## 2020-01-01 RX ORDER — CEFTRIAXONE 500 MG/1
2 INJECTION, POWDER, FOR SOLUTION INTRAMUSCULAR; INTRAVENOUS
Qty: 0 | Refills: 0 | DISCHARGE
Start: 2020-01-01

## 2020-01-01 RX ORDER — MIDODRINE HYDROCHLORIDE 2.5 MG/1
15 TABLET ORAL EVERY 8 HOURS
Refills: 0 | Status: DISCONTINUED | OUTPATIENT
Start: 2020-01-01 | End: 2020-01-01

## 2020-01-01 RX ORDER — INSULIN GLARGINE 100 [IU]/ML
40 INJECTION, SOLUTION SUBCUTANEOUS AT BEDTIME
Refills: 0 | Status: DISCONTINUED | OUTPATIENT
Start: 2020-01-01 | End: 2020-01-01

## 2020-01-01 RX ORDER — SODIUM CHLORIDE 9 MG/ML
500 INJECTION, SOLUTION INTRAVENOUS ONCE
Refills: 0 | Status: COMPLETED | OUTPATIENT
Start: 2020-01-01 | End: 2020-01-01

## 2020-01-01 RX ORDER — INSULIN LISPRO 100/ML
2 VIAL (ML) SUBCUTANEOUS
Refills: 0 | Status: DISCONTINUED | OUTPATIENT
Start: 2020-01-01 | End: 2020-01-01

## 2020-01-01 RX ORDER — SENNA PLUS 8.6 MG/1
2 TABLET ORAL AT BEDTIME
Refills: 0 | Status: DISCONTINUED | OUTPATIENT
Start: 2020-01-01 | End: 2020-01-01

## 2020-01-01 RX ORDER — INSULIN GLARGINE 100 [IU]/ML
7 INJECTION, SOLUTION SUBCUTANEOUS AT BEDTIME
Refills: 0 | Status: DISCONTINUED | OUTPATIENT
Start: 2020-01-01 | End: 2020-01-01

## 2020-01-01 RX ORDER — SODIUM ZIRCONIUM CYCLOSILICATE 10 G/10G
10 POWDER, FOR SUSPENSION ORAL EVERY 8 HOURS
Refills: 0 | Status: COMPLETED | OUTPATIENT
Start: 2020-01-01 | End: 2020-01-01

## 2020-01-01 RX ORDER — HYDROMORPHONE HYDROCHLORIDE 2 MG/ML
0.5 INJECTION INTRAMUSCULAR; INTRAVENOUS; SUBCUTANEOUS ONCE
Refills: 0 | Status: DISCONTINUED | OUTPATIENT
Start: 2020-01-01 | End: 2020-01-01

## 2020-01-01 RX ORDER — VASOPRESSIN 20 [USP'U]/ML
0.02 INJECTION INTRAVENOUS
Qty: 50 | Refills: 0 | Status: DISCONTINUED | OUTPATIENT
Start: 2020-01-01 | End: 2020-01-01

## 2020-01-01 RX ORDER — INSULIN GLARGINE 100 [IU]/ML
15 INJECTION, SOLUTION SUBCUTANEOUS AT BEDTIME
Refills: 0 | Status: DISCONTINUED | OUTPATIENT
Start: 2020-01-01 | End: 2020-01-01

## 2020-01-01 RX ORDER — ALBUMIN HUMAN 25 %
500 VIAL (ML) INTRAVENOUS ONCE
Refills: 0 | Status: COMPLETED | OUTPATIENT
Start: 2020-01-01 | End: 2020-01-01

## 2020-01-01 RX ORDER — NOREPINEPHRINE BITARTRATE/D5W 8 MG/250ML
0.05 PLASTIC BAG, INJECTION (ML) INTRAVENOUS
Qty: 8 | Refills: 0 | Status: DISCONTINUED | OUTPATIENT
Start: 2020-01-01 | End: 2020-01-01

## 2020-01-01 RX ORDER — MODAFINIL 200 MG/1
200 TABLET ORAL EVERY 24 HOURS
Refills: 0 | Status: DISCONTINUED | OUTPATIENT
Start: 2020-01-01 | End: 2020-01-01

## 2020-01-01 RX ORDER — ALBUMIN HUMAN 25 %
50 VIAL (ML) INTRAVENOUS
Refills: 0 | Status: DISCONTINUED | OUTPATIENT
Start: 2020-01-01 | End: 2020-01-01

## 2020-01-01 RX ORDER — VANCOMYCIN HCL 1 G
1500 VIAL (EA) INTRAVENOUS ONCE
Refills: 0 | Status: COMPLETED | OUTPATIENT
Start: 2020-01-01 | End: 2020-01-01

## 2020-01-01 RX ORDER — DEXTROSE 50 % IN WATER 50 %
12.5 SYRINGE (ML) INTRAVENOUS ONCE
Refills: 0 | Status: DISCONTINUED | OUTPATIENT
Start: 2020-01-01 | End: 2020-01-01

## 2020-01-01 RX ORDER — ALBUMIN HUMAN 25 %
50 VIAL (ML) INTRAVENOUS EVERY 8 HOURS
Refills: 0 | Status: DISCONTINUED | OUTPATIENT
Start: 2020-01-01 | End: 2020-01-01

## 2020-01-01 RX ORDER — ALBUMIN HUMAN 25 %
50 VIAL (ML) INTRAVENOUS EVERY 6 HOURS
Refills: 0 | Status: DISCONTINUED | OUTPATIENT
Start: 2020-01-01 | End: 2020-01-01

## 2020-01-01 RX ORDER — SERTRALINE 25 MG/1
50 TABLET, FILM COATED ORAL DAILY
Refills: 0 | Status: DISCONTINUED | OUTPATIENT
Start: 2020-01-01 | End: 2020-01-01

## 2020-01-01 RX ORDER — INSULIN LISPRO 100/ML
5 VIAL (ML) SUBCUTANEOUS
Refills: 0 | Status: DISCONTINUED | OUTPATIENT
Start: 2020-01-01 | End: 2020-01-01

## 2020-01-01 RX ORDER — MIDODRINE HYDROCHLORIDE 2.5 MG/1
10 TABLET ORAL THREE TIMES A DAY
Refills: 0 | Status: DISCONTINUED | OUTPATIENT
Start: 2020-01-01 | End: 2020-01-01

## 2020-01-01 RX ORDER — MIRTAZAPINE 45 MG/1
7.5 TABLET, ORALLY DISINTEGRATING ORAL DAILY
Refills: 0 | Status: DISCONTINUED | OUTPATIENT
Start: 2020-01-01 | End: 2020-01-01

## 2020-01-01 RX ORDER — CEFTAROLINE FOSAMIL 600 MG/20ML
600 POWDER, FOR SOLUTION INTRAVENOUS EVERY 12 HOURS
Refills: 0 | Status: DISCONTINUED | OUTPATIENT
Start: 2020-01-01 | End: 2020-01-01

## 2020-01-01 RX ORDER — MIDODRINE HYDROCHLORIDE 2.5 MG/1
5 TABLET ORAL THREE TIMES A DAY
Refills: 0 | Status: DISCONTINUED | OUTPATIENT
Start: 2020-01-01 | End: 2020-01-01

## 2020-01-01 RX ORDER — CEFTRIAXONE 500 MG/1
INJECTION, POWDER, FOR SOLUTION INTRAMUSCULAR; INTRAVENOUS
Refills: 0 | Status: DISCONTINUED | OUTPATIENT
Start: 2020-01-01 | End: 2020-01-01

## 2020-01-01 RX ORDER — SODIUM CHLORIDE 9 MG/ML
1000 INJECTION INTRAMUSCULAR; INTRAVENOUS; SUBCUTANEOUS
Refills: 0 | Status: DISCONTINUED | OUTPATIENT
Start: 2020-01-01 | End: 2020-01-01

## 2020-01-01 RX ORDER — ONDANSETRON 8 MG/1
4 TABLET, FILM COATED ORAL ONCE
Refills: 0 | Status: DISCONTINUED | OUTPATIENT
Start: 2020-01-01 | End: 2020-01-01

## 2020-01-01 RX ORDER — INSULIN GLARGINE 100 [IU]/ML
10 INJECTION, SOLUTION SUBCUTANEOUS AT BEDTIME
Refills: 0 | Status: DISCONTINUED | OUTPATIENT
Start: 2020-01-01 | End: 2020-01-01

## 2020-01-01 RX ORDER — HEPARIN SODIUM 5000 [USP'U]/ML
300 INJECTION INTRAVENOUS; SUBCUTANEOUS
Qty: 25000 | Refills: 0 | Status: DISCONTINUED | OUTPATIENT
Start: 2020-01-01 | End: 2020-01-01

## 2020-01-01 RX ORDER — INSULIN GLARGINE 100 [IU]/ML
8 INJECTION, SOLUTION SUBCUTANEOUS AT BEDTIME
Refills: 0 | Status: DISCONTINUED | OUTPATIENT
Start: 2020-01-01 | End: 2020-01-01

## 2020-01-01 RX ORDER — SITAGLIPTIN AND METFORMIN HYDROCHLORIDE 500; 50 MG/1; MG/1
1 TABLET, FILM COATED ORAL
Qty: 0 | Refills: 0 | DISCHARGE

## 2020-01-01 RX ORDER — PANTOPRAZOLE SODIUM 20 MG/1
40 TABLET, DELAYED RELEASE ORAL DAILY
Refills: 0 | Status: DISCONTINUED | OUTPATIENT
Start: 2020-01-01 | End: 2020-01-01

## 2020-01-01 RX ORDER — DRONABINOL 2.5 MG
2.5 CAPSULE ORAL
Refills: 0 | Status: DISCONTINUED | OUTPATIENT
Start: 2020-01-01 | End: 2020-01-01

## 2020-01-01 RX ORDER — INSULIN LISPRO 100/ML
4 VIAL (ML) SUBCUTANEOUS
Refills: 0 | Status: DISCONTINUED | OUTPATIENT
Start: 2020-01-01 | End: 2020-01-01

## 2020-01-01 RX ORDER — CEFTRIAXONE 500 MG/1
1000 INJECTION, POWDER, FOR SOLUTION INTRAMUSCULAR; INTRAVENOUS ONCE
Refills: 0 | Status: COMPLETED | OUTPATIENT
Start: 2020-01-01 | End: 2020-01-01

## 2020-01-01 RX ORDER — LIDOCAINE 4 G/100G
5 CREAM TOPICAL ONCE
Refills: 0 | Status: COMPLETED | OUTPATIENT
Start: 2020-01-01 | End: 2020-01-01

## 2020-01-01 RX ORDER — SODIUM FERRIC GLUCONAT/SUCROSE 62.5MG/5ML
125 AMPUL (ML) INTRAVENOUS ONCE
Refills: 0 | Status: COMPLETED | OUTPATIENT
Start: 2020-01-01 | End: 2020-01-01

## 2020-01-01 RX ORDER — DAPTOMYCIN 500 MG/10ML
700 INJECTION, POWDER, LYOPHILIZED, FOR SOLUTION INTRAVENOUS
Refills: 0 | Status: DISCONTINUED | OUTPATIENT
Start: 2020-01-01 | End: 2020-01-01

## 2020-01-01 RX ORDER — SODIUM CHLORIDE 9 MG/ML
500 INJECTION INTRAMUSCULAR; INTRAVENOUS; SUBCUTANEOUS ONCE
Refills: 0 | Status: COMPLETED | OUTPATIENT
Start: 2020-01-01 | End: 2020-01-01

## 2020-01-01 RX ORDER — IRON SUCROSE 20 MG/ML
200 INJECTION, SOLUTION INTRAVENOUS ONCE
Refills: 0 | Status: COMPLETED | OUTPATIENT
Start: 2020-01-01 | End: 2020-01-01

## 2020-01-01 RX ORDER — TAMSULOSIN HYDROCHLORIDE 0.4 MG/1
0.4 CAPSULE ORAL AT BEDTIME
Refills: 0 | Status: DISCONTINUED | OUTPATIENT
Start: 2020-01-01 | End: 2020-01-01

## 2020-01-01 RX ORDER — HYDROMORPHONE HYDROCHLORIDE 2 MG/ML
0.25 INJECTION INTRAMUSCULAR; INTRAVENOUS; SUBCUTANEOUS ONCE
Refills: 0 | Status: DISCONTINUED | OUTPATIENT
Start: 2020-01-01 | End: 2020-01-01

## 2020-01-01 RX ORDER — INSULIN HUMAN 100 [IU]/ML
9 INJECTION, SOLUTION SUBCUTANEOUS
Qty: 100 | Refills: 0 | Status: DISCONTINUED | OUTPATIENT
Start: 2020-01-01 | End: 2020-01-01

## 2020-01-01 RX ORDER — SEVELAMER CARBONATE 2400 MG/1
1600 POWDER, FOR SUSPENSION ORAL
Refills: 0 | Status: DISCONTINUED | OUTPATIENT
Start: 2020-01-01 | End: 2020-01-01

## 2020-01-01 RX ORDER — PANTOPRAZOLE SODIUM 20 MG/1
40 TABLET, DELAYED RELEASE ORAL
Refills: 0 | Status: DISCONTINUED | OUTPATIENT
Start: 2020-01-01 | End: 2020-01-01

## 2020-01-01 RX ORDER — HEPARIN SODIUM 5000 [USP'U]/ML
400 INJECTION INTRAVENOUS; SUBCUTANEOUS
Qty: 25000 | Refills: 0 | Status: DISCONTINUED | OUTPATIENT
Start: 2020-01-01 | End: 2020-01-01

## 2020-01-01 RX ORDER — CALCIUM CARBONATE 500(1250)
1 TABLET ORAL ONCE
Refills: 0 | Status: COMPLETED | OUTPATIENT
Start: 2020-01-01 | End: 2020-01-01

## 2020-01-01 RX ORDER — BENZOCAINE AND MENTHOL 5; 1 G/100ML; G/100ML
1 LIQUID ORAL ONCE
Refills: 0 | Status: COMPLETED | OUTPATIENT
Start: 2020-01-01 | End: 2020-01-01

## 2020-01-01 RX ORDER — CEFTAROLINE FOSAMIL 600 MG/20ML
400 POWDER, FOR SOLUTION INTRAVENOUS ONCE
Refills: 0 | Status: COMPLETED | OUTPATIENT
Start: 2020-01-01 | End: 2020-01-01

## 2020-01-01 RX ORDER — FINASTERIDE 5 MG/1
5 TABLET, FILM COATED ORAL DAILY
Refills: 0 | Status: DISCONTINUED | OUTPATIENT
Start: 2020-01-01 | End: 2020-01-01

## 2020-01-01 RX ORDER — PHENYLEPHRINE HYDROCHLORIDE 10 MG/ML
0.3 INJECTION INTRAVENOUS
Qty: 160 | Refills: 0 | Status: DISCONTINUED | OUTPATIENT
Start: 2020-01-01 | End: 2020-01-01

## 2020-01-01 RX ORDER — SODIUM BICARBONATE 1 MEQ/ML
650 SYRINGE (ML) INTRAVENOUS DAILY
Refills: 0 | Status: DISCONTINUED | OUTPATIENT
Start: 2020-01-01 | End: 2020-01-01

## 2020-01-01 RX ORDER — SODIUM FERRIC GLUCONAT/SUCROSE 62.5MG/5ML
10 AMPUL (ML) INTRAVENOUS
Qty: 0 | Refills: 0 | DISCHARGE
Start: 2020-01-01

## 2020-01-01 RX ORDER — INSULIN LISPRO 100/ML
6 VIAL (ML) SUBCUTANEOUS
Refills: 0 | Status: DISCONTINUED | OUTPATIENT
Start: 2020-01-01 | End: 2020-01-01

## 2020-01-01 RX ORDER — PANTOPRAZOLE SODIUM 20 MG/1
80 TABLET, DELAYED RELEASE ORAL EVERY 12 HOURS
Refills: 0 | Status: DISCONTINUED | OUTPATIENT
Start: 2020-01-01 | End: 2020-01-01

## 2020-01-01 RX ORDER — MIDODRINE HYDROCHLORIDE 2.5 MG/1
5 TABLET ORAL EVERY 8 HOURS
Refills: 0 | Status: DISCONTINUED | OUTPATIENT
Start: 2020-01-01 | End: 2020-01-01

## 2020-01-01 RX ORDER — INSULIN HUMAN 100 [IU]/ML
6.5 INJECTION, SOLUTION SUBCUTANEOUS
Qty: 100 | Refills: 0 | Status: DISCONTINUED | OUTPATIENT
Start: 2020-01-01 | End: 2020-01-01

## 2020-01-01 RX ORDER — SODIUM CHLORIDE 9 MG/ML
250 INJECTION INTRAMUSCULAR; INTRAVENOUS; SUBCUTANEOUS ONCE
Refills: 0 | Status: COMPLETED | OUTPATIENT
Start: 2020-01-01 | End: 2020-01-01

## 2020-01-01 RX ORDER — PHENYLEPHRINE HYDROCHLORIDE 10 MG/ML
0.1 INJECTION INTRAVENOUS
Qty: 40 | Refills: 0 | Status: DISCONTINUED | OUTPATIENT
Start: 2020-01-01 | End: 2020-01-01

## 2020-01-01 RX ORDER — INSULIN HUMAN 100 [IU]/ML
2 INJECTION, SOLUTION SUBCUTANEOUS EVERY 6 HOURS
Refills: 0 | Status: DISCONTINUED | OUTPATIENT
Start: 2020-01-01 | End: 2020-01-01

## 2020-01-01 RX ORDER — INSULIN GLARGINE 100 [IU]/ML
4 INJECTION, SOLUTION SUBCUTANEOUS AT BEDTIME
Refills: 0 | Status: DISCONTINUED | OUTPATIENT
Start: 2020-01-01 | End: 2020-01-01

## 2020-01-01 RX ORDER — INSULIN LISPRO 100/ML
8 VIAL (ML) SUBCUTANEOUS
Refills: 0 | Status: DISCONTINUED | OUTPATIENT
Start: 2020-01-01 | End: 2020-01-01

## 2020-01-01 RX ORDER — PHYTONADIONE (VIT K1) 5 MG
5 TABLET ORAL ONCE
Refills: 0 | Status: COMPLETED | OUTPATIENT
Start: 2020-01-01 | End: 2020-01-01

## 2020-01-01 RX ORDER — INSULIN LISPRO 100/ML
10 VIAL (ML) SUBCUTANEOUS
Refills: 0 | Status: DISCONTINUED | OUTPATIENT
Start: 2020-01-01 | End: 2020-01-01

## 2020-01-01 RX ORDER — MIRTAZAPINE 45 MG/1
15 TABLET, ORALLY DISINTEGRATING ORAL DAILY
Refills: 0 | Status: DISCONTINUED | OUTPATIENT
Start: 2020-01-01 | End: 2020-01-01

## 2020-01-01 RX ORDER — CALCIUM CARBONATE 500(1250)
1 TABLET ORAL
Qty: 0 | Refills: 0 | DISCHARGE
Start: 2020-01-01

## 2020-01-01 RX ORDER — HEPARIN SODIUM 5000 [USP'U]/ML
450 INJECTION INTRAVENOUS; SUBCUTANEOUS
Qty: 25000 | Refills: 0 | Status: DISCONTINUED | OUTPATIENT
Start: 2020-01-01 | End: 2020-01-01

## 2020-01-01 RX ORDER — CHLORHEXIDINE GLUCONATE 213 G/1000ML
1 SOLUTION TOPICAL
Refills: 0 | Status: DISCONTINUED | OUTPATIENT
Start: 2020-01-01 | End: 2020-01-01

## 2020-01-01 RX ORDER — FUROSEMIDE 40 MG
20 TABLET ORAL DAILY
Refills: 0 | Status: DISCONTINUED | OUTPATIENT
Start: 2020-01-01 | End: 2020-01-01

## 2020-01-01 RX ORDER — MIRTAZAPINE 45 MG/1
15 TABLET, ORALLY DISINTEGRATING ORAL AT BEDTIME
Refills: 0 | Status: DISCONTINUED | OUTPATIENT
Start: 2020-01-01 | End: 2020-01-01

## 2020-01-01 RX ORDER — DRONABINOL 2.5 MG
2.5 CAPSULE ORAL DAILY
Refills: 0 | Status: DISCONTINUED | OUTPATIENT
Start: 2020-01-01 | End: 2020-01-01

## 2020-01-01 RX ORDER — INSULIN LISPRO 100/ML
VIAL (ML) SUBCUTANEOUS AT BEDTIME
Refills: 0 | Status: DISCONTINUED | OUTPATIENT
Start: 2020-01-01 | End: 2020-01-01

## 2020-01-01 RX ORDER — MIRTAZAPINE 45 MG/1
1 TABLET, ORALLY DISINTEGRATING ORAL
Qty: 0 | Refills: 0 | DISCHARGE
Start: 2020-01-01

## 2020-01-01 RX ORDER — MIDODRINE HYDROCHLORIDE 2.5 MG/1
30 TABLET ORAL EVERY 8 HOURS
Refills: 0 | Status: DISCONTINUED | OUTPATIENT
Start: 2020-01-01 | End: 2020-01-01

## 2020-01-01 RX ORDER — IOHEXOL 300 MG/ML
30 INJECTION, SOLUTION INTRAVENOUS ONCE
Refills: 0 | Status: COMPLETED | OUTPATIENT
Start: 2020-01-01 | End: 2020-01-01

## 2020-01-01 RX ORDER — DEXTROSE 50 % IN WATER 50 %
15 SYRINGE (ML) INTRAVENOUS ONCE
Refills: 0 | Status: DISCONTINUED | OUTPATIENT
Start: 2020-01-01 | End: 2020-01-01

## 2020-01-01 RX ORDER — CEFTAROLINE FOSAMIL 600 MG/20ML
POWDER, FOR SOLUTION INTRAVENOUS
Refills: 0 | Status: DISCONTINUED | OUTPATIENT
Start: 2020-01-01 | End: 2020-01-01

## 2020-01-01 RX ORDER — MAGNESIUM SULFATE 500 MG/ML
2 VIAL (ML) INJECTION ONCE
Refills: 0 | Status: COMPLETED | OUTPATIENT
Start: 2020-01-01 | End: 2020-01-01

## 2020-01-01 RX ORDER — ZINC OXIDE 200 MG/G
1 OINTMENT TOPICAL
Refills: 0 | Status: DISCONTINUED | OUTPATIENT
Start: 2020-01-01 | End: 2020-01-01

## 2020-01-01 RX ORDER — CEFTAROLINE FOSAMIL 600 MG/20ML
200 POWDER, FOR SOLUTION INTRAVENOUS EVERY 12 HOURS
Refills: 0 | Status: DISCONTINUED | OUTPATIENT
Start: 2020-01-01 | End: 2020-01-01

## 2020-01-01 RX ORDER — HYDROCORTISONE 20 MG
50 TABLET ORAL EVERY 6 HOURS
Refills: 0 | Status: DISCONTINUED | OUTPATIENT
Start: 2020-01-01 | End: 2020-01-01

## 2020-01-01 RX ORDER — INSULIN HUMAN 100 [IU]/ML
10 INJECTION, SOLUTION SUBCUTANEOUS ONCE
Refills: 0 | Status: COMPLETED | OUTPATIENT
Start: 2020-01-01 | End: 2020-01-01

## 2020-01-01 RX ORDER — SEVELAMER CARBONATE 2400 MG/1
1600 POWDER, FOR SUSPENSION ORAL EVERY 8 HOURS
Refills: 0 | Status: DISCONTINUED | OUTPATIENT
Start: 2020-01-01 | End: 2020-01-01

## 2020-01-01 RX ORDER — LACTULOSE 10 G/15ML
20 SOLUTION ORAL EVERY 8 HOURS
Refills: 0 | Status: DISCONTINUED | OUTPATIENT
Start: 2020-01-01 | End: 2020-01-01

## 2020-01-01 RX ORDER — SODIUM BICARBONATE 1 MEQ/ML
650 SYRINGE (ML) INTRAVENOUS THREE TIMES A DAY
Refills: 0 | Status: DISCONTINUED | OUTPATIENT
Start: 2020-01-01 | End: 2020-01-01

## 2020-01-01 RX ORDER — HUMAN INSULIN 100 [IU]/ML
10 INJECTION, SUSPENSION SUBCUTANEOUS ONCE
Refills: 0 | Status: COMPLETED | OUTPATIENT
Start: 2020-01-01 | End: 2020-01-01

## 2020-01-01 RX ORDER — BENZOCAINE AND MENTHOL 5; 1 G/100ML; G/100ML
0 LIQUID ORAL
Qty: 0 | Refills: 0 | DISCHARGE
Start: 2020-01-01

## 2020-01-01 RX ORDER — DRONABINOL 2.5 MG
1 CAPSULE ORAL
Qty: 0 | Refills: 0 | DISCHARGE
Start: 2020-01-01

## 2020-01-01 RX ORDER — INSULIN GLARGINE 100 [IU]/ML
15 INJECTION, SOLUTION SUBCUTANEOUS EVERY MORNING
Refills: 0 | Status: DISCONTINUED | OUTPATIENT
Start: 2020-01-01 | End: 2020-01-01

## 2020-01-01 RX ORDER — COSYNTROPIN 0.25 MG/ML
0.25 INJECTION, SOLUTION INTRAVENOUS ONCE
Refills: 0 | Status: COMPLETED | OUTPATIENT
Start: 2020-01-01 | End: 2020-01-01

## 2020-01-01 RX ORDER — PHYTONADIONE (VIT K1) 5 MG
10 TABLET ORAL EVERY 24 HOURS
Refills: 0 | Status: DISCONTINUED | OUTPATIENT
Start: 2020-01-01 | End: 2020-01-01

## 2020-01-01 RX ORDER — PHENYLEPHRINE HYDROCHLORIDE 10 MG/ML
10 INJECTION INTRAVENOUS
Qty: 160 | Refills: 0 | Status: DISCONTINUED | OUTPATIENT
Start: 2020-01-01 | End: 2020-01-01

## 2020-01-01 RX ORDER — HYDROCORTISONE 20 MG
50 TABLET ORAL EVERY 24 HOURS
Refills: 0 | Status: DISCONTINUED | OUTPATIENT
Start: 2020-01-01 | End: 2020-01-01

## 2020-01-01 RX ORDER — DAPTOMYCIN 500 MG/10ML
500 INJECTION, POWDER, LYOPHILIZED, FOR SOLUTION INTRAVENOUS
Refills: 0 | Status: DISCONTINUED | OUTPATIENT
Start: 2020-01-01 | End: 2020-01-01

## 2020-01-01 RX ORDER — INSULIN GLARGINE 100 [IU]/ML
5 INJECTION, SOLUTION SUBCUTANEOUS AT BEDTIME
Refills: 0 | Status: DISCONTINUED | OUTPATIENT
Start: 2020-01-01 | End: 2020-01-01

## 2020-01-01 RX ORDER — MIRTAZAPINE 45 MG/1
7.5 TABLET, ORALLY DISINTEGRATING ORAL EVERY 24 HOURS
Refills: 0 | Status: DISCONTINUED | OUTPATIENT
Start: 2020-01-01 | End: 2020-01-01

## 2020-01-01 RX ORDER — PSYLLIUM SEED (WITH DEXTROSE)
1 POWDER (GRAM) ORAL DAILY
Refills: 0 | Status: DISCONTINUED | OUTPATIENT
Start: 2020-01-01 | End: 2020-01-01

## 2020-01-01 RX ORDER — MEGESTROL ACETATE 40 MG/ML
400 SUSPENSION ORAL DAILY
Refills: 0 | Status: DISCONTINUED | OUTPATIENT
Start: 2020-01-01 | End: 2020-01-01

## 2020-01-01 RX ORDER — NOREPINEPHRINE BITARTRATE/D5W 8 MG/250ML
0.04 PLASTIC BAG, INJECTION (ML) INTRAVENOUS
Qty: 16 | Refills: 0 | Status: DISCONTINUED | OUTPATIENT
Start: 2020-01-01 | End: 2020-01-01

## 2020-01-01 RX ORDER — ERYTHROPOIETIN 10000 [IU]/ML
20000 INJECTION, SOLUTION INTRAVENOUS; SUBCUTANEOUS ONCE
Refills: 0 | Status: COMPLETED | OUTPATIENT
Start: 2020-01-01 | End: 2020-01-01

## 2020-01-01 RX ORDER — SENNA PLUS 8.6 MG/1
2 TABLET ORAL
Qty: 0 | Refills: 0 | DISCHARGE
Start: 2020-01-01

## 2020-01-01 RX ORDER — VANCOMYCIN HCL 1 G
1000 VIAL (EA) INTRAVENOUS ONCE
Refills: 0 | Status: COMPLETED | OUTPATIENT
Start: 2020-01-01 | End: 2020-01-01

## 2020-01-01 RX ORDER — HEPARIN SODIUM 5000 [USP'U]/ML
500 INJECTION INTRAVENOUS; SUBCUTANEOUS
Qty: 25000 | Refills: 0 | Status: DISCONTINUED | OUTPATIENT
Start: 2020-01-01 | End: 2020-01-01

## 2020-01-01 RX ORDER — INSULIN LISPRO 100/ML
6 VIAL (ML) SUBCUTANEOUS
Qty: 100 | Refills: 0
Start: 2020-01-01

## 2020-01-01 RX ORDER — POTASSIUM CHLORIDE 20 MEQ
20 PACKET (EA) ORAL ONCE
Refills: 0 | Status: COMPLETED | OUTPATIENT
Start: 2020-01-01 | End: 2020-01-01

## 2020-01-01 RX ORDER — FLUCONAZOLE 150 MG/1
50 TABLET ORAL DAILY
Refills: 0 | Status: DISCONTINUED | OUTPATIENT
Start: 2020-01-01 | End: 2020-01-01

## 2020-01-01 RX ORDER — DAPTOMYCIN 500 MG/10ML
550 INJECTION, POWDER, LYOPHILIZED, FOR SOLUTION INTRAVENOUS
Refills: 0 | Status: DISCONTINUED | OUTPATIENT
Start: 2020-01-01 | End: 2020-01-01

## 2020-01-01 RX ORDER — SODIUM CHLORIDE 9 MG/ML
500 INJECTION INTRAMUSCULAR; INTRAVENOUS; SUBCUTANEOUS ONCE
Refills: 0 | Status: DISCONTINUED | OUTPATIENT
Start: 2020-01-01 | End: 2020-01-01

## 2020-01-01 RX ORDER — MEROPENEM 1 G/30ML
500 INJECTION INTRAVENOUS EVERY 24 HOURS
Refills: 0 | Status: DISCONTINUED | OUTPATIENT
Start: 2020-01-01 | End: 2020-01-01

## 2020-01-01 RX ADMIN — Medication 1 APPLICATION(S): at 10:54

## 2020-01-01 RX ADMIN — LACTULOSE 20 GRAM(S): 10 SOLUTION ORAL at 17:57

## 2020-01-01 RX ADMIN — Medication 4: at 12:00

## 2020-01-01 RX ADMIN — MIDODRINE HYDROCHLORIDE 5 MILLIGRAM(S): 2.5 TABLET ORAL at 19:09

## 2020-01-01 RX ADMIN — Medication 2: at 07:53

## 2020-01-01 RX ADMIN — Medication 3 MICROGRAM(S)/KG/MIN: at 19:16

## 2020-01-01 RX ADMIN — Medication 2: at 09:22

## 2020-01-01 RX ADMIN — NYSTATIN CREAM 1 APPLICATION(S): 100000 CREAM TOPICAL at 17:27

## 2020-01-01 RX ADMIN — CHLORHEXIDINE GLUCONATE 1 APPLICATION(S): 213 SOLUTION TOPICAL at 06:20

## 2020-01-01 RX ADMIN — Medication 4: at 09:18

## 2020-01-01 RX ADMIN — Medication 50 MILLILITER(S): at 16:30

## 2020-01-01 RX ADMIN — SERTRALINE 50 MILLIGRAM(S): 25 TABLET, FILM COATED ORAL at 12:45

## 2020-01-01 RX ADMIN — Medication 1 APPLICATION(S): at 11:29

## 2020-01-01 RX ADMIN — Medication 3: at 23:16

## 2020-01-01 RX ADMIN — INSULIN HUMAN 9 UNIT(S)/HR: 100 INJECTION, SOLUTION SUBCUTANEOUS at 02:12

## 2020-01-01 RX ADMIN — Medication 3.37 MICROGRAM(S)/KG/MIN: at 16:59

## 2020-01-01 RX ADMIN — MIRTAZAPINE 7.5 MILLIGRAM(S): 45 TABLET, ORALLY DISINTEGRATING ORAL at 13:41

## 2020-01-01 RX ADMIN — Medication 55 MILLIGRAM(S): at 22:05

## 2020-01-01 RX ADMIN — Medication 50 MILLIGRAM(S): at 14:30

## 2020-01-01 RX ADMIN — NYSTATIN CREAM 1 APPLICATION(S): 100000 CREAM TOPICAL at 18:15

## 2020-01-01 RX ADMIN — CHLORHEXIDINE GLUCONATE 1 APPLICATION(S): 213 SOLUTION TOPICAL at 05:26

## 2020-01-01 RX ADMIN — SEVELAMER CARBONATE 800 MILLIGRAM(S): 2400 POWDER, FOR SUSPENSION ORAL at 05:16

## 2020-01-01 RX ADMIN — CHLORHEXIDINE GLUCONATE 1 APPLICATION(S): 213 SOLUTION TOPICAL at 05:27

## 2020-01-01 RX ADMIN — PANTOPRAZOLE SODIUM 40 MILLIGRAM(S): 20 TABLET, DELAYED RELEASE ORAL at 10:53

## 2020-01-01 RX ADMIN — CHLORHEXIDINE GLUCONATE 1 APPLICATION(S): 213 SOLUTION TOPICAL at 05:13

## 2020-01-01 RX ADMIN — Medication 2 UNIT(S): at 11:26

## 2020-01-01 RX ADMIN — Medication 10 UNIT(S): at 12:55

## 2020-01-01 RX ADMIN — INSULIN GLARGINE 12 UNIT(S): 100 INJECTION, SOLUTION SUBCUTANEOUS at 23:43

## 2020-01-01 RX ADMIN — PANTOPRAZOLE SODIUM 40 MILLIGRAM(S): 20 TABLET, DELAYED RELEASE ORAL at 11:55

## 2020-01-01 RX ADMIN — CHLORHEXIDINE GLUCONATE 1 APPLICATION(S): 213 SOLUTION TOPICAL at 12:25

## 2020-01-01 RX ADMIN — IOHEXOL 30 MILLILITER(S): 300 INJECTION, SOLUTION INTRAVENOUS at 15:50

## 2020-01-01 RX ADMIN — NYSTATIN CREAM 1 APPLICATION(S): 100000 CREAM TOPICAL at 07:52

## 2020-01-01 RX ADMIN — INSULIN HUMAN 9 UNIT(S)/HR: 100 INJECTION, SOLUTION SUBCUTANEOUS at 16:05

## 2020-01-01 RX ADMIN — CHLORHEXIDINE GLUCONATE 1 APPLICATION(S): 213 SOLUTION TOPICAL at 05:30

## 2020-01-01 RX ADMIN — Medication 650 MILLIGRAM(S): at 11:25

## 2020-01-01 RX ADMIN — CHLORHEXIDINE GLUCONATE 1 APPLICATION(S): 213 SOLUTION TOPICAL at 06:30

## 2020-01-01 RX ADMIN — NYSTATIN CREAM 1 APPLICATION(S): 100000 CREAM TOPICAL at 08:31

## 2020-01-01 RX ADMIN — NYSTATIN CREAM 1 APPLICATION(S): 100000 CREAM TOPICAL at 05:25

## 2020-01-01 RX ADMIN — Medication 10: at 11:44

## 2020-01-01 RX ADMIN — Medication 500000 UNIT(S): at 06:21

## 2020-01-01 RX ADMIN — HEPARIN SODIUM 5000 UNIT(S): 5000 INJECTION INTRAVENOUS; SUBCUTANEOUS at 22:49

## 2020-01-01 RX ADMIN — LACTULOSE 20 GRAM(S): 10 SOLUTION ORAL at 18:33

## 2020-01-01 RX ADMIN — Medication 55 MILLIGRAM(S): at 19:38

## 2020-01-01 RX ADMIN — Medication 2: at 17:30

## 2020-01-01 RX ADMIN — Medication 2: at 00:16

## 2020-01-01 RX ADMIN — NYSTATIN CREAM 1 APPLICATION(S): 100000 CREAM TOPICAL at 18:55

## 2020-01-01 RX ADMIN — MIDODRINE HYDROCHLORIDE 15 MILLIGRAM(S): 2.5 TABLET ORAL at 18:25

## 2020-01-01 RX ADMIN — MIDODRINE HYDROCHLORIDE 15 MILLIGRAM(S): 2.5 TABLET ORAL at 06:33

## 2020-01-01 RX ADMIN — SCOPALAMINE 1 PATCH: 1 PATCH, EXTENDED RELEASE TRANSDERMAL at 18:56

## 2020-01-01 RX ADMIN — MIDODRINE HYDROCHLORIDE 5 MILLIGRAM(S): 2.5 TABLET ORAL at 22:29

## 2020-01-01 RX ADMIN — Medication 4 UNIT(S): at 16:26

## 2020-01-01 RX ADMIN — Medication 50 MILLIGRAM(S): at 20:56

## 2020-01-01 RX ADMIN — Medication 2: at 16:55

## 2020-01-01 RX ADMIN — MIDODRINE HYDROCHLORIDE 15 MILLIGRAM(S): 2.5 TABLET ORAL at 23:00

## 2020-01-01 RX ADMIN — Medication 400 MILLIGRAM(S): at 15:58

## 2020-01-01 RX ADMIN — VASOPRESSIN 1.2 UNIT(S)/MIN: 20 INJECTION INTRAVENOUS at 13:55

## 2020-01-01 RX ADMIN — MIDODRINE HYDROCHLORIDE 15 MILLIGRAM(S): 2.5 TABLET ORAL at 05:38

## 2020-01-01 RX ADMIN — VASOPRESSIN 2.4 UNIT(S)/MIN: 20 INJECTION INTRAVENOUS at 10:16

## 2020-01-01 RX ADMIN — Medication 50 MILLIGRAM(S): at 15:16

## 2020-01-01 RX ADMIN — DAPTOMYCIN 128 MILLIGRAM(S): 500 INJECTION, POWDER, LYOPHILIZED, FOR SOLUTION INTRAVENOUS at 16:36

## 2020-01-01 RX ADMIN — Medication 4: at 11:27

## 2020-01-01 RX ADMIN — SIMVASTATIN 10 MILLIGRAM(S): 20 TABLET, FILM COATED ORAL at 00:12

## 2020-01-01 RX ADMIN — PANTOPRAZOLE SODIUM 40 MILLIGRAM(S): 20 TABLET, DELAYED RELEASE ORAL at 10:05

## 2020-01-01 RX ADMIN — LIDOCAINE 1 PATCH: 4 CREAM TOPICAL at 10:38

## 2020-01-01 RX ADMIN — SEVELAMER CARBONATE 1600 MILLIGRAM(S): 2400 POWDER, FOR SUSPENSION ORAL at 16:35

## 2020-01-01 RX ADMIN — PANTOPRAZOLE SODIUM 40 MILLIGRAM(S): 20 TABLET, DELAYED RELEASE ORAL at 08:32

## 2020-01-01 RX ADMIN — NYSTATIN CREAM 1 APPLICATION(S): 100000 CREAM TOPICAL at 17:45

## 2020-01-01 RX ADMIN — Medication 2: at 21:57

## 2020-01-01 RX ADMIN — MIDODRINE HYDROCHLORIDE 15 MILLIGRAM(S): 2.5 TABLET ORAL at 05:01

## 2020-01-01 RX ADMIN — MIDODRINE HYDROCHLORIDE 15 MILLIGRAM(S): 2.5 TABLET ORAL at 13:59

## 2020-01-01 RX ADMIN — Medication 500000 UNIT(S): at 06:48

## 2020-01-01 RX ADMIN — INSULIN GLARGINE 8 UNIT(S): 100 INJECTION, SOLUTION SUBCUTANEOUS at 22:29

## 2020-01-01 RX ADMIN — Medication 50 MILLIGRAM(S): at 06:14

## 2020-01-01 RX ADMIN — SENNA PLUS 2 TABLET(S): 8.6 TABLET ORAL at 21:47

## 2020-01-01 RX ADMIN — PHENYLEPHRINE HYDROCHLORIDE 4.04 MICROGRAM(S)/KG/MIN: 10 INJECTION INTRAVENOUS at 23:33

## 2020-01-01 RX ADMIN — Medication 2: at 17:21

## 2020-01-01 RX ADMIN — Medication 50 MILLIGRAM(S): at 11:49

## 2020-01-01 RX ADMIN — TAMSULOSIN HYDROCHLORIDE 0.4 MILLIGRAM(S): 0.4 CAPSULE ORAL at 22:05

## 2020-01-01 RX ADMIN — SEVELAMER CARBONATE 1600 MILLIGRAM(S): 2400 POWDER, FOR SUSPENSION ORAL at 22:13

## 2020-01-01 RX ADMIN — Medication 2: at 06:30

## 2020-01-01 RX ADMIN — INSULIN GLARGINE 4 UNIT(S): 100 INJECTION, SOLUTION SUBCUTANEOUS at 21:36

## 2020-01-01 RX ADMIN — Medication 2: at 21:28

## 2020-01-01 RX ADMIN — MEROPENEM 100 MILLIGRAM(S): 1 INJECTION INTRAVENOUS at 15:30

## 2020-01-01 RX ADMIN — Medication 50 MILLIGRAM(S): at 06:00

## 2020-01-01 RX ADMIN — PANTOPRAZOLE SODIUM 40 MILLIGRAM(S): 20 TABLET, DELAYED RELEASE ORAL at 10:14

## 2020-01-01 RX ADMIN — CEFTRIAXONE 100 MILLIGRAM(S): 500 INJECTION, POWDER, FOR SOLUTION INTRAMUSCULAR; INTRAVENOUS at 09:32

## 2020-01-01 RX ADMIN — SEVELAMER CARBONATE 1600 MILLIGRAM(S): 2400 POWDER, FOR SUSPENSION ORAL at 14:29

## 2020-01-01 RX ADMIN — Medication 2.5 MILLIGRAM(S): at 12:44

## 2020-01-01 RX ADMIN — Medication 3.37 MICROGRAM(S)/KG/MIN: at 07:07

## 2020-01-01 RX ADMIN — MIDODRINE HYDROCHLORIDE 10 MILLIGRAM(S): 2.5 TABLET ORAL at 06:17

## 2020-01-01 RX ADMIN — INSULIN GLARGINE 4 UNIT(S): 100 INJECTION, SOLUTION SUBCUTANEOUS at 21:58

## 2020-01-01 RX ADMIN — NYSTATIN CREAM 1 APPLICATION(S): 100000 CREAM TOPICAL at 18:19

## 2020-01-01 RX ADMIN — Medication 300 MILLIGRAM(S): at 11:25

## 2020-01-01 RX ADMIN — SEVELAMER CARBONATE 800 MILLIGRAM(S): 2400 POWDER, FOR SUSPENSION ORAL at 13:19

## 2020-01-01 RX ADMIN — TAMSULOSIN HYDROCHLORIDE 0.4 MILLIGRAM(S): 0.4 CAPSULE ORAL at 22:36

## 2020-01-01 RX ADMIN — CHLORHEXIDINE GLUCONATE 1 APPLICATION(S): 213 SOLUTION TOPICAL at 05:11

## 2020-01-01 RX ADMIN — FINASTERIDE 5 MILLIGRAM(S): 5 TABLET, FILM COATED ORAL at 12:09

## 2020-01-01 RX ADMIN — Medication 4: at 06:06

## 2020-01-01 RX ADMIN — INSULIN GLARGINE 20 UNIT(S): 100 INJECTION, SOLUTION SUBCUTANEOUS at 22:36

## 2020-01-01 RX ADMIN — NYSTATIN CREAM 1 APPLICATION(S): 100000 CREAM TOPICAL at 07:00

## 2020-01-01 RX ADMIN — Medication 3.37 MICROGRAM(S)/KG/MIN: at 06:42

## 2020-01-01 RX ADMIN — PANTOPRAZOLE SODIUM 40 MILLIGRAM(S): 20 TABLET, DELAYED RELEASE ORAL at 11:12

## 2020-01-01 RX ADMIN — LIDOCAINE 1 PATCH: 4 CREAM TOPICAL at 21:53

## 2020-01-01 RX ADMIN — ZINC OXIDE 1 APPLICATION(S): 200 OINTMENT TOPICAL at 17:12

## 2020-01-01 RX ADMIN — MIDODRINE HYDROCHLORIDE 10 MILLIGRAM(S): 2.5 TABLET ORAL at 22:13

## 2020-01-01 RX ADMIN — Medication 650 MILLIGRAM(S): at 12:55

## 2020-01-01 RX ADMIN — Medication 2: at 09:04

## 2020-01-01 RX ADMIN — Medication 50 MILLIGRAM(S): at 06:51

## 2020-01-01 RX ADMIN — Medication 1 APPLICATION(S): at 19:00

## 2020-01-01 RX ADMIN — FINASTERIDE 5 MILLIGRAM(S): 5 TABLET, FILM COATED ORAL at 12:25

## 2020-01-01 RX ADMIN — Medication 2 UNIT(S): at 11:30

## 2020-01-01 RX ADMIN — MIDODRINE HYDROCHLORIDE 30 MILLIGRAM(S): 2.5 TABLET ORAL at 16:49

## 2020-01-01 RX ADMIN — MIRTAZAPINE 7.5 MILLIGRAM(S): 45 TABLET, ORALLY DISINTEGRATING ORAL at 11:37

## 2020-01-01 RX ADMIN — SERTRALINE 50 MILLIGRAM(S): 25 TABLET, FILM COATED ORAL at 13:41

## 2020-01-01 RX ADMIN — VASOPRESSIN 2.4 UNIT(S)/MIN: 20 INJECTION INTRAVENOUS at 07:52

## 2020-01-01 RX ADMIN — MIDODRINE HYDROCHLORIDE 10 MILLIGRAM(S): 2.5 TABLET ORAL at 14:19

## 2020-01-01 RX ADMIN — MIDODRINE HYDROCHLORIDE 10 MILLIGRAM(S): 2.5 TABLET ORAL at 13:33

## 2020-01-01 RX ADMIN — VASOPRESSIN 2.4 UNIT(S)/MIN: 20 INJECTION INTRAVENOUS at 13:43

## 2020-01-01 RX ADMIN — HEPARIN SODIUM 5000 UNIT(S): 5000 INJECTION INTRAVENOUS; SUBCUTANEOUS at 06:02

## 2020-01-01 RX ADMIN — HEPARIN SODIUM 5000 UNIT(S): 5000 INJECTION INTRAVENOUS; SUBCUTANEOUS at 06:56

## 2020-01-01 RX ADMIN — MIDODRINE HYDROCHLORIDE 5 MILLIGRAM(S): 2.5 TABLET ORAL at 13:10

## 2020-01-01 RX ADMIN — DAPTOMYCIN 128 MILLIGRAM(S): 500 INJECTION, POWDER, LYOPHILIZED, FOR SOLUTION INTRAVENOUS at 16:57

## 2020-01-01 RX ADMIN — Medication 1 APPLICATION(S): at 12:45

## 2020-01-01 RX ADMIN — Medication 650 MILLIGRAM(S): at 21:15

## 2020-01-01 RX ADMIN — SEVELAMER CARBONATE 1600 MILLIGRAM(S): 2400 POWDER, FOR SUSPENSION ORAL at 17:51

## 2020-01-01 RX ADMIN — CEFTRIAXONE 100 MILLIGRAM(S): 500 INJECTION, POWDER, FOR SOLUTION INTRAMUSCULAR; INTRAVENOUS at 09:36

## 2020-01-01 RX ADMIN — Medication 50 MILLILITER(S): at 13:10

## 2020-01-01 RX ADMIN — NYSTATIN CREAM 1 APPLICATION(S): 100000 CREAM TOPICAL at 05:41

## 2020-01-01 RX ADMIN — Medication 2: at 11:53

## 2020-01-01 RX ADMIN — Medication 650 MILLIGRAM(S): at 22:56

## 2020-01-01 RX ADMIN — CHLORHEXIDINE GLUCONATE 1 APPLICATION(S): 213 SOLUTION TOPICAL at 06:46

## 2020-01-01 RX ADMIN — FINASTERIDE 5 MILLIGRAM(S): 5 TABLET, FILM COATED ORAL at 12:44

## 2020-01-01 RX ADMIN — SEVELAMER CARBONATE 1600 MILLIGRAM(S): 2400 POWDER, FOR SUSPENSION ORAL at 06:33

## 2020-01-01 RX ADMIN — Medication 3.37 MICROGRAM(S)/KG/MIN: at 13:49

## 2020-01-01 RX ADMIN — Medication 50 MILLIGRAM(S): at 23:07

## 2020-01-01 RX ADMIN — Medication 650 MILLIGRAM(S): at 08:37

## 2020-01-01 RX ADMIN — SERTRALINE 50 MILLIGRAM(S): 25 TABLET, FILM COATED ORAL at 11:34

## 2020-01-01 RX ADMIN — FINASTERIDE 5 MILLIGRAM(S): 5 TABLET, FILM COATED ORAL at 13:48

## 2020-01-01 RX ADMIN — VASOPRESSIN 2.4 UNIT(S)/MIN: 20 INJECTION INTRAVENOUS at 22:49

## 2020-01-01 RX ADMIN — MIRTAZAPINE 7.5 MILLIGRAM(S): 45 TABLET, ORALLY DISINTEGRATING ORAL at 11:10

## 2020-01-01 RX ADMIN — INSULIN GLARGINE 10 UNIT(S): 100 INJECTION, SOLUTION SUBCUTANEOUS at 21:36

## 2020-01-01 RX ADMIN — Medication 125 MILLILITER(S): at 02:36

## 2020-01-01 RX ADMIN — Medication 50 MILLIGRAM(S): at 06:34

## 2020-01-01 RX ADMIN — MEGESTROL ACETATE 400 MILLIGRAM(S): 40 SUSPENSION ORAL at 12:31

## 2020-01-01 RX ADMIN — NYSTATIN CREAM 1 APPLICATION(S): 100000 CREAM TOPICAL at 18:54

## 2020-01-01 RX ADMIN — CHLORHEXIDINE GLUCONATE 1 APPLICATION(S): 213 SOLUTION TOPICAL at 05:51

## 2020-01-01 RX ADMIN — HEPARIN SODIUM 5000 UNIT(S): 5000 INJECTION INTRAVENOUS; SUBCUTANEOUS at 15:45

## 2020-01-01 RX ADMIN — NYSTATIN CREAM 1 APPLICATION(S): 100000 CREAM TOPICAL at 06:11

## 2020-01-01 RX ADMIN — Medication 2.5 MILLIGRAM(S): at 16:35

## 2020-01-01 RX ADMIN — INSULIN GLARGINE 10 UNIT(S): 100 INJECTION, SOLUTION SUBCUTANEOUS at 22:16

## 2020-01-01 RX ADMIN — IRON SUCROSE 110 MILLIGRAM(S): 20 INJECTION, SOLUTION INTRAVENOUS at 19:00

## 2020-01-01 RX ADMIN — SODIUM CHLORIDE 1000 MILLILITER(S): 9 INJECTION INTRAMUSCULAR; INTRAVENOUS; SUBCUTANEOUS at 22:58

## 2020-01-01 RX ADMIN — Medication 4: at 17:50

## 2020-01-01 RX ADMIN — Medication 2: at 11:26

## 2020-01-01 RX ADMIN — Medication 1 APPLICATION(S): at 11:45

## 2020-01-01 RX ADMIN — MIRTAZAPINE 7.5 MILLIGRAM(S): 45 TABLET, ORALLY DISINTEGRATING ORAL at 22:02

## 2020-01-01 RX ADMIN — SERTRALINE 50 MILLIGRAM(S): 25 TABLET, FILM COATED ORAL at 11:22

## 2020-01-01 RX ADMIN — PANTOPRAZOLE SODIUM 40 MILLIGRAM(S): 20 TABLET, DELAYED RELEASE ORAL at 09:55

## 2020-01-01 RX ADMIN — Medication 50 MILLIGRAM(S): at 14:19

## 2020-01-01 RX ADMIN — FINASTERIDE 5 MILLIGRAM(S): 5 TABLET, FILM COATED ORAL at 12:55

## 2020-01-01 RX ADMIN — MIDODRINE HYDROCHLORIDE 15 MILLIGRAM(S): 2.5 TABLET ORAL at 21:28

## 2020-01-01 RX ADMIN — SERTRALINE 50 MILLIGRAM(S): 25 TABLET, FILM COATED ORAL at 23:43

## 2020-01-01 RX ADMIN — Medication 2.5 MILLIGRAM(S): at 13:10

## 2020-01-01 RX ADMIN — MIDODRINE HYDROCHLORIDE 10 MILLIGRAM(S): 2.5 TABLET ORAL at 22:42

## 2020-01-01 RX ADMIN — TAMSULOSIN HYDROCHLORIDE 0.4 MILLIGRAM(S): 0.4 CAPSULE ORAL at 22:49

## 2020-01-01 RX ADMIN — Medication 50 GRAM(S): at 11:45

## 2020-01-01 RX ADMIN — NYSTATIN CREAM 1 APPLICATION(S): 100000 CREAM TOPICAL at 17:37

## 2020-01-01 RX ADMIN — MIDODRINE HYDROCHLORIDE 10 MILLIGRAM(S): 2.5 TABLET ORAL at 07:40

## 2020-01-01 RX ADMIN — INSULIN GLARGINE 15 UNIT(S): 100 INJECTION, SOLUTION SUBCUTANEOUS at 07:26

## 2020-01-01 RX ADMIN — SODIUM CHLORIDE 1000 MILLILITER(S): 9 INJECTION INTRAMUSCULAR; INTRAVENOUS; SUBCUTANEOUS at 16:42

## 2020-01-01 RX ADMIN — SCOPALAMINE 1 PATCH: 1 PATCH, EXTENDED RELEASE TRANSDERMAL at 17:18

## 2020-01-01 RX ADMIN — PANTOPRAZOLE SODIUM 40 MILLIGRAM(S): 20 TABLET, DELAYED RELEASE ORAL at 10:11

## 2020-01-01 RX ADMIN — Medication 650 MILLIGRAM(S): at 18:07

## 2020-01-01 RX ADMIN — Medication 2: at 22:43

## 2020-01-01 RX ADMIN — Medication 110 MILLIGRAM(S): at 12:26

## 2020-01-01 RX ADMIN — HEPARIN SODIUM 5000 UNIT(S): 5000 INJECTION INTRAVENOUS; SUBCUTANEOUS at 06:46

## 2020-01-01 RX ADMIN — Medication 2.5 MILLIGRAM(S): at 11:18

## 2020-01-01 RX ADMIN — ERYTHROPOIETIN 20000 UNIT(S): 10000 INJECTION, SOLUTION INTRAVENOUS; SUBCUTANEOUS at 22:44

## 2020-01-01 RX ADMIN — Medication 2: at 21:39

## 2020-01-01 RX ADMIN — SEVELAMER CARBONATE 800 MILLIGRAM(S): 2400 POWDER, FOR SUSPENSION ORAL at 05:39

## 2020-01-01 RX ADMIN — Medication 2: at 12:21

## 2020-01-01 RX ADMIN — Medication 1 APPLICATION(S): at 11:59

## 2020-01-01 RX ADMIN — FINASTERIDE 5 MILLIGRAM(S): 5 TABLET, FILM COATED ORAL at 13:05

## 2020-01-01 RX ADMIN — Medication 2 UNIT(S): at 06:48

## 2020-01-01 RX ADMIN — Medication 1 APPLICATION(S): at 11:37

## 2020-01-01 RX ADMIN — MIDODRINE HYDROCHLORIDE 15 MILLIGRAM(S): 2.5 TABLET ORAL at 12:54

## 2020-01-01 RX ADMIN — SERTRALINE 50 MILLIGRAM(S): 25 TABLET, FILM COATED ORAL at 12:08

## 2020-01-01 RX ADMIN — CEFTAROLINE FOSAMIL 50 MILLIGRAM(S): 600 POWDER, FOR SOLUTION INTRAVENOUS at 17:23

## 2020-01-01 RX ADMIN — Medication 2: at 11:22

## 2020-01-01 RX ADMIN — MIDODRINE HYDROCHLORIDE 15 MILLIGRAM(S): 2.5 TABLET ORAL at 06:15

## 2020-01-01 RX ADMIN — INSULIN GLARGINE 22 UNIT(S): 100 INJECTION, SOLUTION SUBCUTANEOUS at 09:08

## 2020-01-01 RX ADMIN — Medication 2 UNIT(S): at 07:12

## 2020-01-01 RX ADMIN — Medication 50 MILLILITER(S): at 07:15

## 2020-01-01 RX ADMIN — NYSTATIN CREAM 1 APPLICATION(S): 100000 CREAM TOPICAL at 06:44

## 2020-01-01 RX ADMIN — Medication 10 UNIT(S): at 19:09

## 2020-01-01 RX ADMIN — Medication 500000 UNIT(S): at 19:10

## 2020-01-01 RX ADMIN — NYSTATIN CREAM 1 APPLICATION(S): 100000 CREAM TOPICAL at 05:26

## 2020-01-01 RX ADMIN — Medication 650 MILLIGRAM(S): at 05:29

## 2020-01-01 RX ADMIN — Medication 3.37 MICROGRAM(S)/KG/MIN: at 18:07

## 2020-01-01 RX ADMIN — CHLORHEXIDINE GLUCONATE 1 APPLICATION(S): 213 SOLUTION TOPICAL at 05:18

## 2020-01-01 RX ADMIN — CHLORHEXIDINE GLUCONATE 1 APPLICATION(S): 213 SOLUTION TOPICAL at 06:33

## 2020-01-01 RX ADMIN — SERTRALINE 50 MILLIGRAM(S): 25 TABLET, FILM COATED ORAL at 11:57

## 2020-01-01 RX ADMIN — Medication 1 APPLICATION(S): at 12:18

## 2020-01-01 RX ADMIN — SIMVASTATIN 10 MILLIGRAM(S): 20 TABLET, FILM COATED ORAL at 22:42

## 2020-01-01 RX ADMIN — ALTEPLASE 2 MILLIGRAM(S): KIT at 14:52

## 2020-01-01 RX ADMIN — INSULIN HUMAN 10 UNIT(S): 100 INJECTION, SOLUTION SUBCUTANEOUS at 14:37

## 2020-01-01 RX ADMIN — SERTRALINE 50 MILLIGRAM(S): 25 TABLET, FILM COATED ORAL at 19:44

## 2020-01-01 RX ADMIN — MIRTAZAPINE 7.5 MILLIGRAM(S): 45 TABLET, ORALLY DISINTEGRATING ORAL at 01:50

## 2020-01-01 RX ADMIN — Medication 50 MILLIGRAM(S): at 18:54

## 2020-01-01 RX ADMIN — MIDODRINE HYDROCHLORIDE 10 MILLIGRAM(S): 2.5 TABLET ORAL at 05:07

## 2020-01-01 RX ADMIN — INSULIN GLARGINE 12 UNIT(S): 100 INJECTION, SOLUTION SUBCUTANEOUS at 22:34

## 2020-01-01 RX ADMIN — CHLORHEXIDINE GLUCONATE 1 APPLICATION(S): 213 SOLUTION TOPICAL at 06:02

## 2020-01-01 RX ADMIN — Medication 650 MILLIGRAM(S): at 22:17

## 2020-01-01 RX ADMIN — CEFTAROLINE FOSAMIL 50 MILLIGRAM(S): 600 POWDER, FOR SOLUTION INTRAVENOUS at 05:36

## 2020-01-01 RX ADMIN — Medication 1 APPLICATION(S): at 13:33

## 2020-01-01 RX ADMIN — PANTOPRAZOLE SODIUM 40 MILLIGRAM(S): 20 TABLET, DELAYED RELEASE ORAL at 19:09

## 2020-01-01 RX ADMIN — NYSTATIN CREAM 1 APPLICATION(S): 100000 CREAM TOPICAL at 19:07

## 2020-01-01 RX ADMIN — MIDODRINE HYDROCHLORIDE 15 MILLIGRAM(S): 2.5 TABLET ORAL at 13:16

## 2020-01-01 RX ADMIN — PHENYLEPHRINE HYDROCHLORIDE 2.69 MICROGRAM(S)/KG/MIN: 10 INJECTION INTRAVENOUS at 17:46

## 2020-01-01 RX ADMIN — CEFTRIAXONE 100 MILLIGRAM(S): 500 INJECTION, POWDER, FOR SOLUTION INTRAMUSCULAR; INTRAVENOUS at 00:06

## 2020-01-01 RX ADMIN — NYSTATIN CREAM 1 APPLICATION(S): 100000 CREAM TOPICAL at 06:49

## 2020-01-01 RX ADMIN — LIDOCAINE 5 MILLILITER(S): 4 CREAM TOPICAL at 11:25

## 2020-01-01 RX ADMIN — Medication 2: at 12:41

## 2020-01-01 RX ADMIN — MIDODRINE HYDROCHLORIDE 15 MILLIGRAM(S): 2.5 TABLET ORAL at 10:46

## 2020-01-01 RX ADMIN — NYSTATIN CREAM 1 APPLICATION(S): 100000 CREAM TOPICAL at 05:51

## 2020-01-01 RX ADMIN — CEFTAROLINE FOSAMIL 50 MILLIGRAM(S): 600 POWDER, FOR SOLUTION INTRAVENOUS at 05:37

## 2020-01-01 RX ADMIN — CEFTRIAXONE 100 MILLIGRAM(S): 500 INJECTION, POWDER, FOR SOLUTION INTRAMUSCULAR; INTRAVENOUS at 12:52

## 2020-01-01 RX ADMIN — SIMVASTATIN 10 MILLIGRAM(S): 20 TABLET, FILM COATED ORAL at 21:46

## 2020-01-01 RX ADMIN — HEPARIN SODIUM 5000 UNIT(S): 5000 INJECTION INTRAVENOUS; SUBCUTANEOUS at 13:10

## 2020-01-01 RX ADMIN — Medication 50 MILLILITER(S): at 14:10

## 2020-01-01 RX ADMIN — CEFTRIAXONE 100 MILLIGRAM(S): 500 INJECTION, POWDER, FOR SOLUTION INTRAMUSCULAR; INTRAVENOUS at 21:39

## 2020-01-01 RX ADMIN — Medication 1 APPLICATION(S): at 11:28

## 2020-01-01 RX ADMIN — PANTOPRAZOLE SODIUM 40 MILLIGRAM(S): 20 TABLET, DELAYED RELEASE ORAL at 10:07

## 2020-01-01 RX ADMIN — DAPTOMYCIN 128 MILLIGRAM(S): 500 INJECTION, POWDER, LYOPHILIZED, FOR SOLUTION INTRAVENOUS at 16:06

## 2020-01-01 RX ADMIN — Medication 50 MILLIGRAM(S): at 10:53

## 2020-01-01 RX ADMIN — Medication 6: at 16:29

## 2020-01-01 RX ADMIN — HEPARIN SODIUM 5000 UNIT(S): 5000 INJECTION INTRAVENOUS; SUBCUTANEOUS at 22:35

## 2020-01-01 RX ADMIN — CHLORHEXIDINE GLUCONATE 1 APPLICATION(S): 213 SOLUTION TOPICAL at 05:49

## 2020-01-01 RX ADMIN — PHENYLEPHRINE HYDROCHLORIDE 4.04 MICROGRAM(S)/KG/MIN: 10 INJECTION INTRAVENOUS at 07:11

## 2020-01-01 RX ADMIN — Medication 4: at 17:07

## 2020-01-01 RX ADMIN — SEVELAMER CARBONATE 800 MILLIGRAM(S): 2400 POWDER, FOR SUSPENSION ORAL at 21:43

## 2020-01-01 RX ADMIN — MIRTAZAPINE 7.5 MILLIGRAM(S): 45 TABLET, ORALLY DISINTEGRATING ORAL at 16:13

## 2020-01-01 RX ADMIN — Medication 50 MILLIGRAM(S): at 05:50

## 2020-01-01 RX ADMIN — TAMSULOSIN HYDROCHLORIDE 0.4 MILLIGRAM(S): 0.4 CAPSULE ORAL at 21:46

## 2020-01-01 RX ADMIN — Medication 10: at 09:06

## 2020-01-01 RX ADMIN — CHLORHEXIDINE GLUCONATE 1 APPLICATION(S): 213 SOLUTION TOPICAL at 06:48

## 2020-01-01 RX ADMIN — CEFTAROLINE FOSAMIL 50 MILLIGRAM(S): 600 POWDER, FOR SOLUTION INTRAVENOUS at 15:50

## 2020-01-01 RX ADMIN — MIDODRINE HYDROCHLORIDE 15 MILLIGRAM(S): 2.5 TABLET ORAL at 12:33

## 2020-01-01 RX ADMIN — Medication 2: at 11:57

## 2020-01-01 RX ADMIN — SIMVASTATIN 10 MILLIGRAM(S): 20 TABLET, FILM COATED ORAL at 22:05

## 2020-01-01 RX ADMIN — Medication 2: at 11:41

## 2020-01-01 RX ADMIN — PANTOPRAZOLE SODIUM 40 MILLIGRAM(S): 20 TABLET, DELAYED RELEASE ORAL at 17:02

## 2020-01-01 RX ADMIN — HUMAN INSULIN 8 UNIT(S): 100 INJECTION, SUSPENSION SUBCUTANEOUS at 14:41

## 2020-01-01 RX ADMIN — CHLORHEXIDINE GLUCONATE 1 APPLICATION(S): 213 SOLUTION TOPICAL at 11:22

## 2020-01-01 RX ADMIN — CEFTAROLINE FOSAMIL 50 MILLIGRAM(S): 600 POWDER, FOR SOLUTION INTRAVENOUS at 17:17

## 2020-01-01 RX ADMIN — Medication 50 MILLIGRAM(S): at 00:20

## 2020-01-01 RX ADMIN — CHLORHEXIDINE GLUCONATE 1 APPLICATION(S): 213 SOLUTION TOPICAL at 06:15

## 2020-01-01 RX ADMIN — SERTRALINE 50 MILLIGRAM(S): 25 TABLET, FILM COATED ORAL at 11:20

## 2020-01-01 RX ADMIN — Medication 50 MILLIGRAM(S): at 21:57

## 2020-01-01 RX ADMIN — HEPARIN SODIUM 5000 UNIT(S): 5000 INJECTION INTRAVENOUS; SUBCUTANEOUS at 06:48

## 2020-01-01 RX ADMIN — DAPTOMYCIN 128 MILLIGRAM(S): 500 INJECTION, POWDER, LYOPHILIZED, FOR SOLUTION INTRAVENOUS at 16:05

## 2020-01-01 RX ADMIN — HEPARIN SODIUM 5000 UNIT(S): 5000 INJECTION INTRAVENOUS; SUBCUTANEOUS at 06:32

## 2020-01-01 RX ADMIN — FINASTERIDE 5 MILLIGRAM(S): 5 TABLET, FILM COATED ORAL at 13:10

## 2020-01-01 RX ADMIN — CHLORHEXIDINE GLUCONATE 1 APPLICATION(S): 213 SOLUTION TOPICAL at 12:03

## 2020-01-01 RX ADMIN — Medication 500000 UNIT(S): at 00:44

## 2020-01-01 RX ADMIN — Medication 2.5 MILLIGRAM(S): at 18:20

## 2020-01-01 RX ADMIN — Medication 2: at 22:24

## 2020-01-01 RX ADMIN — Medication 2: at 09:47

## 2020-01-01 RX ADMIN — LACTULOSE 20 GRAM(S): 10 SOLUTION ORAL at 13:43

## 2020-01-01 RX ADMIN — Medication 4: at 16:24

## 2020-01-01 RX ADMIN — Medication 50 MILLIGRAM(S): at 22:54

## 2020-01-01 RX ADMIN — MIDODRINE HYDROCHLORIDE 5 MILLIGRAM(S): 2.5 TABLET ORAL at 05:24

## 2020-01-01 RX ADMIN — SODIUM ZIRCONIUM CYCLOSILICATE 10 GRAM(S): 10 POWDER, FOR SUSPENSION ORAL at 16:05

## 2020-01-01 RX ADMIN — NYSTATIN CREAM 1 APPLICATION(S): 100000 CREAM TOPICAL at 19:22

## 2020-01-01 RX ADMIN — Medication 650 MILLIGRAM(S): at 12:00

## 2020-01-01 RX ADMIN — MIDODRINE HYDROCHLORIDE 15 MILLIGRAM(S): 2.5 TABLET ORAL at 21:37

## 2020-01-01 RX ADMIN — NYSTATIN CREAM 1 APPLICATION(S): 100000 CREAM TOPICAL at 15:57

## 2020-01-01 RX ADMIN — INSULIN GLARGINE 8 UNIT(S): 100 INJECTION, SOLUTION SUBCUTANEOUS at 21:33

## 2020-01-01 RX ADMIN — PIPERACILLIN AND TAZOBACTAM 200 GRAM(S): 4; .5 INJECTION, POWDER, LYOPHILIZED, FOR SOLUTION INTRAVENOUS at 21:25

## 2020-01-01 RX ADMIN — SCOPALAMINE 1 PATCH: 1 PATCH, EXTENDED RELEASE TRANSDERMAL at 18:24

## 2020-01-01 RX ADMIN — Medication 1 APPLICATION(S): at 11:48

## 2020-01-01 RX ADMIN — MIDODRINE HYDROCHLORIDE 15 MILLIGRAM(S): 2.5 TABLET ORAL at 22:17

## 2020-01-01 RX ADMIN — INSULIN GLARGINE 8 UNIT(S): 100 INJECTION, SOLUTION SUBCUTANEOUS at 00:07

## 2020-01-01 RX ADMIN — Medication 8: at 22:49

## 2020-01-01 RX ADMIN — Medication 50 MILLILITER(S): at 12:16

## 2020-01-01 RX ADMIN — NYSTATIN CREAM 1 APPLICATION(S): 100000 CREAM TOPICAL at 05:27

## 2020-01-01 RX ADMIN — CEFTAROLINE FOSAMIL 50 MILLIGRAM(S): 600 POWDER, FOR SOLUTION INTRAVENOUS at 17:07

## 2020-01-01 RX ADMIN — LACTULOSE 20 GRAM(S): 10 SOLUTION ORAL at 00:11

## 2020-01-01 RX ADMIN — PANTOPRAZOLE SODIUM 40 MILLIGRAM(S): 20 TABLET, DELAYED RELEASE ORAL at 10:52

## 2020-01-01 RX ADMIN — Medication 2: at 15:45

## 2020-01-01 RX ADMIN — SERTRALINE 50 MILLIGRAM(S): 25 TABLET, FILM COATED ORAL at 11:54

## 2020-01-01 RX ADMIN — Medication 50 MILLILITER(S): at 12:45

## 2020-01-01 RX ADMIN — Medication 1 APPLICATION(S): at 11:10

## 2020-01-01 RX ADMIN — MIDODRINE HYDROCHLORIDE 15 MILLIGRAM(S): 2.5 TABLET ORAL at 21:57

## 2020-01-01 RX ADMIN — Medication 50 MILLIGRAM(S): at 07:20

## 2020-01-01 RX ADMIN — INSULIN GLARGINE 7 UNIT(S): 100 INJECTION, SOLUTION SUBCUTANEOUS at 23:57

## 2020-01-01 RX ADMIN — Medication 2: at 17:27

## 2020-01-01 RX ADMIN — Medication 3.37 MICROGRAM(S)/KG/MIN: at 11:54

## 2020-01-01 RX ADMIN — Medication 650 MILLIGRAM(S): at 13:38

## 2020-01-01 RX ADMIN — DAPTOMYCIN 120 MILLIGRAM(S): 500 INJECTION, POWDER, LYOPHILIZED, FOR SOLUTION INTRAVENOUS at 16:00

## 2020-01-01 RX ADMIN — HEPARIN SODIUM 5000 UNIT(S): 5000 INJECTION INTRAVENOUS; SUBCUTANEOUS at 06:21

## 2020-01-01 RX ADMIN — Medication 6: at 12:08

## 2020-01-01 RX ADMIN — DAPTOMYCIN 128 MILLIGRAM(S): 500 INJECTION, POWDER, LYOPHILIZED, FOR SOLUTION INTRAVENOUS at 17:30

## 2020-01-01 RX ADMIN — HEPARIN SODIUM 5000 UNIT(S): 5000 INJECTION INTRAVENOUS; SUBCUTANEOUS at 21:47

## 2020-01-01 RX ADMIN — PHENYLEPHRINE HYDROCHLORIDE 2.69 MICROGRAM(S)/KG/MIN: 10 INJECTION INTRAVENOUS at 13:08

## 2020-01-01 RX ADMIN — CHLORHEXIDINE GLUCONATE 1 APPLICATION(S): 213 SOLUTION TOPICAL at 06:34

## 2020-01-01 RX ADMIN — Medication 2: at 22:09

## 2020-01-01 RX ADMIN — DAPTOMYCIN 120 MILLIGRAM(S): 500 INJECTION, POWDER, LYOPHILIZED, FOR SOLUTION INTRAVENOUS at 16:20

## 2020-01-01 RX ADMIN — Medication 50 MILLIGRAM(S): at 00:34

## 2020-01-01 RX ADMIN — Medication 50 MILLIGRAM(S): at 23:04

## 2020-01-01 RX ADMIN — MIRTAZAPINE 15 MILLIGRAM(S): 45 TABLET, ORALLY DISINTEGRATING ORAL at 23:00

## 2020-01-01 RX ADMIN — MIDODRINE HYDROCHLORIDE 10 MILLIGRAM(S): 2.5 TABLET ORAL at 22:37

## 2020-01-01 RX ADMIN — CHLORHEXIDINE GLUCONATE 1 APPLICATION(S): 213 SOLUTION TOPICAL at 05:36

## 2020-01-01 RX ADMIN — CEFTRIAXONE 100 MILLIGRAM(S): 500 INJECTION, POWDER, FOR SOLUTION INTRAMUSCULAR; INTRAVENOUS at 09:18

## 2020-01-01 RX ADMIN — Medication 650 MILLIGRAM(S): at 17:52

## 2020-01-01 RX ADMIN — SODIUM CHLORIDE 1000 MILLILITER(S): 9 INJECTION INTRAMUSCULAR; INTRAVENOUS; SUBCUTANEOUS at 13:53

## 2020-01-01 RX ADMIN — PHENYLEPHRINE HYDROCHLORIDE 2.69 MICROGRAM(S)/KG/MIN: 10 INJECTION INTRAVENOUS at 21:37

## 2020-01-01 RX ADMIN — HEPARIN SODIUM 4 UNIT(S)/HR: 5000 INJECTION INTRAVENOUS; SUBCUTANEOUS at 15:08

## 2020-01-01 RX ADMIN — Medication 650 MILLIGRAM(S): at 06:15

## 2020-01-01 RX ADMIN — Medication 8: at 17:17

## 2020-01-01 RX ADMIN — Medication 8: at 22:05

## 2020-01-01 RX ADMIN — FINASTERIDE 5 MILLIGRAM(S): 5 TABLET, FILM COATED ORAL at 10:08

## 2020-01-01 RX ADMIN — Medication 500000 UNIT(S): at 06:32

## 2020-01-01 RX ADMIN — MIDODRINE HYDROCHLORIDE 15 MILLIGRAM(S): 2.5 TABLET ORAL at 13:39

## 2020-01-01 RX ADMIN — Medication 50 MILLIGRAM(S): at 13:19

## 2020-01-01 RX ADMIN — DAPTOMYCIN 128 MILLIGRAM(S): 500 INJECTION, POWDER, LYOPHILIZED, FOR SOLUTION INTRAVENOUS at 16:28

## 2020-01-01 RX ADMIN — MIDODRINE HYDROCHLORIDE 5 MILLIGRAM(S): 2.5 TABLET ORAL at 23:46

## 2020-01-01 RX ADMIN — Medication 4: at 11:57

## 2020-01-01 RX ADMIN — Medication 650 MILLIGRAM(S): at 07:06

## 2020-01-01 RX ADMIN — NYSTATIN CREAM 1 APPLICATION(S): 100000 CREAM TOPICAL at 18:48

## 2020-01-01 RX ADMIN — CHLORHEXIDINE GLUCONATE 1 APPLICATION(S): 213 SOLUTION TOPICAL at 04:55

## 2020-01-01 RX ADMIN — SERTRALINE 50 MILLIGRAM(S): 25 TABLET, FILM COATED ORAL at 10:53

## 2020-01-01 RX ADMIN — NYSTATIN CREAM 1 APPLICATION(S): 100000 CREAM TOPICAL at 05:12

## 2020-01-01 RX ADMIN — Medication 10 MILLIGRAM(S): at 18:33

## 2020-01-01 RX ADMIN — Medication 4: at 12:45

## 2020-01-01 RX ADMIN — Medication 2: at 22:45

## 2020-01-01 RX ADMIN — CHLORHEXIDINE GLUCONATE 1 APPLICATION(S): 213 SOLUTION TOPICAL at 05:50

## 2020-01-01 RX ADMIN — Medication 1 APPLICATION(S): at 11:36

## 2020-01-01 RX ADMIN — FINASTERIDE 5 MILLIGRAM(S): 5 TABLET, FILM COATED ORAL at 11:37

## 2020-01-01 RX ADMIN — Medication 2 UNIT(S): at 06:33

## 2020-01-01 RX ADMIN — Medication 2: at 23:45

## 2020-01-01 RX ADMIN — Medication 500000 UNIT(S): at 18:04

## 2020-01-01 RX ADMIN — NYSTATIN CREAM 1 APPLICATION(S): 100000 CREAM TOPICAL at 05:18

## 2020-01-01 RX ADMIN — CHLORHEXIDINE GLUCONATE 1 APPLICATION(S): 213 SOLUTION TOPICAL at 11:05

## 2020-01-01 RX ADMIN — Medication 2: at 22:17

## 2020-01-01 RX ADMIN — Medication 2: at 06:33

## 2020-01-01 RX ADMIN — INSULIN GLARGINE 10 UNIT(S): 100 INJECTION, SOLUTION SUBCUTANEOUS at 23:56

## 2020-01-01 RX ADMIN — TAMSULOSIN HYDROCHLORIDE 0.4 MILLIGRAM(S): 0.4 CAPSULE ORAL at 23:43

## 2020-01-01 RX ADMIN — Medication 1 APPLICATION(S): at 16:48

## 2020-01-01 RX ADMIN — NYSTATIN CREAM 1 APPLICATION(S): 100000 CREAM TOPICAL at 18:11

## 2020-01-01 RX ADMIN — SERTRALINE 50 MILLIGRAM(S): 25 TABLET, FILM COATED ORAL at 11:09

## 2020-01-01 RX ADMIN — Medication 2: at 06:32

## 2020-01-01 RX ADMIN — PANTOPRAZOLE SODIUM 40 MILLIGRAM(S): 20 TABLET, DELAYED RELEASE ORAL at 12:59

## 2020-01-01 RX ADMIN — IOHEXOL 30 MILLILITER(S): 300 INJECTION, SOLUTION INTRAVENOUS at 10:32

## 2020-01-01 RX ADMIN — CHLORHEXIDINE GLUCONATE 1 APPLICATION(S): 213 SOLUTION TOPICAL at 11:40

## 2020-01-01 RX ADMIN — INSULIN GLARGINE 4 UNIT(S): 100 INJECTION, SOLUTION SUBCUTANEOUS at 21:23

## 2020-01-01 RX ADMIN — MIRTAZAPINE 7.5 MILLIGRAM(S): 45 TABLET, ORALLY DISINTEGRATING ORAL at 11:30

## 2020-01-01 RX ADMIN — SEVELAMER CARBONATE 800 MILLIGRAM(S): 2400 POWDER, FOR SUSPENSION ORAL at 05:07

## 2020-01-01 RX ADMIN — CHLORHEXIDINE GLUCONATE 1 APPLICATION(S): 213 SOLUTION TOPICAL at 05:24

## 2020-01-01 RX ADMIN — Medication 4: at 08:05

## 2020-01-01 RX ADMIN — Medication 500000 UNIT(S): at 13:09

## 2020-01-01 RX ADMIN — HYDROMORPHONE HYDROCHLORIDE 0.5 MILLIGRAM(S): 2 INJECTION INTRAMUSCULAR; INTRAVENOUS; SUBCUTANEOUS at 04:11

## 2020-01-01 RX ADMIN — Medication 2: at 15:33

## 2020-01-01 RX ADMIN — Medication 50 MILLIGRAM(S): at 17:28

## 2020-01-01 RX ADMIN — SERTRALINE 50 MILLIGRAM(S): 25 TABLET, FILM COATED ORAL at 12:33

## 2020-01-01 RX ADMIN — SERTRALINE 50 MILLIGRAM(S): 25 TABLET, FILM COATED ORAL at 16:35

## 2020-01-01 RX ADMIN — CHLORHEXIDINE GLUCONATE 1 APPLICATION(S): 213 SOLUTION TOPICAL at 12:53

## 2020-01-01 RX ADMIN — LACTULOSE 20 GRAM(S): 10 SOLUTION ORAL at 05:26

## 2020-01-01 RX ADMIN — FINASTERIDE 5 MILLIGRAM(S): 5 TABLET, FILM COATED ORAL at 12:22

## 2020-01-01 RX ADMIN — PANTOPRAZOLE SODIUM 40 MILLIGRAM(S): 20 TABLET, DELAYED RELEASE ORAL at 05:17

## 2020-01-01 RX ADMIN — MIRTAZAPINE 7.5 MILLIGRAM(S): 45 TABLET, ORALLY DISINTEGRATING ORAL at 22:17

## 2020-01-01 RX ADMIN — Medication 500000 UNIT(S): at 06:25

## 2020-01-01 RX ADMIN — Medication 10: at 12:32

## 2020-01-01 RX ADMIN — FINASTERIDE 5 MILLIGRAM(S): 5 TABLET, FILM COATED ORAL at 12:45

## 2020-01-01 RX ADMIN — NYSTATIN CREAM 1 APPLICATION(S): 100000 CREAM TOPICAL at 05:45

## 2020-01-01 RX ADMIN — HEPARIN SODIUM 5000 UNIT(S): 5000 INJECTION INTRAVENOUS; SUBCUTANEOUS at 22:44

## 2020-01-01 RX ADMIN — Medication 50 MILLIGRAM(S): at 11:19

## 2020-01-01 RX ADMIN — Medication 2: at 07:03

## 2020-01-01 RX ADMIN — PIPERACILLIN AND TAZOBACTAM 200 GRAM(S): 4; .5 INJECTION, POWDER, LYOPHILIZED, FOR SOLUTION INTRAVENOUS at 16:06

## 2020-01-01 RX ADMIN — TAMSULOSIN HYDROCHLORIDE 0.4 MILLIGRAM(S): 0.4 CAPSULE ORAL at 22:42

## 2020-01-01 RX ADMIN — DAPTOMYCIN 128 MILLIGRAM(S): 500 INJECTION, POWDER, LYOPHILIZED, FOR SOLUTION INTRAVENOUS at 15:05

## 2020-01-01 RX ADMIN — Medication 50 MILLIGRAM(S): at 16:57

## 2020-01-01 RX ADMIN — SODIUM CHLORIDE 100 MILLILITER(S): 9 INJECTION, SOLUTION INTRAVENOUS at 04:14

## 2020-01-01 RX ADMIN — Medication 12 UNIT(S): at 22:05

## 2020-01-01 RX ADMIN — Medication 2: at 21:37

## 2020-01-01 RX ADMIN — PANTOPRAZOLE SODIUM 40 MILLIGRAM(S): 20 TABLET, DELAYED RELEASE ORAL at 11:05

## 2020-01-01 RX ADMIN — SODIUM CHLORIDE 1500 MILLILITER(S): 9 INJECTION, SOLUTION INTRAVENOUS at 18:10

## 2020-01-01 RX ADMIN — PANTOPRAZOLE SODIUM 40 MILLIGRAM(S): 20 TABLET, DELAYED RELEASE ORAL at 10:57

## 2020-01-01 RX ADMIN — INSULIN GLARGINE 10 UNIT(S): 100 INJECTION, SOLUTION SUBCUTANEOUS at 21:17

## 2020-01-01 RX ADMIN — NYSTATIN CREAM 1 APPLICATION(S): 100000 CREAM TOPICAL at 06:10

## 2020-01-01 RX ADMIN — Medication 250 MILLILITER(S): at 16:23

## 2020-01-01 RX ADMIN — MIDODRINE HYDROCHLORIDE 10 MILLIGRAM(S): 2.5 TABLET ORAL at 22:43

## 2020-01-01 RX ADMIN — MIDODRINE HYDROCHLORIDE 15 MILLIGRAM(S): 2.5 TABLET ORAL at 07:06

## 2020-01-01 RX ADMIN — Medication 10 UNIT(S): at 18:20

## 2020-01-01 RX ADMIN — Medication 2: at 10:52

## 2020-01-01 RX ADMIN — NYSTATIN CREAM 1 APPLICATION(S): 100000 CREAM TOPICAL at 18:26

## 2020-01-01 RX ADMIN — MIDODRINE HYDROCHLORIDE 10 MILLIGRAM(S): 2.5 TABLET ORAL at 05:26

## 2020-01-01 RX ADMIN — Medication 3.37 MICROGRAM(S)/KG/MIN: at 05:51

## 2020-01-01 RX ADMIN — SIMVASTATIN 10 MILLIGRAM(S): 20 TABLET, FILM COATED ORAL at 21:47

## 2020-01-01 RX ADMIN — Medication 2: at 15:18

## 2020-01-01 RX ADMIN — CEFTAROLINE FOSAMIL 50 MILLIGRAM(S): 600 POWDER, FOR SOLUTION INTRAVENOUS at 04:38

## 2020-01-01 RX ADMIN — DAPTOMYCIN 128 MILLIGRAM(S): 500 INJECTION, POWDER, LYOPHILIZED, FOR SOLUTION INTRAVENOUS at 17:04

## 2020-01-01 RX ADMIN — Medication 2: at 21:18

## 2020-01-01 RX ADMIN — HEPARIN SODIUM 5000 UNIT(S): 5000 INJECTION INTRAVENOUS; SUBCUTANEOUS at 06:25

## 2020-01-01 RX ADMIN — Medication 50 MILLILITER(S): at 11:45

## 2020-01-01 RX ADMIN — Medication 6.73 MICROGRAM(S)/KG/MIN: at 18:22

## 2020-01-01 RX ADMIN — NYSTATIN CREAM 1 APPLICATION(S): 100000 CREAM TOPICAL at 17:04

## 2020-01-01 RX ADMIN — HEPARIN SODIUM 5000 UNIT(S): 5000 INJECTION INTRAVENOUS; SUBCUTANEOUS at 22:05

## 2020-01-01 RX ADMIN — INSULIN GLARGINE 4 UNIT(S): 100 INJECTION, SOLUTION SUBCUTANEOUS at 21:47

## 2020-01-01 RX ADMIN — HEPARIN SODIUM 5000 UNIT(S): 5000 INJECTION INTRAVENOUS; SUBCUTANEOUS at 13:00

## 2020-01-01 RX ADMIN — MIDODRINE HYDROCHLORIDE 15 MILLIGRAM(S): 2.5 TABLET ORAL at 22:56

## 2020-01-01 RX ADMIN — Medication 3.37 MICROGRAM(S)/KG/MIN: at 06:30

## 2020-01-01 RX ADMIN — Medication 50 MILLILITER(S): at 17:20

## 2020-01-01 RX ADMIN — CHLORHEXIDINE GLUCONATE 1 APPLICATION(S): 213 SOLUTION TOPICAL at 05:42

## 2020-01-01 RX ADMIN — PANTOPRAZOLE SODIUM 40 MILLIGRAM(S): 20 TABLET, DELAYED RELEASE ORAL at 11:30

## 2020-01-01 RX ADMIN — Medication 10 UNIT(S): at 12:07

## 2020-01-01 RX ADMIN — Medication 1 APPLICATION(S): at 12:48

## 2020-01-01 RX ADMIN — SODIUM CHLORIDE 1000 MILLILITER(S): 9 INJECTION INTRAMUSCULAR; INTRAVENOUS; SUBCUTANEOUS at 01:20

## 2020-01-01 RX ADMIN — Medication 2: at 13:06

## 2020-01-01 RX ADMIN — HEPARIN SODIUM 5000 UNIT(S): 5000 INJECTION INTRAVENOUS; SUBCUTANEOUS at 22:13

## 2020-01-01 RX ADMIN — Medication 2: at 21:33

## 2020-01-01 RX ADMIN — ZINC OXIDE 1 APPLICATION(S): 200 OINTMENT TOPICAL at 06:45

## 2020-01-01 RX ADMIN — Medication 50 MILLIGRAM(S): at 08:50

## 2020-01-01 RX ADMIN — Medication 50 MILLIGRAM(S): at 12:47

## 2020-01-01 RX ADMIN — MEROPENEM 100 MILLIGRAM(S): 1 INJECTION INTRAVENOUS at 14:55

## 2020-01-01 RX ADMIN — PANTOPRAZOLE SODIUM 40 MILLIGRAM(S): 20 TABLET, DELAYED RELEASE ORAL at 05:26

## 2020-01-01 RX ADMIN — Medication 50 MILLIGRAM(S): at 05:56

## 2020-01-01 RX ADMIN — SERTRALINE 50 MILLIGRAM(S): 25 TABLET, FILM COATED ORAL at 13:48

## 2020-01-01 RX ADMIN — PANTOPRAZOLE SODIUM 40 MILLIGRAM(S): 20 TABLET, DELAYED RELEASE ORAL at 09:33

## 2020-01-01 RX ADMIN — Medication 1 APPLICATION(S): at 12:26

## 2020-01-01 RX ADMIN — NYSTATIN CREAM 1 APPLICATION(S): 100000 CREAM TOPICAL at 18:02

## 2020-01-01 RX ADMIN — Medication 500000 UNIT(S): at 06:46

## 2020-01-01 RX ADMIN — Medication 50 MILLIGRAM(S): at 06:32

## 2020-01-01 RX ADMIN — HUMAN INSULIN 10 UNIT(S): 100 INJECTION, SUSPENSION SUBCUTANEOUS at 09:08

## 2020-01-01 RX ADMIN — Medication 1 APPLICATION(S): at 09:42

## 2020-01-01 RX ADMIN — PHENYLEPHRINE HYDROCHLORIDE 135 MICROGRAM(S)/KG/MIN: 10 INJECTION INTRAVENOUS at 02:36

## 2020-01-01 RX ADMIN — PANTOPRAZOLE SODIUM 40 MILLIGRAM(S): 20 TABLET, DELAYED RELEASE ORAL at 11:32

## 2020-01-01 RX ADMIN — Medication 650 MILLIGRAM(S): at 06:16

## 2020-01-01 RX ADMIN — MIDODRINE HYDROCHLORIDE 10 MILLIGRAM(S): 2.5 TABLET ORAL at 11:22

## 2020-01-01 RX ADMIN — Medication 650 MILLIGRAM(S): at 21:03

## 2020-01-01 RX ADMIN — Medication 2: at 16:56

## 2020-01-01 RX ADMIN — MIDODRINE HYDROCHLORIDE 5 MILLIGRAM(S): 2.5 TABLET ORAL at 11:26

## 2020-01-01 RX ADMIN — HYDROMORPHONE HYDROCHLORIDE 0.25 MILLIGRAM(S): 2 INJECTION INTRAMUSCULAR; INTRAVENOUS; SUBCUTANEOUS at 23:31

## 2020-01-01 RX ADMIN — Medication 500000 UNIT(S): at 17:20

## 2020-01-01 RX ADMIN — Medication 2: at 22:55

## 2020-01-01 RX ADMIN — PANTOPRAZOLE SODIUM 40 MILLIGRAM(S): 20 TABLET, DELAYED RELEASE ORAL at 12:49

## 2020-01-01 RX ADMIN — Medication 1 APPLICATION(S): at 11:30

## 2020-01-01 RX ADMIN — SEVELAMER CARBONATE 1600 MILLIGRAM(S): 2400 POWDER, FOR SUSPENSION ORAL at 06:17

## 2020-01-01 RX ADMIN — CEFTAROLINE FOSAMIL 50 MILLIGRAM(S): 600 POWDER, FOR SOLUTION INTRAVENOUS at 17:53

## 2020-01-01 RX ADMIN — NYSTATIN CREAM 1 APPLICATION(S): 100000 CREAM TOPICAL at 16:00

## 2020-01-01 RX ADMIN — PANTOPRAZOLE SODIUM 40 MILLIGRAM(S): 20 TABLET, DELAYED RELEASE ORAL at 10:06

## 2020-01-01 RX ADMIN — NYSTATIN CREAM 1 APPLICATION(S): 100000 CREAM TOPICAL at 17:01

## 2020-01-01 RX ADMIN — NYSTATIN CREAM 1 APPLICATION(S): 100000 CREAM TOPICAL at 06:17

## 2020-01-01 RX ADMIN — Medication 8 UNIT(S): at 17:17

## 2020-01-01 RX ADMIN — NYSTATIN CREAM 1 APPLICATION(S): 100000 CREAM TOPICAL at 17:31

## 2020-01-01 RX ADMIN — Medication 250 MILLIGRAM(S): at 12:22

## 2020-01-01 RX ADMIN — Medication 2: at 13:10

## 2020-01-01 RX ADMIN — TAMSULOSIN HYDROCHLORIDE 0.4 MILLIGRAM(S): 0.4 CAPSULE ORAL at 22:13

## 2020-01-01 RX ADMIN — Medication 4: at 12:18

## 2020-01-01 RX ADMIN — Medication 50 GRAM(S): at 10:34

## 2020-01-01 RX ADMIN — HEPARIN SODIUM 5000 UNIT(S): 5000 INJECTION INTRAVENOUS; SUBCUTANEOUS at 13:05

## 2020-01-01 RX ADMIN — Medication 500000 UNIT(S): at 18:20

## 2020-01-01 RX ADMIN — Medication 50 MILLIGRAM(S): at 15:45

## 2020-01-01 RX ADMIN — Medication 8 UNIT(S): at 09:07

## 2020-01-01 RX ADMIN — SENNA PLUS 2 TABLET(S): 8.6 TABLET ORAL at 21:46

## 2020-01-01 RX ADMIN — CEFTAROLINE FOSAMIL 50 MILLIGRAM(S): 600 POWDER, FOR SOLUTION INTRAVENOUS at 05:50

## 2020-01-01 RX ADMIN — Medication 50 MILLIGRAM(S): at 12:54

## 2020-01-01 RX ADMIN — SERTRALINE 50 MILLIGRAM(S): 25 TABLET, FILM COATED ORAL at 11:40

## 2020-01-01 RX ADMIN — Medication 4: at 11:26

## 2020-01-01 RX ADMIN — SERTRALINE 50 MILLIGRAM(S): 25 TABLET, FILM COATED ORAL at 12:22

## 2020-01-01 RX ADMIN — Medication 4: at 11:56

## 2020-01-01 RX ADMIN — Medication 2: at 10:43

## 2020-01-01 RX ADMIN — Medication 1 APPLICATION(S): at 12:58

## 2020-01-01 RX ADMIN — PANTOPRAZOLE SODIUM 40 MILLIGRAM(S): 20 TABLET, DELAYED RELEASE ORAL at 12:27

## 2020-01-01 RX ADMIN — Medication 2: at 18:20

## 2020-01-01 RX ADMIN — MIDODRINE HYDROCHLORIDE 5 MILLIGRAM(S): 2.5 TABLET ORAL at 21:47

## 2020-01-01 RX ADMIN — Medication 4: at 22:02

## 2020-01-01 RX ADMIN — Medication 4: at 10:33

## 2020-01-01 RX ADMIN — Medication 10 UNIT(S): at 09:04

## 2020-01-01 RX ADMIN — Medication 50 MILLIGRAM(S): at 05:26

## 2020-01-01 RX ADMIN — Medication 1 APPLICATION(S): at 14:39

## 2020-01-01 RX ADMIN — FINASTERIDE 5 MILLIGRAM(S): 5 TABLET, FILM COATED ORAL at 11:09

## 2020-01-01 RX ADMIN — SCOPALAMINE 1 PATCH: 1 PATCH, EXTENDED RELEASE TRANSDERMAL at 06:07

## 2020-01-01 RX ADMIN — NYSTATIN CREAM 1 APPLICATION(S): 100000 CREAM TOPICAL at 17:23

## 2020-01-01 RX ADMIN — MIDODRINE HYDROCHLORIDE 10 MILLIGRAM(S): 2.5 TABLET ORAL at 14:57

## 2020-01-01 RX ADMIN — CHLORHEXIDINE GLUCONATE 1 APPLICATION(S): 213 SOLUTION TOPICAL at 05:10

## 2020-01-01 RX ADMIN — MEGESTROL ACETATE 400 MILLIGRAM(S): 40 SUSPENSION ORAL at 18:02

## 2020-01-01 RX ADMIN — Medication 50 MILLIGRAM(S): at 00:08

## 2020-01-01 RX ADMIN — CHLORHEXIDINE GLUCONATE 1 APPLICATION(S): 213 SOLUTION TOPICAL at 05:37

## 2020-01-01 RX ADMIN — MIDODRINE HYDROCHLORIDE 15 MILLIGRAM(S): 2.5 TABLET ORAL at 05:55

## 2020-01-01 RX ADMIN — SCOPALAMINE 1 PATCH: 1 PATCH, EXTENDED RELEASE TRANSDERMAL at 06:15

## 2020-01-01 RX ADMIN — Medication 1 APPLICATION(S): at 15:00

## 2020-01-01 RX ADMIN — PANTOPRAZOLE SODIUM 40 MILLIGRAM(S): 20 TABLET, DELAYED RELEASE ORAL at 11:14

## 2020-01-01 RX ADMIN — Medication 650 MILLIGRAM(S): at 18:38

## 2020-01-01 RX ADMIN — MIRTAZAPINE 7.5 MILLIGRAM(S): 45 TABLET, ORALLY DISINTEGRATING ORAL at 11:23

## 2020-01-01 RX ADMIN — SIMVASTATIN 10 MILLIGRAM(S): 20 TABLET, FILM COATED ORAL at 23:43

## 2020-01-01 RX ADMIN — MIRTAZAPINE 15 MILLIGRAM(S): 45 TABLET, ORALLY DISINTEGRATING ORAL at 10:08

## 2020-01-01 RX ADMIN — DAPTOMYCIN 128 MILLIGRAM(S): 500 INJECTION, POWDER, LYOPHILIZED, FOR SOLUTION INTRAVENOUS at 16:10

## 2020-01-01 RX ADMIN — SEVELAMER CARBONATE 800 MILLIGRAM(S): 2400 POWDER, FOR SUSPENSION ORAL at 22:17

## 2020-01-01 RX ADMIN — NYSTATIN CREAM 1 APPLICATION(S): 100000 CREAM TOPICAL at 17:52

## 2020-01-01 RX ADMIN — Medication 2: at 16:07

## 2020-01-01 RX ADMIN — Medication 2: at 21:23

## 2020-01-01 RX ADMIN — Medication 50 MILLILITER(S): at 18:30

## 2020-01-01 RX ADMIN — SEVELAMER CARBONATE 800 MILLIGRAM(S): 2400 POWDER, FOR SUSPENSION ORAL at 05:49

## 2020-01-01 RX ADMIN — PIPERACILLIN AND TAZOBACTAM 200 GRAM(S): 4; .5 INJECTION, POWDER, LYOPHILIZED, FOR SOLUTION INTRAVENOUS at 05:26

## 2020-01-01 RX ADMIN — NYSTATIN CREAM 1 APPLICATION(S): 100000 CREAM TOPICAL at 05:48

## 2020-01-01 RX ADMIN — Medication 650 MILLIGRAM(S): at 10:28

## 2020-01-01 RX ADMIN — Medication 50 MILLIGRAM(S): at 23:50

## 2020-01-01 RX ADMIN — SEVELAMER CARBONATE 1600 MILLIGRAM(S): 2400 POWDER, FOR SUSPENSION ORAL at 16:34

## 2020-01-01 RX ADMIN — INSULIN GLARGINE 4 UNIT(S): 100 INJECTION, SOLUTION SUBCUTANEOUS at 22:42

## 2020-01-01 RX ADMIN — DAPTOMYCIN 128 MILLIGRAM(S): 500 INJECTION, POWDER, LYOPHILIZED, FOR SOLUTION INTRAVENOUS at 15:15

## 2020-01-01 RX ADMIN — Medication 50 MILLIGRAM(S): at 21:31

## 2020-01-01 RX ADMIN — SENNA PLUS 2 TABLET(S): 8.6 TABLET ORAL at 22:42

## 2020-01-01 RX ADMIN — MIDODRINE HYDROCHLORIDE 10 MILLIGRAM(S): 2.5 TABLET ORAL at 06:41

## 2020-01-01 RX ADMIN — Medication 2: at 23:02

## 2020-01-01 RX ADMIN — NYSTATIN CREAM 1 APPLICATION(S): 100000 CREAM TOPICAL at 18:18

## 2020-01-01 RX ADMIN — Medication 3.37 MICROGRAM(S)/KG/MIN: at 21:55

## 2020-01-01 RX ADMIN — CHLORHEXIDINE GLUCONATE 1 APPLICATION(S): 213 SOLUTION TOPICAL at 05:17

## 2020-01-01 RX ADMIN — Medication 2 UNIT(S): at 07:03

## 2020-01-01 RX ADMIN — HYDROMORPHONE HYDROCHLORIDE 0.25 MILLIGRAM(S): 2 INJECTION INTRAMUSCULAR; INTRAVENOUS; SUBCUTANEOUS at 19:26

## 2020-01-01 RX ADMIN — Medication 1 PACKET(S): at 16:36

## 2020-01-01 RX ADMIN — INSULIN GLARGINE 10 UNIT(S): 100 INJECTION, SOLUTION SUBCUTANEOUS at 22:54

## 2020-01-01 RX ADMIN — Medication 3.37 MICROGRAM(S)/KG/MIN: at 10:04

## 2020-01-01 RX ADMIN — SIMVASTATIN 10 MILLIGRAM(S): 20 TABLET, FILM COATED ORAL at 22:35

## 2020-01-01 RX ADMIN — CHLORHEXIDINE GLUCONATE 1 APPLICATION(S): 213 SOLUTION TOPICAL at 07:06

## 2020-01-01 RX ADMIN — HEPARIN SODIUM 5000 UNIT(S): 5000 INJECTION INTRAVENOUS; SUBCUTANEOUS at 06:33

## 2020-01-01 RX ADMIN — MIRTAZAPINE 7.5 MILLIGRAM(S): 45 TABLET, ORALLY DISINTEGRATING ORAL at 11:33

## 2020-01-01 RX ADMIN — Medication 50 MILLIGRAM(S): at 18:27

## 2020-01-01 RX ADMIN — DAPTOMYCIN 128 MILLIGRAM(S): 500 INJECTION, POWDER, LYOPHILIZED, FOR SOLUTION INTRAVENOUS at 17:18

## 2020-01-01 RX ADMIN — Medication 4: at 11:05

## 2020-01-01 RX ADMIN — SEVELAMER CARBONATE 800 MILLIGRAM(S): 2400 POWDER, FOR SUSPENSION ORAL at 21:25

## 2020-01-01 RX ADMIN — CEFTRIAXONE 100 MILLIGRAM(S): 500 INJECTION, POWDER, FOR SOLUTION INTRAMUSCULAR; INTRAVENOUS at 09:03

## 2020-01-01 RX ADMIN — PANTOPRAZOLE SODIUM 40 MILLIGRAM(S): 20 TABLET, DELAYED RELEASE ORAL at 10:47

## 2020-01-01 RX ADMIN — HEPARIN SODIUM 5000 UNIT(S): 5000 INJECTION INTRAVENOUS; SUBCUTANEOUS at 21:46

## 2020-01-01 RX ADMIN — Medication 2 UNIT(S): at 06:17

## 2020-01-01 RX ADMIN — PANTOPRAZOLE SODIUM 40 MILLIGRAM(S): 20 TABLET, DELAYED RELEASE ORAL at 12:16

## 2020-01-01 RX ADMIN — NYSTATIN CREAM 1 APPLICATION(S): 100000 CREAM TOPICAL at 18:29

## 2020-01-01 RX ADMIN — SERTRALINE 50 MILLIGRAM(S): 25 TABLET, FILM COATED ORAL at 12:04

## 2020-01-01 RX ADMIN — Medication 650 MILLIGRAM(S): at 16:05

## 2020-01-01 RX ADMIN — Medication 2: at 06:59

## 2020-01-01 RX ADMIN — Medication 4: at 05:26

## 2020-01-01 RX ADMIN — INSULIN GLARGINE 8 UNIT(S): 100 INJECTION, SOLUTION SUBCUTANEOUS at 22:42

## 2020-01-01 RX ADMIN — MIDODRINE HYDROCHLORIDE 5 MILLIGRAM(S): 2.5 TABLET ORAL at 15:47

## 2020-01-01 RX ADMIN — Medication 50 MILLILITER(S): at 16:20

## 2020-01-01 RX ADMIN — PHENYLEPHRINE HYDROCHLORIDE 0.1 MICROGRAM(S)/KG/MIN: 10 INJECTION INTRAVENOUS at 15:44

## 2020-01-01 RX ADMIN — Medication 50 GRAM(S): at 09:49

## 2020-01-01 RX ADMIN — NYSTATIN CREAM 1 APPLICATION(S): 100000 CREAM TOPICAL at 06:33

## 2020-01-01 RX ADMIN — Medication 500000 UNIT(S): at 12:44

## 2020-01-01 RX ADMIN — Medication 3: at 12:56

## 2020-01-01 RX ADMIN — SERTRALINE 50 MILLIGRAM(S): 25 TABLET, FILM COATED ORAL at 11:30

## 2020-01-01 RX ADMIN — MIDODRINE HYDROCHLORIDE 10 MILLIGRAM(S): 2.5 TABLET ORAL at 07:52

## 2020-01-01 RX ADMIN — SIMVASTATIN 10 MILLIGRAM(S): 20 TABLET, FILM COATED ORAL at 22:59

## 2020-01-01 RX ADMIN — COSYNTROPIN 0.25 MILLIGRAM(S): 0.25 INJECTION, SOLUTION INTRAVENOUS at 12:46

## 2020-01-01 RX ADMIN — MIRTAZAPINE 7.5 MILLIGRAM(S): 45 TABLET, ORALLY DISINTEGRATING ORAL at 23:43

## 2020-01-01 RX ADMIN — Medication 3.37 MICROGRAM(S)/KG/MIN: at 23:10

## 2020-01-01 RX ADMIN — NYSTATIN CREAM 1 APPLICATION(S): 100000 CREAM TOPICAL at 16:27

## 2020-01-01 RX ADMIN — Medication 50 MILLILITER(S): at 22:44

## 2020-01-01 RX ADMIN — Medication 2.5 MILLIGRAM(S): at 12:54

## 2020-01-01 RX ADMIN — TAMSULOSIN HYDROCHLORIDE 0.4 MILLIGRAM(S): 0.4 CAPSULE ORAL at 21:47

## 2020-01-01 RX ADMIN — SERTRALINE 50 MILLIGRAM(S): 25 TABLET, FILM COATED ORAL at 11:28

## 2020-01-01 RX ADMIN — Medication 101 MILLIGRAM(S): at 00:06

## 2020-01-01 RX ADMIN — HYDROMORPHONE HYDROCHLORIDE 0.5 MILLIGRAM(S): 2 INJECTION INTRAMUSCULAR; INTRAVENOUS; SUBCUTANEOUS at 05:25

## 2020-01-01 RX ADMIN — INSULIN GLARGINE 8 UNIT(S): 100 INJECTION, SOLUTION SUBCUTANEOUS at 22:12

## 2020-01-01 RX ADMIN — MIRTAZAPINE 7.5 MILLIGRAM(S): 45 TABLET, ORALLY DISINTEGRATING ORAL at 14:50

## 2020-01-01 RX ADMIN — NYSTATIN CREAM 1 APPLICATION(S): 100000 CREAM TOPICAL at 19:21

## 2020-01-01 RX ADMIN — Medication 2: at 12:03

## 2020-01-01 RX ADMIN — Medication 3.37 MICROGRAM(S)/KG/MIN: at 09:36

## 2020-01-01 RX ADMIN — Medication 2 UNIT(S): at 16:57

## 2020-01-01 RX ADMIN — Medication 3.37 MICROGRAM(S)/KG/MIN: at 22:10

## 2020-01-01 RX ADMIN — INSULIN GLARGINE 15 UNIT(S): 100 INJECTION, SOLUTION SUBCUTANEOUS at 22:44

## 2020-01-01 RX ADMIN — INSULIN GLARGINE 35 UNIT(S): 100 INJECTION, SOLUTION SUBCUTANEOUS at 22:09

## 2020-01-01 RX ADMIN — NYSTATIN CREAM 1 APPLICATION(S): 100000 CREAM TOPICAL at 07:51

## 2020-01-01 RX ADMIN — Medication 400 MILLIGRAM(S): at 16:10

## 2020-01-01 RX ADMIN — DAPTOMYCIN 128 MILLIGRAM(S): 500 INJECTION, POWDER, LYOPHILIZED, FOR SOLUTION INTRAVENOUS at 17:59

## 2020-01-01 RX ADMIN — MIDODRINE HYDROCHLORIDE 10 MILLIGRAM(S): 2.5 TABLET ORAL at 07:25

## 2020-01-01 RX ADMIN — SEVELAMER CARBONATE 1600 MILLIGRAM(S): 2400 POWDER, FOR SUSPENSION ORAL at 21:36

## 2020-01-01 RX ADMIN — MIRTAZAPINE 7.5 MILLIGRAM(S): 45 TABLET, ORALLY DISINTEGRATING ORAL at 11:26

## 2020-01-01 RX ADMIN — MIDODRINE HYDROCHLORIDE 10 MILLIGRAM(S): 2.5 TABLET ORAL at 05:51

## 2020-01-01 RX ADMIN — Medication 50 MILLILITER(S): at 17:30

## 2020-01-01 RX ADMIN — Medication 50 MILLIGRAM(S): at 04:50

## 2020-01-01 RX ADMIN — FINASTERIDE 5 MILLIGRAM(S): 5 TABLET, FILM COATED ORAL at 11:26

## 2020-01-01 RX ADMIN — MIDODRINE HYDROCHLORIDE 15 MILLIGRAM(S): 2.5 TABLET ORAL at 05:44

## 2020-01-01 RX ADMIN — MIDODRINE HYDROCHLORIDE 5 MILLIGRAM(S): 2.5 TABLET ORAL at 23:45

## 2020-01-01 RX ADMIN — CEFTRIAXONE 100 MILLIGRAM(S): 500 INJECTION, POWDER, FOR SOLUTION INTRAMUSCULAR; INTRAVENOUS at 10:41

## 2020-01-01 RX ADMIN — NYSTATIN CREAM 1 APPLICATION(S): 100000 CREAM TOPICAL at 05:13

## 2020-01-01 RX ADMIN — SENNA PLUS 2 TABLET(S): 8.6 TABLET ORAL at 22:35

## 2020-01-01 RX ADMIN — Medication 50 MILLILITER(S): at 11:22

## 2020-01-01 RX ADMIN — Medication 500000 UNIT(S): at 12:08

## 2020-01-01 RX ADMIN — MIDODRINE HYDROCHLORIDE 15 MILLIGRAM(S): 2.5 TABLET ORAL at 12:08

## 2020-01-01 RX ADMIN — FINASTERIDE 5 MILLIGRAM(S): 5 TABLET, FILM COATED ORAL at 11:30

## 2020-01-01 RX ADMIN — Medication 2.5 MILLIGRAM(S): at 19:10

## 2020-01-01 RX ADMIN — HYDROMORPHONE HYDROCHLORIDE 0.5 MILLIGRAM(S): 2 INJECTION INTRAMUSCULAR; INTRAVENOUS; SUBCUTANEOUS at 18:31

## 2020-01-01 RX ADMIN — Medication 1 APPLICATION(S): at 11:17

## 2020-01-01 RX ADMIN — MIDODRINE HYDROCHLORIDE 5 MILLIGRAM(S): 2.5 TABLET ORAL at 00:27

## 2020-01-01 RX ADMIN — FINASTERIDE 5 MILLIGRAM(S): 5 TABLET, FILM COATED ORAL at 12:08

## 2020-01-01 RX ADMIN — PANTOPRAZOLE SODIUM 40 MILLIGRAM(S): 20 TABLET, DELAYED RELEASE ORAL at 11:40

## 2020-01-01 RX ADMIN — Medication 50 MILLIGRAM(S): at 23:14

## 2020-01-01 RX ADMIN — MIDODRINE HYDROCHLORIDE 10 MILLIGRAM(S): 2.5 TABLET ORAL at 13:40

## 2020-01-01 RX ADMIN — MIRTAZAPINE 7.5 MILLIGRAM(S): 45 TABLET, ORALLY DISINTEGRATING ORAL at 12:22

## 2020-01-01 RX ADMIN — NYSTATIN CREAM 1 APPLICATION(S): 100000 CREAM TOPICAL at 16:26

## 2020-01-01 RX ADMIN — Medication 4: at 16:57

## 2020-01-01 RX ADMIN — Medication 250 MILLILITER(S): at 22:57

## 2020-01-01 RX ADMIN — FINASTERIDE 5 MILLIGRAM(S): 5 TABLET, FILM COATED ORAL at 11:20

## 2020-01-01 RX ADMIN — MIDODRINE HYDROCHLORIDE 15 MILLIGRAM(S): 2.5 TABLET ORAL at 05:29

## 2020-01-01 RX ADMIN — INSULIN GLARGINE 10 UNIT(S): 100 INJECTION, SOLUTION SUBCUTANEOUS at 22:02

## 2020-01-01 RX ADMIN — Medication 6: at 09:08

## 2020-01-01 RX ADMIN — Medication 2: at 06:00

## 2020-01-01 RX ADMIN — PANTOPRAZOLE SODIUM 40 MILLIGRAM(S): 20 TABLET, DELAYED RELEASE ORAL at 12:33

## 2020-01-01 RX ADMIN — SCOPALAMINE 1 PATCH: 1 PATCH, EXTENDED RELEASE TRANSDERMAL at 07:24

## 2020-01-01 RX ADMIN — INSULIN GLARGINE 20 UNIT(S): 100 INJECTION, SOLUTION SUBCUTANEOUS at 09:50

## 2020-01-01 RX ADMIN — FINASTERIDE 5 MILLIGRAM(S): 5 TABLET, FILM COATED ORAL at 23:43

## 2020-01-01 RX ADMIN — INSULIN GLARGINE 8 UNIT(S): 100 INJECTION, SOLUTION SUBCUTANEOUS at 22:18

## 2020-01-01 RX ADMIN — Medication 3.37 MICROGRAM(S)/KG/MIN: at 11:21

## 2020-01-01 RX ADMIN — Medication 500000 UNIT(S): at 00:37

## 2020-01-01 RX ADMIN — Medication 2 UNIT(S): at 07:52

## 2020-01-01 RX ADMIN — Medication 2: at 07:02

## 2020-01-01 RX ADMIN — Medication 4: at 16:26

## 2020-01-01 RX ADMIN — PANTOPRAZOLE SODIUM 40 MILLIGRAM(S): 20 TABLET, DELAYED RELEASE ORAL at 09:35

## 2020-01-01 RX ADMIN — CEFTAROLINE FOSAMIL 50 MILLIGRAM(S): 600 POWDER, FOR SOLUTION INTRAVENOUS at 17:31

## 2020-01-01 RX ADMIN — Medication 4: at 22:34

## 2020-01-01 RX ADMIN — HEPARIN SODIUM 5000 UNIT(S): 5000 INJECTION INTRAVENOUS; SUBCUTANEOUS at 14:00

## 2020-01-01 RX ADMIN — Medication 6.73 MICROGRAM(S)/KG/MIN: at 00:26

## 2020-01-01 RX ADMIN — FINASTERIDE 5 MILLIGRAM(S): 5 TABLET, FILM COATED ORAL at 11:23

## 2020-01-01 RX ADMIN — INSULIN GLARGINE 8 UNIT(S): 100 INJECTION, SOLUTION SUBCUTANEOUS at 22:45

## 2020-01-01 RX ADMIN — SODIUM CHLORIDE 100 MILLILITER(S): 9 INJECTION, SOLUTION INTRAVENOUS at 19:44

## 2020-01-01 RX ADMIN — INSULIN GLARGINE 10 UNIT(S): 100 INJECTION, SOLUTION SUBCUTANEOUS at 00:00

## 2020-01-01 RX ADMIN — NYSTATIN CREAM 1 APPLICATION(S): 100000 CREAM TOPICAL at 17:54

## 2020-01-01 RX ADMIN — FINASTERIDE 5 MILLIGRAM(S): 5 TABLET, FILM COATED ORAL at 11:22

## 2020-01-01 RX ADMIN — CHLORHEXIDINE GLUCONATE 1 APPLICATION(S): 213 SOLUTION TOPICAL at 05:25

## 2020-01-01 RX ADMIN — SODIUM CHLORIDE 2000 MILLILITER(S): 9 INJECTION INTRAMUSCULAR; INTRAVENOUS; SUBCUTANEOUS at 12:53

## 2020-01-01 RX ADMIN — CHLORHEXIDINE GLUCONATE 1 APPLICATION(S): 213 SOLUTION TOPICAL at 06:25

## 2020-01-01 RX ADMIN — NYSTATIN CREAM 1 APPLICATION(S): 100000 CREAM TOPICAL at 06:34

## 2020-01-01 RX ADMIN — Medication 8: at 22:12

## 2020-01-01 RX ADMIN — Medication 500000 UNIT(S): at 23:23

## 2020-01-01 RX ADMIN — Medication 650 MILLIGRAM(S): at 22:06

## 2020-01-01 RX ADMIN — Medication 4: at 06:33

## 2020-01-01 RX ADMIN — Medication 25 MILLILITER(S): at 11:06

## 2020-01-01 RX ADMIN — Medication 2: at 22:35

## 2020-01-01 RX ADMIN — MIRTAZAPINE 7.5 MILLIGRAM(S): 45 TABLET, ORALLY DISINTEGRATING ORAL at 21:29

## 2020-01-01 RX ADMIN — Medication 250 MILLILITER(S): at 03:54

## 2020-01-01 RX ADMIN — Medication 2.5 MILLIGRAM(S): at 11:26

## 2020-01-01 RX ADMIN — Medication 2.5 MILLIGRAM(S): at 18:04

## 2020-01-01 RX ADMIN — NYSTATIN CREAM 1 APPLICATION(S): 100000 CREAM TOPICAL at 05:38

## 2020-01-01 RX ADMIN — NYSTATIN CREAM 1 APPLICATION(S): 100000 CREAM TOPICAL at 17:15

## 2020-01-01 RX ADMIN — INSULIN GLARGINE 8 UNIT(S): 100 INJECTION, SOLUTION SUBCUTANEOUS at 21:29

## 2020-01-01 RX ADMIN — Medication 10 UNIT(S): at 13:10

## 2020-01-01 RX ADMIN — Medication 4: at 17:13

## 2020-01-01 RX ADMIN — Medication 650 MILLIGRAM(S): at 17:16

## 2020-01-01 RX ADMIN — Medication 4: at 11:40

## 2020-01-01 RX ADMIN — HEPARIN SODIUM 5000 UNIT(S): 5000 INJECTION INTRAVENOUS; SUBCUTANEOUS at 14:12

## 2020-01-01 RX ADMIN — Medication 50 MILLIGRAM(S): at 11:27

## 2020-01-01 RX ADMIN — Medication 4: at 06:18

## 2020-01-01 RX ADMIN — DAPTOMYCIN 128 MILLIGRAM(S): 500 INJECTION, POWDER, LYOPHILIZED, FOR SOLUTION INTRAVENOUS at 15:47

## 2020-01-01 RX ADMIN — Medication 50 MILLILITER(S): at 00:44

## 2020-01-01 RX ADMIN — NYSTATIN CREAM 1 APPLICATION(S): 100000 CREAM TOPICAL at 18:10

## 2020-01-01 RX ADMIN — Medication 10 UNIT(S): at 09:18

## 2020-01-01 RX ADMIN — Medication 4: at 18:09

## 2020-01-01 RX ADMIN — SODIUM CHLORIDE 1000 MILLILITER(S): 9 INJECTION INTRAMUSCULAR; INTRAVENOUS; SUBCUTANEOUS at 16:43

## 2020-01-01 RX ADMIN — NYSTATIN CREAM 1 APPLICATION(S): 100000 CREAM TOPICAL at 06:01

## 2020-01-01 RX ADMIN — NYSTATIN CREAM 1 APPLICATION(S): 100000 CREAM TOPICAL at 06:18

## 2020-01-01 RX ADMIN — VASOPRESSIN 1.2 UNIT(S)/MIN: 20 INJECTION INTRAVENOUS at 10:49

## 2020-01-01 RX ADMIN — PANTOPRAZOLE SODIUM 40 MILLIGRAM(S): 20 TABLET, DELAYED RELEASE ORAL at 09:21

## 2020-01-01 RX ADMIN — INSULIN GLARGINE 20 UNIT(S): 100 INJECTION, SOLUTION SUBCUTANEOUS at 00:04

## 2020-01-01 RX ADMIN — CEFTAROLINE FOSAMIL 50 MILLIGRAM(S): 600 POWDER, FOR SOLUTION INTRAVENOUS at 05:26

## 2020-01-01 RX ADMIN — Medication 3.37 MICROGRAM(S)/KG/MIN: at 19:39

## 2020-01-01 RX ADMIN — CEFTAROLINE FOSAMIL 50 MILLIGRAM(S): 600 POWDER, FOR SOLUTION INTRAVENOUS at 05:08

## 2020-01-01 RX ADMIN — Medication 1 TABLET(S): at 16:13

## 2020-01-01 RX ADMIN — Medication 2: at 12:54

## 2020-01-01 RX ADMIN — SERTRALINE 50 MILLIGRAM(S): 25 TABLET, FILM COATED ORAL at 11:10

## 2020-01-01 RX ADMIN — FINASTERIDE 5 MILLIGRAM(S): 5 TABLET, FILM COATED ORAL at 12:56

## 2020-01-01 RX ADMIN — Medication 8: at 17:49

## 2020-01-01 RX ADMIN — MIDODRINE HYDROCHLORIDE 15 MILLIGRAM(S): 2.5 TABLET ORAL at 21:03

## 2020-01-01 RX ADMIN — VASOPRESSIN 2.4 UNIT(S)/MIN: 20 INJECTION INTRAVENOUS at 23:40

## 2020-01-01 RX ADMIN — Medication 500000 UNIT(S): at 00:10

## 2020-01-01 RX ADMIN — SEVELAMER CARBONATE 800 MILLIGRAM(S): 2400 POWDER, FOR SUSPENSION ORAL at 21:38

## 2020-01-01 RX ADMIN — PANTOPRAZOLE SODIUM 40 MILLIGRAM(S): 20 TABLET, DELAYED RELEASE ORAL at 04:13

## 2020-01-01 RX ADMIN — CHLORHEXIDINE GLUCONATE 1 APPLICATION(S): 213 SOLUTION TOPICAL at 06:14

## 2020-01-01 RX ADMIN — Medication 50 MILLIGRAM(S): at 12:08

## 2020-01-01 RX ADMIN — Medication 102 MILLIGRAM(S): at 17:54

## 2020-01-01 RX ADMIN — NYSTATIN CREAM 1 APPLICATION(S): 100000 CREAM TOPICAL at 05:30

## 2020-01-01 RX ADMIN — NYSTATIN CREAM 1 APPLICATION(S): 100000 CREAM TOPICAL at 06:15

## 2020-01-01 RX ADMIN — CHLORHEXIDINE GLUCONATE 1 APPLICATION(S): 213 SOLUTION TOPICAL at 06:00

## 2020-01-01 RX ADMIN — Medication 500000 UNIT(S): at 12:31

## 2020-01-01 RX ADMIN — Medication 2: at 13:30

## 2020-01-01 RX ADMIN — CHLORHEXIDINE GLUCONATE 1 APPLICATION(S): 213 SOLUTION TOPICAL at 12:04

## 2020-01-01 RX ADMIN — Medication 2: at 10:31

## 2020-01-01 RX ADMIN — DAPTOMYCIN 128 MILLIGRAM(S): 500 INJECTION, POWDER, LYOPHILIZED, FOR SOLUTION INTRAVENOUS at 16:25

## 2020-01-01 RX ADMIN — LIDOCAINE 1 PATCH: 4 CREAM TOPICAL at 18:33

## 2020-01-01 RX ADMIN — CEFTAROLINE FOSAMIL 50 MILLIGRAM(S): 600 POWDER, FOR SOLUTION INTRAVENOUS at 05:01

## 2020-01-01 RX ADMIN — Medication 4: at 18:07

## 2020-01-01 RX ADMIN — MIDODRINE HYDROCHLORIDE 10 MILLIGRAM(S): 2.5 TABLET ORAL at 05:39

## 2020-01-01 RX ADMIN — Medication 2: at 00:12

## 2020-01-01 RX ADMIN — Medication 2: at 15:46

## 2020-01-01 RX ADMIN — NYSTATIN CREAM 1 APPLICATION(S): 100000 CREAM TOPICAL at 18:24

## 2020-01-01 RX ADMIN — Medication 50 MILLIGRAM(S): at 16:25

## 2020-01-01 RX ADMIN — MIDODRINE HYDROCHLORIDE 10 MILLIGRAM(S): 2.5 TABLET ORAL at 19:14

## 2020-01-01 RX ADMIN — FINASTERIDE 5 MILLIGRAM(S): 5 TABLET, FILM COATED ORAL at 15:47

## 2020-01-01 RX ADMIN — PANTOPRAZOLE SODIUM 40 MILLIGRAM(S): 20 TABLET, DELAYED RELEASE ORAL at 11:18

## 2020-01-01 RX ADMIN — Medication 2: at 16:14

## 2020-01-01 RX ADMIN — MIDODRINE HYDROCHLORIDE 15 MILLIGRAM(S): 2.5 TABLET ORAL at 14:44

## 2020-01-01 RX ADMIN — Medication 50 MILLILITER(S): at 06:40

## 2020-01-01 RX ADMIN — Medication 4 UNIT(S): at 11:04

## 2020-01-01 RX ADMIN — INSULIN GLARGINE 10 UNIT(S): 100 INJECTION, SOLUTION SUBCUTANEOUS at 21:23

## 2020-01-01 RX ADMIN — SIMVASTATIN 10 MILLIGRAM(S): 20 TABLET, FILM COATED ORAL at 23:41

## 2020-01-01 RX ADMIN — Medication 50 MILLIGRAM(S): at 05:01

## 2020-01-01 RX ADMIN — Medication 650 MILLIGRAM(S): at 19:17

## 2020-01-01 RX ADMIN — LACTULOSE 20 GRAM(S): 10 SOLUTION ORAL at 05:17

## 2020-01-01 RX ADMIN — Medication 50 MILLIGRAM(S): at 04:04

## 2020-01-01 RX ADMIN — Medication 50 MILLIGRAM(S): at 18:20

## 2020-01-01 RX ADMIN — INSULIN HUMAN 2 UNIT(S): 100 INJECTION, SOLUTION SUBCUTANEOUS at 11:28

## 2020-01-01 RX ADMIN — FINASTERIDE 5 MILLIGRAM(S): 5 TABLET, FILM COATED ORAL at 11:36

## 2020-01-01 RX ADMIN — SIMVASTATIN 10 MILLIGRAM(S): 20 TABLET, FILM COATED ORAL at 22:49

## 2020-01-01 RX ADMIN — PANTOPRAZOLE SODIUM 40 MILLIGRAM(S): 20 TABLET, DELAYED RELEASE ORAL at 11:09

## 2020-01-01 RX ADMIN — SERTRALINE 50 MILLIGRAM(S): 25 TABLET, FILM COATED ORAL at 11:25

## 2020-01-01 RX ADMIN — MIDODRINE HYDROCHLORIDE 10 MILLIGRAM(S): 2.5 TABLET ORAL at 13:19

## 2020-01-01 RX ADMIN — Medication 2 UNIT(S): at 15:33

## 2020-01-01 RX ADMIN — PANTOPRAZOLE SODIUM 40 MILLIGRAM(S): 20 TABLET, DELAYED RELEASE ORAL at 10:15

## 2020-01-01 RX ADMIN — Medication 200 MILLIGRAM(S): at 21:28

## 2020-01-01 RX ADMIN — Medication 4: at 07:04

## 2020-01-01 RX ADMIN — Medication 2: at 21:30

## 2020-01-01 RX ADMIN — INSULIN GLARGINE 22 UNIT(S): 100 INJECTION, SOLUTION SUBCUTANEOUS at 09:19

## 2020-01-01 RX ADMIN — CEFTAROLINE FOSAMIL 50 MILLIGRAM(S): 600 POWDER, FOR SOLUTION INTRAVENOUS at 17:01

## 2020-01-01 RX ADMIN — MIDODRINE HYDROCHLORIDE 10 MILLIGRAM(S): 2.5 TABLET ORAL at 13:03

## 2020-01-01 RX ADMIN — CEFTRIAXONE 100 MILLIGRAM(S): 500 INJECTION, POWDER, FOR SOLUTION INTRAMUSCULAR; INTRAVENOUS at 09:47

## 2020-01-01 RX ADMIN — Medication 50 MILLIGRAM(S): at 16:27

## 2020-01-01 RX ADMIN — PANTOPRAZOLE SODIUM 40 MILLIGRAM(S): 20 TABLET, DELAYED RELEASE ORAL at 19:14

## 2020-01-01 RX ADMIN — PANTOPRAZOLE SODIUM 40 MILLIGRAM(S): 20 TABLET, DELAYED RELEASE ORAL at 06:41

## 2020-01-01 RX ADMIN — PANTOPRAZOLE SODIUM 40 MILLIGRAM(S): 20 TABLET, DELAYED RELEASE ORAL at 07:15

## 2020-01-01 RX ADMIN — FINASTERIDE 5 MILLIGRAM(S): 5 TABLET, FILM COATED ORAL at 09:19

## 2020-01-01 RX ADMIN — HEPARIN SODIUM 5000 UNIT(S): 5000 INJECTION INTRAVENOUS; SUBCUTANEOUS at 21:39

## 2020-01-01 RX ADMIN — Medication 20 MILLIEQUIVALENT(S): at 12:26

## 2020-01-01 RX ADMIN — Medication 2: at 17:23

## 2020-01-01 RX ADMIN — Medication 2: at 17:33

## 2020-01-01 RX ADMIN — Medication 50 MILLIGRAM(S): at 18:25

## 2020-01-01 RX ADMIN — Medication 2: at 21:26

## 2020-01-01 RX ADMIN — BENZOCAINE AND MENTHOL 1 LOZENGE: 5; 1 LIQUID ORAL at 05:48

## 2020-01-01 RX ADMIN — FINASTERIDE 5 MILLIGRAM(S): 5 TABLET, FILM COATED ORAL at 11:25

## 2020-01-01 RX ADMIN — FINASTERIDE 5 MILLIGRAM(S): 5 TABLET, FILM COATED ORAL at 12:04

## 2020-01-01 RX ADMIN — MIRTAZAPINE 7.5 MILLIGRAM(S): 45 TABLET, ORALLY DISINTEGRATING ORAL at 12:03

## 2020-01-01 RX ADMIN — MODAFINIL 200 MILLIGRAM(S): 200 TABLET ORAL at 11:47

## 2020-01-01 RX ADMIN — INSULIN GLARGINE 15 UNIT(S): 100 INJECTION, SOLUTION SUBCUTANEOUS at 11:44

## 2020-01-01 RX ADMIN — Medication 2: at 12:55

## 2020-01-01 RX ADMIN — Medication 500000 UNIT(S): at 18:02

## 2020-01-01 RX ADMIN — MIDODRINE HYDROCHLORIDE 10 MILLIGRAM(S): 2.5 TABLET ORAL at 21:25

## 2020-01-01 RX ADMIN — LACTULOSE 20 GRAM(S): 10 SOLUTION ORAL at 06:48

## 2020-01-01 RX ADMIN — Medication 2: at 17:10

## 2020-01-01 RX ADMIN — NYSTATIN CREAM 1 APPLICATION(S): 100000 CREAM TOPICAL at 05:08

## 2020-01-01 RX ADMIN — Medication 1 APPLICATION(S): at 15:55

## 2020-01-01 RX ADMIN — Medication 500000 UNIT(S): at 13:05

## 2020-01-01 RX ADMIN — Medication 50 MILLIGRAM(S): at 10:56

## 2020-01-01 RX ADMIN — MIDODRINE HYDROCHLORIDE 10 MILLIGRAM(S): 2.5 TABLET ORAL at 22:17

## 2020-01-01 RX ADMIN — Medication 2: at 06:17

## 2020-01-01 RX ADMIN — Medication 50 MILLIGRAM(S): at 00:00

## 2020-01-01 RX ADMIN — MIDODRINE HYDROCHLORIDE 15 MILLIGRAM(S): 2.5 TABLET ORAL at 22:03

## 2020-01-01 RX ADMIN — INSULIN GLARGINE 4 UNIT(S): 100 INJECTION, SOLUTION SUBCUTANEOUS at 22:16

## 2020-01-01 RX ADMIN — Medication 50 MILLIGRAM(S): at 21:18

## 2020-01-01 RX ADMIN — Medication 2: at 07:11

## 2020-01-01 RX ADMIN — MIDODRINE HYDROCHLORIDE 10 MILLIGRAM(S): 2.5 TABLET ORAL at 13:52

## 2020-01-01 RX ADMIN — Medication 10 MILLIGRAM(S): at 17:21

## 2020-01-01 RX ADMIN — NYSTATIN CREAM 1 APPLICATION(S): 100000 CREAM TOPICAL at 18:00

## 2020-01-01 RX ADMIN — NYSTATIN CREAM 1 APPLICATION(S): 100000 CREAM TOPICAL at 05:17

## 2020-01-01 RX ADMIN — Medication 650 MILLIGRAM(S): at 17:55

## 2020-01-01 RX ADMIN — SODIUM CHLORIDE 1000 MILLILITER(S): 9 INJECTION INTRAMUSCULAR; INTRAVENOUS; SUBCUTANEOUS at 15:41

## 2020-01-01 RX ADMIN — NYSTATIN CREAM 1 APPLICATION(S): 100000 CREAM TOPICAL at 10:53

## 2020-01-01 RX ADMIN — Medication 2: at 06:02

## 2020-01-01 RX ADMIN — INSULIN HUMAN 2 UNIT(S): 100 INJECTION, SOLUTION SUBCUTANEOUS at 17:20

## 2020-01-01 RX ADMIN — Medication 1 APPLICATION(S): at 12:44

## 2020-01-01 RX ADMIN — DAPTOMYCIN 120 MILLIGRAM(S): 500 INJECTION, POWDER, LYOPHILIZED, FOR SOLUTION INTRAVENOUS at 14:52

## 2020-01-01 RX ADMIN — Medication 8 UNIT(S): at 12:33

## 2020-01-01 RX ADMIN — Medication 650 MILLIGRAM(S): at 20:18

## 2020-01-01 RX ADMIN — Medication 50 MILLIGRAM(S): at 05:47

## 2020-01-01 RX ADMIN — Medication 2: at 11:33

## 2020-01-01 RX ADMIN — FINASTERIDE 5 MILLIGRAM(S): 5 TABLET, FILM COATED ORAL at 11:18

## 2020-01-01 RX ADMIN — CEFTRIAXONE 100 MILLIGRAM(S): 500 INJECTION, POWDER, FOR SOLUTION INTRAMUSCULAR; INTRAVENOUS at 09:33

## 2020-01-01 RX ADMIN — Medication 3.37 MICROGRAM(S)/KG/MIN: at 08:29

## 2020-01-01 RX ADMIN — Medication 2: at 21:15

## 2020-01-01 RX ADMIN — Medication 2 UNIT(S): at 12:27

## 2020-01-01 RX ADMIN — Medication 2 UNIT(S): at 06:06

## 2020-01-01 RX ADMIN — Medication 2: at 05:46

## 2020-01-01 RX ADMIN — Medication 1 APPLICATION(S): at 10:11

## 2020-01-01 RX ADMIN — CHLORHEXIDINE GLUCONATE 1 APPLICATION(S): 213 SOLUTION TOPICAL at 06:44

## 2020-01-01 RX ADMIN — Medication 25 MILLILITER(S): at 23:23

## 2020-01-01 RX ADMIN — LACTULOSE 20 GRAM(S): 10 SOLUTION ORAL at 07:25

## 2020-01-01 RX ADMIN — CEFTRIAXONE 100 MILLIGRAM(S): 500 INJECTION, POWDER, FOR SOLUTION INTRAMUSCULAR; INTRAVENOUS at 15:51

## 2020-01-01 RX ADMIN — MIDODRINE HYDROCHLORIDE 10 MILLIGRAM(S): 2.5 TABLET ORAL at 05:29

## 2020-01-01 RX ADMIN — Medication 1 APPLICATION(S): at 16:00

## 2020-01-01 RX ADMIN — VASOPRESSIN 2.4 UNIT(S)/MIN: 20 INJECTION INTRAVENOUS at 10:03

## 2020-01-01 RX ADMIN — Medication 1 APPLICATION(S): at 10:07

## 2020-01-01 RX ADMIN — SODIUM CHLORIDE 1000 MILLILITER(S): 9 INJECTION INTRAMUSCULAR; INTRAVENOUS; SUBCUTANEOUS at 14:20

## 2020-01-01 RX ADMIN — SODIUM CHLORIDE 250 MILLILITER(S): 9 INJECTION, SOLUTION INTRAVENOUS at 22:59

## 2020-01-01 RX ADMIN — PANTOPRAZOLE SODIUM 40 MILLIGRAM(S): 20 TABLET, DELAYED RELEASE ORAL at 17:48

## 2020-01-01 RX ADMIN — Medication 50 MILLILITER(S): at 05:47

## 2020-01-01 RX ADMIN — MIDODRINE HYDROCHLORIDE 10 MILLIGRAM(S): 2.5 TABLET ORAL at 21:38

## 2020-01-01 RX ADMIN — FINASTERIDE 5 MILLIGRAM(S): 5 TABLET, FILM COATED ORAL at 13:00

## 2020-01-01 RX ADMIN — Medication 2: at 11:29

## 2020-01-01 RX ADMIN — MIRTAZAPINE 7.5 MILLIGRAM(S): 45 TABLET, ORALLY DISINTEGRATING ORAL at 21:37

## 2020-01-01 RX ADMIN — PANTOPRAZOLE SODIUM 40 MILLIGRAM(S): 20 TABLET, DELAYED RELEASE ORAL at 12:54

## 2020-01-01 RX ADMIN — Medication 500000 UNIT(S): at 00:31

## 2020-01-01 RX ADMIN — HEPARIN SODIUM 3 UNIT(S)/HR: 5000 INJECTION INTRAVENOUS; SUBCUTANEOUS at 06:38

## 2020-01-01 RX ADMIN — Medication 10 UNIT(S): at 12:45

## 2020-01-01 RX ADMIN — SEVELAMER CARBONATE 800 MILLIGRAM(S): 2400 POWDER, FOR SUSPENSION ORAL at 17:01

## 2020-01-01 RX ADMIN — Medication 50 MILLIGRAM(S): at 16:17

## 2020-01-01 RX ADMIN — NYSTATIN CREAM 1 APPLICATION(S): 100000 CREAM TOPICAL at 16:02

## 2020-01-01 RX ADMIN — MIDODRINE HYDROCHLORIDE 15 MILLIGRAM(S): 2.5 TABLET ORAL at 11:19

## 2020-01-01 RX ADMIN — SODIUM CHLORIDE 100 MILLILITER(S): 9 INJECTION, SOLUTION INTRAVENOUS at 22:45

## 2020-01-01 RX ADMIN — Medication 2 UNIT(S): at 06:25

## 2020-01-01 RX ADMIN — Medication 4: at 12:26

## 2020-01-01 RX ADMIN — HEPARIN SODIUM 5000 UNIT(S): 5000 INJECTION INTRAVENOUS; SUBCUTANEOUS at 14:17

## 2020-01-01 RX ADMIN — BENZOCAINE AND MENTHOL 1 LOZENGE: 5; 1 LIQUID ORAL at 11:42

## 2020-01-01 RX ADMIN — HEPARIN SODIUM 5000 UNIT(S): 5000 INJECTION INTRAVENOUS; SUBCUTANEOUS at 13:06

## 2020-01-01 RX ADMIN — SODIUM CHLORIDE 1000 MILLILITER(S): 9 INJECTION INTRAMUSCULAR; INTRAVENOUS; SUBCUTANEOUS at 15:51

## 2020-01-01 RX ADMIN — Medication 50 MILLIGRAM(S): at 21:23

## 2020-01-01 RX ADMIN — MIDODRINE HYDROCHLORIDE 30 MILLIGRAM(S): 2.5 TABLET ORAL at 22:06

## 2020-01-01 RX ADMIN — CHLORHEXIDINE GLUCONATE 1 APPLICATION(S): 213 SOLUTION TOPICAL at 05:41

## 2020-02-04 NOTE — H&P ADULT - PROBLEM SELECTOR PLAN 10
-PCP Contacted on Admission: (Y/N) --> Name & Phone #: Dr. Quintero [phone]  -Date of Contact with PCP:  -PCP Contacted at Discharge: (Y/N, N/A)  -Summary of Handoff Given to PCP:   -Post-Discharge Appointment Date and Location:

## 2020-02-04 NOTE — H&P ADULT - PROBLEM SELECTOR PLAN 9
-No IVF, patient to received 2 units PRBC per Dr. Roberts  -Will replete to K>4 and Mg>2  -Consistent carbohydrate diet  -Dispo RMF

## 2020-02-04 NOTE — H&P ADULT - PROBLEM SELECTOR PLAN 3
-Patient with history of diabetes mellitus, takes glyburide 2.5mg daily, and janumet 50mg-1000mg twice daily, continue with insulin sliding scale -Patient with history of diabetes mellitus, takes glyburide 2.5mg daily, and Janumet 50mg-1000mg twice daily, continue with insulin sliding scale. -Patient with history of diabetes mellitus, takes glyburide 2.5mg daily, and Janumet 50mg-1000mg twice daily, continue with insulin sliding scale.  Patient hyperglycemic over 300 which appears higher than previous values, will obtain hemoglobin A1C for AM.

## 2020-02-04 NOTE — H&P ADULT - ASSESSMENT
74 year old male with PMH DM, BPH, HLD, SVT s/p ablation, CKD, iron deficiency anemia and legal blindness who presents for 2 weeks of weakness admitted for symptomatic anemia.

## 2020-02-04 NOTE — H&P ADULT - HISTORY OF PRESENT ILLNESS
74 year old male with PMH DM, BPH, HLD, SVT s/p ablation, CKD, iron deficiency anemia and legal blindness who presents for 2 weeks of weakness.  The patient states that for the past 2 weeks, he has been weaker than usual.  He states that even minimal exertion tires him out.  Additionally he states that he will get short of breath with iminal exertion.  He was told by multiple people to go to the ED including his podiatrist on Friday however, he did not present until today.  Upon arrival to the ED, vital signs were BP 98/52, HR 91, RR 18, temperature 97.7 degrees Farenheit and saturating 100% on room air.  Labs were significant for hemoglobin of 6.5 (baseline appears 7-8 pre previous records), bicarb of 14, BUN/Cr of 64/3.04, glucose of 321 and ALT of 52.  He was ordered for 2 units of PRBC and admitted for symptomatic anemia. 74 year old male with PMH DM, BPH, HLD, SVT s/p ablation, CKD, iron deficiency anemia and legal blindness who presents for 2 weeks of weakness.  The patient endorses that for the past 2 weeks, he has been weaker than usual.  He states that even minimal exertion tires him out and makes him short of breathly he states that he will get short of breath with iminal exertion.  He was told by multiple people to go to the ED including his podiatrist on Friday however, he did not present until today.  Upon arrival to the ED, vital signs were BP 98/52, HR 91, RR 18, temperature 97.7 degrees Farenheit and saturating 100% on room air.  Labs were significant for hemoglobin of 6.5 (baseline appears 7-8 pre previous records), bicarb of 14, BUN/Cr of 64/3.04, glucose of 321 and ALT of 52.  He was ordered for 2 units of PRBC and admitted for symptomatic anemia. 74 year old male with PMH DM, BPH, HLD, SVT s/p ablation, CKD, iron deficiency anemia and legal blindness who presents for 2 weeks of weakness.  The patient endorses that for the past 2 weeks, he has been weaker than usual.  He states that even minimal exertion tires him out and makes him short of breath.  He was told by multiple people to go to the ED including his podiatrist on Friday however, he did not present until today.  Upon arrival to the ED, vital signs were BP 98/52, HR 91, RR 18, temperature 97.7 degrees Farenheit and saturating 100% on room air.  Labs were significant for hemoglobin of 6.5 (baseline appears 7-8 pre previous records), bicarb of 14, BUN/Cr of 64/3.04, glucose of 321 and ALT of 52.  He was ordered for 2 units of PRBC and admitted for symptomatic anemia.

## 2020-02-04 NOTE — ED ADULT NURSE NOTE - NSIMPLEMENTINTERV_GEN_ALL_ED
Implemented All Fall with Harm Risk Interventions:  [place] to call system. Call bell, personal items and telephone within reach. Instruct patient to call for assistance. Room bathroom lighting operational. Non-slip footwear when patient is off stretcher. Physically safe environment: no spills, clutter or unnecessary equipment. Stretcher in lowest position, wheels locked, appropriate side rails in place. Provide visual cue, wrist band, yellow gown, etc. Monitor gait and stability. Monitor for mental status changes and reorient to person, place, and time. Review medications for side effects contributing to fall risk. Reinforce activity limits and safety measures with patient and family. Provide visual clues: red socks.

## 2020-02-04 NOTE — H&P ADULT - PROBLEM SELECTOR PLAN 7
-Patient with slightly elevated ALT of 52, can be due to dehydration vs statin use, ALT, alk phos and bilirubin normal, can continue to trend for now -Patient with slightly elevated ALT of 52, can be due to dehydration vs statin use, ALT, alk phos and bilirubin normal, can continue to trend for now.

## 2020-02-04 NOTE — H&P ADULT - PROBLEM SELECTOR PLAN 1
-Patient with history of iron deficiency anemia, previously received iron infusions, with baseline hemoglobin 7-8 presenting with 2 weeks of weakness and dyspnea on exertion.  Hemoglobin upon admission 6.5.  Patient hemodynamically stable with no signs/symptoms of active bleed.  Maintain active type and screen.  Patient to receive 2 units PRBC and repeat CBC in AM. -Patient with history of iron deficiency anemia, previously received iron infusions with Dr. Roberts, with baseline hemoglobin 7-8 presenting with 2 weeks of weakness and dyspnea on exertion.  Hemoglobin upon admission 6.5.  Patient hemodynamically stable with no signs/symptoms of active bleed.  Maintain active type and screen.  Patient to receive 2 units PRBC and repeat CBC in AM.

## 2020-02-04 NOTE — H&P ADULT - NSHPREVIEWOFSYSTEMS_GEN_ALL_CORE
CONSTITUTIONAL: Endorses weakness, denies fevers or chills  EYES/ENT: No visual changes;  No vertigo or throat pain   NECK: No pain or stiffness  RESPIRATORY: No cough, wheezing, hemoptysis; endorses dyspnea on exertion  CARDIOVASCULAR: No chest pain or palpitations  GASTROINTESTINAL: No abdominal or epigastric pain. No nausea, vomiting, or hematemesis; No diarrhea or constipation. No melena or hematochezia.  GENITOURINARY: No dysuria, frequency or hematuria  NEUROLOGICAL: No numbness or weakness  SKIN: No itching, burning, rashes, or lesions   All other review of systems is negative unless indicated above.

## 2020-02-04 NOTE — H&P ADULT - PROBLEM SELECTOR PLAN 5
-Patient with history of CKD stage 3 with baseline creatinine 1.5-2 presenting with creatinine of 3.04, this can be in the setting of dehydration as patient endorsing poor PO intake and is anemic.  Patient to received 2 units PRBC and will encourage PO intake and continue to trend BMP.  If no improvement, can obtain urine studies to look for other underlying etiologies.  Renally dose medications.

## 2020-02-04 NOTE — ED ADULT TRIAGE NOTE - OTHER COMPLAINTS
Pt presents with worsening weakness and fatigue x1 week. Pt has history of anemia with unknown etiology with blood transfusion 2 months ago. Pt denies any fevers, no lightheadedness, no dizziness, no headache, no cp, no sob, no n/v, no changes to bowels, no urinary complaints. PMH DM, blind, afib s/p ablation 2 years ago

## 2020-02-04 NOTE — ED PROVIDER NOTE - OBJECTIVE STATEMENT
74 year old female with history of iron def anemia requiring multiple blood transfusions, HTN, HLD and renal insufficiency presents to ED with concern for generalized weakness and concern he may be anemic.  Patient denies associated chest pain, shortness of breath, fever, chills, nausea, vomiting, abdominal pain, changes to bowel movements, peripheral edema or any additional acute complaints or concerns at this time.

## 2020-02-04 NOTE — H&P ADULT - PROBLEM SELECTOR PLAN 2
-Patient with history of BPH, continue finasteride 5mg daily -Patient with history of BPH, continue finasteride 5mg daily.

## 2020-02-04 NOTE — H&P ADULT - NSHPLABSRESULTS_GEN_ALL_CORE
.  LABS:                         6.5    4.90  )-----------( 165      ( 04 Feb 2020 19:10 )             21.3     02-04    136  |  106  |  64<H>  ----------------------------<  321<H>  5.0   |  14<L>  |  3.04<H>    Ca    9.1      04 Feb 2020 19:11    TPro  6.8  /  Alb  3.4  /  TBili  0.2  /  DBili  x   /  AST  30  /  ALT  52<H>  /  AlkPhos  101  02-04    PT/INR - ( 04 Feb 2020 19:11 )   PT: 16.7 sec;   INR: 1.45          PTT - ( 04 Feb 2020 19:11 )  PTT:26.9 sec              RADIOLOGY, EKG & ADDITIONAL TESTS: Reviewed.

## 2020-02-04 NOTE — H&P ADULT - NSHPSOCIALHISTORY_GEN_ALL_CORE
Denies alcohol and drug use, endorses quitting smoking 30 years ago, smoked 10 cigarettes daily for 10 years

## 2020-02-04 NOTE — ED ADULT NURSE NOTE - OBJECTIVE STATEMENT
73 yo M pmhx AFib, stroke, prostate CA, DM, multiple transfusions, anemia presents to ED co fatigue and weakness. PEr pt, "I used to get iron transfusions but my toe got infected so I stopped and I haven't had a transfusion in a while and ayse been so tired lately." Pt describes fatigue, SOB upon exertion worsening over the past 2 weeks. Pt is pale, skin cool to touch, lung sounds clear, RUQ tender upon palpation. Pt denies SOB, chest pain, N/V/D, hematuria, blood in stool, recent illnesses, lightheadedness, dizziness. EKG completed, labs drawn, on continuous cardiac monitor. will continue to monitor.

## 2020-02-04 NOTE — H&P ADULT - NSHPPHYSICALEXAM_GEN_ALL_CORE
Constitutional: Pale/slightly jaundiced elderly male laying comfortably in bed; NAD  Head: NC/AT  Eyes: EOMI, pale conjunctive  ENT: Dry mucous membranes  Neck: supple; no JVD or thyromegaly  Respiratory: CTA B/L; no W/R/R, no retractions  Cardiac: +S1/S2; RRR; 2/6 systolic murmur  Gastrointestinal: soft, NT/ND; no rebound or guarding; +BSx4  Extremities: WWP, no clubbing or cyanosis; no peripheral edema  Musculoskeletal: NROM x4; no joint swelling, tenderness or erythema  Lymphatic: no submandibular or cervical LAD  Neurologic: AAOx3; CNII-XII grossly intact; no focal deficits

## 2020-02-04 NOTE — ED PROVIDER NOTE - CLINICAL SUMMARY MEDICAL DECISION MAKING FREE TEXT BOX
Patient in ED w concern for anemia.  Hx of anemia noted, received blood in the past.  Hgb today noted to be 6.5 - patient is aware and consented for transfusion.  He is agreeable to plan for admission with transfusion and monitoring ensure appropriate rise in Hgb.  Patient ready for admission at this time.

## 2020-02-04 NOTE — H&P ADULT - PROBLEM SELECTOR PLAN 6
-Patient presenting with elevated INR of 1.45 not on any anticoagulation, no signs/symptoms of active bleed, can continue to trend, no indication for reversal -Patient presenting with elevated INR of 1.45 not on any anticoagulation, no signs/symptoms of active bleed, can continue to trend, no indication for reversal.

## 2020-02-04 NOTE — H&P ADULT - PROBLEM SELECTOR PLAN 4
-Patient with history of HLD, continue home dose of simvastatin 10mg nightly -Patient with history of HLD, continue home dose of simvastatin 10mg nightly.

## 2020-02-05 NOTE — PROGRESS NOTE ADULT - PROBLEM SELECTOR PLAN 3
continue home meds glyburide 2.5 mg daily, Janumet 50mg-1000mg twice daily, continue with insulin sliding scale. HbA1c is unreliable in setting of CKD due to rapid RBC turnover, monitor with finger stick glucose. continue home meds glyburide 2.5 mg daily, Janumet 50mg-1000mg twice daily, continue with insulin sliding scale.   HbA1c is unreliable in setting of CKD due to rapid RBC turnover, monitor with finger stick glucose.

## 2020-02-05 NOTE — PHYSICAL THERAPY INITIAL EVALUATION ADULT - GAIT DEVIATIONS NOTED, PT EVAL
sig dec lissette, unable to maneuver around obstacles bilaterally, 2 min LOB when hitting obstacles requiring min A to recover, unsteady gait, minimal step clearance

## 2020-02-05 NOTE — PROGRESS NOTE ADULT - SUBJECTIVE AND OBJECTIVE BOX
coverage for Dr Roberts    Pt seen and examined   no complaints  feels better with transfsion  did PT    REVIEW OF SYSTEMS:  Constitutional: No fever,]  Cardiovascular: No chest pain, palpitations, dizziness or leg swelling  Gastrointestinal: No abdominal or epigastric pain. No nausea, vomiting or hematemesis;. No melena or hematochezia.  Skin: No itching, burning, rashes or lesions       MEDICATIONS:  MEDICATIONS  (STANDING):  dextrose 5%. 1000 milliLiter(s) (50 mL/Hr) IV Continuous <Continuous>  dextrose 50% Injectable 12.5 Gram(s) IV Push once  dextrose 50% Injectable 25 Gram(s) IV Push once  dextrose 50% Injectable 25 Gram(s) IV Push once  finasteride 5 milliGRAM(s) Oral daily  insulin lispro (HumaLOG) corrective regimen sliding scale   SubCutaneous three times a day before meals  insulin lispro (HumaLOG) corrective regimen sliding scale   SubCutaneous at bedtime  simvastatin 10 milliGRAM(s) Oral at bedtime    MEDICATIONS  (PRN):  dextrose 40% Gel 15 Gram(s) Oral once PRN Blood Glucose LESS THAN 70 milliGRAM(s)/deciliter  glucagon  Injectable 1 milliGRAM(s) IntraMuscular once PRN Glucose LESS THAN 70 milligrams/deciliter      Allergies    No Known Allergies    Intolerances        Vital Signs Last 24 Hrs  T(C): 36.7 (05 Feb 2020 09:42), Max: 37.5 (05 Feb 2020 02:30)  T(F): 98 (05 Feb 2020 09:42), Max: 99.5 (05 Feb 2020 02:30)  HR: 88 (05 Feb 2020 09:42) (81 - 97)  BP: 120/66 (05 Feb 2020 09:42) (98/52 - 158/67)  BP(mean): --  RR: 18 (05 Feb 2020 09:42) (16 - 18)  SpO2: 100% (05 Feb 2020 09:42) (97% - 100%)      PHYSICAL EXAM:    General: ; in no acute distress  HEENT: MMM, pale conjunctiva and sclera clear  Lungs: clear  Heart: regular  Gastrointestinal: Soft non-tender non-distended; Normal bowel sounds; No hepatosplenomegaly  Skin: Warm and dry. No obvious rash    LABS:      CBC Full  -  ( 05 Feb 2020 06:22 )  WBC Count : 5.33 K/uL  RBC Count : 3.18 M/uL  Hemoglobin : 8.1 g/dL  Hematocrit : 26.0 %  Platelet Count - Automated : 168 K/uL  Mean Cell Volume : 81.8 fl  Mean Cell Hemoglobin : 25.5 pg  Mean Cell Hemoglobin Concentration : 31.2 gm/dL  Auto Neutrophil # : x  Auto Lymphocyte # : x  Auto Monocyte # : x  Auto Eosinophil # : x  Auto Basophil # : x  Auto Neutrophil % : x  Auto Lymphocyte % : x  Auto Monocyte % : x  Auto Eosinophil % : x  Auto Basophil % : x    02-05    135  |  105  |  59<H>  ----------------------------<  287<H>  4.5   |  13<L>  |  2.62<H>    Ca    8.8      05 Feb 2020 06:22  Mg     1.6     02-05    TPro  6.4  /  Alb  3.1<L>  /  TBili  0.3  /  DBili  x   /  AST  26  /  ALT  43  /  AlkPhos  103  02-05    PT/INR - ( 05 Feb 2020 06:22 )   PT: 16.8 sec;   INR: 1.46          PTT - ( 05 Feb 2020 06:22 )  PTT:29.0 sec                  RADIOLOGY & ADDITIONAL STUDIES (The following images were personally reviewed):

## 2020-02-05 NOTE — DISCHARGE NOTE PROVIDER - NSDCCPCAREPLAN_GEN_ALL_CORE_FT
PRINCIPAL DISCHARGE DIAGNOSIS  Diagnosis: Anemia, unspecified type  Assessment and Plan of Treatment: Plan for follow-up: Return to Dr. Roberts for further evaluation regarding your anemia. A cause of your anemia has never been fully understood but it is most likely partly related to the chronic kidney disease you have.   Repeat lab work: Please return to the emergency department if your symptoms reoccur or if you notice bleeding or abnormal bowel movements as this can also cause anemia and require immediate treatment. You should have labs repeated by Dr. Roberts after you are discharged, within 1-2 weeks.      SECONDARY DISCHARGE DIAGNOSES  Diagnosis: Renal insufficiency  Assessment and Plan of Treatment:

## 2020-02-05 NOTE — PROGRESS NOTE ADULT - PROBLEM SELECTOR PLAN 2
Patient with CKD stage 3 with baseline Cr 1.5-2, presented with Cr 3.04 in setting of dehydration and poor po intake. After 2 units PRBC Cr improved to 2.62. Encourage po intake and hydration.

## 2020-02-05 NOTE — PROGRESS NOTE ADULT - SUBJECTIVE AND OBJECTIVE BOX
OVERNIGHT EVENTS:    SUBJECTIVE / INTERVAL HPI: Patient seen and examined at bedside.     VITAL SIGNS:  Vital Signs Last 24 Hrs  T(C): 36.7 (05 Feb 2020 09:42), Max: 37.5 (05 Feb 2020 02:30)  T(F): 98 (05 Feb 2020 09:42), Max: 99.5 (05 Feb 2020 02:30)  HR: 88 (05 Feb 2020 09:42) (81 - 97)  BP: 120/66 (05 Feb 2020 09:42) (98/52 - 158/67)  BP(mean): --  RR: 18 (05 Feb 2020 09:42) (16 - 18)  SpO2: 100% (05 Feb 2020 09:42) (97% - 100%)    PHYSICAL EXAM:    General: tired and pale appearing elderly man  HEENT: NC/AT; PERRL, anicteric sclera; dry mucus membranes and poor dentition  Neck: supple  Cardiovascular: +S1/S2; RRR; no M/R/G on auscultation  Respiratory: CTA B/L; no W/R/R  Gastrointestinal: soft, NT/ND; +BSx4  Extremities: WWP; no edema, clubbing or cyanosis  Vascular: 2+ radial, DP/PT pulses B/L  Neurological: AAOx3; no focal deficits    MEDICATIONS:  MEDICATIONS  (STANDING):  dextrose 5%. 1000 milliLiter(s) (50 mL/Hr) IV Continuous <Continuous>  dextrose 50% Injectable 12.5 Gram(s) IV Push once  dextrose 50% Injectable 25 Gram(s) IV Push once  dextrose 50% Injectable 25 Gram(s) IV Push once  finasteride 5 milliGRAM(s) Oral daily  insulin lispro (HumaLOG) corrective regimen sliding scale   SubCutaneous three times a day before meals  insulin lispro (HumaLOG) corrective regimen sliding scale   SubCutaneous at bedtime  simvastatin 10 milliGRAM(s) Oral at bedtime    MEDICATIONS  (PRN):  dextrose 40% Gel 15 Gram(s) Oral once PRN Blood Glucose LESS THAN 70 milliGRAM(s)/deciliter  glucagon  Injectable 1 milliGRAM(s) IntraMuscular once PRN Glucose LESS THAN 70 milligrams/deciliter      ALLERGIES:  Allergies    No Known Allergies    Intolerances        LABS:                        8.1    5.33  )-----------( 168      ( 05 Feb 2020 06:22 )             26.0     02-05    135  |  105  |  59<H>  ----------------------------<  287<H>  4.5   |  13<L>  |  2.62<H>    Ca    8.8      05 Feb 2020 06:22  Mg     1.6     02-05    TPro  6.4  /  Alb  3.1<L>  /  TBili  0.3  /  DBili  x   /  AST  26  /  ALT  43  /  AlkPhos  103  02-05    PT/INR - ( 05 Feb 2020 06:22 )   PT: 16.8 sec;   INR: 1.46          PTT - ( 05 Feb 2020 06:22 )  PTT:29.0 sec    CAPILLARY BLOOD GLUCOSE      POCT Blood Glucose.: 281 mg/dL (05 Feb 2020 12:19)      RADIOLOGY & ADDITIONAL TESTS:   EKG:   CXR:   CT:   MRI: OVERNIGHT EVENTS: given 2 units of PRBC with clinical improvement of symptoms (weakness, SOB).     SUBJECTIVE / INTERVAL HPI: Patient seen and examined at bedside. Appears tired and pale. Patient is able to get up and go to bathroom with cane and assistance.     VITAL SIGNS:  Vital Signs Last 24 Hrs  T(C): 36.7 (05 Feb 2020 09:42), Max: 37.5 (05 Feb 2020 02:30)  T(F): 98 (05 Feb 2020 09:42), Max: 99.5 (05 Feb 2020 02:30)  HR: 88 (05 Feb 2020 09:42) (81 - 97)  BP: 120/66 (05 Feb 2020 09:42) (98/52 - 158/67)  BP(mean): --  RR: 18 (05 Feb 2020 09:42) (16 - 18)  SpO2: 100% (05 Feb 2020 09:42) (97% - 100%)    PHYSICAL EXAM:    General: tired and pale appearing elderly man, NAD  HEENT: NC/AT; PERRL, anicteric sclera; dry mucus membranes and poor dentition  Neck: supple  Cardiovascular: +S1/S2; RRR  Respiratory: CTA B/L; no W/R/R  Gastrointestinal: soft, NT/ND; +BSx4  Extremities: WWP; no edema, clubbing or cyanosis  Vascular: 2+ radial, DP/PT pulses B/L  Neurological: AAOx3; no focal deficits    MEDICATIONS:  MEDICATIONS  (STANDING):  dextrose 5%. 1000 milliLiter(s) (50 mL/Hr) IV Continuous <Continuous>  dextrose 50% Injectable 12.5 Gram(s) IV Push once  dextrose 50% Injectable 25 Gram(s) IV Push once  dextrose 50% Injectable 25 Gram(s) IV Push once  finasteride 5 milliGRAM(s) Oral daily  insulin lispro (HumaLOG) corrective regimen sliding scale   SubCutaneous three times a day before meals  insulin lispro (HumaLOG) corrective regimen sliding scale   SubCutaneous at bedtime  simvastatin 10 milliGRAM(s) Oral at bedtime    MEDICATIONS  (PRN):  dextrose 40% Gel 15 Gram(s) Oral once PRN Blood Glucose LESS THAN 70 milliGRAM(s)/deciliter  glucagon  Injectable 1 milliGRAM(s) IntraMuscular once PRN Glucose LESS THAN 70 milligrams/deciliter      ALLERGIES:  Allergies    No Known Allergies    Intolerances        LABS:                        8.1    5.33  )-----------( 168      ( 05 Feb 2020 06:22 )             26.0     02-05    135  |  105  |  59<H>  ----------------------------<  287<H>  4.5   |  13<L>  |  2.62<H>    Ca    8.8      05 Feb 2020 06:22  Mg     1.6     02-05    TPro  6.4  /  Alb  3.1<L>  /  TBili  0.3  /  DBili  x   /  AST  26  /  ALT  43  /  AlkPhos  103  02-05    PT/INR - ( 05 Feb 2020 06:22 )   PT: 16.8 sec;   INR: 1.46          PTT - ( 05 Feb 2020 06:22 )  PTT:29.0 sec    CAPILLARY BLOOD GLUCOSE      POCT Blood Glucose.: 281 mg/dL (05 Feb 2020 12:19)      RADIOLOGY & ADDITIONAL TESTS:   EKG:   CXR:   CT:   MRI:

## 2020-02-05 NOTE — DISCHARGE NOTE NURSING/CASE MANAGEMENT/SOCIAL WORK - PATIENT PORTAL LINK FT
You can access the FollowMyHealth Patient Portal offered by [facility] by registering at the following website: [email or web address]. By joining [facility]’s FollowMyHealth portal, you will also be able to view your health information using other applications (apps) compatible with our system.

## 2020-02-05 NOTE — PHYSICAL THERAPY INITIAL EVALUATION ADULT - ADDITIONAL COMMENTS
alone, no hha, reports being independent prior to arrival, primarily bedridden at home, 1 fall in past year 2/2 home situation with clutter, apartment, elevator, 3 steps in lobby however with ramp access as well

## 2020-02-05 NOTE — ED ADULT NURSE REASSESSMENT NOTE - NS ED NURSE REASSESS COMMENT FT1
PATIENT DISCHARGED HOME .DISCHARGE INSTRUCTIONS AND MEDICATION REVIEWED WITH PATIENT WITH POSITIVE UNDERSTANDING .INSTRUCTED PATIENT TO FOLLOW UP WITH PCP DR RIVERA PATIENT VERBALIZED UNDERSTANDING AND COMPLIANCES  .IV HL REMOVED .PATIENT LEFT ERHO AOX4 WITH FRIEND PAGGY NO COMPLAINTS.
PRBC unit initiated. MELIA Mathias for second RN verification. No initial reaction noted.

## 2020-02-05 NOTE — DISCHARGE NOTE PROVIDER - HOSPITAL COURSE
74 year old male with PMH DM, BPH, HLD, SVT s/p ablation, CKD, iron deficiency anemia and legal blindness who presents for 2 weeks of weakness.  The patient endorses that for the past 2 weeks, he has been weaker than usual.  He states that even minimal exertion tires him out and makes him short of breath.  He was told by multiple people to go to the ED including his podiatrist on Friday however, he did not present until today.  Upon arrival to the ED, vital signs were BP 98/52, HR 91, RR 18, temperature 97.7 degrees Farenheit and saturating 100% on room air.  Labs were significant for hemoglobin of 6.5 (baseline appears 7-8 pre previous records), bicarb of 14, BUN/Cr of 64/3.04, glucose of 321 and ALT of 52.  He was ordered for 2 units of PRBC and admitted for symptomatic anemia. Transfusion was uneventful and repeat hemoglobin consistent with appropriate response. Patient was seen by PT, appropriate for discharge home with home PT. Discharged home.        Plan for follow-up: Return to Dr. Roberts for further evaluation regarding your anemia. A cause of your anemia has never been fully understood but it is most likely partly related to the chronic kidney disease you have.         Repeat lab work: Please return to the emergency department if your symptoms reoccur or if you notice bleeding or abnormal bowel movements as this can also cause anemia and require immediate treatment. You should have labs repeated by Dr. Roberts after you are discharged, within 1-2 weeks.

## 2020-02-05 NOTE — PHYSICAL THERAPY INITIAL EVALUATION ADULT - PERTINENT HX OF CURRENT PROBLEM, REHAB EVAL
74M who presents for 2 weeks of weakness.  The patient endorses that for the past 2 weeks, he has been weaker than usual.  He states that even minimal exertion tires him out and makes him short of breath.  He was told by multiple people to go to the ED including his podiatrist on Friday however, he did not present until today.

## 2020-02-05 NOTE — DISCHARGE NOTE PROVIDER - NSDCMRMEDTOKEN_GEN_ALL_CORE_FT
finasteride 5 mg oral tablet: 1 tab(s) orally once a day MDD:1 tab  glyBURIDE 2.5 mg oral tablet: 1 tab(s) orally once a day  Janumet 50 mg-1000 mg oral tablet: 1 tab(s) orally 2 times a day  simvastatin 10 mg oral tablet: 1 tab(s) orally once a day (at bedtime)

## 2020-02-05 NOTE — PROGRESS NOTE ADULT - ASSESSMENT
75 yo M with PMH DM, CKD, and unspecified normocytic anemia who presented with 2 weeks of worsening weakness and SOB. On presentation to the ED Hb was found to be 6.5. After 2 units of PRBC repeat CBC showed Hb increased to 8.1 and he felt better. He was able to get up and walk to the bathroom with cane and assistance. Patient is hemodynamically stable with no signs/symptoms of active bleed.         PLAN:   1. symptomatic anemia: Plan - hx of iron deficiency anemia vs anemia of chronic disease.   - occult bleed possible; consider outpatient colonoscopy if not performed recently.   - splenic sequestration possible  - decreased RBC production is unlikely given Retic 0.8% and retic count 26  - hemolysis unlikely given Haptoglobin is elevated 288, , Retic % 0.8, bilirubin normal  patient is seen by Dr. Roberts and should follow up with her after d/c.     2.  AAMIR (acute kidney injury) Plan- Patient with CKD stage 3 with baseline Cr 1.5-2, presented with Cr 3.04 in setting of dehydration and poor po intake. After 2 units PRBC Cr improved to 2.62. Encourage po intake and hydration.     3. DM Plan- continue home meds glyburide 2.5 mg daily, Janumet 50mg-1000mg twice daily, continue with insulin sliding scale. HbA1c is unreliable in setting of CKD due to rapid RBC turnover, monitor with finger stick glucose.    4. Enlarged Prostate Plan- continue home medication finasteride 5mg daily    5. HLD Plan- continue home med simvastatin 10 mg nightly 73 yo M with PMH DM, CKD, and unspecified normocytic anemia who presented with 2 weeks of worsening weakness and SOB. On presentation to the ED Hb was found to be 6.5. After 2 units of PRBC repeat CBC showed Hb increased to 8.1 and he felt better. He was able to get up and walk to the bathroom with cane and assistance. Patient is hemodynamically stable with no signs/symptoms of active bleed. 73 yo M with PMH DM2, CKD stage 3, and unspecified normocytic anemia who presented with 2 weeks of worsening weakness, SOB and poor po intake. On presentation to the ED Hb was found to be 6.5. After 2 units of PRBC repeat CBC showed Hb increased to 8.1 and he felt better. He was able to get up and walk to the bathroom with cane and assistance. Patient is hemodynamically stable with no signs/symptoms of active bleed.

## 2020-02-05 NOTE — PROGRESS NOTE ADULT - PROBLEM SELECTOR PLAN 1
hx of iron deficiency anemia vs anemia of chronic disease.   - occult bleed possible; consider outpatient colonoscopy if not performed recently.   - splenic sequestration possible  - decreased RBC production is unlikely given Retic 0.8% and retic count 26  - hemolysis unlikely given Haptoglobin is elevated 288, , Retic % 0.8, bilirubin normal  patient is seen by Dr. Roberts and should follow up with her after d/c. hx of iron deficiency anemia vs anemia of chronic disease.   - occult bleed possible; consider outpatient colonoscopy if not performed recently.   - decreased RBC production is unlikely given Retic 0.8% and retic count 26  - hemolysis unlikely given Haptoglobin is elevated 288, , Retic % 0.8, bilirubin normal  patient is seen by Dr. Roberts and should follow up with her after d/c.

## 2020-02-13 NOTE — ED ADULT NURSE NOTE - NSIMPLEMENTINTERV_GEN_ALL_ED
Implemented All Fall Risk Interventions:  [place] to call system. Call bell, personal items and telephone within reach. Instruct patient to call for assistance. Room bathroom lighting operational. Non-slip footwear when patient is off stretcher. Physically safe environment: no spills, clutter or unnecessary equipment. Stretcher in lowest position, wheels locked, appropriate side rails in place. Provide visual cue, wrist band, yellow gown, etc. Monitor gait and stability. Monitor for mental status changes and reorient to person, place, and time. Review medications for side effects contributing to fall risk. Reinforce activity limits and safety measures with patient and family.

## 2020-02-13 NOTE — PATIENT PROFILE ADULT - STATED REASON FOR ADMISSION
Patient stated he was recently in the hospital for anemia and his health has been declining over the last 3 weeks.  Patient states he was unable to walk and had extreme weakness.  Patient's friend called an ambulance.

## 2020-02-13 NOTE — ED PROVIDER NOTE - CLINICAL SUMMARY MEDICAL DECISION MAKING FREE TEXT BOX
Impression: Acute hyperglycemia with  in ED. Afebrile, borderline hypotension. Labs reviewed with findings of leukocytosis to 26, anemic, lactic acidosis to 4.1, hyper K to 6, CO2 8, anion of 26, acute AAMIR with creatinine of 4.4, b-hydroxybutyrate to 3.8, and mild acidosis on VBG. Will start on Insulin drip for likely DKA and obtain acute consult for admission to monitored setting. Impression: Acute hyperglycemia with  in ED. Afebrile, borderline hypotension. Labs reviewed with findings of leukocytosis to 26, anemic, lactic acidosis to 4.1, hyper K to 6, CO2 8, ag of 26, acute AAMIR with creatinine of 4.4, b-hydroxybutyrate to 3.8, and mild acidosis on VBG. Will start on Insulin drip for likely DKA and obtain micu consult for admission to monitored setting.    Pt seen by micu and accepted for admission to [facility] for further management of dka. Dr. Roberts informed of pt's status.

## 2020-02-13 NOTE — H&P ADULT - ASSESSMENT
75 yo male with hx of uncontrolled DM II, BPH found to have poly factorial anion gap metabolic acidosis, mild DKA and urinary retention with concurrent urinary tract infection.     Endocrine  # DKA w/ hyperglycemia   - Presenting with anion gap metabolic acidosis and elevated beta hydroxybutyrate to 3.8. Labs consistent with mild DKA and hyperglycemia   - insulin gtt w/ DKA protocol titration   - c/w insulin gtt at 9 cc/hr  - Continue to replete K when <5.3.   - K was recently at 5.1  - c/w .45% NS at 250 cc/hr with 40mEQ of K  - Give dextrose when glucose < 250   - When Anion Gap closes x2, start basal/bolus insulin as patient begins eating, and discontinue insulin gtt after 2 hours.   - BMP q 4  - FSG q1    #Anion gap metabolic acidosis  - Well compensated. Poly factorial in setting of azotemia, lactic acidosis and elevated beta hydroxybutyrate   - trend lactate with IVF resuscitation  - lactate downtrending   - monitor azotemia w/ relief of urinary tract obstruction     Renal  #Acute renal failure  Increasing Cr w/ elevated BUN and hyperkalemia. Exam consistent with urinary tract obstruction 2/2 BPH. Urine electrolytes collected, likely obstructive w/ possible component of pre renal due to DKA/hypovolemia   - monitor K closely given relief of obstruction + insulin gtt   - urology consultation for optimization of BPH regimen   - Ordered retroperitoneal u/s to eval for hydronephrosis     #Weakness/nausea  - Patient with significantly elevated azotemia possibly contributing to his anorexia and subjective weakness. Also with multiple significant electrolyte abnormalities which could contribute. No objective deficits on exam at this time to suggest primary CNS origin     #BPH  Patient takes finasteride 5mg at home  -restarting finesteride 5mg     ID  #Severe Sepsis   patient presenting meeting sepsis criteria with elevated WBC, tachycardia, elevated lactate, urinary retention and positive UA for UTI. No recent instrumentation or risk factors for resistance  organisms. Given zosyn x1 in the ED. On admission, patient normotensive, warm/well perfused and w/o evidence of shock. Reperfusion exam wnl.   - c/w ceftriaxone 1g q24  - Lactate downtrending  - UA positive for UTI  - f/u urine cx     GI  #HLD  Patient takes simvastatin 10 at home  -restarting simvastatin 10    CV: JAVAN    F: 0.45% NS with 40 mEQ of K at 250 cc/hr  E: K<5.3 Mg<2  N: NPO    Dispo: MICU 75 yo male with hx of uncontrolled DM II, BPH found to have poly factorial anion gap metabolic acidosis, mild DKA and urinary retention with concurrent urinary tract infection.     Endocrine  # DKA w/ hyperglycemia   - Presenting with anion gap metabolic acidosis and elevated beta hydroxybutyrate to 3.8. Labs consistent with mild DKA and hyperglycemia   - insulin gtt w/ DKA protocol titration   - c/w insulin gtt at 9 cc/hr  - Replete K when <5.3.   - K was recently at 5.1  - c/w .45% NS at 250 cc/hr with 40mEQ of K  - Give dextrose when glucose < 250   - When Anion Gap closes x2, start basal/bolus insulin as patient begins eating, and discontinue insulin gtt after 2 hours.   - BMP q 4  - FSG q1    #Anion gap metabolic acidosis  - Well compensated. Poly factorial in setting of azotemia, lactic acidosis and elevated beta hydroxybutyrate   - trend lactate with IVF resuscitation  - lactate downtrending   - monitor azotemia w/ relief of urinary tract obstruction     Renal  #Acute renal failure  Increasing Cr w/ elevated BUN and hyperkalemia. Exam consistent with urinary tract obstruction 2/2 BPH. Urine electrolytes collected, likely obstructive w/ possible component of pre renal due to DKA/hypovolemia   - monitor K closely given relief of obstruction + insulin gtt   - urology consultation for optimization of BPH regimen   - Ordered retroperitoneal u/s to eval for hydronephrosis     #Weakness/nausea  - Patient with significantly elevated azotemia possibly contributing to his anorexia and subjective weakness. Also with multiple significant electrolyte abnormalities which could contribute. No objective deficits on exam at this time to suggest primary CNS origin     #BPH  Patient takes finasteride 5mg at home  -restarting finesteride 5mg     ID  #Severe Sepsis   patient presenting meeting sepsis criteria with elevated WBC, tachycardia, elevated lactate, urinary retention and positive UA for UTI. No recent instrumentation or risk factors for resistance  organisms. Given zosyn x1 in the ED. On admission, patient normotensive, warm/well perfused and w/o evidence of shock. Reperfusion exam wnl.   - c/w ceftriaxone 1g q24  - Lactate downtrending  - UA positive for UTI  - f/u urine cx     GI  #HLD  Patient takes simvastatin 10 at home  -restarting simvastatin 10    CV: JAVAN    F: 0.45% NS with 40 mEQ of K at 250 cc/hr  E: K<5.3 Mg<2  N: NPO    Dispo: MICU 73 yo male with hx of uncontrolled DM II, BPH found to have poly factorial anion gap metabolic acidosis, mild DKA and urinary retention with concurrent urinary tract infection.     Endocrine  # DKA w/ hyperglycemia   - Presenting with anion gap metabolic acidosis and elevated beta hydroxybutyrate to 3.8. Labs consistent with mild DKA and hyperglycemia   - insulin gtt w/ DKA protocol titration   - c/w insulin gtt at 9 cc/hr  - Replete K when <5.3.   - K was recently at 5.1  - c/w .45% NS at 250 cc/hr with 40mEQ of K  - Give dextrose when glucose < 250   - When Anion Gap closes x2, start basal/bolus insulin as patient begins eating, and discontinue insulin gtt after 2 hours.   - BMP q 4  - FSG q1    #Anion gap metabolic acidosis  - Well compensated. Poly factorial in setting of azotemia, lactic acidosis and elevated beta hydroxybutyrate   - trend lactate with IVF resuscitation  - lactate downtrending   - monitor azotemia w/ relief of urinary tract obstruction     Renal  #Acute renal failure  Increasing Cr w/ elevated BUN and hyperkalemia. Exam consistent with urinary tract obstruction 2/2 BPH. Urine electrolytes collected, likely obstructive w/ possible component of pre renal due to DKA/hypovolemia   - monitor K closely given relief of obstruction + insulin gtt   - urology consultation for optimization of BPH regimen   - Ordered retroperitoneal u/s to eval for hydronephrosis     #Weakness/nausea  - Patient with significantly elevated azotemia possibly contributing to his anorexia and subjective weakness. Also with multiple significant electrolyte abnormalities which could contribute. No objective deficits on exam at this time to suggest primary CNS origin     #BPH  Patient takes finasteride 5mg at home  -restarting finesteride 5mg     ID  #Severe Sepsis   patient presenting meeting sepsis criteria with elevated WBC, tachycardia, elevated lactate, urinary retention and positive UA for UTI. No recent instrumentation or risk factors for resistance  organisms. Given zosyn x1 in the ED. On admission, patient normotensive, warm/well perfused and w/o evidence of shock. Reperfusion exam wnl.   - s/p 3L NS resuscitation  - Lactate downtrending  - UA positive for UTI  - c/w ceftriaxone 1g q24 (started 2/13)  - f/u urine cx   - f/u blood cultures    GI  #HLD  Patient takes simvastatin 10 at home  -restarting simvastatin 10    CV: JAVAN    F: 0.45% NS with 40 mEQ of K at 250 cc/hr  E: K<5.3 Mg<2  N: NPO    Dispo: MICU

## 2020-02-13 NOTE — ED PROVIDER NOTE - OBJECTIVE STATEMENT
75 y/o M with PMH xof  DM, HTN, HLD, anemia and legally blind BIBA for c/o not felling well for the past 2 weeks. Pt accompanied to the ED by his friend, who states that pt has not been eating with decreased PO intake over the last 2 weeks as well. Pt however does endorse drinking diet cokes. Friend is also questioning pt's compliance with medications due to pt's generalized weakness, as pt states that he is even unable to walk due to this weakness. Pt reports mild dyspnea on exertion, none at rest. Denies any chest pain, fevers, chills, URI symptoms, abdominal pain, vomiting, diarrhea, dysuria or any other acute complaints. Per EMS, pt's FS >500.

## 2020-02-13 NOTE — CONSULT NOTE ADULT - ASSESSMENT
75 yo male with hx of uncontrolled DM II, BPH found to have poly factorial anion gap metabolic acidosis, mild DKA and urinary retention with concurrent urinary tract infection.     Severe Sepsis   - patient presenting meeting sepsis criteria with elevated WBC, tachycardia, urinary retention and positive urinary tract infection. No recent instrumentation or risk factors for resistance  organisms. Given zosyn x1 in the ED. currently normotensive, warm/well perfused and w/o evidence of shock. Reperfusion exam wnl.   - c/w ceftriaxone  - f/u urine cx     DKA w/ hyperglycemia   - Presenting with anion gap metabolic acidosis and elevated beta hydroxybutyrate. Labs consistent with mild DKA and hyperglycemia   - insulin gtt w/ DKA protocol titration   - .45% NA at 250 cc/hr   - BMP q 4, FSG q 1     Anion gap metabolic acidosis  - Well compensated. Poly factorial in setting of azotemia, lactic acidosis and elevated beta hydroxybutyrate   - trend lactate with IVF resuscitation   - monitor azotemia w/ relief of urinary tract obstruction     Acute renal failure  - Increasing Cr w/ elevated BUN and hyperkalemia. Exam consistent with urinary tract obstruction 2/2 BPH. Urine electrolytes collected, likely obstructive w/ possible component of pre renal due to DKA/hypovolemia   - monitor K closely given relief of obstruction + insulin gtt   - urology consultation for optimization of BPH regimen   - retroperitoneal u/s to eval for hydronephrosis     Weakness/nausea  - Patient with significantly elevated azotemia possibly contributing to his anorexia and subjective weakness. Also with multiple significant electrolyte abnormalities which could contribute. No objective deficits on exam at this time to suggest primary CNS origin

## 2020-02-13 NOTE — ED ADULT NURSE NOTE - OBJECTIVE STATEMENT
pt a&ox3 resting in stretcher. friend at bedside. pt reports weakness and decreased appetite for 2 weeks. today, pt  friend came to visit him and called 911 as he was weak and not himself. fs in ems read HI. bs at [id number]. breathing unlabored and even. denies cp, sob, n,v ,d, fevers at home. skin pink,  cool, dry to touch. pt reports taking all of his medications as prescribed. reports increased thirst.

## 2020-02-13 NOTE — H&P ADULT - NSHPLABSRESULTS_GEN_ALL_CORE
.  LABS:                         9.2    26.36 )-----------( 225      ( 2020 14:30 )             30.1         132<L>  |  104  |  97<H>  ----------------------------<  497<HH>  5.1   |  8<LL>  |  4.10<H>    Ca    7.8<L>      2020 16:50  Phos  7.1       Mg     1.8         TPro  7.1  /  Alb  2.8<L>  /  TBili  0.2  /  DBili  x   /  AST  9<L>  /  ALT  25  /  AlkPhos  105      PTT - ( 2020 14:53 )  PTT:29.3 sec  Urinalysis Basic - ( 2020 17:40 )    Color: Yellow / Appearance: SL Cloudy / S.025 / pH: x  Gluc: x / Ketone: NEGATIVE  / Bili: Negative / Urobili: 0.2 E.U./dL   Blood: x / Protein: 30 mg/dL / Nitrite: POSITIVE   Leuk Esterase: Large / RBC: < 5 /HPF / WBC Many /HPF   Sq Epi: x / Non Sq Epi: 0-5 /HPF / Bacteria: Many /HPF      CARDIAC MARKERS ( 2020 14:30 )  x     / 0.05 ng/mL / 25 U/L / x     / 1.4 ng/mL        Lactate, Blood: 3.1 mmol/L ( @ 16:49)  Lactate, Blood: 4.1 mmol/L ( @ 14:29)      RADIOLOGY, EKG & ADDITIONAL TESTS: Reviewed.

## 2020-02-13 NOTE — CONSULT NOTE ADULT - SUBJECTIVE AND OBJECTIVE BOX
CCM SERVICE CONSULTATION NOTE    CC: weakness     HPI:    75 yo female with a hx of DM, HTN, HLD, iron deficiency anemia, CKD 4 who was brought in by his home health aide for feeling generally unwell and week for the past 2 weeks since his discharge from [facility] for symptomatic anemia. He has felt weakness, malaise and fatigue. He has not been experiencing any shortness of breath, chest pain, cough, rhinorhea, odynophagia, difficulty hearing, abdominal pain, nausea, vomiting, diarrhea, constipation. He reports a history of chronic urinary incontinence and difficulty voiding due to known BPH with lower urinary tract symptoms. He denies any fever, chills, or rigors at home. He has been compliant with his medications prior to presentation. No recent abx use or hospitalizations. No dysuria, urgency or frequency that he has noted. No penile discharge. On presentation patient was noted to be in urinary retention w/ anion gap metabolic acidosis, acute renal failure, elevated b hydroxybutyrate and hyperglycemia w/ likely UTI. ICU consulted for management.     ROS:  Otherwise negative, except as specified in HPI.    PMH: DM, HTN, HLD, SERA, CKD 4,     PSH: non reported     FH: no significant.    SH: denies alcohol, tobacco, illicit drug use.     ALLERGIES:    MEDICATIONS:    VITAL SIGNS:  ICU Vital Signs Last 24 Hrs  T(C): 36.9 (2020 18:00), Max: 37.1 (2020 14:24)  T(F): 98.4 (2020 18:00), Max: 98.7 (2020 14:24)  HR: 98 (2020 18:00) (93 - 98)  BP: 120/58 (2020 18:00) (91/59 - 128/60)  BP(mean): 83 (2020 18:00) (83 - 83)  ABP: --  ABP(mean): --  RR: 15 (2020 18:00) (15 - 20)  SpO2: 100% (2020 18:00) (98% - 100%)    CAPILLARY BLOOD GLUCOSE      POCT Blood Glucose.: 443 mg/dL (2020 17:54)      PHYSICAL EXAM:  Constitutional: resting comfortably in bed, NAD  HEENT: NC/AT; PERRL, anicteric sclera; no oropharyngeal erythema or exudates; MMM  Neck: supple, no appreciable JVD  Respiratory: CTA B/L, no W/R/R; respirations appear non-labored, conversive in full sentences  Cardiovascular: +S1/S2, RRR  Gastrointestinal: abdomen soft, NT/ND. Palpable suprapubic fullness. No tenderness to palpation. no CVA tenderness   Extremities: WWP; no clubbing, cyanosis or edema  Vascular: 2+ radial, femoral, and DP/PT pulses B/L  Dermatologic: skin normal color and turgor; no visible rashes  Neurological: a&ox3     LABS:                        9.2    26.36 )-----------( 225      ( 2020 14:30 )             30.1         132<L>  |  104  |  97<H>  ----------------------------<  497<HH>  5.1   |  8<LL>  |  4.10<H>    Ca    7.8<L>      2020 16:50  Phos  7.1       Mg     1.8         TPro  7.1  /  Alb  2.8<L>  /  TBili  0.2  /  DBili  x   /  AST  9<L>  /  ALT  25  /  AlkPhos  105      PTT - ( 2020 14:53 )  PTT:29.3 sec  Lactate, Blood: 3.1 mmol/L (20 @ 16:49)  Lactate, Blood: 4.1 mmol/L (20 @ 14:29)    CARDIAC MARKERS ( 2020 14:30 )  x     / 0.05 ng/mL / 25 U/L / x     / 1.4 ng/mL      Urinalysis Basic - ( 2020 17:40 )    Color: Yellow / Appearance: SL Cloudy / S.025 / pH: x  Gluc: x / Ketone: NEGATIVE  / Bili: Negative / Urobili: 0.2 E.U./dL   Blood: x / Protein: 30 mg/dL / Nitrite: POSITIVE   Leuk Esterase: Large / RBC: < 5 /HPF / WBC Many /HPF   Sq Epi: x / Non Sq Epi: 0-5 /HPF / Bacteria: Many /HPF      Blood Gas Profile w/Lytes - Venous: Performed in Lab (20 @ 14:37)      EKG: sinus tachycardia with left anterior fascicular block     RADIOLOGY & ADDITIONAL TESTS: Reviewed.

## 2020-02-13 NOTE — ED ADULT TRIAGE NOTE - CHIEF COMPLAINT QUOTE
pt BIBA c/o generalized weakness, decreased appetite x 2 weeks, as per EMS pt's friend called 911 today. FS done and read " high". pt endorsed taking all his med daily. denies cp, SOB, dizziness.  pt found hypotensive in triage ekg and FS in progress.

## 2020-02-13 NOTE — H&P ADULT - NSHPPHYSICALEXAM_GEN_ALL_CORE
Constitutional: resting comfortably in bed, NAD  HEENT: NC/AT; PERRL, anicteric sclera; no oropharyngeal erythema or exudates; MMM  Neck: supple, no JVD  Respiratory: CTA B/L, no W/R/R; respirations appear non-labored, conversive in full sentences  Cardiovascular: +S1/S2, RRR  Gastrointestinal: abdomen soft, NT/ND. No tenderness to palpation. no CVA tenderness   Extremities: WWP; no clubbing, cyanosis or edema  Vascular: 2+ radial, femoral, and DP/PT pulses B/L  Dermatologic: skin normal color and turgor; no visible rashes  Neurological: a&ox3

## 2020-02-13 NOTE — H&P ADULT - HISTORY OF PRESENT ILLNESS
73 yo male with a hx of DM, HTN, HLD, iron deficiency anemia, CKD 4 who was brought in by his home health aide for feeling generally unwell and week for the past 2 weeks since his discharge from [facility] for symptomatic anemia. He has felt weakness, malaise and fatigue. He has not been experiencing any shortness of breath, chest pain, cough, sputum production, rhinorrhea odynophagia, difficulty hearing, abdominal pain, nausea, vomiting, diarrhea, constipation. He reports a history of chronic urinary incontinence and difficulty voiding due to known BPH with lower urinary tract symptoms. He denies any fever, chills, or rigors at home. He has been compliant with his medications prior to presentation. No recent abx use or hospitalizations. No dysuria, urgency or frequency that he has noted. No penile discharge. On presentation patient was noted to be in urinary retention w/ anion gap metabolic acidosis, acute renal failure, elevated b hydroxybutyrate and hyperglycemia w/ likely UTI. ICU consulted for management, and patient admitted for DKA c/b UTI.     In the ED: Patient given 3L NS, started on an insulin gtt, and given zosyn.

## 2020-02-14 NOTE — CONSULT NOTE ADULT - PROBLEM SELECTOR RECOMMENDATION 9
1) UCx growing Klebsiella, f/u sensitivities,  BCx+ klebsiella pneumonia , not MDR  2) Continue CTX 2g q24h (start date 2/13)  3) WBC downtrending today  4)Rodriguez catheter in place for strict I&O, currently draining red-tinged urine  5) f/u surveillance cx and re-order daily until clear.

## 2020-02-14 NOTE — CONSULT NOTE ADULT - SUBJECTIVE AND OBJECTIVE BOX
HPI:73 yo male with a hx of DM, HTN, HLD, iron deficiency anemia, CKD 4 who was brought in by his home health aide for feeling generally unwell and week for the past 2 weeks since his discharge from [facility] for symptomatic anemia. He has felt weakness, malaise and fatigue. He has not been experiencing any shortness of breath, chest pain, cough, sputum production, rhinorrhea odynophagia, difficulty hearing, abdominal pain, nausea, vomiting, diarrhea, constipation. He reports a history of chronic urinary incontinence and difficulty voiding due to known BPH with lower urinary tract symptoms. He denies any fever, chills, or rigors at home. He has been compliant with his medications prior to presentation. No recent abx use or hospitalizations. No dysuria, urgency or frequency that he has noted. No penile discharge. On presentation patient was noted to be in urinary retention w/ anion gap metabolic acidosis, acute renal failure, elevated b hydroxybutyrate and hyperglycemia w/ likely UTI. ICU consulted for management, and patient admitted for DKA c/b UTI. 1st Blood Culture set growing gram neg rods   Presenting with anion gap metabolic acidosis and elevated beta hydroxybutyrate to 3.8. Labs consistent with mild DKA and hyperglycemia            · 	simvastatin 10 mg oral tablet: 1 tab(s) orally once a day (at bedtime)  · 	glyBURIDE 2.5 mg oral tablet: 1 tab(s) orally once a day  · 	finasteride 5 mg oral tablet: 1 tab(s) orally once a day MDD:1 tab  · 	Janumet 50 mg-1000 mg oral tablet: 1 tab(s) orally 2 times a day  FSG & insulin:    Dinner FSG   Lispro   +    Ate  Bedtime FSG     Lantus      and Lispro         Breakfast FSG   Lispro    +    Ate  Lunch FSG      Lispro    +    Ate      Age at Dx:  How dx:  Hx and duration of insulin:  Current Therapy:  Hx of other regimens:    Home FSG:  Fasting  Lunch  Dinner  Bed    Diet:  Exercise:    Hx of hypoglycemia:  Hx of DKA/HHS:  Complications:  Outpatient Endo:    FH:  DM:  Thyroid:  Autoimmune:  Other:    SH:  Smoking  Etoh:  Recreational Drugs:  Social Life:    PMH & Surgical Hx:DIABETIC KETOACIDOSIS WITHOUT COMA ASSOCIATED [place]  FH: stomach cancer  FHx: breast cancer  FHx: stroke  No pertinent family history in first degree relatives  Handoff  MEWS Score  History of pancytopenia  H/O hydrocephalus  Anemia  HLD (hyperlipidemia)  Enlarged prostate  DM (diabetes mellitus)  HTN (hypertension)  Diabetic ketoacidosis without coma associated with other specified diabetes mellitus  H/O prior ablation treatment  HYPERGLYCEMIC  8          Current Meds:  acetaminophen   Tablet .. 650 milliGRAM(s) Oral every 6 hours PRN  cefTRIAXone   IVPB 1000 milliGRAM(s) IV Intermittent once  chlorhexidine 2% Cloths 1 Application(s) Topical <User Schedule>  dextrose 40% Gel 15 Gram(s) Oral once PRN  dextrose 5%. 1000 milliLiter(s) IV Continuous <Continuous>  dextrose 50% Injectable 12.5 Gram(s) IV Push once  dextrose 50% Injectable 25 Gram(s) IV Push once  dextrose 50% Injectable 25 Gram(s) IV Push once  finasteride 5 milliGRAM(s) Oral daily  glucagon  Injectable 1 milliGRAM(s) IntraMuscular once PRN  heparin  Injectable 5000 Unit(s) SubCutaneous every 8 hours  influenza   Vaccine 0.5 milliLiter(s) IntraMuscular once  insulin glargine Injectable (LANTUS) 15 Unit(s) SubCutaneous every morning  insulin lispro (HumaLOG) corrective regimen sliding scale   SubCutaneous Before meals and at bedtime  insulin lispro Injectable (HumaLOG) 4 Unit(s) SubCutaneous three times a day before meals  simvastatin 10 milliGRAM(s) Oral at bedtime  sodium chloride 0.9% Bolus 500 milliLiter(s) IV Bolus once  tamsulosin 0.4 milliGRAM(s) Oral at bedtime      Allergies:  No Known Allergies      ROS:  Denies the following except as indicated.    General: weight loss/weight gain, decreased appetite, fatigue  Eyes: Blurry vision, double vision, visual changes  ENT: Throat pain, changes in voice,   CV: palpitations, SOB, CP, cough  GI: NVD, difficulty swallowing, abdominal pain  : polyuria, dysuria  Endo: abnormal menses, temperature intolerance, decreased libido  MSK: weakness, joint pain  Skin: rash, dryness, diaphoresis  Heme: Easy bruising, bleeding  Neuro: HA, dizziness, lightheadedness, numbness/ tingling  Psych: Anxiety, Depression    Vital Signs Last 24 Hrs  T(C): 39.3 (2020 08:30), Max: 39.3 (2020 08:30)  T(F): 102.7 (2020 08:30), Max: 102.7 (2020 08:30)  HR: 95 (2020 12:00) (76 - 119)  BP: 89/51 (2020 12:00) (82/49 - 149/70)  BP(mean): 66 (2020 12:00) (61 - 105)  RR: 23 (2020 12:00) (11 - 26)  SpO2: 97% (2020 12:00) (97% - 100%)  Height (cm): 177.8 ( @ 18:00)  Weight (kg): 87.6 ( @ 03:00)  BMI (kg/m2): 27.7 ( @ 03:00)      Constitutional: wn/wd in NAD.   HEENT: NCAT, MMM, OP clear, EOMI, , no proptosis or lid retraction  Neck: no thyromegaly or palpable thyroid nodules   Respiratory: lungs CTAB.  Cardiovascular: regular rhythm, normal S1 and S2, no audible murmurs, no peripheral edema  GI: soft, NT/ND, no masses/HSM appreciated.  Neurology: no tremors, DTR 2+  Skin: no visible rashes/lesions. no acanthosis nigricans. no hyperlipotrophy. no cushing's stigmata.  Psychiatric: AAO x 3, normal affect/mood.  Ext: radial pulses intact, DP pulses intact, extremities warm, no cyanosis, clubbing or edema.       LABS:                        8.5    12.81 )-----------( 158      ( 2020 05:28 )             27.2     02-14    138  |  111<H>  |  92<H>  ----------------------------<  147<H>  4.9   |  13<L>  |  3.87<H>    Ca    8.2<L>      2020 05:28  Phos  3.5     02-14  Mg     1.6     02-14    TPro  6.0  /  Alb  2.6<L>  /  TBili  0.2  /  DBili  x   /  AST  9<L>  /  ALT  18  /  AlkPhos  90  02-14    PTT - ( 2020 14:53 )  PTT:29.3 sec  Urinalysis Basic - ( 2020 17:40 )    Color: Yellow / Appearance: SL Cloudy / S.025 / pH: x  Gluc: x / Ketone: NEGATIVE  / Bili: Negative / Urobili: 0.2 E.U./dL   Blood: x / Protein: 30 mg/dL / Nitrite: POSITIVE   Leuk Esterase: Large / RBC: < 5 /HPF / WBC Many /HPF   Sq Epi: x / Non Sq Epi: 0-5 /HPF / Bacteria: Many /HPF      Hemoglobin A1C, Whole Blood: 8.4 ( @ 06:22)        RADIOLOGY & ADDITIONAL STUDIES:  CAPILLARY BLOOD GLUCOSE      POCT Blood Glucose.: 208 mg/dL (2020 11:00)  POCT Blood Glucose.: 159 mg/dL (2020 08:01)  POCT Blood Glucose.: 126 mg/dL (2020 07:16)  POCT Blood Glucose.: 120 mg/dL (2020 06:05)  POCT Blood Glucose.: 149 mg/dL (2020 05:12)  POCT Blood Glucose.: 173 mg/dL (2020 04:19)  POCT Blood Glucose.: 183 mg/dL (2020 02:58)  POCT Blood Glucose.: 203 mg/dL (2020 02:19)  POCT Blood Glucose.: 189 mg/dL (2020 01:17)  POCT Blood Glucose.: 221 mg/dL (2020 00:05)  POCT Blood Glucose.: 256 mg/dL (2020 23:03)  POCT Blood Glucose.: 269 mg/dL (2020 22:10)  POCT Blood Glucose.: 302 mg/dL (2020 21:05)  POCT Blood Glucose.: 362 mg/dL (2020 19:55)  POCT Blood Glucose.: 408 mg/dL (2020 19:03)  POCT Blood Glucose.: 443 mg/dL (2020 17:54)  POCT Blood Glucose.: 493 mg/dL (2020 17:06)  POCT Blood Glucose.: 597 mg/dL (2020 13:51)      Will discuss in rounds  A/P:73 yo male with hx of uncontrolled DM II, BPH found to have poly factorial anion gap metabolic acidosis, mild DKA and urinary retention with concurrent urinary tract infection.    1.  DM -   A1C:  Weight:  Cr/GRF:  ER:    Please continue lantus       units at night / morning.  Please continue lispro      units before each meal.  Please continue lispro moderate / low dose sliding scale four times daily with meals and at bedtime    Pt's fingerstick glucose goal is     Will continue to monitor     For discharge, pt can continue    Pt can follow up at discharge with [facility] by calling  to make an appointment.   Will discuss case with     and update primary team    To Make an appointment at [address] [place] [address] for the patient, either:  1. Send a STAT task via [employer] to Cynthia Berg or Ester Curtis (office managers)   OR  2. Call supervisor's line. P: [phone] (do not give this number to patient). VM is checked periodically.  In the message, specify that this is a hospital discharge follow-up, and that the appt can be made with a NP if there is no timely MD availability.    REMINDERS FOR INSULIN/DIABETES SUPPLIES at DISCHARGE:  INSULIN:   Long actin/Basal Insulin: Examples: Toujeo, Basaglar, Tresiba, Lantus   Short acting/Bolus Insulin: Humalog, Admelog, Novolog  Please ensure that BOTH short acting and long acting insulin are prescribed in the same preparation (Ex: PEN vs VIAL/SOLUTION)     TESTING SUPPLIES:   All glucometer supplies should be written as generic to avoid issues with insurance. Use the free text option in sunrise prescription writer, and type in glucometer test strips, lancets, etc to order.    If sending patient home on insulin PEN, please send:   •	BD fili insulin pen needles for use up to 4 times daily (total quantity 100)  •	Lancets for use up to 4 times daily (total quantity 100)  •	Glucometer Test strips for use up to 4 times daily (total quantity 100)  •	Alcohol swabs for use up to 4 times daily (total quantity 100)  •	Glucometer (If provided by hospital, still provide scripts for lancets, test strips, and swabs)  If sending patient home on insulin VIAL, please send:   •	Insulin syringes (6mm) - for use up to 4 times daily (total quantity 100)  •	Lancets for use up to 4 times daily (total quantity 100)  •	Generic Glucometer Test strips for use up to 4 times daily (total quantity 100)  •	Alcohol swabs for use up to 4 times daily (total quantity 100)  •	Generic Glucometer (If provided by hospital, still provide scripts for lancets, test strips, and swabs)  •	Do not specify brand for testing supplies (such as contour, freestyle, one touch etc) that way the pharmacy has the freedom to pick and change according to what the insurance dictates.  For patients without insurance:   •	Provide social work with appropriate scripts so they may obtain 1 week of samples  •	Provide with glucometer. Glucometers are located at various nursing stations, the nursing office, education, and endocrine fellows office.  •	Please make appointment with Chaya Nair NP or Staci Villafuerte RN and NELA Vergara at the [address] [place] [address] endocrinology clinic. They can see patients without insurance, provide appropriate samples, and assist in getting insurance coverage.     PREFERRED PHARMACY:  [facility] (located on 1st floor next to admitting)  P: [phone]  Hours: M – F 8AM – 8PM, Sat 8AM – 4PM, Sun—closed  If not using [facility], please follow up with chosen pharmacy to ensure insulin prescribed is covered. HPI:73 yo male with a hx of DM, HTN, HLD, iron deficiency anemia, CKD 4 who was brought in by his home health aide with weakness, malaise and fatigue. He reports a history of chronic urinary incontinence and difficulty voiding due to known BPH with lower urinary tract symptoms. He denies any fever, chills, or rigors at home.  On presentation patient was noted to be in urinary retention w/ anion gap metabolic acidosis, acute renal failure, elevated b hydroxybutyrate and hyperglycemia being treated for UTI.  On admission, labs notable for K 6, CO2 8, AG 26, Cr 4.4, , albumin 2.8, lactate 4.1, and BHB 3.8. Urine ketones negative. Started on insulin drip at 9 units/hr with NS1/2 @ 250 changed to D51/2 @ 250 at 1AM. Drip and IVF tapered off and discontinued at 2.5 units/ hr at 8AM after AG closed X2. Lantus 15 units given at 8AM with . Lunch . Lispro 4+4 given. Poor PO intake. Febrile this AM to 102.7 with WBC 26.  Endocrine consulted for diabetes management. History incomplete due to slow mentation      A1C 8.4%. In 2018 A1C 7.2%  Age at Dx: 10-15 year ago  Current Therapy: glyBURIDE 2.5 mg oral tablet: 1 tab(s) orally once a day, Janumet 50 mg-1000 mg oral tablet: 1 tab(s) orally 2 times a day  Hx of other regimens: initially controlled with diet    Home FSG: had been checking, but strips with meter changed recently and has not been able to.  Diet: ?  Exercise: ?    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 friend in building at bedside.    PMH & Surgical Hx:DIABETIC KETOACIDOSIS WITHOUT COMA ASSOCIATED [place]  FH: stomach cancer  FHx: breast cancer  FHx: stroke  No pertinent family history in first degree relatives  Handoff  MEWS Score  History of pancytopenia  H/O hydrocephalus  Anemia  HLD (hyperlipidemia)  Enlarged prostate  DM (diabetes mellitus)  HTN (hypertension)  Diabetic ketoacidosis without coma associated with other specified diabetes mellitus  H/O prior ablation treatment  HYPERGLYCEMIC  8          Current Meds:  acetaminophen   Tablet .. 650 milliGRAM(s) Oral every 6 hours PRN  cefTRIAXone   IVPB 1000 milliGRAM(s) IV Intermittent once  chlorhexidine 2% Cloths 1 Application(s) Topical <User Schedule>  dextrose 40% Gel 15 Gram(s) Oral once PRN  dextrose 5%. 1000 milliLiter(s) IV Continuous <Continuous>  dextrose 50% Injectable 12.5 Gram(s) IV Push once  dextrose 50% Injectable 25 Gram(s) IV Push once  dextrose 50% Injectable 25 Gram(s) IV Push once  finasteride 5 milliGRAM(s) Oral daily  glucagon  Injectable 1 milliGRAM(s) IntraMuscular once PRN  heparin  Injectable 5000 Unit(s) SubCutaneous every 8 hours  influenza   Vaccine 0.5 milliLiter(s) IntraMuscular once  insulin glargine Injectable (LANTUS) 15 Unit(s) SubCutaneous every morning  insulin lispro (HumaLOG) corrective regimen sliding scale   SubCutaneous Before meals and at bedtime  insulin lispro Injectable (HumaLOG) 4 Unit(s) SubCutaneous three times a day before meals  simvastatin 10 milliGRAM(s) Oral at bedtime  sodium chloride 0.9% Bolus 500 milliLiter(s) IV Bolus once  tamsulosin 0.4 milliGRAM(s) Oral at bedtime      Allergies:  No Known Allergies      ROS:  Denies the following except as indicated.    General: weight loss/weight gain, decreased appetite, fatigue  Eyes: Blurry vision, double vision, visual changes  ENT: Throat pain, changes in voice,   CV: palpitations, SOB, CP, cough  GI: NVD, difficulty swallowing, abdominal pain  : polyuria, dysuria  Endo:  temperature intolerance, decreased libido  MSK: weakness, joint pain  Skin: rash, dryness, diaphoresis  Heme: Easy bruising, bleeding  Neuro: HA, dizziness, lightheadedness, numbness/ tingling  Psych: Anxiety, Depression    Vital Signs Last 24 Hrs  T(C): 39.3 (2020 08:30), Max: 39.3 (2020 08:30)  T(F): 102.7 (2020 08:30), Max: 102.7 (2020 08:30)  HR: 95 (2020 12:00) (76 - 119)  BP: 89/51 (2020 12:00) (82/49 - 149/70)  BP(mean): 66 (2020 12:00) (61 - 105)  RR: 23 (2020 12:00) (11 - 26)  SpO2: 97% (2020 12:00) (97% - 100%)  Height (cm): 177.8 ( @ 18:00)  Weight (kg): 87.6 ( @ 03:00)  BMI (kg/m2): 27.7 ( @ 03:00)      Constitutional: wn/wd in NAD.   HEENT: NCAT, MMM, OP clear, EOMI, , no proptosis or lid retraction  Neck: no thyromegaly or palpable thyroid nodules   Respiratory: lungs CTAB.  Cardiovascular: regular rhythm, normal S1 and S2, no audible murmurs, no peripheral edema  GI: soft, NT/ND, no masses/HSM appreciated.  Neurology: no tremors, DTR 2+  Skin: no visible rashes/lesions. no acanthosis nigricans. no hyperlipotrophy. no cushing's stigmata.  Psychiatric: AAO x 3, normal affect/mood.  Ext: radial pulses intact, DP pulses intact, extremities warm, no cyanosis, clubbing or edema.       LABS:                        8.5    12.81 )-----------( 158      ( 2020 05:28 )             27.2     02-14    138  |  111<H>  |  92<H>  ----------------------------<  147<H>  4.9   |  13<L>  |  3.87<H>    Ca    8.2<L>      2020 05:28  Phos  3.5     02-14  Mg     1.6     -14    TPro  6.0  /  Alb  2.6<L>  /  TBili  0.2  /  DBili  x   /  AST  9<L>  /  ALT  18  /  AlkPhos  90  02-14    PTT - ( 2020 14:53 )  PTT:29.3 sec  Urinalysis Basic - ( 2020 17:40 )    Color: Yellow / Appearance: SL Cloudy / S.025 / pH: x  Gluc: x / Ketone: NEGATIVE  / Bili: Negative / Urobili: 0.2 E.U./dL   Blood: x / Protein: 30 mg/dL / Nitrite: POSITIVE   Leuk Esterase: Large / RBC: < 5 /HPF / WBC Many /HPF   Sq Epi: x / Non Sq Epi: 0-5 /HPF / Bacteria: Many /HPF      Hemoglobin A1C, Whole Blood: 8.4 ( @ 06:22)        RADIOLOGY & ADDITIONAL STUDIES:  CAPILLARY BLOOD GLUCOSE      POCT Blood Glucose.: 208 mg/dL (2020 11:00)  POCT Blood Glucose.: 159 mg/dL (2020 08:01)  POCT Blood Glucose.: 126 mg/dL (2020 07:16)  POCT Blood Glucose.: 120 mg/dL (2020 06:05)  POCT Blood Glucose.: 149 mg/dL (2020 05:12)  POCT Blood Glucose.: 173 mg/dL (2020 04:19)  POCT Blood Glucose.: 183 mg/dL (2020 02:58)  POCT Blood Glucose.: 203 mg/dL (2020 02:19)  POCT Blood Glucose.: 189 mg/dL (2020 01:17)  POCT Blood Glucose.: 221 mg/dL (2020 00:05)  POCT Blood Glucose.: 256 mg/dL (2020 23:03)  POCT Blood Glucose.: 269 mg/dL (2020 22:10)  POCT Blood Glucose.: 302 mg/dL (2020 21:05)  POCT Blood Glucose.: 362 mg/dL (2020 19:55)  POCT Blood Glucose.: 408 mg/dL (2020 19:03)  POCT Blood Glucose.: 443 mg/dL (2020 17:54)  POCT Blood Glucose.: 493 mg/dL (2020 17:06)  POCT Blood Glucose.: 597 mg/dL (2020 13:51)      A/P:73 yo male with hx of uncontrolled DM II, BPH found to have poly factorial anion gap metabolic acidosis, mild DKA and urinary retention with concurrent urinary tract infection.    1.  DM - type 2, uncontrolled, complicated  A1C: 8.4%  Weight: 87kg/BMI 28  Cr/GRF: 4.4 (baseline in 2s)    Please decrease lantus to 10 units in morning.  Please continue lispro 4  units before each meal.  Please continue lispro moderate  dose sliding scale four times daily with meals and at bedtime    Pt's fingerstick glucose goal is 100-180    Will continue to monitor     For discharge, pt can continue    Pt can follow up at discharge with [facility] by calling  to make an appointment.   Will discuss case with Dr. mondragon   and update primary team    To Make an appointment at [address] [place] [address] for the patient, either:  1. Send a STAT task via [employer] to Cynthia Berg or Ester Curtis (office managers)   OR  2. Call supervisor's line. P: [phone] (do not give this number to patient). VM is checked periodically.  In the message, specify that this is a hospital discharge follow-up, and that the appt can be made with a NP if there is no timely MD availability.    REMINDERS FOR INSULIN/DIABETES SUPPLIES at DISCHARGE:  INSULIN:   Long actin/Basal Insulin: Examples: Toujeo, Basaglar, Tresiba, Lantus   Short acting/Bolus Insulin: Humalog, Admelog, Novolog  Please ensure that BOTH short acting and long acting insulin are prescribed in the same preparation (Ex: PEN vs VIAL/SOLUTION)     TESTING SUPPLIES:   All glucometer supplies should be written as generic to avoid issues with insurance. Use the free text option in sunrise prescription writer, and type in glucometer test strips, lancets, etc to order.    If sending patient home on insulin PEN, please send:   •	BD fili insulin pen needles for use up to 4 times daily (total quantity 100)  •	Lancets for use up to 4 times daily (total quantity 100)  •	Glucometer Test strips for use up to 4 times daily (total quantity 100)  •	Alcohol swabs for use up to 4 times daily (total quantity 100)  •	Glucometer (If provided by hospital, still provide scripts for lancets, test strips, and swabs)  If sending patient home on insulin VIAL, please send:   •	Insulin syringes (6mm) - for use up to 4 times daily (total quantity 100)  •	Lancets for use up to 4 times daily (total quantity 100)  •	Generic Glucometer Test strips for use up to 4 times daily (total quantity 100)  •	Alcohol swabs for use up to 4 times daily (total quantity 100)  •	Generic Glucometer (If provided by hospital, still provide scripts for lancets, test strips, and swabs)  •	Do not specify brand for testing supplies (such as contour, freestyle, one touch etc) that way the pharmacy has the freedom to pick and change according to what the insurance dictates.  For patients without insurance:   •	Provide social work with appropriate scripts so they may obtain 1 week of samples  •	Provide with glucometer. Glucometers are located at various nursing stations, the nursing office, education, and endocrine fellows office.  •	Please make appointment with Chaya Nair NP or Staci Villafuerte RN and NELA Vergara at the [address] [place] [address] endocrinology clinic. They can see patients without insurance, provide appropriate samples, and assist in getting insurance coverage.     PREFERRED PHARMACY:  [facility] (located on 1st floor next to admitting)  P: [phone]  Hours: M – F 8AM – 8PM, Sat 8AM – 4PM, Sun—closed  If not using [facility], please follow up with chosen pharmacy to ensure insulin prescribed is covered.

## 2020-02-14 NOTE — SWALLOW BEDSIDE ASSESSMENT ADULT - NS SPL SWALLOW CLINIC TRIAL FT
Pt was given option for puree vs mech soft solid diet, stated he'd prefer soft chewable despite poor dentition.

## 2020-02-14 NOTE — DIETITIAN INITIAL EVALUATION ADULT. - PERTINENT LABORATORY DATA
WBC 12.81 (H), POC , 120mg/dL, beta-hydroxy butyrate (H), A1C 8.4%, Na/K/Mg/Phos WNL, BUN 92, Cr 3.87

## 2020-02-14 NOTE — PROGRESS NOTE ADULT - SUBJECTIVE AND OBJECTIVE BOX
F accepting MICU    Hospital Course:  Pt is a 75 yo M with PMH DM, HTN, HLD, BPH (chronic incont/ret), SERA, CKD4, and a-fib (s/p ablation ) who presented to [facility] for general feelings of illness, weakness, and fatigue x2 wks. Was dc'd from [facility] for symptomatic anemia 2 wks prior to admission. Denies F/C, HA, changes to vision/hearing, N/V, cough, sore throat, CP, SOB, abdominal pain, changes to BMs, pain in arms or legs. Pt with chronic urinary incontinence and difficulty voiding 2/2 BPH with lower urinary tract symptoms. He endorses compliance with medications. On arrival pt noted to have urinary retention with anion gap metabolic acidosis, acute renal failure, elevated BHB, hyperglycemia, and UA+. Pt admitted to ICU for management of DKA and severe sepsis 2/2 UTI with klebsiella bacteremia. Treated with insulin gtt with endo following, now transitioned to lantus 15U and humalog 4U TID. BCx from admission have grown klebsiella and UCx+ klebsiella. Currently being treated with CTX 2g q24h. Now medically stable for step-down for further management.     Subjective:  Pt seen and examined at bedside in no acute distress. Appears mildly tired. Participated fully in history taking and physical exam. Denies HA, changes to vision/hearing, N/V, sore throat, cough, CP, SOB, abdominal pain, pain in arms/legs, changes to BMs or urination. Does not recall having hematuria in the past but is unsure. Doesn't recall last BM. Feels generally fatigued. Recalls being told he had an irregular heart rhythm "a long time ago" and had an ablation done in .    REVIEW OF SYSTEMS:    CONSTITUTIONAL: No fevers or chills. +generalized weakness and fatigue  EYES/ENT: No visual changes;  No vertigo or throat pain   NECK: No pain or stiffness  RESPIRATORY: No cough, wheezing, hemoptysis; No shortness of breath  CARDIOVASCULAR: No chest pain or palpitations  GASTROINTESTINAL: No abdominal or epigastric pain. No nausea, vomiting, or hematemesis; No diarrhea or ?constipation (doesn't recall last BM). No melena or hematochezia.  GENITOURINARY: No dysuria or frequency  NEUROLOGICAL: No numbness or weakness  SKIN: No itching, burning, rashes, or lesions   All other review of systems is negative unless indicated above.    VITAL SIGNS:  T(C): 36.8 (20 @ 14:46), Max: 39.3 (20 @ 08:30)  T(F): 98.2 (20 @ 14:46), Max: 102.7 (20 @ 08:30)  HR: 86 (20 @ 18:00) (76 - 119)  BP: 111/58 (20 @ 18:00) (82/49 - 149/70)  BP(mean): 81 (20 @ 18:00) (61 - 105)  RR: 16 (20 @ 18:00) (11 - 26)  SpO2: 99% (20 @ 18:00) (97% - 100%)  Wt(kg): --    PHYSICAL EXAM:  Constitutional: WDWN resting comfortably in bed; appears fatigued  Head: NC/AT; mild pallor  Eyes: PERRL, EOMI, anicteric sclera  ENT: no nasal discharge; MMM  Neck: supple; no JVD  Respiratory: CTA B/L; no W/R/R, no retractions; speaking in full sentences without difficulty, soft voice and intermittently slow to respond but responds appropriately  Cardiac: +S1/S2; RRR; no M/R/G   Gastrointestinal: soft, NT/ND; no rebound or guarding; +BSx4  Genitourinary: palacio catheter in place, draining red-tinged urine (not ash blood)  Extremities: WWP, no clubbing or cyanosis; no peripheral edema; mildly pale  Musculoskeletal: moving all extremities spontaneously  Vascular: 2+ radial, PT pulses B/L  Dermatologic: skin warm, dry and intact; no rashes, wounds, or scars  Neurologic: AAOx3; CNII-XII grossly intact; no focal deficits; mm strength 5/5 UE LE BL; sensation intact UE LE BL    MEDICATIONS  (STANDING):  chlorhexidine 2% Cloths 1 Application(s) Topical <User Schedule>  dextrose 5%. 1000 milliLiter(s) (50 mL/Hr) IV Continuous <Continuous>  dextrose 50% Injectable 12.5 Gram(s) IV Push once  dextrose 50% Injectable 25 Gram(s) IV Push once  dextrose 50% Injectable 25 Gram(s) IV Push once  finasteride 5 milliGRAM(s) Oral daily  heparin  Injectable 5000 Unit(s) SubCutaneous every 8 hours  influenza   Vaccine 0.5 milliLiter(s) IntraMuscular once  insulin glargine Injectable (LANTUS) 15 Unit(s) SubCutaneous every morning  insulin lispro (HumaLOG) corrective regimen sliding scale   SubCutaneous Before meals and at bedtime  insulin lispro Injectable (HumaLOG) 6 Unit(s) SubCutaneous three times a day before meals  simvastatin 10 milliGRAM(s) Oral at bedtime  tamsulosin 0.4 milliGRAM(s) Oral at bedtime    MEDICATIONS  (PRN):  acetaminophen   Tablet .. 650 milliGRAM(s) Oral every 6 hours PRN Temp greater or equal to 38C (100.4F)  dextrose 40% Gel 15 Gram(s) Oral once PRN Blood Glucose LESS THAN 70 milliGRAM(s)/deciliter  glucagon  Injectable 1 milliGRAM(s) IntraMuscular once PRN Glucose LESS THAN 70 milligrams/deciliter      Allergies    No Known Allergies    Intolerances        LABS:                        8.5    12.81 )-----------( 158      ( 2020 05:28 )             27.2     02-14    138  |  111<H>  |  92<H>  ----------------------------<  147<H>  4.9   |  13<L>  |  3.87<H>    Ca    8.2<L>      2020 05:28  Phos  3.5     -  Mg     1.6         TPro  6.0  /  Alb  2.6<L>  /  TBili  0.2  /  DBili  x   /  AST  9<L>  /  ALT  18  /  AlkPhos  90  02-14    PTT - ( 2020 14:53 )  PTT:29.3 sec  Urinalysis Basic - ( 2020 17:40 )    Color: Yellow / Appearance: SL Cloudy / S.025 / pH: x  Gluc: x / Ketone: NEGATIVE  / Bili: Negative / Urobili: 0.2 E.U./dL   Blood: x / Protein: 30 mg/dL / Nitrite: POSITIVE   Leuk Esterase: Large / RBC: < 5 /HPF / WBC Many /HPF   Sq Epi: x / Non Sq Epi: 0-5 /HPF / Bacteria: Many /HPF      CAPILLARY BLOOD GLUCOSE      POCT Blood Glucose.: 263 mg/dL (2020 16:12)      RADIOLOGY & ADDITIONAL TESTS: Reviewed. F accepting MICU    Hospital Course:  Pt is a 75 yo M with PMH DM, HTN, HLD, BPH (chronic incont/ret), SERA, CKD4, and a-fib (s/p ablation ) who presented to [facility] for general feelings of illness, weakness, and fatigue x2 wks. Was dc'd from [facility] for symptomatic anemia 2 wks prior to admission. Denies F/C, HA, changes to vision/hearing, N/V, cough, sore throat, CP, SOB, abdominal pain, changes to BMs, pain in arms or legs. Pt with chronic urinary incontinence and difficulty voiding 2/2 BPH with lower urinary tract symptoms. He endorses compliance with medications. On arrival pt noted to have urinary retention with anion gap metabolic acidosis, acute renal failure, elevated BHB, hyperglycemia, and UA+. Pt admitted to ICU for management of DKA and severe sepsis 2/2 UTI with klebsiella bacteremia. Treated with insulin gtt with endo following, now transitioned to lantus 15U and humalog 4U TID. BCx from admission have grown klebsiella and UCx+ klebsiella. Currently being treated with CTX 2g q24h. Now medically stable for step-down for further management.     Subjective:  Pt seen and examined at bedside in no acute distress. Appears mildly tired. Participated fully in history taking and physical exam. Denies HA, changes to vision/hearing, N/V, sore throat, cough, CP, SOB, abdominal pain, pain in arms/legs, changes to BMs or urination. Does not recall having hematuria in the past but is unsure. Doesn't recall last BM. Feels generally fatigued. Recalls being told he had an irregular heart rhythm "a long time ago" and had an ablation done in . Of note, pt denies having HHA or family contacts. Resides in a building with a friend who brought him in for evaluation.    REVIEW OF SYSTEMS:    CONSTITUTIONAL: No fevers or chills. +generalized weakness and fatigue  EYES/ENT: No visual changes;  No vertigo or throat pain   NECK: No pain or stiffness  RESPIRATORY: No cough, wheezing, hemoptysis; No shortness of breath  CARDIOVASCULAR: No chest pain or palpitations  GASTROINTESTINAL: No abdominal or epigastric pain. No nausea, vomiting, or hematemesis; No diarrhea or ?constipation (doesn't recall last BM). No melena or hematochezia.  GENITOURINARY: No dysuria or frequency  NEUROLOGICAL: No numbness or weakness  SKIN: No itching, burning, rashes, or lesions   All other review of systems is negative unless indicated above.    VITAL SIGNS:  T(C): 36.8 (20 @ 14:46), Max: 39.3 (20 @ 08:30)  T(F): 98.2 (20 @ 14:46), Max: 102.7 (20 @ 08:30)  HR: 86 (20 @ 18:00) (76 - 119)  BP: 111/58 (20 @ 18:00) (82/49 - 149/70)  BP(mean): 81 (20 @ 18:00) (61 - 105)  RR: 16 (20 @ 18:00) (11 - 26)  SpO2: 99% (20 @ 18:00) (97% - 100%)  Wt(kg): --    PHYSICAL EXAM:  Constitutional: WDWN resting comfortably in bed; appears fatigued  Head: NC/AT; mild pallor  Eyes: PERRL, EOMI, anicteric sclera  ENT: no nasal discharge; MMM  Neck: supple; no JVD  Respiratory: CTA B/L; no W/R/R, no retractions; speaking in full sentences without difficulty, soft voice and intermittently slow to respond but responds appropriately  Cardiac: +S1/S2; RRR; no M/R/G   Gastrointestinal: soft, NT/ND; no rebound or guarding; +BSx4  Genitourinary: palacio catheter in place, draining red-tinged urine (not ash blood)  Extremities: WWP, no clubbing or cyanosis; no peripheral edema; mildly pale  Musculoskeletal: moving all extremities spontaneously  Vascular: 2+ radial, PT pulses B/L  Dermatologic: skin warm, dry and intact; no rashes, wounds, or scars  Neurologic: AAOx3; CNII-XII grossly intact; no focal deficits; mm strength 5/5 UE LE BL; sensation intact UE LE BL    MEDICATIONS  (STANDING):  chlorhexidine 2% Cloths 1 Application(s) Topical <User Schedule>  dextrose 5%. 1000 milliLiter(s) (50 mL/Hr) IV Continuous <Continuous>  dextrose 50% Injectable 12.5 Gram(s) IV Push once  dextrose 50% Injectable 25 Gram(s) IV Push once  dextrose 50% Injectable 25 Gram(s) IV Push once  finasteride 5 milliGRAM(s) Oral daily  heparin  Injectable 5000 Unit(s) SubCutaneous every 8 hours  influenza   Vaccine 0.5 milliLiter(s) IntraMuscular once  insulin glargine Injectable (LANTUS) 15 Unit(s) SubCutaneous every morning  insulin lispro (HumaLOG) corrective regimen sliding scale   SubCutaneous Before meals and at bedtime  insulin lispro Injectable (HumaLOG) 6 Unit(s) SubCutaneous three times a day before meals  simvastatin 10 milliGRAM(s) Oral at bedtime  tamsulosin 0.4 milliGRAM(s) Oral at bedtime    MEDICATIONS  (PRN):  acetaminophen   Tablet .. 650 milliGRAM(s) Oral every 6 hours PRN Temp greater or equal to 38C (100.4F)  dextrose 40% Gel 15 Gram(s) Oral once PRN Blood Glucose LESS THAN 70 milliGRAM(s)/deciliter  glucagon  Injectable 1 milliGRAM(s) IntraMuscular once PRN Glucose LESS THAN 70 milligrams/deciliter      Allergies    No Known Allergies    Intolerances        LABS:                        8.5    12.81 )-----------( 158      ( 2020 05:28 )             27.2     02-14    138  |  111<H>  |  92<H>  ----------------------------<  147<H>  4.9   |  13<L>  |  3.87<H>    Ca    8.2<L>      2020 05:28  Phos  3.5     02-14  Mg     1.6     -14    TPro  6.0  /  Alb  2.6<L>  /  TBili  0.2  /  DBili  x   /  AST  9<L>  /  ALT  18  /  AlkPhos  90  -14    PTT - ( 2020 14:53 )  PTT:29.3 sec  Urinalysis Basic - ( 2020 17:40 )    Color: Yellow / Appearance: SL Cloudy / S.025 / pH: x  Gluc: x / Ketone: NEGATIVE  / Bili: Negative / Urobili: 0.2 E.U./dL   Blood: x / Protein: 30 mg/dL / Nitrite: POSITIVE   Leuk Esterase: Large / RBC: < 5 /HPF / WBC Many /HPF   Sq Epi: x / Non Sq Epi: 0-5 /HPF / Bacteria: Many /HPF      CAPILLARY BLOOD GLUCOSE      POCT Blood Glucose.: 263 mg/dL (2020 16:12)      RADIOLOGY & ADDITIONAL TESTS: Reviewed.

## 2020-02-14 NOTE — DISCHARGE NOTE NURSING/CASE MANAGEMENT/SOCIAL WORK - PATIENT PORTAL LINK FT
You can access the FollowMyHealth Patient Portal offered by [facility] by registering at the following website: [email or web address]. By joining [facility]’s FollowMyHealth portal, you will also be able to view your health information using other applications (apps) compatible with our system.

## 2020-02-14 NOTE — SWALLOW BEDSIDE ASSESSMENT ADULT - COMMENTS
Received sleeping. Aroused via verbal & light tactile stim. Neighbor/friend, Venus, present. Reports Pt has been c/o difficulty swallowing x2 weeks. Pt endorses same but cannot describe difficulty. Says it's neither choking nor globus.

## 2020-02-14 NOTE — DIETITIAN INITIAL EVALUATION ADULT. - OTHER INFO
73 yo/male with PMHx DM, HTN, HLD, SERA, CKD, presented w/DKA and UTI. Pt febrile overnight. Insulin gtt now off. Pt seen in room and discussed during MICU rounds. Pt asleep, lethargic, flat-affect and minimally responsive to questions. Intermittently nodded/shook head to questions but did not verbally answers by MICU team or by RD. D51/2NS running at 250mL/hr. Was able to ascertain that he has had a poor appetite recently but unable to obtain information regarding diet history. He was NPO at time of visit but has since been advanced to Consistent Carbohydrate Diet. Reported not being hungry. NKFA. No N/V/C/D reported at this time. Unable to assess for previous difficulty chewing or swallowing. Skin intact pressure-wise; no edema note. No pain. Per EMR, pt weighed 209# 10 months ago. NFPE significant for moderate temporal wasting, moderate orbital fat loss. Education not appropriate at this time d/t mental status. Will continue to follow per RD protocol.

## 2020-02-14 NOTE — CONSULT NOTE ADULT - SUBJECTIVE AND OBJECTIVE BOX
HPI:  73 yo male with a hx of DM, HTN, HLD, iron deficiency anemia, CKD 4 who was brought in by his home health aide for feeling generally unwell and week for the past 2 weeks since his discharge from [facility] for symptomatic anemia. He has felt weakness, malaise and fatigue. He has not been experiencing any shortness of breath, chest pain, cough, sputum production, rhinorrhea odynophagia, difficulty hearing, abdominal pain, nausea, vomiting, diarrhea, constipation. He reports a history of chronic urinary incontinence and difficulty voiding due to known BPH with lower urinary tract symptoms. He denies any fever, chills, or rigors at home. He has been compliant with his medications prior to presentation. No recent abx use or hospitalizations. No dysuria, urgency or frequency that he has noted. No penile discharge. On presentation patient was noted to be in urinary retention w/ anion gap metabolic acidosis, acute renal failure, elevated b hydroxybutyrate and hyperglycemia w/ likely UTI. ICU consulted for management, and patient admitted for DKA c/b UTI.     In the ED: Patient given 3L NS, started on an insulin gtt, and given zosyn. (2020 18:56)      PAST MEDICAL & SURGICAL HISTORY:  History of pancytopenia  H/O hydrocephalus  Anemia  HLD (hyperlipidemia)  Enlarged prostate  DM (diabetes mellitus)  HTN (hypertension)  H/O prior ablation treatment  Legally blind        REVIEW OF SYSTEMS:    General:   (+) weakness; no fevers, no chills  Skin/Breast: no rash  Respiratory and Thorax: no SOB, no cough  Cardiovascular:	No chest pain  Gastrointestinal:	 no nausea, vomiting , diarrhea  Genitourinary:	(+) difficulty urinating, no hematuria, Palacio in place  Musculoskeletal:	no weakness, no joint swelling/pain  Neurological:	no focal weakness/numbness  Endocrine: no polyuria, no polydipsia      ANTIBIOTICS:  MEDICATIONS  (STANDING):  chlorhexidine 2% Cloths 1 Application(s) Topical <User Schedule>  dextrose 5%. 1000 milliLiter(s) (50 mL/Hr) IV Continuous <Continuous>  dextrose 50% Injectable 12.5 Gram(s) IV Push once  dextrose 50% Injectable 25 Gram(s) IV Push once  dextrose 50% Injectable 25 Gram(s) IV Push once  finasteride 5 milliGRAM(s) Oral daily  heparin  Injectable 5000 Unit(s) SubCutaneous every 8 hours  influenza   Vaccine 0.5 milliLiter(s) IntraMuscular once  insulin glargine Injectable (LANTUS) 10 Unit(s) SubCutaneous every morning  insulin lispro (HumaLOG) corrective regimen sliding scale   SubCutaneous Before meals and at bedtime  insulin lispro Injectable (HumaLOG) 6 Unit(s) SubCutaneous three times a day before meals  simvastatin 10 milliGRAM(s) Oral at bedtime  tamsulosin 0.4 milliGRAM(s) Oral at bedtime    MEDICATIONS  (PRN):  acetaminophen   Tablet .. 650 milliGRAM(s) Oral every 6 hours PRN Temp greater or equal to 38C (100.4F)  dextrose 40% Gel 15 Gram(s) Oral once PRN Blood Glucose LESS THAN 70 milliGRAM(s)/deciliter  glucagon  Injectable 1 milliGRAM(s) IntraMuscular once PRN Glucose LESS THAN 70 milligrams/deciliter      Allergies: No Known Allergies    SOCIAL HISTORY: no smoking, no ETOH    FAMILY HISTORY:  FH: stomach cancer  FHx: breast cancer  FHx: stroke      Vital Signs Last 24 Hrs  T(C): 36.6 (2020 18:54), Max: 39.3 (2020 08:30)  T(F): 97.8 (2020 18:54), Max: 102.7 (2020 08:30)  HR: 86 (2020 18:00) (76 - 119)  BP: 111/58 (2020 18:00) (82/49 - 149/70)  BP(mean): 81 (2020 18:00) (61 - 96)  RR: 16 (2020 18:00) (11 - 26)  SpO2: 99% (2020 18:00) (97% - 100%)    20 @ 07:01  -  20 @ 07:00  --------------------------------------------------------  IN: 3738.7 mL / OUT: 1245 mL / NET: 2493.7 mL    20 @ 07:01  -  20 @ 19:31  --------------------------------------------------------  IN: 852.5 mL / OUT: 520 mL / NET: 332.5 mL        PHYSICAL EXAM:  Constitutional: WDWN resting comfortably in bed; appears fatigued  Head: NC/AT; mild pallor  Eyes: PERRL, EOMI, anicteric sclera  ENT: no nasal discharge; MMM  Neck: supple; no JVD  Respiratory: CTA B/L; no W/R/R, no retractions; speaking in full sentences without difficulty, soft voice and intermittently slow to respond but responds appropriately  Cardiac: +S1/S2; RRR; no M/R/G   Gastrointestinal: soft, NT/ND; no rebound or guarding; +BSx4  Genitourinary: palacio catheter in place, draining red-tinged urine 100cc in bag  Extremities: WWP, no clubbing or cyanosis; no peripheral edema; mildly pale  Musculoskeletal: moving all extremities spontaneously  Vascular: 2+ radial, PT pulses B/L  Dermatologic: skin warm, dry and intact; no rashes, wounds, or scars  Neurologic: AAOx3; CNII-XII grossly intact; no focal deficits; mm strength 5/5 UE LE BL; sensation intact UE LE BL          LABS:                        8.5    12.81 )-----------( 158      ( 2020 05:28 )             27.2     02-14    138  |  111<H>  |  92<H>  ----------------------------<  147<H>  4.9   |  13<L>  |  3.87<H>    Ca    8.2<L>      2020 05:28  Phos  3.5     -14  Mg     1.6     -    TPro  6.0  /  Alb  2.6<L>  /  TBili  0.2  /  DBili  x   /  AST  9<L>  /  ALT  18  /  AlkPhos  90  02-14    PTT - ( 2020 14:53 )  PTT:29.3 sec  Urinalysis Basic - ( 2020 17:40 )    Color: Yellow / Appearance: SL Cloudy / S.025 / pH: x  Gluc: x / Ketone: NEGATIVE  / Bili: Negative / Urobili: 0.2 E.U./dL   Blood: x / Protein: 30 mg/dL / Nitrite: POSITIVE   Leuk Esterase: Large / RBC: < 5 /HPF / WBC Many /HPF   Sq Epi: x / Non Sq Epi: 0-5 /HPF / Bacteria: Many /HPF    MICROBIOLOGY:    Culture - Urine (20 @ 21:49)    Specimen Source: .Urine Clean Catch (Midstream)    Culture Results:   >100,000 CFU/ml Klebsiella variicola  Susceptibility to follow.    Culture - Blood (20 @ 20:23)    Gram Stain:   Aerobic Bottle: Gram Negative Rods  Floor previously notified.    Specimen Source: .Blood Blood    Culture Results:   No growth at 12 hours    Culture - Blood (20 @ 20:23)    Gram Stain:   Anaerobic Bottle: Gram Negative Rods  Result called to and read back byRodrick Sexton RN  2020 11:38:11  ***Blood Panel PCR results on this specimen are available  approximately 3 hours after the Gram stain result.***  Gram stain, PCR, and/or culture results may not always  correspond due to difference in methodologies.  ************************************************************  This PCR assay was performed using [email or web address].  The following targets are tested for: Enterococcus,  vancomycin resistant enterococci, Listeria monocytogenes,  coagulase negative staphylococci, S. aureus,  methicillin resistant S. aureus, Streptococcus agalactiae  (Group B), S. pneumoniae, S. pyogenes (Group A),  Acinetobacter baumannii, Enterobacter cloacae, E. coli,  Klebsiella oxytoca, K. pneumoniae, Proteus sp.,  Serratia marcescens, Haemophilus influenzae,  Neisseria meningitidis, Pseudomonas aeruginosa, Candida  albicans, C. glabrata, C krusei, C parapsilosis,  C. tropicalis and the KPC resistance gene.    -  Multidrug (KPC pos) resistant organism: Nondet    -  Klebsiella pneumoniae: Detec    Specimen Source: .Blood Blood    Organism: Blood Culture PCR    Culture Results:   Culture in progress    Organism Identification: Blood Culture PCR    Method Type: PCR      RADIOLOGY & ADDITIONAL STUDIES:   Xray Chest 1 View AP/PA (20 @ 16:03) >    EXAM:  XR CHEST AP OR PA 1V                          PROCEDURE DATE:  2020          INTERPRETATION:  Clinical History / Reason for exam: Weakness    Comparison : Chest radiograph 2019.    Technique/Positioning: Single frontal view of thechest    Findings:    Support devices: Telemetry leads overlie the patient.    Cardiac/mediastinum/hilum: Unchanged    Lung parenchyma/Pleura: No focal parenchymal opacities, pleural effusion or pneumothorax are present. Stable elevation of the righthemidiaphragm    Skeleton/soft tissues: Degenerative changes of the shoulders and the spine    Impression:      No radiographic evidence of acute cardiopulmonary disease.

## 2020-02-14 NOTE — PROGRESS NOTE ADULT - PROBLEM SELECTOR PLAN 6
- Pt with known hx BPH  - Continue home med finasteride 5mg daily  - Monitor I&O via palacio catheter  - f/u Urology - Pt with known hx BPH  - Continue home med finasteride 5mg daily  - Initiated tamsulosin 0.4mg daily  - Monitor I&O via palacio catheter  - f/u Urology - Pt with known hx BPH  - Continue home med finasteride 5mg daily  - Initiated tamsulosin 0.4mg daily  - Monitor I&O via palacio catheter  - f/u Urology  - f/u PSA

## 2020-02-14 NOTE — CONSULT NOTE ADULT - SUBJECTIVE AND OBJECTIVE BOX
Patient is a 74y old  Male who presents with a chief complaint of DKA (14 Feb 2020 12:47)      HPI:  73 yo male with a hx of DM, HTN, HLD, iron deficiency anemia, CKD 4 who was brought in by his home health aide for feeling generally unwell and weak for the past 2 weeks since his discharge from [facility] for symptomatic anemia 2/3/2020-2/4/2020. On admission, pt was found to have AAMIR on CKD with urinary tract obstruction 2/2 BPH with concurrent UTI. On   US renal/bladder?, the results showed hydronephrosis also noted on u/s consistent with scan from 5/18. He had palacio placed on 2/13/2020. This was his first time needing a catheter. Urology consulted for optimization of BPH regimen.     Pt states he has seen a urologist in the past for BPH but is unsure who. He was offered UDS and cystoscopy. Pt states he has been taking finasteride and tamsulosin for one year but reports he ran out of finasteride 3 weeks ago. Pt reports that he gets up 2-3 times a night to urinate. His stream is moderate denies hematuria, dysuria or hesitancy. Pt reports back pain that started one week ago. Pt also reports new onset incontinence x 1 month and has been wearing diapers. Pt reports he has had UTIs in the past but is unsure when. His Cr on admissions was 4.4, prior to palacio placement. His most recent Cr is 3.87. His baseline is ~ 2-2.5 ngml as per the team, patient does not know what his baseline is.       Vital Signs Last 24 Hrs  T(C): 36.8 (14 Feb 2020 14:46), Max: 39.3 (14 Feb 2020 08:30)  T(F): 98.2 (14 Feb 2020 14:46), Max: 102.7 (14 Feb 2020 08:30)  HR: 90 (14 Feb 2020 14:00) (76 - 119)  BP: 97/53 (14 Feb 2020 14:00) (82/49 - 149/70)  BP(mean): 72 (14 Feb 2020 14:00) (61 - 105)  RR: 19 (14 Feb 2020 14:00) (11 - 26)  SpO2: 99% (14 Feb 2020 14:00) (97% - 100%)  I&O's Summary    13 Feb 2020 07:01  -  14 Feb 2020 07:00  --------------------------------------------------------  IN: 3738.7 mL / OUT: 1245 mL / NET: 2493.7 mL    14 Feb 2020 07:01  -  14 Feb 2020 15:52  --------------------------------------------------------  IN: 852.5 mL / OUT: 460 mL / NET: 392.5 mL        PE:  Gen:  Abd:  :  KRISTIN:    LABS:                        8.5    12.81 )-----------( 158      ( 14 Feb 2020 05:28 )             27.2     02-14    138  |  111<H>  |  92<H>  ----------------------------<  147<H>  4.9   |  13<L>  |  3.87<H>    Ca    8.2<L>      14 Feb 2020 05:28  Phos  3.5     02-14  Mg     1.6     02-14    TPro  6.0  /  Alb  2.6<L>  /  TBili  0.2  /  DBili  x   /  AST  9<L>  /  ALT  18  /  AlkPhos  90  02-14    PTT - ( 13 Feb 2020 14:53 )  PTT:29.3 sec  Cultures  Culture Results:   >100,000 CFU/ml Klebsiella variicola  Susceptibility to follow. (02-13 @ 21:49)  Culture Results:   Culture in progress (02-13 @ 20:23)  Culture Results:   No growth at 12 hours (02-13 @ 20:23)      A/P HPI:  73 yo male with a hx of DM, HTN, HLD, iron deficiency anemia, CKD 4 who was brought in by his home health aide for feeling generally unwell and weak for the past 2 weeks since his discharge from [facility] for symptomatic anemia 2/3/2020-2/4/2020. On admission, pt was found to have AAMIR on CKD with urinary tract obstruction 2/2 BPH with concurrent UTI. On   US renal/bladder?, the results showed hydronephrosis also noted on u/s consistent with scan from 5/18. He had palacio placed on 2/13/2020. This was his first time needing a catheter. Urology consulted for optimization of BPH regimen.     Pt states he has seen a urologist in the past for BPH but is unsure who. He was offered UDS and cystoscopy. Pt states he has been taking finasteride and tamsulosin for one year but reports he ran out of finasteride 3 weeks ago. Pt reports that he gets up 2-3 times a night to urinate. His stream is moderate denies hematuria, dysuria or hesitancy. Pt reports back pain that started one week ago. Pt also reports new onset incontinence x 1 month and has been wearing diapers. Pt reports he has had UTIs in the past but is unsure when. His Cr on admissions was 4.4, prior to palacio placement. His most recent Cr is 3.87. His baseline is ~ 2-2.5 ngml as per the team, patient does not know what his baseline is.       Vital Signs Last 24 Hrs  T(C): 36.8 (14 Feb 2020 14:46), Max: 39.3 (14 Feb 2020 08:30)  T(F): 98.2 (14 Feb 2020 14:46), Max: 102.7 (14 Feb 2020 08:30)  HR: 90 (14 Feb 2020 14:00) (76 - 119)  BP: 97/53 (14 Feb 2020 14:00) (82/49 - 149/70)  BP(mean): 72 (14 Feb 2020 14:00) (61 - 105)  RR: 19 (14 Feb 2020 14:00) (11 - 26)  SpO2: 99% (14 Feb 2020 14:00) (97% - 100%)  I&O's Summary    13 Feb 2020 07:01  -  14 Feb 2020 07:00  --------------------------------------------------------  IN: 3738.7 mL / OUT: 1245 mL / NET: 2493.7 mL    14 Feb 2020 07:01  -  14 Feb 2020 15:52  --------------------------------------------------------  IN: 852.5 mL / OUT: 460 mL / NET: 392.5 mL        PE:  Gen: NAD  Abd: soft, nt/nd  : palacio in place, yellow urine     LABS:                        8.5    12.81 )-----------( 158      ( 14 Feb 2020 05:28 )             27.2     02-14    138  |  111<H>  |  92<H>  ----------------------------<  147<H>  4.9   |  13<L>  |  3.87<H>    Ca    8.2<L>      14 Feb 2020 05:28  Phos  3.5     02-14  Mg     1.6     02-14    TPro  6.0  /  Alb  2.6<L>  /  TBili  0.2  /  DBili  x   /  AST  9<L>  /  ALT  18  /  AlkPhos  90  02-14    PTT - ( 13 Feb 2020 14:53 )  PTT:29.3 sec  Cultures  Culture Results:   >100,000 CFU/ml Klebsiella variicola  Susceptibility to follow. (02-13 @ 21:49)  Culture Results:   Culture in progress (02-13 @ 20:23)  Culture Results:   No growth at 12 hours (02-13 @ 20:23)      A/P 73 yo male with a hx of DM, HTN, HLD, iron deficiency anemia, CKD 4 who was brought in by his home health aide for feeling generally unwell and weak for the past 2 weeks since his discharge from [facility] for symptomatic anemia 2/3/2020-2/4/2020. He was found to have a UTI and be in urinary retention. He had a palacio placed with 1 liter output. Patient reports prior urinary issues, can not recall the urologist the saw.    -US reviewed hydro stable from 5/2018. Trend Cr, will follow up tomorrow   -Continue Palacio  -Abx, follow up Ucx sensitives    -Continue Flomax and Finasteride   -Follow up out for further workup for prior urinary symptoms/ BPH management  -IV fluids PRN  -Case discussed with resident

## 2020-02-14 NOTE — DIETITIAN INITIAL EVALUATION ADULT. - ADD RECOMMEND
1. Encourage PO intake; consider addition of Glucerna 1x/day (220kcal, 20g pro) if appetite poor 2. Attempt to obtain further nutrition hx and provide education at f/u as appropriate 3. Monitor lytes and replete prn. POC BG q6hrs 4. Pain and bowel regimens per team

## 2020-02-14 NOTE — SWALLOW BEDSIDE ASSESSMENT ADULT - SWALLOW EVAL: DIAGNOSIS
Functional oropharyngeal swallow for mechanical soft solids and thin liquids with no overt signs of aspiration

## 2020-02-14 NOTE — PROGRESS NOTE ADULT - ASSESSMENT
Pt is a 73 yo M with PMH DM, HTN, HLD, BPH (chronic incont/ret), SERA, CKD4, and a-fib (s/p ablation 2011) who presented to [facility] for general feelings of illness, weakness, and fatigue x2 wks. Initially admitted to ICU for DKA with klebsiella UTI and bacteremia, now stepped down to [facility] for further observation and management.

## 2020-02-14 NOTE — SWALLOW BEDSIDE ASSESSMENT ADULT - PHARYNGEAL PHASE
Within functional limits/Hyolaryngeal excursion palpated during swallow trigger, appears brisk & timely. No cough, throat clear or change in voice quality.

## 2020-02-14 NOTE — CONSULT NOTE ADULT - ASSESSMENT
Pt is a 75 yo M with PMH DM, HTN, HLD, BPH (chronic incont/ret), SERA, CKD4, and a-fib (s/p ablation 2011) who presented to [facility] for general feelings of illness, weakness, and fatigue x2 wks. Initially admitted to ICU for DKA with klebsiella UTI and bacteremia, now stepped down to [facility] for further observation and management.

## 2020-02-14 NOTE — CONSULT NOTE ADULT - ATTENDING COMMENTS
Patient seen and examined with house-staff during bedside rounds  Resident note read, including vitals, physical findings, laboratory data, and radiological reports.   Revisions included below.  Case discussed with House staff  Direct personal management at bedside  and extensive interpretation of data. Decision making of high complexity.
Pt seen on rounds this afternoon.  Very poor historian.  Admitted with acute on chronic renal insuff, urinary retention, metabolic acidosis due combination of elevated beta-OH-butyrate and lactate, plus the renal insufficiency.  (Was on Janumet as outpatient, so ?? whether the metformin was contributing to the lactate).  Glucose was 597 on admission, and responded to insulin drip.  Fingersticks have been in 200 range today.  His PO intake seems to be fairly good.  Received 15 Lantus this morning, but would be concerned about low basal requirements given his marked CKD.  Would therefore decrease this to 10 units for tomorrow as a precaution, and then will add to it if AM fingerstick tomorrow is elevated.  Will increase the pre-meal lispro to 6 units.

## 2020-02-14 NOTE — PROGRESS NOTE ADULT - SUBJECTIVE AND OBJECTIVE BOX
OVERNIGHT EVENTS: UOP decreased to 30 cc/hr. Initially given 350 cc NS bolus with little response. POCUS showed no B-lines, only a-lines. 1 L in NS given throughout the night with additional albumin 5% given. UOP improved to 100 cc/hr. Anion gap closed x2. Started on lantus 15.     SUBJECTIVE / INTERVAL HPI: Patient seen and examined at bedside. Patient laying comfortably. Says he feels weak, but is able to move all of his extremities. Otherwise difficult to assess ROS as he does not cooperate with exam or questions.     VITAL SIGNS:  Vital Signs Last 24 Hrs  T(C): 39.3 (2020 08:30), Max: 39.3 (2020 08:30)  T(F): 102.7 (2020 08:30), Max: 102.7 (2020 08:30)  HR: 95 (2020 12:00) (76 - 119)  BP: 89/51 (2020 12:00) (82/49 - 149/70)  BP(mean): 66 (2020 12:00) (61 - 105)  RR: 23 (2020 12:00) (11 - 26)  SpO2: 97% (2020 12:00) (97% - 100%)  I&O's Summary    2020 07:01  -  2020 07:00  --------------------------------------------------------  IN: 3738.7 mL / OUT: 1245 mL / NET: 2493.7 mL    2020 07:01  -  2020 12:25  --------------------------------------------------------  IN: 302.5 mL / OUT: 200 mL / NET: 102.5 mL        PHYSICAL EXAM:  Constitutional: resting comfortably in bed, NAD  HEENT: NC/AT; PERRL, anicteric sclera; no oropharyngeal erythema or exudates; MMM  Neck: supple, no JVD  Respiratory: CTA B/L, no W/R/R; respirations appear non-labored, conversive in full sentences  Cardiovascular: +S1/S2, RRR  Gastrointestinal: abdomen soft, NT/ND. No tenderness to palpation. no CVA tenderness   Extremities: WWP; no clubbing, cyanosis or edema  Vascular: 2+ radial, femoral, and DP/PT pulses B/L  Dermatologic: skin normal color and turgor; no visible rashes  Neurological: AAOx3    MEDICATIONS:  MEDICATIONS  (STANDING):  cefTRIAXone   IVPB 2000 milliGRAM(s) IV Intermittent every 24 hours  chlorhexidine 2% Cloths 1 Application(s) Topical <User Schedule>  dextrose 5%. 1000 milliLiter(s) (50 mL/Hr) IV Continuous <Continuous>  dextrose 50% Injectable 12.5 Gram(s) IV Push once  dextrose 50% Injectable 25 Gram(s) IV Push once  dextrose 50% Injectable 25 Gram(s) IV Push once  finasteride 5 milliGRAM(s) Oral daily  heparin  Injectable 5000 Unit(s) SubCutaneous every 8 hours  influenza   Vaccine 0.5 milliLiter(s) IntraMuscular once  insulin glargine Injectable (LANTUS) 15 Unit(s) SubCutaneous every morning  insulin lispro (HumaLOG) corrective regimen sliding scale   SubCutaneous Before meals and at bedtime  insulin lispro Injectable (HumaLOG) 4 Unit(s) SubCutaneous three times a day before meals  simvastatin 10 milliGRAM(s) Oral at bedtime  tamsulosin 0.4 milliGRAM(s) Oral at bedtime    MEDICATIONS  (PRN):  acetaminophen   Tablet .. 650 milliGRAM(s) Oral every 6 hours PRN Temp greater or equal to 38C (100.4F)  dextrose 40% Gel 15 Gram(s) Oral once PRN Blood Glucose LESS THAN 70 milliGRAM(s)/deciliter  glucagon  Injectable 1 milliGRAM(s) IntraMuscular once PRN Glucose LESS THAN 70 milligrams/deciliter      ALLERGIES:  Allergies    No Known Allergies    Intolerances        LABS:                        8.5    12.81 )-----------( 158      ( 2020 05:28 )             27.2     02-14    138  |  111<H>  |  92<H>  ----------------------------<  147<H>  4.9   |  13<L>  |  3.87<H>    Ca    8.2<L>      2020 05:28  Phos  3.5     -  Mg     1.6     -    TPro  6.0  /  Alb  2.6<L>  /  TBili  0.2  /  DBili  x   /  AST  9<L>  /  ALT  18  /  AlkPhos  90  02-14    PTT - ( 2020 14:53 )  PTT:29.3 sec  Urinalysis Basic - ( 2020 17:40 )    Color: Yellow / Appearance: SL Cloudy / S.025 / pH: x  Gluc: x / Ketone: NEGATIVE  / Bili: Negative / Urobili: 0.2 E.U./dL   Blood: x / Protein: 30 mg/dL / Nitrite: POSITIVE   Leuk Esterase: Large / RBC: < 5 /HPF / WBC Many /HPF   Sq Epi: x / Non Sq Epi: 0-5 /HPF / Bacteria: Many /HPF      CAPILLARY BLOOD GLUCOSE      POCT Blood Glucose.: 208 mg/dL (2020 11:00)      RADIOLOGY & ADDITIONAL TESTS: Reviewed. Hospital Course:     OVERNIGHT EVENTS: UOP decreased to 30 cc/hr. Initially given 350 cc NS bolus with little response. POCUS showed no B-lines, only a-lines. 1 L in NS given throughout the night with additional albumin 5% given. UOP improved to 100 cc/hr. Anion gap closed x2. Started on lantus 15.     SUBJECTIVE / INTERVAL HPI: Patient seen and examined at bedside. Patient laying comfortably. Says he feels weak, but is able to move all of his extremities. Otherwise difficult to assess ROS as he does not cooperate with exam or questions. Urine culture and blood Cx growing klebsiella pneumonia. Increased ceftriaxone from 1 to 2 grams. Started flomax. Endocrine and urology consulted.     VITAL SIGNS:  Vital Signs Last 24 Hrs  T(C): 39.3 (2020 08:30), Max: 39.3 (2020 08:30)  T(F): 102.7 (2020 08:30), Max: 102.7 (2020 08:30)  HR: 95 (2020 12:00) (76 - 119)  BP: 89/51 (2020 12:00) (82/49 - 149/70)  BP(mean): 66 (2020 12:00) (61 - 105)  RR: 23 (2020 12:00) (11 - 26)  SpO2: 97% (2020 12:00) (97% - 100%)  I&O's Summary    2020 07:  -  2020 07:00  --------------------------------------------------------  IN: 3738.7 mL / OUT: 1245 mL / NET: 2493.7 mL    2020 07:01  -  2020 12:25  --------------------------------------------------------  IN: 302.5 mL / OUT: 200 mL / NET: 102.5 mL        PHYSICAL EXAM:  Constitutional: Patient appears pale, KRYS  HEENT: NC/AT; PERRL, anicteric sclera; no oropharyngeal erythema or exudates; MMM  Neck: supple, no JVD  Respiratory: CTA B/L, no W/R/R; respirations appear non-labored, conversive in full sentences  Cardiovascular: +S1/S2, RRR  Gastrointestinal: abdomen soft, NT/ND. No tenderness to palpation. no CVA tenderness   Extremities: WWP; no clubbing, cyanosis or edema  Vascular: 2+ radial, femoral, and DP/PT pulses B/L  Neurological: AAOx3, weak but 5/5 strength in upper and lower lings.     MEDICATIONS:  MEDICATIONS  (STANDING):  cefTRIAXone   IVPB 2000 milliGRAM(s) IV Intermittent every 24 hours  chlorhexidine 2% Cloths 1 Application(s) Topical <User Schedule>  dextrose 5%. 1000 milliLiter(s) (50 mL/Hr) IV Continuous <Continuous>  dextrose 50% Injectable 12.5 Gram(s) IV Push once  dextrose 50% Injectable 25 Gram(s) IV Push once  dextrose 50% Injectable 25 Gram(s) IV Push once  finasteride 5 milliGRAM(s) Oral daily  heparin  Injectable 5000 Unit(s) SubCutaneous every 8 hours  influenza   Vaccine 0.5 milliLiter(s) IntraMuscular once  insulin glargine Injectable (LANTUS) 15 Unit(s) SubCutaneous every morning  insulin lispro (HumaLOG) corrective regimen sliding scale   SubCutaneous Before meals and at bedtime  insulin lispro Injectable (HumaLOG) 4 Unit(s) SubCutaneous three times a day before meals  simvastatin 10 milliGRAM(s) Oral at bedtime  tamsulosin 0.4 milliGRAM(s) Oral at bedtime    MEDICATIONS  (PRN):  acetaminophen   Tablet .. 650 milliGRAM(s) Oral every 6 hours PRN Temp greater or equal to 38C (100.4F)  dextrose 40% Gel 15 Gram(s) Oral once PRN Blood Glucose LESS THAN 70 milliGRAM(s)/deciliter  glucagon  Injectable 1 milliGRAM(s) IntraMuscular once PRN Glucose LESS THAN 70 milligrams/deciliter      ALLERGIES:  Allergies    No Known Allergies    Intolerances        LABS:                        8.5    12.81 )-----------( 158      ( 2020 05:28 )             27.2     02-14    138  |  111<H>  |  92<H>  ----------------------------<  147<H>  4.9   |  13<L>  |  3.87<H>    Ca    8.2<L>      2020 05:28  Phos  3.5       Mg     1.6         TPro  6.0  /  Alb  2.6<L>  /  TBili  0.2  /  DBili  x   /  AST  9<L>  /  ALT  18  /  AlkPhos  90      PTT - ( 2020 14:53 )  PTT:29.3 sec  Urinalysis Basic - ( 2020 17:40 )    Color: Yellow / Appearance: SL Cloudy / S.025 / pH: x  Gluc: x / Ketone: NEGATIVE  / Bili: Negative / Urobili: 0.2 E.U./dL   Blood: x / Protein: 30 mg/dL / Nitrite: POSITIVE   Leuk Esterase: Large / RBC: < 5 /HPF / WBC Many /HPF   Sq Epi: x / Non Sq Epi: 0-5 /HPF / Bacteria: Many /HPF      CAPILLARY BLOOD GLUCOSE      POCT Blood Glucose.: 208 mg/dL (2020 11:00)      RADIOLOGY & ADDITIONAL TESTS: Reviewed. Hospital Course: HPI:75 yo male with a hx of DM, HTN, HLD, iron deficiency anemia, CKD 4 who was brought in by his home health aide for feeling generally unwell and week for the past 2 weeks since his discharge from [facility] for symptomatic anemia. He has felt weakness, malaise and fatigue and found to be in urinary retention w/ anion gap metabolic acidosis, acute renal failure, elevated b hydroxybutyrate and hyperglycemia w/ likely UTI. ICU consulted for management, and patient admitted for DKA c/b severe sepsis 2/2 UTI and klebsiella bacteremia. Anion gap closed x2, insuling gtt discontinued, and patient placed on lantus 15 and humalog 4. 1st Blood Culture set growing klebsiella and urine Cx positive for klebsiella. Currently treated with ceftriaxone 2g. Patient is medically stable for step-down.     OVERNIGHT EVENTS: UOP decreased to 30 cc/hr. Initially given 350 cc NS bolus with little response. POCUS showed no B-lines, only a-lines. 1 L in NS given throughout the night with additional albumin 5% given. UOP improved to 100 cc/hr. Anion gap closed x2. Started on lantus 15.     SUBJECTIVE / INTERVAL HPI: Patient seen and examined at bedside. Patient laying comfortably. Says he feels weak, but is able to move all of his extremities. Otherwise difficult to assess ROS as he does not cooperate with exam or questions. Urine culture and blood Cx growing klebsiella pneumonia. Increased ceftriaxone from 1 to 2 grams. Started flomax. Endocrine and urology consulted.     VITAL SIGNS:  Vital Signs Last 24 Hrs  T(C): 39.3 (2020 08:30), Max: 39.3 (2020 08:30)  T(F): 102.7 (2020 08:30), Max: 102.7 (2020 08:30)  HR: 95 (2020 12:00) (76 - 119)  BP: 89/51 (2020 12:00) (82/49 - 149/70)  BP(mean): 66 (2020 12:00) (61 - 105)  RR: 23 (2020 12:00) (11 - 26)  SpO2: 97% (2020 12:00) (97% - 100%)  I&O's Summary    2020 07: 07:00  --------------------------------------------------------  IN: 3738.7 mL / OUT: 1245 mL / NET: 2493.7 mL    2020 07:  -  2020 12:25  --------------------------------------------------------  IN: 302.5 mL / OUT: 200 mL / NET: 102.5 mL        PHYSICAL EXAM:  Constitutional: Patient appears pale, KRYS  HEENT: NC/AT; PERRL, anicteric sclera; no oropharyngeal erythema or exudates; MMM  Neck: supple, no JVD  Respiratory: CTA B/L, no W/R/R; respirations appear non-labored, conversive in full sentences  Cardiovascular: +S1/S2, RRR  Gastrointestinal: abdomen soft, NT/ND. No tenderness to palpation. no CVA tenderness   Extremities: WWP; no clubbing, cyanosis or edema  Vascular: 2+ radial, femoral, and DP/PT pulses B/L  Neurological: AAOx3, weak but 5/5 strength in upper and lower lings.     MEDICATIONS:  MEDICATIONS  (STANDING):  cefTRIAXone   IVPB 2000 milliGRAM(s) IV Intermittent every 24 hours  chlorhexidine 2% Cloths 1 Application(s) Topical <User Schedule>  dextrose 5%. 1000 milliLiter(s) (50 mL/Hr) IV Continuous <Continuous>  dextrose 50% Injectable 12.5 Gram(s) IV Push once  dextrose 50% Injectable 25 Gram(s) IV Push once  dextrose 50% Injectable 25 Gram(s) IV Push once  finasteride 5 milliGRAM(s) Oral daily  heparin  Injectable 5000 Unit(s) SubCutaneous every 8 hours  influenza   Vaccine 0.5 milliLiter(s) IntraMuscular once  insulin glargine Injectable (LANTUS) 15 Unit(s) SubCutaneous every morning  insulin lispro (HumaLOG) corrective regimen sliding scale   SubCutaneous Before meals and at bedtime  insulin lispro Injectable (HumaLOG) 4 Unit(s) SubCutaneous three times a day before meals  simvastatin 10 milliGRAM(s) Oral at bedtime  tamsulosin 0.4 milliGRAM(s) Oral at bedtime    MEDICATIONS  (PRN):  acetaminophen   Tablet .. 650 milliGRAM(s) Oral every 6 hours PRN Temp greater or equal to 38C (100.4F)  dextrose 40% Gel 15 Gram(s) Oral once PRN Blood Glucose LESS THAN 70 milliGRAM(s)/deciliter  glucagon  Injectable 1 milliGRAM(s) IntraMuscular once PRN Glucose LESS THAN 70 milligrams/deciliter      ALLERGIES:  Allergies    No Known Allergies    Intolerances        LABS:                        8.5    12.81 )-----------( 158      ( 2020 05:28 )             27.2     02-14    138  |  111<H>  |  92<H>  ----------------------------<  147<H>  4.9   |  13<L>  |  3.87<H>    Ca    8.2<L>      2020 05:28  Phos  3.5     -14  Mg     1.6         TPro  6.0  /  Alb  2.6<L>  /  TBili  0.2  /  DBili  x   /  AST  9<L>  /  ALT  18  /  AlkPhos  90  -14    PTT - ( 2020 14:53 )  PTT:29.3 sec  Urinalysis Basic - ( 2020 17:40 )    Color: Yellow / Appearance: SL Cloudy / S.025 / pH: x  Gluc: x / Ketone: NEGATIVE  / Bili: Negative / Urobili: 0.2 E.U./dL   Blood: x / Protein: 30 mg/dL / Nitrite: POSITIVE   Leuk Esterase: Large / RBC: < 5 /HPF / WBC Many /HPF   Sq Epi: x / Non Sq Epi: 0-5 /HPF / Bacteria: Many /HPF      CAPILLARY BLOOD GLUCOSE      POCT Blood Glucose.: 208 mg/dL (2020 11:00)      RADIOLOGY & ADDITIONAL TESTS: Reviewed.

## 2020-02-14 NOTE — CONSULT NOTE ADULT - SUBJECTIVE AND OBJECTIVE BOX
HPI:  73 yo male with a hx of DM, HTN, HLD, iron deficiency anemia, CKD 4 who was brought in by his home health aide for feeling generally unwell and week for the past 2 weeks since his discharge from [facility] for symptomatic anemia. He has felt weakness, malaise and fatigue. He has not been experiencing any shortness of breath, chest pain, cough, sputum production, rhinorrhea odynophagia, difficulty hearing, abdominal pain, nausea, vomiting, diarrhea, constipation. He reports a history of chronic urinary incontinence and difficulty voiding due to known BPH with lower urinary tract symptoms. He denies any fever, chills, or rigors at home. He has been compliant with his medications prior to presentation. No recent abx use or hospitalizations. No dysuria, urgency or frequency that he has noted. No penile discharge. On presentation patient was noted to be in urinary retention w/ anion gap metabolic acidosis, acute renal failure, elevated b hydroxybutyrate and hyperglycemia w/ likely UTI. ICU consulted for management, and patient admitted for DKA c/b UTI.     In the ED: Patient given 3L NS, started on an insulin gtt, and given zosyn. (13 Feb 2020 18:56)    FAMILY HISTORY:  FH: stomach cancer  FHx: breast cancer  FHx: stroke    MEDICATIONS  (STANDING):  chlorhexidine 2% Cloths 1 Application(s) Topical <User Schedule>  dextrose 5%. 1000 milliLiter(s) (50 mL/Hr) IV Continuous <Continuous>  dextrose 50% Injectable 12.5 Gram(s) IV Push once  dextrose 50% Injectable 25 Gram(s) IV Push once  dextrose 50% Injectable 25 Gram(s) IV Push once  finasteride 5 milliGRAM(s) Oral daily  heparin  Injectable 5000 Unit(s) SubCutaneous every 8 hours  influenza   Vaccine 0.5 milliLiter(s) IntraMuscular once  insulin glargine Injectable (LANTUS) 10 Unit(s) SubCutaneous every morning  insulin lispro (HumaLOG) corrective regimen sliding scale   SubCutaneous Before meals and at bedtime  insulin lispro Injectable (HumaLOG) 6 Unit(s) SubCutaneous three times a day before meals  simvastatin 10 milliGRAM(s) Oral at bedtime  tamsulosin 0.4 milliGRAM(s) Oral at bedtime    MEDICATIONS  (PRN):  acetaminophen   Tablet .. 650 milliGRAM(s) Oral every 6 hours PRN Temp greater or equal to 38C (100.4F)  dextrose 40% Gel 15 Gram(s) Oral once PRN Blood Glucose LESS THAN 70 milliGRAM(s)/deciliter  glucagon  Injectable 1 milliGRAM(s) IntraMuscular once PRN Glucose LESS THAN 70 milligrams/deciliter    Vital Signs Last 24 Hrs  T(C): 36.9 (14 Feb 2020 21:46), Max: 39.3 (14 Feb 2020 08:30)  T(F): 98.4 (14 Feb 2020 21:46), Max: 102.7 (14 Feb 2020 08:30)  HR: 85 (14 Feb 2020 21:46) (76 - 118)  BP: 103/58 (14 Feb 2020 21:46) (82/49 - 131/65)  BP(mean): 81 (14 Feb 2020 18:00) (61 - 96)  RR: 18 (14 Feb 2020 21:46) (11 - 26)  SpO2: 98% (14 Feb 2020 21:46) (97% - 100%)PHYSICAL EXAM:    Constitutional:    Neck:    Breasts:    Respiratory:    Cardiovascular:    Gastrointestinal:    Extremities:    Neurological:    Skin:    Lymph Nodes:      Labs:  CBC Full  -  ( 14 Feb 2020 05:28 )  WBC Count : 12.81 K/uL  RBC Count : 3.43 M/uL  Hemoglobin : 8.5 g/dL  Hematocrit : 27.2 %  Platelet Count - Automated : 158 K/uL  Mean Cell Volume : 79.3 fl  Mean Cell Hemoglobin : 24.8 pg  Mean Cell Hemoglobin Concentration : 31.3 gm/dL  Auto Neutrophil # : 11.57 K/uL  Auto Lymphocyte # : 0.51 K/uL  Auto Monocyte # : 0.54 K/uL  Auto Eosinophil # : 0.00 K/uL  Auto Basophil # : 0.01 K/uL  Auto Neutrophil % : 90.3 %  Auto Lymphocyte % : 4.0 %  Auto Monocyte % : 4.2 %  Auto Eosinophil % : 0.0 %  Auto Basophil % : 0.1 %    02-14    138  |  111<H>  |  92<H>  ----------------------------<  147<H>  4.9   |  13<L>  |  3.87<H>    Ca    8.2<L>      14 Feb 2020 05:28  Phos  3.5     02-14  Mg     1.6     02-14    TPro  6.0  /  Alb  2.6<L>  /  TBili  0.2  /  DBili  x   /  AST  9<L>  /  ALT  18  /  AlkPhos  90  02-14      Radiology:  HEALTH ISSUES - R/O PROBLEM Dx:    Assessmant:  Patient admitted   1)with Klebsiella urosepsis  2) Diabetes out of control  3)urinary retention  currentli has Glucose 146  is on Ceftriaxone iv and sepsis appears adequately treated  Plan:  ID Dr Huerta  PSA  us prostate  DM control  Thank you  Olivia Roberts MD

## 2020-02-14 NOTE — PROGRESS NOTE ADULT - PROBLEM SELECTOR PLAN 2
- As above  - No recent instrumentation or risk factors for resistant  organisms  - Likely provoked in the setting of BPH with chronic urinary incontinence vs. retention  - Rodriguez catheter in place, monitor I&O  - Currently with hematuria - red tinged urine, not ash blood  - Continue to monitor  - Urology following, f/u recs - As above  - No recent instrumentation or risk factors for resistant  organisms  - Likely provoked in the setting of BPH with chronic urinary incontinence vs. retention  - Rodriguez catheter in place, monitor I&O  - Currently with hematuria - red tinged urine, not ash blood  - Continue to monitor  - Urology following, f/u recs  - ID consulted (Dr. Mcwilliams), f/u recs

## 2020-02-14 NOTE — SWALLOW BEDSIDE ASSESSMENT ADULT - SLP PERTINENT HISTORY OF CURRENT PROBLEM
75 yo male with a hx of DM, HTN, HLD, iron deficiency anemia, CKD 4 who was brought in by his home health aide for feeling generally unwell and week for the past 2 weeks since his discharge from [facility] for symptomatic anemia.

## 2020-02-14 NOTE — CHART NOTE - NSCHARTNOTEFT_GEN_A_CORE
Upon Nutritional Assessment by the Registered Dietitian your patient was determined to meet criteria / has evidence of the following diagnosis/diagnoses:          [ ]  Mild Protein Calorie Malnutrition        [X ]  Moderate Protein Calorie Malnutrition        [ ] Severe Protein Calorie Malnutrition        [ ] Unspecified Protein Calorie Malnutrition        [ ] Underweight / BMI <19        [ ] Morbid Obesity / BMI > 40      Findings as based on:  •  Comprehensive nutrition assessment and consultation    Per physical assessment: Muscle Wasting- Temporal [  X- moderate ]  Clavicle/Pectoral [  X- moderate ]  Shoulder/Deltoid [   ]  Scapula [   ]  Interosseous [   ]  Quadriceps [   ]  Gastrocnemius [   ]; Fat Wasting- Orbital [ X- moderate  ]  Buccal [   ]  Triceps [   ]  Rib [   ]--> Suspect moderate protein-calorie malnutrition 2/2 to physical assessment, suspected inadequate energy intake of <75% for >7 days    Treatment:    The following diet has been recommended:    1. Encourage PO intake; consider addition of Glucerna 1x/day (220kcal, 20g pro) if appetite poor. Recommend MVI  2. Monitor lytes and replete prn. POC BG q6hrs   3. Pain and bowel regimens per team      PROVIDER Section:     By signing this assessment you are acknowledging and agree with the diagnosis/diagnoses assigned by the Registered Dietitian    Comments:

## 2020-02-14 NOTE — PROGRESS NOTE ADULT - PROBLEM SELECTOR PLAN 1
- Pt presenting with generalized ill feeling and malaise, found to be in DKA with urinary retention c/b klebsiella UTI and bacteremia  - On arrival with WBC 26.36 with 92% neutrophils, tachycardia, lactate 4.1  - Lactate cleared, no longer trending  - UA+ with UCx growing klebsiella, f/u sensitivities  - BCx+ klebsiella pneumonia  - s/p 1x zosyn in ED  - Continue CTX 2g q24h (start date 2/13)  - WBC downtrending today  - Rodriguez catheter in place for strict I&O, currently draining red-tinged urine  - Continue to monitor output  - f/u surveillance cx and re-order daily until clear - Pt presenting with generalized ill feeling and malaise, found to be in DKA with urinary retention c/b klebsiella UTI and bacteremia  - On arrival with WBC 26.36 with 92% neutrophils, tachycardia, lactate 4.1  - Lactate cleared, no longer trending  - UA+ with UCx growing klebsiella, f/u sensitivities  - BCx+ klebsiella pneumonia  - s/p 1x zosyn in ED  - Continue CTX 2g q24h (start date 2/13)  - WBC downtrending today  - Rodriguez catheter in place for strict I&O, currently draining red-tinged urine  - Continue to monitor output  - f/u surveillance cx and re-order daily until clear  - ID consulted (Dr. Mcwilliams), f/u recs

## 2020-02-14 NOTE — DIETITIAN INITIAL EVALUATION ADULT. - ENERGY NEEDS
Height 70"; .2#; #; 116%IBW  BMI 27.7  ActualBW used for calculations as pt between % of IBW. Needs estimated based on older age; increased kcal 2/2 suspected malnutrition, UTI. Moderate protein 2/2 CKD

## 2020-02-15 NOTE — PROGRESS NOTE ADULT - SUBJECTIVE AND OBJECTIVE BOX
INTERVAL HPI/OVERNIGHT EVENTS:    SUBJECTIVE: Patient seen and examined at bedside.    VITAL SIGNS:  ICU Vital Signs Last 24 Hrs  T(C): 36.7 (15 Feb 2020 09:07), Max: 36.9 (2020 21:46)  T(F): 98 (15 Feb 2020 09:07), Max: 98.4 (2020 21:46)  HR: 96 (15 Feb 2020 09:07) (78 - 97)  BP: 125/67 (15 Feb 2020 09:07) (97/52 - 125/67)  BP(mean): 81 (2020 18:00) (71 - 81)  ABP: --  ABP(mean): --  RR: 18 (15 Feb 2020 09:07) (16 - 24)  SpO2: 97% (15 Feb 2020 09:07) (97% - 99%)        -14 @ 07:01  -  02-15 @ 07:00  --------------------------------------------------------  IN: 852.5 mL / OUT: 1370 mL / NET: -517.5 mL      CAPILLARY BLOOD GLUCOSE      POCT Blood Glucose.: 338 mg/dL (15 Feb 2020 13:52)      PHYSICAL EXAM:    Constitutional: NAD  HEENT: NC/AT; PERRL, anicteric sclera; MMM  Neck: supple, no JVD  Cardiovascular: +S1/S2, RRR  Respiratory: CTA B/L, no W/R/R  Gastrointestinal: abdomen soft, NT/ND; no rebound or guarding; +BSx4  Genitourinary: no suprapubic tenderness or fullness  Extremities: WWP; no LE edema; no clubbing or cyanosis  Vascular: 2+ radial, DP/PT and femoral pulses B/L  Dermatologic: normal color and turgor; no visible rashes  Neurological:     MEDICATIONS:  MEDICATIONS  (STANDING):  cefTRIAXone   IVPB 2000 milliGRAM(s) IV Intermittent every 24 hours  chlorhexidine 2% Cloths 1 Application(s) Topical <User Schedule>  dextrose 5%. 1000 milliLiter(s) (50 mL/Hr) IV Continuous <Continuous>  dextrose 50% Injectable 12.5 Gram(s) IV Push once  dextrose 50% Injectable 25 Gram(s) IV Push once  dextrose 50% Injectable 25 Gram(s) IV Push once  finasteride 5 milliGRAM(s) Oral daily  heparin  Injectable 5000 Unit(s) SubCutaneous every 8 hours  influenza   Vaccine 0.5 milliLiter(s) IntraMuscular once  insulin glargine Injectable (LANTUS) 15 Unit(s) SubCutaneous every morning  insulin lispro (HumaLOG) corrective regimen sliding scale   SubCutaneous Before meals and at bedtime  insulin lispro Injectable (HumaLOG) 8 Unit(s) SubCutaneous three times a day before meals  insulin NPH human recombinant 8 Unit(s) SubCutaneous once  nystatin    Suspension 273246 Unit(s) Oral four times a day  simvastatin 10 milliGRAM(s) Oral at bedtime  tamsulosin 0.4 milliGRAM(s) Oral at bedtime    MEDICATIONS  (PRN):  acetaminophen   Tablet .. 650 milliGRAM(s) Oral every 6 hours PRN Temp greater or equal to 38C (100.4F)  benzocaine 15 mG/menthol 3.6 mG (Sugar-Free) Lozenge 1 Lozenge Oral two times a day PRN Sore Throat  dextrose 40% Gel 15 Gram(s) Oral once PRN Blood Glucose LESS THAN 70 milliGRAM(s)/deciliter  glucagon  Injectable 1 milliGRAM(s) IntraMuscular once PRN Glucose LESS THAN 70 milligrams/deciliter      ALLERGIES:  Allergies    No Known Allergies    Intolerances        LABS:                        7.8    7.96  )-----------( 129      ( 15 Feb 2020 06:23 )             24.9     02-15    139  |  113<H>  |  94<H>  ----------------------------<  331<H>  4.2   |  11<L>  |  3.38<H>    Ca    8.5      15 Feb 2020 06:23  Phos  4.1     02-15  Mg     1.7     02-15    TPro  6.0  /  Alb  2.6<L>  /  TBili  0.2  /  DBili  x   /  AST  9<L>  /  ALT  18  /  AlkPhos  90  02-14    PTT - ( 2020 14:53 )  PTT:29.3 sec  Urinalysis Basic - ( 2020 17:40 )    Color: Yellow / Appearance: SL Cloudy / S.025 / pH: x  Gluc: x / Ketone: NEGATIVE  / Bili: Negative / Urobili: 0.2 E.U./dL   Blood: x / Protein: 30 mg/dL / Nitrite: POSITIVE   Leuk Esterase: Large / RBC: < 5 /HPF / WBC Many /HPF   Sq Epi: x / Non Sq Epi: 0-5 /HPF / Bacteria: Many /HPF        RADIOLOGY & ADDITIONAL TESTS: Reviewed. INTERVAL HPI/OVERNIGHT EVENTS: Pt. BG elevated to 300s. AM Lantus adjusted.     SUBJECTIVE: Patient seen and examined at bedside.    VITAL SIGNS:  ICU Vital Signs Last 24 Hrs  T(C): 36.7 (15 Feb 2020 09:07), Max: 36.9 (2020 21:46)  T(F): 98 (15 Feb 2020 09:07), Max: 98.4 (2020 21:46)  HR: 96 (15 Feb 2020 09:07) (78 - 97)  BP: 125/67 (15 Feb 2020 09:07) (97/52 - 125/67)  BP(mean): 81 (2020 18:00) (71 - 81)  ABP: --  ABP(mean): --  RR: 18 (15 Feb 2020 09:07) (16 - 24)  SpO2: 97% (15 Feb 2020 09:07) (97% - 99%)        02-14 @ 07:01  -  02-15 @ 07:00  --------------------------------------------------------  IN: 852.5 mL / OUT: 1370 mL / NET: -517.5 mL      CAPILLARY BLOOD GLUCOSE      POCT Blood Glucose.: 338 mg/dL (15 Feb 2020 13:52)      PHYSICAL EXAM:    Constitutional: NAD  HEENT: NC/AT; PERRL, anicteric sclera; MMM, oral thrush noted on tongue  Neck: supple, no JVD  Cardiovascular: +S1/S2, RRR, no m/r/g  Respiratory: CTA B/L, no W/R/R  Gastrointestinal: obese abdomen soft, NT/ND; no rebound or guarding; +BSx4  Genitourinary: no suprapubic tenderness or fullness  Extremities: WWP; no LE edema; no clubbing or cyanosis  Vascular: 2+ radial, DP/PT and femoral pulses B/L  Dermatologic: normal color and turgor; no visible rashes  Neurological: AAOx3; no focal neurological deficits, pt. following commands    MEDICATIONS:  MEDICATIONS  (STANDING):  cefTRIAXone   IVPB 2000 milliGRAM(s) IV Intermittent every 24 hours  chlorhexidine 2% Cloths 1 Application(s) Topical <User Schedule>  dextrose 5%. 1000 milliLiter(s) (50 mL/Hr) IV Continuous <Continuous>  dextrose 50% Injectable 12.5 Gram(s) IV Push once  dextrose 50% Injectable 25 Gram(s) IV Push once  dextrose 50% Injectable 25 Gram(s) IV Push once  finasteride 5 milliGRAM(s) Oral daily  heparin  Injectable 5000 Unit(s) SubCutaneous every 8 hours  influenza   Vaccine 0.5 milliLiter(s) IntraMuscular once  insulin glargine Injectable (LANTUS) 15 Unit(s) SubCutaneous every morning  insulin lispro (HumaLOG) corrective regimen sliding scale   SubCutaneous Before meals and at bedtime  insulin lispro Injectable (HumaLOG) 8 Unit(s) SubCutaneous three times a day before meals  insulin NPH human recombinant 8 Unit(s) SubCutaneous once  nystatin    Suspension 384239 Unit(s) Oral four times a day  simvastatin 10 milliGRAM(s) Oral at bedtime  tamsulosin 0.4 milliGRAM(s) Oral at bedtime    MEDICATIONS  (PRN):  acetaminophen   Tablet .. 650 milliGRAM(s) Oral every 6 hours PRN Temp greater or equal to 38C (100.4F)  benzocaine 15 mG/menthol 3.6 mG (Sugar-Free) Lozenge 1 Lozenge Oral two times a day PRN Sore Throat  dextrose 40% Gel 15 Gram(s) Oral once PRN Blood Glucose LESS THAN 70 milliGRAM(s)/deciliter  glucagon  Injectable 1 milliGRAM(s) IntraMuscular once PRN Glucose LESS THAN 70 milligrams/deciliter      ALLERGIES:  Allergies    No Known Allergies    Intolerances        LABS:                        7.8    7.96  )-----------( 129      ( 15 Feb 2020 06:23 )             24.9     02-15    139  |  113<H>  |  94<H>  ----------------------------<  331<H>  4.2   |  11<L>  |  3.38<H>    Ca    8.5      15 Feb 2020 06:23  Phos  4.1     02-15  Mg     1.7     02-15    TPro  6.0  /  Alb  2.6<L>  /  TBili  0.2  /  DBili  x   /  AST  9<L>  /  ALT  18  /  AlkPhos  90  02-14    PTT - ( 2020 14:53 )  PTT:29.3 sec  Urinalysis Basic - ( 2020 17:40 )    Color: Yellow / Appearance: SL Cloudy / S.025 / pH: x  Gluc: x / Ketone: NEGATIVE  / Bili: Negative / Urobili: 0.2 E.U./dL   Blood: x / Protein: 30 mg/dL / Nitrite: POSITIVE   Leuk Esterase: Large / RBC: < 5 /HPF / WBC Many /HPF   Sq Epi: x / Non Sq Epi: 0-5 /HPF / Bacteria: Many /HPF        RADIOLOGY & ADDITIONAL TESTS: Reviewed.

## 2020-02-15 NOTE — PROGRESS NOTE ADULT - SUBJECTIVE AND OBJECTIVE BOX
INTERVAL HPI/OVERNIGHT EVENTS: No new complains    CONSTITUTIONAL:  Negative fever or chills, feels better  EYES:  Negative  blurry vision or double vision  CARDIOVASCULAR:  Negative for chest pain or palpitations  RESPIRATORY:  Negative for cough, wheezing, or SOB   GASTROINTESTINAL:  Negative for nausea, vomiting, diarrhea, constipation, or abdominal pain  GENITOURINARY: Palacio in place  NEUROLOGIC:  No headache, confusion, dizziness, lightheadedness      ANTIBIOTICS/RELEVANT:    MEDICATIONS  (STANDING):  cefTRIAXone   IVPB 2000 milliGRAM(s) IV Intermittent every 24 hours  chlorhexidine 2% Cloths 1 Application(s) Topical <User Schedule>  dextrose 5%. 1000 milliLiter(s) (50 mL/Hr) IV Continuous <Continuous>  dextrose 50% Injectable 12.5 Gram(s) IV Push once  dextrose 50% Injectable 25 Gram(s) IV Push once  dextrose 50% Injectable 25 Gram(s) IV Push once  finasteride 5 milliGRAM(s) Oral daily  heparin  Injectable 5000 Unit(s) SubCutaneous every 8 hours  influenza   Vaccine 0.5 milliLiter(s) IntraMuscular once  insulin glargine Injectable (LANTUS) 10 Unit(s) SubCutaneous every morning  insulin lispro (HumaLOG) corrective regimen sliding scale   SubCutaneous Before meals and at bedtime  insulin lispro Injectable (HumaLOG) 6 Unit(s) SubCutaneous three times a day before meals  magnesium sulfate  IVPB 2 Gram(s) IV Intermittent once  nystatin    Suspension 168144 Unit(s) Oral four times a day  simvastatin 10 milliGRAM(s) Oral at bedtime  tamsulosin 0.4 milliGRAM(s) Oral at bedtime    MEDICATIONS  (PRN):  acetaminophen   Tablet .. 650 milliGRAM(s) Oral every 6 hours PRN Temp greater or equal to 38C (100.4F)  benzocaine 15 mG/menthol 3.6 mG (Sugar-Free) Lozenge 1 Lozenge Oral two times a day PRN Sore Throat  dextrose 40% Gel 15 Gram(s) Oral once PRN Blood Glucose LESS THAN 70 milliGRAM(s)/deciliter  glucagon  Injectable 1 milliGRAM(s) IntraMuscular once PRN Glucose LESS THAN 70 milligrams/deciliter        Vital Signs Last 24 Hrs  T(C): 36.7 (15 Feb 2020 09:07), Max: 36.9 (2020 21:46)  T(F): 98 (15 Feb 2020 09:07), Max: 98.4 (2020 21:46)  HR: 96 (15 Feb 2020 09:07) (78 - 97)  BP: 125/67 (15 Feb 2020 09:07) (84/47 - 125/67)  BP(mean): 81 (2020 18:00) (61 - 81)  RR: 18 (15 Feb 2020 09:07) (16 - 25)  SpO2: 97% (15 Feb 2020 09:07) (97% - 99%)    20 @ 07:01  -  02-15-20 @ 07:00  --------------------------------------------------------  IN: 852.5 mL / OUT: 1370 mL / NET: -517.5 mL      PHYSICAL EXAM:  Constitutional: WDWN resting comfortably in bed; appears fatigued  Head: NC/AT; mild pallor  Eyes: PERRL, EOMI, anicteric sclera  ENT: no nasal discharge; MMM  Neck: supple; no JVD  Respiratory: CTA B/L; no W/R/R, no retractions; speaking in full sentences without difficulty, soft voice and intermittently slow to respond but responds appropriately  Cardiac: +S1/S2; RRR; no M/R/G   Gastrointestinal: soft, NT/ND; no rebound or guarding; +BSx4  Genitourinary: palacio catheter in place, draining red-tinged urine 100cc in bag  Extremities: WWP, no clubbing or cyanosis; no peripheral edema; mildly pale  Musculoskeletal: moving all extremities spontaneously  Vascular: 2+ radial, PT pulses B/L  Dermatologic: skin warm, dry and intact; no rashes, wounds, or scars  Neurologic: AAOx3; CNII-XII grossly intact; no focal deficits; mm strength 5/5 UE LE BL; sensation intact UE LE BL    LABS:                        7.8    7.96  )-----------( 129      ( 15 Feb 2020 06:23 )             24.9     02-15    139  |  113<H>  |  94<H>  ----------------------------<  331<H>  4.2   |  11<L>  |  3.38<H>    Ca    8.5      15 Feb 2020 06:23  Phos  4.1     02-15  Mg     1.7     02-15    TPro  6.0  /  Alb  2.6<L>  /  TBili  0.2  /  DBili  x   /  AST  9<L>  /  ALT  18  /  AlkPhos  90      PTT - ( 2020 14:53 )  PTT:29.3 sec  Urinalysis Basic - ( 2020 17:40 )    Color: Yellow / Appearance: SL Cloudy / S.025 / pH: x  Gluc: x / Ketone: NEGATIVE  / Bili: Negative / Urobili: 0.2 E.U./dL   Blood: x / Protein: 30 mg/dL / Nitrite: POSITIVE   Leuk Esterase: Large / RBC: < 5 /HPF / WBC Many /HPF   Sq Epi: x / Non Sq Epi: 0-5 /HPF / Bacteria: Many /HPF        MICROBIOLOGY:  Culture - Urine (20 @ 21:49)    Specimen Source: .Urine Clean Catch (Midstream)    Culture Results:   >100,000 CFU/ml Klebsiella variicola  Susceptibility to follow.    Culture - Blood (20 @ 20:23)    Gram Stain:   Aerobic Bottle: Gram Negative Rods  Floor previously notified.    Specimen Source: .Blood Blood    Culture Results:   Growth in aerobic bottle: Gram Negative Rods  Identification and susceptibility to follow.    Culture - Blood (20 @ 20:23)    -  Klebsiella pneumoniae: Detec    -  Multidrug (KPC pos) resistant organism: Nondet    Gram Stain:   Anaerobic Bottle: Gram Negative Rods  Result called to and read back by_ Carlie RN  2020 11:38:11  Aerobic Bottle: Gram Negative Rods  Result called to and read back by_ LEIF Yancey RN  02/15/2020 09:19:30    Specimen Source: .Blood Blood    Organism: Blood Culture PCR    Culture Results:   Culture in progress    Organism Identification: Blood Culture PCR    Method Type: PCR        RADIOLOGY & ADDITIONAL STUDIES:

## 2020-02-15 NOTE — PHYSICAL THERAPY INITIAL EVALUATION ADULT - CRITERIA FOR SKILLED THERAPEUTIC INTERVENTIONS
risk reduction/prevention/impairments found/therapy frequency/functional limitations in following categories/rehab potential/anticipated discharge recommendation

## 2020-02-15 NOTE — PHYSICAL THERAPY INITIAL EVALUATION ADULT - PERTINENT HX OF CURRENT PROBLEM, REHAB EVAL
75 yo male with a hx of DM, HTN, HLD, iron deficiency anemia, CKD 4 who was brought in by his home health aide for feeling generally unwell and week for the past 2 weeks since his discharge from [facility] for symptomatic anemia. He has felt weakness, malaise and fatigue.

## 2020-02-15 NOTE — PROGRESS NOTE ADULT - ASSESSMENT
Pt is a 73 yo M with PMH DM, HTN, HLD, BPH (chronic incont/ret), SERA, CKD4, and a-fib (s/p ablation 2011) who presented to [facility] for general feelings of illness, weakness, and fatigue x2 wks. Initially admitted to ICU for DKA with klebsiella UTI and bacteremia, now stepped down to RMF for further observation and management.

## 2020-02-15 NOTE — PHYSICAL THERAPY INITIAL EVALUATION ADULT - GENERAL OBSERVATIONS, REHAB EVAL
Received semi-Qiu's position in bed with +hep lock, on room air, in no apparent distress. Patient appears to be resting comfortably in bed

## 2020-02-15 NOTE — PROGRESS NOTE ADULT - ASSESSMENT
Pt is a 73 yo M with PMH DM, HTN, HLD, BPH (chronic incont/ret), SERA, CKD4, and a-fib (s/p ablation 2011) who presented to [facility] for general feelings of illness, weakness, and fatigue x2 wks. Initially admitted to ICU for DKA with klebsiella UTI and bacteremia, now stepped down to RMF for further observation and management.

## 2020-02-15 NOTE — PROGRESS NOTE ADULT - PROBLEM SELECTOR PLAN 5
- Pt presenting with general feelings of illness and malaise  - Likely in the setting of azotemia, decreased PO intake, and electrolyte abnormalities  - Continue to monitor  - Daily BMP  - PT consult  - no deficits on exam  - Per Dr. Roberts - pt started on Megace 40mg TID to stimulate appetite - Pt presenting with general feelings of illness and malaise  - Likely in the setting of azotemia, decreased PO intake, and electrolyte abnormalities  - Continue to monitor  - Daily BMP  - PT consult  - no deficits on exam  - Per Dr. Roberts - pt started on Megace 40mg TID (changed by pharmacy to 400mg daily) to stimulate appetite

## 2020-02-15 NOTE — PROGRESS NOTE ADULT - PROBLEM SELECTOR PLAN 2
- As above  - No recent instrumentation or risk factors for resistant  organisms  - Likely provoked in the setting of BPH with chronic urinary incontinence vs. retention  - Rodriguez catheter in place, monitor I&O  - Currently with hematuria - red tinged urine, not ash blood  - Continue to monitor  - Urology following, f/u recs  - ID consulted (Dr. Mcwilliams), f/u recs

## 2020-02-15 NOTE — PROGRESS NOTE ADULT - PROBLEM SELECTOR PLAN 1
- Pt presenting with generalized ill feeling and malaise, found to be in DKA with urinary retention c/b klebsiella UTI and bacteremia  - On arrival with WBC 26.36 with 92% neutrophils, tachycardia, lactate 4.1  - Lactate cleared, no longer trending  - UA+ with UCx growing klebsiella, f/u sensitivities  - BCx+ klebsiella pneumonia  - s/p 1x zosyn in ED  - Continue CTX 2g q24h (start date 2/13)  - WBC downtrending today  - Rodriguez catheter in place for strict I&O, currently draining red-tinged urine  - Continue to monitor output  - f/u surveillance cx and re-order daily until clear  - ID consulted (Dr. Mcwilliams), f/u recs

## 2020-02-15 NOTE — PROGRESS NOTE ADULT - SUBJECTIVE AND OBJECTIVE BOX
INTERVAL HPI/OVERNIGHT EVENTS:    Patient seen at bedside. Blood sugars under poor control . He was thirsty this morning, was given and drank 2 cups of apple juice this morning. C/o sore throat.     FSG & Insulin received:  Yesterday:  pre-dinner fs  nutritional lispro 0  units + 6  units lispro SS  bedtime fs  lantus  0 units + 8   units lispro SS    Today:  pre-breakfast fs   nutritional lispro   units+    units lispro SS  pre-lunch fsg:  nutritional lispro   units+   units lispro SS    Pt reports the following symptoms:    CONSTITUTIONAL:  Negative fever or chills, feels well, poor appetite  EYES:  Negative  blurry vision or double vision  CARDIOVASCULAR:  Negative for chest pain or palpitations  RESPIRATORY:  Negative for cough, wheezing, or SOB   GASTROINTESTINAL:  Negative for nausea, vomiting, diarrhea, constipation, or abdominal pain  GENITOURINARY:  Negative frequency, urgency or dysuria  NEUROLOGIC:  No headache, confusion, dizziness, lightheadedness    MEDICATIONS  (STANDING):  cefTRIAXone   IVPB 2000 milliGRAM(s) IV Intermittent every 24 hours  chlorhexidine 2% Cloths 1 Application(s) Topical <User Schedule>  dextrose 5%. 1000 milliLiter(s) (50 mL/Hr) IV Continuous <Continuous>  dextrose 50% Injectable 12.5 Gram(s) IV Push once  dextrose 50% Injectable 25 Gram(s) IV Push once  dextrose 50% Injectable 25 Gram(s) IV Push once  finasteride 5 milliGRAM(s) Oral daily  heparin  Injectable 5000 Unit(s) SubCutaneous every 8 hours  influenza   Vaccine 0.5 milliLiter(s) IntraMuscular once  insulin glargine Injectable (LANTUS) 15 Unit(s) SubCutaneous every morning  insulin lispro (HumaLOG) corrective regimen sliding scale   SubCutaneous Before meals and at bedtime  insulin lispro Injectable (HumaLOG) 8 Unit(s) SubCutaneous three times a day before meals  magnesium sulfate  IVPB 2 Gram(s) IV Intermittent once  nystatin    Suspension 364650 Unit(s) Oral four times a day  simvastatin 10 milliGRAM(s) Oral at bedtime  tamsulosin 0.4 milliGRAM(s) Oral at bedtime    MEDICATIONS  (PRN):  acetaminophen   Tablet .. 650 milliGRAM(s) Oral every 6 hours PRN Temp greater or equal to 38C (100.4F)  benzocaine 15 mG/menthol 3.6 mG (Sugar-Free) Lozenge 1 Lozenge Oral two times a day PRN Sore Throat  dextrose 40% Gel 15 Gram(s) Oral once PRN Blood Glucose LESS THAN 70 milliGRAM(s)/deciliter  glucagon  Injectable 1 milliGRAM(s) IntraMuscular once PRN Glucose LESS THAN 70 milligrams/deciliter      PHYSICAL EXAM  Vital Signs Last 24 Hrs  T(C): 36.7 (15 Feb 2020 09:07), Max: 36.9 (2020 21:46)  T(F): 98 (15 Feb 2020 09:07), Max: 98.4 (2020 21:46)  HR: 96 (15 Feb 2020 09:07) (78 - 97)  BP: 125/67 (15 Feb 2020 09:07) (89/51 - 125/67)  BP(mean): 81 (2020 18:00) (66 - 81)  RR: 18 (15 Feb 2020 09:07) (16 - 25)  SpO2: 97% (15 Feb 2020 09:07) (97% - 99%)    Constitutional: wn/wd in NAD.   HEENT: NCAT, MMM, OP clear, EOMI, no proptosis or lid retraction  Neck: no thyromegaly or palpable thyroid nodules   Respiratory: lungs CTAB.  Cardiovascular: regular rhythm, normal S1 and S2, no audible murmurs, no peripheral edema  GI: soft, NT/ND, no masses/HSM appreciated.  Neurology: no tremors, DTR 2+  Skin: no visible rashes/lesions  Psychiatric: AAO x 3, normal affect/mood.    LABS:                        7.8    7.96  )-----------( 129      ( 15 Feb 2020 06:23 )             24.9     02-15    139  |  113<H>  |  94<H>  ----------------------------<  331<H>  4.2   |  11<L>  |  3.38<H>    Ca    8.5      15 Feb 2020 06:23  Phos  4.1     02-15  Mg     1.7     02-15    TPro  6.0  /  Alb  2.6<L>  /  TBili  0.2  /  DBili  x   /  AST  9<L>  /  ALT  18  /  AlkPhos  90  -    PTT - ( 2020 14:53 )  PTT:29.3 sec  Urinalysis Basic - ( 2020 17:40 )    Color: Yellow / Appearance: SL Cloudy / S.025 / pH: x  Gluc: x / Ketone: NEGATIVE  / Bili: Negative / Urobili: 0.2 E.U./dL   Blood: x / Protein: 30 mg/dL / Nitrite: POSITIVE   Leuk Esterase: Large / RBC: < 5 /HPF / WBC Many /HPF   Sq Epi: x / Non Sq Epi: 0-5 /HPF / Bacteria: Many /HPF      Thyroid Stimulating Hormone, Serum: 0.532 uIU/mL (02-15 @ 06:23)      HbA1C: 8.4 % ( @ 06:22)    CAPILLARY BLOOD GLUCOSE      POCT Blood Glucose.: 330 mg/dL (15 Feb 2020 08:39)  POCT Blood Glucose.: 322 mg/dL (2020 21:56)  POCT Blood Glucose.: 263 mg/dL (2020 16:12)        A/P: 74y Male with history of DM type II presenting for       1.  DM -     Please continue           units lantus at bedtime  / in the morning   Continue       units lispro with meals   Cotninue lispro moderate / low dose sliding scale 4 times daily with meals and at bedtime.    Please continue consistent carbohydrate diet.    Goal FSG is   Will continue to monitor   For discharge, TBD    Pt can follow up at discharge with [facility] by calling  to make an appointment.   Will discuss case with     and update primary team    [facility] (located on 1st floor next to admitting)  P: [phone]  Hours: M – F 8AM – 8PM, Sat 8AM – 4PM, Sun—closed  If not using [facility], please follow up with chosen pharmacy to ensure insulin prescribed is covered. INTERVAL HPI/OVERNIGHT EVENTS:    Patient seen at bedside. Blood sugars under poor control . He was thirsty this morning, was given and drank 2 cups of apple juice this morning. C/o sore throat. Notes nausea slightly better today.     FSG & Insulin received:  Yesterday:  pre-dinner fs  nutritional lispro 0  units + 6  units lispro SS  bedtime fs  lantus  0 units + 8   units lispro SS    Today:  pre-breakfast fs   nutritional lispro   units+    units lispro SS  pre-lunch fsg:  nutritional lispro   units+   units lispro SS    Pt reports the following symptoms:    CONSTITUTIONAL:  Negative fever or chills, feels well, poor appetite  EYES:  Negative  blurry vision or double vision  CARDIOVASCULAR:  Negative for chest pain or palpitations  RESPIRATORY:  Negative for cough, wheezing, or SOB   GASTROINTESTINAL:  Negative for nausea, vomiting, diarrhea, constipation, or abdominal pain  GENITOURINARY:  Negative frequency, urgency or dysuria  NEUROLOGIC:  No headache, confusion, dizziness, lightheadedness    MEDICATIONS  (STANDING):  cefTRIAXone   IVPB 2000 milliGRAM(s) IV Intermittent every 24 hours  chlorhexidine 2% Cloths 1 Application(s) Topical <User Schedule>  dextrose 5%. 1000 milliLiter(s) (50 mL/Hr) IV Continuous <Continuous>  dextrose 50% Injectable 12.5 Gram(s) IV Push once  dextrose 50% Injectable 25 Gram(s) IV Push once  dextrose 50% Injectable 25 Gram(s) IV Push once  finasteride 5 milliGRAM(s) Oral daily  heparin  Injectable 5000 Unit(s) SubCutaneous every 8 hours  influenza   Vaccine 0.5 milliLiter(s) IntraMuscular once  insulin glargine Injectable (LANTUS) 15 Unit(s) SubCutaneous every morning  insulin lispro (HumaLOG) corrective regimen sliding scale   SubCutaneous Before meals and at bedtime  insulin lispro Injectable (HumaLOG) 8 Unit(s) SubCutaneous three times a day before meals  magnesium sulfate  IVPB 2 Gram(s) IV Intermittent once  nystatin    Suspension 348552 Unit(s) Oral four times a day  simvastatin 10 milliGRAM(s) Oral at bedtime  tamsulosin 0.4 milliGRAM(s) Oral at bedtime    MEDICATIONS  (PRN):  acetaminophen   Tablet .. 650 milliGRAM(s) Oral every 6 hours PRN Temp greater or equal to 38C (100.4F)  benzocaine 15 mG/menthol 3.6 mG (Sugar-Free) Lozenge 1 Lozenge Oral two times a day PRN Sore Throat  dextrose 40% Gel 15 Gram(s) Oral once PRN Blood Glucose LESS THAN 70 milliGRAM(s)/deciliter  glucagon  Injectable 1 milliGRAM(s) IntraMuscular once PRN Glucose LESS THAN 70 milligrams/deciliter      PHYSICAL EXAM  Vital Signs Last 24 Hrs  T(C): 36.7 (15 Feb 2020 09:07), Max: 36.9 (2020 21:46)  T(F): 98 (15 Feb 2020 09:07), Max: 98.4 (2020 21:46)  HR: 96 (15 Feb 2020 09:07) (78 - 97)  BP: 125/67 (15 Feb 2020 09:07) (89/51 - 125/67)  BP(mean): 81 (2020 18:00) (66 - 81)  RR: 18 (15 Feb 2020 09:07) (16 - 25)  SpO2: 97% (15 Feb 2020 09:07) (97% - 99%)    Constitutional: wn/wd in NAD.   HEENT: NCAT, MMM, OP clear, EOMI, no proptosis or lid retraction  Neck: no thyromegaly or palpable thyroid nodules   Respiratory: lungs CTAB.  Cardiovascular: regular rhythm, normal S1 and S2, no audible murmurs, no peripheral edema  GI: soft, NT/ND, no masses/HSM appreciated.  Neurology: no tremors, DTR 2+  Skin: no visible rashes/lesions  Psychiatric: AAO x 3, normal affect/mood.  +palacio    LABS:                        7.8    7.96  )-----------( 129      ( 15 Feb 2020 06:23 )             24.9     02-15    139  |  113<H>  |  94<H>  ----------------------------<  331<H>  4.2   |  11<L>  |  3.38<H>    Ca    8.5      15 Feb 2020 06:23  Phos  4.1     02-15  Mg     1.7     02-15    TPro  6.0  /  Alb  2.6<L>  /  TBili  0.2  /  DBili  x   /  AST  9<L>  /  ALT  18  /  AlkPhos  90  -    PTT - ( 2020 14:53 )  PTT:29.3 sec  Urinalysis Basic - ( 2020 17:40 )    Color: Yellow / Appearance: SL Cloudy / S.025 / pH: x  Gluc: x / Ketone: NEGATIVE  / Bili: Negative / Urobili: 0.2 E.U./dL   Blood: x / Protein: 30 mg/dL / Nitrite: POSITIVE   Leuk Esterase: Large / RBC: < 5 /HPF / WBC Many /HPF   Sq Epi: x / Non Sq Epi: 0-5 /HPF / Bacteria: Many /HPF      Thyroid Stimulating Hormone, Serum: 0.532 uIU/mL (02-15 @ 06:23)      HbA1C: 8.4 % ( @ 06:22)    CAPILLARY BLOOD GLUCOSE      POCT Blood Glucose.: 330 mg/dL (15 Feb 2020 08:39)  POCT Blood Glucose.: 322 mg/dL (2020 21:56)  POCT Blood Glucose.: 263 mg/dL (2020 16:12)      A/P:73 yo male with hx of uncontrolled DM II, BPH found to have poly factorial anion gap metabolic acidosis, mild DKA and urinary retention with concurrent urinary tract infection.    1.  DM - type 2, uncontrolled, complicated  A1C: 8.4%  Weight: 87kg/BMI 28  Cr/GRF: 4.4 (baseline in 2s)    Please increase lantus to 15 units in morning.    Please increase lispro to 6 units before each meal.    Please continue lispro moderate  dose sliding scale four times daily with meals and at bedtime  Pt's fingerstick glucose goal is 100-180  Will continue to monitor     2. Low TSH, Ft4 0.54   -Please repeat TSH tomorrow morning     Pt can follow up at discharge with [facility] by calling  to make an appointment.   Will discuss case with Dr. Walter and update primary team    [facility] (located on 1st floor next to admitting)  P: [phone]  Hours: M – F 8AM – 8PM, Sat 8AM – 4PM, Sun—closed  If not using [facility], please follow up with chosen pharmacy to ensure insulin prescribed is covered.

## 2020-02-15 NOTE — PHYSICAL THERAPY INITIAL EVALUATION ADULT - ADDITIONAL COMMENTS
Patient lives in an elevator apartment with no steps to enter. Prior to admission, patient was independent for all functional mobility and ADLs without assistive device.

## 2020-02-15 NOTE — PROGRESS NOTE ADULT - PROBLEM SELECTOR PLAN 6
- Pt with known hx BPH  - Continue home med finasteride 5mg daily  - Initiated tamsulosin 0.4mg daily  - Monitor I&O via palacio catheter  - Per urology, will likely go home with the palacio  - PSA: 0.9

## 2020-02-15 NOTE — PROGRESS NOTE ADULT - SUBJECTIVE AND OBJECTIVE BOX
Seen and examined bedside. Resting comfortably in bed.  Denies f/c/n/v.   Does not have urologist but on finasteride 5mg and flomax 0.4mg.   Past CT revealed bladder >1L urine and b/l hydronephrosis.  Currently treated for UTI and bacteremia.  No CVAT, palacio with clear yellow urine.  Monitor for POD.  Will need to be discharged with palacio.  Will likely need bladder outlet procedure.  c/w abx, flomax 0.4mg, finasteride 5mg.  Can have RP/bladder u/s in 48hrs  d/w Dr. Adams    Vital Signs Last 24 Hrs  T(C): 36.7 (15 Feb 2020 09:07), Max: 36.9 (14 Feb 2020 21:46)  T(F): 98 (15 Feb 2020 09:07), Max: 98.4 (14 Feb 2020 21:46)  HR: 96 (15 Feb 2020 09:07) (78 - 97)  BP: 125/67 (15 Feb 2020 09:07) (84/47 - 125/67)  BP(mean): 81 (14 Feb 2020 18:00) (61 - 81)  RR: 18 (15 Feb 2020 09:07) (16 - 25)  SpO2: 97% (15 Feb 2020 09:07) (97% - 99%)                          7.8    7.96  )-----------( 129      ( 15 Feb 2020 06:23 )             24.9   15 Feb 2020 06:23    139    |  113    |  94     ----------------------------<  331    4.2     |  11     |  3.38     Ca    8.5        15 Feb 2020 06:23  Phos  4.1       15 Feb 2020 06:23  Mg     1.7       15 Feb 2020 06:23    TPro  6.0    /  Alb  2.6    /  TBili  0.2    /  DBili  x      /  AST  9      /  ALT  18     /  AlkPhos  90     14 Feb 2020 05:28  PTT - ( 13 Feb 2020 14:53 )  PTT:29.3 sec

## 2020-02-16 NOTE — PROGRESS NOTE ADULT - SUBJECTIVE AND OBJECTIVE BOX
INTERVAL HPI/OVERNIGHT EVENTS:    Patient is a 74y old  Male who presents with a chief complaint of DKA (15 Feb 2020 14:20)      Pt reports the following symptoms:    CONSTITUTIONAL:  Negative fever or chills, feels well, good appetite  EYES:  Negative  blurry vision or double vision  CARDIOVASCULAR:  Negative for chest pain or palpitations  RESPIRATORY:  Negative for cough, wheezing, or SOB   GASTROINTESTINAL:  Negative for nausea, vomiting, diarrhea, constipation, or abdominal pain  GENITOURINARY:  Negative frequency, urgency or dysuria  NEUROLOGIC:  No headache, confusion, dizziness, lightheadedness    MEDICATIONS  (STANDING):  cefTRIAXone   IVPB 2000 milliGRAM(s) IV Intermittent every 24 hours  chlorhexidine 2% Cloths 1 Application(s) Topical <User Schedule>  dextrose 5%. 1000 milliLiter(s) (50 mL/Hr) IV Continuous <Continuous>  dextrose 50% Injectable 12.5 Gram(s) IV Push once  dextrose 50% Injectable 25 Gram(s) IV Push once  dextrose 50% Injectable 25 Gram(s) IV Push once  finasteride 5 milliGRAM(s) Oral daily  heparin  Injectable 5000 Unit(s) SubCutaneous every 8 hours  influenza   Vaccine 0.5 milliLiter(s) IntraMuscular once  insulin glargine Injectable (LANTUS) 22 Unit(s) SubCutaneous every morning  insulin lispro (HumaLOG) corrective regimen sliding scale   SubCutaneous Before meals and at bedtime  insulin lispro Injectable (HumaLOG) 8 Unit(s) SubCutaneous three times a day before meals  insulin NPH human recombinant 10 Unit(s) SubCutaneous once  megestrol Suspension 400 milliGRAM(s) Oral daily  nystatin    Suspension 733200 Unit(s) Oral four times a day  simvastatin 10 milliGRAM(s) Oral at bedtime  tamsulosin 0.4 milliGRAM(s) Oral at bedtime    MEDICATIONS  (PRN):  acetaminophen   Tablet .. 650 milliGRAM(s) Oral every 6 hours PRN Temp greater or equal to 38C (100.4F)  benzocaine 15 mG/menthol 3.6 mG (Sugar-Free) Lozenge 1 Lozenge Oral two times a day PRN Sore Throat  dextrose 40% Gel 15 Gram(s) Oral once PRN Blood Glucose LESS THAN 70 milliGRAM(s)/deciliter  glucagon  Injectable 1 milliGRAM(s) IntraMuscular once PRN Glucose LESS THAN 70 milligrams/deciliter      PHYSICAL EXAM  Vital Signs Last 24 Hrs  T(C): 36.6 (16 Feb 2020 05:52), Max: 37.2 (15 Feb 2020 21:39)  T(F): 97.9 (16 Feb 2020 05:52), Max: 98.9 (15 Feb 2020 21:39)  HR: 98 (16 Feb 2020 05:52) (90 - 98)  BP: 121/70 (16 Feb 2020 05:52) (110/66 - 131/74)  BP(mean): --  RR: 17 (16 Feb 2020 05:52) (17 - 18)  SpO2: 98% (16 Feb 2020 05:52) (97% - 99%)    Constitutional: wn/wd in NAD.   HEENT: NCAT, MMM, OP clear, EOMI, no proptosis or lid retraction  Neck: no thyromegaly or palpable thyroid nodules   Respiratory: lungs CTAB.  Cardiovascular: regular rhythm, normal S1 and S2, no audible murmurs, no peripheral edema  GI: soft, NT/ND, no masses/HSM appreciated.  Neurology: no tremors, DTR 2+  Skin: no visible rashes/lesions  Psychiatric: AAO x 3, normal affect/mood.    LABS:                        8.3    7.65  )-----------( 127      ( 16 Feb 2020 06:32 )             25.6     02-16    142  |  115<H>  |  87<H>  ----------------------------<  354<H>  4.0   |  12<L>  |  2.93<H>    Ca    9.0      16 Feb 2020 06:32  Phos  4.1     02-15  Mg     2.1     02-16          Thyroid Stimulating Hormone, Serum: 0.532 uIU/mL (02-15 @ 06:23)      HbA1C: 8.4 % (02-05 @ 06:22)    CAPILLARY BLOOD GLUCOSE      POCT Blood Glucose.: 344 mg/dL (15 Feb 2020 22:38)  POCT Blood Glucose.: 346 mg/dL (15 Feb 2020 16:55)  POCT Blood Glucose.: 338 mg/dL (15 Feb 2020 13:52)  POCT Blood Glucose.: 368 mg/dL (15 Feb 2020 11:41)  POCT Blood Glucose.: 330 mg/dL (15 Feb 2020 08:39)        A/P: 74y Male with history of DM type II presenting for       1.  DM -     Please continue           units lantus at bedtime  / in the morning   Continue       units lispro with meals   Cotninue lispro moderate / low dose sliding scale 4 times daily with meals and at bedtime.    Please continue consistent carbohydrate diet.    Goal FSG is   Will continue to monitor   For discharge, TBD    Pt can follow up at discharge with [facility] by calling  to make an appointment.   Will discuss case with     and update primary team    REMINDERS FOR INSULIN/DIABETES SUPPLIES at DISCHARGE:  INSULIN:   Long actin/Basal Insulin: Examples: Toujeo, Basaglar, Tresiba, Lantus   Short acting/Bolus Insulin: Humalog, Admelog, Novolog  Please ensure that BOTH short acting and long acting insulin are prescribed in the same preparation (Ex: PEN vs VIAL/SOLUTION)     TESTING SUPPLIES:   All glucometer supplies should be written as generic to avoid issues with insurance. Use the free text option in [employer] prescription writer, and type in glucometer test strips, lancets, etc to order.    If sending patient home on insulin PEN, please send:   •	BD fili insulin pen needles for use up to 4 times daily (total quantity 100)  •	Lancets for use up to 4 times daily (total quantity 100)  •	Glucometer Test strips for use up to 4 times daily (total quantity 100)  •	Alcohol swabs for use up to 4 times daily (total quantity 100)  •	Glucometer (If provided by hospital, still provide scripts for lancets, test strips, and swabs)  If sending patient home on insulin VIAL, please send:   •	Insulin syringes (6mm) - for use up to 4 times daily (total quantity 100)  •	Lancets for use up to 4 times daily (total quantity 100)  •	Generic Glucometer Test strips for use up to 4 times daily (total quantity 100)  •	Alcohol swabs for use up to 4 times daily (total quantity 100)  •	Generic Glucometer (If provided by hospital, still provide scripts for lancets, test strips, and swabs)  •	Do not specify brand for testing supplies (such as contour, freestyle, one touch etc) that way the pharmacy has the freedom to pick and change according to what the insurance dictates.  For patients without insurance:   •	Provide social work with appropriate scripts so they may obtain 1 week of samples  •	Provide with glucometer. Glucometers are located at various nursing stations, the nursing office, education, and endocrine fellows office.  •	Please make appointment with Chaya Nair NP or Staci Villafuerte RN and NELA Vergara at the [address] [place] [address] endocrinology clinic. They can see patients without insurance, provide appropriate samples, and assist in getting insurance coverage.     PREFERRED PHARMACY:  [facility] (located on 1st floor next to admitting)  P: [phone]  Hours: M – F 8AM – 8PM, Sat 8AM – 4PM, Sun—closed  If not using [facility], please follow up with chosen pharmacy to ensure insulin prescribed is covered. INTERVAL HPI/OVERNIGHT EVENTS:    Patient seen at bedside, no appetite. He was started on megace 400mg suspension once daily. Received 2 doses so far. Blood glucoses severely elevated despite no appetite. Ate 1 banana this morning which the RN fed him. Pt received another 10 units NPH this morning. Lantus dose increased to 22 units this morning.    FSG & Insulin received:  Yesterday:  pre-dinner fs  8 NPH + 8  units lispro SS  bedtime fs  +  8  units lispro SS    Today:  pre-breakfast fs units lantus + 10 NPH + nutritional lispro 8  units +  10  units lispro SS  pre-lunch fs  nutritional lispro 8  units+ 10  units lispro SS    Pt reports the following symptoms:  CONSTITUTIONAL:  Negative fever or chills, feels well, poor appetite  CARDIOVASCULAR:  Negative for chest pain or palpitations  RESPIRATORY:  Negative for cough, wheezing, or SOB   GASTROINTESTINAL:  Negative for nausea, vomiting    MEDICATIONS  (STANDING):  cefTRIAXone   IVPB 2000 milliGRAM(s) IV Intermittent every 24 hours  chlorhexidine 2% Cloths 1 Application(s) Topical <User Schedule>  dextrose 5%. 1000 milliLiter(s) (50 mL/Hr) IV Continuous <Continuous>  dextrose 50% Injectable 12.5 Gram(s) IV Push once  dextrose 50% Injectable 25 Gram(s) IV Push once  dextrose 50% Injectable 25 Gram(s) IV Push once  finasteride 5 milliGRAM(s) Oral daily  heparin  Injectable 5000 Unit(s) SubCutaneous every 8 hours  influenza   Vaccine 0.5 milliLiter(s) IntraMuscular once  insulin glargine Injectable (LANTUS) 22 Unit(s) SubCutaneous every morning  insulin lispro (HumaLOG) corrective regimen sliding scale   SubCutaneous Before meals and at bedtime  insulin lispro Injectable (HumaLOG) 8 Unit(s) SubCutaneous three times a day before meals  insulin NPH human recombinant 10 Unit(s) SubCutaneous once  megestrol Suspension 400 milliGRAM(s) Oral daily  nystatin    Suspension [id number] Unit(s) Oral four times a day  simvastatin 10 milliGRAM(s) Oral at bedtime  tamsulosin 0.4 milliGRAM(s) Oral at bedtime    MEDICATIONS  (PRN):  acetaminophen   Tablet .. 650 milliGRAM(s) Oral every 6 hours PRN Temp greater or equal to 38C (100.4F)  benzocaine 15 mG/menthol 3.6 mG (Sugar-Free) Lozenge 1 Lozenge Oral two times a day PRN Sore Throat  dextrose 40% Gel 15 Gram(s) Oral once PRN Blood Glucose LESS THAN 70 milliGRAM(s)/deciliter  glucagon  Injectable 1 milliGRAM(s) IntraMuscular once PRN Glucose LESS THAN 70 milligrams/deciliter      PHYSICAL EXAM  Vital Signs Last 24 Hrs  T(C): 36.6 (2020 05:52), Max: 37.2 (15 Feb 2020 21:39)  T(F): 97.9 (2020 05:52), Max: 98.9 (15 Feb 2020 21:39)  HR: 98 (2020 05:52) (90 - 98)  BP: 121/70 (2020 05:52) (110/66 - 131/74)  BP(mean): --  RR: 17 (2020 05:52) (17 - 18)  SpO2: 98% (2020 05:52) (97% - 99%)    Constitutional: wn/wd in NAD.   HEENT: NCAT, no proptosis or lid retraction  Neck: no thyromegaly or palpable thyroid nodules   Respiratory: lungs CTAB.  Cardiovascular: regular rhythm, normal S1 and S2, no peripheral edema  GI: soft, NT/ND  Psychiatric: AAO x 3, normal affect/mood.    LABS:                        8.3    7.65  )-----------( 127      ( 2020 06:32 )             25.6     02-16    142  |  115<H>  |  87<H>  ----------------------------<  354<H>  4.0   |  12<L>  |  2.93<H>    Ca    9.0      2020 06:32  Phos  4.1     02-15  Mg     2.1     -16      Thyroid Stimulating Hormone, Serum: 0.532 uIU/mL (02-15 @ 06:23)      HbA1C: 8.4 % ( @ 06:22)    CAPILLARY BLOOD GLUCOSE      POCT Blood Glucose.: 344 mg/dL (15 Feb 2020 22:38)  POCT Blood Glucose.: 346 mg/dL (15 Feb 2020 16:55)  POCT Blood Glucose.: 338 mg/dL (15 Feb 2020 13:52)  POCT Blood Glucose.: 368 mg/dL (15 Feb 2020 11:41)  POCT Blood Glucose.: 330 mg/dL (15 Feb 2020 08:39)      A/P:73 yo male with hx of uncontrolled DM II, BPH found to have poly factorial anion gap metabolic acidosis, mild DKA and urinary retention with concurrent urinary tract infection.    1.  DM - type 2, uncontrolled, complicated. Persistent hyperglycemia despite no appetite but yesterday he was started on megace which has glucocorticoid like activity and has received 2 doses so far. Would suggest to stop that and switch to an appetite stimulant that will not increase his blood sugars, ie marinol if no contraindications. Will therefore slowly continue to go up on his insulins given continued hyperglycemia and active infection.     A1C: 8.4%  Weight: 87kg/BMI 28  Cr/GRF: 2.93 (baseline in 2s)    Please increase lantus to 2 units in morning.    Please increase lispro to 12 units before each meal.    If blood glucose 200-300 mg/dl at bedtime, please give an additional 10 units of lantus tonight at 10pm. If blood glucose above 300 mg/dl, please give an additional 15 units of lantus tonight at 10pm  Please continue lispro moderate  dose sliding scale four times daily with meals and at bedtime  Pt's fingerstick glucose goal is 100-180  Will continue to monitor     2. Low TSH, Ft4 0.54. Repeat TFTs showing similar results. (Initial low TSH drawn prior to megace)   -Check lipid panel and Total T3 tomorrow morning   -F/u anti-thyroid ABs   -Will decide on replacement treatment tomorrow.    Pt can follow up at discharge with [facility] by calling  to make an appointment.   Will discuss case with Dr. Walter and update primary team

## 2020-02-16 NOTE — PROGRESS NOTE ADULT - SUBJECTIVE AND OBJECTIVE BOX
74 YEAR OLD MALE WITH bph RETENTION AND uti. pT ON FINASTERIDE AND TAMSULOSIN. cREAT 2.96. Renal sono shows bilateral hydro likely due to poor bladder emptying and retention.  Recommend continued BPH meds and possible TURP after UTI clears. Pt will need cystoscopy and Urodynamics electively as outpatient after UTI resolves. Repeat renal sono. Pt seen and examined. Concur with plan

## 2020-02-16 NOTE — PROGRESS NOTE ADULT - ASSESSMENT
Pt is a 75 yo M with PMH DM, HTN, HLD, BPH (chronic incont/ret), SERA, CKD4, and a-fib (s/p ablation 2011) who presented to [facility] for general feelings of illness, weakness, and fatigue x2 wks. Initially admitted to ICU for DKA with Klebsiella UTI and bacteremia, improving

## 2020-02-16 NOTE — PROGRESS NOTE ADULT - SUBJECTIVE AND OBJECTIVE BOX
INTERVAL HPI/OVERNIGHT EVENTS: Improving no fever    CONSTITUTIONAL:  Negative fever or chills, feels well, good appetite  EYES:  Negative  blurry vision or double vision  CARDIOVASCULAR:  Negative for chest pain or palpitations  RESPIRATORY:  Negative for cough, wheezing, or SOB   GASTROINTESTINAL:  Negative for nausea, vomiting, diarrhea, constipation, or abdominal pain  GENITOURINARY:  Negative frequency, urgency or dysuria  NEUROLOGIC:  No headache, confusion, dizziness, lightheadedness      ANTIBIOTICS/RELEVANT:    MEDICATIONS  (STANDING):  cefTRIAXone   IVPB 2000 milliGRAM(s) IV Intermittent every 24 hours  chlorhexidine 2% Cloths 1 Application(s) Topical <User Schedule>  dextrose 5%. 1000 milliLiter(s) (50 mL/Hr) IV Continuous <Continuous>  dextrose 50% Injectable 12.5 Gram(s) IV Push once  dextrose 50% Injectable 25 Gram(s) IV Push once  dextrose 50% Injectable 25 Gram(s) IV Push once  finasteride 5 milliGRAM(s) Oral daily  heparin  Injectable 5000 Unit(s) SubCutaneous every 8 hours  influenza   Vaccine 0.5 milliLiter(s) IntraMuscular once  insulin glargine Injectable (LANTUS) 15 Unit(s) SubCutaneous at bedtime  insulin glargine Injectable (LANTUS) 22 Unit(s) SubCutaneous every morning  insulin lispro (HumaLOG) corrective regimen sliding scale   SubCutaneous Before meals and at bedtime  insulin lispro Injectable (HumaLOG) 12 Unit(s) SubCutaneous Before meals and at bedtime  nystatin    Suspension 461938 Unit(s) Oral four times a day  simvastatin 10 milliGRAM(s) Oral at bedtime  tamsulosin 0.4 milliGRAM(s) Oral at bedtime    MEDICATIONS  (PRN):  acetaminophen   Tablet .. 650 milliGRAM(s) Oral every 6 hours PRN Temp greater or equal to 38C (100.4F)  benzocaine 15 mG/menthol 3.6 mG (Sugar-Free) Lozenge 1 Lozenge Oral two times a day PRN Sore Throat  dextrose 40% Gel 15 Gram(s) Oral once PRN Blood Glucose LESS THAN 70 milliGRAM(s)/deciliter  glucagon  Injectable 1 milliGRAM(s) IntraMuscular once PRN Glucose LESS THAN 70 milligrams/deciliter        Vital Signs Last 24 Hrs  T(C): 36.4 (16 Feb 2020 21:18), Max: 36.6 (16 Feb 2020 05:52)  T(F): 97.5 (16 Feb 2020 21:18), Max: 97.9 (16 Feb 2020 05:52)  HR: 91 (16 Feb 2020 21:18) (90 - 98)  BP: 110/67 (16 Feb 2020 21:18) (110/67 - 124/73)  BP(mean): --  RR: 18 (16 Feb 2020 21:18) (14 - 18)  SpO2: 97% (16 Feb 2020 21:18) (97% - 98%)    02-15-20 @ 07:01  -  02-16-20 @ 07:00  --------------------------------------------------------  IN: 0 mL / OUT: 1950 mL / NET: -1950 mL    02-16-20 @ 07:01  -  02-16-20 @ 23:46  --------------------------------------------------------  IN: 0 mL / OUT: 400 mL / NET: -400 mL      PHYSICAL EXAM:  Constitutional: WDWN resting comfortably in bed; appears fatigued  Head: NC/AT; mild pallor  Eyes: PERRL, EOMI, anicteric sclera  ENT: no nasal discharge; MMM  Neck: supple; no JVD  Respiratory: CTA B/L; no W/R/R, no retractions; speaking in full sentences without difficulty, soft voice and intermittently slow to respond but responds appropriately  Cardiac: +S1/S2; RRR; no M/R/G   Gastrointestinal: soft, NT/ND; no rebound or guarding; +BSx4  Genitourinary: palacio catheter in place, draining red-tinged urine 100cc in bag  Extremities: WWP, no clubbing or cyanosis; no peripheral edema; mildly pale  Musculoskeletal: moving all extremities spontaneously  Vascular: 2+ radial, PT pulses B/L  Dermatologic: skin warm, dry and intact; no rashes, wounds, or scars  Neurologic: AAOx3; CNII-XII grossly intact; no focal deficits; mm strength 5/5 UE LE BL; sensation intact UE LE BL  LABS:                        8.3    7.65  )-----------( 127      ( 16 Feb 2020 06:32 )             25.6     02-16    142  |  115<H>  |  87<H>  ----------------------------<  354<H>  4.0   |  12<L>  |  2.93<H>    Ca    9.0      16 Feb 2020 06:32  Phos  4.1     02-15  Mg     2.1     02-16            MICROBIOLOGY:  Culture - Blood (02.15.20 @ 18:11)    Specimen Source: .Blood None    Culture Results:   No growth at 1 day.    Culture - Urine (02.13.20 @ 21:49)    -  Amikacin: S <=8    -  Ampicillin: I 16 These ampicillin results predict results for amoxicillin    -  Ampicillin/Sulbactam: S <=4/2 Enterobacter, Citrobacter, and Serratia may develop resistance during prolonged therapy (3-4 days)    -  Aztreonam: S <=4    -  Cefazolin: S <=2    -  Cefepime: S <=2    -  Cefotaxime: S <=2    -  Cefoxitin: S <=4    -  Ceftazidime: S <=1    -  Ceftriaxone: S <=1 Enterobacter, Citrobacter, and Serratia may develop resistance during prolonged therapy    -  Cefuroxime: S <=4    -  Cephalothin: S <=8    -  Ertapenem: S <=0.5    -  Gentamicin: S <=1    -  Meropenem: S <=1    -  Piperacillin/Tazobactam: S <=8    -  Tetra/Doxy: S <=2    -  Tigecycline: S <=1    -  Tobramycin: S <=2    -  Trimethoprim/Sulfamethoxazole: S <=0.5/9.5    -  Nitrofurantoin: S <=32 Should not be used to treat pyelonephritis    Specimen Source: .Urine Clean Catch (Midstream)    Culture Results:   >100,000 CFU/ml Klebsiella variicola    Organism Identification: Klebsiella variicola    Organism: Klebsiella variicola    Method Type: IMAN    Culture - Blood (02.13.20 @ 20:23)    -  Trimethoprim/Sulfamethoxazole: S <=0.5/9.5    -  Tobramycin: S <=2    -  Piperacillin/Tazobactam: S <=8    -  Gentamicin: S <=1    -  Ceftriaxone: S <=1 Enterobacter, Citrobacter, and Serratia may develop resistance during prolonged therapy    -  Cefazolin: S <=2 Enterobacter, Citrobacter, and Serratia may develop resistance during prolonged therapy (3-4 days)    -  Ampicillin/Sulbactam: S <=4/2 Enterobacter, Citrobacter, and Serratia may develop resistance during prolonged therapy (3-4 days)    -  Ampicillin: R 16 These ampicillin results predict results for amoxicillin    Gram Stain:   Aerobic Bottle: Gram Negative Rods  Floor previously notified.    Specimen Source: .Blood Blood    Organism: Klebsiella pneumoniae    Culture Results:   Growth in aerobic bottle: Klebsiella pneumoniae / variicola    Organism Identification: Klebsiella pneumoniae    Method Type: IMAN        RADIOLOGY & ADDITIONAL STUDIES:

## 2020-02-16 NOTE — PROGRESS NOTE ADULT - SUBJECTIVE AND OBJECTIVE BOX
HPI:  73 yo male with a hx of DM, HTN, HLD, iron deficiency anemia, CKD 4 who was brought in by his home health aide for feeling generally unwell and week for the past 2 weeks since his discharge from [facility] for symptomatic anemia. He has felt weakness, malaise and fatigue. He has not been experiencing any shortness of breath, chest pain, cough, sputum production, rhinorrhea odynophagia, difficulty hearing, abdominal pain, nausea, vomiting, diarrhea, constipation. He reports a history of chronic urinary incontinence and difficulty voiding due to known BPH with lower urinary tract symptoms. He denies any fever, chills, or rigors at home. He has been compliant with his medications prior to presentation. No recent abx use or hospitalizations. No dysuria, urgency or frequency that he has noted. No penile discharge. On presentation patient was noted to be in urinary retention w/ anion gap metabolic acidosis, acute renal failure, elevated b hydroxybutyrate and hyperglycemia w/ likely UTI. ICU consulted for management, and patient admitted for DKA c/b UTI.     In the ED: Patient given 3L NS, started on an insulin gtt, and given zosyn. (13 Feb 2020 18:56)    FAMILY HISTORY:  FH: stomach cancer  FHx: breast cancer  FHx: stroke    MEDICATIONS  (STANDING):  cefTRIAXone   IVPB 2000 milliGRAM(s) IV Intermittent every 24 hours  chlorhexidine 2% Cloths 1 Application(s) Topical <User Schedule>  dextrose 5%. 1000 milliLiter(s) (50 mL/Hr) IV Continuous <Continuous>  dextrose 50% Injectable 12.5 Gram(s) IV Push once  dextrose 50% Injectable 25 Gram(s) IV Push once  dextrose 50% Injectable 25 Gram(s) IV Push once  finasteride 5 milliGRAM(s) Oral daily  heparin  Injectable 5000 Unit(s) SubCutaneous every 8 hours  influenza   Vaccine 0.5 milliLiter(s) IntraMuscular once  insulin glargine Injectable (LANTUS) 22 Unit(s) SubCutaneous every morning  insulin lispro (HumaLOG) corrective regimen sliding scale   SubCutaneous Before meals and at bedtime  nystatin    Suspension [id number] Unit(s) Oral four times a day  simvastatin 10 milliGRAM(s) Oral at bedtime  sodium ferric gluconate complex IVPB 125 milliGRAM(s) IV Intermittent once  tamsulosin 0.4 milliGRAM(s) Oral at bedtime    MEDICATIONS  (PRN):  acetaminophen   Tablet .. 650 milliGRAM(s) Oral every 6 hours PRN Temp greater or equal to 38C (100.4F)  benzocaine 15 mG/menthol 3.6 mG (Sugar-Free) Lozenge 1 Lozenge Oral two times a day PRN Sore Throat  dextrose 40% Gel 15 Gram(s) Oral once PRN Blood Glucose LESS THAN 70 milliGRAM(s)/deciliter  glucagon  Injectable 1 milliGRAM(s) IntraMuscular once PRN Glucose LESS THAN 70 milligrams/deciliter    Vital Signs Last 24 Hrs  T(C): 36.6 (16 Feb 2020 16:58), Max: 37.2 (15 Feb 2020 21:39)  T(F): 97.8 (16 Feb 2020 16:58), Max: 98.9 (15 Feb 2020 21:39)  HR: 92 (16 Feb 2020 16:58) (90 - 98)  BP: 120/75 (16 Feb 2020 16:58) (120/75 - 131/74)  BP(mean): --  RR: 18 (16 Feb 2020 16:58) (14 - 18)  SpO2: 98% (16 Feb 2020 16:58) (97% - 99%)    Physical exam:    Overall impression  Lymphadenopathy  Liver  spleen    Labs:  CBC Full  -  ( 16 Feb 2020 06:32 )  WBC Count : 7.65 K/uL  RBC Count : 3.31 M/uL  Hemoglobin : 8.3 g/dL  Hematocrit : 25.6 %  Platelet Count - Automated : 127 K/uL  Mean Cell Volume : 77.3 fl  Mean Cell Hemoglobin : 25.1 pg  Mean Cell Hemoglobin Concentration : 32.4 gm/dL  Auto Neutrophil # : x  Auto Lymphocyte # : x  Auto Monocyte # : x  Auto Eosinophil # : x  Auto Basophil # : x  Auto Neutrophil % : x  Auto Lymphocyte % : x  Auto Monocyte % : x  Auto Eosinophil % : x  Auto Basophil % : x    02-16    142  |  115<H>  |  87<H>  ----------------------------<  354<H>  4.0   |  12<L>  |  2.93<H>    Ca    9.0      16 Feb 2020 06:32  Phos  4.1     02-15  Mg     2.1     02-16        Radiology:  HEALTH ISSUES - R/O PROBLEM Dx:      Assessmant / Problems  Patient feeling much better  Blood c&S negative x2  Plan:  continue iv Ceftriaxone  IV iron ordered  Thank you  Olivia Roberts MD

## 2020-02-16 NOTE — PROGRESS NOTE ADULT - SUBJECTIVE AND OBJECTIVE BOX
Seen and examined bedside. Resting comfortably in bed.  Denies f/c/n/v.   Currently treated for UTI and bacteremia.  No CVAT, palacio with clear yellow urine.  Monitor for POD.  Will need to be discharged with palacio.  Will likely need bladder outlet procedure outpatient.  c/w abx, flomax 0.4mg, finasteride 5mg.  Can have RP/bladder u/s in 24hrs    Vital Signs Last 24 Hrs  T(C): 36.4 (16 Feb 2020 09:00), Max: 37.2 (15 Feb 2020 21:39)  T(F): 97.6 (16 Feb 2020 09:00), Max: 98.9 (15 Feb 2020 21:39)  HR: 90 (16 Feb 2020 09:00) (90 - 98)  BP: 124/73 (16 Feb 2020 09:00) (110/66 - 131/74)  BP(mean): --  RR: 14 (16 Feb 2020 09:00) (14 - 18)  SpO2: 97% (16 Feb 2020 09:00) (97% - 99%)                          8.3    7.65  )-----------( 127      ( 16 Feb 2020 06:32 )             25.6   16 Feb 2020 06:32    142    |  115    |  87     ----------------------------<  354    4.0     |  12     |  2.93     Ca    9.0        16 Feb 2020 06:32  Phos  4.1       15 Feb 2020 06:23  Mg     2.1       16 Feb 2020 06:32

## 2020-02-16 NOTE — PROGRESS NOTE ADULT - PROBLEM SELECTOR PLAN 1
1) UCx growing Klebsiella,  BCx+ klebsiella pneumonia , not MDR, both sensitive to Ceftriaxone  2) Continue CTX 2g q24h (start date 2/13)  3) WBC downtrending today  4) surveillance Blood cx negative

## 2020-02-17 NOTE — PROGRESS NOTE ADULT - SUBJECTIVE AND OBJECTIVE BOX
INTERVAL HPI/OVERNIGHT EVENTS:    Patient is a 74y old  Male who presents with a chief complaint of DKA (16 Feb 2020 20:07)      Pt reports the following symptoms:    CONSTITUTIONAL:  Negative fever or chills, feels well, good appetite  EYES:  Negative  blurry vision or double vision  CARDIOVASCULAR:  Negative for chest pain or palpitations  RESPIRATORY:  Negative for cough, wheezing, or SOB   GASTROINTESTINAL:  Negative for nausea, vomiting, diarrhea, constipation, or abdominal pain  GENITOURINARY:  Negative frequency, urgency or dysuria  NEUROLOGIC:  No headache, confusion, dizziness, lightheadedness    MEDICATIONS  (STANDING):  cefTRIAXone   IVPB 2000 milliGRAM(s) IV Intermittent every 24 hours  chlorhexidine 2% Cloths 1 Application(s) Topical <User Schedule>  dextrose 5%. 1000 milliLiter(s) (50 mL/Hr) IV Continuous <Continuous>  dextrose 50% Injectable 12.5 Gram(s) IV Push once  dextrose 50% Injectable 25 Gram(s) IV Push once  dextrose 50% Injectable 25 Gram(s) IV Push once  finasteride 5 milliGRAM(s) Oral daily  heparin  Injectable 5000 Unit(s) SubCutaneous every 8 hours  influenza   Vaccine 0.5 milliLiter(s) IntraMuscular once  insulin glargine Injectable (LANTUS) 15 Unit(s) SubCutaneous at bedtime  insulin glargine Injectable (LANTUS) 22 Unit(s) SubCutaneous every morning  insulin lispro (HumaLOG) corrective regimen sliding scale   SubCutaneous Before meals and at bedtime  insulin lispro Injectable (HumaLOG) 10 Unit(s) SubCutaneous three times a day before meals  nystatin    Suspension 542638 Unit(s) Oral four times a day  simvastatin 10 milliGRAM(s) Oral at bedtime  tamsulosin 0.4 milliGRAM(s) Oral at bedtime    MEDICATIONS  (PRN):  acetaminophen   Tablet .. 650 milliGRAM(s) Oral every 6 hours PRN Temp greater or equal to 38C (100.4F)  benzocaine 15 mG/menthol 3.6 mG (Sugar-Free) Lozenge 1 Lozenge Oral two times a day PRN Sore Throat  dextrose 40% Gel 15 Gram(s) Oral once PRN Blood Glucose LESS THAN 70 milliGRAM(s)/deciliter  glucagon  Injectable 1 milliGRAM(s) IntraMuscular once PRN Glucose LESS THAN 70 milligrams/deciliter      PHYSICAL EXAM  Vital Signs Last 24 Hrs  T(C): 36.7 (17 Feb 2020 04:34), Max: 36.7 (17 Feb 2020 04:34)  T(F): 98 (17 Feb 2020 04:34), Max: 98 (17 Feb 2020 04:34)  HR: 86 (17 Feb 2020 04:34) (86 - 92)  BP: 123/67 (17 Feb 2020 04:34) (110/67 - 124/73)  BP(mean): --  RR: 17 (17 Feb 2020 04:34) (14 - 18)  SpO2: 97% (17 Feb 2020 04:34) (97% - 98%)    Constitutional: wn/wd in NAD.   HEENT: NCAT, MMM, OP clear, EOMI, no proptosis or lid retraction  Neck: no thyromegaly or palpable thyroid nodules   Respiratory: lungs CTAB.  Cardiovascular: regular rhythm, normal S1 and S2, no audible murmurs, no peripheral edema  GI: soft, NT/ND, no masses/HSM appreciated.  Neurology: no tremors, DTR 2+  Skin: no visible rashes/lesions  Psychiatric: AAO x 3, normal affect/mood.    LABS:                        8.0    6.63  )-----------( 122      ( 17 Feb 2020 07:15 )             25.8     02-17    147<H>  |  118<H>  |  74<H>  ----------------------------<  257<H>  3.7   |  15<L>  |  2.62<H>    Ca    8.8      17 Feb 2020 07:15  Mg     2.2     02-17          Thyroid Stimulating Hormone, Serum: 0.690 uIU/mL (02-16 @ 06:32)  Thyroid Stimulating Hormone, Serum: 0.532 uIU/mL (02-15 @ 06:23)      HbA1C: 8.4 % (02-05 @ 06:22)    CAPILLARY BLOOD GLUCOSE      POCT Blood Glucose.: 312 mg/dL (16 Feb 2020 21:53)  POCT Blood Glucose.: 329 mg/dL (16 Feb 2020 17:08)  POCT Blood Glucose.: 380 mg/dL (16 Feb 2020 12:29)  POCT Blood Glucose.: 362 mg/dL (16 Feb 2020 08:58)        A/P: 74y Male with history of DM type II presenting for       1.  DM -     Please continue           units lantus at bedtime  / in the morning   Continue       units lispro with meals   Cotninue lispro moderate / low dose sliding scale 4 times daily with meals and at bedtime.    Please continue consistent carbohydrate diet.    Goal FSG is   Will continue to monitor   For discharge, TBD    Pt can follow up at discharge with [facility] by calling  to make an appointment.   Will discuss case with     and update primary team    REMINDERS FOR INSULIN/DIABETES SUPPLIES at DISCHARGE:  INSULIN:   Long actin/Basal Insulin: Examples: Toujeo, Basaglar, Tresiba, Lantus   Short acting/Bolus Insulin: Humalog, Admelog, Novolog  Please ensure that BOTH short acting and long acting insulin are prescribed in the same preparation (Ex: PEN vs VIAL/SOLUTION)     TESTING SUPPLIES:   All glucometer supplies should be written as generic to avoid issues with insurance. Use the free text option in sunrise prescription writer, and type in glucometer test strips, lancets, etc to order.    If sending patient home on insulin PEN, please send:   •	BD fili insulin pen needles for use up to 4 times daily (total quantity 100)  •	Lancets for use up to 4 times daily (total quantity 100)  •	Glucometer Test strips for use up to 4 times daily (total quantity 100)  •	Alcohol swabs for use up to 4 times daily (total quantity 100)  •	Glucometer (If provided by hospital, still provide scripts for lancets, test strips, and swabs)  If sending patient home on insulin VIAL, please send:   •	Insulin syringes (6mm) - for use up to 4 times daily (total quantity 100)  •	Lancets for use up to 4 times daily (total quantity 100)  •	Generic Glucometer Test strips for use up to 4 times daily (total quantity 100)  •	Alcohol swabs for use up to 4 times daily (total quantity 100)  •	Generic Glucometer (If provided by hospital, still provide scripts for lancets, test strips, and swabs)  •	Do not specify brand for testing supplies (such as contour, freestyle, one touch etc) that way the pharmacy has the freedom to pick and change according to what the insurance dictates.  For patients without insurance:   •	Provide social work with appropriate scripts so they may obtain 1 week of samples  •	Provide with glucometer. Glucometers are located at various nursing stations, the nursing office, education, and endocrine fellows office.  •	Please make appointment with Chaya Nair NP or Staci Villafuerte RN and NELA Vergara at the [address] [place] [address] endocrinology clinic. They can see patients without insurance, provide appropriate samples, and assist in getting insurance coverage.     PREFERRED PHARMACY:  [facility] (located on 1st floor next to admitting)  P: [phone]  Hours: M – F 8AM – 8PM, Sat 8AM – 4PM, Sun—closed  If not using [facility], please follow up with chosen pharmacy to ensure insulin prescribed is covered. INTERVAL HPI/OVERNIGHT EVENTS:    Patient seen at bedside. Appetite remains poor. Megace stopped and switched to marinol for appetite stimulant. No fever since . Pt received EPOGEN for anemia.     FSG & Insulin received:  Yesterday:  pre-dinner fs  nutritional lispro  8 units + 10  units lispro SS  bedtime fs  lantus 15  units + nutritional lispro 12 units + 8 units lispro SS    Today:  pre-breakfast fs  lantus 22 units + nutritional lispro 10  units+ 6   units lispro SS  pre-lunch fs  nutritional lispro 10  units+ 6  units lispro SS      Pt reports the following symptoms:  CONSTITUTIONAL:  Negative fever or chills, feels well, poor appetite  CARDIOVASCULAR:  Negative for chest pain or palpitations  RESPIRATORY:  Negative for cough, wheezing, or SOB   GASTROINTESTINAL:  Negative for nausea, vomiting    MEDICATIONS  (STANDING):  cefTRIAXone   IVPB 2000 milliGRAM(s) IV Intermittent every 24 hours  chlorhexidine 2% Cloths 1 Application(s) Topical <User Schedule>  dextrose 5%. 1000 milliLiter(s) (50 mL/Hr) IV Continuous <Continuous>  dextrose 50% Injectable 12.5 Gram(s) IV Push once  dextrose 50% Injectable 25 Gram(s) IV Push once  dextrose 50% Injectable 25 Gram(s) IV Push once  finasteride 5 milliGRAM(s) Oral daily  heparin  Injectable 5000 Unit(s) SubCutaneous every 8 hours  influenza   Vaccine 0.5 milliLiter(s) IntraMuscular once  insulin glargine Injectable (LANTUS) 15 Unit(s) SubCutaneous at bedtime  insulin glargine Injectable (LANTUS) 22 Unit(s) SubCutaneous every morning  insulin lispro (HumaLOG) corrective regimen sliding scale   SubCutaneous Before meals and at bedtime  insulin lispro Injectable (HumaLOG) 10 Unit(s) SubCutaneous three times a day before meals  nystatin    Suspension 365818 Unit(s) Oral four times a day  simvastatin 10 milliGRAM(s) Oral at bedtime  tamsulosin 0.4 milliGRAM(s) Oral at bedtime    MEDICATIONS  (PRN):  acetaminophen   Tablet .. 650 milliGRAM(s) Oral every 6 hours PRN Temp greater or equal to 38C (100.4F)  benzocaine 15 mG/menthol 3.6 mG (Sugar-Free) Lozenge 1 Lozenge Oral two times a day PRN Sore Throat  dextrose 40% Gel 15 Gram(s) Oral once PRN Blood Glucose LESS THAN 70 milliGRAM(s)/deciliter  glucagon  Injectable 1 milliGRAM(s) IntraMuscular once PRN Glucose LESS THAN 70 milligrams/deciliter      PHYSICAL EXAM  Vital Signs Last 24 Hrs  T(C): 36.7 (2020 04:34), Max: 36.7 (2020 04:34)  T(F): 98 (2020 04:34), Max: 98 (2020 04:34)  HR: 86 (2020 04:34) (86 - 92)  BP: 123/67 (2020 04:34) (110/67 - 124/73)  BP(mean): --  RR: 17 (2020 04:34) (14 - 18)  SpO2: 97% (2020 04:34) (97% - 98%)    Constitutional: wn/wd in NAD.   HEENT: NCAT, no proptosis or lid retraction  Neck: no thyromegaly or palpable thyroid nodules   Respiratory: lungs CTAB.  Cardiovascular: regular rhythm, normal S1 and S2, no peripheral edema  GI: soft, NT/ND  Psychiatric: AAO x 3, normal affect/mood.    LABS:                        8.0    6.63  )-----------( 122      ( 2020 07:15 )             25.8     02    147<H>  |  118<H>  |  74<H>  ----------------------------<  257<H>  3.7   |  15<L>  |  2.62<H>    Ca    8.8      2020 07:15  Mg     2.2         Thyroid Stimulating Hormone, Serum: 0.690 uIU/mL ( @ 06:32)  Thyroid Stimulating Hormone, Serum: 0.532 uIU/mL (02-15 @ 06:23)      HbA1C: 8.4 % ( @ 06:22)    CAPILLARY BLOOD GLUCOSE  POCT Blood Glucose.: 312 mg/dL (2020 21:53)  POCT Blood Glucose.: 329 mg/dL (2020 17:08)  POCT Blood Glucose.: 380 mg/dL (2020 12:29)  POCT Blood Glucose.: 362 mg/dL (2020 08:58)    A/P:75 yo male with hx of uncontrolled DM II, BPH found to have poly factorial anion gap metabolic acidosis, mild DKA and urinary retention with concurrent urinary tract infection.    1.  DM - type 2, uncontrolled, complicated. Persistent hyperglycemia despite no appetite but yesterday he was started on megace which has glucocorticoid like activity and has received 2 doses so far. Would suggest to stop that and switch to an appetite stimulant that will not increase his blood sugars, ie marinol if no contraindications. Will therefore slowly continue to go up on his insulins given continued hyperglycemia and active infection.     A1C: 8.4%  Weight: 87kg/BMI 28  Cr/GRF: 2.93 (baseline in 2s)    Please increase lantus to __ units in morning.    Please increase lispro to __ units before each meal.      Please continue lispro moderate  dose sliding scale four times daily with meals and at bedtime  Pt's fingerstick glucose goal is 100-180  Will continue to monitor     2. Low TSH, Ft4 0.54. Repeat TFTs showing similar results. (Initial low TSH drawn prior to megace). Anti-thyroid ABs negative. TT3 48.       Pt can follow up at discharge with [facility] by calling  to make an appointment.   Will discuss case with Dr. Walter and update primary team INTERVAL HPI/OVERNIGHT EVENTS:    Patient seen at bedside. Appetite remains poor. Megace stopped and switched to marinol for appetite stimulant. No fever since . Pt received EPOGEN for anemia.     FSG & Insulin received:  Yesterday:  pre-dinner fs  nutritional lispro  8 units + 10  units lispro SS  bedtime fs  lantus 15  units + nutritional lispro 12 units + 8 units lispro SS    Today:  pre-breakfast fs  lantus 22 units + nutritional lispro 10  units+ 6   units lispro SS  pre-lunch fs  nutritional lispro 10  units+ 6  units lispro SS      Pt reports the following symptoms:  CONSTITUTIONAL:  Negative fever or chills, feels well, poor appetite  CARDIOVASCULAR:  Negative for chest pain or palpitations  RESPIRATORY:  Negative for cough, wheezing, or SOB   GASTROINTESTINAL:  Negative for nausea, vomiting    MEDICATIONS  (STANDING):  cefTRIAXone   IVPB 2000 milliGRAM(s) IV Intermittent every 24 hours  chlorhexidine 2% Cloths 1 Application(s) Topical <User Schedule>  dextrose 5%. 1000 milliLiter(s) (50 mL/Hr) IV Continuous <Continuous>  dextrose 50% Injectable 12.5 Gram(s) IV Push once  dextrose 50% Injectable 25 Gram(s) IV Push once  dextrose 50% Injectable 25 Gram(s) IV Push once  finasteride 5 milliGRAM(s) Oral daily  heparin  Injectable 5000 Unit(s) SubCutaneous every 8 hours  influenza   Vaccine 0.5 milliLiter(s) IntraMuscular once  insulin glargine Injectable (LANTUS) 15 Unit(s) SubCutaneous at bedtime  insulin glargine Injectable (LANTUS) 22 Unit(s) SubCutaneous every morning  insulin lispro (HumaLOG) corrective regimen sliding scale   SubCutaneous Before meals and at bedtime  insulin lispro Injectable (HumaLOG) 10 Unit(s) SubCutaneous three times a day before meals  nystatin    Suspension [id number] Unit(s) Oral four times a day  simvastatin 10 milliGRAM(s) Oral at bedtime  tamsulosin 0.4 milliGRAM(s) Oral at bedtime    MEDICATIONS  (PRN):  acetaminophen   Tablet .. 650 milliGRAM(s) Oral every 6 hours PRN Temp greater or equal to 38C (100.4F)  benzocaine 15 mG/menthol 3.6 mG (Sugar-Free) Lozenge 1 Lozenge Oral two times a day PRN Sore Throat  dextrose 40% Gel 15 Gram(s) Oral once PRN Blood Glucose LESS THAN 70 milliGRAM(s)/deciliter  glucagon  Injectable 1 milliGRAM(s) IntraMuscular once PRN Glucose LESS THAN 70 milligrams/deciliter      PHYSICAL EXAM  Vital Signs Last 24 Hrs  T(C): 36.7 (2020 04:34), Max: 36.7 (2020 04:34)  T(F): 98 (2020 04:34), Max: 98 (2020 04:34)  HR: 86 (2020 04:34) (86 - 92)  BP: 123/67 (2020 04:34) (110/67 - 124/73)  BP(mean): --  RR: 17 (2020 04:34) (14 - 18)  SpO2: 97% (2020 04:34) (97% - 98%)    Constitutional: wn/wd in NAD.   HEENT: NCAT, no proptosis or lid retraction  Neck: no thyromegaly or palpable thyroid nodules   Respiratory: lungs CTAB.  Cardiovascular: regular rhythm, normal S1 and S2, no peripheral edema  GI: soft, NT/ND  Psychiatric: AAO x 3, normal affect/mood.    LABS:                        8.0    6.63  )-----------( 122      ( 2020 07:15 )             25.8     02    147<H>  |  118<H>  |  74<H>  ----------------------------<  257<H>  3.7   |  15<L>  |  2.62<H>    Ca    8.8      2020 07:15  Mg     2.2         Thyroid Stimulating Hormone, Serum: 0.690 uIU/mL ( @ 06:32)  Thyroid Stimulating Hormone, Serum: 0.532 uIU/mL (02-15 @ 06:23)      HbA1C: 8.4 % ( @ 06:22)    CAPILLARY BLOOD GLUCOSE  POCT Blood Glucose.: 312 mg/dL (2020 21:53)  POCT Blood Glucose.: 329 mg/dL (2020 17:08)  POCT Blood Glucose.: 380 mg/dL (2020 12:29)  POCT Blood Glucose.: 362 mg/dL (2020 08:58)    A/P:73 yo male with hx of uncontrolled DM II, BPH found to have poly factorial anion gap metabolic acidosis, mild DKA and urinary retention with concurrent urinary tract infection.    1.  DM - type 2, uncontrolled, complicated. Persistent hyperglycemia despite no appetite but yesterday he was started on megace which has glucocorticoid like activity and has received 2 doses so far. Would suggest to stop that and switch to an appetite stimulant that will not increase his blood sugars, ie marinol if no contraindications. Will therefore slowly continue to go up on his insulins given continued hyperglycemia and active infection.     A1C: 8.4%  Weight: 87kg/BMI 28  Cr/GRF: 2.93 (baseline in 2s)    Please decrease lantus to 20 units in morning. Hold night time lantus if blood glucose <200.   Please continue lispro 10 units before each meal.      Please continue lispro moderate  dose sliding scale four times daily with meals and at bedtime  Pt's fingerstick glucose goal is 100-180  Will continue to monitor     2. Low TSH, Ft4 0.54. Repeat TFTs showing similar results. (Initial low TSH drawn prior to megace). Anti-thyroid ABs negative. TT3 48.       Pt can follow up at discharge with [facility] by calling  to make an appointment.   Will discuss case with Dr. Walter and update primary team

## 2020-02-17 NOTE — PROGRESS NOTE ADULT - PROBLEM SELECTOR PLAN 1
- Pt presenting with generalized ill feeling and malaise, found to be in DKA with urinary retention c/b klebsiella UTI and bacteremia  - On arrival with WBC 26.36 with 92% neutrophils, tachycardia, lactate 4.1  - Lactate cleared, no longer trending  - UA+ with UCx growing klebsiella, f/u sensitivities  - BCx+ klebsiella pneumonia  - s/p 1x zosyn in ED  - Continue CTX 2g q24h (start date 2/13)  - WBC wnl today 6.63  - Rodriguez catheter in place for strict I&O, currently draining red-tinged urine  - Continue to monitor output  - ID consulted (Dr. Mcwilliams), f/u recs

## 2020-02-17 NOTE — PROGRESS NOTE ADULT - ASSESSMENT
Pt is a 73 yo M with PMH DM, HTN, HLD, BPH (chronic incont/ret), SERA, CKD4, and a-fib (s/p ablation 2011) who presented to [facility] for general feelings of illness, weakness, and fatigue x2 wks. Initially admitted to ICU for DKA with Klebsiella UTI and bacteremia, improving

## 2020-02-17 NOTE — PROGRESS NOTE ADULT - SUBJECTIVE AND OBJECTIVE BOX
INTERVAL HPI/OVERNIGHT EVENTS: Patient in bed, no resp distress, needs help with ambulation    CONSTITUTIONAL:  Negative fever or chills, feels better, good appetite  EYES:  Negative  blurry vision or double vision  CARDIOVASCULAR:  Negative for chest pain or palpitations  RESPIRATORY:  Negative for cough, wheezing, or SOB   GASTROINTESTINAL:  Negative for nausea, vomiting, diarrhea, constipation, or abdominal pain  GENITOURINARY:  Negative frequency, urgency or dysuria  NEUROLOGIC:  No headache, confusion, dizziness, lightheadedness      ANTIBIOTICS/RELEVANT:    MEDICATIONS  (STANDING):  cefTRIAXone   IVPB 2000 milliGRAM(s) IV Intermittent every 24 hours  chlorhexidine 2% Cloths 1 Application(s) Topical <User Schedule>  dextrose 5%. 1000 milliLiter(s) (50 mL/Hr) IV Continuous <Continuous>  dextrose 50% Injectable 12.5 Gram(s) IV Push once  dextrose 50% Injectable 25 Gram(s) IV Push once  dextrose 50% Injectable 25 Gram(s) IV Push once  dronabinol 2.5 milliGRAM(s) Oral two times a day before meals  finasteride 5 milliGRAM(s) Oral daily  heparin  Injectable 5000 Unit(s) SubCutaneous every 8 hours  influenza   Vaccine 0.5 milliLiter(s) IntraMuscular once  insulin glargine Injectable (LANTUS) 20 Unit(s) SubCutaneous every morning  insulin glargine Injectable (LANTUS) 20 Unit(s) SubCutaneous at bedtime  insulin lispro (HumaLOG) corrective regimen sliding scale   SubCutaneous Before meals and at bedtime  insulin lispro Injectable (HumaLOG) 10 Unit(s) SubCutaneous three times a day before meals  nystatin    Suspension [id number] Unit(s) Oral four times a day  senna 2 Tablet(s) Oral at bedtime  simvastatin 10 milliGRAM(s) Oral at bedtime  tamsulosin 0.4 milliGRAM(s) Oral at bedtime    MEDICATIONS  (PRN):  acetaminophen   Tablet .. 650 milliGRAM(s) Oral every 6 hours PRN Temp greater or equal to 38C (100.4F)  benzocaine 15 mG/menthol 3.6 mG (Sugar-Free) Lozenge 1 Lozenge Oral two times a day PRN Sore Throat  dextrose 40% Gel 15 Gram(s) Oral once PRN Blood Glucose LESS THAN 70 milliGRAM(s)/deciliter  glucagon  Injectable 1 milliGRAM(s) IntraMuscular once PRN Glucose LESS THAN 70 milligrams/deciliter        Vital Signs Last 24 Hrs  T(C): 36.7 (17 Feb 2020 16:44), Max: 36.7 (17 Feb 2020 04:34)  T(F): 98 (17 Feb 2020 16:44), Max: 98 (17 Feb 2020 04:34)  HR: 89 (17 Feb 2020 16:44) (86 - 91)  BP: 110/69 (17 Feb 2020 16:44) (110/67 - 126/72)  BP(mean): --  RR: 18 (17 Feb 2020 16:44) (17 - 18)  SpO2: 96% (17 Feb 2020 16:44) (96% - 97%)    02-16-20 @ 07:01  -  02-17-20 @ 07:00  --------------------------------------------------------  IN: 0 mL / OUT: 1200 mL / NET: -1200 mL    02-17-20 @ 07:01  -  02-17-20 @ 19:48  --------------------------------------------------------  IN: 50 mL / OUT: 540 mL / NET: -490 mL      PHYSICAL EXAM:  Constitutional: WDWN resting comfortably in bed; appears fatigued  Head: NC/AT; mild pallor  Eyes: PERRL, EOMI, anicteric sclera  ENT: no nasal discharge; MMM  Neck: supple; no JVD  Respiratory: CTA B/L; no W/R/R, no retractions; speaking in full sentences without difficulty, soft voice and intermittently slow to respond but responds appropriately  Cardiac: +S1/S2; RRR; no M/R/G   Gastrointestinal: soft, NT/ND; no rebound or guarding; +BSx4  Genitourinary: palacio catheter in place, draining red-tinged urine 100cc in bag  Extremities: WWP, no clubbing or cyanosis; no peripheral edema; mildly pale  Musculoskeletal: moving all extremities spontaneously  Vascular: 2+ radial, PT pulses B/L  Dermatologic: skin warm, dry and intact; no rashes, wounds, or scars  Neurologic: AAOx3; CNII-XII grossly intact; no focal deficits; mm strength 5/5 UE LE BL; sensation intact UE LE BL  LABS:                        8.0    6.63  )-----------( 122      ( 17 Feb 2020 07:15 )             25.8     02-17    147<H>  |  118<H>  |  74<H>  ----------------------------<  257<H>  3.7   |  15<L>  |  2.62<H>    Ca    8.8      17 Feb 2020 07:15  Mg     2.2     02-17      MICROBIOLOGY:  Culture - Blood (02.15.20 @ 18:11)    Specimen Source: .Blood None    Culture Results:   No growth at 2 days.    Culture - Urine (02.13.20 @ 21:49)    -  Amikacin: S <=8    -  Ampicillin: I 16 These ampicillin results predict results for amoxicillin    -  Ampicillin/Sulbactam: S <=4/2 Enterobacter, Citrobacter, and Serratia may develop resistance during prolonged therapy (3-4 days)    -  Aztreonam: S <=4    -  Cefazolin: S <=2    -  Cefepime: S <=2    -  Cefotaxime: S <=2    -  Cefoxitin: S <=4    -  Ceftazidime: S <=1    -  Ceftriaxone: S <=1 Enterobacter, Citrobacter, and Serratia may develop resistance during prolonged therapy    -  Cefuroxime: S <=4    -  Cephalothin: S <=8    -  Gentamicin: S <=1    -  Meropenem: S <=1    -  Piperacillin/Tazobactam: S <=8    -  Tetra/Doxy: S <=2    -  Tigecycline: S <=1    -  Nitrofurantoin: S <=32 Should not be used to treat pyelonephritis    -  Ertapenem: S <=0.5    -  Tobramycin: S <=2    -  Trimethoprim/Sulfamethoxazole: S <=0.5/9.5    Specimen Source: .Urine Clean Catch (Midstream)    Culture Results:   >100,000 CFU/ml Klebsiella variicola    Organism Identification: Klebsiella variicola    Organism: Klebsiella variicola    Method Type: IMAN    Culture - Blood (02.13.20 @ 20:23)    -  Trimethoprim/Sulfamethoxazole: S <=0.5/9.5    -  Tobramycin: S <=2    -  Piperacillin/Tazobactam: S <=8    -  Gentamicin: S <=1    -  Ceftriaxone: S <=1 Enterobacter, Citrobacter, and Serratia may develop resistance during prolonged therapy    -  Cefazolin: S <=2 Enterobacter, Citrobacter, and Serratia may develop resistance during prolonged therapy (3-4 days)    -  Ampicillin/Sulbactam: S <=4/2 Enterobacter, Citrobacter, and Serratia may develop resistance during prolonged therapy (3-4 days)    -  Ampicillin: R 16 These ampicillin results predict results for amoxicillin    Gram Stain:   Aerobic Bottle: Gram Negative Rods  Floor previously notified.    Specimen Source: .Blood Blood    Organism: Klebsiella pneumoniae    Culture Results:   Growth in aerobic bottle: Klebsiella pneumoniae / variicola    Organism Identification: Klebsiella pneumoniae    Method Type: IMAN        RADIOLOGY & ADDITIONAL STUDIES:

## 2020-02-17 NOTE — CONSULT NOTE ADULT - ASSESSMENT
per Internal Medicine    Pt is a 73 yo M with PMH DM, HTN, HLD, BPH (chronic incont/ret), SERA, CKD4, and a-fib (s/p ablation 2011) who presented to [facility] for general feelings of illness, weakness, and fatigue x2 wks. Initially admitted to ICU for DKA with klebsiella UTI and bacteremia, now stepped down to [facility] for further observation and management.    Problem/Plan - 1:  ·  Problem: Severe sepsis.  Plan: - Pt presenting with generalized ill feeling and malaise, found to be in DKA with urinary retention c/b klebsiella UTI and bacteremia  - On arrival with WBC 26.36 with 92% neutrophils, tachycardia, lactate 4.1  - Lactate cleared, no longer trending  - UA+ with UCx growing klebsiella, f/u sensitivities  - BCx+ klebsiella pneumonia  - s/p 1x zosyn in ED  - Continue CTX 2g q24h (start date 2/13)  - WBC downtrending today  - Palacio catheter in place for strict I&O, currently draining red-tinged urine  - Continue to monitor output  - f/u surveillance cx and re-order daily until clear  - ID consulted (Dr. Mcwilliams), f/u recs.     Problem/Plan - 2:  ·  Problem: UTI (urinary tract infection).  Plan: - As above  - No recent instrumentation or risk factors for resistant  organisms  - Likely provoked in the setting of BPH with chronic urinary incontinence vs. retention  - Palacio catheter in place, monitor I&O  - Currently with hematuria - red tinged urine, not ash blood  - Continue to monitor  - Urology following, f/u recs  - ID consulted (Dr. Mcwilliams), f/u recs.     Problem/Plan - 3:  ·  Problem: Acute on chronic renal insufficiency.  Plan: - Pt with known hx CKD4  - On arrival with Cr 4.4  - Per chart review, baseline closer to 3  - Associated with elevated BUN and hyperkalemia  - Likely 2/2 urinary tract obstruction in the setting of BPH  - Ulytes c/w obstructive disease with component of pre-renal disease likely 2/2 DKA and hypovolemia  - FeNa c/w intrinsic etiology  - f/u urology recs for optimization of BPH  - Retroperitoneal US with stable BL mild hydronephrosis since 2018 and bladder wall thickening with enlarged prostate  - Continue to monitor UO  - Daily BMP  - Avoid nephrotoxic agents  - Renally dose medications.     Problem/Plan - 4:  ·  Problem: DKA (diabetic ketoacidoses).  Plan: - Resolved  - On arrival with , BHB 3.8, AG 26, bicarb 8, lactate 4.1  - s/p insulin gtt with DKA protocol titration in MICU  - AG closed - 2/2 compensatory and azotemia, lactic acidosis, and elevated BHB  - Lactate 2 today, will not continue to trend  - Continue to monitor azotemia in the setting of urinary tract obstruction (BPH)  - Endocrine following, f/u recs  - Monitor AM BG, if > 180 can increase AM lantus by 2  - mISS  - Tolerating PO  - Carb consistent diet  - Continue to monitor FSG  - Lantus increased from 10U to 15U in the am  - Premeal increased from 6U to 8U  - today, persistently hyperglycemic even w poor PO intake w BG in 300s so given 8U NPH in the afternoon.     Problem/Plan - 5:  ·  Problem: Weakness.  Plan: - Pt presenting with general feelings of illness and malaise  - Likely in the setting of azotemia, decreased PO intake, and electrolyte abnormalities  - Continue to monitor  - Daily BMP  - PT consult  - no deficits on exam  - Per Dr. Roberts - pt started on Megace 40mg TID (changed by pharmacy to 400mg daily) to stimulate appetite.    Problem/Plan - 6:  Problem: BPH (benign prostatic hyperplasia). Plan: - Pt with known hx BPH  - Continue home med finasteride 5mg daily  - Initiated tamsulosin 0.4mg daily  - Monitor I&O via palacio catheter  - Per urology, will likely go home with the palacio  - PSA: 0.9.    Problem/Plan - 7:  ·  Problem: HTN (hypertension).  Plan: - Pt with known hx HTN  - Not on home med antihypertensive  - Normotensive throughout course  - Continue to monitor.     Problem/Plan - 8:  ·  Problem: HLD (hyperlipidemia).  Plan: - Pt with known hx HLD  - Home med simvastatin 10mg daily  - Continue home med.     Problem/Plan - 9:  ·  Problem: Iron deficiency anemia.  Plan: - Pt with known hx SERA, not on any outpt supplements  - Hb 8.5, MCV 79.3  - Defer iron studies while inpt, pursue outpt w/u  - No active s/s bleeding, continue to monitor  - Hold supplementation in the setting of active infection, will need outpt f/u  - Keep active T&S  - Transfuse Hb<7.     Problem/Plan - 10:  Problem: Nutrition, metabolism, and development symptoms. Plan; - F: none, euvolemic and tolerating PO  - E: replete K<4, Mg<2  - N: mechanical soft, carb consistent  - D: heparin 5000U sq q8h  - GI ppx: none    Code: full  Dispo: [facility].

## 2020-02-17 NOTE — PROGRESS NOTE ADULT - SUBJECTIVE AND OBJECTIVE BOX
INTERVAL HPI/OVERNIGHT EVENTS:    SUBJECTIVE: Patient seen and examined at bedside.    VITAL SIGNS:  ICU Vital Signs Last 24 Hrs  T(C): 36.3 (17 Feb 2020 09:21), Max: 36.7 (17 Feb 2020 04:34)  T(F): 97.3 (17 Feb 2020 09:21), Max: 98 (17 Feb 2020 04:34)  HR: 91 (17 Feb 2020 09:21) (86 - 92)  BP: 126/72 (17 Feb 2020 09:21) (110/67 - 126/72)  BP(mean): --  ABP: --  ABP(mean): --  RR: 18 (17 Feb 2020 09:21) (17 - 18)  SpO2: 96% (17 Feb 2020 09:21) (96% - 98%)        02-16 @ 07:01  -  02-17 @ 07:00  --------------------------------------------------------  IN: 0 mL / OUT: 1200 mL / NET: -1200 mL      CAPILLARY BLOOD GLUCOSE      POCT Blood Glucose.: 278 mg/dL (17 Feb 2020 08:29)      PHYSICAL EXAM:    Constitutional: NAD  HEENT: NC/AT; PERRL, anicteric sclera; MMM  Neck: supple, no JVD  Cardiovascular: +S1/S2, RRR  Respiratory: CTA B/L, no W/R/R  Gastrointestinal: abdomen soft, NT/ND; no rebound or guarding; +BSx4  Genitourinary: no suprapubic tenderness or fullness  Extremities: WWP; no LE edema; no clubbing or cyanosis  Vascular: 2+ radial, DP/PT and femoral pulses B/L  Dermatologic: normal color and turgor; no visible rashes  Neurological:     MEDICATIONS:  MEDICATIONS  (STANDING):  cefTRIAXone   IVPB 2000 milliGRAM(s) IV Intermittent every 24 hours  chlorhexidine 2% Cloths 1 Application(s) Topical <User Schedule>  dextrose 5%. 1000 milliLiter(s) (50 mL/Hr) IV Continuous <Continuous>  dextrose 50% Injectable 12.5 Gram(s) IV Push once  dextrose 50% Injectable 25 Gram(s) IV Push once  dextrose 50% Injectable 25 Gram(s) IV Push once  finasteride 5 milliGRAM(s) Oral daily  heparin  Injectable 5000 Unit(s) SubCutaneous every 8 hours  influenza   Vaccine 0.5 milliLiter(s) IntraMuscular once  insulin glargine Injectable (LANTUS) 15 Unit(s) SubCutaneous at bedtime  insulin glargine Injectable (LANTUS) 22 Unit(s) SubCutaneous every morning  insulin lispro (HumaLOG) corrective regimen sliding scale   SubCutaneous Before meals and at bedtime  insulin lispro Injectable (HumaLOG) 10 Unit(s) SubCutaneous three times a day before meals  nystatin    Suspension 162539 Unit(s) Oral four times a day  simvastatin 10 milliGRAM(s) Oral at bedtime  tamsulosin 0.4 milliGRAM(s) Oral at bedtime    MEDICATIONS  (PRN):  acetaminophen   Tablet .. 650 milliGRAM(s) Oral every 6 hours PRN Temp greater or equal to 38C (100.4F)  benzocaine 15 mG/menthol 3.6 mG (Sugar-Free) Lozenge 1 Lozenge Oral two times a day PRN Sore Throat  dextrose 40% Gel 15 Gram(s) Oral once PRN Blood Glucose LESS THAN 70 milliGRAM(s)/deciliter  glucagon  Injectable 1 milliGRAM(s) IntraMuscular once PRN Glucose LESS THAN 70 milligrams/deciliter      ALLERGIES:  Allergies    No Known Allergies    Intolerances        LABS:                        8.0    6.63  )-----------( 122      ( 17 Feb 2020 07:15 )             25.8     02-17    147<H>  |  118<H>  |  74<H>  ----------------------------<  257<H>  3.7   |  15<L>  |  2.62<H>    Ca    8.8      17 Feb 2020 07:15  Mg     2.2     02-17            RADIOLOGY & ADDITIONAL TESTS: Reviewed. INTERVAL HPI/OVERNIGHT EVENTS: Continues to have elevated FS BG.    SUBJECTIVE: Patient seen and examined at bedside. Endorses weakness and poor appetite.     VITAL SIGNS:  ICU Vital Signs Last 24 Hrs  T(C): 36.3 (17 Feb 2020 09:21), Max: 36.7 (17 Feb 2020 04:34)  T(F): 97.3 (17 Feb 2020 09:21), Max: 98 (17 Feb 2020 04:34)  HR: 91 (17 Feb 2020 09:21) (86 - 92)  BP: 126/72 (17 Feb 2020 09:21) (110/67 - 126/72)  BP(mean): --  ABP: --  ABP(mean): --  RR: 18 (17 Feb 2020 09:21) (17 - 18)  SpO2: 96% (17 Feb 2020 09:21) (96% - 98%)        02-16 @ 07:01  -  02-17 @ 07:00  --------------------------------------------------------  IN: 0 mL / OUT: 1200 mL / NET: -1200 mL      CAPILLARY BLOOD GLUCOSE      POCT Blood Glucose.: 278 mg/dL (17 Feb 2020 08:29)      PHYSICAL EXAM:    Constitutional: NAD, more drows  HEENT: NC/AT; PERRL, anicteric sclera; MMM, oral thrush noted on tongue  Neck: supple, no JVD  Cardiovascular: +S1/S2, RRR, no m/r/g  Respiratory: CTA B/L, no W/R/R  Gastrointestinal: obese abdomen soft, NT/ND; no rebound or guarding; +BSx4  Genitourinary: no suprapubic tenderness or fullness  Extremities: WWP; no LE edema; no clubbing or cyanosis  Vascular: 2+ radial, DP/PT and femoral pulses B/L  Dermatologic: normal color and turgor; no visible rashes  Neurological: AAOx3; no focal neurological deficits, pt. following commands    MEDICATIONS:  MEDICATIONS  (STANDING):  cefTRIAXone   IVPB 2000 milliGRAM(s) IV Intermittent every 24 hours  chlorhexidine 2% Cloths 1 Application(s) Topical <User Schedule>  dextrose 5%. 1000 milliLiter(s) (50 mL/Hr) IV Continuous <Continuous>  dextrose 50% Injectable 12.5 Gram(s) IV Push once  dextrose 50% Injectable 25 Gram(s) IV Push once  dextrose 50% Injectable 25 Gram(s) IV Push once  finasteride 5 milliGRAM(s) Oral daily  heparin  Injectable 5000 Unit(s) SubCutaneous every 8 hours  influenza   Vaccine 0.5 milliLiter(s) IntraMuscular once  insulin glargine Injectable (LANTUS) 15 Unit(s) SubCutaneous at bedtime  insulin glargine Injectable (LANTUS) 22 Unit(s) SubCutaneous every morning  insulin lispro (HumaLOG) corrective regimen sliding scale   SubCutaneous Before meals and at bedtime  insulin lispro Injectable (HumaLOG) 10 Unit(s) SubCutaneous three times a day before meals  nystatin    Suspension 955280 Unit(s) Oral four times a day  simvastatin 10 milliGRAM(s) Oral at bedtime  tamsulosin 0.4 milliGRAM(s) Oral at bedtime    MEDICATIONS  (PRN):  acetaminophen   Tablet .. 650 milliGRAM(s) Oral every 6 hours PRN Temp greater or equal to 38C (100.4F)  benzocaine 15 mG/menthol 3.6 mG (Sugar-Free) Lozenge 1 Lozenge Oral two times a day PRN Sore Throat  dextrose 40% Gel 15 Gram(s) Oral once PRN Blood Glucose LESS THAN 70 milliGRAM(s)/deciliter  glucagon  Injectable 1 milliGRAM(s) IntraMuscular once PRN Glucose LESS THAN 70 milligrams/deciliter      ALLERGIES:  Allergies    No Known Allergies    Intolerances        LABS:                        8.0    6.63  )-----------( 122      ( 17 Feb 2020 07:15 )             25.8     02-17    147<H>  |  118<H>  |  74<H>  ----------------------------<  257<H>  3.7   |  15<L>  |  2.62<H>    Ca    8.8      17 Feb 2020 07:15  Mg     2.2     02-17            RADIOLOGY & ADDITIONAL TESTS: Reviewed.

## 2020-02-17 NOTE — PROGRESS NOTE ADULT - SUBJECTIVE AND OBJECTIVE BOX
HPI:  73 yo male with a hx of DM, HTN, HLD, iron deficiency anemia, CKD 4 who was brought in by his home health aide for feeling generally unwell and week for the past 2 weeks since his discharge from [facility] for symptomatic anemia. He has felt weakness, malaise and fatigue. He has not been experiencing any shortness of breath, chest pain, cough, sputum production, rhinorrhea odynophagia, difficulty hearing, abdominal pain, nausea, vomiting, diarrhea, constipation. He reports a history of chronic urinary incontinence and difficulty voiding due to known BPH with lower urinary tract symptoms. He denies any fever, chills, or rigors at home. He has been compliant with his medications prior to presentation. No recent abx use or hospitalizations. No dysuria, urgency or frequency that he has noted. No penile discharge. On presentation patient was noted to be in urinary retention w/ anion gap metabolic acidosis, acute renal failure, elevated b hydroxybutyrate and hyperglycemia w/ likely UTI. ICU consulted for management, and patient admitted for DKA c/b UTI.     In the ED: Patient given 3L NS, started on an insulin gtt, and given zosyn. (13 Feb 2020 18:56)    FAMILY HISTORY:  FH: stomach cancer  FHx: breast cancer  FHx: stroke    MEDICATIONS  (STANDING):  cefTRIAXone   IVPB 2000 milliGRAM(s) IV Intermittent every 24 hours  chlorhexidine 2% Cloths 1 Application(s) Topical <User Schedule>  dextrose 5%. 1000 milliLiter(s) (50 mL/Hr) IV Continuous <Continuous>  dextrose 50% Injectable 12.5 Gram(s) IV Push once  dextrose 50% Injectable 25 Gram(s) IV Push once  dextrose 50% Injectable 25 Gram(s) IV Push once  dronabinol 2.5 milliGRAM(s) Oral two times a day before meals  finasteride 5 milliGRAM(s) Oral daily  heparin  Injectable 5000 Unit(s) SubCutaneous every 8 hours  influenza   Vaccine 0.5 milliLiter(s) IntraMuscular once  insulin glargine Injectable (LANTUS) 20 Unit(s) SubCutaneous every morning  insulin glargine Injectable (LANTUS) 20 Unit(s) SubCutaneous at bedtime  insulin lispro (HumaLOG) corrective regimen sliding scale   SubCutaneous Before meals and at bedtime  insulin lispro Injectable (HumaLOG) 10 Unit(s) SubCutaneous three times a day before meals  nystatin    Suspension 679105 Unit(s) Oral four times a day  senna 2 Tablet(s) Oral at bedtime  simvastatin 10 milliGRAM(s) Oral at bedtime  tamsulosin 0.4 milliGRAM(s) Oral at bedtime    MEDICATIONS  (PRN):  acetaminophen   Tablet .. 650 milliGRAM(s) Oral every 6 hours PRN Temp greater or equal to 38C (100.4F)  benzocaine 15 mG/menthol 3.6 mG (Sugar-Free) Lozenge 1 Lozenge Oral two times a day PRN Sore Throat  dextrose 40% Gel 15 Gram(s) Oral once PRN Blood Glucose LESS THAN 70 milliGRAM(s)/deciliter  glucagon  Injectable 1 milliGRAM(s) IntraMuscular once PRN Glucose LESS THAN 70 milligrams/deciliter    Vital Signs Last 24 Hrs  T(C): 36.7 (17 Feb 2020 16:44), Max: 36.7 (17 Feb 2020 04:34)  T(F): 98 (17 Feb 2020 16:44), Max: 98 (17 Feb 2020 04:34)  HR: 89 (17 Feb 2020 16:44) (86 - 91)  BP: 110/69 (17 Feb 2020 16:44) (110/67 - 126/72)  BP(mean): --  RR: 18 (17 Feb 2020 16:44) (17 - 18)  SpO2: 96% (17 Feb 2020 16:44) (96% - 97%)    Physical exam:    Overall impression  Lymphadenopathy  Liver  spleen    Labs:  CBC Full  -  ( 17 Feb 2020 07:15 )  WBC Count : 6.63 K/uL  RBC Count : 3.27 M/uL  Hemoglobin : 8.0 g/dL  Hematocrit : 25.8 %  Platelet Count - Automated : 122 K/uL  Mean Cell Volume : 78.9 fl  Mean Cell Hemoglobin : 24.5 pg  Mean Cell Hemoglobin Concentration : 31.0 gm/dL  Auto Neutrophil # : x  Auto Lymphocyte # : x  Auto Monocyte # : x  Auto Eosinophil # : x  Auto Basophil # : x  Auto Neutrophil % : x  Auto Lymphocyte % : x  Auto Monocyte % : x  Auto Eosinophil % : x  Auto Basophil % : x    02-17    147<H>  |  118<H>  |  74<H>  ----------------------------<  257<H>  3.7   |  15<L>  |  2.62<H>    Ca    8.8      17 Feb 2020 07:15  Mg     2.2     02-17        Radiology:  HEALTH ISSUES - R/O PROBLEM Dx:      Assessmant / Problems  Patient improving every day  on Iv antibiotics blood c&s negative  Megase d/c  Marinol started  Got iv iron and Procrit  Plan:  iv MVI  repeat urine c&s    Thank you  Olivia Roberts MD

## 2020-02-17 NOTE — CONSULT NOTE ADULT - SUBJECTIVE AND OBJECTIVE BOX
Patient is a 74y old  Male who presents with a chief complaint of DKA (17 Feb 2020 08:08)       HPI:  75 yo male with a hx of DM, HTN, HLD, iron deficiency anemia, CKD 4 who was brought in by his home health aide for feeling generally unwell and week for the past 2 weeks since his discharge from [facility] for symptomatic anemia. He has felt weakness, malaise and fatigue. He has not been experiencing any shortness of breath, chest pain, cough, sputum production, rhinorrhea odynophagia, difficulty hearing, abdominal pain, nausea, vomiting, diarrhea, constipation. He reports a history of chronic urinary incontinence and difficulty voiding due to known BPH with lower urinary tract symptoms. He denies any fever, chills, or rigors at home. He has been compliant with his medications prior to presentation. No recent abx use or hospitalizations. No dysuria, urgency or frequency that he has noted. No penile discharge. On presentation patient was noted to be in urinary retention w/ anion gap metabolic acidosis, acute renal failure, elevated b hydroxybutyrate and hyperglycemia w/ likely UTI. ICU consulted for management, and patient admitted for DKA c/b UTI.     In the ED: Patient given 3L NS, started on an insulin gtt, and given zosyn. (13 Feb 2020 18:56)      PAST MEDICAL & SURGICAL HISTORY:  History of pancytopenia  H/O hydrocephalus  Anemia  HLD (hyperlipidemia)  Enlarged prostate  DM (diabetes mellitus)  HTN (hypertension)  H/O prior ablation treatment      MEDICATIONS  (STANDING):  cefTRIAXone   IVPB 2000 milliGRAM(s) IV Intermittent every 24 hours  chlorhexidine 2% Cloths 1 Application(s) Topical <User Schedule>  dextrose 5%. 1000 milliLiter(s) (50 mL/Hr) IV Continuous <Continuous>  dextrose 50% Injectable 12.5 Gram(s) IV Push once  dextrose 50% Injectable 25 Gram(s) IV Push once  dextrose 50% Injectable 25 Gram(s) IV Push once  finasteride 5 milliGRAM(s) Oral daily  heparin  Injectable 5000 Unit(s) SubCutaneous every 8 hours  influenza   Vaccine 0.5 milliLiter(s) IntraMuscular once  insulin glargine Injectable (LANTUS) 15 Unit(s) SubCutaneous at bedtime  insulin glargine Injectable (LANTUS) 22 Unit(s) SubCutaneous every morning  insulin lispro (HumaLOG) corrective regimen sliding scale   SubCutaneous Before meals and at bedtime  insulin lispro Injectable (HumaLOG) 10 Unit(s) SubCutaneous three times a day before meals  nystatin    Suspension 143047 Unit(s) Oral four times a day  simvastatin 10 milliGRAM(s) Oral at bedtime  tamsulosin 0.4 milliGRAM(s) Oral at bedtime    MEDICATIONS  (PRN):  acetaminophen   Tablet .. 650 milliGRAM(s) Oral every 6 hours PRN Temp greater or equal to 38C (100.4F)  benzocaine 15 mG/menthol 3.6 mG (Sugar-Free) Lozenge 1 Lozenge Oral two times a day PRN Sore Throat  dextrose 40% Gel 15 Gram(s) Oral once PRN Blood Glucose LESS THAN 70 milliGRAM(s)/deciliter  glucagon  Injectable 1 milliGRAM(s) IntraMuscular once PRN Glucose LESS THAN 70 milligrams/deciliter      Social History:           -  Occupation: X           -  Home Living Status: lives alone in an elevator accessible apartment building           -  Prior Home Care Services: X    Baseline Functional Level Prior to Admission:           - ADL's:  independent           - ambulates with a cane prn    FAMILY HISTORY:  FH: stomach cancer  FHx: breast cancer  FHx: stroke      CBC Full  -  ( 17 Feb 2020 07:15 )  WBC Count : 6.63 K/uL  RBC Count : 3.27 M/uL  Hemoglobin : 8.0 g/dL  Hematocrit : 25.8 %  Platelet Count - Automated : 122 K/uL  Mean Cell Volume : 78.9 fl  Mean Cell Hemoglobin : 24.5 pg  Mean Cell Hemoglobin Concentration : 31.0 gm/dL  Auto Neutrophil # : x  Auto Lymphocyte # : x  Auto Monocyte # : x  Auto Eosinophil # : x  Auto Basophil # : x  Auto Neutrophil % : x  Auto Lymphocyte % : x  Auto Monocyte % : x  Auto Eosinophil % : x  Auto Basophil % : x      02-17    147<H>  |  118<H>  |  74<H>  ----------------------------<  257<H>  3.7   |  15<L>  |  2.62<H>    Ca    8.8      17 Feb 2020 07:15  Mg     2.2     02-17              Radiology:    < from: Xray Chest 1 View AP/PA (02.13.20 @ 16:03) >  EXAM:  XR CHEST AP OR PA 1V                          PROCEDURE DATE:  02/13/2020          INTERPRETATION:  Clinical History / Reason for exam: Weakness    Comparison : Chest radiograph 4/21/2019.    Technique/Positioning: Single frontal view of thechest    Findings:    Support devices: Telemetry leads overlie the patient.    Cardiac/mediastinum/hilum: Unchanged    Lung parenchyma/Pleura: No focal parenchymal opacities, pleural effusion or pneumothorax are present. Stable elevation of the righthemidiaphragm    Skeleton/soft tissues: Degenerative changes of the shoulders and the spine    Impression:      No radiographic evidence of acute cardiopulmonary disease.              Vital Signs Last 24 Hrs  T(C): 36.3 (17 Feb 2020 09:21), Max: 36.7 (17 Feb 2020 04:34)  T(F): 97.3 (17 Feb 2020 09:21), Max: 98 (17 Feb 2020 04:34)  HR: 91 (17 Feb 2020 09:21) (86 - 92)  BP: 126/72 (17 Feb 2020 09:21) (110/67 - 126/72)  BP(mean): --  RR: 18 (17 Feb 2020 09:21) (17 - 18)  SpO2: 96% (17 Feb 2020 09:21) (96% - 98%)    REVIEW OF SYSTEMS:    CONSTITUTIONAL:  fatigue  EYES: No eye pain, visual disturbances, or discharge  ENMT:  No difficulty hearing, tinnitus, vertigo; No sinus or throat pain  NECK: No pain or stiffness  BREASTS: No pain, masses, or nipple discharge  RESPIRATORY: No cough, wheezing, chills or hemoptysis; No shortness of breath  CARDIOVASCULAR: No chest pain, palpitations, dizziness, or leg swelling  GASTROINTESTINAL: No abdominal or epigastric pain. No nausea, vomiting, or hematemesis; No diarrhea or constipation. No melena or hematochezia.  GENITOURINARY: No dysuria, frequency, hematuria, or incontinence  NEUROLOGICAL: No headaches, memory loss, loss of strength, numbness, or tremors  SKIN: No itching, burning, rashes, or lesions   LYMPH NODES: No enlarged glands  ENDOCRINE: No heat or cold intolerance; No hair loss  MUSCULOSKELETAL: No joint pain or swelling; No muscle, back, or extremity pain  PSYCHIATRIC: No depression, anxiety, mood swings, or difficulty sleeping  HEME/LYMPH: No easy bruising, or bleeding gums  ALLERGY AND IMMUNOLOGIC: No hives or eczema  VASCULAR: no swelling, erythema         Physical Exam: 75 yo  gentleman lying in semi Qiu's position, c/o fatigue    Head: normocephalic, atraumatic    Eyes: PERRLA, EOMI, no nystagmus, sclera anicteric    ENT: nasal discharge, uvula midline, no oropharyngeal erythema/exudate    Neck: supple, negative JVD, negative carotid bruits, no thyromegaly    Chest: CTA bilaterally, neg wheeze/ rhonchi/ rales/ crackles/ egophany    Cardiovascular: regular rate and rhythm, neg murmurs/rubs/gallops    Abdomen: soft, obese, non tender to palpation in all 4 quadrants, negative rebound/guarding, normal bowel sounds    Extremities: WWP, neg cyanosis/clubbing/edema, negative calf tenderness to palpation, negative Darlene's sign      :     Neurologic Exam:    Alert and oriented to person, place, date/year, speech fluent w/o dysarthria, recent and remote memory intact, repetition intact, comprehension intact    Cranial Nerves:     II:                       pupils equal, round and reactive to light, visual fields intact   III/ IV/VI:            extraocular movements intact, neg nystagmus, neg ptosis  V:                       facial sensation intact, V1-3 normal  VII:                     face symmetric, no droop, normal eye closure and smile  VIII:                    hearing intact to finger rub bilaterally  IX/ X:                 soft palate rise symmetrical  XI:                      head turning, shoulder shrug normal  XII:                     tongue midline    Motor Exam:    Upper Extremities:     RIght:   no focal weakness               negative drift    Left :    no focal weakness               negative drift    Lower Extremities:                 Right: no focal weakness    Left:     no focal weakness                 Sensory:    intact to LT/PP in all UE/LE dermatomes                     DTR:             = biceps/     triceps/     brachioradialis                      = patella/   medial hamstring/ankle                      neg clonus                      neg Babinski                          Gait:  not tested        PM&R Impression:    1) deconditioned  2) no focal weakness        Recommendations:    1) Physical therapy focusing on therapeutic exercises, bed mobility/transfer out of bed evaluation, progressive ambulation with assistive devices prn.    2) Anticipated Disposition Plan/Recs:  probable subacute rehab placement

## 2020-02-17 NOTE — PROGRESS NOTE ADULT - PROBLEM SELECTOR PLAN 5
- Pt presenting with general feelings of illness and malaise  - Likely in the setting of azotemia, decreased PO intake, and electrolyte abnormalities  - Continue to monitor  - Daily BMP  - PT consult  - no deficits on exam  - Pt received Megace for appetite stimulation, however discontinued after patient received 2 doses, as can contribute to hyperglycemia  - Pt started on Marinol 2.5mg twice daily before meals instead for appetite stimulation

## 2020-02-17 NOTE — PROGRESS NOTE ADULT - PROBLEM SELECTOR PLAN 1
1) UCx growing Klebsiella,  BCx+ klebsiella pneumonia , not MDR, both sensitive to Ceftriaxone  2) Continue CTX 2g q24h (start date 2/13)  3) WBC downtrending today  4) surveillance Blood cx negative.   5) Urology f/u RE: urine retention/BPH

## 2020-02-18 NOTE — PROGRESS NOTE ADULT - SUBJECTIVE AND OBJECTIVE BOX
HPI:  75 yo male with a hx of DM, HTN, HLD, iron deficiency anemia, CKD 4 who was brought in by his home health aide for feeling generally unwell and week for the past 2 weeks since his discharge from [facility] for symptomatic anemia. He has felt weakness, malaise and fatigue. He has not been experiencing any shortness of breath, chest pain, cough, sputum production, rhinorrhea odynophagia, difficulty hearing, abdominal pain, nausea, vomiting, diarrhea, constipation. He reports a history of chronic urinary incontinence and difficulty voiding due to known BPH with lower urinary tract symptoms. He denies any fever, chills, or rigors at home. He has been compliant with his medications prior to presentation. No recent abx use or hospitalizations. No dysuria, urgency or frequency that he has noted. No penile discharge. On presentation patient was noted to be in urinary retention w/ anion gap metabolic acidosis, acute renal failure, elevated b hydroxybutyrate and hyperglycemia w/ likely UTI. ICU consulted for management, and patient admitted for DKA c/b UTI.     In the ED: Patient given 3L NS, started on an insulin gtt, and given zosyn. (13 Feb 2020 18:56)    FAMILY HISTORY:  FH: stomach cancer  FHx: breast cancer  FHx: stroke    MEDICATIONS  (STANDING):  cefTRIAXone   IVPB 2000 milliGRAM(s) IV Intermittent every 24 hours  chlorhexidine 2% Cloths 1 Application(s) Topical <User Schedule>  dextrose 5%. 1000 milliLiter(s) (50 mL/Hr) IV Continuous <Continuous>  dextrose 50% Injectable 12.5 Gram(s) IV Push once  dextrose 50% Injectable 25 Gram(s) IV Push once  dextrose 50% Injectable 25 Gram(s) IV Push once  dronabinol 2.5 milliGRAM(s) Oral two times a day before meals  finasteride 5 milliGRAM(s) Oral daily  heparin  Injectable 5000 Unit(s) SubCutaneous every 8 hours  influenza   Vaccine 0.5 milliLiter(s) IntraMuscular once  insulin glargine Injectable (LANTUS) 20 Unit(s) SubCutaneous at bedtime  insulin lispro (HumaLOG) corrective regimen sliding scale   SubCutaneous Before meals and at bedtime  insulin lispro Injectable (HumaLOG) 10 Unit(s) SubCutaneous three times a day before meals  nystatin    Suspension 297708 Unit(s) Oral four times a day  senna 2 Tablet(s) Oral at bedtime  simvastatin 10 milliGRAM(s) Oral at bedtime  sodium ferric gluconate complex IVPB 125 milliGRAM(s) IV Intermittent once  tamsulosin 0.4 milliGRAM(s) Oral at bedtime    MEDICATIONS  (PRN):  acetaminophen   Tablet .. 650 milliGRAM(s) Oral every 6 hours PRN Temp greater or equal to 38C (100.4F)  benzocaine 15 mG/menthol 3.6 mG (Sugar-Free) Lozenge 1 Lozenge Oral two times a day PRN Sore Throat  dextrose 40% Gel 15 Gram(s) Oral once PRN Blood Glucose LESS THAN 70 milliGRAM(s)/deciliter  glucagon  Injectable 1 milliGRAM(s) IntraMuscular once PRN Glucose LESS THAN 70 milligrams/deciliter    Vital Signs Last 24 Hrs  T(C): 36.7 (18 Feb 2020 15:29), Max: 36.8 (18 Feb 2020 09:30)  T(F): 98.1 (18 Feb 2020 15:29), Max: 98.2 (18 Feb 2020 09:30)  HR: 90 (18 Feb 2020 15:29) (77 - 92)  BP: 100/57 (18 Feb 2020 15:29) (100/57 - 121/71)  BP(mean): --  RR: 16 (18 Feb 2020 15:29) (16 - 18)  SpO2: 99% (18 Feb 2020 15:29) (96% - 99%)    Physical exam:    Overall impression  Lymphadenopathy  Liver  spleen    Labs:  CBC Full  -  ( 18 Feb 2020 07:40 )  WBC Count : 5.71 K/uL  RBC Count : 3.41 M/uL  Hemoglobin : 8.3 g/dL  Hematocrit : 27.3 %  Platelet Count - Automated : 114 K/uL  Mean Cell Volume : 80.1 fl  Mean Cell Hemoglobin : 24.3 pg  Mean Cell Hemoglobin Concentration : 30.4 gm/dL  Auto Neutrophil # : x  Auto Lymphocyte # : x  Auto Monocyte # : x  Auto Eosinophil # : x  Auto Basophil # : x  Auto Neutrophil % : x  Auto Lymphocyte % : x  Auto Monocyte % : x  Auto Eosinophil % : x  Auto Basophil % : x    02-18    146<H>  |  120<H>  |  68<H>  ----------------------------<  187<H>  3.8   |  13<L>  |  2.36<H>    Ca    8.7      18 Feb 2020 07:40  Mg     2.2     02-18    TPro  6.0  /  Alb  2.5<L>  /  TBili  0.2  /  DBili  x   /  AST  23  /  ALT  18  /  AlkPhos  83  02-18      Radiology:  HEALTH ISSUES - R/O PROBLEM Dx:      Assessmant / Problems    Plan:    Thank you  Olivia Roberts MD HPI:  73 yo male with a hx of DM, HTN, HLD, iron deficiency anemia, CKD 4 who was brought in by his home health aide for feeling generally unwell and week for the past 2 weeks since his discharge from [facility] for symptomatic anemia. He has felt weakness, malaise and fatigue. He has not been experiencing any shortness of breath, chest pain, cough, sputum production, rhinorrhea odynophagia, difficulty hearing, abdominal pain, nausea, vomiting, diarrhea, constipation. He reports a history of chronic urinary incontinence and difficulty voiding due to known BPH with lower urinary tract symptoms. He denies any fever, chills, or rigors at home. He has been compliant with his medications prior to presentation. No recent abx use or hospitalizations. No dysuria, urgency or frequency that he has noted. No penile discharge. On presentation patient was noted to be in urinary retention w/ anion gap metabolic acidosis, acute renal failure, elevated b hydroxybutyrate and hyperglycemia w/ likely UTI. ICU consulted for management, and patient admitted for DKA c/b UTI.     In the ED: Patient given 3L NS, started on an insulin gtt, and given zosyn. (13 Feb 2020 18:56)    FAMILY HISTORY:  FH: stomach cancer  FHx: breast cancer  FHx: stroke    MEDICATIONS  (STANDING):  cefTRIAXone   IVPB 2000 milliGRAM(s) IV Intermittent every 24 hours  chlorhexidine 2% Cloths 1 Application(s) Topical <User Schedule>  dextrose 5%. 1000 milliLiter(s) (50 mL/Hr) IV Continuous <Continuous>  dextrose 50% Injectable 12.5 Gram(s) IV Push once  dextrose 50% Injectable 25 Gram(s) IV Push once  dextrose 50% Injectable 25 Gram(s) IV Push once  dronabinol 2.5 milliGRAM(s) Oral two times a day before meals  finasteride 5 milliGRAM(s) Oral daily  heparin  Injectable 5000 Unit(s) SubCutaneous every 8 hours  influenza   Vaccine 0.5 milliLiter(s) IntraMuscular once  insulin glargine Injectable (LANTUS) 20 Unit(s) SubCutaneous at bedtime  insulin lispro (HumaLOG) corrective regimen sliding scale   SubCutaneous Before meals and at bedtime  insulin lispro Injectable (HumaLOG) 10 Unit(s) SubCutaneous three times a day before meals  nystatin    Suspension [id number] Unit(s) Oral four times a day  senna 2 Tablet(s) Oral at bedtime  simvastatin 10 milliGRAM(s) Oral at bedtime  sodium ferric gluconate complex IVPB 125 milliGRAM(s) IV Intermittent once  tamsulosin 0.4 milliGRAM(s) Oral at bedtime    MEDICATIONS  (PRN):  acetaminophen   Tablet .. 650 milliGRAM(s) Oral every 6 hours PRN Temp greater or equal to 38C (100.4F)  benzocaine 15 mG/menthol 3.6 mG (Sugar-Free) Lozenge 1 Lozenge Oral two times a day PRN Sore Throat  dextrose 40% Gel 15 Gram(s) Oral once PRN Blood Glucose LESS THAN 70 milliGRAM(s)/deciliter  glucagon  Injectable 1 milliGRAM(s) IntraMuscular once PRN Glucose LESS THAN 70 milligrams/deciliter    Vital Signs Last 24 Hrs  T(C): 36.7 (18 Feb 2020 15:29), Max: 36.8 (18 Feb 2020 09:30)  T(F): 98.1 (18 Feb 2020 15:29), Max: 98.2 (18 Feb 2020 09:30)  HR: 90 (18 Feb 2020 15:29) (77 - 92)  BP: 100/57 (18 Feb 2020 15:29) (100/57 - 121/71)  BP(mean): --  RR: 16 (18 Feb 2020 15:29) (16 - 18)  SpO2: 99% (18 Feb 2020 15:29) (96% - 99%)    Physical exam:    Overall impression  Lymphadenopathy  Liver  spleen    Labs:  CBC Full  -  ( 18 Feb 2020 07:40 )  WBC Count : 5.71 K/uL  RBC Count : 3.41 M/uL  Hemoglobin : 8.3 g/dL  Hematocrit : 27.3 %  Platelet Count - Automated : 114 K/uL  Mean Cell Volume : 80.1 fl  Mean Cell Hemoglobin : 24.3 pg  Mean Cell Hemoglobin Concentration : 30.4 gm/dL  Auto Neutrophil # : x  Auto Lymphocyte # : x  Auto Monocyte # : x  Auto Eosinophil # : x  Auto Basophil # : x  Auto Neutrophil % : x  Auto Lymphocyte % : x  Auto Monocyte % : x  Auto Eosinophil % : x  Auto Basophil % : x    02-18    146<H>  |  120<H>  |  68<H>  ----------------------------<  187<H>  3.8   |  13<L>  |  2.36<H>    Ca    8.7      18 Feb 2020 07:40  Mg     2.2     02-18    TPro  6.0  /  Alb  2.5<L>  /  TBili  0.2  /  DBili  x   /  AST  23  /  ALT  18  /  AlkPhos  83  02-18      Radiology:  HEALTH ISSUES - R/O PROBLEM Dx:      Assessmant / Problems  1)overall improvement of urosepsis on Ceftriaxone iv  2)anemia improving on iv iron, Procrit      Thank you  Olivia Roberts MD

## 2020-02-18 NOTE — PROGRESS NOTE ADULT - PROBLEM SELECTOR PLAN 2
- As above  - No recent instrumentation or risk factors for resistant  organisms  - Likely provoked in the setting of BPH with chronic urinary incontinence vs. retention  - Rodriguez catheter in place, monitor I&O  - Currently with hematuria - red tinged urine, not ash blood  - Continue to monitor  - Urology following, f/u recs  - ID consulted (Dr. Mcwilliams), f/u recs  - f/u retroperitoneal ultrasound read

## 2020-02-18 NOTE — PROGRESS NOTE ADULT - PROBLEM SELECTOR PLAN 1
- Pt presenting with generalized ill feeling and malaise, found to be in DKA with urinary retention c/b klebsiella UTI and bacteremia  - On arrival with WBC 26.36 with 92% neutrophils, tachycardia, lactate 4.1  - Lactate cleared, no longer trending  - UA+ with UCx growing klebsiella, f/u sensitivities  - BCx+ klebsiella pneumonia, Surveillance BCx NGTD  - s/p 1x zosyn in ED  - Continue CTX 2g q24h (start date 2/13)  - Rodriguez catheter in place for strict I&O, currently draining red-tinged urine  - Continue to monitor output  - ID consulted (Dr. Mcwilliams)

## 2020-02-18 NOTE — PROGRESS NOTE ADULT - PROBLEM SELECTOR PLAN 1
1) UCx growing Klebsiella,  BCx+ klebsiella pneumonia , not MDR, both sensitive to Ceftriaxone  2) Continue CTX 2g q24h x 2 weeks (start date 2/13)  3) Surveillance Blood cx negative.   4) Resume Physical therapy  5) Urology f/u RE: urine retention/BPH.

## 2020-02-18 NOTE — PROGRESS NOTE ADULT - SUBJECTIVE AND OBJECTIVE BOX
INTERVAL HPI/OVERNIGHT EVENTS:    Patient is a 74y old  Male who presents with a chief complaint of DKA (18 Feb 2020 18:42)      Pt reports the following symptoms:    CONSTITUTIONAL:  Negative fever or chills, feels well, good appetite  EYES:  Negative  blurry vision or double vision  CARDIOVASCULAR:  Negative for chest pain or palpitations  RESPIRATORY:  Negative for cough, wheezing, or SOB   GASTROINTESTINAL:  Negative for nausea, vomiting, diarrhea, constipation, or abdominal pain  GENITOURINARY:  Negative frequency, urgency or dysuria  NEUROLOGIC:  No headache, confusion, dizziness, lightheadedness    MEDICATIONS  (STANDING):  cefTRIAXone   IVPB 2000 milliGRAM(s) IV Intermittent every 24 hours  chlorhexidine 2% Cloths 1 Application(s) Topical <User Schedule>  dextrose 5%. 1000 milliLiter(s) (50 mL/Hr) IV Continuous <Continuous>  dextrose 50% Injectable 12.5 Gram(s) IV Push once  dextrose 50% Injectable 25 Gram(s) IV Push once  dextrose 50% Injectable 25 Gram(s) IV Push once  dronabinol 2.5 milliGRAM(s) Oral two times a day before meals  finasteride 5 milliGRAM(s) Oral daily  heparin  Injectable 5000 Unit(s) SubCutaneous every 8 hours  influenza   Vaccine 0.5 milliLiter(s) IntraMuscular once  insulin glargine Injectable (LANTUS) 20 Unit(s) SubCutaneous at bedtime  insulin lispro (HumaLOG) corrective regimen sliding scale   SubCutaneous Before meals and at bedtime  insulin lispro Injectable (HumaLOG) 10 Unit(s) SubCutaneous three times a day before meals  nystatin    Suspension [id number] Unit(s) Oral four times a day  senna 2 Tablet(s) Oral at bedtime  simvastatin 10 milliGRAM(s) Oral at bedtime  sodium ferric gluconate complex IVPB 125 milliGRAM(s) IV Intermittent once  tamsulosin 0.4 milliGRAM(s) Oral at bedtime    MEDICATIONS  (PRN):  acetaminophen   Tablet .. 650 milliGRAM(s) Oral every 6 hours PRN Temp greater or equal to 38C (100.4F)  benzocaine 15 mG/menthol 3.6 mG (Sugar-Free) Lozenge 1 Lozenge Oral two times a day PRN Sore Throat  dextrose 40% Gel 15 Gram(s) Oral once PRN Blood Glucose LESS THAN 70 milliGRAM(s)/deciliter  glucagon  Injectable 1 milliGRAM(s) IntraMuscular once PRN Glucose LESS THAN 70 milligrams/deciliter      PHYSICAL EXAM  Vital Signs Last 24 Hrs  T(C): 36.7 (18 Feb 2020 15:29), Max: 36.8 (18 Feb 2020 09:30)  T(F): 98.1 (18 Feb 2020 15:29), Max: 98.2 (18 Feb 2020 09:30)  HR: 90 (18 Feb 2020 15:29) (77 - 92)  BP: 100/57 (18 Feb 2020 15:29) (100/57 - 121/71)  BP(mean): --  RR: 16 (18 Feb 2020 15:29) (16 - 18)  SpO2: 99% (18 Feb 2020 15:29) (96% - 99%)    Constitutional: wn/wd in NAD.   HEENT: NCAT, MMM, OP clear, EOMI, no proptosis or lid retraction  Neck: no thyromegaly or palpable thyroid nodules   Respiratory: lungs CTAB.  Cardiovascular: regular rhythm, normal S1 and S2, no audible murmurs, no peripheral edema  GI: soft, NT/ND, no masses/HSM appreciated.  Neurology: no tremors, DTR 2+  Skin: no visible rashes/lesions  Psychiatric: AAO x 3, normal affect/mood.    LABS:                        8.3    5.71  )-----------( 114      ( 18 Feb 2020 07:40 )             27.3     02-18    146<H>  |  120<H>  |  68<H>  ----------------------------<  187<H>  3.8   |  13<L>  |  2.36<H>    Ca    8.7      18 Feb 2020 07:40  Mg     2.2     02-18    TPro  6.0  /  Alb  2.5<L>  /  TBili  0.2  /  DBili  x   /  AST  23  /  ALT  18  /  AlkPhos  83  02-18        Thyroid Stimulating Hormone, Serum: 0.690 uIU/mL (02-16 @ 06:32)  Thyroid Stimulating Hormone, Serum: 0.532 uIU/mL (02-15 @ 06:23)      HbA1C: 8.4 % (02-05 @ 06:22)    CAPILLARY BLOOD GLUCOSE      POCT Blood Glucose.: 100 mg/dL (18 Feb 2020 18:29)  POCT Blood Glucose.: 159 mg/dL (18 Feb 2020 12:23)  POCT Blood Glucose.: 208 mg/dL (18 Feb 2020 08:49)  POCT Blood Glucose.: 155 mg/dL (17 Feb 2020 22:33)      Insulin Sliding Scale requirements X 24 Hours:    RADIOLOGY & ADDITIONAL TESTS:    A/P: 74y Male with history of DM type II presenting for       1.  DM -     Please continue           units lantus at bedtime  / in the morning and        units lispro with meals and lispro moderate / low dose sliding scale 4 times daily with meals and at bedtime.  Please continue consistent carbohydrate diet.      Goal FSG is   Will continue to monitor   For discharge, pt can continue    Pt can follow up at discharge with [facility] by calling  to make an appointment.   Will discuss case with     and update primary team INTERVAL HPI/OVERNIGHT EVENTS:    Patient seen and examined at the bedside. No acute events overnight. his Am cortisol level was 21.8 today morning. His appetite has been poor and was drinking glucerna.   FSG & Insulin received:  Yesterday:  pre-dinner fs, 10 nutritional lispro   units +2   units lispro SS  bedtime fs, 20 lantus   units +   2 units lispro SS  Today:  pre-breakfast fs, 20 lantus, 10 nutritional lispro   units+  4  units lispro SS  pre-lunch fs     Pt reports the following symptoms:  CONSTITUTIONAL:  Negative fever or chills,   CARDIOVASCULAR:  Negative for chest pain or palpitations  RESPIRATORY:  Negative for cough, wheezing, or SOB   GASTROINTESTINAL:  Negative for nausea, vomiting, diarrhea, constipation, or abdominal pain  NEUROLOGIC:  No headache, confusion, dizziness, lightheadedness    MEDICATIONS  (STANDING):  cefTRIAXone   IVPB 2000 milliGRAM(s) IV Intermittent every 24 hours  chlorhexidine 2% Cloths 1 Application(s) Topical <User Schedule>  dextrose 5%. 1000 milliLiter(s) (50 mL/Hr) IV Continuous <Continuous>  dextrose 50% Injectable 12.5 Gram(s) IV Push once  dextrose 50% Injectable 25 Gram(s) IV Push once  dextrose 50% Injectable 25 Gram(s) IV Push once  dronabinol 2.5 milliGRAM(s) Oral two times a day before meals  finasteride 5 milliGRAM(s) Oral daily  heparin  Injectable 5000 Unit(s) SubCutaneous every 8 hours  influenza   Vaccine 0.5 milliLiter(s) IntraMuscular once  insulin glargine Injectable (LANTUS) 20 Unit(s) SubCutaneous at bedtime  insulin lispro (HumaLOG) corrective regimen sliding scale   SubCutaneous Before meals and at bedtime  insulin lispro Injectable (HumaLOG) 10 Unit(s) SubCutaneous three times a day before meals  nystatin    Suspension [id number] Unit(s) Oral four times a day  senna 2 Tablet(s) Oral at bedtime  simvastatin 10 milliGRAM(s) Oral at bedtime  sodium ferric gluconate complex IVPB 125 milliGRAM(s) IV Intermittent once  tamsulosin 0.4 milliGRAM(s) Oral at bedtime    MEDICATIONS  (PRN):  acetaminophen   Tablet .. 650 milliGRAM(s) Oral every 6 hours PRN Temp greater or equal to 38C (100.4F)  benzocaine 15 mG/menthol 3.6 mG (Sugar-Free) Lozenge 1 Lozenge Oral two times a day PRN Sore Throat  dextrose 40% Gel 15 Gram(s) Oral once PRN Blood Glucose LESS THAN 70 milliGRAM(s)/deciliter  glucagon  Injectable 1 milliGRAM(s) IntraMuscular once PRN Glucose LESS THAN 70 milligrams/deciliter      PHYSICAL EXAM  Vital Signs Last 24 Hrs  T(C): 36.7 (2020 15:29), Max: 36.8 (2020 09:30)  T(F): 98.1 (2020 15:29), Max: 98.2 (2020 09:30)  HR: 90 (2020 15:29) (77 - 92)  BP: 100/57 (2020 15:29) (100/57 - 121/71)  BP(mean): --  RR: 16 (2020 15:29) (16 - 18)  SpO2: 99% (2020 15:29) (96% - 99%)    Constitutional: wn/wd in NAD.   Respiratory: lungs CTAB.  Cardiovascular: regular rhythm, normal S1 and S2, no peripheral edema  GI: soft, NT/ND, no masses/HSM appreciated.  Neurology: no tremors, DTR 1+      LABS:                        8.3    5.71  )-----------( 114      ( 2020 07:40 )             27.3     02-18    146<H>  |  120<H>  |  68<H>  ----------------------------<  187<H>  3.8   |  13<L>  |  2.36<H>    Ca    8.7      2020 07:40  Mg     2.2     -    TPro  6.0  /  Alb  2.5<L>  /  TBili  0.2  /  DBili  x   /  AST  23  /  ALT  18  /  AlkPhos  83  -        Thyroid Stimulating Hormone, Serum: 0.690 uIU/mL ( @ 06:32)  Thyroid Stimulating Hormone, Serum: 0.532 uIU/mL (02-15 @ 06:23)      HbA1C: 8.4 % ( @ 06:22)    CAPILLARY BLOOD GLUCOSE      POCT Blood Glucose.: 100 mg/dL (2020 18:29)  POCT Blood Glucose.: 159 mg/dL (2020 12:23)  POCT Blood Glucose.: 208 mg/dL (2020 08:49)  POCT Blood Glucose.: 155 mg/dL (2020 22:33)      Insulin Sliding Scale requirements X 24 Hours:    RADIOLOGY & ADDITIONAL TESTS:    A/P: 75 yo male with hx of uncontrolled DM II, BPH found to have poly factorial anion gap metabolic acidosis, mild DKA and urinary retention with concurrent urinary tract infection.    1.  DM - type 2, uncontrolled, complicated. Persistent hyperglycemia   -A1C: 8.4%  Weight: 87kg/BMI 28  Cr/GRF: 2.93 (baseline in 2s)  Please increase lantus to 28 units in morning.   Please continue Lantus 20 units at bedtime.   Please continue lispro 10 units before each meal.    Please continue lispro moderate  dose sliding scale four times daily with meals and at bedtime  - please obtain 300 Am FSG  Pt's fingerstick glucose goal is 100-180  Will continue to monitor     2. euthyroidal sick illness  Low TSH, Ft4 0.54. ( 2/15) - before megace. On 20 TSH 0.690, free T4 0.54   Anti-thyroid ABs negative. TT3 48.   - AM cortisol was 21.8 ( no adrenal insufficiency)  - We will repeat TFTs in 3 to 4 days.    Pt can follow up at discharge with [facility] by calling  to make an appointment.   discussed case with  and updated primary team

## 2020-02-18 NOTE — PROGRESS NOTE ADULT - SUBJECTIVE AND OBJECTIVE BOX
in progress    INTERVAL HPI/OVERNIGHT EVENTS:    SUBJECTIVE: Patient seen and examined at bedside.    VITAL SIGNS:  ICU Vital Signs Last 24 Hrs  T(C): 36.8 (18 Feb 2020 09:30), Max: 36.8 (18 Feb 2020 09:30)  T(F): 98.2 (18 Feb 2020 09:30), Max: 98.2 (18 Feb 2020 09:30)  HR: 86 (18 Feb 2020 09:30) (77 - 92)  BP: 121/71 (18 Feb 2020 09:30) (110/69 - 121/71)  BP(mean): --  ABP: --  ABP(mean): --  RR: 17 (18 Feb 2020 09:30) (17 - 18)  SpO2: 98% (18 Feb 2020 09:30) (96% - 98%)        02-17 @ 07:01  -  02-18 @ 07:00  --------------------------------------------------------  IN: 210 mL / OUT: 1290 mL / NET: -1080 mL    02-18 @ 07:01  -  02-18 @ 11:36  --------------------------------------------------------  IN: 0 mL / OUT: 400 mL / NET: -400 mL      CAPILLARY BLOOD GLUCOSE      POCT Blood Glucose.: 208 mg/dL (18 Feb 2020 08:49)      PHYSICAL EXAM:    Constitutional: NAD  HEENT: NC/AT; PERRL, anicteric sclera; MMM  Neck: supple, no JVD  Cardiovascular: +S1/S2, RRR  Respiratory: CTA B/L, no W/R/R  Gastrointestinal: abdomen soft, NT/ND; no rebound or guarding; +BSx4  Genitourinary: no suprapubic tenderness or fullness  Extremities: WWP; no LE edema; no clubbing or cyanosis  Vascular: 2+ radial, DP/PT and femoral pulses B/L  Dermatologic: normal color and turgor; no visible rashes  Neurological:     MEDICATIONS:  MEDICATIONS  (STANDING):  cefTRIAXone   IVPB 2000 milliGRAM(s) IV Intermittent every 24 hours  chlorhexidine 2% Cloths 1 Application(s) Topical <User Schedule>  dextrose 5%. 1000 milliLiter(s) (50 mL/Hr) IV Continuous <Continuous>  dextrose 50% Injectable 12.5 Gram(s) IV Push once  dextrose 50% Injectable 25 Gram(s) IV Push once  dextrose 50% Injectable 25 Gram(s) IV Push once  dronabinol 2.5 milliGRAM(s) Oral two times a day before meals  finasteride 5 milliGRAM(s) Oral daily  heparin  Injectable 5000 Unit(s) SubCutaneous every 8 hours  influenza   Vaccine 0.5 milliLiter(s) IntraMuscular once  insulin glargine Injectable (LANTUS) 20 Unit(s) SubCutaneous every morning  insulin lispro (HumaLOG) corrective regimen sliding scale   SubCutaneous Before meals and at bedtime  insulin lispro Injectable (HumaLOG) 10 Unit(s) SubCutaneous three times a day before meals  nystatin    Suspension 361793 Unit(s) Oral four times a day  senna 2 Tablet(s) Oral at bedtime  simvastatin 10 milliGRAM(s) Oral at bedtime  tamsulosin 0.4 milliGRAM(s) Oral at bedtime    MEDICATIONS  (PRN):  acetaminophen   Tablet .. 650 milliGRAM(s) Oral every 6 hours PRN Temp greater or equal to 38C (100.4F)  benzocaine 15 mG/menthol 3.6 mG (Sugar-Free) Lozenge 1 Lozenge Oral two times a day PRN Sore Throat  dextrose 40% Gel 15 Gram(s) Oral once PRN Blood Glucose LESS THAN 70 milliGRAM(s)/deciliter  glucagon  Injectable 1 milliGRAM(s) IntraMuscular once PRN Glucose LESS THAN 70 milligrams/deciliter      ALLERGIES:  Allergies    No Known Allergies    Intolerances        LABS:                        8.3    5.71  )-----------( 114      ( 18 Feb 2020 07:40 )             27.3     02-18    146<H>  |  120<H>  |  68<H>  ----------------------------<  187<H>  3.8   |  13<L>  |  2.36<H>    Ca    8.7      18 Feb 2020 07:40  Mg     2.2     02-18    TPro  6.0  /  Alb  2.5<L>  /  TBili  0.2  /  DBili  x   /  AST  23  /  ALT  18  /  AlkPhos  83  02-18          RADIOLOGY & ADDITIONAL TESTS: Reviewed. INTERVAL HPI/OVERNIGHT EVENTS: NAOE    SUBJECTIVE: Patient seen and examined at bedside.    VITAL SIGNS:  ICU Vital Signs Last 24 Hrs  T(C): 36.8 (18 Feb 2020 09:30), Max: 36.8 (18 Feb 2020 09:30)  T(F): 98.2 (18 Feb 2020 09:30), Max: 98.2 (18 Feb 2020 09:30)  HR: 86 (18 Feb 2020 09:30) (77 - 92)  BP: 121/71 (18 Feb 2020 09:30) (110/69 - 121/71)  BP(mean): --  ABP: --  ABP(mean): --  RR: 17 (18 Feb 2020 09:30) (17 - 18)  SpO2: 98% (18 Feb 2020 09:30) (96% - 98%)        02-17 @ 07:01  -  02-18 @ 07:00  --------------------------------------------------------  IN: 210 mL / OUT: 1290 mL / NET: -1080 mL    02-18 @ 07:01  -  02-18 @ 11:36  --------------------------------------------------------  IN: 0 mL / OUT: 400 mL / NET: -400 mL      CAPILLARY BLOOD GLUCOSE      POCT Blood Glucose.: 208 mg/dL (18 Feb 2020 08:49)      PHYSICAL EXAM:    Constitutional: NAD  HEENT: NC/AT; PERRL, anicteric sclera; MMM  Neck: supple, no JVD  Cardiovascular: +S1/S2, RRR  Respiratory: CTA B/L, no W/R/R  Gastrointestinal: abdomen soft, NT/ND; no rebound or guarding; +BSx4  Genitourinary: no suprapubic tenderness or fullness  Extremities: WWP; no LE edema; no clubbing or cyanosis  Vascular: 2+ radial, DP/PT and femoral pulses B/L  Dermatologic: normal color and turgor; no visible rashes  Neurological:     MEDICATIONS:  MEDICATIONS  (STANDING):  cefTRIAXone   IVPB 2000 milliGRAM(s) IV Intermittent every 24 hours  chlorhexidine 2% Cloths 1 Application(s) Topical <User Schedule>  dextrose 5%. 1000 milliLiter(s) (50 mL/Hr) IV Continuous <Continuous>  dextrose 50% Injectable 12.5 Gram(s) IV Push once  dextrose 50% Injectable 25 Gram(s) IV Push once  dextrose 50% Injectable 25 Gram(s) IV Push once  dronabinol 2.5 milliGRAM(s) Oral two times a day before meals  finasteride 5 milliGRAM(s) Oral daily  heparin  Injectable 5000 Unit(s) SubCutaneous every 8 hours  influenza   Vaccine 0.5 milliLiter(s) IntraMuscular once  insulin glargine Injectable (LANTUS) 20 Unit(s) SubCutaneous every morning  insulin lispro (HumaLOG) corrective regimen sliding scale   SubCutaneous Before meals and at bedtime  insulin lispro Injectable (HumaLOG) 10 Unit(s) SubCutaneous three times a day before meals  nystatin    Suspension 442273 Unit(s) Oral four times a day  senna 2 Tablet(s) Oral at bedtime  simvastatin 10 milliGRAM(s) Oral at bedtime  tamsulosin 0.4 milliGRAM(s) Oral at bedtime    MEDICATIONS  (PRN):  acetaminophen   Tablet .. 650 milliGRAM(s) Oral every 6 hours PRN Temp greater or equal to 38C (100.4F)  benzocaine 15 mG/menthol 3.6 mG (Sugar-Free) Lozenge 1 Lozenge Oral two times a day PRN Sore Throat  dextrose 40% Gel 15 Gram(s) Oral once PRN Blood Glucose LESS THAN 70 milliGRAM(s)/deciliter  glucagon  Injectable 1 milliGRAM(s) IntraMuscular once PRN Glucose LESS THAN 70 milligrams/deciliter      ALLERGIES:  Allergies    No Known Allergies    Intolerances        LABS:                        8.3    5.71  )-----------( 114      ( 18 Feb 2020 07:40 )             27.3     02-18    146<H>  |  120<H>  |  68<H>  ----------------------------<  187<H>  3.8   |  13<L>  |  2.36<H>    Ca    8.7      18 Feb 2020 07:40  Mg     2.2     02-18    TPro  6.0  /  Alb  2.5<L>  /  TBili  0.2  /  DBili  x   /  AST  23  /  ALT  18  /  AlkPhos  83  02-18          RADIOLOGY & ADDITIONAL TESTS: Reviewed.

## 2020-02-18 NOTE — PROGRESS NOTE ADULT - PROBLEM SELECTOR PROBLEM 6
Subjective   Patient is a 67-year-old female complaining of increased generalized weakness over the past several days as well as mild increase in her baseline shortness of breath.  Shortness of breath is mild.  She has cough fever chest pain vomiting or other associated complaints.          Review of Systems  Negative for headache earache sore throat cough fever chest pain abdominal pain Bondar dysuria achiness weight loss or other associated complaints.  Past Medical History:   Diagnosis Date   • Acute congestive heart failure (CMS/[facility])    • Asthma    • Bipolar 1 disorder (CMS/[facility])    • COPD (chronic obstructive pulmonary disease) (CMS/[facility])    • Depression    • Dyslipidemia    • Fibromyalgia    • Hypertension    • Hypothyroid    • IBS (irritable bowel syndrome)    • Migraines    • Morbid obesity (CMS/[facility])    • Neuropathy    • Sleep apnea    • Spinal stenosis    • Vertigo        Allergies   Allergen Reactions   • Adhesive Tape Unknown (See Comments)     hives   • Butorphanol Unknown (See Comments)     unknown   • Garlic Unknown (See Comments)   • Tetanus-Diphtheria Toxoids Td Unknown (See Comments)   • Aloe Itching   • Bee Pollen Unknown (See Comments)   • Latex Unknown (See Comments)   • Macadamia Nut Oil Unknown (See Comments)   • Tuberculin Unknown (See Comments)   • Wasp Venom Unknown (See Comments)       Past Surgical History:   Procedure Laterality Date   • CARDIAC CATHETERIZATION  2009   • CARDIAC CATHETERIZATION N/A 8/14/2019    Procedure: Left Heart Cath;  Surgeon: Jose Levi MD;  Location: [facility] CATH INVASIVE LOCATION;  Service: Cardiovascular   • CARDIAC CATHETERIZATION N/A 8/14/2019    Procedure: Right Heart Cath;  Surgeon: Jose Levi MD;  Location: [facility] CATH INVASIVE LOCATION;  Service: Cardiovascular   • CARDIAC CATHETERIZATION N/A 8/14/2019    Procedure: Aortic root aortogram;  Surgeon: Jose Levi MD;  Location: [facility] CATH INVASIVE LOCATION;  Service:  Cardiovascular   • CYSTOCELE REPAIR  1984   • FOOT SURGERY     • GALLBLADDER SURGERY  2002   • VAGINAL HYSTERECTOMY  1972   • WRIST SURGERY  2011       No family history on file.    Social History     Socioeconomic History   • Marital status:      Spouse name: Not on file   • Number of children: Not on file   • Years of education: Not on file   • Highest education level: Not on file   Tobacco Use   • Smoking status: Current Every Day Smoker     Types: Cigarettes   • Smokeless tobacco: Never Used   • Tobacco comment: patient is trying to quit, has patch on    Substance and Sexual Activity   • Alcohol use: No     Frequency: Never           Objective   Physical Exam  Neurologic exam is nonfocal.  HEENT exam shows TMs to be clear.  Oropharynx comers but sclerae nonicteric.  Neck has no adenopathy JVD or bruits.  Lungs are clear.  Heart has a regular rate rhythm without murmur rub or gallop.  Chest is nontender.  Abdomen is soft nontender.  Extremity exam is no cyanosis or edema.  Procedures           ED Course      Results for orders placed or performed during the hospital encounter of 02/18/20   Influenza Antigen, Rapid - Swab, Nasopharynx   Result Value Ref Range    Influenza A PCR Not Detected Not Detected    Influenza B PCR Not Detected Not Detected   Basic Metabolic Panel   Result Value Ref Range    Glucose 137 (H) 65 - 99 mg/dL    BUN 7 (L) 8 - 23 mg/dL    Creatinine 0.68 0.57 - 1.00 mg/dL    Sodium 138 136 - 145 mmol/L    Potassium 3.8 3.5 - 5.2 mmol/L    Chloride 101 98 - 107 mmol/L    CO2 26.0 22.0 - 29.0 mmol/L    Calcium 8.9 8.6 - 10.5 mg/dL    eGFR Non African Amer 86 >60 mL/min/1.73    BUN/Creatinine Ratio 10.3 7.0 - 25.0    Anion Gap 11.0 5.0 - 15.0 mmol/L   Troponin   Result Value Ref Range    Troponin T <0.010 0.000 - 0.030 ng/mL   BNP   Result Value Ref Range    proBNP 1,271.0 (H) 5.0 - 900.0 pg/mL   CBC Auto Differential   Result Value Ref Range    WBC 7.30 3.40 - 10.80 10*3/mm3    RBC 4.04 3.77  - 5.28 10*6/mm3    Hemoglobin 12.4 12.0 - 15.9 g/dL    Hematocrit 36.9 34.0 - 46.6 %    MCV 91.5 79.0 - 97.0 fL    MCH 30.8 26.6 - 33.0 pg    MCHC 33.7 31.5 - 35.7 g/dL    RDW 13.2 12.3 - 15.4 %    RDW-SD 42.9 37.0 - 54.0 fl    MPV 8.9 6.0 - 12.0 fL    Platelets 205 140 - 450 10*3/mm3    Neutrophil % 70.2 42.7 - 76.0 %    Lymphocyte % 20.2 19.6 - 45.3 %    Monocyte % 8.3 5.0 - 12.0 %    Eosinophil % 0.4 0.3 - 6.2 %    Basophil % 0.9 0.0 - 1.5 %    Neutrophils, Absolute 5.10 1.70 - 7.00 10*3/mm3    Lymphocytes, Absolute 1.50 0.70 - 3.10 10*3/mm3    Monocytes, Absolute 0.60 0.10 - 0.90 10*3/mm3    Eosinophils, Absolute 0.00 0.00 - 0.40 10*3/mm3    Basophils, Absolute 0.10 0.00 - 0.20 10*3/mm3    nRBC 0.1 0.0 - 0.2 /100 WBC     Xr Chest 1 View    Result Date: 2/18/2020  Linear subsegmental atelectasis versus scarring in the midlung zones. No consolidation.  Stable cardiac megaly without overt pulmonary edema.  Electronically Signed By-Dr. Yue Miller MD On:2/18/2020 9:16 AM This report was finalized on [id number] by Dr. Yue Miller MD.                                         MDM  Number of Diagnoses or Management Options  Diagnosis management comments: Patient has findings consistent with CHF secondary to mild CHF exacerbation.  There is no evidence of acute infectious process including pneumonia.  Metabolic panel is at baseline.  Patient will be discharged and will take 1 extra Lasix pill over the next 3 days.  She will follow with MD for further evaluation as needed.    Risk of Complications, Morbidity, and/or Mortality  Presenting problems: high  Diagnostic procedures: high  Management options: high    Patient Progress  Patient progress: stable      Final diagnoses:   Dyspnea, unspecified type   Acute on chronic congestive heart failure, unspecified heart failure type (CMS/HCC)   Weakness            Wurst, Perez, MD  02/18/20 1016     Anemia

## 2020-02-18 NOTE — PROGRESS NOTE ADULT - SUBJECTIVE AND OBJECTIVE BOX
No acute events overnight. No complaints.   Denies n/v. Pain controlled.  Palacio with clear yellow urine with some sediment.  No CVAT or SPT.  Cr downtrending  c/w palacio  Repeat RP ultrasound  Will need outpatient cystoscopy and UDS outpatient.   Can followup with Dr. Adams    Vital Signs Last 24 Hrs  T(C): 36.8 (18 Feb 2020 09:30), Max: 36.8 (18 Feb 2020 09:30)  T(F): 98.2 (18 Feb 2020 09:30), Max: 98.2 (18 Feb 2020 09:30)  HR: 86 (18 Feb 2020 09:30) (77 - 92)  BP: 121/71 (18 Feb 2020 09:30) (110/69 - 121/71)  BP(mean): --  RR: 17 (18 Feb 2020 09:30) (17 - 18)  SpO2: 98% (18 Feb 2020 09:30) (96% - 98%)                        8.3    5.71  )-----------( 114      ( 18 Feb 2020 07:40 )             27.3   18 Feb 2020 07:40    146    |  120    |  68     ----------------------------<  187    3.8     |  13     |  2.36     Ca    8.7        18 Feb 2020 07:40  Mg     2.2       18 Feb 2020 07:40    TPro  6.0    /  Alb  2.5    /  TBili  0.2    /  DBili  x      /  AST  23     /  ALT  18     /  AlkPhos  83     18 Feb 2020 07:40

## 2020-02-18 NOTE — PROGRESS NOTE ADULT - SUBJECTIVE AND OBJECTIVE BOX
INTERVAL HPI/OVERNIGHT EVENTS: No change overnight, no fever    CONSTITUTIONAL:  Negative fever or chills,  EYES:  legally blind  CARDIOVASCULAR:  Negative for chest pain or palpitations  RESPIRATORY:  Negative for cough, wheezing, or SOB   GASTROINTESTINAL:  Negative for nausea, vomiting, diarrhea, constipation, or abdominal pain  GENITOURINARY: Palacio in place  NEUROLOGIC:  No headache, confusion, dizziness, lightheadedness      ANTIBIOTICS/RELEVANT:    MEDICATIONS  (STANDING):  cefTRIAXone   IVPB 2000 milliGRAM(s) IV Intermittent every 24 hours  chlorhexidine 2% Cloths 1 Application(s) Topical <User Schedule>  dextrose 5%. 1000 milliLiter(s) (50 mL/Hr) IV Continuous <Continuous>  dextrose 50% Injectable 12.5 Gram(s) IV Push once  dextrose 50% Injectable 25 Gram(s) IV Push once  dextrose 50% Injectable 25 Gram(s) IV Push once  dronabinol 2.5 milliGRAM(s) Oral two times a day before meals  finasteride 5 milliGRAM(s) Oral daily  heparin  Injectable 5000 Unit(s) SubCutaneous every 8 hours  influenza   Vaccine 0.5 milliLiter(s) IntraMuscular once  insulin glargine Injectable (LANTUS) 20 Unit(s) SubCutaneous every morning  insulin lispro (HumaLOG) corrective regimen sliding scale   SubCutaneous Before meals and at bedtime  insulin lispro Injectable (HumaLOG) 10 Unit(s) SubCutaneous three times a day before meals  nystatin    Suspension [id number] Unit(s) Oral four times a day  senna 2 Tablet(s) Oral at bedtime  simvastatin 10 milliGRAM(s) Oral at bedtime  tamsulosin 0.4 milliGRAM(s) Oral at bedtime    MEDICATIONS  (PRN):  acetaminophen   Tablet .. 650 milliGRAM(s) Oral every 6 hours PRN Temp greater or equal to 38C (100.4F)  benzocaine 15 mG/menthol 3.6 mG (Sugar-Free) Lozenge 1 Lozenge Oral two times a day PRN Sore Throat  dextrose 40% Gel 15 Gram(s) Oral once PRN Blood Glucose LESS THAN 70 milliGRAM(s)/deciliter  glucagon  Injectable 1 milliGRAM(s) IntraMuscular once PRN Glucose LESS THAN 70 milligrams/deciliter        Vital Signs Last 24 Hrs  T(C): 36.7 (18 Feb 2020 15:29), Max: 36.8 (18 Feb 2020 09:30)  T(F): 98.1 (18 Feb 2020 15:29), Max: 98.2 (18 Feb 2020 09:30)  HR: 90 (18 Feb 2020 15:29) (77 - 92)  BP: 100/57 (18 Feb 2020 15:29) (100/57 - 121/71)  BP(mean): --  RR: 16 (18 Feb 2020 15:29) (16 - 18)  SpO2: 99% (18 Feb 2020 15:29) (96% - 99%)    02-17-20 @ 07:01  -  02-18-20 @ 07:00  --------------------------------------------------------  IN: 210 mL / OUT: 1290 mL / NET: -1080 mL    02-18-20 @ 07:01  -  02-18-20 @ 17:29  --------------------------------------------------------  IN: 0 mL / OUT: 650 mL / NET: -650 mL      PHYSICAL EXAM:  Constitutional: WDWN resting comfortably in bed; appears fatigued  Head: NC/AT; mild pallor  Eyes: PERRL, EOMI, anicteric sclera  ENT: no nasal discharge; MMM  Neck: supple; no JVD  Respiratory: CTA B/L; no W/R/R, no retractions; speaking in full sentences without difficulty, soft voice and intermittently slow to respond but responds appropriately  Cardiac: +S1/S2; RRR; no M/R/G   Gastrointestinal: soft, NT/ND; no rebound or guarding; +BSx4  Genitourinary: palacio catheter in place, draining red-tinged urine 100cc in bag  Extremities: WWP, no clubbing or cyanosis; no peripheral edema; mildly pale  Musculoskeletal: moving all extremities spontaneously  Vascular: 2+ radial, PT pulses B/L  Dermatologic: skin warm, dry and intact; no rashes, wounds, or scars  Neurologic: AAOx3; CNII-XII grossly intact; no focal deficits; mm strength 5/5 UE LE BL; sensation intact UE LE BL    LABS:                        8.3    5.71  )-----------( 114      ( 18 Feb 2020 07:40 )             27.3     02-18    146<H>  |  120<H>  |  68<H>  ----------------------------<  187<H>  3.8   |  13<L>  |  2.36<H>    Ca    8.7      18 Feb 2020 07:40  Mg     2.2     02-18    TPro  6.0  /  Alb  2.5<L>  /  TBili  0.2  /  DBili  x   /  AST  23  /  ALT  18  /  AlkPhos  83  02-18          MICROBIOLOGY:    RADIOLOGY & ADDITIONAL STUDIES: INTERVAL HPI/OVERNIGHT EVENTS: No change overnight, no fever    CONSTITUTIONAL:  Negative fever or chills,  EYES:  legally blind  CARDIOVASCULAR:  Negative for chest pain or palpitations  RESPIRATORY:  Negative for cough, wheezing, or SOB   GASTROINTESTINAL:  Negative for nausea, vomiting, diarrhea, constipation, or abdominal pain  GENITOURINARY: Palacio in place  NEUROLOGIC:  No headache, confusion, dizziness, lightheadedness      ANTIBIOTICS/RELEVANT:    MEDICATIONS  (STANDING):  cefTRIAXone   IVPB 2000 milliGRAM(s) IV Intermittent every 24 hours  chlorhexidine 2% Cloths 1 Application(s) Topical <User Schedule>  dextrose 5%. 1000 milliLiter(s) (50 mL/Hr) IV Continuous <Continuous>  dextrose 50% Injectable 12.5 Gram(s) IV Push once  dextrose 50% Injectable 25 Gram(s) IV Push once  dextrose 50% Injectable 25 Gram(s) IV Push once  dronabinol 2.5 milliGRAM(s) Oral two times a day before meals  finasteride 5 milliGRAM(s) Oral daily  heparin  Injectable 5000 Unit(s) SubCutaneous every 8 hours  influenza   Vaccine 0.5 milliLiter(s) IntraMuscular once  insulin glargine Injectable (LANTUS) 20 Unit(s) SubCutaneous every morning  insulin lispro (HumaLOG) corrective regimen sliding scale   SubCutaneous Before meals and at bedtime  insulin lispro Injectable (HumaLOG) 10 Unit(s) SubCutaneous three times a day before meals  nystatin    Suspension [id number] Unit(s) Oral four times a day  senna 2 Tablet(s) Oral at bedtime  simvastatin 10 milliGRAM(s) Oral at bedtime  tamsulosin 0.4 milliGRAM(s) Oral at bedtime    MEDICATIONS  (PRN):  acetaminophen   Tablet .. 650 milliGRAM(s) Oral every 6 hours PRN Temp greater or equal to 38C (100.4F)  benzocaine 15 mG/menthol 3.6 mG (Sugar-Free) Lozenge 1 Lozenge Oral two times a day PRN Sore Throat  dextrose 40% Gel 15 Gram(s) Oral once PRN Blood Glucose LESS THAN 70 milliGRAM(s)/deciliter  glucagon  Injectable 1 milliGRAM(s) IntraMuscular once PRN Glucose LESS THAN 70 milligrams/deciliter        Vital Signs Last 24 Hrs  T(C): 36.7 (18 Feb 2020 15:29), Max: 36.8 (18 Feb 2020 09:30)  T(F): 98.1 (18 Feb 2020 15:29), Max: 98.2 (18 Feb 2020 09:30)  HR: 90 (18 Feb 2020 15:29) (77 - 92)  BP: 100/57 (18 Feb 2020 15:29) (100/57 - 121/71)  BP(mean): --  RR: 16 (18 Feb 2020 15:29) (16 - 18)  SpO2: 99% (18 Feb 2020 15:29) (96% - 99%)    02-17-20 @ 07:01  -  02-18-20 @ 07:00  --------------------------------------------------------  IN: 210 mL / OUT: 1290 mL / NET: -1080 mL    02-18-20 @ 07:01  -  02-18-20 @ 17:29  --------------------------------------------------------  IN: 0 mL / OUT: 650 mL / NET: -650 mL      PHYSICAL EXAM:  Constitutional: WDWN resting comfortably in bed; appears fatigued  Head: NC/AT; mild pallor  Eyes: PERRL, EOMI, anicteric sclera  ENT: no nasal discharge; MMM  Neck: supple; no JVD  Respiratory: CTA B/L; no W/R/R, no retractions; speaking in full sentences without difficulty, soft voice and intermittently slow to respond but responds appropriately  Cardiac: +S1/S2; RRR; no M/R/G   Gastrointestinal: soft, NT/ND; no rebound or guarding; +BSx4  Genitourinary: palacio catheter in place, draining red-tinged urine 100cc in bag  Extremities: WWP, no clubbing or cyanosis; no peripheral edema; mildly pale  Musculoskeletal: moving all extremities spontaneously  Vascular: 2+ radial, PT pulses B/L  Dermatologic: skin warm, dry and intact; no rashes, wounds, or scars  Neurologic: AAOx3; CNII-XII grossly intact; no focal deficits; mm strength 5/5 UE LE BL; sensation intact UE LE BL    LABS:                        8.3    5.71  )-----------( 114      ( 18 Feb 2020 07:40 )             27.3     02-18    146<H>  |  120<H>  |  68<H>  ----------------------------<  187<H>  3.8   |  13<L>  |  2.36<H>    Ca    8.7      18 Feb 2020 07:40  Mg     2.2     02-18    TPro  6.0  /  Alb  2.5<L>  /  TBili  0.2  /  DBili  x   /  AST  23  /  ALT  18  /  AlkPhos  83  02-18          MICROBIOLOGY:  Culture - Blood (02.15.20 @ 18:11)    Specimen Source: .Blood None    Culture Results:   No growth at 2 days.    RADIOLOGY & ADDITIONAL STUDIES: US Retroperitoneal Complete (02.17.20 @ 15:43)     IMPRESSION:  1.  Enlarged prostate with a total volume of 65 mL.    2.  Moderate bilateral hydronephrosis.    3.  Bladder wall thickening. Palacio catheter within thebladder lumen.

## 2020-02-18 NOTE — PROGRESS NOTE ADULT - ASSESSMENT
Pt is a 73 yo M with PMH DM, HTN, HLD, BPH (chronic incont/ret), SERA, CKD4, and a-fib (s/p ablation 2011) who presented to [facility] for general feelings of illness, weakness, and fatigue x2 wks. Initially admitted to ICU for DKA with klebsiella UTI and bacteremia, now stepped down to [facility] for further observation and management.

## 2020-02-18 NOTE — PROGRESS NOTE ADULT - ASSESSMENT
Pt is a 73 yo M with PMH DM, HTN, HLD, BPH (chronic incont/ret), SERA, CKD4, and a-fib (s/p ablation 2011) who presented to [facility] for general feelings of illness, weakness, and fatigue x2 wks. Initially admitted to ICU for DKA with Klebsiella UTI and bacteremia, improving

## 2020-02-19 NOTE — PROGRESS NOTE ADULT - PROBLEM SELECTOR PLAN 1
- Pt presenting with generalized ill feeling and malaise, found to be in DKA with urinary retention c/b klebsiella UTI and bacteremia  - On arrival with WBC 26.36 with 92% neutrophils, tachycardia, lactate 4.1  - Lactate cleared, no longer trending  - UA+ with UCx growing klebsiella  - BCx+ klebsiella pneumonia, Surveillance BCx NGTD  - Continue CTX 2g q24h (stop date 2/27)  - Rodriguez catheter in place for strict I&O, currently draining red-tinged urine  - Continue to monitor output  - ID consulted, f/u recs (Dr. Mcwilliams)

## 2020-02-19 NOTE — PROGRESS NOTE ADULT - ASSESSMENT
Pt is a 75 yo M with PMH DM, HTN, HLD, BPH (chronic incont/ret), SERA, CKD4, and a-fib (s/p ablation 2011) who presented to [facility] for general feelings of illness, weakness, and fatigue x2 wks. Initially admitted to ICU for DKA with Klebsiella UTI and bacteremia, improving

## 2020-02-19 NOTE — PROGRESS NOTE ADULT - ASSESSMENT
Pt is a 75 yo M with PMH DM, HTN, HLD, BPH (chronic incont/ret), SERA, CKD4, and a-fib (s/p ablation 2011) who presented to [facility] for general feelings of illness, weakness, and fatigue x2 wks. Initially admitted to ICU for DKA with klebsiella UTI and bacteremia, now stepped down to [facility] for further observation and management.

## 2020-02-19 NOTE — PROGRESS NOTE ADULT - PROBLEM SELECTOR PLAN 6
- Pt with known hx BPH  - Continue home med finasteride 5mg daily  - Initiated tamsulosin 0.4mg daily  - Monitor I&O via palacio catheter  - Per urology, will likely go home with the palacio and will need outpatient cystoscopy and UDS.   - PSA: 0.9

## 2020-02-19 NOTE — PROGRESS NOTE ADULT - SUBJECTIVE AND OBJECTIVE BOX
INTERVAL HPI/OVERNIGHT EVENTS:    Patient is a 74y old  Male who presents with a chief complaint of Sepsis (19 Feb 2020 10:34)      Pt reports the following symptoms:    CONSTITUTIONAL:  Negative fever or chills, feels well, good appetite  EYES:  Negative  blurry vision or double vision  CARDIOVASCULAR:  Negative for chest pain or palpitations  RESPIRATORY:  Negative for cough, wheezing, or SOB   GASTROINTESTINAL:  Negative for nausea, vomiting, diarrhea, constipation, or abdominal pain  GENITOURINARY:  Negative frequency, urgency or dysuria  NEUROLOGIC:  No headache, confusion, dizziness, lightheadedness    MEDICATIONS  (STANDING):  cefTRIAXone   IVPB 2000 milliGRAM(s) IV Intermittent every 24 hours  chlorhexidine 2% Cloths 1 Application(s) Topical <User Schedule>  dextrose 5%. 1000 milliLiter(s) (50 mL/Hr) IV Continuous <Continuous>  dextrose 50% Injectable 12.5 Gram(s) IV Push once  dextrose 50% Injectable 25 Gram(s) IV Push once  dextrose 50% Injectable 25 Gram(s) IV Push once  dronabinol 2.5 milliGRAM(s) Oral two times a day before meals  finasteride 5 milliGRAM(s) Oral daily  heparin  Injectable 5000 Unit(s) SubCutaneous every 8 hours  influenza   Vaccine 0.5 milliLiter(s) IntraMuscular once  insulin glargine Injectable (LANTUS) 28 Unit(s) SubCutaneous every morning  insulin glargine Injectable (LANTUS) 20 Unit(s) SubCutaneous at bedtime  insulin lispro (HumaLOG) corrective regimen sliding scale   SubCutaneous Before meals and at bedtime  insulin lispro Injectable (HumaLOG) 10 Unit(s) SubCutaneous three times a day before meals  nystatin    Suspension 364658 Unit(s) Oral four times a day  senna 2 Tablet(s) Oral at bedtime  simvastatin 10 milliGRAM(s) Oral at bedtime  tamsulosin 0.4 milliGRAM(s) Oral at bedtime    MEDICATIONS  (PRN):  acetaminophen   Tablet .. 650 milliGRAM(s) Oral every 6 hours PRN Temp greater or equal to 38C (100.4F)  benzocaine 15 mG/menthol 3.6 mG (Sugar-Free) Lozenge 1 Lozenge Oral two times a day PRN Sore Throat  dextrose 40% Gel 15 Gram(s) Oral once PRN Blood Glucose LESS THAN 70 milliGRAM(s)/deciliter  glucagon  Injectable 1 milliGRAM(s) IntraMuscular once PRN Glucose LESS THAN 70 milligrams/deciliter      PHYSICAL EXAM  Vital Signs Last 24 Hrs  T(C): 36.2 (19 Feb 2020 08:53), Max: 36.7 (18 Feb 2020 15:29)  T(F): 97.2 (19 Feb 2020 08:53), Max: 98.1 (18 Feb 2020 15:29)  HR: 92 (19 Feb 2020 08:53) (85 - 92)  BP: 118/72 (19 Feb 2020 08:53) (100/57 - 124/70)  BP(mean): --  RR: 17 (19 Feb 2020 08:53) (16 - 18)  SpO2: 99% (19 Feb 2020 08:53) (98% - 99%)    Constitutional: wn/wd in NAD.   HEENT: NCAT, MMM, OP clear, EOMI, no proptosis or lid retraction  Neck: no thyromegaly or palpable thyroid nodules   Respiratory: lungs CTAB.  Cardiovascular: regular rhythm, normal S1 and S2, no audible murmurs, no peripheral edema  GI: soft, NT/ND, no masses/HSM appreciated.  Neurology: no tremors, DTR 2+  Skin: no visible rashes/lesions  Psychiatric: AAO x 3, normal affect/mood.    LABS:                        8.3    7.23  )-----------( 121      ( 19 Feb 2020 07:19 )             27.3     02-19    148<H>  |  120<H>  |  61<H>  ----------------------------<  180<H>  4.0   |  16<L>  |  2.41<H>    Ca    8.9      19 Feb 2020 07:19  Mg     2.3     02-19    TPro  6.0  /  Alb  2.5<L>  /  TBili  0.2  /  DBili  x   /  AST  23  /  ALT  18  /  AlkPhos  83  02-18        Thyroid Stimulating Hormone, Serum: 0.690 uIU/mL (02-16 @ 06:32)  Thyroid Stimulating Hormone, Serum: 0.532 uIU/mL (02-15 @ 06:23)      HbA1C: 8.4 % (02-05 @ 06:22)    CAPILLARY BLOOD GLUCOSE      POCT Blood Glucose.: 217 mg/dL (19 Feb 2020 12:43)  POCT Blood Glucose.: 185 mg/dL (19 Feb 2020 09:06)  POCT Blood Glucose.: 163 mg/dL (19 Feb 2020 03:30)  POCT Blood Glucose.: 145 mg/dL (18 Feb 2020 23:53)  POCT Blood Glucose.: 76 mg/dL (18 Feb 2020 22:02)  POCT Blood Glucose.: 100 mg/dL (18 Feb 2020 18:29)      Insulin Sliding Scale requirements X 24 Hours:    RADIOLOGY & ADDITIONAL TESTS:    A/P: 74y Male with history of DM type II presenting for       1.  DM -     Please continue           units lantus at bedtime  / in the morning and        units lispro with meals and lispro moderate / low dose sliding scale 4 times daily with meals and at bedtime.  Please continue consistent carbohydrate diet.      Goal FSG is   Will continue to monitor   For discharge, pt can continue    Pt can follow up at discharge with [facility] by calling  to make an appointment.   Will discuss case with     and update primary team INTERVAL HPI/OVERNIGHT EVENTS:    Patient seen and examined at the bedside. No acute events overnight. His appetite has been poor and and he is not keeping himself hydrated well. His sodium was 148 today.  FSG & Insulin received:  Yesterday:  pre-dinner fs, 10 nutritional lispro   units  bedtime fs - was given 25gm of D50, repeat FSg was 145, 20 lantus   units  Today:  300 Am   pre-breakfast fs, patient refused insulin, 2  units lispro SS  pre-lunch fs, 10 nutritional lispro   units+  4 units lispro SS    Pt reports the following symptoms:  CONSTITUTIONAL:  Negative fever or chills  CARDIOVASCULAR:  Negative for chest pain or palpitations  RESPIRATORY:  Negative for cough, wheezing, or SOB   GASTROINTESTINAL:  Negative for nausea, vomiting, diarrhea, constipation, or abdominal pain  NEUROLOGIC:  No headache, confusion, dizziness, lightheadedness    MEDICATIONS  (STANDING):  cefTRIAXone   IVPB 2000 milliGRAM(s) IV Intermittent every 24 hours  chlorhexidine 2% Cloths 1 Application(s) Topical <User Schedule>  dextrose 5%. 1000 milliLiter(s) (50 mL/Hr) IV Continuous <Continuous>  dextrose 50% Injectable 12.5 Gram(s) IV Push once  dextrose 50% Injectable 25 Gram(s) IV Push once  dextrose 50% Injectable 25 Gram(s) IV Push once  dronabinol 2.5 milliGRAM(s) Oral two times a day before meals  finasteride 5 milliGRAM(s) Oral daily  heparin  Injectable 5000 Unit(s) SubCutaneous every 8 hours  influenza   Vaccine 0.5 milliLiter(s) IntraMuscular once  insulin glargine Injectable (LANTUS) 28 Unit(s) SubCutaneous every morning  insulin glargine Injectable (LANTUS) 20 Unit(s) SubCutaneous at bedtime  insulin lispro (HumaLOG) corrective regimen sliding scale   SubCutaneous Before meals and at bedtime  insulin lispro Injectable (HumaLOG) 10 Unit(s) SubCutaneous three times a day before meals  nystatin    Suspension [id number] Unit(s) Oral four times a day  senna 2 Tablet(s) Oral at bedtime  simvastatin 10 milliGRAM(s) Oral at bedtime  tamsulosin 0.4 milliGRAM(s) Oral at bedtime    MEDICATIONS  (PRN):  acetaminophen   Tablet .. 650 milliGRAM(s) Oral every 6 hours PRN Temp greater or equal to 38C (100.4F)  benzocaine 15 mG/menthol 3.6 mG (Sugar-Free) Lozenge 1 Lozenge Oral two times a day PRN Sore Throat  dextrose 40% Gel 15 Gram(s) Oral once PRN Blood Glucose LESS THAN 70 milliGRAM(s)/deciliter  glucagon  Injectable 1 milliGRAM(s) IntraMuscular once PRN Glucose LESS THAN 70 milligrams/deciliter      PHYSICAL EXAM  Vital Signs Last 24 Hrs  T(C): 36.2 (2020 08:53), Max: 36.7 (2020 15:29)  T(F): 97.2 (2020 08:53), Max: 98.1 (2020 15:29)  HR: 92 (2020 08:53) (85 - 92)  BP: 118/72 (2020 08:53) (100/57 - 124/70)  BP(mean): --  RR: 17 (2020 08:53) (16 - 18)  SpO2: 99% (2020 08:53) (98% - 99%)    Constitutional: wn/wd in NAD.   Respiratory: lungs CTAB.  Cardiovascular: regular rhythm, normal S1 and S2, no peripheral edema  GI: soft, NT/ND, no masses/HSM appreciated.  Neurology: no tremors, DTR 2+      LABS:                        8.3    7.23  )-----------( 121      ( 2020 07:19 )             27.3         148<H>  |  120<H>  |  61<H>  ----------------------------<  180<H>  4.0   |  16<L>  |  2.41<H>    Ca    8.9      2020 07:19  Mg     2.3         TPro  6.0  /  Alb  2.5<L>  /  TBili  0.2  /  DBili  x   /  AST  23  /  ALT  18  /  AlkPhos  83          Thyroid Stimulating Hormone, Serum: 0.690 uIU/mL ( @ 06:32)  Thyroid Stimulating Hormone, Serum: 0.532 uIU/mL (02-15 @ 06:23)      HbA1C: 8.4 % ( @ 06:22)    CAPILLARY BLOOD GLUCOSE      POCT Blood Glucose.: 217 mg/dL (2020 12:43)  POCT Blood Glucose.: 185 mg/dL (2020 09:06)  POCT Blood Glucose.: 163 mg/dL (2020 03:30)  POCT Blood Glucose.: 145 mg/dL (2020 23:53)  POCT Blood Glucose.: 76 mg/dL (2020 22:02)  POCT Blood Glucose.: 100 mg/dL (2020 18:29)      Insulin Sliding Scale requirements X 24 Hours:    RADIOLOGY & ADDITIONAL TESTS:    A/P: 75 yo male with hx of uncontrolled DM II, BPH found to have poly factorial anion gap metabolic acidosis, mild DKA and urinary retention with concurrent urinary tract infection.    1.  DM - type 2, uncontrolled, complicated. Persistent hyperglycemia   -A1C: 8.4%  Weight: 87kg/BMI 28  Cr/GRF: 2.93 (baseline in 2s)  Please stop the morning lantus  Please increase to Lantus 35 units at bedtime.   Please decrease to lispro 7 units before each meal.    Please continue lispro moderate  dose sliding scale four times daily with meals and at bedtime  - please obtain 300 Am FSG  Pt's fingerstick glucose goal is 100-180  Will continue to monitor     2. euthyroidal sick illness  Low TSH, Ft4 0.54. ( 2/15) - before megace. On 20 TSH 0.690, free T4 0.54   Anti-thyroid ABs negative. TT3 48.   - AM cortisol was 21.8 ( no adrenal insufficiency)  - We will repeat TFTs in 3 to 4 days.    3. Hypernatremi  -     Pt can follow up at discharge with [facility] by calling  to make an appointment.   discussed case with  and updated primary team INTERVAL HPI/OVERNIGHT EVENTS:    Patient seen and examined at the bedside. No acute events overnight. His appetite has been poor and and he is not keeping himself hydrated well. His sodium was 148 today.  FSG & Insulin received:  Yesterday:  pre-dinner fs, 10 nutritional lispro   units  bedtime fs - was given 25gm of D50, repeat FSg was 145, 20 lantus   units  Today:  300 Am   pre-breakfast fs, patient refused insulin, 2  units lispro SS  pre-lunch fs, 10 nutritional lispro   units+  4 units lispro SS    Pt reports the following symptoms:  CONSTITUTIONAL:  Negative fever or chills  CARDIOVASCULAR:  Negative for chest pain or palpitations  RESPIRATORY:  Negative for cough, wheezing, or SOB   GASTROINTESTINAL:  Negative for nausea, vomiting, diarrhea, constipation, or abdominal pain  NEUROLOGIC:  No headache, confusion, dizziness, lightheadedness    MEDICATIONS  (STANDING):  cefTRIAXone   IVPB 2000 milliGRAM(s) IV Intermittent every 24 hours  chlorhexidine 2% Cloths 1 Application(s) Topical <User Schedule>  dextrose 5%. 1000 milliLiter(s) (50 mL/Hr) IV Continuous <Continuous>  dextrose 50% Injectable 12.5 Gram(s) IV Push once  dextrose 50% Injectable 25 Gram(s) IV Push once  dextrose 50% Injectable 25 Gram(s) IV Push once  dronabinol 2.5 milliGRAM(s) Oral two times a day before meals  finasteride 5 milliGRAM(s) Oral daily  heparin  Injectable 5000 Unit(s) SubCutaneous every 8 hours  influenza   Vaccine 0.5 milliLiter(s) IntraMuscular once  insulin glargine Injectable (LANTUS) 28 Unit(s) SubCutaneous every morning  insulin glargine Injectable (LANTUS) 20 Unit(s) SubCutaneous at bedtime  insulin lispro (HumaLOG) corrective regimen sliding scale   SubCutaneous Before meals and at bedtime  insulin lispro Injectable (HumaLOG) 10 Unit(s) SubCutaneous three times a day before meals  nystatin    Suspension [id number] Unit(s) Oral four times a day  senna 2 Tablet(s) Oral at bedtime  simvastatin 10 milliGRAM(s) Oral at bedtime  tamsulosin 0.4 milliGRAM(s) Oral at bedtime    MEDICATIONS  (PRN):  acetaminophen   Tablet .. 650 milliGRAM(s) Oral every 6 hours PRN Temp greater or equal to 38C (100.4F)  benzocaine 15 mG/menthol 3.6 mG (Sugar-Free) Lozenge 1 Lozenge Oral two times a day PRN Sore Throat  dextrose 40% Gel 15 Gram(s) Oral once PRN Blood Glucose LESS THAN 70 milliGRAM(s)/deciliter  glucagon  Injectable 1 milliGRAM(s) IntraMuscular once PRN Glucose LESS THAN 70 milligrams/deciliter      PHYSICAL EXAM  Vital Signs Last 24 Hrs  T(C): 36.2 (2020 08:53), Max: 36.7 (2020 15:29)  T(F): 97.2 (2020 08:53), Max: 98.1 (2020 15:29)  HR: 92 (2020 08:53) (85 - 92)  BP: 118/72 (2020 08:53) (100/57 - 124/70)  BP(mean): --  RR: 17 (2020 08:53) (16 - 18)  SpO2: 99% (2020 08:53) (98% - 99%)    Constitutional: wn/wd in NAD.   Respiratory: lungs CTAB.  Cardiovascular: regular rhythm, normal S1 and S2, no peripheral edema  GI: soft, NT/ND, no masses/HSM appreciated.  Neurology: no tremors, DTR 2+      LABS:                        8.3    7.23  )-----------( 121      ( 2020 07:19 )             27.3         148<H>  |  120<H>  |  61<H>  ----------------------------<  180<H>  4.0   |  16<L>  |  2.41<H>    Ca    8.9      2020 07:19  Mg     2.3         TPro  6.0  /  Alb  2.5<L>  /  TBili  0.2  /  DBili  x   /  AST  23  /  ALT  18  /  AlkPhos  83          Thyroid Stimulating Hormone, Serum: 0.690 uIU/mL ( @ 06:32)  Thyroid Stimulating Hormone, Serum: 0.532 uIU/mL (02-15 @ 06:23)      HbA1C: 8.4 % ( @ 06:22)    CAPILLARY BLOOD GLUCOSE      POCT Blood Glucose.: 217 mg/dL (2020 12:43)  POCT Blood Glucose.: 185 mg/dL (2020 09:06)  POCT Blood Glucose.: 163 mg/dL (2020 03:30)  POCT Blood Glucose.: 145 mg/dL (2020 23:53)  POCT Blood Glucose.: 76 mg/dL (2020 22:02)  POCT Blood Glucose.: 100 mg/dL (2020 18:29)      Insulin Sliding Scale requirements X 24 Hours:    RADIOLOGY & ADDITIONAL TESTS:    A/P: 73 yo male with hx of uncontrolled DM II, BPH found to have poly factorial anion gap metabolic acidosis, mild DKA and urinary retention with concurrent urinary tract infection.    1.  DM - type 2, uncontrolled, complicated. Persistent hyperglycemia   -A1C: 8.4%  Weight: 87kg/BMI 28  Cr/GRF: 2.93 (baseline in 2s)  Please stop the morning lantus  Please increase to Lantus 35 units at bedtime.   Please decrease to lispro 7 units before each meal.    Please continue lispro moderate  dose sliding scale four times daily with meals and at bedtime  - please obtain 300 Am FSG  Pt's fingerstick glucose goal is 100-180  Will continue to monitor     2. euthyroidal sick illness  Low TSH, Ft4 0.54. ( 2/15) - before megace. On 20 TSH 0.690, free T4 0.54   Anti-thyroid ABs negative. TT3 48.   - AM cortisol was 21.8 ( no adrenal insufficiency)  - We will repeat TFTs in 3 to 4 days.    3. Hypernatremia - likely hypovolemia  - Sodium was 148 today  - please obtain UA, urine sodium and urine osmolality along with a BMP    Pt can follow up at discharge with [facility] by calling  to make an appointment.   discussed case with  and updated primary team

## 2020-02-19 NOTE — PROGRESS NOTE ADULT - PROBLEM SELECTOR PLAN 1
1) UCx growing Klebsiella,  BCx+ klebsiella pneumonia , not MDR, both sensitive to Ceftriaxone  2) Continue CTX 2g q24h x 2 weeks , day 6/14  3) Surveillance Blood cx negative.   4) Resume Physical therapy  5) Urology f/u RE: urine retention/BPH.   6) PICC placement  7) SNF placement

## 2020-02-19 NOTE — PROGRESS NOTE ADULT - SUBJECTIVE AND OBJECTIVE BOX
INTERVAL HPI/OVERNIGHT EVENTS: Clinically improving, still weak and with Palacio    CONSTITUTIONAL:  Negative fever or chills, feels better  EYES:  Negative  blurry vision or double vision  CARDIOVASCULAR:  Negative for chest pain or palpitations  RESPIRATORY:  Negative for cough, wheezing, or SOB   GASTROINTESTINAL:  Negative for nausea, vomiting, diarrhea, constipation, or abdominal pain  GENITOURINARY:  Palacio in place  NEUROLOGIC:  No headache, confusion, dizziness, lightheadedness      ANTIBIOTICS/RELEVANT:    MEDICATIONS  (STANDING):  cefTRIAXone   IVPB 2000 milliGRAM(s) IV Intermittent every 24 hours  chlorhexidine 2% Cloths 1 Application(s) Topical <User Schedule>  dextrose 5%. 1000 milliLiter(s) (50 mL/Hr) IV Continuous <Continuous>  dextrose 50% Injectable 12.5 Gram(s) IV Push once  dextrose 50% Injectable 25 Gram(s) IV Push once  dextrose 50% Injectable 25 Gram(s) IV Push once  dronabinol 2.5 milliGRAM(s) Oral two times a day before meals  finasteride 5 milliGRAM(s) Oral daily  heparin  Injectable 5000 Unit(s) SubCutaneous every 8 hours  influenza   Vaccine 0.5 milliLiter(s) IntraMuscular once  insulin glargine Injectable (LANTUS) 28 Unit(s) SubCutaneous every morning  insulin glargine Injectable (LANTUS) 20 Unit(s) SubCutaneous at bedtime  insulin lispro (HumaLOG) corrective regimen sliding scale   SubCutaneous Before meals and at bedtime  insulin lispro Injectable (HumaLOG) 10 Unit(s) SubCutaneous three times a day before meals  nystatin    Suspension [id number] Unit(s) Oral four times a day  senna 2 Tablet(s) Oral at bedtime  simvastatin 10 milliGRAM(s) Oral at bedtime  tamsulosin 0.4 milliGRAM(s) Oral at bedtime    MEDICATIONS  (PRN):  acetaminophen   Tablet .. 650 milliGRAM(s) Oral every 6 hours PRN Temp greater or equal to 38C (100.4F)  benzocaine 15 mG/menthol 3.6 mG (Sugar-Free) Lozenge 1 Lozenge Oral two times a day PRN Sore Throat  dextrose 40% Gel 15 Gram(s) Oral once PRN Blood Glucose LESS THAN 70 milliGRAM(s)/deciliter  glucagon  Injectable 1 milliGRAM(s) IntraMuscular once PRN Glucose LESS THAN 70 milligrams/deciliter        Vital Signs Last 24 Hrs  T(C): 36.2 (19 Feb 2020 08:53), Max: 36.7 (18 Feb 2020 15:29)  T(F): 97.2 (19 Feb 2020 08:53), Max: 98.1 (18 Feb 2020 15:29)  HR: 92 (19 Feb 2020 08:53) (85 - 92)  BP: 118/72 (19 Feb 2020 08:53) (100/57 - 124/70)  BP(mean): --  RR: 17 (19 Feb 2020 08:53) (16 - 18)  SpO2: 99% (19 Feb 2020 08:53) (98% - 99%)    02-18-20 @ 07:01  -  02-19-20 @ 07:00  --------------------------------------------------------  IN: 0 mL / OUT: 875 mL / NET: -875 mL      PHYSICAL EXAM:  Constitutional: WDWN resting comfortably in bed; appears fatigued  Head: NC/AT; mild pallor  Eyes: PERRL, EOMI, anicteric sclera  ENT: no nasal discharge; MMM  Neck: supple; no JVD  Respiratory: CTA B/L; no W/R/R, no retractions; speaking in full sentences without difficulty, soft voice and intermittently slow to respond but responds appropriately  Cardiac: +S1/S2; RRR; no M/R/G   Gastrointestinal: soft, NT/ND; no rebound or guarding; +BSx4  Genitourinary: palacio catheter in place, draining red-tinged urine 100cc in bag  Extremities: WWP, no clubbing or cyanosis; no peripheral edema; mildly pale  Musculoskeletal: moving all extremities spontaneously  Vascular: 2+ radial, PT pulses B/L  Dermatologic: skin warm, dry and intact; no rashes, wounds, or scars  Neurologic: AAOx3; CNII-XII grossly intact; no focal deficits; mm strength 5/5 UE LE BL; sensation intact UE LE BL  LABS:                        8.3    7.23  )-----------( 121      ( 19 Feb 2020 07:19 )             27.3     02-19    148<H>  |  120<H>  |  61<H>  ----------------------------<  180<H>  4.0   |  16<L>  |  2.41<H>    Ca    8.9      19 Feb 2020 07:19  Mg     2.3     02-19    TPro  6.0  /  Alb  2.5<L>  /  TBili  0.2  /  DBili  x   /  AST  23  /  ALT  18  /  AlkPhos  83  02-18          MICROBIOLOGY:Culture - Urine (02.13.20 @ 21:49)    -  Trimethoprim/Sulfamethoxazole: S <=0.5/9.5    -  Piperacillin/Tazobactam: S <=8    -  Tetra/Doxy: S <=2    -  Tigecycline: S <=1    -  Tobramycin: S <=2    -  Ampicillin: I 16 These ampicillin results predict results for amoxicillin    -  Amikacin: S <=8    -  Cefazolin: S <=2    -  Cefepime: S <=2    -  Cefotaxime: S <=2    -  Cefoxitin: S <=4    -  Ceftazidime: S <=1    -  Ampicillin/Sulbactam: S <=4/2 Enterobacter, Citrobacter, and Serratia may develop resistance during prolonged therapy (3-4 days)    -  Aztreonam: S <=4    -  Ceftriaxone: S <=1 Enterobacter, Citrobacter, and Serratia may develop resistance during prolonged therapy    -  Cefuroxime: S <=4    -  Cephalothin: S <=8    -  Gentamicin: S <=1    -  Meropenem: S <=1    -  Nitrofurantoin: S <=32 Should not be used to treat pyelonephritis    -  Ertapenem: S <=0.5    Specimen Source: .Urine Clean Catch (Midstream)    Culture Results:   >100,000 CFU/ml Klebsiella variicola    Organism Identification: Klebsiella variicola    Organism: Klebsiella variicola    Method Type: IMAN        RADIOLOGY & ADDITIONAL STUDIES:

## 2020-02-19 NOTE — PROGRESS NOTE ADULT - PROBLEM SELECTOR PLAN 2
- As above  - No recent instrumentation or risk factors for resistant  organisms  - Likely provoked in the setting of BPH with chronic urinary incontinence vs. retention  - Rodriguez catheter in place, monitor I&O  - Currently with hematuria - red tinged urine, not ash blood  - Continue to monitor  - Urology following, f/u recs  - ID consulted (Dr. [email or web address]), f/u recs  - retroperitoneal ultrasound shows slight improvement of moderate bilateral hydronephrosis  - Concern for pyelonephritis

## 2020-02-19 NOTE — PROGRESS NOTE ADULT - SUBJECTIVE AND OBJECTIVE BOX
HPI:  73 yo male with a hx of DM, HTN, HLD, iron deficiency anemia, CKD 4 who was brought in by his home health aide for feeling generally unwell and week for the past 2 weeks since his discharge from [facility] for symptomatic anemia. He has felt weakness, malaise and fatigue. He has not been experiencing any shortness of breath, chest pain, cough, sputum production, rhinorrhea odynophagia, difficulty hearing, abdominal pain, nausea, vomiting, diarrhea, constipation. He reports a history of chronic urinary incontinence and difficulty voiding due to known BPH with lower urinary tract symptoms. He denies any fever, chills, or rigors at home. He has been compliant with his medications prior to presentation. No recent abx use or hospitalizations. No dysuria, urgency or frequency that he has noted. No penile discharge. On presentation patient was noted to be in urinary retention w/ anion gap metabolic acidosis, acute renal failure, elevated b hydroxybutyrate and hyperglycemia w/ likely UTI. ICU consulted for management, and patient admitted for DKA c/b UTI.     In the ED: Patient given 3L NS, started on an insulin gtt, and given zosyn. (13 Feb 2020 18:56)    FAMILY HISTORY:  FH: stomach cancer  FHx: breast cancer  FHx: stroke    MEDICATIONS  (STANDING):  cefTRIAXone   IVPB 2000 milliGRAM(s) IV Intermittent every 24 hours  chlorhexidine 2% Cloths 1 Application(s) Topical <User Schedule>  dextrose 5%. 1000 milliLiter(s) (50 mL/Hr) IV Continuous <Continuous>  dextrose 50% Injectable 12.5 Gram(s) IV Push once  dextrose 50% Injectable 25 Gram(s) IV Push once  dextrose 50% Injectable 25 Gram(s) IV Push once  dronabinol 2.5 milliGRAM(s) Oral two times a day before meals  finasteride 5 milliGRAM(s) Oral daily  heparin  Injectable 5000 Unit(s) SubCutaneous every 8 hours  influenza   Vaccine 0.5 milliLiter(s) IntraMuscular once  insulin glargine Injectable (LANTUS) 28 Unit(s) SubCutaneous every morning  insulin glargine Injectable (LANTUS) 20 Unit(s) SubCutaneous at bedtime  insulin lispro (HumaLOG) corrective regimen sliding scale   SubCutaneous Before meals and at bedtime  insulin lispro Injectable (HumaLOG) 10 Unit(s) SubCutaneous three times a day before meals  nystatin    Suspension 820932 Unit(s) Oral four times a day  senna 2 Tablet(s) Oral at bedtime  simvastatin 10 milliGRAM(s) Oral at bedtime  tamsulosin 0.4 milliGRAM(s) Oral at bedtime    MEDICATIONS  (PRN):  acetaminophen   Tablet .. 650 milliGRAM(s) Oral every 6 hours PRN Temp greater or equal to 38C (100.4F)  benzocaine 15 mG/menthol 3.6 mG (Sugar-Free) Lozenge 1 Lozenge Oral two times a day PRN Sore Throat  dextrose 40% Gel 15 Gram(s) Oral once PRN Blood Glucose LESS THAN 70 milliGRAM(s)/deciliter  glucagon  Injectable 1 milliGRAM(s) IntraMuscular once PRN Glucose LESS THAN 70 milligrams/deciliter    Vital Signs Last 24 Hrs  T(C): 36.2 (19 Feb 2020 08:53), Max: 36.7 (18 Feb 2020 15:29)  T(F): 97.2 (19 Feb 2020 08:53), Max: 98.1 (18 Feb 2020 15:29)  HR: 92 (19 Feb 2020 08:53) (85 - 92)  BP: 118/72 (19 Feb 2020 08:53) (100/57 - 124/70)  BP(mean): --  RR: 17 (19 Feb 2020 08:53) (16 - 18)  SpO2: 99% (19 Feb 2020 08:53) (98% - 99%)    Physical exam:    Overall impression  Lymphadenopathy  Liver  spleen    Labs:  CBC Full  -  ( 19 Feb 2020 07:19 )  WBC Count : 7.23 K/uL  RBC Count : 3.39 M/uL  Hemoglobin : 8.3 g/dL  Hematocrit : 27.3 %  Platelet Count - Automated : 121 K/uL  Mean Cell Volume : 80.5 fl  Mean Cell Hemoglobin : 24.5 pg  Mean Cell Hemoglobin Concentration : 30.4 gm/dL  Auto Neutrophil # : x  Auto Lymphocyte # : x  Auto Monocyte # : x  Auto Eosinophil # : x  Auto Basophil # : x  Auto Neutrophil % : x  Auto Lymphocyte % : x  Auto Monocyte % : x  Auto Eosinophil % : x  Auto Basophil % : x    02-19    148<H>  |  120<H>  |  61<H>  ----------------------------<  180<H>  4.0   |  16<L>  |  2.41<H>    Ca    8.9      19 Feb 2020 07:19  Mg     2.3     02-19    TPro  6.0  /  Alb  2.5<L>  /  TBili  0.2  /  DBili  x   /  AST  23  /  ALT  18  /  AlkPhos  83  02-18      Radiology:  HEALTH ISSUES - R/O PROBLEM Dx:      Assessmant / Problems  1) Urosepsis clinically improving on iv Ceftriaxone  2) Anemia improving on iv iron , Procrit      Thank you  Olivia Roberts MD

## 2020-02-19 NOTE — PROGRESS NOTE ADULT - SUBJECTIVE AND OBJECTIVE BOX
OVERNIGHT EVENTS: FSG 74, given 1/2 amp of D50    SUBJECTIVE / INTERVAL HPI: Patient seen and examined at bedside. Patient resting comfortably in bed. Denies any complaints at this time, but patient has a strange affect. I need to repeat myself several times before the patient understands. Spoke with ID who is recommending patient remain on ceftriaxone rather than transition to oral abx for his bilateral pyelonephritis. Plan for PICC placement today.     VITAL SIGNS:  Vital Signs Last 24 Hrs  T(C): 36.2 (19 Feb 2020 08:53), Max: 36.7 (18 Feb 2020 15:29)  T(F): 97.2 (19 Feb 2020 08:53), Max: 98.1 (18 Feb 2020 15:29)  HR: 92 (19 Feb 2020 08:53) (85 - 92)  BP: 118/72 (19 Feb 2020 08:53) (100/57 - 124/70)  BP(mean): --  RR: 17 (19 Feb 2020 08:53) (16 - 18)  SpO2: 99% (19 Feb 2020 08:53) (98% - 99%)  I&O's Summary    18 Feb 2020 07:01  -  19 Feb 2020 07:00  --------------------------------------------------------  IN: 0 mL / OUT: 875 mL / NET: -875 mL        PHYSICAL EXAM:    Constitutional: NAD  HEENT: NC/AT; PERRL, anicteric sclera; MMM  Neck: supple, no JVD  Cardiovascular: +S1/S2, RRR  Respiratory: CTA B/L, no W/R/R  Gastrointestinal: abdomen soft, NT/ND; no rebound or guarding; +BSx4  Back: No CVA tenderness  Genitourinary: no suprapubic tenderness or fullness  Extremities: WWP; no LE edema; no clubbing or cyanosis  Vascular: 2+ radial, DP/PT and femoral pulses B/L    MEDICATIONS:  MEDICATIONS  (STANDING):  cefTRIAXone   IVPB 2000 milliGRAM(s) IV Intermittent every 24 hours  chlorhexidine 2% Cloths 1 Application(s) Topical <User Schedule>  dextrose 5%. 1000 milliLiter(s) (50 mL/Hr) IV Continuous <Continuous>  dextrose 50% Injectable 12.5 Gram(s) IV Push once  dextrose 50% Injectable 25 Gram(s) IV Push once  dextrose 50% Injectable 25 Gram(s) IV Push once  dronabinol 2.5 milliGRAM(s) Oral two times a day before meals  finasteride 5 milliGRAM(s) Oral daily  heparin  Injectable 5000 Unit(s) SubCutaneous every 8 hours  influenza   Vaccine 0.5 milliLiter(s) IntraMuscular once  insulin glargine Injectable (LANTUS) 28 Unit(s) SubCutaneous every morning  insulin glargine Injectable (LANTUS) 20 Unit(s) SubCutaneous at bedtime  insulin lispro (HumaLOG) corrective regimen sliding scale   SubCutaneous Before meals and at bedtime  insulin lispro Injectable (HumaLOG) 10 Unit(s) SubCutaneous three times a day before meals  nystatin    Suspension 578063 Unit(s) Oral four times a day  senna 2 Tablet(s) Oral at bedtime  simvastatin 10 milliGRAM(s) Oral at bedtime  tamsulosin 0.4 milliGRAM(s) Oral at bedtime    MEDICATIONS  (PRN):  acetaminophen   Tablet .. 650 milliGRAM(s) Oral every 6 hours PRN Temp greater or equal to 38C (100.4F)  benzocaine 15 mG/menthol 3.6 mG (Sugar-Free) Lozenge 1 Lozenge Oral two times a day PRN Sore Throat  dextrose 40% Gel 15 Gram(s) Oral once PRN Blood Glucose LESS THAN 70 milliGRAM(s)/deciliter  glucagon  Injectable 1 milliGRAM(s) IntraMuscular once PRN Glucose LESS THAN 70 milligrams/deciliter      ALLERGIES:  Allergies    No Known Allergies    Intolerances        LABS:                        8.3    7.23  )-----------( 121      ( 19 Feb 2020 07:19 )             27.3     02-19    148<H>  |  120<H>  |  61<H>  ----------------------------<  180<H>  4.0   |  16<L>  |  2.41<H>    Ca    8.9      19 Feb 2020 07:19  Mg     2.3     02-19    TPro  6.0  /  Alb  2.5<L>  /  TBili  0.2  /  DBili  x   /  AST  23  /  ALT  18  /  AlkPhos  83  02-18        CAPILLARY BLOOD GLUCOSE      POCT Blood Glucose.: 217 mg/dL (19 Feb 2020 12:43)      RADIOLOGY & ADDITIONAL TESTS: Reviewed.

## 2020-02-19 NOTE — CHART NOTE - NSCHARTNOTEFT_GEN_A_CORE
Admitting Diagnosis:   Patient is a 74y old  Male who presents with a chief complaint of DKA (19 Feb 2020 14:15)      PAST MEDICAL & SURGICAL HISTORY:  History of pancytopenia  H/O hydrocephalus  Anemia  HLD (hyperlipidemia)  Enlarged prostate  DM (diabetes mellitus)  HTN (hypertension)  H/O prior ablation treatment      Current Nutrition Order: mech soft, cstCHO no snack, Glucerna QD     PO Intake: Good (%) [   ]  Fair (50-75%) [   ] Poor (<25%) [  x ]    GI Issues: No apparent GI distress per EMR, although last BM 2/14    Pain: Unable to assess at this time     Skin Integrity: no edema, no pressure injury    Labs:   02-19    148<H>  |  120<H>  |  61<H>  ----------------------------<  180<H>  4.0   |  16<L>  |  2.41<H>    Ca    8.9      19 Feb 2020 07:19  Mg     2.3     02-19    TPro  6.0  /  Alb  2.5<L>  /  TBili  0.2  /  DBili  x   /  AST  23  /  ALT  18  /  AlkPhos  83  02-18    CAPILLARY BLOOD GLUCOSE      POCT Blood Glucose.: 217 mg/dL (19 Feb 2020 12:43)  POCT Blood Glucose.: 185 mg/dL (19 Feb 2020 09:06)  POCT Blood Glucose.: 163 mg/dL (19 Feb 2020 03:30)  POCT Blood Glucose.: 145 mg/dL (18 Feb 2020 23:53)  POCT Blood Glucose.: 76 mg/dL (18 Feb 2020 22:02)  POCT Blood Glucose.: 100 mg/dL (18 Feb 2020 18:29)      Medications:  MEDICATIONS  (STANDING):  cefTRIAXone   IVPB 2000 milliGRAM(s) IV Intermittent every 24 hours  chlorhexidine 2% Cloths 1 Application(s) Topical <User Schedule>  dextrose 5%. 1000 milliLiter(s) (50 mL/Hr) IV Continuous <Continuous>  dextrose 50% Injectable 12.5 Gram(s) IV Push once  dextrose 50% Injectable 25 Gram(s) IV Push once  dextrose 50% Injectable 25 Gram(s) IV Push once  dronabinol 2.5 milliGRAM(s) Oral two times a day before meals  finasteride 5 milliGRAM(s) Oral daily  heparin  Injectable 5000 Unit(s) SubCutaneous every 8 hours  influenza   Vaccine 0.5 milliLiter(s) IntraMuscular once  insulin glargine Injectable (LANTUS) 28 Unit(s) SubCutaneous every morning  insulin glargine Injectable (LANTUS) 20 Unit(s) SubCutaneous at bedtime  insulin lispro (HumaLOG) corrective regimen sliding scale   SubCutaneous Before meals and at bedtime  insulin lispro Injectable (HumaLOG) 10 Unit(s) SubCutaneous three times a day before meals  nystatin    Suspension 990040 Unit(s) Oral four times a day  senna 2 Tablet(s) Oral at bedtime  simvastatin 10 milliGRAM(s) Oral at bedtime  tamsulosin 0.4 milliGRAM(s) Oral at bedtime    MEDICATIONS  (PRN):  acetaminophen   Tablet .. 650 milliGRAM(s) Oral every 6 hours PRN Temp greater or equal to 38C (100.4F)  benzocaine 15 mG/menthol 3.6 mG (Sugar-Free) Lozenge 1 Lozenge Oral two times a day PRN Sore Throat  dextrose 40% Gel 15 Gram(s) Oral once PRN Blood Glucose LESS THAN 70 milliGRAM(s)/deciliter  glucagon  Injectable 1 milliGRAM(s) IntraMuscular once PRN Glucose LESS THAN 70 milligrams/deciliter      Weight:  2/13- 74.2kg  2/14- 87.6kg     Weight Change: 13.4kg wt gain x1 day suspect inaccurate (18% wt increase), please obtain updated wt and then continue to trend    Nutrition Focused Physical Exam: Completed [ x, 2/14  ]  Not Pertinent [   ]  Noted w/ suspected moderate PCM 2/14, please see chart note.     Estimated energy needs:   Height 70"; .2#; #; 116%IBW, BMI 27.7  ActualBW used for calculations as pt between % of IBW. Needs estimated based on older age; increased kcal 2/2 suspected malnutrition, UTI. Moderate protein 2/2 CKD  25-30kcal/kg= 2190-2628kcal  0.75-1.0gms pro/kg= 65-88gms protein  25-30ml/kg= 2190-2628ml    Subjective:   Pt seen for nutrition follow up. 75 yo M with PMH DM, HTN, HLD, BPH (chronic incont/ret), SERA, CKD stage 4, and a-fib (s/p ablation 2011) who presented to [facility] for general feelings of illness, weakness, and fatigue x2 wks. Initially admitted to ICU for DKA with klebsiella UTI and bacteremia, now stepped down to [facility] for further observation and management. Pt received Megace for appetite stimulation, however discontinued after patient received 2 doses, as can contribute to hyperglycemia, pt then started on Marinol 2.5mg twice daily before meals instead for appetite stimulation. Seen by SLP 2/14 w/ recommendation for MS w/ thin liquids. Attempted to see pt in room however pt off floor in IR per RN. Discussed pt w/ PCA. Pt is on MS, cstCHO no snack diet w/ Glucerna QD w/ poor PO intake per PCA, consuming mostly fruit at meal time and some Glucerna (~50% consumed). Unable to provide diet education at this time. Nutrition recs below. RD to follow up per protocol.     Previous Nutrition Diagnosis:  Inadequate Energy Intake RT current NPO status AEB 0% of EER able to be met    Active [   ]  Resolved [ x  ]    If resolved, new PES:   Inadequate PO intake RT suspected poor appetite AEB consuming <25% EER    Goal: Pt to consume >75% EER     Recommendations:  1. c/w cstCHO no snack diet w/ mech soft per SLP recommendation  2. c/w Glucerna QD (220kcal and 10gms protein)  3. provide assistance w/ meals as feasible, honor food preferences as requested w/in therapeutic diet   4. pain and bowel regimen per MD  5. monitor lytes and replete PRN  6. appetite stimulant per MD discretion    Education: N/A, pt off floor    Risk Level: High [ x  ] Moderate [   ] Low [   ]

## 2020-02-20 NOTE — PROGRESS NOTE ADULT - PROBLEM SELECTOR PROBLEM 3
Acute on chronic renal insufficiency

## 2020-02-20 NOTE — DISCHARGE NOTE PROVIDER - HOSPITAL COURSE
Patient is 75 yo M with past medical history of DM, HTN, HLD, BPH (chronic incontinence/retention), SERA, CKD4, and a-fib     Presented with general feelings of illness, weakness, and fatigue, found to have DKA c/b klebsiella UTI and Bacteremia.         Problem List/Main Diagnoses (system-based):     1) DKA: Treated with insulin drip, anion gap closed twice and placed on basal bolus regimen    2) Severe sepsis: 2/2 klebsiella UTI and bacteremia being treated with ceftriaxone 2g    3) Pyelonephritis: Retroperitoneal U/S showed bilateral hydronephrosis with klebsiella UTI concerning for pyelonephritis.     4) Acute on chronic renal insufficiency: 2/2 urinary tract obstruction in BPH, will follow-up with urology outpatient for optimization.     5) Depression: Patient scored on 18 on PHQ-9. Started sertraline 50, will monitor in outpatient setting.         Inpatient treatment course: Patient admitted for DKA c/b kelbsiella UTI (pyelo) and bacteremia. DKA treated with insulin gtt, gap closed x2, and started on basal/bolus insling treatment. Course c/b klebsiella UTI and bacteremia, treating with ceftriaxone. U/S showed bilateral hydronephrosis, indicating pyelonephritis. Repeat U/S showed improved in hydronephrosis s/p palacio cath placement. Plan for outpatient urology follow-up for removal of palacio and optimization of BPH. PICC placed so patient wcan continue to receive ceftriaxone upon discharge. Patient also presented with signs of depression, including anorexia, anhedonia, and feelings of depression. Scored an 18 on PHQ-9, and started on sertraline 50. Patient ready for discharge, with follow-up with urology and PCP.         New Medications: Ceftriaxone 2g IV (ends 2/27), sertraline 50mg daily        Labs to be followed outpatient: BMP    Exam to be followed outpatient: Cystoscopy Patient is 73 yo M with past medical history of DM, HTN, HLD, BPH (chronic incontinence/retention), SERA, CKD4, and a-fib     Presented with general feelings of illness, weakness, and fatigue, found to have DKA c/b klebsiella UTI and Bacteremia.         Problem List/Main Diagnoses (system-based):     1) DKA: Treated with insulin drip, anion gap closed twice and placed on basal bolus regimen    2) Severe sepsis: 2/2 klebsiella UTI and bacteremia being treated with ceftriaxone 2g    3) Pyelonephritis: Retroperitoneal U/S showed bilateral hydronephrosis with klebsiella UTI concerning for pyelonephritis.     4) Acute on chronic renal insufficiency: 2/2 urinary tract obstruction in BPH, will follow-up with urology outpatient for optimization.     5) Depression: Patient scored on 18 on PHQ-9. Started sertraline 50, will monitor in outpatient setting.     6) Hypernatremia: in the setting of poor po intake and dehydration. Patient advised to please drink fluids, and will continue to get 1/2 normal saline at rehab.         Inpatient treatment course: Patient admitted for DKA c/b kelbsiella UTI (pyelo) and bacteremia. DKA treated with insulin gtt, gap closed x2, and started on basal/bolus insling treatment. Course c/b klebsiella UTI and bacteremia, treating with ceftriaxone. U/S showed bilateral hydronephrosis, indicating pyelonephritis. Repeat U/S showed improved in hydronephrosis s/p palacio cath placement. Plan for outpatient urology follow-up for removal of palacio and optimization of BPH. PICC placed so patient wcan continue to receive ceftriaxone upon discharge. Patient also presented with signs of depression, including anorexia, anhedonia, and feelings of depression. Scored an 18 on PHQ-9, and started on sertraline 50. Course c/b hypernatremia as patient has poor po intake. Fluids given. Patient ready for discharge, with follow-up with urology and PCP.         New Medications: Ceftriaxone 2g IV (ends 2/27), sertraline 50mg daily, rimeron 7.5mg,        Labs to be followed outpatient: BMP    Exam to be followed outpatient: Cystoscopy Patient is 73 yo M with past medical history of DM, HTN, HLD, BPH (chronic incontinence/retention), SERA, CKD4, and a-fib     Presented with general feelings of illness, weakness, and fatigue, found to have DKA c/b klebsiella UTI and Bacteremia.         Problem List/Main Diagnoses (system-based):     1) DKA: Treated with insulin drip, anion gap closed twice and placed on basal bolus regimen    2) Severe sepsis: 2/2 klebsiella UTI and bacteremia being treated with ceftriaxone 2g    3) Pyelonephritis: Retroperitoneal U/S showed bilateral hydronephrosis with klebsiella UTI concerning for pyelonephritis.     4) Acute on chronic renal insufficiency: 2/2 urinary tract obstruction in BPH, will follow-up with urology outpatient for optimization.     5) Depression: Patient scored on 18 on PHQ-9. Started sertraline 50, will monitor in outpatient setting.     6) Hypernatremia: in the setting of poor po intake and dehydration. Patient advised to please drink fluids. Please continue to give 1/2 normal saline peter 100 cc/hr at rehab as patient is not eating as much as he should.         Inpatient treatment course: Patient admitted for DKA c/b kelbsiella UTI (pyelo) and bacteremia. DKA treated with insulin gtt, gap closed x2, and started on basal/bolus insling treatment. Course c/b klebsiella UTI and bacteremia, treating with ceftriaxone. U/S showed bilateral hydronephrosis, indicating pyelonephritis. Repeat U/S showed improved in hydronephrosis s/p palacio cath placement. Plan for outpatient urology follow-up for removal of palacio and optimization of BPH. PICC placed so patient wcan continue to receive ceftriaxone upon discharge. Patient also presented with signs of depression, including anorexia, anhedonia, and feelings of depression. Scored an 18 on PHQ-9, and started on sertraline 50. Course c/b hypernatremia as patient has poor po intake. Fluids given. Patient ready for discharge, with follow-up with urology and PCP.         New Medications: Ceftriaxone 2g IV (ends 2/27), sertraline 50mg daily, rimeron 7.5mg,        Labs to be followed outpatient: BMP    Exam to be followed outpatient: Cystoscopy

## 2020-02-20 NOTE — PROGRESS NOTE ADULT - PROBLEM SELECTOR PLAN 10
- F: none, euvolemic and tolerating PO  - E: replete K<4, Mg<2  - N: mechanical soft, carb consistent  - D: heparin 5000U sq q8h  - GI ppx: none    Code: full  Dispo: [facility]

## 2020-02-20 NOTE — PROGRESS NOTE ADULT - PROBLEM SELECTOR PLAN 1
1) UCx growing Klebsiella,  BCx+ klebsiella pneumonia , not MDR, both sensitive to Ceftriaxone  2) Continue CTX 2g q24h x 2 weeks , day 7/14  3) Surveillance Blood cx negative.   4) Resume Physical therapy  5) Urology f/u RE: urine retention/BPH.   6) PICC placement  7) SNF placement.

## 2020-02-20 NOTE — PROGRESS NOTE ADULT - SUBJECTIVE AND OBJECTIVE BOX
INTERVAL HPI/OVERNIGHT EVENTS: Feels better today, eating    CONSTITUTIONAL:  Negative fever or chills, feels well, fair appetite  EYES:  Negative  blurry vision or double vision  CARDIOVASCULAR:  Negative for chest pain or palpitations  RESPIRATORY:  Negative for cough, wheezing, or SOB   GASTROINTESTINAL:  Negative for nausea, vomiting, diarrhea, constipation, or abdominal pain  GENITOURINARY:  Negative frequency, urgency or dysuria  NEUROLOGIC:  No headache, confusion, dizziness, lightheadedness      ANTIBIOTICS/RELEVANT:    MEDICATIONS  (STANDING):  cefTRIAXone   IVPB 2000 milliGRAM(s) IV Intermittent every 24 hours  chlorhexidine 2% Cloths 1 Application(s) Topical <User Schedule>  dextrose 5%. 1000 milliLiter(s) (50 mL/Hr) IV Continuous <Continuous>  dextrose 50% Injectable 12.5 Gram(s) IV Push once  dextrose 50% Injectable 25 Gram(s) IV Push once  dextrose 50% Injectable 25 Gram(s) IV Push once  dronabinol 2.5 milliGRAM(s) Oral two times a day before meals  finasteride 5 milliGRAM(s) Oral daily  heparin  Injectable 5000 Unit(s) SubCutaneous every 8 hours  influenza   Vaccine 0.5 milliLiter(s) IntraMuscular once  insulin glargine Injectable (LANTUS) 35 Unit(s) SubCutaneous at bedtime  insulin lispro (HumaLOG) corrective regimen sliding scale   SubCutaneous Before meals and at bedtime  insulin lispro Injectable (HumaLOG) 10 Unit(s) SubCutaneous three times a day before meals  lactated ringers. 1000 milliLiter(s) (100 mL/Hr) IV Continuous <Continuous>  senna 2 Tablet(s) Oral at bedtime  sertraline 50 milliGRAM(s) Oral daily  simvastatin 10 milliGRAM(s) Oral at bedtime  sodium bicarbonate 650 milliGRAM(s) Oral daily  tamsulosin 0.4 milliGRAM(s) Oral at bedtime    MEDICATIONS  (PRN):  acetaminophen   Tablet .. 650 milliGRAM(s) Oral every 6 hours PRN Temp greater or equal to 38C (100.4F)  benzocaine 15 mG/menthol 3.6 mG (Sugar-Free) Lozenge 1 Lozenge Oral two times a day PRN Sore Throat  dextrose 40% Gel 15 Gram(s) Oral once PRN Blood Glucose LESS THAN 70 milliGRAM(s)/deciliter  glucagon  Injectable 1 milliGRAM(s) IntraMuscular once PRN Glucose LESS THAN 70 milligrams/deciliter        Vital Signs Last 24 Hrs  T(C): 36.3 (20 Feb 2020 15:39), Max: 36.4 (20 Feb 2020 05:33)  T(F): 97.3 (20 Feb 2020 15:39), Max: 97.6 (20 Feb 2020 05:33)  HR: 89 (20 Feb 2020 15:39) (85 - 90)  BP: 102/68 (20 Feb 2020 15:39) (99/58 - 125/72)  BP(mean): --  RR: 18 (20 Feb 2020 15:39) (17 - 18)  SpO2: 100% (20 Feb 2020 15:39) (95% - 100%)    02-19-20 @ 07:01  -  02-20-20 @ 07:00  --------------------------------------------------------  IN: 180 mL / OUT: 650 mL / NET: -470 mL    02-20-20 @ 07:01  -  02-20-20 @ 17:48  --------------------------------------------------------  IN: 360 mL / OUT: 350 mL / NET: 10 mL      PHYSICAL EXAM:  Constitutional: WDWN resting comfortably in bed; appears fatigued  Head: NC/AT; mild pallor  Eyes: PERRL, EOMI, anicteric sclera  ENT: no nasal discharge; MMM  Neck: supple; no JVD  Respiratory: CTA B/L; no W/R/R, no retractions; speaking in full sentences without difficulty, soft voice and intermittently slow to respond but responds appropriately  Cardiac: +S1/S2; RRR; no M/R/G   Gastrointestinal: soft, NT/ND; no rebound or guarding; +BSx4  Genitourinary: palacio catheter in place, draining red-tinged urine 100cc in bag  Extremities: WWP, no clubbing or cyanosis; no peripheral edema; mildly pale  Musculoskeletal: moving all extremities spontaneously  Vascular: 2+ radial, PT pulses B/L  Dermatologic: skin warm, dry and intact; no rashes, wounds, or scars  Neurologic: AAOx3; CNII-XII grossly intact; no focal deficits  LABS:                        8.2    6.81  )-----------( 117      ( 20 Feb 2020 06:38 )             26.9     02-20    147<H>  |  121<H>  |  56<H>  ----------------------------<  142<H>  3.8   |  16<L>  |  2.23<H>    Ca    8.6      20 Feb 2020 06:38  Phos  3.4     02-20  Mg     2.3     02-20        MICROBIOLOGY:  Culture - Blood (02.15.20 @ 18:11)    Specimen Source: .Blood None    Culture Results:   No growth at 4 days.    Culture - Urine (02.13.20 @ 21:49)    -  Trimethoprim/Sulfamethoxazole: S <=0.5/9.5    -  Nitrofurantoin: S <=32 Should not be used to treat pyelonephritis    -  Ertapenem: S <=0.5    -  Gentamicin: S <=1    -  Meropenem: S <=1    -  Piperacillin/Tazobactam: S <=8    -  Tetra/Doxy: S <=2    -  Tigecycline: S <=1    -  Tobramycin: S <=2    -  Ampicillin: I 16 These ampicillin results predict results for amoxicillin    -  Amikacin: S <=8    -  Cefazolin: S <=2    -  Cefepime: S <=2    -  Cefotaxime: S <=2    -  Cefoxitin: S <=4    -  Ceftazidime: S <=1    -  Ampicillin/Sulbactam: S <=4/2 Enterobacter, Citrobacter, and Serratia may develop resistance during prolonged therapy (3-4 days)    -  Aztreonam: S <=4    -  Ceftriaxone: S <=1 Enterobacter, Citrobacter, and Serratia may develop resistance during prolonged therapy    -  Cefuroxime: S <=4    -  Cephalothin: S <=8    Specimen Source: .Urine Clean Catch (Midstream)    Culture Results:   >100,000 CFU/ml Klebsiella variicola    Organism Identification: Klebsiella variicola    Organism: Klebsiella variicola    Method Type: IMAN      RADIOLOGY & ADDITIONAL STUDIES:  < from: US Retroperitoneal B-Scan Limited (02.19.20 @ 12:07) >  FINDINGS: The kidneys are normal in size bilaterally, with the right kidney measuring 11.6 cm in length and the left kidney measuring 12.7 cm.  There is normalrenal parenchymal thickness and echogenicity bilaterally.  There has been slight improvement in moderate bilateral hydronephrosis. There is again a 3.5 cm cyst arising from the upper pole of the right kidney.    Images of the urinary bladder demonstrate a Palacio catheter within a diffusely thick-walled, nondistended bladder. Enlarged and lobular prostate again projects into base of bladder.    Small to moderate ascites present. Splenomegaly again identified, with spleen measuring 16.9 x 5.9 x 6.3 cm. No focal splenic lesion.    IMPRESSION: 1. Since 2/17/2020, there has been slight improvement of moderate bilateral hydronephrosis.    2. Small to moderate ascites.

## 2020-02-20 NOTE — PROGRESS NOTE ADULT - PROBLEM SELECTOR PLAN 2
- As above  - No recent instrumentation or risk factors for resistant  organisms  - Likely provoked in the setting of BPH with chronic urinary incontinence vs. retention  - Rodriguez catheter in place, monitor I&O  - Currently with hematuria - red tinged urine, not ash blood  - Continue to monitor  - Urology following, f/u recs  - ID consulted (Dr. Mcwilliams), f/u recs  - retroperitoneal ultrasound shows slight improvement of moderate bilateral hydronephrosis  - Treating for pyelonephritis

## 2020-02-20 NOTE — PROGRESS NOTE ADULT - PROBLEM SELECTOR PLAN 4
- Resolved  - On arrival with , BHB 3.8, AG 26, bicarb 8, lactate 4.1  - s/p insulin gtt with DKA protocol titration in MICU  - AG closed - 2/2 compensatory and azotemia, lactic acidosis, and elevated BHB  - Lactate 2 today, will not continue to trend  - Continue to monitor azotemia in the setting of urinary tract obstruction (BPH)  - Endocrine following, f/u recs  - Monitor AM BG, if > 180 can increase AM lantus by 2  - mISS  - Tolerating PO  - Carb consistent diet  - Continue to monitor FSG  - Lantus 20U  - Premeal Lispro 10U
- Resolved  - On arrival with , BHB 3.8, AG 26, bicarb 8, lactate 4.1  - s/p insulin gtt with DKA protocol titration in MICU  - AG closed - 2/2 compensatory and azotemia, lactic acidosis, and elevated BHB  - Lactate 2 today, will not continue to trend  - Continue to monitor azotemia in the setting of urinary tract obstruction (BPH)  - Endocrine following, f/u recs  - Monitor AM BG, if > 180 can increase AM lantus by 2  - mISS  - Tolerating PO  - Carb consistent diet  - Continue to monitor FSG  - Lantus increased from 10U to 15U in the am  - Premeal increased from 6U to 8U  - today, persistently hyperglycemic even w poor PO intake w BG in 300s so given 8U NPH in the afternoon
- Resolved  - On arrival with , BHB 3.8, AG 26, bicarb 8, lactate 4.1  - s/p insulin gtt with DKA protocol titration in MICU  - AG closed - 2/2 compensatory and azotemia, lactic acidosis, and elevated BHB  - Lactate 2 today, will not continue to trend  - Continue to monitor azotemia in the setting of urinary tract obstruction (BPH)  - Endocrine following, f/u recs  - Monitor AM BG, if > 180 can increase AM lantus by 2  - mISS  - Tolerating PO  - Carb consistent diet  - Continue to monitor FSG  - Lantus increased to 22U  - Premeal Lispro 10U
- Resolved  - On arrival with , BHB 3.8, AG 26, bicarb 8, lactate 4.1  - s/p insulin gtt with DKA protocol titration in MICU  - AG closed - 2/2 compensatory and azotemia, lactic acidosis, and elevated BHB  - Lactate 2 today, will not continue to trend  - Continue to monitor azotemia in the setting of urinary tract obstruction (BPH)  - Endocrine following, f/u recs  - Lantus decreased to 10U in AM and lispro 6U TID  - Monitor AM BG, if > 180 can increase AM lantus by 2  - mISS  - Tolerating PO  - Carb consistent diet  - Continue to monitor FSG

## 2020-02-20 NOTE — PROGRESS NOTE ADULT - PROBLEM SELECTOR PLAN 5
- Pt presenting with general feelings of illness and malaise  - Likely in the setting of azotemia, decreased PO intake, and electrolyte abnormalities  - Continue to monitor  - Daily BMP  - PT consult  - no deficits on exam  - Pt received Megace for appetite stimulation, however discontinued after patient received 2 doses, as can contribute to hyperglycemia  - Pt started on Marinol 2.5mg twice daily before meals instead for appetite stimulation    #Depression  Patient scored an 18 on PHQ-9. Reports depressed mood, anhedonia, anorexia, and more.   -started sertraline 50mg

## 2020-02-20 NOTE — DISCHARGE NOTE PROVIDER - NSDCFUADDAPPT_GEN_ALL_CORE_FT
We have scheduled an appointment with Dr. Adams (urology) for February 24th at 3:30pm. We have scheduled an appointment with Dr. Adams (urology) for February 24th at 3:30pm.  We have scheduled an appointment with your PCP, Dr. Roberts, on March 10th at 10:45 am We have scheduled an appointment with Dr. Adams (urology) for February 24th at 3:30pm.  We have scheduled an appointment with your PCP, Dr. Roberts, on March 10th at 10:45 am  Please follow-up with [facility] endocrine clinic on [address] ([phone]) in the next 4 weeks. We have scheduled an appointment with Dr. Adams (urology) for February 24th at 3:30pm.  We have scheduled an appointment with your PCP, Dr. Roberts, on March 10th at 10:45 am  Please follow-up with [facility] endocrine clinic on [address] [place] [address] ([phone]) on March 18th at 10:00 AM

## 2020-02-20 NOTE — DISCHARGE NOTE PROVIDER - NSDCCPCAREPLAN_GEN_ALL_CORE_FT
PRINCIPAL DISCHARGE DIAGNOSIS  Diagnosis: Diabetic ketoacidosis without coma associated with other specified diabetes mellitus  Assessment and Plan of Treatment: Diabetic ketoacidosis is a serious problem that happens to people with diabetes when chemicals called "ketones" build up in their blood. Normally, the body breaks down sugar as a source of energy. But in people with diabetes who do not make any insulin, the body is unable to use sugar. When the body can't use sugar, it burns fat as a source of energy. But burning fat can cause the body to make too many ketones. When ketones build up in the blood, they can be toxic. People can get diabetic ketoacidosis for a few reasons; they have a major illness or health problem, such a heart attack or infection, they take certain medicines or illegal drugs or they don't take their insulin as directed.  The symptoms can include; feeling very thirsty and drinking a lot, urinating a lot, including at night, nausea or vomiting, belly pain, feeling tired or having trouble thinking clearly, having breath that smells sweet or fruity and unintentional weight loss. In order to prevent DKA in the future it is very important that you take your diabetes medications as directed, measure your blood sugar regularly, and continue to see primary care doctor or endocrinologist for management of your diabetes.        SECONDARY DISCHARGE DIAGNOSES  Diagnosis: Clinical depression  Assessment and Plan of Treatment: Depression is a medical condition that causes feelings of sadness or hopelessness that do not go away. Depression may cause you to lose interest in things you used to enjoy and these feelings may interfere with your daily life. Please call [phone] if you think about harming yourself or someone else. Please take your medication, sertraline 50mg, once a day on a regular basis to improve or balance your mood and have regular follow-up with your PCP or psychiatrist so they can manage your care.      Diagnosis: Severe sepsis  Assessment and Plan of Treatment: When you came to the hospital, you were found to have an infection in your blood stream. We started you on IV antibiotics, and you started to improve. We placed an IV called a PICC line in your right arm so that you can continue to get your IV antibiotics. The last day of antibiotics is 2/27.    Diagnosis: Acute on chronic renal insufficiency  Assessment and Plan of Treatment: You were found to have a kidney injury from the kidney infection as well as from BPH. A palacio cathater was placed and your obstruction was relieved. An U/S was done and it showed enlarged of both kidneys called hydrophrosis, but this enlargement improved once the palacio cathater was placed. A urology appointment was made for you on 2/24 with Dr. Adams. Please make sure to go to that appointment.    Diagnosis: Acute pyelonephritis  Assessment and Plan of Treatment: Urinary tract infections, also called "UTIs," are infections that affect either the bladder or the kidneys, and in your case, you were found to have a kidney infection called pyelonephritis.Kidney infections happen when bacteria get into the urethra and travel up into the bladder. Kidney infections happen when the bacteria travel even higher, up into the kidneys. Signs that an infection has spread to the kidneys include fever, back pain, or nausea/vomiting. It is important that you take your antibiotics as prescribed and to completion to properly treat your urinary tract infection and prevent antibiotic resistance. The antibiotics, ceftriaxone, will continue to be given to you through the IV in your arm until 2/27. PRINCIPAL DISCHARGE DIAGNOSIS  Diagnosis: Diabetic ketoacidosis without coma associated with other specified diabetes mellitus  Assessment and Plan of Treatment: Diabetic ketoacidosis is a serious problem that happens to people with diabetes when chemicals called "ketones" build up in their blood. Normally, the body breaks down sugar as a source of energy. But in people with diabetes who do not make any insulin, the body is unable to use sugar. When the body can't use sugar, it burns fat as a source of energy. But burning fat can cause the body to make too many ketones. When ketones build up in the blood, they can be toxic. People can get diabetic ketoacidosis for a few reasons; they have a major illness or health problem, such a heart attack or infection, they take certain medicines or illegal drugs or they don't take their insulin as directed.  The symptoms can include; feeling very thirsty and drinking a lot, urinating a lot, including at night, nausea or vomiting, belly pain, feeling tired or having trouble thinking clearly, having breath that smells sweet or fruity and unintentional weight loss. In order to prevent DKA in the future it is very important that you take your diabetes medications as directed, measure your blood sugar regularly, and continue to see primary care doctor or endocrinologist for management of your diabetes.        SECONDARY DISCHARGE DIAGNOSES  Diagnosis: Hypernatremia  Assessment and Plan of Treatment: In the hospital, you were found to have high sodium levels. This was due to some damage to your kidneys, but also because you were not eating or drinking. Please continue to eat and drink lots of fluids to help regain strength and also to prevent yourself from being dehydrated. Instructions for rehab: please give 1/2 normal saline for hypernatremia at 100 cc/hr if patient is not drinking or eating.    Diagnosis: Clinical depression  Assessment and Plan of Treatment: Depression is a medical condition that causes feelings of sadness or hopelessness that do not go away. Depression may cause you to lose interest in things you used to enjoy and these feelings may interfere with your daily life. Please call 911 if you think about harming yourself or someone else. Please take your medication, sertraline 50mg, once a day on a regular basis to improve or balance your mood and have regular follow-up with your PCP or psychiatrist so they can manage your care.      Diagnosis: Severe sepsis  Assessment and Plan of Treatment: When you came to the hospital, you were found to have an infection in your blood stream. We started you on IV antibiotics, and you started to improve. We placed an IV called a PICC line in your right arm so that you can continue to get your IV antibiotics. The last day of antibiotics is 2/27.    Diagnosis: Acute on chronic renal insufficiency  Assessment and Plan of Treatment: You were found to have a kidney injury from the kidney infection as well as from BPH. A palacio cathater was placed and your obstruction was relieved. An U/S was done and it showed enlarged of both kidneys called hydrophrosis, but this enlargement improved once the palacio cathater was placed. A urology appointment was made for you on 2/24 with Dr. Adams. Please make sure to go to that appointment.    Diagnosis: Acute pyelonephritis  Assessment and Plan of Treatment: Urinary tract infections, also called "UTIs," are infections that affect either the bladder or the kidneys, and in your case, you were found to have a kidney infection called pyelonephritis.Kidney infections happen when bacteria get into the urethra and travel up into the bladder. Kidney infections happen when the bacteria travel even higher, up into the kidneys. Signs that an infection has spread to the kidneys include fever, back pain, or nausea/vomiting. It is important that you take your antibiotics as prescribed and to completion to properly treat your urinary tract infection and prevent antibiotic resistance. The antibiotics, ceftriaxone, will continue to be given to you through the IV in your arm until 2/27.

## 2020-02-20 NOTE — PROGRESS NOTE ADULT - ASSESSMENT
Pt is a 75 yo M with PMH DM, HTN, HLD, BPH (chronic incont/ret), SERA, CKD4, and a-fib (s/p ablation 2011) who presented to [facility] for general feelings of illness, weakness, and fatigue x2 wks. Initially admitted to ICU for DKA with klebsiella UTI and bacteremia, now stepped down to F for further observation and management. Being treated with ceftriaxone 2g until the 27th.

## 2020-02-20 NOTE — PROGRESS NOTE ADULT - PROBLEM SELECTOR PLAN 7
- Pt with known hx HTN  - Not on home med antihypertensive  - Normotensive throughout course  - Continue to monitor

## 2020-02-20 NOTE — DISCHARGE NOTE PROVIDER - NSDCFUSCHEDAPPT_GEN_ALL_CORE_FT
SANIYA LOZANO ; 03/18/2020 ; [facility] [address] SANIYA LOZANO ; 03/18/2020 ; [facility] [address]

## 2020-02-20 NOTE — PROGRESS NOTE ADULT - SUBJECTIVE AND OBJECTIVE BOX
OVERNIGHT EVENTS: KRYS    SUBJECTIVE / INTERVAL HPI: Patient seen and examined at bedside. Patient says his appetite remains poor, but that he is feeling a little better than on admission. He denies having any fevers, chills, chest pain, abdominal pain, n/v/d. Patient has a depressed affect, and performed the PHQ-9 with him, in which he scored an 18. The patient admits to feeling depressed, anorexia, anhedonia, and more. Started the patient on sertraline 50 mg BID.     VITAL SIGNS:  Vital Signs Last 24 Hrs  T(C): 36.2 (20 Feb 2020 09:28), Max: 36.5 (19 Feb 2020 15:56)  T(F): 97.2 (20 Feb 2020 09:28), Max: 97.7 (19 Feb 2020 15:56)  HR: 85 (20 Feb 2020 09:28) (85 - 90)  BP: 121/70 (20 Feb 2020 09:28) (99/58 - 125/72)  BP(mean): --  RR: 18 (20 Feb 2020 09:28) (17 - 18)  SpO2: 96% (20 Feb 2020 09:28) (95% - 98%)  I&O's Summary    19 Feb 2020 07:01  -  20 Feb 2020 07:00  --------------------------------------------------------  IN: 180 mL / OUT: 650 mL / NET: -470 mL    20 Feb 2020 07:01  -  20 Feb 2020 15:12  --------------------------------------------------------  IN: 360 mL / OUT: 350 mL / NET: 10 mL        PHYSICAL EXAM:    Constitutional: NAD  HEENT: NC/AT; PERRL, anicteric sclera; MMM  Neck: supple, no JVD  Cardiovascular: +S1/S2, RRR  Respiratory: CTA B/L, no W/R/R  Gastrointestinal: abdomen soft, NT/ND; no rebound or guarding; +BSx4  Back: No CVA tenderness  Genitourinary: slight suprapubic tenderness  Extremities: lower extremities are cool touch, upper are warm; no LE edema; no clubbing or cyanosis. R. PICC is in place, no erythema or leakage from entry site.   Vascular: 2+ radial, DP/PT and femoral pulses B/L    MEDICATIONS:  MEDICATIONS  (STANDING):  cefTRIAXone   IVPB 2000 milliGRAM(s) IV Intermittent every 24 hours  chlorhexidine 2% Cloths 1 Application(s) Topical <User Schedule>  dextrose 5%. 1000 milliLiter(s) (50 mL/Hr) IV Continuous <Continuous>  dextrose 50% Injectable 12.5 Gram(s) IV Push once  dextrose 50% Injectable 25 Gram(s) IV Push once  dextrose 50% Injectable 25 Gram(s) IV Push once  dronabinol 2.5 milliGRAM(s) Oral two times a day before meals  finasteride 5 milliGRAM(s) Oral daily  heparin  Injectable 5000 Unit(s) SubCutaneous every 8 hours  influenza   Vaccine 0.5 milliLiter(s) IntraMuscular once  insulin glargine Injectable (LANTUS) 35 Unit(s) SubCutaneous at bedtime  insulin lispro (HumaLOG) corrective regimen sliding scale   SubCutaneous Before meals and at bedtime  insulin lispro Injectable (HumaLOG) 10 Unit(s) SubCutaneous three times a day before meals  senna 2 Tablet(s) Oral at bedtime  sertraline 50 milliGRAM(s) Oral daily  simvastatin 10 milliGRAM(s) Oral at bedtime  sodium bicarbonate 650 milliGRAM(s) Oral daily  tamsulosin 0.4 milliGRAM(s) Oral at bedtime    MEDICATIONS  (PRN):  acetaminophen   Tablet .. 650 milliGRAM(s) Oral every 6 hours PRN Temp greater or equal to 38C (100.4F)  benzocaine 15 mG/menthol 3.6 mG (Sugar-Free) Lozenge 1 Lozenge Oral two times a day PRN Sore Throat  dextrose 40% Gel 15 Gram(s) Oral once PRN Blood Glucose LESS THAN 70 milliGRAM(s)/deciliter  glucagon  Injectable 1 milliGRAM(s) IntraMuscular once PRN Glucose LESS THAN 70 milligrams/deciliter      ALLERGIES:  Allergies    No Known Allergies    Intolerances        LABS:                        8.2    6.81  )-----------( 117      ( 20 Feb 2020 06:38 )             26.9     02-20    147<H>  |  121<H>  |  56<H>  ----------------------------<  142<H>  3.8   |  16<L>  |  2.23<H>    Ca    8.6      20 Feb 2020 06:38  Phos  3.4     02-20  Mg     2.3     02-20          CAPILLARY BLOOD GLUCOSE      POCT Blood Glucose.: 182 mg/dL (20 Feb 2020 12:36)      RADIOLOGY & ADDITIONAL TESTS: Reviewed.

## 2020-02-20 NOTE — PROGRESS NOTE ADULT - SUBJECTIVE AND OBJECTIVE BOX
HPI:  73 yo male with a hx of DM, HTN, HLD, iron deficiency anemia, CKD 4 who was brought in by his home health aide for feeling generally unwell and week for the past 2 weeks since his discharge from [facility] for symptomatic anemia. He has felt weakness, malaise and fatigue. He has not been experiencing any shortness of breath, chest pain, cough, sputum production, rhinorrhea odynophagia, difficulty hearing, abdominal pain, nausea, vomiting, diarrhea, constipation. He reports a history of chronic urinary incontinence and difficulty voiding due to known BPH with lower urinary tract symptoms. He denies any fever, chills, or rigors at home. He has been compliant with his medications prior to presentation. No recent abx use or hospitalizations. No dysuria, urgency or frequency that he has noted. No penile discharge. On presentation patient was noted to be in urinary retention w/ anion gap metabolic acidosis, acute renal failure, elevated b hydroxybutyrate and hyperglycemia w/ likely UTI. ICU consulted for management, and patient admitted for DKA c/b UTI.     In the ED: Patient given 3L NS, started on an insulin gtt, and given zosyn. (13 Feb 2020 18:56)    FAMILY HISTORY:  FH: stomach cancer  FHx: breast cancer  FHx: stroke    MEDICATIONS  (STANDING):  cefTRIAXone   IVPB 2000 milliGRAM(s) IV Intermittent every 24 hours  chlorhexidine 2% Cloths 1 Application(s) Topical <User Schedule>  dextrose 5%. 1000 milliLiter(s) (50 mL/Hr) IV Continuous <Continuous>  dextrose 50% Injectable 12.5 Gram(s) IV Push once  dextrose 50% Injectable 25 Gram(s) IV Push once  dextrose 50% Injectable 25 Gram(s) IV Push once  dronabinol 2.5 milliGRAM(s) Oral two times a day before meals  finasteride 5 milliGRAM(s) Oral daily  heparin  Injectable 5000 Unit(s) SubCutaneous every 8 hours  influenza   Vaccine 0.5 milliLiter(s) IntraMuscular once  insulin glargine Injectable (LANTUS) 35 Unit(s) SubCutaneous at bedtime  insulin lispro (HumaLOG) corrective regimen sliding scale   SubCutaneous Before meals and at bedtime  insulin lispro Injectable (HumaLOG) 10 Unit(s) SubCutaneous three times a day before meals  potassium chloride    Tablet ER 20 milliEquivalent(s) Oral once  senna 2 Tablet(s) Oral at bedtime  simvastatin 10 milliGRAM(s) Oral at bedtime  sodium bicarbonate 650 milliGRAM(s) Oral daily  sodium ferric gluconate complex IVPB 125 milliGRAM(s) IV Intermittent once  tamsulosin 0.4 milliGRAM(s) Oral at bedtime    MEDICATIONS  (PRN):  acetaminophen   Tablet .. 650 milliGRAM(s) Oral every 6 hours PRN Temp greater or equal to 38C (100.4F)  benzocaine 15 mG/menthol 3.6 mG (Sugar-Free) Lozenge 1 Lozenge Oral two times a day PRN Sore Throat  dextrose 40% Gel 15 Gram(s) Oral once PRN Blood Glucose LESS THAN 70 milliGRAM(s)/deciliter  glucagon  Injectable 1 milliGRAM(s) IntraMuscular once PRN Glucose LESS THAN 70 milligrams/deciliter    Vital Signs Last 24 Hrs  T(C): 36.4 (20 Feb 2020 05:33), Max: 36.5 (19 Feb 2020 15:56)  T(F): 97.6 (20 Feb 2020 05:33), Max: 97.7 (19 Feb 2020 15:56)  HR: 90 (20 Feb 2020 06:36) (85 - 90)  BP: 125/72 (20 Feb 2020 06:36) (99/58 - 125/72)  BP(mean): --  RR: 17 (20 Feb 2020 05:33) (17 - 18)  SpO2: 95% (20 Feb 2020 05:33) (95% - 98%)    Physical exam:    Overall impression  Lymphadenopathy  Liver  spleen    Labs:  CBC Full  -  ( 20 Feb 2020 06:38 )  WBC Count : 6.81 K/uL  RBC Count : 3.33 M/uL  Hemoglobin : 8.2 g/dL  Hematocrit : 26.9 %  Platelet Count - Automated : 117 K/uL  Mean Cell Volume : 80.8 fl  Mean Cell Hemoglobin : 24.6 pg  Mean Cell Hemoglobin Concentration : 30.5 gm/dL  Auto Neutrophil # : x  Auto Lymphocyte # : x  Auto Monocyte # : x  Auto Eosinophil # : x  Auto Basophil # : x  Auto Neutrophil % : x  Auto Lymphocyte % : x  Auto Monocyte % : x  Auto Eosinophil % : x  Auto Basophil % : x    02-20    147<H>  |  121<H>  |  56<H>  ----------------------------<  142<H>  3.8   |  16<L>  |  2.23<H>    Ca    8.6      20 Feb 2020 06:38  Phos  3.4     02-20  Mg     2.3     02-20        Radiology:  HEALTH ISSUES - R/O PROBLEM Dx:      Assessmant / Problems  1) Urosepsis improving  Blood c&s negative  Plan:  UA and urine culture  2) Anemia of FE deficiency and chronic disease  Plan: iv iron, Procrit  3) DM improved control  Thank you  Olivia Roberts MD

## 2020-02-20 NOTE — PROGRESS NOTE ADULT - PROBLEM SELECTOR PROBLEM 4
DKA (diabetic ketoacidoses)

## 2020-02-20 NOTE — PROGRESS NOTE ADULT - PROBLEM SELECTOR PROBLEM 9
Iron deficiency anemia

## 2020-02-20 NOTE — PROGRESS NOTE ADULT - PROBLEM SELECTOR PLAN 3
- Pt with known hx CKD4  - On arrival with Cr 4.4  - Per chart review, baseline closer to 3  - Associated with elevated BUN and hyperkalemia  - Likely 2/2 urinary tract obstruction in the setting of BPH  - Ulytes c/w obstructive disease with component of pre-renal disease likely 2/2 DKA and hypovolemia  - FeNa c/w intrinsic etiology  - f/u urology recs for optimization of BPH  - Retroperitoneal US with stable BL mild hydronephrosis since 2018 and bladder wall thickening with enlarged prostate  - Continue to monitor UO  - Daily BMP  - Avoid nephrotoxic agents  - Renally dose medications
- Pt with known hx CKD4  - On arrival with Cr 4.4  - Per chart review, baseline closer to 3  - Associated with elevated BUN and hyperkalemia  - Likely 2/2 urinary tract obstruction in the setting of BPH  - Ulytes c/w obstructive disease with component of pre-renal disease likely 2/2 DKA and hypovolemia  - FeNa c/w intrinsic etiology  - f/u urology recs for optimization of BPH  - Retroperitoneal US with stable BL mild hydronephrosis since 2018 and bladder wall thickening with enlarged prostate  - Continue to monitor UO  - Daily BMP  - Avoid nephrotoxic agents  - Renally dose medications  - Cr today 2.36
- Pt with known hx CKD4  - On arrival with Cr 4.4  - Per chart review, baseline closer to 3  - Associated with elevated BUN and hyperkalemia  - Likely 2/2 urinary tract obstruction in the setting of BPH  - Ulytes c/w obstructive disease with component of pre-renal disease likely 2/2 DKA and hypovolemia  - FeNa c/w intrinsic etiology  - f/u urology recs for optimization of BPH  - Retroperitoneal US with stable BL mild hydronephrosis since 2018 and bladder wall thickening with enlarged prostate  - Continue to monitor UO  - Daily BMP  - Avoid nephrotoxic agents  - Renally dose medications  - Cr today 2.62
- Pt with known hx CKD4  - On arrival with Cr 4.4  - Per chart review, baseline closer to 3  - Associated with elevated BUN and hyperkalemia  - Likely 2/2 urinary tract obstruction in the setting of BPH  - Ulytes c/w obstructive disease with component of pre-renal disease likely 2/2 DKA and hypovolemia  - FeNa c/w intrinsic etiology  - f/u urology recs for optimization of BPH  - Retroperitoneal US with stable BL mild hydronephrosis since 2018 and bladder wall thickening with enlarged prostate  - Continue to monitor UO  - Daily BMP  - Avoid nephrotoxic agents  - Renally dose medications

## 2020-02-20 NOTE — PROGRESS NOTE ADULT - ASSESSMENT
Pt is a 75 yo M with PMH DM, HTN, HLD, BPH (chronic incont/ret), SERA, CKD4, and a-fib (s/p ablation 2011) who presented to [facility] for general feelings of illness, weakness, and fatigue x2 wks. Initially admitted to ICU for DKA with Klebsiella UTI and Sepsis, improving

## 2020-02-20 NOTE — DISCHARGE NOTE PROVIDER - NSDCMRMEDTOKEN_GEN_ALL_CORE_FT
cefTRIAXone 2 g intravenous injection: 2 gram(s) intravenous every 24 hours, the last day is 2/27.   finasteride 5 mg oral tablet: 1 tab(s) orally once a day MDD:1 tab  senna oral tablet: 2 tab(s) orally once a day (at bedtime)  simvastatin 10 mg oral tablet: 1 tab(s) orally once a day (at bedtime)  sodium ferric gluconate complex 12.5 mg/mL intravenous solution: 10 milliliter(s) intravenous once  tamsulosin 0.4 mg oral capsule: 1 cap(s) orally once a day (at bedtime) cefTRIAXone 2 g intravenous injection: 2 gram(s) intravenous every 24 hours, the last day is 2/27.   finasteride 5 mg oral tablet: 1 tab(s) orally once a day MDD:1 tab  senna oral tablet: 2 tab(s) orally once a day (at bedtime)  sertraline 50 mg oral tablet: 1 tab(s) orally once a day   simvastatin 10 mg oral tablet: 1 tab(s) orally once a day (at bedtime)  sodium ferric gluconate complex 12.5 mg/mL intravenous solution: 10 milliliter(s) intravenous once  tamsulosin 0.4 mg oral capsule: 1 cap(s) orally once a day (at bedtime) cefTRIAXone 2 g intravenous injection: 2 gram(s) intravenous every 24 hours, the last day is 2/27.   dronabinol 2.5 mg oral capsule: 1 cap(s) orally 2 times a day (before meals)  finasteride 5 mg oral tablet: 1 tab(s) orally once a day MDD:1 tab  menthol-benzocaine 3.6 mg-15 mg mucous membrane lozenge:  mucous membrane   senna oral tablet: 2 tab(s) orally once a day (at bedtime)  sertraline 50 mg oral tablet: 1 tab(s) orally once a day   simvastatin 10 mg oral tablet: 1 tab(s) orally once a day (at bedtime)  sodium ferric gluconate complex 12.5 mg/mL intravenous solution: 10 milliliter(s) intravenous once  tamsulosin 0.4 mg oral capsule: 1 cap(s) orally once a day (at bedtime) cefTRIAXone 2 g intravenous injection: 2 gram(s) intravenous every 24 hours, the last day is 2/27.   dronabinol 2.5 mg oral capsule: 1 cap(s) orally 2 times a day (before meals)  finasteride 5 mg oral tablet: 1 tab(s) orally once a day MDD:1 tab  insulin glargine: 15 unit(s) intramuscular once a day (at bedtime)  menthol-benzocaine 3.6 mg-15 mg mucous membrane lozenge:  mucous membrane   mirtazapine 7.5 mg oral tablet: 1 tab(s) orally once a day  senna oral tablet: 2 tab(s) orally once a day (at bedtime)  sertraline 50 mg oral tablet: 1 tab(s) orally once a day   simvastatin 10 mg oral tablet: 1 tab(s) orally once a day (at bedtime)  sodium ferric gluconate complex 12.5 mg/mL intravenous solution: 10 milliliter(s) intravenous once  tamsulosin 0.4 mg oral capsule: 1 cap(s) orally once a day (at bedtime) calcium carbonate 500 mg (200 mg elemental calcium) oral tablet, chewable: 1 tab(s) orally once  cefTRIAXone 2 g intravenous injection: 2 gram(s) intravenous every 24 hours, the last day is 2/27.   dronabinol 2.5 mg oral capsule: 1 cap(s) orally 2 times a day (before meals)  finasteride 5 mg oral tablet: 1 tab(s) orally once a day MDD:1 tab  insulin glargine: 15 unit(s) intramuscular once a day (at bedtime)  menthol-benzocaine 3.6 mg-15 mg mucous membrane lozenge:  mucous membrane   mirtazapine 7.5 mg oral tablet: 1 tab(s) orally once a day  senna oral tablet: 2 tab(s) orally once a day (at bedtime)  sertraline 50 mg oral tablet: 1 tab(s) orally once a day   simvastatin 10 mg oral tablet: 1 tab(s) orally once a day (at bedtime)  sodium ferric gluconate complex 12.5 mg/mL intravenous solution: 10 milliliter(s) intravenous once  tamsulosin 0.4 mg oral capsule: 1 cap(s) orally once a day (at bedtime)

## 2020-02-20 NOTE — PROGRESS NOTE ADULT - SUBJECTIVE AND OBJECTIVE BOX
INTERVAL HPI/OVERNIGHT EVENTS:    Patient is a 74y old  Male who presents with a chief complaint of DKA (20 Feb 2020 09:15)      Pt reports the following symptoms:    CONSTITUTIONAL:  Negative fever or chills, feels well, good appetite  EYES:  Negative  blurry vision or double vision  CARDIOVASCULAR:  Negative for chest pain or palpitations  RESPIRATORY:  Negative for cough, wheezing, or SOB   GASTROINTESTINAL:  Negative for nausea, vomiting, diarrhea, constipation, or abdominal pain  GENITOURINARY:  Negative frequency, urgency or dysuria  NEUROLOGIC:  No headache, confusion, dizziness, lightheadedness    MEDICATIONS  (STANDING):  cefTRIAXone   IVPB 2000 milliGRAM(s) IV Intermittent every 24 hours  chlorhexidine 2% Cloths 1 Application(s) Topical <User Schedule>  dextrose 5%. 1000 milliLiter(s) (50 mL/Hr) IV Continuous <Continuous>  dextrose 50% Injectable 12.5 Gram(s) IV Push once  dextrose 50% Injectable 25 Gram(s) IV Push once  dextrose 50% Injectable 25 Gram(s) IV Push once  dronabinol 2.5 milliGRAM(s) Oral two times a day before meals  finasteride 5 milliGRAM(s) Oral daily  heparin  Injectable 5000 Unit(s) SubCutaneous every 8 hours  influenza   Vaccine 0.5 milliLiter(s) IntraMuscular once  insulin glargine Injectable (LANTUS) 35 Unit(s) SubCutaneous at bedtime  insulin lispro (HumaLOG) corrective regimen sliding scale   SubCutaneous Before meals and at bedtime  insulin lispro Injectable (HumaLOG) 10 Unit(s) SubCutaneous three times a day before meals  potassium chloride    Tablet ER 20 milliEquivalent(s) Oral once  senna 2 Tablet(s) Oral at bedtime  simvastatin 10 milliGRAM(s) Oral at bedtime  sodium bicarbonate 650 milliGRAM(s) Oral daily  sodium ferric gluconate complex IVPB 125 milliGRAM(s) IV Intermittent once  tamsulosin 0.4 milliGRAM(s) Oral at bedtime    MEDICATIONS  (PRN):  acetaminophen   Tablet .. 650 milliGRAM(s) Oral every 6 hours PRN Temp greater or equal to 38C (100.4F)  benzocaine 15 mG/menthol 3.6 mG (Sugar-Free) Lozenge 1 Lozenge Oral two times a day PRN Sore Throat  dextrose 40% Gel 15 Gram(s) Oral once PRN Blood Glucose LESS THAN 70 milliGRAM(s)/deciliter  glucagon  Injectable 1 milliGRAM(s) IntraMuscular once PRN Glucose LESS THAN 70 milligrams/deciliter      PHYSICAL EXAM  Vital Signs Last 24 Hrs  T(C): 36.2 (20 Feb 2020 09:28), Max: 36.5 (19 Feb 2020 15:56)  T(F): 97.2 (20 Feb 2020 09:28), Max: 97.7 (19 Feb 2020 15:56)  HR: 85 (20 Feb 2020 09:28) (85 - 90)  BP: 121/70 (20 Feb 2020 09:28) (99/58 - 125/72)  BP(mean): --  RR: 18 (20 Feb 2020 09:28) (17 - 18)  SpO2: 96% (20 Feb 2020 09:28) (95% - 98%)    Constitutional: wn/wd in NAD.   HEENT: NCAT, MMM, OP clear, EOMI, no proptosis or lid retraction  Neck: no thyromegaly or palpable thyroid nodules   Respiratory: lungs CTAB.  Cardiovascular: regular rhythm, normal S1 and S2, no audible murmurs, no peripheral edema  GI: soft, NT/ND, no masses/HSM appreciated.  Neurology: no tremors, DTR 2+  Skin: no visible rashes/lesions  Psychiatric: AAO x 3, normal affect/mood.    LABS:                        8.2    6.81  )-----------( 117      ( 20 Feb 2020 06:38 )             26.9     02-20    147<H>  |  121<H>  |  56<H>  ----------------------------<  142<H>  3.8   |  16<L>  |  2.23<H>    Ca    8.6      20 Feb 2020 06:38  Phos  3.4     02-20  Mg     2.3     02-20          Thyroid Stimulating Hormone, Serum: 0.690 uIU/mL (02-16 @ 06:32)  Thyroid Stimulating Hormone, Serum: 0.532 uIU/mL (02-15 @ 06:23)      HbA1C: 8.4 % (02-05 @ 06:22)    CAPILLARY BLOOD GLUCOSE      POCT Blood Glucose.: 158 mg/dL (20 Feb 2020 09:00)  POCT Blood Glucose.: 157 mg/dL (19 Feb 2020 22:00)  POCT Blood Glucose.: 144 mg/dL (19 Feb 2020 17:49)  POCT Blood Glucose.: 217 mg/dL (19 Feb 2020 12:43)      Insulin Sliding Scale requirements X 24 Hours:    RADIOLOGY & ADDITIONAL TESTS:    A/P: 74y Male with history of DM type II presenting for       1.  DM -     Please continue           units lantus at bedtime  / in the morning and        units lispro with meals and lispro moderate / low dose sliding scale 4 times daily with meals and at bedtime.  Please continue consistent carbohydrate diet.      Goal FSG is   Will continue to monitor   For discharge, pt can continue    Pt can follow up at discharge with [facility] by calling  to make an appointment.   Will discuss case with     and update primary team INTERVAL HPI/OVERNIGHT EVENTS:    Patient seen and examined at the bedside. His appetite continues to remain poor and is not drinking/ keeping himself hydrated well. He is feeling thirsty but does not feel like drinking. he di not have his dinner. he has been drinking half of his glucerna for breakfast. His sodium was 147 with urine osm 409 ( semi-concentrated) and urine sodium < 20. He is being for disposition to [facility] when medically stable.   FSG & Insulin received:  Yesterday:  pre-dinner fs, not hungry - did not eat.  bedtime fs, 35 lantus   units + 2   units lispro SS  Today:  pre-breakfast fs, 10 nutritional lispro   units+  2  units lispro SS  pre-lunch fs    Pt reports the following symptoms:  CONSTITUTIONAL:  Negative fever or chills  CARDIOVASCULAR:  Negative for chest pain or palpitations  RESPIRATORY:  Negative for cough, wheezing, or SOB   GASTROINTESTINAL:  Negative for vomiting, diarrhea, constipation, or abdominal pain  GENITOURINARY:  Negative frequency, urgency or dysuria  NEUROLOGIC:  No headache, dizziness, lightheadedness    MEDICATIONS  (STANDING):  cefTRIAXone   IVPB 2000 milliGRAM(s) IV Intermittent every 24 hours  chlorhexidine 2% Cloths 1 Application(s) Topical <User Schedule>  dextrose 5%. 1000 milliLiter(s) (50 mL/Hr) IV Continuous <Continuous>  dextrose 50% Injectable 12.5 Gram(s) IV Push once  dextrose 50% Injectable 25 Gram(s) IV Push once  dextrose 50% Injectable 25 Gram(s) IV Push once  dronabinol 2.5 milliGRAM(s) Oral two times a day before meals  finasteride 5 milliGRAM(s) Oral daily  heparin  Injectable 5000 Unit(s) SubCutaneous every 8 hours  influenza   Vaccine 0.5 milliLiter(s) IntraMuscular once  insulin glargine Injectable (LANTUS) 35 Unit(s) SubCutaneous at bedtime  insulin lispro (HumaLOG) corrective regimen sliding scale   SubCutaneous Before meals and at bedtime  insulin lispro Injectable (HumaLOG) 10 Unit(s) SubCutaneous three times a day before meals  potassium chloride    Tablet ER 20 milliEquivalent(s) Oral once  senna 2 Tablet(s) Oral at bedtime  simvastatin 10 milliGRAM(s) Oral at bedtime  sodium bicarbonate 650 milliGRAM(s) Oral daily  sodium ferric gluconate complex IVPB 125 milliGRAM(s) IV Intermittent once  tamsulosin 0.4 milliGRAM(s) Oral at bedtime    MEDICATIONS  (PRN):  acetaminophen   Tablet .. 650 milliGRAM(s) Oral every 6 hours PRN Temp greater or equal to 38C (100.4F)  benzocaine 15 mG/menthol 3.6 mG (Sugar-Free) Lozenge 1 Lozenge Oral two times a day PRN Sore Throat  dextrose 40% Gel 15 Gram(s) Oral once PRN Blood Glucose LESS THAN 70 milliGRAM(s)/deciliter  glucagon  Injectable 1 milliGRAM(s) IntraMuscular once PRN Glucose LESS THAN 70 milligrams/deciliter      PHYSICAL EXAM  Vital Signs Last 24 Hrs  T(C): 36.2 (2020 09:28), Max: 36.5 (2020 15:56)  T(F): 97.2 (2020 09:28), Max: 97.7 (2020 15:56)  HR: 85 (2020 09:28) (85 - 90)  BP: 121/70 (2020 09:28) (99/58 - 125/72)  BP(mean): --  RR: 18 (2020 09:28) (17 - 18)  SpO2: 96% (2020 09:28) (95% - 98%)    Constitutional: wn/wd in NAD.   Respiratory: lungs CTAB.  Cardiovascular: regular rhythm, normal S1 and S2, no peripheral edema  GI: soft, NT/ND, no masses/HSM appreciated.  Neurology: no tremors, DTR 2+  Psychiatric: AAO x 2, flat affect/ low mood.    LABS:                        8.2    6.81  )-----------( 117      ( 2020 06:38 )             26.9     02-20    147<H>  |  121<H>  |  56<H>  ----------------------------<  142<H>  3.8   |  16<L>  |  2.23<H>    Ca    8.6      2020 06:38  Phos  3.4     02-20  Mg     2.3     02-20          Thyroid Stimulating Hormone, Serum: 0.690 uIU/mL ( @ 06:32)  Thyroid Stimulating Hormone, Serum: 0.532 uIU/mL (02-15 @ 06:23)      HbA1C: 8.4 % ( @ 06:22)    CAPILLARY BLOOD GLUCOSE      POCT Blood Glucose.: 158 mg/dL (2020 09:00)  POCT Blood Glucose.: 157 mg/dL (2020 22:00)  POCT Blood Glucose.: 144 mg/dL (2020 17:49)  POCT Blood Glucose.: 217 mg/dL (2020 12:43)      Insulin Sliding Scale requirements X 24 Hours:    RADIOLOGY & ADDITIONAL TESTS:    A/P: 73 yo male with hx of uncontrolled DM II, BPH found to have poly factorial anion gap metabolic acidosis, DKA and urinary retention with concurrent urinary tract infection with bilatreal hydronephrosis. AAMIR improving. He is currently getting treated for UTI with ciprofloxacin.     1.  DM - type 2, uncontrolled, complicated. Persistent hyperglycemia   -A1C: 8.4%  Weight: 87kg/BMI 28  Cr/GRF: 2.93 (baseline in 2s)  Please increase to Lantus 40 units at bedtime.   Please continue lispro 10 units before each meal.    Please continue lispro moderate  dose sliding scale four times daily with meals and at bedtime  - please obtain 300 Am FSG  Pt's fingerstick glucose goal is 100-180  Will continue to monitor     2. euthyroidal sick illness  Low TSH, Ft4 0.54. ( 2/15) - before megace. On 20 TSH 0.690, free T4 0.54   Anti-thyroid ABs negative. TT3 48.   - AM cortisol was 21.8 ( no adrenal insufficiency)  - We will repeat TFTs in 3 to 4 days - on 2020    3. Hypernatremia - likely hypovolemia  - Sodium was 147 with urine osm 409 and urine sodium < 20.  - Likely hypovolemia  - consider IV fluids    Pt can follow up at discharge with [facility] by calling  to make an appointment.   discussed case with  and updated primary team

## 2020-02-20 NOTE — PROGRESS NOTE ADULT - PROBLEM SELECTOR PLAN 9
- Pt with known hx SERA, not on any outpt supplements  - Hb 8.3  - Defer iron studies while inpt, pursue outpt w/u  - No active s/s bleeding, continue to monitor  - Hold supplementation in the setting of active infection, will need outpt f/u  - Keep active T&S  - Transfuse Hb<7  - IV iron per Dr. Roberts
- Pt with known hx SERA, not on any outpt supplements  - Hb 8.3  - No active s/s bleeding, continue to monitor  - Hold supplementation in the setting of active infection, will need outpt f/u  - Keep active T&S  - Transfuse Hb<7  - IV iron per Dr. Roberts
- Pt with known hx SERA, not on any outpt supplements  - Hb 8.3  - Defer iron studies while inpt, pursue outpt w/u  - No active s/s bleeding, continue to monitor  - Hold supplementation in the setting of active infection, will need outpt f/u  - Keep active T&S  - Transfuse Hb<7  - IV iron per Dr. Roberts
- Pt with known hx SERA, not on any outpt supplements  - Hb 8.5, MCV 79.3  - Defer iron studies while inpt, pursue outpt w/u  - No active s/s bleeding, continue to monitor  - Hold supplementation in the setting of active infection, will need outpt f/u  - Keep active T&S  - Transfuse Hb<7
- Pt with known hx SERA, not on any outpt supplements  - Hb 8.5, MCV 79.3  - Defer iron studies while inpt, pursue outpt w/u  - No active s/s bleeding, continue to monitor  - Hold supplementation in the setting of active infection, will need outpt f/u  - Keep active T&S  - Transfuse Hb<7
- Pt with known hx SERA, not on any outpt supplements  - Hb 8.5, MCV 79.3  - Defer iron studies while inpt, pursue outpt w/u  - No active s/s bleeding, continue to monitor  - Hold supplementation in the setting of active infection, will need outpt f/u  - Keep active T&S  - Transfuse Hb<7  - IV iron per Dr. Roberts

## 2020-02-20 NOTE — DISCHARGE NOTE PROVIDER - PROVIDER TOKENS
PROVIDER:[TOKEN:[[id number]]] PROVIDER:[TOKEN:[[id number]]],PROVIDER:[TOKEN:[[id number]]]

## 2020-02-20 NOTE — DISCHARGE NOTE PROVIDER - CARE PROVIDER_API CALL
Michel Adams (MD)  Urology  [address] [place] [address]  Phone: [phone]  Fax: [phone]  Follow Up Time: Michel Adams (MD)  Urology  [address] [place] [address]  Phone: [phone]  Fax: [phone]  Follow Up Time:     Olivia Roberts)  [address], [address] [place] [address]  Phone: [phone]  Fax: [phone]  Follow Up Time:

## 2020-02-20 NOTE — PROGRESS NOTE ADULT - PROBLEM SELECTOR PROBLEM 6
PAIN/DIFFICULTY BEARING WEIGHT/left ankle pain and unable to bear weight
BPH (benign prostatic hyperplasia)

## 2020-02-20 NOTE — PROGRESS NOTE ADULT - PROBLEM SELECTOR PLAN 8
Primary Care Provider Appointment    Subjective:      Patient ID: Zoey Ham is a 71 y.o. female here for follow up after starting HD, recent hip fracture and recovery, ESRD, carotid artery disease, recent CHF exacerbation.         Chief Complaint: Follow-up and Hypertension    HPI    Doing well on HD.  D/c from ortho s/p completion of PT.  [place] for [place].  Moving back to the  with her son and restarting HD here.  No surg was done after fracture of pelvis.    Since last visit saw [facility] and scan was done for carotid artery disease.  [facility] is considering a procedure due to her h/o vascular disease.  She has an appt 11/5 with Dr Sethi.  She is no certain that this is what she wants to have done due to risk.  She will discuss with [facility] her concerns and risk/benefit.    Renal wants her dry wt adjusted.  When no fluid removed recently, admitted for an CHF exacerbation.        Past Surgical History:   Procedure Laterality Date    APPENDECTOMY      CARDIAC SURGERY  2002    CABG    CHOLECYSTECTOMY      CHOLECYSTECTOMY-LAPAROSCOPIC N/A 2/4/2015    Performed by Quinton [facility] at [facility]ND [place]    [id number]-IINSEPM-ZE  - Left brachiocephalic Left 4/16/2018    Performed by YAMEL Perry III, [facility] at [facility] OR 2ND [facility]    CORONARY ANGIOPLASTY  2004    CORONARY ARTERY BYPASS GRAFT      EYE SURGERY      cataracts bilaterally    Fistulogram Left 1/17/2019    [facility]EL [facility] at [facility] OR [facility]    Fistulogram Left 9/20/2018    Performed by [facility]EL [facility] III, [facility] at [facility] OR [facility] [place]    Fistulogram left arm Left 6/20/2019    Performed by Y[facility]EL [facility] III, [facility] at [facility] OR [facility] [place]    HYSTERECTOMY      PTA (ANGIOPLASTY, PERCUTANEOUS, TRANSLUMINAL) N/A 6/20/2019    Performed by YAMEL Perry III, [facility] at [facility] OR 2ND [place]    PTA (ANGIOPLASTY, PERCUTANEOUS, TRANSLUMINAL) N/A 1/17/2019    [facility]    PTA 
(ANGIOPLASTY, PERCUTANEOUS, TRANSLUMINAL) N/A 9/20/2018    Performed by [facility]EL [facility] III, MD at [facility] OR 2ND [place]    STENT, FISTULA Left 6/20/2019    Performed by YAMEL Perry III, MD at [facility] OR 2ND [facility]    TONSILLECTOMY         Past Medical History:   Diagnosis Date    Acid reflux     Acute myocardial infarction of anterolateral wall 7/9/2015    Anemia of chronic renal failure, stage 4 (severe) 1/22/2015    Ankle fracture     right    Anticoagulant long-term use     ASA and plavix    Atherosclerosis of aorta 10/3/2012    AV shunt malfunction     Benign hypertension with CKD (chronic kidney disease) stage IV 3/11/2016    Hypertension associated with Diabetes    Carpal tunnel syndrome, right     Chronic diastolic congestive heart failure 10/3/2012    Combined systolic and diastolic CHF    Chronic kidney disease, stage IV (severe) 7/3/2012    Coronary artery disease     s/p 1v CABG 2002 and multiple PCI (last angiogram in 6/2012 with patent LIMA->LAD and patent LCx and RCA stents)    Diabetic polyneuropathy associated with type 2 diabetes mellitus 7/9/2015    Diabetic polyneuropathy associated with type 2 diabetes mellitus 7/9/2015    Dialysis patient     Encounter for blood transfusion     Gastritis without bleeding     Heart attack 09/2012    5-6 events    Hyperlipidemia     Hyperparathyroidism     Hyperphosphatemia     Metabolic acidosis     Nephritis and nephropathy, with pathological lesion in kidney 7/9/2015    NSTEMI (non-ST elevated myocardial infarction)     NSTEMI (non-ST elevated myocardial infarction)     NSTEMI (non-ST elevated myocardial infarction)     Occlusion and stenosis of carotid artery with cerebral infarction 7/9/2015    Osteopenia     PAF (paroxysmal atrial fibrillation) 10/3/2012    Pneumonia     Proliferative diabetic retinopathy 10/3/2012    S/P drug eluting coronary stent placement     Sepsis due to Escherichia coli with acute renal failure 2/2016    
UTI     TACO (transfusion associated circulatory overload)     Type II diabetes mellitus with neurological manifestations 7/9/2015    Type II diabetes mellitus with renal manifestations 7/3/2012       Social History     Socioeconomic History    Marital status: Single     Spouse name: Not on file    Number of children: Not on file    Years of education: Not on file    Highest education level: Not on file   Occupational History    Occupation: Homemaker   Social Needs    Financial resource strain: Not on file    Food insecurity:     Worry: Not on file     Inability: Not on file    Transportation needs:     Medical: Not on file     Non-medical: Not on file   Tobacco Use    Smoking status: Never Smoker    Smokeless tobacco: Never Used   Substance and Sexual Activity    Alcohol use: No    Drug use: No    Sexual activity: Never   Lifestyle    Physical activity:     Days per week: Not on file     Minutes per session: Not on file    Stress: Not on file   Relationships    Social connections:     Talks on phone: Not on file     Gets together: Not on file     Attends Buddhist service: Not on file     Active member of club or organization: Not on file     Attends meetings of clubs or organizations: Not on file     Relationship status: Not on file   Other Topics Concern    Are you pregnant or think you may be? No    Breast-feeding No   Social History Narrative    2 biological kids, 1 adopted9 grandkids (1 grandaughter lives with her while she 's attending pharmacy school at [employer])3 great[place]kids[place]       Review of Systems   Constitutional: Negative for activity change, fatigue and unexpected weight change.   Respiratory: Negative for apnea, chest tightness and shortness of breath.    Cardiovascular: Positive for leg swelling (edema at the end of the day). Negative for chest pain.        No PND/orthopnea/WALSH/SOB   Gastrointestinal: Negative for anal 
"bleeding, blood in stool, constipation, diarrhea and nausea.        Gerd in the hospital but no further   Musculoskeletal:        No recent fall and no pain with recent fracture   Neurological: Negative for facial asymmetry, speech difficulty, weakness, light-headedness and numbness.   Hematological: Bruises/bleeds easily.   Psychiatric/Behavioral: Negative for confusion.       Objective:   /60   Pulse 71   Ht 5' 4" (1.626 m)   Wt 74.2 kg (163 lb 11.1 oz)   LMP  (LMP Unknown)   SpO2 (!) 94%   BMI 28.10 kg/m²     Physical Exam   Constitutional: She appears well-developed and well-nourished. No distress.   HENT:   Head: Normocephalic and atraumatic.   R tm dull.  L tm with cerumen impaction   Eyes: Pupils are equal, round, and reactive to light. Right eye exhibits no discharge. Left eye exhibits no discharge. No scleral icterus.   Neck: Normal range of motion. No thyromegaly present.   Cardiovascular: Normal rate and normal heart sounds.   Pulmonary/Chest: Effort normal and breath sounds normal. No respiratory distress. She has no wheezes. She has no rales.   Abdominal: Soft. Bowel sounds are normal. She exhibits no distension. There is no tenderness. There is no rebound and no guarding.   Musculoskeletal: She exhibits no edema.   Neurological: She is alert.   Skin: Skin is dry. Ecchymosis (bruising noted B arms) noted. She is not diaphoretic.   Psychiatric: She has a normal mood and affect. Her behavior is normal. Judgment and thought content normal.           Lab Results   Component Value Date    WBC 9.82 07/16/2019    HGB 8.3 (L) 07/16/2019    HCT 27.3 (L) 07/16/2019     07/16/2019    CHOL 119 (L) 08/06/2019    TRIG 129 08/06/2019    HDL 28 (L) 08/06/2019    ALT 14 07/16/2019    AST 23 07/16/2019     07/16/2019    K 4.0 07/16/2019    CL 98 07/16/2019    CREATININE 2.6 (H) 07/16/2019    BUN 23 07/16/2019    CO2 27 07/16/2019    TSH 2.801 06/09/2018    INR 0.9 06/23/2019    GLUF 132 (H) "
05/03/2012    HGBA1C 8.7 (H) 06/23/2019       Current Outpatient Medications on File Prior to Visit   Medication Sig Dispense Refill    aspirin (ECOTRIN) 325 MG EC tablet Take 1 tablet (325 mg total) by mouth once daily. (Patient taking differently: Take 325 mg by mouth every morning. )      atorvastatin (LIPITOR) 80 MG tablet Take 1 tablet (80 mg total) by mouth every morning. (Patient taking differently: Take 40 mg by mouth every morning. ) 90 tablet 3    calcitRIOL (ROCALTROL) 0.5 MCG Cap Take 1 capsule (0.5 mcg total) by mouth once daily. 90 capsule 1    clopidogrel (PLAVIX) 75 mg tablet Take 1 tablet (75 mg total) by mouth once daily. 90 tablet 3    famotidine (PEPCID) 20 MG tablet Take 1 tablet (20 mg total) by mouth 2 (two) times daily. 180 tablet 3    glucose 4 GM chewable tablet Take 4 tablets (16 g total) by mouth as needed.  12    glucose 4 GM chewable tablet Take 6 tablets (24 g total) by mouth as needed.  12    insulin detemir U-100 (LEVEMIR FLEXTOUCH) 100 unit/mL (3 mL) SubQ InPn pen Inject 15 Units into the skin once daily.  0    insulin glargine,hum.rec.anlog (LANTUS SUBQ) Inject into the skin.       metoprolol tartrate (LOPRESSOR) 100 MG tablet Take 1 tablet (100 mg total) by mouth 2 (two) times daily. 180 tablet 3    multivitamin with minerals tablet Take 1 tablet by mouth once daily. 90 tablet 3    nitroGLYCERIN (NITROSTAT) 0.4 MG SL tablet Place 1 tablet (0.4 mg total) under the tongue every 5 (five) minutes as needed for Chest pain. 25 tablet 3    polyethylene glycol (GLYCOLAX) 17 gram PwPk Take 17 g by mouth 2 (two) times daily as needed.  0    traMADol (ULTRAM) 50 mg tablet Take 1 tablet (50 mg total) by mouth every 8 (eight) hours as needed.      vit b cmplx 3-fa-vit c-biotin 1- mg-mg-mcg (NEPHRO-EWA RX OR EQUIV) 1- mg-mg-mcg Tab Take 1 tablet by mouth once daily. 90 tablet 3    vitamin renal formula, B-complex-vitamin c-folic acid, (NEPHROCAPS) 1 mg Cap Take 1 
capsule by mouth once daily.  0    [DISCONTINUED] furosemide (LASIX) 80 MG tablet One 80 mg  tab once a day. 90 tablet 2     No current facility-administered medications on file prior to visit.          Assessment:   71 y.o. female with multiple co-morbid illnesses here for continued follow up of medical problems.      Plan:     Problem List Items Addressed This Visit        Cardiac/Vascular    Chronic combined systolic and diastolic congestive heart failure    Relevant Medications    furosemide (LASIX) 80 MG tablet    Chronic diastolic heart failure     Recent admission for exacerbation  · Lasix to be increased to once daily and BID PRN  · Renal to adjust dry wt          Bilateral carotid artery stenosis     There is 70-79% right Internal Carotid Stenosis.  There is 50-59% left Internal Carotid Stenosis.\on carotid u/ 8/19  · Reviewed u/s results with pt  · Will continue high dose atorvo  · Pt will f/u vasc 11/19 to discuss surgical options if indicated            Endocrine    Type 2 diabetes mellitus with hyperlipidemia     ESRD and so no HbA1C since will not be accurate.  No log today  · optho needed.  · Continue current meds  · Call with any hypoglycemia         Relevant Orders    Ambulatory consult to Optometry       Orthopedic    Age-related osteoporosis without current pathological fracture     Recent fracture and last dexa 2017.  · dexa ordered to eval further though clinically osteoprosis with fx with fall  · Medication needs to be considered once results obtained  · Check vit D and PTH.  Pt to bring phos from labs doen with HD and we will review         Relevant Orders    DXA Bone Density Spine And Hip    Vitamin D    PTH, intact      Other Visit Diagnoses     Need for shingles vaccine    -  Primary    Relevant Medications    adjuvant AS01B, PF,vial 1 of 2 Susp    varicella-zoster gE vac,2 of 2 50 mcg SusR    Coronary artery disease of bypass graft of native heart with stable angina pectoris        
Relevant Medications    furosemide (LASIX) 80 MG tablet    Uncontrolled type 2 diabetes mellitus with stage 4 chronic kidney disease, with long-term current use of insulin        Relevant Medications    furosemide (LASIX) 80 MG tablet          Health Maintenance       Date Due Completion Date    Fecal Occult Blood Test (FOBT)/[employer] 1948 ---    Shingles Vaccine (2 of 3) 01/06/2011 11/11/2010    Mammogram 05/14/2019 5/14/2018    Eye Exam 07/09/2019 7/9/2018    Influenza Vaccine (1) 09/01/2019 10/1/2018    Override on 8/30/2018: Done (done at dialysis)    Override on 8/29/2016: Done    Override on 10/27/2013: Done    Colonoscopy 01/14/2020 (Originally 8/21/1966) ---    Foot Exam 05/14/2020 5/14/2019 (Done)    Override on 5/14/2019: Done    Override on 12/6/2016: Done    DEXA SCAN 07/13/2020 7/13/2017    TETANUS VACCINE 03/03/2026 3/3/2016          Follow up in about 3 months (around 12/17/2019). Total face-to-face time was 60 min, 50% of this was spent on counseling and coordination of care. The following issues were discussed: follow up after starting HD, recent hip fracture and recovery, ESRD, carotid artery disease, recent CHF exacerbation.         Pt refusing shingles vaccine today.  Will give rx to be done in the future..  Flu to be done with HD.  iFOBT top be returned.  Pt has at home.    MMG refused today.  Dexa ordered with fx.        Tao Wood MD  Internal Medicine  [facility]  [phone]        
- Pt with known hx HLD  - Home med simvastatin 10mg daily  - Continue home med

## 2020-02-21 NOTE — DISCHARGE NOTE NURSING/CASE MANAGEMENT/SOCIAL WORK - NSDCFUADDAPPT_GEN_ALL_CORE_FT
We have scheduled an appointment with Dr. Adams (urology) for February 24th at 3:30pm.  We have scheduled an appointment with your PCP, Dr. Roberts, on March 10th at 10:45 am  Please follow-up with [facility] endocrine clinic on [address] [place] [address] ([phone]) on March 18th at 10:00 AM

## 2020-02-21 NOTE — PROGRESS NOTE ADULT - ATTENDING COMMENTS
The patient was evaluated at the bedside with Dr. Ely,endocrinology Fellow. His glucoses remain uncontrolled in the 300's probably because since he is not eating he was given Megestrol yesterday and already today. He needed to be bridged this AM with 10 units NPH Insulin.He received his 22 units Glargine Insulin this AM and dependent on further glucose levels today may need an additiona; 25-20 units Glargine Insulin at bedtime tonight.
Pt seen on rounds this afternoon and events of the weekend reviewed.  His basal insulin requirements continue to be surprisingly high, and the Lantus will need to be increased.  Will try to get the Lantus into a single morning dose over the next few days, since I have doubts about pt's ability to manage his insulin without supervision, and if a nurse needs to give this a bedtime dose would be impractical.  Will keep the Lantus to 20 units for bedtime, but increase the AM dose to 28 units.  The pre-meal lispro dose is OK
Pt seen on rounds this afternoon.  Glucoses are mostly in target range, though will increase the Lantus slightly to 40 units at bedtime given the AM fingerstick of 158.  The problem of pt's poor appetite and minimal PO intake remains.  Reason for this is unclear.  I doubt that his renal insufficiency is the main factor any longer.  Depression has been considered as a potential culprit, and I agree.  Would therefore consider use of Remeron as both a mild anti-depressant and appetite stimulant.  (The pt also says that he is not sleeping well--Remeron would obviously help).  With regard to the hypernatremia, several factors are involved.  He almost certainly does not have central DI.  The low urinary Na suggests volume depletion, and does appear clinically dry.  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  The reason for the poor water intake is also unclear, probably goes along with his poor food intake.
Pt seen on rounds this afternoon.  PO intake remains poor, and his water intake is still inadquate to correct the hypernatremia--despite encouragement.  He has been receiving LR.    Skin turgor is poor and mucous membranes still appear dry on exam  Fingersticks are in an excellent range, but with the pt not having received Lantus last night.  At this point it is difficult to predict a Lantus dose, but for caution would continue it but only at 15 units hs, with pre-meal lispro only 6 units TID
Pt seen on rounds this afternoon.  Still very difficult to communicate effectively with him due to paucity of speech, low volume, and cognitive deficits.  He failed to receive Lantus this morning, which is a "setback" in terms of trying to combine his two doses into a single dose in the morning to improve compliance.  His appetite also remains poor, and his fingerstick fell to 76 before supper last night.  HIs fingerstick this morning was still somewhat high at 185.  --Will decrease the Humalog to 7 units pre-meal.  Will try to now stabilize the Lantus as a single bedtime dose, and given 35 units tonight.  --He is also moderately hypernatremic at 148.  No urine studies to assess concentrating ability have been done.  He appears clinically "dry," with poor skin turgor peripherally.  He does report that he is (appropriately) thirsty but it is unclear how much he is drinking.  Would sent U/A, urine Na and osmolality (plus repeat met panel) now.  His urine does not appear unduly dilute and his outputs are not obviously excessive.  Suspect that he has a combination of impaired urinary concentrating ability secondary to long-standing obstructive uropathy plus deficient PO fluid intake.
The patient was seen and evaluated at the bedside with Dr. Sands,endocrinology Fellow. The ptient was admitted with high glucose and borderline acidosis and was treated with an Insulin drip. Currently as the patient received no long acting basal Insulin glucoses are up to 330 with 263 and 322 mg% glucose last night. I agree with Dr. Ely's plan with 15 units Glargine Insulin to be given now however with recent gluicose 338 I agree with bridging the patient with stat 15 units NPH Insulin now. The patient is noted to have a low TSH with a low Free T4 of 0.54. TSH is to be repeated then we will consider thyroid hormone therapy.
This patient was seen and examined at the bedside. He is not eating and taking only minimal liquid supplement. His glucoses remain high which may be still from the Megace given him yesterday. He is now on Marinol for appetite stimulation.Glucose level was 329-312 last night then 278-268 today. I agree with Increasing his Lantus Insulin to 20 units twice daily and continuing Lispro 10 units three times daily.     His abnormal thyroid function tests were repeated revealing TSH now normal at 0.69 with Free T4 still low at 0.54. He is to have a Cortrosyn stimulation test in the AM ,prior to consideration of thyroid replacement therapy.
Patient seen and examined with house-staff during bedside rounds  Resident note read, including vitals, physical findings, laboratory data, and radiological reports.   Revisions included below.  Case discussed with House staff  Direct personal management at bedside  and extensive interpretation of data. Decision making of high complexity.    Improved; Urology to see

## 2020-02-21 NOTE — PROGRESS NOTE ADULT - SUBJECTIVE AND OBJECTIVE BOX
Physical Medicine and Rehabilitation Progress Note:    Patient is a 74y old  Male who presents with a chief complaint of DKA (21 Feb 2020 14:04)      HPI:  73 yo male with a hx of DM, HTN, HLD, iron deficiency anemia, CKD 4 who was brought in by his home health aide for feeling generally unwell and week for the past 2 weeks since his discharge from [facility] for symptomatic anemia. He has felt weakness, malaise and fatigue. He has not been experiencing any shortness of breath, chest pain, cough, sputum production, rhinorrhea odynophagia, difficulty hearing, abdominal pain, nausea, vomiting, diarrhea, constipation. He reports a history of chronic urinary incontinence and difficulty voiding due to known BPH with lower urinary tract symptoms. He denies any fever, chills, or rigors at home. He has been compliant with his medications prior to presentation. No recent abx use or hospitalizations. No dysuria, urgency or frequency that he has noted. No penile discharge. On presentation patient was noted to be in urinary retention w/ anion gap metabolic acidosis, acute renal failure, elevated b hydroxybutyrate and hyperglycemia w/ likely UTI. ICU consulted for management, and patient admitted for DKA c/b UTI.     In the ED: Patient given 3L NS, started on an insulin gtt, and given zosyn. (13 Feb 2020 18:56)                            9.1    5.83  )-----------( 122      ( 21 Feb 2020 08:11 )             31.4       02-21    152<H>  |  122<H>  |  47<H>  ----------------------------<  136<H>  4.1   |  19<L>  |  2.03<H>    Ca    8.8      21 Feb 2020 08:11  Phos  3.5     02-21  Mg     2.2     02-21      Vital Signs Last 24 Hrs  T(C): 36.3 (21 Feb 2020 08:30), Max: 36.5 (21 Feb 2020 05:03)  T(F): 97.4 (21 Feb 2020 08:30), Max: 97.7 (21 Feb 2020 05:03)  HR: 87 (21 Feb 2020 08:30) (85 - 89)  BP: 106/63 (21 Feb 2020 08:30) (102/68 - 113/66)  BP(mean): --  RR: 16 (21 Feb 2020 08:30) (16 - 18)  SpO2: 96% (21 Feb 2020 08:30) (96% - 100%)    MEDICATIONS  (STANDING):  cefTRIAXone   IVPB 2000 milliGRAM(s) IV Intermittent every 24 hours  chlorhexidine 2% Cloths 1 Application(s) Topical <User Schedule>  dextrose 5%. 1000 milliLiter(s) (50 mL/Hr) IV Continuous <Continuous>  dextrose 50% Injectable 12.5 Gram(s) IV Push once  dextrose 50% Injectable 25 Gram(s) IV Push once  dextrose 50% Injectable 25 Gram(s) IV Push once  dronabinol 2.5 milliGRAM(s) Oral two times a day before meals  finasteride 5 milliGRAM(s) Oral daily  heparin  Injectable 5000 Unit(s) SubCutaneous every 8 hours  influenza   Vaccine 0.5 milliLiter(s) IntraMuscular once  insulin glargine Injectable (LANTUS) 40 Unit(s) SubCutaneous at bedtime  insulin lispro (HumaLOG) corrective regimen sliding scale   SubCutaneous Before meals and at bedtime  insulin lispro Injectable (HumaLOG) 10 Unit(s) SubCutaneous three times a day before meals  lactated ringers. 1000 milliLiter(s) (100 mL/Hr) IV Continuous <Continuous>  senna 2 Tablet(s) Oral at bedtime  sertraline 50 milliGRAM(s) Oral daily  simvastatin 10 milliGRAM(s) Oral at bedtime  sodium bicarbonate 650 milliGRAM(s) Oral daily  tamsulosin 0.4 milliGRAM(s) Oral at bedtime    MEDICATIONS  (PRN):  acetaminophen   Tablet .. 650 milliGRAM(s) Oral every 6 hours PRN Temp greater or equal to 38C (100.4F)  benzocaine 15 mG/menthol 3.6 mG (Sugar-Free) Lozenge 1 Lozenge Oral two times a day PRN Sore Throat  dextrose 40% Gel 15 Gram(s) Oral once PRN Blood Glucose LESS THAN 70 milliGRAM(s)/deciliter  glucagon  Injectable 1 milliGRAM(s) IntraMuscular once PRN Glucose LESS THAN 70 milligrams/deciliter    Currently Undergoing Physical Therapy at bedside.    Functional Status Assessment: 2/20/2020    Therapeutic Interventions      Bed Mobility  Bed Mobility Training Scooting: moderate assist (50% patient effort);  nonverbal cues (demo/gestures);  verbal cues;  1 person assist  Bed Mobility Training Sit-to-Supine: moderate assist (50% patient effort);  1 person assist;  verbal cues;  nonverbal cues (demo/gestures);  minimum assist (75% patient effort)  Bed Mobility Training Supine-to-Sit: moderate assist (50% patient effort);  1 person assist;  nonverbal cues (demo/gestures);  verbal cues  Bed Mobility Training Limitations: decreased ability to use arms for pushing/pulling;  decreased ability to use legs for bridging/pushing;  impaired ability to control trunk for mobility;  decreased strength;  impaired balance;  impaired postural control    Sit-Stand Transfer Training  Transfer Training Sit-to-Stand Transfer: moderate assist (50% patient effort);  1 person assist;  verbal cues;  nonverbal cues (demo/gestures);  rolling walker  Transfer Training Stand-to-Sit Transfer: moderate assist (50% patient effort);  1 person assist;  nonverbal cues (demo/gestures);  verbal cues;  rolling walker  Sit-to-Stand Transfer Training Transfer Safety Analysis: decreased balance;  decreased safety awareness, poor eccentric control with descending. ;  decreased strength;  impaired balance;  impaired postural control;  rolling walker    Gait Training  Gait Training: moderate assist (50% patient effort);  1 person assist;  nonverbal cues (demo/gestures);  verbal cues;  rolling walker;  10 side steps   Gait Analysis: 3-point gait   increased time in double stance;  unsteady gait, no lose fo balance;  decreased weight-shifting ability;  decreased strength;  impaired balance;  impaired postural control;  rolling walker  Duration of Rest Duration of Rest: unable to ambulate further distance secondary pt complains feeling fatigue.     Therapeutic Exercise  Therapeutic Exercise Detail: Seated ther ex: LAQ, hip flexion x 10 bilaterally.           PM&R Impression: as above    Current Disposition Plan : [facility]

## 2020-02-21 NOTE — PROGRESS NOTE ADULT - PROBLEM SELECTOR PLAN 1
1) UCx growing Klebsiella,  BCx+ klebsiella pneumonia , not MDR, both sensitive to Ceftriaxone  2) Continue CTX 2g q24h x 2 weeks , day 8/14  3) Surveillance Blood cx and urine cx negative.   4) Resume Physical therapy  5) Urology f/u RE: urine retention/BPH.   6) PICC placement  7) SNF placement.

## 2020-02-21 NOTE — PROGRESS NOTE ADULT - SUBJECTIVE AND OBJECTIVE BOX
INTERVAL HPI/OVERNIGHT EVENTS: remains afebrile, depressed    CONSTITUTIONAL:  Negative fever or chills, poor appetite  EYES:  Negative  blurry vision or double vision  CARDIOVASCULAR:  Negative for chest pain or palpitations  RESPIRATORY:  Negative for cough, wheezing, or SOB   GASTROINTESTINAL:  Negative for nausea, vomiting, diarrhea, constipation, or abdominal pain  GENITOURINARY: Palacio in place  NEUROLOGIC:  No headache, confusion, dizziness, lightheadedness      ANTIBIOTICS/RELEVANT:    MEDICATIONS  (STANDING):  cefTRIAXone   IVPB 2000 milliGRAM(s) IV Intermittent every 24 hours  chlorhexidine 2% Cloths 1 Application(s) Topical <User Schedule>  dextrose 5%. 1000 milliLiter(s) (50 mL/Hr) IV Continuous <Continuous>  dextrose 50% Injectable 12.5 Gram(s) IV Push once  dextrose 50% Injectable 25 Gram(s) IV Push once  dextrose 50% Injectable 25 Gram(s) IV Push once  dronabinol 2.5 milliGRAM(s) Oral two times a day before meals  finasteride 5 milliGRAM(s) Oral daily  heparin  Injectable 5000 Unit(s) SubCutaneous every 8 hours  influenza   Vaccine 0.5 milliLiter(s) IntraMuscular once  insulin glargine Injectable (LANTUS) 15 Unit(s) SubCutaneous at bedtime  insulin lispro (HumaLOG) corrective regimen sliding scale   SubCutaneous Before meals and at bedtime  insulin lispro Injectable (HumaLOG) 6 Unit(s) SubCutaneous three times a day before meals  mirtazapine 7.5 milliGRAM(s) Oral daily  senna 2 Tablet(s) Oral at bedtime  simvastatin 10 milliGRAM(s) Oral at bedtime  sodium bicarbonate 650 milliGRAM(s) Oral daily  sodium chloride 0.45%. 1000 milliLiter(s) (125 mL/Hr) IV Continuous <Continuous>  tamsulosin 0.4 milliGRAM(s) Oral at bedtime    MEDICATIONS  (PRN):  acetaminophen   Tablet .. 650 milliGRAM(s) Oral every 6 hours PRN Temp greater or equal to 38C (100.4F)  benzocaine 15 mG/menthol 3.6 mG (Sugar-Free) Lozenge 1 Lozenge Oral two times a day PRN Sore Throat  dextrose 40% Gel 15 Gram(s) Oral once PRN Blood Glucose LESS THAN 70 milliGRAM(s)/deciliter  glucagon  Injectable 1 milliGRAM(s) IntraMuscular once PRN Glucose LESS THAN 70 milligrams/deciliter        Vital Signs Last 24 Hrs  T(C): 36.3 (2020 08:30), Max: 36.5 (2020 05:03)  T(F): 97.4 (2020 08:30), Max: 97.7 (2020 05:03)  HR: 87 (2020 08:30) (87 - 87)  BP: 106/63 (2020 08:30) (106/63 - 106/68)  BP(mean): --  RR: 16 (2020 08:30) (16 - 18)  SpO2: 96% (2020 08:30) (96% - 98%)    20 @ 07:01  -  20 @ 07:00  --------------------------------------------------------  IN: 1560 mL / OUT: 1200 mL / NET: 360 mL    20 @ 07:01  -  20 @ 20:56  --------------------------------------------------------  IN: 1000 mL / OUT: 550 mL / NET: 450 mL      PHYSICAL EXAM:  Constitutional: WDWN resting comfortably in bed; appears fatigued  Head: NC/AT; mild pallor  Eyes: PERRL, EOMI, anicteric sclera  ENT: no nasal discharge; MMM  Neck: supple; no JVD  Respiratory: CTA B/L; no W/R/R, no retractions; speaking in full sentences without difficulty, soft voice and intermittently slow to respond but responds appropriately  Cardiac: +S1/S2; RRR; no M/R/G   Gastrointestinal: soft, NT/ND; no rebound or guarding; +BSx4  Genitourinary: palacio catheter in place, draining red-tinged urine 100cc in bag  Extremities: WWP, no clubbing or cyanosis; no peripheral edema; mildly pale  Musculoskeletal: moving all extremities spontaneously  Vascular: 2+ radial, PT pulses B/L  Dermatologic: skin warm, dry and intact; no rashes, wounds, or scars  Neurologic: AAOx3; CNII-XII grossly intact; no focal deficits    LABS:                        9.1    5.83  )-----------( 122      ( 2020 08:11 )             31.4     02    152<H>  |  122<H>  |  47<H>  ----------------------------<  136<H>  4.1   |  19<L>  |  2.03<H>    Ca    8.8      2020 08:11  Phos  3.5       Mg     2.2             Urinalysis Basic - ( 2020 17:26 )    Color: Yellow / Appearance: SL Cloudy / S.020 / pH: x  Gluc: x / Ketone: NEGATIVE  / Bili: Negative / Urobili: 0.2 E.U./dL   Blood: x / Protein: 30 mg/dL / Nitrite: NEGATIVE   Leuk Esterase: Moderate / RBC: 5-10 /HPF / WBC Many /HPF   Sq Epi: x / Non Sq Epi: 0-5 /HPF / Bacteria: Present /HPF        MICROBIOLOGY:  Culture - Urine (20 @ 17:42)    Specimen Source: .Urine None    Culture Results:   No growth    Culture - Blood (02.15.20 @ 18:11)    Specimen Source: .Blood None    Culture Results:   No growth at 5 days.        RADIOLOGY & ADDITIONAL STUDIES:

## 2020-02-21 NOTE — PROGRESS NOTE ADULT - REASON FOR ADMISSION
DKA
DKA  Sepsis
DKA, Sepsis
DKA, Sepsis
Sepsis
DKA
DKA

## 2020-02-21 NOTE — PROGRESS NOTE ADULT - SUBJECTIVE AND OBJECTIVE BOX
INTERVAL HPI/OVERNIGHT EVENTS:    Patient is a 74y old  Male who presents with a chief complaint of DKA, Sepsis (2020 17:48)      Pt reports the following symptoms:    CONSTITUTIONAL:  Negative fever or chills, feels well, good appetite  EYES:  Negative  blurry vision or double vision  CARDIOVASCULAR:  Negative for chest pain or palpitations  RESPIRATORY:  Negative for cough, wheezing, or SOB   GASTROINTESTINAL:  Negative for nausea, vomiting, diarrhea, constipation, or abdominal pain  GENITOURINARY:  Negative frequency, urgency or dysuria  NEUROLOGIC:  No headache, confusion, dizziness, lightheadedness    MEDICATIONS  (STANDING):  cefTRIAXone   IVPB 2000 milliGRAM(s) IV Intermittent every 24 hours  chlorhexidine 2% Cloths 1 Application(s) Topical <User Schedule>  dextrose 5%. 1000 milliLiter(s) (50 mL/Hr) IV Continuous <Continuous>  dextrose 50% Injectable 12.5 Gram(s) IV Push once  dextrose 50% Injectable 25 Gram(s) IV Push once  dextrose 50% Injectable 25 Gram(s) IV Push once  dronabinol 2.5 milliGRAM(s) Oral two times a day before meals  finasteride 5 milliGRAM(s) Oral daily  heparin  Injectable 5000 Unit(s) SubCutaneous every 8 hours  influenza   Vaccine 0.5 milliLiter(s) IntraMuscular once  insulin glargine Injectable (LANTUS) 40 Unit(s) SubCutaneous at bedtime  insulin lispro (HumaLOG) corrective regimen sliding scale   SubCutaneous Before meals and at bedtime  insulin lispro Injectable (HumaLOG) 10 Unit(s) SubCutaneous three times a day before meals  lactated ringers. 1000 milliLiter(s) (100 mL/Hr) IV Continuous <Continuous>  senna 2 Tablet(s) Oral at bedtime  sertraline 50 milliGRAM(s) Oral daily  simvastatin 10 milliGRAM(s) Oral at bedtime  sodium bicarbonate 650 milliGRAM(s) Oral daily  tamsulosin 0.4 milliGRAM(s) Oral at bedtime    MEDICATIONS  (PRN):  acetaminophen   Tablet .. 650 milliGRAM(s) Oral every 6 hours PRN Temp greater or equal to 38C (100.4F)  benzocaine 15 mG/menthol 3.6 mG (Sugar-Free) Lozenge 1 Lozenge Oral two times a day PRN Sore Throat  dextrose 40% Gel 15 Gram(s) Oral once PRN Blood Glucose LESS THAN 70 milliGRAM(s)/deciliter  glucagon  Injectable 1 milliGRAM(s) IntraMuscular once PRN Glucose LESS THAN 70 milligrams/deciliter      PHYSICAL EXAM  Vital Signs Last 24 Hrs  T(C): 36.3 (2020 08:30), Max: 36.5 (2020 05:03)  T(F): 97.4 (2020 08:30), Max: 97.7 (2020 05:03)  HR: 87 (2020 08:30) (85 - 89)  BP: 106/63 (2020 08:30) (102/68 - 113/66)  BP(mean): --  RR: 16 (2020 08:30) (16 - 18)  SpO2: 96% (2020 08:30) (96% - 100%)    Constitutional: wn/wd in NAD.   HEENT: NCAT, MMM, OP clear, EOMI, no proptosis or lid retraction  Neck: no thyromegaly or palpable thyroid nodules   Respiratory: lungs CTAB.  Cardiovascular: regular rhythm, normal S1 and S2, no audible murmurs, no peripheral edema  GI: soft, NT/ND, no masses/HSM appreciated.  Neurology: no tremors, DTR 2+  Skin: no visible rashes/lesions  Psychiatric: AAO x 3, normal affect/mood.    LABS:                        9.1    5.83  )-----------( 122      ( 2020 08:11 )             31.4     02-21    152<H>  |  122<H>  |  47<H>  ----------------------------<  136<H>  4.1   |  19<L>  |  2.03<H>    Ca    8.8      2020 08:11  Phos  3.5     02-21  Mg     2.2     02-21        Urinalysis Basic - ( 2020 17:26 )    Color: Yellow / Appearance: SL Cloudy / S.020 / pH: x  Gluc: x / Ketone: NEGATIVE  / Bili: Negative / Urobili: 0.2 E.U./dL   Blood: x / Protein: 30 mg/dL / Nitrite: NEGATIVE   Leuk Esterase: Moderate / RBC: 5-10 /HPF / WBC Many /HPF   Sq Epi: x / Non Sq Epi: 0-5 /HPF / Bacteria: Present /HPF      Thyroid Stimulating Hormone, Serum: 1.923 uIU/mL ( @ 08:11)  Thyroid Stimulating Hormone, Serum: 0.690 uIU/mL ( @ 06:32)  Thyroid Stimulating Hormone, Serum: 0.532 uIU/mL (02-15 @ 06:23)      HbA1C: 8.4 % ( @ 06:22)    CAPILLARY BLOOD GLUCOSE      POCT Blood Glucose.: 149 mg/dL (2020 12:02)  POCT Blood Glucose.: 141 mg/dL (2020 09:12)  POCT Blood Glucose.: 114 mg/dL (2020 22:18)  POCT Blood Glucose.: 136 mg/dL (2020 17:50)      Insulin Sliding Scale requirements X 24 Hours:    RADIOLOGY & ADDITIONAL TESTS:    A/P: 74y Male with history of DM type II presenting for       1.  DM -     Please continue           units lantus at bedtime  / in the morning and        units lispro with meals and lispro moderate / low dose sliding scale 4 times daily with meals and at bedtime.  Please continue consistent carbohydrate diet.      Goal FSG is   Will continue to monitor   For discharge, pt can continue    Pt can follow up at discharge with [facility] by calling  to make an appointment.   Will discuss case with     and update primary team INTERVAL HPI/OVERNIGHT EVENTS:    Patient seen and examined at the bedside. he is being planned for discharge to [facility] today. His sodium was 152 - was on LR @ 100 cc/hr. His TSh was 1.923, Free T4 0.69 and Total T3 54. His appetite continues to remain poor. He feels thirsty and does not like to drink. his renal function is stable with cr 2.03 with GFR 36 with about 1200 cc of urine in 24 hours  FSG & Insulin received:  Yesterday:  pre-dinner fs, no insulin as he did not have dinner  bedtime fs, No lantus given - held  Today:  pre-breakfast fs, no insulin  pre-lunch fs,     Pt reports the following symptoms:  CONSTITUTIONAL:  Negative fever or chills  CARDIOVASCULAR:  Negative for chest pain or palpitations  RESPIRATORY:  Negative for cough, wheezing, or SOB   GASTROINTESTINAL:  Negative for diarrhea, constipation, or abdominal pain  NEUROLOGIC:  No  dizziness, lightheadedness    MEDICATIONS  (STANDING):  cefTRIAXone   IVPB 2000 milliGRAM(s) IV Intermittent every 24 hours  chlorhexidine 2% Cloths 1 Application(s) Topical <User Schedule>  dextrose 5%. 1000 milliLiter(s) (50 mL/Hr) IV Continuous <Continuous>  dextrose 50% Injectable 12.5 Gram(s) IV Push once  dextrose 50% Injectable 25 Gram(s) IV Push once  dextrose 50% Injectable 25 Gram(s) IV Push once  dronabinol 2.5 milliGRAM(s) Oral two times a day before meals  finasteride 5 milliGRAM(s) Oral daily  heparin  Injectable 5000 Unit(s) SubCutaneous every 8 hours  influenza   Vaccine 0.5 milliLiter(s) IntraMuscular once  insulin glargine Injectable (LANTUS) 40 Unit(s) SubCutaneous at bedtime  insulin lispro (HumaLOG) corrective regimen sliding scale   SubCutaneous Before meals and at bedtime  insulin lispro Injectable (HumaLOG) 10 Unit(s) SubCutaneous three times a day before meals  lactated ringers. 1000 milliLiter(s) (100 mL/Hr) IV Continuous <Continuous>  senna 2 Tablet(s) Oral at bedtime  sertraline 50 milliGRAM(s) Oral daily  simvastatin 10 milliGRAM(s) Oral at bedtime  sodium bicarbonate 650 milliGRAM(s) Oral daily  tamsulosin 0.4 milliGRAM(s) Oral at bedtime    MEDICATIONS  (PRN):  acetaminophen   Tablet .. 650 milliGRAM(s) Oral every 6 hours PRN Temp greater or equal to 38C (100.4F)  benzocaine 15 mG/menthol 3.6 mG (Sugar-Free) Lozenge 1 Lozenge Oral two times a day PRN Sore Throat  dextrose 40% Gel 15 Gram(s) Oral once PRN Blood Glucose LESS THAN 70 milliGRAM(s)/deciliter  glucagon  Injectable 1 milliGRAM(s) IntraMuscular once PRN Glucose LESS THAN 70 milligrams/deciliter      PHYSICAL EXAM  Vital Signs Last 24 Hrs  T(C): 36.3 (2020 08:30), Max: 36.5 (2020 05:03)  T(F): 97.4 (2020 08:30), Max: 97.7 (2020 05:03)  HR: 87 (2020 08:30) (85 - 89)  BP: 106/63 (2020 08:30) (102/68 - 113/66)  BP(mean): --  RR: 16 (2020 08:30) (16 - 18)  SpO2: 96% (2020 08:30) (96% - 100%)    Constitutional: wn/wd in NAD.   Respiratory: lungs CTAB.  Cardiovascular: regular rhythm, normal S1 and S2, no peripheral edema  GI: soft, NT/ND, no masses/HSM appreciated.  Neurology: no tremors, DTR 2+      LABS:                        9.1    5.83  )-----------( 122      ( 2020 08:11 )             31.4     02-    152<H>  |  122<H>  |  47<H>  ----------------------------<  136<H>  4.1   |  19<L>  |  2.03<H>    Ca    8.8      2020 08:11  Phos  3.5     02-  Mg     2.2     02-21        Urinalysis Basic - ( 2020 17:26 )    Color: Yellow / Appearance: SL Cloudy / S.020 / pH: x  Gluc: x / Ketone: NEGATIVE  / Bili: Negative / Urobili: 0.2 E.U./dL   Blood: x / Protein: 30 mg/dL / Nitrite: NEGATIVE   Leuk Esterase: Moderate / RBC: 5-10 /HPF / WBC Many /HPF   Sq Epi: x / Non Sq Epi: 0-5 /HPF / Bacteria: Present /HPF      Thyroid Stimulating Hormone, Serum: 1.923 uIU/mL ( @ 08:11)  Thyroid Stimulating Hormone, Serum: 0.690 uIU/mL ( @ 06:32)  Thyroid Stimulating Hormone, Serum: 0.532 uIU/mL (02-15 @ 06:23)      HbA1C: 8.4 % ( @ 06:22)    CAPILLARY BLOOD GLUCOSE      POCT Blood Glucose.: 149 mg/dL (2020 12:02)  POCT Blood Glucose.: 141 mg/dL (2020 09:12)  POCT Blood Glucose.: 114 mg/dL (2020 22:18)  POCT Blood Glucose.: 136 mg/dL (2020 17:50)      Insulin Sliding Scale requirements X 24 Hours:    RADIOLOGY & ADDITIONAL TESTS:    A/P: 73 yo male with hx of uncontrolled DM II, BPH found to have poly factorial anion gap metabolic acidosis, DKA and urinary retention with concurrent urinary tract infection with bilatreal hydronephrosis. AAMIR improving. He is currently getting treated for UTI with ciprofloxacin.     1.  DM - type 2, uncontrolled, complicated. Persistent hyperglycemia   -A1C: 8.4%  Weight: 87kg/BMI 28  Cr/GRF: 2.93 (baseline in 2s)  Please decrease to Lantus 15 units at bedtime.   Please decrease to lispro 6 units before each meal.    Please continue lispro moderate  dose sliding scale four times daily with meals and at bedtime  - please obtain 300 Am FSG  Pt's fingerstick glucose goal is 100-180  Will continue to monitor     2. Euthyroidal sick illness - needs to rule central hypothyroidism as well  Low TSH, Ft4 0.54. ( 2/15) - before megace. On 20 TSH 0.690, free T4 0.54   Anti-thyroid ABs negative. TT3 48.   - AM cortisol was 21.8 ( no adrenal insufficiency)  - TSh was 1.923, Free T4 0.69 and Total T3 54 on 2020  - repeat TFTs in 4 weeks      3. Hypernatremia - likely hypovolemia  - Sodium was 147 with urine osm 409 and urine sodium < 20.  - today, sodium was 152  - Likely hypovolemia  - consider IV fluids    For discharge to [facility], patient can be discharged on Lantus 15 units at bedtime, Lispro 6 units before each meal.  Patient needs repeat TFTs done as an OP in 4 weeks.    Pt can follow up at discharge with [facility] by calling  to make an appointment.   discussed case with  and updated primary team    To Make an appointment at [address] [place] [address] for the patient, either:  1. Send a STAT task via [employer] to Cynthia Berg or Ester Curtis (office managers)   OR  2. Call supervisor's line. P: [phone] (do not give this number to patient). VM is checked periodically  In the message, specify that this is a hospital discharge follow-up, and that the appt can be made with a NP if there is no timely MD availability    REMINDERS FOR INSULIN/DIABETES SUPPLIES at DISCHARGE:  INSULIN:   Long actin/Basal Insulin: Examples: Toujeo, Basaglar, Tresiba, Lantus   Short acting/Bolus Insulin: Humalog, Admelog, Novolog  Please ensure that BOTH short acting and long acting insulin are prescribed in the same preparation (Ex: PEN vs VIAL/SOLUTION)     TESTING SUPPLIES:   All glucometer supplies should be written as generic to avoid issues with insurance. Use the free text option in sunrise prescription writer, and type in glucometer test strips, lancets, etc to order.    If sending patient home on insulin PEN, please send:   •	BD fili insulin pen needles for use up to 4 times daily (total quantity 100)  •	Lancets for use up to 4 times daily (total quantity 100)  •	Glucometer Test strips for use up to 4 times daily (total quantity 100)  •	Alcohol swabs for use up to 4 times daily (total quantity 100)  •	Glucometer (If provided by hospital, still provide scripts for lancets, test strips, and swabs)  If sending patient home on insulin VIAL, please send:   •	Insulin syringes (6mm) - for use up to 4 times daily (total quantity 100)  •	Lancets for use up to 4 times daily (total quantity 100)  •	Generic Glucometer Test strips for use up to 4 times daily (total quantity 100)  •	Alcohol swabs for use up to 4 times daily (total quantity 100)  •	Generic Glucometer (If provided by hospital, still provide scripts for lancets, test strips, and swabs)  •	Do not specify brand for testing supplies (such as contour, freestyle, one touch etc) that way the pharmacy has the freedom to pick and change according to what the insurance dictates.  For patients without insurance:   •	Provide social work with appropriate scripts so they may obtain 1 week of samples  •	Provide with glucometer. Glucometers are located at various nursing stations, the nursing office, education, and endocrine fellows office.  •	Please make appointment with Chaya Nair NP or Staci Villafuerte RN and NELA Vergara at the [address] [place] [address] endocrinology clinic. They can see patients without insurance, provide appropriate samples, and assist in getting insurance coverage.     PREFERRED PHARMACY:  [facility] (located on 1st floor next to admitting)  P: [phone]  Hours: M – F 8AM – 8PM, Sat 8AM – 4PM, Sun—closed  If not using HomeStay, please follow up with chosen pharmacy to ensure insulin prescribed is covered.

## 2020-02-21 NOTE — DISCHARGE NOTE NURSING/CASE MANAGEMENT/SOCIAL WORK - PATIENT PORTAL LINK FT
You can access the FollowMyHealth Patient Portal offered by [facility] by registering at the following website: [email or web address]. By joining [employer]’s FollowMyHealth portal, you will also be able to view your health information using other applications (apps) compatible with our system.

## 2020-02-21 NOTE — PROGRESS NOTE ADULT - ASSESSMENT
per Internal Medicine    Pt is a 75 yo M with PMH DM, HTN, HLD, BPH (chronic incont/ret), SERA, CKD4, and a-fib (s/p ablation 2011) who presented to [facility] for general feelings of illness, weakness, and fatigue x2 wks. Initially admitted to ICU for DKA with klebsiella UTI and bacteremia, now stepped down to RMF for further observation and management. Being treated with ceftriaxone 2g until the 27th.       Problem/Plan - 1:  ·  Problem: Severe sepsis.  Plan: - Pt presenting with generalized ill feeling and malaise, found to be in DKA with urinary retention c/b klebsiella UTI and bacteremia  - On arrival with WBC 26.36 with 92% neutrophils, tachycardia, lactate 4.1  - Lactate cleared, no longer trending  - UA+ with UCx growing klebsiella  - BCx+ klebsiella pneumonia, Surveillance BCx NGTD  - Continue CTX 2g q24h (stop date 2/27)  - Palacio catheter in place for strict I&O, currently draining red-tinged urine  - Continue to monitor output  - ID consulted, f/u recs (Dr. Mcwilliams).     Problem/Plan - 2:  ·  Problem: UTI (urinary tract infection).  Plan: - As above  - No recent instrumentation or risk factors for resistant  organisms  - Likely provoked in the setting of BPH with chronic urinary incontinence vs. retention  - Palacio catheter in place, monitor I&O  - Currently with hematuria - red tinged urine, not ash blood  - Continue to monitor  - Urology following, f/u recs  - ID consulted (Dr. Mcwilliams), f/u recs  - retroperitoneal ultrasound shows slight improvement of moderate bilateral hydronephrosis  - Treating for pyelonephritis.     Problem/Plan - 3:  ·  Problem: Acute on chronic renal insufficiency.  Plan: - Pt with known hx CKD4  - On arrival with Cr 4.4  - Per chart review, baseline closer to 3  - Associated with elevated BUN and hyperkalemia  - Likely 2/2 urinary tract obstruction in the setting of BPH  - Ulytes c/w obstructive disease with component of pre-renal disease likely 2/2 DKA and hypovolemia  - FeNa c/w intrinsic etiology  - f/u urology recs for optimization of BPH  - Retroperitoneal US with stable BL mild hydronephrosis since 2018 and bladder wall thickening with enlarged prostate  - Continue to monitor UO  - Daily BMP  - Avoid nephrotoxic agents  - Renally dose medications.     Problem/Plan - 4:  ·  Problem: DKA (diabetic ketoacidoses).  Plan: - Resolved  - On arrival with , BHB 3.8, AG 26, bicarb 8, lactate 4.1  - s/p insulin gtt with DKA protocol titration in MICU  - AG closed - 2/2 compensatory and azotemia, lactic acidosis, and elevated BHB  - Lactate 2 today, will not continue to trend  - Continue to monitor azotemia in the setting of urinary tract obstruction (BPH)  - Endocrine following, f/u recs  - Monitor AM BG, if > 180 can increase AM lantus by 2  - mISS  - Tolerating PO  - Carb consistent diet  - Continue to monitor FSG  - Lantus 20U  - Premeal Lispro 10U.     Problem/Plan - 5:  ·  Problem: Weakness.  Plan: - Pt presenting with general feelings of illness and malaise  - Likely in the setting of azotemia, decreased PO intake, and electrolyte abnormalities  - Continue to monitor  - Daily BMP  - PT consult  - no deficits on exam  - Pt received Megace for appetite stimulation, however discontinued after patient received 2 doses, as can contribute to hyperglycemia  - Pt started on Marinol 2.5mg twice daily before meals instead for appetite stimulation    #Depression  Patient scored an 18 on PHQ-9. Reports depressed mood, anhedonia, anorexia, and more.   -started sertraline 50mg.     Problem/Plan - 6:  Problem: BPH (benign prostatic hyperplasia). Plan: - Pt with known hx BPH  - Continue home med finasteride 5mg daily  - Initiated tamsulosin 0.4mg daily  - Monitor I&O via palacio catheter  - Per urology, will likely go home with the palacio and will need outpatient cystoscopy and UDS.   - PSA: 0.9.    Problem/Plan - 7:  ·  Problem: HTN (hypertension).  Plan: - Pt with known hx HTN  - Not on home med antihypertensive  - Normotensive throughout course  - Continue to monitor.     Problem/Plan - 8:  ·  Problem: HLD (hyperlipidemia).  Plan: - Pt with known hx HLD  - Home med simvastatin 10mg daily  - Continue home med.     Problem/Plan - 9:  ·  Problem: Iron deficiency anemia.  Plan: - Pt with known hx SERA, not on any outpt supplements  - Hb 8.3  - No active s/s bleeding, continue to monitor  - Hold supplementation in the setting of active infection, will need outpt f/u  - Keep active T&S  - Transfuse Hb<7  - IV iron per Dr. Roberts.     Problem/Plan - 10:  Problem: Nutrition, metabolism, and development symptoms. Plan; - F: none, euvolemic and tolerating PO  - E: replete K<4, Mg<2  - N: mechanical soft, carb consistent  - D: heparin 5000U sq q8h  - GI ppx: none    Code: full  Dispo: [facility].

## 2020-02-21 NOTE — PROGRESS NOTE ADULT - PROBLEM SELECTOR PROBLEM 1
Acute pyelonephritis
Severe sepsis
Statement Selected

## 2020-02-21 NOTE — PROGRESS NOTE ADULT - NSHPATTENDINGPLANDISCUSS_GEN_ALL_CORE
and Dr. Ely
Dr. Schultz and [address] [place], [place] [address]taff
Dr. Schultz and [address] [place], [place] [address]taff
Dr. Schultz and [address] [place], [place] [address]taff
Dr. Schultz and [address] [place], [place] [address]taff
Primary team
Primary team Dr Roberts
REsident staff and Dr. Castro
staff

## 2020-05-08 NOTE — ED ADULT TRIAGE NOTE - OTHER COMPLAINTS
pt sent for decreased po intake, generalized weakness, abd distension. pt presents with palacio cath, reports decreased output. full code.

## 2020-05-08 NOTE — H&P ADULT - PROBLEM SELECTOR PLAN 9
Hx of RUE DVT recently at 2.5 mg Eliquis bid F: c/w NS @75 cc/hr   E: replete as needed  N: Consistent carb, dash /tlc  D: RMF

## 2020-05-08 NOTE — ED PROVIDER NOTE - OBJECTIVE STATEMENT
74 year old male with history of 74 year old male with history of DM, chronic indwelling palacio catheter, BPH, HLD presents to ED from nursing facility secondary to concern for generalized weakness and failure to thrive.  Patient states he has not been eating over the past few days.  He is a oriented to person, and place on arrival to ED.  Additional history is limited as patient is a poor historian, however on questioning, patient denies chest pain, shortness of breath, or any additional specific complaints or concerns.

## 2020-05-08 NOTE — H&P ADULT - PROBLEM SELECTOR PLAN 8
C/w home dose of mirtazapine 7.5 mg daily and sertraline 50 mg daily DVT PPX: Eliquis bid for dvt  GI ppx: none

## 2020-05-08 NOTE — ED ADULT NURSE NOTE - OBJECTIVE STATEMENT
73 y/o male from NH c/o generalized weakness, abdominal distention, decreased appetite for a few days.  pt denies SOB, CP, dizziness. Rodriguez catheter in placed with about 150 cc of cloudy urine, pt was offered and educated about changing for a new Rodriguez but pt refused. pt had a bowel movement, perianal care provided, sacral ulcer stage 3 noted, clean dressing applied. 75 y/o male from NH c/o generalized weakness, abdominal distention, decreased appetite for a few days.  pt denies SOB, CP, dizziness. Rodriguez catheter in placed with about 150 cc of cloudy urine, pt was offered and educated about changing for a new Rodriguez but pt refused. pt had a bowel movement, perianal care provided, sacral ulcer stage 3 noted size 4 cm x 4 cm , clean dressing applied. deep tissue injury noted to the left heel.

## 2020-05-08 NOTE — H&P ADULT - PROBLEM SELECTOR PLAN 6
F: c/w NS @75 cc/hr   E: replete as needed  N: Consistent carb, dash /tlc  D: RMF C/w home dose of mirtazapine 7.5 mg daily and sertraline 50 mg daily

## 2020-05-08 NOTE — H&P ADULT - PROBLEM SELECTOR PLAN 7
Pt also started on eliquis 2.5 mg bid for a RUE clot . 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 pending pill endoscopy ? )  keep active type and screen  f/u am cbc  F/u Dr Roberts recs in am Hx of RUE DVT recently at 2.5 mg Eliquis bid

## 2020-05-08 NOTE — H&P ADULT - PROBLEM SELECTOR PLAN 2
Pt presenting with AAMIR in setting of diuretic use for ascites and LE edema and combination of decreased po intake   Hold diuretic for now given AAMIR Pt presenting with AAMIR likely in setting of recent diuretic use for his ascites and LE edema and combination of decreased po intake   Hold diuretic for now given AAMIR  continue iv hydration   Of note pcp reports recent echo with cardiac dysfunction ?   repeat echo  urine lytes Acute on chronic renal insufficiency.   Pt with known hx CKD4. On arrival cr 3.96, baseline closer to 2?   Pt presenting with AAMIR likely in setting of recent diuretic use for his ascites and LE edema and combination of decreased po intake   Hold diuretic for now given AAMIR  continue iv hydration   Of note pcp reports recent echo with cardiac dysfunction ?   repeat echo  urine lytes    # Ascites, LE edema  Patient also complains for worsening LE and abdominal swelling. As per collateral obtained from Dr Barksdale([id number]) patient abdominal usg notable for massive ascites, and increased liver echogenicity(LFTS outpatient wnl )   and he weight was aggressively diuresed with  Lasix , torsemide 40 mg bid--> 20 mg bid (of note also started on midodrine as his pressures would drop with iv diuresis ). After diuresis his ascites and LE edema improved however he developed worsening AAMIR and so lasix was stopped and torsemide decreased to 20 mg bid. Of note he was seen by Cardiology and  echo done, concern fluid overload was cardiac in etiology given elevated bnp and echo findings.   ECHO in am  consider abdominal tap, calculate SAAG to assess for etiology of ascites  f/u Ct abdomen pelvis   Hold diueretics for now in setting of AAMIR Acute on chronic renal insufficiency.  Pt with known hx CKD4. On arrival cr 3.96, baseline closer to 2?   Pt presenting with AAMIR likely in setting of recent diuretic use for his ascites and LE edema and combination of decreased po intake   Hold diuretic for now given AAMIR  continue iv hydration   urine lytes  bladder usg  Keep patient on gently hydration with lung checks intermittently     # Ascites, LE edema  Patient also complains for worsening LE and abdominal swelling. As per collateral obtained from Dr Barksdale([id number]) patient abdominal usg notable for massive ascites, and increased liver echogenicity(LFTS outpatient wnl )   and he  was aggressively diuresed with  Lasix , torsemide 40 mg bid (of note also started on midodrine as his pressures would drop with iv diuresis ). After diuresis his ascites and LE edema improved however he developed worsening AAMIR and so lasix was stopped and torsemide decreased to 20 mg bid. Of note he was seen by Cardiology and  echo done, concern fluid overload was cardiac in etiology given elevated bnp and echo findings.   ECHO in am  f/u bnp  consider abdominal tap, calculate SAAG to assess for etiology of ascites  f/u Ct abdomen pelvis   Hold diuretics for now in setting of AAMIR  continue with home dose of midodrine 5 mg tid

## 2020-05-08 NOTE — H&P ADULT - PROBLEM SELECTOR PLAN 1
Patient presenting with decreased po intake, he reports that he recently had a intense bowel regimen for constipation after which he developed loose stools. To prevent further loose stools he stopped eating . Decreased po intake also in setting of poor appetite.  Pt with albumin 1.9   nutrition consult in am   encourage po intake

## 2020-05-08 NOTE — CONSULT NOTE ADULT - SUBJECTIVE AND OBJECTIVE BOX
Patient is a 74y old  Male who presents with a chief complaint of     HPI:  Patient is 75 yo M with past medical history of DM, HTN, HLD, BPH (chronic incontinence/retention), SERA, CKD4, and a-fib  , mds, pancytopenia, presents from [facility] with complaints of decreased po intake and weakness. Patient reports that he recently had a intense bowel regimen for constipation after which he developed loose stools. To prevent further loose stools he stopped eating . Decreased po intake also in setting of poor appetite. Patient also complains for worsening LE and abdominal swelling. As per collateral obtained from Dr Barksdale([id number]) patient abdominal usg notable for massive ascites, and increased liver echogenicity(LFTS outpatient wnl )   and he weight was aggressively diuresed with  Lasix , torsemide 40 mg bid--> 20 mg bid (of note also started on midodrine as his pressures would drop with iv diuresis ). After diuresis his ascites and LE edema improved however he developed worsening AAMIR and so lasix was stopped and torsemide decreased to 20 mg bid. Of note he was seen by Cardiology and  echo done, concern fluid overload was cardiac in etiology given elevated bnp and echo findings. As per collateral patient also on levaquin 1 week ago for intersitial infiltrates on cxr , s/p 7 day course.   Upon arrival to ED vitals bp 92/52, hr 94, rr 16, satting well ra.   Labs notable for worsening AAMIR. CT abdomen pelvis ____  ed management : supra pubic cath taken out, with plans to place new one, 1L NS, 1G ceftriaxone  Pt admitted to [facility] for further management (08 May 2020 16:10)                                         ( ( ( ( ( ( ( ( (  NOTE ) ) ) ) ) ) ) )        Patient is as stated above,  team consulted to assess Suprapubic palacio cath tube.  Patient is a poor historian though states he has never had a SPT palacio cath.  Patient has a chronic palacio catheter draining dark yellow/clear output.  Patient does not recall when palacio catheter was last changed.  Patient was to follow up with Dr. Michel Adams for Urodynamics and cystoscopy but failed to follow up.  Patient denies n/v/f/c, dysuria, suprapubic discomfort, hematuria.       Vital Signs Last 24 Hrs  T(C): 36.4 (08 May 2020 15:23), Max: 37.1 (08 May 2020 12:15)  T(F): 97.5 (08 May 2020 15:23), Max: 98.7 (08 May 2020 12:15)  HR: 97 (08 May 2020 17:39) (94 - 99)  BP: 99/70 (08 May 2020 17:39) (92/52 - 99/70)  BP(mean): --  RR: 16 (08 May 2020 17:39) (16 - 167)  SpO2: 96% (08 May 2020 17:39) (96% - 96%)  I&O's Summary      PE:  Gen: NAD, obese patient alert and oriented to name and place.   Abd: Rotund, softly distended. Negative CVAT B/L  : Palacio catheter in place draining w/ yellow output. No edema, erythema, ecchymosis no b/l testes. Uncircumcised phallus.   KRISTIN: Deferred     LABS:                        8.5    8.03  )-----------( 110      ( 08 May 2020 12:19 )             27.2     05-08    130<L>  |  101  |  64<H>  ----------------------------<  153<H>  4.3   |  18<L>  |  3.17<H>    Ca    6.4<LL>      08 May 2020 16:57    TPro  5.3<L>  /  Alb  1.9<L>  /  TBili  0.8  /  DBili  x   /  AST  44<H>  /  ALT  12  /  AlkPhos  105  05-08    PT/INR - ( 08 May 2020 12:19 )   PT: 31.5 sec;   INR: 2.68          PTT - ( 08 May 2020 12:19 )  PTT:37.4 sec  Cultures      A/P

## 2020-05-08 NOTE — ED PROVIDER NOTE - PHYSICAL EXAMINATION
VITAL SIGNS: I have reviewed nursing notes and confirm.  CONSTITUTIONAL: Well-developed; well-nourished; in no acute distress.   SKIN:  warm and dry, no acute rash.   HEAD:  normocephalic, atraumatic.  EYES: PERRL.  EOM intact; conjunctiva and sclera clear.  ENT: No nasal discharge; airway clear.   NECK: Supple; non tender.  CARD: S1, S2 normal; no murmurs, gallops, or rubs. Regular rate and rhythm.   RESP:  Clear to auscultation b/l, no wheezes, rales or rhonchi.  ABD: Normal bowel sounds; soft; non-distended; non-tender; no guarding/rebound.  :  Chronic indwelling palacio catheter.    EXT: Normal ROM. No clubbing, cyanosis or edema. 2+ pulses to b/l ue/le.  NEURO: Alert, oriented, grossly unremarkable.  3/5 strength to bilateral LEs against gravity, 5/5 x to bilateral upper extremities against gravity and external force.  No drift x 4 extremities.  Sensation intact and symmetric x 4 extremities.  No facial asymmetry.    PSYCH: Cooperative, mood and affect appropriate.

## 2020-05-08 NOTE — H&P ADULT - NSHPSOCIALHISTORY_GEN_ALL_CORE
PT usually resides at home, recently d/c to [facility].   uses walker/cane  denies smoking, drinking,drug use

## 2020-05-08 NOTE — CONSULT NOTE ADULT - ASSESSMENT
Patient is a 74M w/ chronic indwelling palacio catheter. Unknown when last changed and pt has not followed up w/ Urologist.  Patient has a palacio cath due to a possible outlet obstruction, he was to get a cystoscopy and urodynamics outpatient. No acute surgical intervention is needed at moment,  team will follow. Can be discharged with Palacio catheter when time.     Recs:   -Start Finasteride 5mg Daily  -Start Flomax 0.4mg daily at bedtime  -Continue palacio catheter  - Order Kidney Bladder Ultrasound  -Trend Creatinine.  -  team will follow, plan d/w Attending and Residents.

## 2020-05-08 NOTE — ED PROVIDER NOTE - CLINICAL SUMMARY MEDICAL DECISION MAKING FREE TEXT BOX
Patient in ED w concern for generalized weakness.  Awake and alert, non toxic.  Patient noted to have decreased PO intake at nursing facility.  Labs and imaging reviewed.  Concern for acute on chronic renal insuff.  Patient given 1 liter IVF.  UA noted - culture sent.  Will give initial dose of IV abx, however suspect possible colonization given chronic indwelling palacio catheter.  Will plan for admission to regional medicine team with close monitoring, IV hydration and continued treatment.  Patient is aware of plan and in agreement.  Will admit at this time.

## 2020-05-08 NOTE — H&P ADULT - HISTORY OF PRESENT ILLNESS
Pt     COLLATERAL : Pt hcp reports at [facility] he had ascites. No clear etiology, no known hx of cirrhosis. He was started on diuretic (lasix , unclear dose ) and his ascites and LE edema improved. Pt also started on eliquis 2.5 mg bid for a RUE clot . Started on half dose has he has hx of guaic positive stools and worsening anemia (s/p upper and lower endoscopy with Dr Mallory  Came in 213 pounds, abdominal usg showed ascites, echogenic liver however lfts wnl. CT with con pending given / Cardiology echo done, appeared cardiac in etiology and started on Lasix and midodrine 5 tid , torsemide 40 bid, 20 bid and diuresis 30 pounds. held after AAMIR and na low, cr worsening . Concern fro cardiac given elevated bnp however lungs clears. Ascites . levaquin 1 week ago for intersitial infiltrates on cxr   supra pubic cath Patient is 73 yo M with past medical history of DM, HTN, HLD, BPH (chronic incontinence/retention), SERA, CKD4, and a-fib  , mds, pancytopenia, presents from [facility] with complaints of decreased po intake and weakness. Patient reports that he recently had a intense bowel regimen for constipation after which he developed loose stools. To prevent further loose stools he stopped eating . Decreased po intake also     COLLATERAL : Pt hcp reports at [facility] he had ascites. No clear etiology, no known hx of cirrhosis. He was started on diuretic (lasix , unclear dose ) and his ascites and LE edema improved. Pt also started on eliquis 2.5 mg bid for a RUE clot . Started on half dose has he has hx of guaic positive stools and worsening anemia (s/p upper and lower endoscopy with Dr Mallory  Came in 213 pounds, abdominal usg showed ascites, echogenic liver however lfts wnl. CT with con pending given / Cardiology echo done, appeared cardiac in etiology and started on Lasix and midodrine 5 tid , torsemide 40 bid, 20 bid and diuresis 30 pounds. held after AAMIR and na low, cr worsening . Concern fro cardiac given elevated bnp however lungs clears. Ascites . levaquin 1 week ago for intersitial infiltrates on cxr   supra pubic cath Patient is 75 yo M with past medical history of DM, HTN, HLD, BPH (chronic incontinence/retention), SERA, CKD4, and a-fib  , mds, pancytopenia, presents from [facility] with complaints of decreased po intake and weakness. Patient reports that he recently had a intense bowel regimen for constipation after which he developed loose stools. To prevent further loose stools he stopped eating . Decreased po intake also in setting of poor appetite. Patient also complains for worsening LE and abdominal swelling. As per collateral obtained from Dr Barksdale([id number]) patient abdominal usg notable for massive ascites, and increased liver echogenicity(LFTS outpatient wnl )   and he weight was aggressively diuresed with  Lasix , torsemide 40 mg bid--> 20 mg bid (of note also started on midodrine as his pressures would drop with iv diuresis ). After diuresis his ascites and LE edema improved however he developed worsening AAMIR and so lasix was stopped and torsemide decreased to 20 mg bid. Of note he was seen by Cardiology and  echo done, concern fluid overload was cardiac in etiology given elevated bnp and echo findings. As per collateral patient also on levaquin 1 week ago for intersitial infiltrates on cxr , s/p 7 day course.   Upon arrival to ED vitals bp 92/52, hr 94, rr 16, satting well ra.   Labs notable for worsening AAMIR. CT abdomen pelvis ____  ed management : supra pubic cath taken out, with plans to place new one, 1L NS, 1G ceftriaxone  Pt admitted to [facility] for further management Patient is 75 yo M with past medical history of DM, HTN, HLD, BPH (chronic incontinence/retention), SERA, CKD4, and a-fib  , mds, pancytopenia, presents from [facility] with complaints of decreased po intake and weakness. Patient reports that he recently had a intense bowel regimen for constipation after which he developed loose stools. To prevent further loose stools he stopped eating . Decreased po intake also in setting of poor appetite. Patient also complains for worsening LE and abdominal swelling. As per collateral obtained from Dr Barksdale([id number]) patient abdominal usg notable for massive ascites, and increased liver echogenicity(LFTS outpatient wnl )   and he weight was aggressively diuresed with  Lasix , torsemide 40 mg bid--> 20 mg bid (of note also started on midodrine as his pressures would drop with iv diuresis ). After diuresis his ascites and LE edema improved however he developed worsening AAMIR and so lasix was stopped and torsemide decreased to 20 mg bid. Of note he was seen by Cardiology and  echo done, concern fluid overload was cardiac in etiology given elevated bnp and echo findings. As per collateral patient also on levaquin 1 week ago for intersitial infiltrates on cxr , s/p 7 day course.   Upon arrival to ED vitals bp 92/52, hr 94, rr 16, satting well ra.   Labs notable for anemia, thrombocytopenia and  worsening AAMIR. CT abdomen pelvis pending  ed management : supra pubic cath taken out, with plans to place new one, 1L NS, 1G ceftriaxone  Pt admitted to [facility] for further management Patient is 73 yo M with past medical history of DM, HTN, HLD, BPH (chronic indwelling palacio placement on last admission, SERA, CKD4, and a-fib  , mds, pancytopenia, presents from [facility] with complaints of decreased po intake and weakness. Patient reports that he recently had a intense bowel regimen for constipation after which he developed loose stools. To prevent further loose stools he stopped eating . Decreased po intake also in setting of poor appetite. Patient also complains for worsening LE and abdominal swelling. As per collateral obtained from Dr Barksdale([id number]) patient abdominal usg notable for massive ascites, and increased liver echogenicity(LFTS outpatient wnl )   and he weight was aggressively diuresed with  Lasix , torsemide 40 mg bid--> 20 mg bid (of note also started on midodrine as his pressures would drop with iv diuresis ). After diuresis his ascites and LE edema improved however he developed worsening AAMIR and so lasix was stopped and torsemide decreased to 20 mg bid. Of note he was seen by Cardiology and  echo done, concern fluid overload was cardiac in etiology given elevated bnp and echo findings. As per collateral patient also on levaquin 1 week ago for intersitial infiltrates on cxr , s/p 7 day course.   Upon arrival to ED vitals bp 92/52, hr 94, rr 16, satting well ra.   Labs notable for anemia, thrombocytopenia and  worsening AAMIR. CT abdomen pelvis pending  ed management : supra pubic cath taken out, with plans to place new one, 1L NS, 1G ceftriaxone  Pt admitted to [facility] for further management Patient is 75 yo M with past medical history of DM, HTN, HLD, BPH (chronic indwelling palacio placement on last admission, SERA, CKD4, and a-fib  , mds, pancytopenia, presents from [facility] with complaints of decreased po intake and weakness. Patient reports that he recently had a intense bowel regimen for constipation after which he developed loose stools. To prevent further loose stools he stopped eating . Decreased po intake also in setting of poor appetite. Patient also complains for worsening LE and abdominal swelling. As per collateral obtained from Dr Barksdale at [facility]([id number]) patient abdominal usg notable for massive ascites, and increased liver echogenicity(LFTS outpatient wnl )   and he was aggressively diuresed with  Lasix , torsemide 40 mg bid (of note also started on midodrine as his pressures would drop with iv diuresis ). After diuresis his ascites and LE edema improved however he developed worsening AAMIR and so Lasix was stopped and torsemide decreased to 20 mg bid. Of note he was seen by Cardiology and  echo done, concern fluid overload was cardiac in etiology given elevated bnp and echo findings.  As per collateral patient also on Levaquin 1 week ago for intersitial infiltrates on cxr , s/p 7 day course.   Upon arrival to ED vitals bp 92/52, hr 94, rr 16, satting well ra.  Labs notable for anemia, thrombocytopenia and  worsening AAMIR. CT abdomen pelvis pending  ed management : supra pubic cath taken out, with plans to place new one, 1L NS, 1G ceftriaxone  Pt admitted to [facility] for further management

## 2020-05-08 NOTE — H&P ADULT - PROBLEM SELECTOR PLAN 4
CT head:   There is no evidence of acute intracranial hemorrhage or acute transcortical infarct. There is enlargement thesulci, cisterns and ventricles consistent with mild cerebral and cerebellar volume loss. However, there is superimposed prominence of the ventricles consistent with hydrocephalus. There is no evidence of mass-effect or midline shift. There is no evidence of an intra or extra-axial fluid collection.  Obtain collateral regarding prior ct head in am

## 2020-05-08 NOTE — H&P ADULT - PROBLEM SELECTOR PLAN 3
Patient with positive UA in setting of chronic palacio cathether.   Will not treat as it appears to be a colonizer given chronic palacio   c/w finasteride and home Patient with positive UA in setting of chronic palacio catheter.   Will not treat as it appears to be a colonizer given chronic palacio catheter   c/w finasteride 5 mg daily and tamsulosin 0.4 mg daily Patient with positive UA in setting of chronic palacio catheter.   Will not treat as it appears to be a colonizer given chronic palacio catheter   c/w finasteride 5 mg daily and tamsulosin 0.4 mg daily    # Chronic palacio catheter  pt with hx of recently placed suprapubic cath with last admission in setting of obstructive uropathy   Urology called , will see patient in am  Of note as per last urology note he was advised to continue BPH meds and possible TURP after his UTI clears with plans for  cystoscopy and Urodynamics electively as outpatient   HCP reports patient has been following up with urology however has still not learnt how to self catherize and struggles  f/u urology recs in am Patient with positive UA in setting of chronic palacio catheter.   Will not treat as it appears to be a colonizer given chronic Palacio catheter   c/w finasteride 5 mg daily and tamsulosin 0.4 mg daily    # Chronic palacio catheter  pt with hx of recently placed cath with last admission in setting of obstructive uropathy   Urology called , will see patient in am  Of note as per last urology note he was advised to continue BPH meds and possible TURP after his UTI clears with plans for  cystoscopy and Urodynamics electively as outpatient   HCP reports patient has been following up with urology however has still not learnt how to self catherize and struggles  f/u urology recs in am Patient with positive UA in setting of chronic Palacio catheter.   Will not treat as it appears to be a colonizer given chronic Palacio catheter   c/w finasteride 5 mg daily and tamsulosin 0.4 mg daily    # Chronic palacio catheter  pt with hx of recently placed cath with last admission in setting of obstructive uropathy   Urology called , will see patient in am  Of note as per last urology note he was advised to continue BPH meds and possible TURP after his UTI clears with plans for  cystoscopy and Urodynamics electively as outpatient   HCP reports patient has been following up with urology however has still not learnt how to self catherize and struggles  f/u urology recs in am

## 2020-05-08 NOTE — H&P ADULT - NSHPLABSRESULTS_GEN_ALL_CORE
.  LABS:                         8.5    8.03  )-----------( 110      ( 08 May 2020 12:19 )             27.2     05-08    129<L>  |  95<L>  |  65<H>  ----------------------------<  193<H>  4.8   |  22  |  3.96<H>    Ca    8.1<L>      08 May 2020 12:19    TPro  5.3<L>  /  Alb  1.9<L>  /  TBili  0.8  /  DBili  x   /  AST  44<H>  /  ALT  12  /  AlkPhos  105  05-08    PT/INR - ( 08 May 2020 12:19 )   PT: 31.5 sec;   INR: 2.68          PTT - ( 08 May 2020 12:19 )  PTT:37.4 sec  Urinalysis Basic - ( 08 May 2020 12:42 )    Color: Yellow / Appearance: SL Cloudy / S.015 / pH: x  Gluc: x / Ketone: NEGATIVE  / Bili: Negative / Urobili: 0.2 E.U./dL   Blood: x / Protein: 30 mg/dL / Nitrite: NEGATIVE   Leuk Esterase: Large / RBC: > 10 /HPF / WBC Many /HPF   Sq Epi: x / Non Sq Epi: 0-5 /HPF / Bacteria: Present /HPF                RADIOLOGY, EKG & ADDITIONAL TESTS: Reviewed.

## 2020-05-08 NOTE — H&P ADULT - ASSESSMENT
Patient is 73 yo M with past medical history of DM, HTN, HLD, BPH (chronic incontinence/retention), SERA, CKD4, and a-fib  , mds, pancytopenia, presents from [facility] with complaints of decreased po intake and weakness with labs notable  for an AAMIR Patient is 75 yo M with past medical history of DM, HTN, HLD, BPH (chronic incontinence/retention, s/p palacio on last admission), SERA, CKD4, and a-fib  , mds, pancytopenia, presents from [facility] with complaints of decreased po intake and weakness with labs notable for an AAMIR , admitted to [facility] for further management

## 2020-05-08 NOTE — H&P ADULT - PROBLEM SELECTOR PLAN 5
DVT PPX: Eliquis bid for dvt  GI ppx: none Pt with hx of pancytopenia , s/p bone marrow biopsy showing concern for MDS  Of note as per collateral obtained from hem onc (Dr Roberts /pcp)  patient has a hx of guaic positive stools and worsening anemia (s/p upper and lower endoscopy with Dr Mallory with no acute findings? pending pill endoscopy ? )  keep active type and screen  f/u am cbc  F/u Dr Roberts recs in am

## 2020-05-08 NOTE — H&P ADULT - NSHPPHYSICALEXAM_GEN_ALL_CORE
.  VITAL SIGNS:  T(C): 36.4 (05-08-20 @ 15:23), Max: 37.1 (05-08-20 @ 12:15)  T(F): 97.5 (05-08-20 @ 15:23), Max: 98.7 (05-08-20 @ 12:15)  HR: 99 (05-08-20 @ 15:23) (94 - 99)  BP: 99/64 (05-08-20 @ 15:23) (92/52 - 99/64)  BP(mean): --  RR: 16 (05-08-20 @ 15:23) (16 - 167)  SpO2: 96% (05-08-20 @ 15:23) (96% - 96%)  Wt(kg): --    PHYSICAL EXAM:    Constitutional: WDWN resting comfortably in bed; NAD  Head: NC/AT  Eyes: PERRL, EOMI, anicteric sclera  ENT: conjunctival pallor   Neck: supple; no JVD or thyromegaly  Respiratory: decreased breath sounds at bases  Cardiac: +S1/S2; RRR; no M/R/G; PMI non-displaced  Gastrointestinal: abdomen soft, non tender, abdomen distended  Back: spine midline, no bony tenderness or step-offs; no CVAT B/L  Extremities: LE edema 2+  Neurologic: AAOx3; CNII-XII grossly intact; no focal deficits .  VITAL SIGNS:  T(C): 36.4 (05-08-20 @ 15:23), Max: 37.1 (05-08-20 @ 12:15)  T(F): 97.5 (05-08-20 @ 15:23), Max: 98.7 (05-08-20 @ 12:15)  HR: 99 (05-08-20 @ 15:23) (94 - 99)  BP: 99/64 (05-08-20 @ 15:23) (92/52 - 99/64)  BP(mean): --  RR: 16 (05-08-20 @ 15:23) (16 - 167)  SpO2: 96% (05-08-20 @ 15:23) (96% - 96%)  Wt(kg): --    PHYSICAL EXAM:    Constitutional: WDWN resting comfortably in bed; NAD  Head: NC/AT  Eyes: PERRL, EOMI, anicteric sclera  ENT: conjunctival pallor   Neck: supple; no JVD or thyromegaly  Respiratory: decreased breath sounds at bases  Cardiac: +S1/S2; RRR; no M/R/G; PMI non-displaced  Gastrointestinal: abdomen soft, non tender, abdomen distended  Back: spine midline, no bony tenderness or step-offs; no CVAT B/L  Extremities: LE edema 2+

## 2020-05-09 NOTE — CONSULT NOTE ADULT - ASSESSMENT
Assessment:  75 yo M with MDS, pancytopenia, CKD, ascites of unknown origin presents from [facility] with complaints of decreased po intake and weakness likely secondary to uremia/AAMIR from diuresis for treatment of ascites, hospital course notable for UGIB and pericardial effusion with tamponade physiology.    Recommendations:    Cardiovascular:  Hypotension is likely a combination of intravascular volume depletion from diuresis and GIB combined with vasodilation from underlying decompensated cirrhosis.  Low suspicion for tamponade as etiology of hypotension based on exam.  H&H is stable, Hgb 10 after 1 unit of prbc, no need for additional transfusion of blood products, however would begin volume resuscitation for correction of intravascular volume depletion.  This will likely improve cardiac physiology as well dt preload dependence.  - Albumin 5% 500 cc once stat  - Albumin 25% 50 cc q6 hours x 24 hours   - Continue midodrine 10mg q8 hours  - Q3 vital signs during resuscitation    Pericardial Effusion:  Moderate in size and unchanged from earlier in day.  Some evidence of tamponade physiology.  Volume resuscitation as above.  - Cardiology consult to determine eligibility for intervention    GI:   UGIB: Primary team reports multiple episodes of melena throughout the day.  Pt has baseline anemia from MDS and never dropped significantly below his baseline Hgb.  He is stable at Hgb of 10 after 1 unit pRBC.  Pt noted to have coagulopathy and thrombocytopenia.  - Monitor H&H closely  - Continue Protonix 40 mg IVP BID  - Reverse coagulopathy with transfusion of FFP and PO Vitamin K  - Monitor coags  - If H&H continues to downtrend patient would benefit from administration of desmopressin for uremic bleeding  - Transfuse platelets < 50 if continued bleeding  - Consider EGD in AM    Ascites:  Per primary team the ascites is of unknown etiology.  Pt would benefit from diagnostic tap.  - Paracentesis in AM  - GI recs for optimization of decompensated cirrhosis  - Please start Ceftriaxone for SBP prophylaxis in setting of decompensated cirrhosis and UGIB    Pulmonary:   Abnormal Exam: pt with absent breath sounds on R lower lung field suspicious for effusion.  No imaging on file.  - Obtain CXR    Disposition: [facility]  Please reconsult with any clinical worsening.

## 2020-05-09 NOTE — PROGRESS NOTE ADULT - PROBLEM SELECTOR PLAN 4
CT head:   There is no evidence of acute intracranial hemorrhage or acute transcortical infarct. There is enlargement thesulci, cisterns and ventricles consistent with mild cerebral and cerebellar volume loss. However, there is superimposed prominence of the ventricles consistent with hydrocephalus. There is no evidence of mass-effect or midline shift. There is no evidence of an intra or extra-axial fluid collection.  Obtain collateral regarding prior ct head in am CT head:   There is no evidence of acute intracranial hemorrhage or acute transcortical infarct. There is enlargement thesulci, cisterns and ventricles consistent with mild cerebral and cerebellar volume loss. However, there is superimposed prominence of the ventricles consistent with hydrocephalus. There is no evidence of mass-effect or midline shift. There is no evidence of an intra or extra-axial fluid collection.  Obtain collateral regarding prior ct head

## 2020-05-09 NOTE — CONSULT NOTE ADULT - ASSESSMENT
AAMIR on CKD - per hx may be related to diuresis -- not improved here and unclear if received saline here   appears to have some ascites and not grossly dry but hypoperfuson a possiblity - jimmy with possible tamponade on echo  of note has severe hypoalbuminemia of unclear source (renal NS, liver dz, malignancy?)  has mild b/l  hydro and obstruction is a possibilty - though this appears chronic    agree with holding diuretics and might consider some IVF -- jimmy if tamponade confirmed  agree with CT of abd/pelvis and abdominal sono to evaluate liver, spleen and r/o mass  recheck UA and check urine protein/cr -may need more w/u for NS if confirmed  follow urine output - none recorded so far   will follow AAMIR on CKD - per hx may be related to diuresis -- not improved here and unclear if received saline here   appears to have some ascites and not grossly dry but hypoperfuson a possiblity - jimmy with possible tamponade on echo  of note has severe hypoalbuminemia of unclear source (renal NS, liver dz, malignancy?)  has mild b/l  hydro and obstruction is a possibilty - though this appears chronic    agree with holding diuretics and might consider some IVF if not given already (or albumin )  -- jimmy if tamponade confirmed  agree with CT of abd/pelvis and abdominal sono to evaluate liver, spleen and r/o mass  recheck UA and check urine protein/cr -may need more w/u for NS if confirmed  follow urine output - none recorded so far   will follow

## 2020-05-09 NOTE — CONSULT NOTE ADULT - SUBJECTIVE AND OBJECTIVE BOX
ICU CONSULT    Patient is a 74y old  Male who presents with a chief complaint of Weakness (09 May 2020 11:16)      HPI:  Patient is 73 yo M with past medical history of DM, HTN, HLD, BPH (chronic indwelling palacio placement on last admission, SERA, CKD4, and a-fib  , mds, pancytopenia, presents from [facility] with complaints of decreased po intake and weakness. Patient reports that he recently had a intense bowel regimen for constipation after which he developed loose stools. To prevent further loose stools he stopped eating . Decreased po intake also in setting of poor appetite. Patient also complains for worsening LE and abdominal swelling. As per collateral obtained from Dr Barksdale at [facility]([id number]) patient abdominal usg notable for massive ascites, and increased liver echogenicity(LFTS outpatient wnl )   and he was aggressively diuresed with  Lasix , torsemide 40 mg bid (of note also started on midodrine as his pressures would drop with iv diuresis ). After diuresis his ascites and LE edema improved however he developed worsening AAMIR and so Lasix was stopped and torsemide decreased to 20 mg bid. Of note he was seen by Cardiology and  echo done, concern fluid overload was cardiac in etiology given elevated bnp and echo findings.  As per collateral patient also on Levaquin 1 week ago for intersitial infiltrates on cxr , s/p 7 day course.   Upon arrival to ED vitals bp 92/52, hr 94, rr 16, satting well ra.  Labs notable for anemia, thrombocytopenia and  worsening AAMIR. CT abdomen pelvis pending  ed management : supra pubic cath taken out, with plans to place new one, 1L NS, 1G ceftriaxone  Pt admitted to [facility] for further management (08 May 2020 16:10)    INTERVAL HISTORY:  Pt found to have melena and downtrending H&H, transfused 1L PRBC with appropriate/exaggerated response.  TTE performed  showed moderate pericardial effusion with evidence of tamponade physiology.  Patient with worsening hypotension , started on midodrine. received no IVF, ICU consulted for worsening hemodynamics.    PAST MEDICAL & SURGICAL HISTORY:  History of pancytopenia (Z86.2)  H/O hydrocephalus (Z86.69)  Anemia (D64.9)  HLD (hyperlipidemia) (E78.5)  Enlarged prostate (N40.0)  DM (diabetes mellitus) (E11.9)  HTN (hypertension) (I10)  H/O prior ablation treatment (Z98.890)      FAMILY HISTORY:  FH: stomach cancer  FHx: breast cancer  FHx: stroke  No pertinent family history in first degree relatives      cefTRIAXone   IVPB 2000 milliGRAM(s) IV Intermittent once  cefTRIAXone   IVPB      finasteride 5 milliGRAM(s) Oral daily  insulin glargine Injectable (LANTUS) 12 Unit(s) SubCutaneous at bedtime  insulin lispro (HumaLOG) corrective regimen sliding scale   SubCutaneous Before meals and at bedtime  midodrine. 10 milliGRAM(s) Oral three times a day  mirtazapine 7.5 milliGRAM(s) Oral daily  pantoprazole  Injectable 40 milliGRAM(s) IV Push every 12 hours  phytonadione  IVPB 5 milliGRAM(s) IV Intermittent once  sertraline 50 milliGRAM(s) Oral daily  simvastatin 10 milliGRAM(s) Oral at bedtime      No Known Allergies      ICU Vital Signs Last 24 Hrs    T(C): 36.1 (09 May 2020 22:40), Max: 37.1 (09 May 2020 00:54)  T(F): 97 (09 May 2020 22:40), Max: 98.8 (09 May 2020 00:54)  HR: 92 (09 May 2020 21:46) (81 - 97)  BP: 76/46 (09 May 2020 21:46) (76/46 - 111/68)  BP(mean): --  ABP: --  ABP(mean): --  RR: 18 (09 May 2020 21:46) (18 - 20)  SpO2: 97% (09 May 2020 21:46) (90% - 99%)    PHYSICAL EXAM:    Constitutional: elderly male resting comfortably in bed on 2L NC; NAD  Head: NC/AT  Eyes: PERRL, EOMI, clear conjunctiva  ENT: no nasal discharge; dry MM  Neck: supple; no JVD or thyromegaly  Respiratory: absent breath sounds on right to midlung; no W/R/R, no retractions  Cardiac: +S1/S2; RRR; no M/R/G  Gastrointestinal: soft, NT; distended +fluid wave, no rebound or guarding; +BSx4  Genitourinary: palacio in place draining scant brown urine  Back: unable to asses dt body habitus  Extremities: WWP, no clubbing or cyanosis; trace peripheral edema  Vascular: 2+ radial  Dermatologic: skin warm, diaphoretic, scattered wounds on arms and legs covered in protective bandages, CDI  Neurologic: AAOx1; CNII-XII grossly intact; no gross deficits    LABS                        10.0   12.36 )-----------( 78       ( 09 May 2020 22:29 )             31.8         131<L>  |  96  |  92<H>  ----------------------------<  222<H>  5.0   |  19<L>  |  4.75<H>    Ca    8.0<L>      09 May 2020 22:29  Mg     1.6         TPro  5.8<L>  /  Alb  1.9<L>  /  TBili  0.6  /  DBili  x   /  AST  50<H>  /  ALT  20  /  AlkPhos  103      PT/INR - ( 09 May 2020 22:29 )   PT: 30.6 sec;   INR: 2.61          PTT - ( 09 May 2020 22:29 )  PTT:39.3 sec  Lactate, Blood: 2.2 mmol/L (20 @ 22:29)    Urinalysis Basic - ( 08 May 2020 12:42 )    Color: Yellow / Appearance: SL Cloudy / S.015 / pH: x  Gluc: x / Ketone: NEGATIVE  / Bili: Negative / Urobili: 0.2 E.U./dL   Blood: x / Protein: 30 mg/dL / Nitrite: NEGATIVE   Leuk Esterase: Large / RBC: > 10 /HPF / WBC Many /HPF   Sq Epi: x / Non Sq Epi: 0-5 /HPF / Bacteria: Present /HPF    IMAGING   CXR - none

## 2020-05-09 NOTE — PHYSICAL THERAPY INITIAL EVALUATION ADULT - CRITERIA FOR SKILLED THERAPEUTIC INTERVENTIONS
predicted duration of therapy intervention/risk reduction/prevention/rehab potential/anticipated discharge recommendation/impairments found/therapy frequency/functional limitations in following categories

## 2020-05-09 NOTE — CONSULT NOTE ADULT - SUBJECTIVE AND OBJECTIVE BOX
Patient is a 74y Male hx DM, HTN, pancytopenia, urinary retention with indwelling  palacio, . CKD 4 (baseline creat has fluctuated but prob 2-3 range it seems), hypotension on midodrine, reportedly aggressively diureses at [facility] for ascites and fluid overload and noted to have increase in serum creat up to 4s  admitted with c.o weakness, abd distension and pain, poor intake - says abd sx have improved recently       PAST MEDICAL & SURGICAL HISTORY:  History of pancytopenia  H/O hydrocephalus  Anemia  HLD (hyperlipidemia)  Enlarged prostate  DM (diabetes mellitus)  HTN (hypertension)  H/O prior ablation treatment      MEDICATIONS  (STANDING):  finasteride 5 milliGRAM(s) Oral daily  insulin glargine Injectable (LANTUS) 12 Unit(s) SubCutaneous at bedtime  insulin lispro (HumaLOG) corrective regimen sliding scale   SubCutaneous Before meals and at bedtime  midodrine. 5 milliGRAM(s) Oral three times a day  mirtazapine 7.5 milliGRAM(s) Oral daily  pantoprazole  Injectable 40 milliGRAM(s) IV Push every 12 hours  sertraline 50 milliGRAM(s) Oral daily  simvastatin 10 milliGRAM(s) Oral at bedtime  tamsulosin 0.4 milliGRAM(s) Oral at bedtime    MEDICATIONS  (PRN):      Allergies    No Known Allergies    Intolerances        SOCIAL HISTORY:    FAMILY HISTORY:  FH: stomach cancer  FHx: breast cancer  FHx: stroke      T(C): , Max: 37.1 (20 @ 18:18)  T(F): , Max: 98.8 (20 @ 18:18)  HR: 84 (20 @ 09:27)  BP: 103/61 (20 @ 09:27)  BP(mean): --  RR: 18 (20 @ 09:27)  SpO2: 99% (20 @ 09:27)  Wt(kg): --    Height (cm): 177.8 ( @ 22:02)  Weight (kg): 71.8 ( 22:02)  BMI (kg/m2): 22.7 ( 22:02)  BSA (m2): 1.89 (:02)      PHYSICAL EXAM:  Constitutional: Well appearning.  No acute distress  ENMT: Moist mucous membrane.  No cyanosis.  Neck: Supple. No JVD.  Back: No CVA tenderness  Respiratory: Clear to auscultation   Cardiovascular: S1, S2.  Regular rate and rhythm.    Gastrointestinal: soft, non-tender, non-distended, normal bowel sounds  Extremities: Warm.  No lower extremity edema.    Neurological: No focal deficits.  Skin: Warm. Dry.    Lymph Nodes:  No cervical lymph nodes.    Psychiatric: Normal affect.    ACCESS:       LABS:                        7.2    7.22  )-----------( 74       ( 09 May 2020 02:55 )             23.3         129<L>  |  95<L>  |  85<H>  ----------------------------<  189<H>  5.2   |  24  |  4.48<H>    Ca    8.2<L>      09 May 2020 06:22  Mg     1.4         TPro  5.3<L>  /  Alb  1.9<L>  /  TBili  0.8  /  DBili  x   /  AST  44<H>  /  ALT  12  /  AlkPhos  105  05-      PT/INR - ( 08 May 2020 12:19 )   PT: 31.5 sec;   INR: 2.68          PTT - ( 08 May 2020 12:19 )  PTT:37.4 sec  Urinalysis Basic - ( 08 May 2020 12:42 )    Color: Yellow / Appearance: SL Cloudy / S.015 / pH: x  Gluc: x / Ketone: NEGATIVE  / Bili: Negative / Urobili: 0.2 E.U./dL   Blood: x / Protein: 30 mg/dL / Nitrite: NEGATIVE   Leuk Esterase: Large / RBC: > 10 /HPF / WBC Many /HPF   Sq Epi: x / Non Sq Epi: 0-5 /HPF / Bacteria: Present /HPF      Creatinine, Random Urine: 85 mg/dL ( @ 16:41)  Sodium, Random Urine: 90 mmol/L ( @ 16:41)  Osmolality, Random Urine: 292 mosmol/kg ( @ 16:41)  Chloride, Random Urine: 78 mmol/L ( @ 16:41)  Potassium, Random Urine: 16 mmol/L ( @ 16:41)        RADIOLOGY & ADDITIONAL STUDIES: Patient is a 74y Male hx DM, HTN, pancytopenia, urinary retention with indwelling  palacio, . CKD 4 (baseline creat has fluctuated but prob 2-3 range it seems), hypotension on midodrine, reportedly aggressively diuresed at [facility] for ascites and fluid overload and noted to have increase in serum creat and lowered dose. admitted today with weakness. creat  up to 4 and Na 129, repeat cr today 4.5 , na 129  admitted with c.o weakness, abd distension and pain, poor intake - says abd sx have improved recently   no uo ducumented despite palacio in place   sono with mild hydro b/l (not new)  echo with nl EF - peric effusion with possible tamponade    PAST MEDICAL & SURGICAL HISTORY:  History of pancytopenia  H/O hydrocephalus  Anemia  HLD (hyperlipidemia)  Enlarged prostate  DM (diabetes mellitus)  HTN (hypertension)  H/O prior ablation treatment      MEDICATIONS  (STANDING):  finasteride 5 milliGRAM(s) Oral daily  insulin glargine Injectable (LANTUS) 12 Unit(s) SubCutaneous at bedtime  insulin lispro (HumaLOG) corrective regimen sliding scale   SubCutaneous Before meals and at bedtime  midodrine. 5 milliGRAM(s) Oral three times a day  mirtazapine 7.5 milliGRAM(s) Oral daily  pantoprazole  Injectable 40 milliGRAM(s) IV Push every 12 hours  sertraline 50 milliGRAM(s) Oral daily  simvastatin 10 milliGRAM(s) Oral at bedtime  tamsulosin 0.4 milliGRAM(s) Oral at bedtime    MEDICATIONS  (PRN):      Allergies    No Known Allergies    Intolerances        SOCIAL HISTORY: denies tobacco    FAMILY HISTORY:  FH: stomach cancer  FHx: breast cancer  FHx: stroke      T(C): , Max: 37.1 (20 @ 18:18)  T(F): , Max: 98.8 (20 @ 18:18)  HR: 84 (20 @ 09:27)  BP: 103/61 (20 @ 09:27)  BP(mean): --  RR: 18 (20 @ 09:27)  SpO2: 99% (20 @ 09:27)  Wt(kg): --    Height (cm): 177.8 ( @ 22:02)  Weight (kg): 71.8 ( @ 22:02)  BMI (kg/m2): 22.7 ( @ 22:02)  BSA (m2): 1.89 ( @ 22:02)      PHYSICAL EXAM:  Constitutional:  No acute distress on NC  ENMT: Moist mucous membrane.  No cyanosis.  Neck: JVD 7 cm  Respiratory: decreased BS bases   Cardiovascular: S1, S2.  tachy ,irreg  Gastrointestinal: soft, non-tender, distended  Extremities: Warm.  No lower extremity edema.    Skin: Warm. Dry.  no rash   Psychiatric: Normal affect. oriented but poor historian   ACCESS:       LABS:                        7.2    7.22  )-----------( 74       ( 09 May 2020 02:55 )             23.3         129<L>  |  95<L>  |  85<H>  ----------------------------<  189<H>  5.2   |  24  |  4.48<H>    Ca    8.2<L>      09 May 2020 06:22  Mg     1.4         TPro  5.3<L>  /  Alb  1.9<L>  /  TBili  0.8  /  DBili  x   /  AST  44<H>  /  ALT  12  /  AlkPhos  105        PT/INR - ( 08 May 2020 12:19 )   PT: 31.5 sec;   INR: 2.68          PTT - ( 08 May 2020 12:19 )  PTT:37.4 sec  Urinalysis Basic - ( 08 May 2020 12:42 )    Color: Yellow / Appearance: SL Cloudy / S.015 / pH: x  Gluc: x / Ketone: NEGATIVE  / Bili: Negative / Urobili: 0.2 E.U./dL   Blood: x / Protein: 30 mg/dL / Nitrite: NEGATIVE   Leuk Esterase: Large / RBC: > 10 /HPF / WBC Many /HPF   Sq Epi: x / Non Sq Epi: 0-5 /HPF / Bacteria: Present /HPF      Creatinine, Random Urine: 85 mg/dL ( @ 16:41)  Sodium, Random Urine: 90 mmol/L ( @ 16:41)  Osmolality, Random Urine: 292 mosmol/kg ( @ 16:41)  Chloride, Random Urine: 78 mmol/L (05-08 @ 16:41)  Potassium, Random Urine: 16 mmol/L (05- @ 16:41)        RADIOLOGY & ADDITIONAL STUDIES:

## 2020-05-09 NOTE — PROGRESS NOTE ADULT - SUBJECTIVE AND OBJECTIVE BOX
INTERVAL HPI/OVERNIGHT EVENTS:  Patient was seen and examined at bedside. As per nurse and patient, no o/n events, patient resting comfortably. No complaints at this time. Patient denies: fever, chills, dizziness, weakness, HA, Changes in vision, CP, palpitations, SOB, cough, N/V/D/C, dysuria, changes in bowel movements, LE edema. ROS otherwise negative.    VITAL SIGNS:  T(F): 97.7 (20 @ 09:27)  HR: 84 (20 @ 09:27)  BP: 103/61 (20 @ 09:27)  RR: 18 (20 @ 09:27)  SpO2: 99% (20 @ 09:27)  Wt(kg): --    PHYSICAL EXAM:    Constitutional: WDWN, NAD  HEENT: PERRL, EOMI, sclera non-icteric, neck supple, trachea midline, no masses, no JVD, MMM, good dentition  Respiratory: CTA b/l, good air entry b/l, no wheezing, no rhonchi, no rales, without accessory muscle use and no intercostal retractions  Cardiovascular: RRR, normal S1S2, no M/R/G  Gastrointestinal: soft, NTND, no masses palpable, BS normal  Extremities: Warm, well perfused, pulses equal bilateral upper and lower extremities, no edema, no clubbing  Neurological: AAOx3, CN Grossly intact  Skin: Normal temperature, warm, dry    MEDICATIONS  (STANDING):  finasteride 5 milliGRAM(s) Oral daily  insulin glargine Injectable (LANTUS) 12 Unit(s) SubCutaneous at bedtime  insulin lispro (HumaLOG) corrective regimen sliding scale   SubCutaneous Before meals and at bedtime  midodrine. 5 milliGRAM(s) Oral three times a day  mirtazapine 7.5 milliGRAM(s) Oral daily  pantoprazole  Injectable 40 milliGRAM(s) IV Push every 12 hours  sertraline 50 milliGRAM(s) Oral daily  simvastatin 10 milliGRAM(s) Oral at bedtime  tamsulosin 0.4 milliGRAM(s) Oral at bedtime    MEDICATIONS  (PRN):      Allergies    No Known Allergies    Intolerances        LABS:                        7.2    7.22  )-----------( 74       ( 09 May 2020 02:55 )             23.3     05-    129<L>  |  95<L>  |  85<H>  ----------------------------<  189<H>  5.2   |  24  |  4.48<H>    Ca    8.2<L>      09 May 2020 06:22  Mg     1.4     -    TPro  5.3<L>  /  Alb  1.9<L>  /  TBili  0.8  /  DBili  x   /  AST  44<H>  /  ALT  12  /  AlkPhos  105  05-08    PT/INR - ( 08 May 2020 12:19 )   PT: 31.5 sec;   INR: 2.68          PTT - ( 08 May 2020 12:19 )  PTT:37.4 sec  Urinalysis Basic - ( 08 May 2020 12:42 )    Color: Yellow / Appearance: SL Cloudy / S.015 / pH: x  Gluc: x / Ketone: NEGATIVE  / Bili: Negative / Urobili: 0.2 E.U./dL   Blood: x / Protein: 30 mg/dL / Nitrite: NEGATIVE   Leuk Esterase: Large / RBC: > 10 /HPF / WBC Many /HPF   Sq Epi: x / Non Sq Epi: 0-5 /HPF / Bacteria: Present /HPF        RADIOLOGY & ADDITIONAL TESTS:  Reviewed INTERVAL HPI/OVERNIGHT EVENTS:  Mr. Carrington complains of abdominal discomfort this morning. He is concerned of why the abdominal swelling has returned.     VITAL SIGNS:  T(F): 97.7 (20 @ 09:27)  HR: 84 (20 @ 09:27)  BP: 103/61 (20 @ 09:27)  RR: 18 (20 @ 09:27)  SpO2: 99% (20 @ 09:27)  Wt(kg): --    PHYSICAL EXAM:    Constitutional: WDWN, NAD  HEENT: PERRL, EOMI, sclera non-icteric, neck supple, trachea midline, no masses, no JVD, MMM, good dentition  Respiratory: Decreased breath sounds at bases, no increased work of breathing  Cardiovascular: RRR, normal S1S2, no M/R/G  Gastrointestinal: Soft, Distended, moderate TTP, unable to assess for fluid wave, +BS  Extremities: Warm, well perfused, no edema, no clubbing  Neurological: AAOx3, CN Grossly intact  Skin: Normal temperature, warm, dry    MEDICATIONS  (STANDING):  finasteride 5 milliGRAM(s) Oral daily  insulin glargine Injectable (LANTUS) 12 Unit(s) SubCutaneous at bedtime  insulin lispro (HumaLOG) corrective regimen sliding scale   SubCutaneous Before meals and at bedtime  midodrine. 5 milliGRAM(s) Oral three times a day  mirtazapine 7.5 milliGRAM(s) Oral daily  pantoprazole  Injectable 40 milliGRAM(s) IV Push every 12 hours  sertraline 50 milliGRAM(s) Oral daily  simvastatin 10 milliGRAM(s) Oral at bedtime  tamsulosin 0.4 milliGRAM(s) Oral at bedtime    MEDICATIONS  (PRN):      Allergies    No Known Allergies    Intolerances        LABS:                        7.2    7.22  )-----------( 74       ( 09 May 2020 02:55 )             23.3         129<L>  |  95<L>  |  85<H>  ----------------------------<  189<H>  5.2   |  24  |  4.48<H>    Ca    8.2<L>      09 May 2020 06:22  Mg     1.4     05-09    TPro  5.3<L>  /  Alb  1.9<L>  /  TBili  0.8  /  DBili  x   /  AST  44<H>  /  ALT  12  /  AlkPhos  105  08    PT/INR - ( 08 May 2020 12:19 )   PT: 31.5 sec;   INR: 2.68          PTT - ( 08 May 2020 12:19 )  PTT:37.4 sec  Urinalysis Basic - ( 08 May 2020 12:42 )    Color: Yellow / Appearance: SL Cloudy / S.015 / pH: x  Gluc: x / Ketone: NEGATIVE  / Bili: Negative / Urobili: 0.2 E.U./dL   Blood: x / Protein: 30 mg/dL / Nitrite: NEGATIVE   Leuk Esterase: Large / RBC: > 10 /HPF / WBC Many /HPF   Sq Epi: x / Non Sq Epi: 0-5 /HPF / Bacteria: Present /HPF        RADIOLOGY & ADDITIONAL TESTS:  Reviewed

## 2020-05-09 NOTE — PROGRESS NOTE ADULT - ASSESSMENT
Patient is 73 yo M with past medical history of DM, HTN, HLD, BPH (chronic incontinence/retention, s/p palacio on last admission), SERA, CKD4, and a-fib  , mds, pancytopenia, presents from [facility] with complaints of decreased po intake and weakness with labs notable for an AAMIR , admitted to [facility] for further management

## 2020-05-09 NOTE — CHART NOTE - NSCHARTNOTEFT_GEN_A_CORE
Admitting Diagnosis:   Patient is a 74y old  Male who presents with a chief complaint of Weakness (09 May 2020 11:16).  PMH sig for diabetes, HTN, HLD, BPH s/p palacio, SERA, CKD stage 4 and A.fib.  H&P notes Pt with poor Po PTA and weakness.  Per medicine note; AAMIR likely in setting of recent diuretic use for ascites and LE edema; holding diuretic.  Pt with aggressive duiresis PTA with lasix; consider abdominal tap and SAAG to assess for etiology of ascites.  Urology to follow for palacio.  CT head with no evidence of acute intracranial hemorrhage or acute transcortical infarct.  Per event note early this AM; Pt with episode of melena overnight.  Hemodynamically stable; plan for transfusion 1 unit prbc.    Pt lying in bed; attempted discussion of Po intake PTA but very limited information obtained.  Pt unable to state what he was/was not eating.  Pt also asking about maintaining connection and signaling with phone; thought it was indicative of wifi but unable to determine; spoke to RN.  Pt would benefit from Room Service Assist protocol and meal selections taken at bedside.    Reason for Initial Nutrition Assessment: Nutrition consult for FTT      PAST MEDICAL & SURGICAL HISTORY:  History of pancytopenia  H/O hydrocephalus  Anemia  HLD (hyperlipidemia)  Enlarged prostate  DM (diabetes mellitus)  HTN (hypertension)  H/O prior ablation treatment      Current Nutrition Order:  Diet, Consistent Carbohydrate w/Evening Snack:   DASH/TLC {Sodium & Cholesterol Restricted} (DASH) (05-08-20 @ 17:50)      PO Intake: Unable to determine; Pt notes he did not receive breakfast.    GI Issues: Constipation then loose stools PTA due to bowel regimen    Pain: Denies pain/discomfort    Skin Integrity: Pt with ascites and 2+ pitting edema to LE; no pressure injuries on flowsheet.    Labs: Hyponatremia noted; with elevated BUN and Cr.  Pt with CKD stage IV with AAMIR.  K and phos wnl.  Cholesterol and TG wnl.  With depleted alb, ascites, poor Po intake PTA, no protein restriction.  Hyperglycemia noted; Hx of DM.  No A1c since admission.  Calcium wnl after correction for hypoalb.  05-09    129<L>  |  95<L>  |  85<H>  ----------------------------<  189<H>  5.2   |  24  |  4.48<H>    Ca    8.2<L>      09 May 2020 06:22  Mg     1.4     05-09    TPro  5.3<L>  /  Alb  1.9<L>  /  TBili  0.8  /  DBili  x   /  AST  44<H>  /  ALT  12  /  AlkPhos  105  05-08    CAPILLARY BLOOD GLUCOSE  POCT Blood Glucose.: 189 mg/dL (09 May 2020 08:29)  POCT Blood Glucose.: 168 mg/dL (09 May 2020 07:04)  POCT Blood Glucose.: 168 mg/dL (08 May 2020 22:43)  POCT Blood Glucose.: 179 mg/dL (08 May 2020 19:52)    Medications:  MEDICATIONS  (STANDING):  finasteride 5 milliGRAM(s) Oral daily  insulin glargine Injectable (LANTUS) 12 Unit(s) SubCutaneous at bedtime  insulin lispro (HumaLOG) corrective regimen sliding scale   SubCutaneous Before meals and at bedtime  midodrine. 5 milliGRAM(s) Oral three times a day  mirtazapine 7.5 milliGRAM(s) Oral daily  pantoprazole  Injectable 40 milliGRAM(s) IV Push every 12 hours  sertraline 50 milliGRAM(s) Oral daily  simvastatin 10 milliGRAM(s) Oral at bedtime  tamsulosin 0.4 milliGRAM(s) Oral at bedtime      Anthropometrics:   Weight: 71.8kg (5.8)  Height: 177.8cm  BMI:  22.7                 IBW:  75.45kg                   %IBW: 95%  Weight Hx: 74.2kg (2.13.2020 in RD note).  This would indicate loss of 2.4 kg (3%) in 3 month period.    Nutrition Focused Physical Exam: Completed [ x  ]  Unable to complete [   ].  Visually notable ascites; mild muscle losses to temporal region visualized.  Mild fat loss over triceps region.      Estimated energy needs: IBW used for calculations  Calories:   Protein:  Fluids:     Nutrition Diagnosis: Mild protein-calorie malnutrition r/t physiologic causes AEB reported poor Po intake for > 5 days PTA and observed mild muscle losses to temporal region coupled with mild fat loss over triceps region.    Goal: Preserve lean body mass while INPT.  Po intake to meet within 10% of EER.    Recommendations:  - Continue DASH/TLC, consistent carb diet order  - Recommend adding nepro 2 units/day for additional kcals/protein.  Each nepro provides 425 kcals & 19 gm pro.  - Monitor renal function; if decline noted remove nepro due to protein content  - Please obtain weights q 3 days for closer monitoring  - Continue mirtazapine for potential of appetite stimuation  - Bowel regimen as needed  - Continue glycemic management; POC glucose goal between  mg/dL. Consult endocrine as needed.  - Adding Pt to Room Service Assist protocol  - Incorporate food preferences as provided  - Electrolytes wnl    Education: Deferred at this time as Pt did not appear able to retain discussion points at time of visit.  RD to continue to follow.    Risk Level: High [ x  ] Moderate [   ] Low [   ]

## 2020-05-09 NOTE — CHART NOTE - NSCHARTNOTEFT_GEN_A_CORE
Called by nurse at 1AM. Pt had 3 episodes of melena. Hemodynamically stable. /60, HR 85, temp 98.8, 96% on 2L NC, RR 20. Pt denies SOB, chest pain, light headedness, dizziness. Exam: Alert, awake, A&Ox2-3, abd soft, distended, nontender, LE with 2+ pitting edema, extremities cool to touch, distal pulses intact. Stat CBC with Hb of 7.2, plt 74. D/vanesa eliquis which pt is on for RUE DVT, started on PPI BID. Plan to transfuse 1 unit of prbc.

## 2020-05-09 NOTE — PHYSICAL THERAPY INITIAL EVALUATION ADULT - STANDING BALANCE: DYNAMIC, REHAB EVAL
Interpreted by ED physician  Chest/ribs L x-ray, 3 views  Lungs clear, heart shadow normal, no displaced rib fractures, bony structures normal, no free air under diaphragm, no PTX To Be Assessed

## 2020-05-09 NOTE — PHYSICAL THERAPY INITIAL EVALUATION ADULT - GENERAL OBSERVATIONS, REHAB EVAL
Patient received semi-robin in bed  in NAD on 2.5L O2, +Heplock., +Rodriguez. Cleared by EMILY Tellez. Agreeable to PT.

## 2020-05-09 NOTE — PROGRESS NOTE ADULT - PROBLEM SELECTOR PLAN 2
Acute on chronic renal insufficiency.  Pt with known hx CKD4. On arrival cr 3.96, baseline closer to 2?   Pt presenting with AAMIR likely in setting of recent diuretic use for his ascites and LE edema and combination of decreased po intake   Hold diuretic for now given AAMIR  continue iv hydration   urine lytes  bladder usg  Keep patient on gently hydration with lung checks intermittently     # Ascites, LE edema  Patient also complains for worsening LE and abdominal swelling. As per collateral obtained from Dr Barksdale([id number]) patient abdominal usg notable for massive ascites, and increased liver echogenicity(LFTS outpatient wnl )   and he  was aggressively diuresed with  Lasix , torsemide 40 mg bid (of note also started on midodrine as his pressures would drop with iv diuresis ). After diuresis his ascites and LE edema improved however he developed worsening AAMIR and so lasix was stopped and torsemide decreased to 20 mg bid. Of note he was seen by Cardiology and  echo done, concern fluid overload was cardiac in etiology given elevated bnp and echo findings.   ECHO in am  f/u bnp  consider abdominal tap, calculate SAAG to assess for etiology of ascites  f/u Ct abdomen pelvis   Hold diuretics for now in setting of AAMIR  continue with home dose of midodrine 5 mg tid Acute on chronic renal insufficiency.  Pt with known hx CKD4. On arrival cr 3.96, baseline closer to 2?   Pt presenting with AAMIR likely in setting of recent diuretic use for his ascites and LE edema and combination of decreased po intake   Hold diuretic for now given AAMIR  continue iv hydration   urine lytes  bladder ultrasound  Nephro contacted by Dr. Mallory, f/u recommendations regarding AAMIR in setting of diuresis    #Ascites  Attempt therapeutic/diagnostic paracentesis on 5/9 or with IR/Dr. Mallory on 5/11 pending supervision  ECHO in am  f/u bnp  calculate SAAG to assess for etiology of ascites - whether hepatic or cardiac  f/u Ct abdomen pelvis   Hold diuretics for now in setting of AAMIR  continue with home dose of midodrine 5 mg tid

## 2020-05-09 NOTE — PHYSICAL THERAPY INITIAL EVALUATION ADULT - ADDITIONAL COMMENTS
Poor historian, confused, AAO x1.5 (himself, May 2020). As per patient, he lives alone in elevator accessible apartment building. Ambulates with RW at baseline.

## 2020-05-09 NOTE — PHYSICAL THERAPY INITIAL EVALUATION ADULT - PERTINENT HX OF CURRENT PROBLEM, REHAB EVAL
Patient is 75 yo M with past medical history of DM, HTN, HLD, BPH (chronic incontinence/retention, s/p palacio on last admission), SERA, CKD4, and a-fib  , mds, pancytopenia, presents from [facility] with complaints of decreased po intake and weakness with labs notable for an AAMIR , admitted to [facility] for further management

## 2020-05-09 NOTE — PROGRESS NOTE ADULT - PROBLEM SELECTOR PLAN 3
Patient with positive UA in setting of chronic Palacio catheter.   Will not treat as it appears to be a colonizer given chronic Palacio catheter   c/w finasteride 5 mg daily and tamsulosin 0.4 mg daily    # Chronic palacio catheter  pt with hx of recently placed cath with last admission in setting of obstructive uropathy   Urology called , will see patient in am  Of note as per last urology note he was advised to continue BPH meds and possible TURP after his UTI clears with plans for  cystoscopy and Urodynamics electively as outpatient   HCP reports patient has been following up with urology however has still not learnt how to self catherize and struggles  f/u urology recs in am Patient with positive UA in setting of chronic Palacio catheter.   Will not treat as it appears to be a colonizer given chronic Palacio catheter, MRSA in urine. Placed on contact  c/w finasteride 5 mg daily and tamsulosin 0.4 mg daily    # Chronic palacio catheter  pt with hx of recently placed cath with last admission in setting of obstructive uropathy   Urology called , will see patient in am  Of note as per last urology note he was advised to continue BPH meds and possible TURP after his UTI clears with plans for  cystoscopy and Urodynamics electively as outpatient   HCP reports patient has been following up with urology however has still not learnt how to self catherize and struggles  f/u urology recs in am

## 2020-05-09 NOTE — PROGRESS NOTE ADULT - PROBLEM SELECTOR PLAN 5
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 pending pill endoscopy ? )  keep active type and screen  f/u am cbc  F/u Dr Roberts recs in am Received 1 unit PRBC on 5/9 AM  keep active type and screen  monitor daily CBC  F/u Dr Mallory recommendations

## 2020-05-09 NOTE — PROGRESS NOTE ADULT - PROBLEM SELECTOR PLAN 6
C/w home dose of mirtazapine 7.5 mg daily and sertraline 50 mg daily C/w home dose of mirtazapine 7.5 mg daily and sertraline 50 mg daily    #Hx of DVT  Hx of RUE DVT recently at 2.5 mg Eliquis bid

## 2020-05-09 NOTE — CONSULT NOTE ADULT - ATTENDING COMMENTS
CCM ATTENDING  ATTESTATION  Patient seen and discussed with House Officer/Resident  Chart and history reviewed  Exam, labs, and radiology as noted above   Assessment and Plans as outlined  Ventilating and oxygenating adequately without increased work of breathing  Hemodynamically with low bp  Aim to maintain MAP >= 65 mmHg, U/O >= 0.5 ml/kg/hr, pulse oximetry OxSat >= 92%   Metabolic demands along with any abnormal blood chemistries, and fluid requirements being addressed    Sedation and/or pain meds as needed to reduce metabolic demand and maintain comfort   GI/DVT prophylaxis  40 minutes  volume resuscitate and increase midodrine

## 2020-05-10 NOTE — PROGRESS NOTE ADULT - SUBJECTIVE AND OBJECTIVE BOX
Patient seen and examined at bedside.   on standing albumin for hypotension with possible tamponade  no uo documented-- has palacio  denies complaints except continued abd distension/discomfort  hiccuping          albumin human 25% IVPB 50 milliLiter(s) every 6 hours  cefTRIAXone   IVPB 2000 milliGRAM(s) every 24 hours  cefTRIAXone   IVPB     finasteride 5 milliGRAM(s) daily  insulin glargine Injectable (LANTUS) 12 Unit(s) at bedtime  insulin lispro (HumaLOG) corrective regimen sliding scale   Before meals and at bedtime  midodrine. 10 milliGRAM(s) three times a day  mirtazapine 7.5 milliGRAM(s) daily  pantoprazole  Injectable 40 milliGRAM(s) every 12 hours  sertraline 50 milliGRAM(s) daily  simvastatin 10 milliGRAM(s) at bedtime      Allergies    No Known Allergies    Intolerances        T(C): , Max: 36.5 (05-10-20 @ 11:37)  T(F): , Max: 97.7 (05-10-20 @ 11:37)  HR: 93 (05-10-20 @ 12:00)  BP: 94/60 (05-10-20 @ 12:00)  BP(mean): --  RR: 18 (05-10-20 @ 11:37)  SpO2: 94% (05-10-20 @ 11:37)  Wt(kg): --          PHYSICAL EXAM:  Constitutional: No acute distress on NC, hiccuping  ENMT: Moist mucous membrane.  No cyanosis.  Neck:  No JVD seen  Respiratory: Clear to auscultation anteriorly   Cardiovascular: S1, S2.  tachy  Gastrointestinal: soft, non-tender, distended  Extremities: Warm.  No lower extremity edema.    Neurological: No focal deficits.  Skin: Warm. Dry.        ACCESS:       LABS:                        9.1    11.15 )-----------( 64       ( 10 May 2020 08:08 )             29.2     05-10    130<L>  |  95<L>  |  98<H>  ----------------------------<  202<H>  4.8   |  21<L>  |  5.03<H>    Ca    7.8<L>      10 May 2020 08:08  Phos  4.7     05-10  Mg     1.5     05-10    TPro  5.3<L>  /  Alb  2.4<L>  /  TBili  0.6  /  DBili  x   /  AST  29  /  ALT  15  /  AlkPhos  84  05-10      PT/INR - ( 10 May 2020 08:08 )   PT: 24.2 sec;   INR: 2.08          PTT - ( 10 May 2020 08:08 )  PTT:37.3 sec    Sodium, Random Urine: 44 mmol/L (05-10 @ 00:25)  Creatinine, Random Urine: 170 mg/dL (05-10 @ 00:25)        RADIOLOGY & ADDITIONAL STUDIES:

## 2020-05-10 NOTE — PROGRESS NOTE ADULT - ASSESSMENT
73 yo M with MDS, pancytopenia, CKD, ascites of unknown origin presents from [facility] with complaints of decreased po intake and weakness likely secondary to uremia/AAMIR from diuresis for treatment of ascites, hospital course notable for UGIB and pericardial effusion with tamponade physiology , as well as ATN, and ascites of unknown etiology, will be moved to ICU for closer monitoring.     Neuro:   Patient is normally AxO x 3, but over the last 12 hours as been more acutely lethargic and encephalopathic likely 2/2 hepatic encephalopathy   - F/u Ammonia level   - Consider starting Lactulose and Rifaximin after for hepatic encephalopathy     #Hydrocephalus on CT: CT Head on admission showed evidence of hydrocephalus. No collateral from prior CT on rate of enlargement.   - Consider Neurosurgery consult    Cardiovascular:     #Hypotension: Patient has been persistently hypotensive for the last 24 hours, likely a combination of intravascular volume depletion from diuresis vs GIB vs pericardial tamponade. Patient was started on albumin 5% and midodrine 10 mg by ICU for blood pressure support.    - Continue with Midodrine 10 mg q 8 hours   - Levophed for BP support     Pericardial Effusion:  Moderate in size and unchanged from earlier in day.  Some evidence of tamponade physiology.    - IR for pericardial window/drain   - Cardiology following, appreciate recs     Pulmonary:     Patient currently saturating well on RA, non-hypoxic   - Monitor Respiratory status   - Patient is at aspiration risk given waxing and waning risk     Gastrointestinal:     UGIB: multiple episodes of melena throughout the day on 5/9.  Pt has baseline anemia from MDS and never dropped significantly below his baseline Hgb.  He is stable at Hgb of 10 after 1 unit pRBC.  Pt noted to have coagulopathy and thrombocytopenia.  - Monitor H&H closely  - Continue Protonix 40 mg IVP BID  - Reverse coagulopathy with transfusion of FFP and PO Vitamin K for IR procedures   - Monitor coags  - If H&H continues to downtrend patient would benefit from administration of desmopressin for uremic bleeding  - Transfuse platelets < 50 if continued bleeding    Ascites:  Patient has had new onset ascites in the last 2 months with unknown etiology, not non-drinker   - Paracentesis   - GI recs for optimization of decompensated cirrhosis  - C/w with Ceftriaxone for SBP prophylaxis in setting of decompensated cirrhosis and UGIB  - F/u workup for non-alcoholic causes of cirrhosis     Renal:     AAMIR on CKD   Patient seems to have baseline kidney function in the 2-3 region, with chronic indwelling palacio. Patient on presentation with Cr. in the 4's and uptrending with minimal urine output. US of Kidneys with evidence of mild right hydronephrosis   - Monitor BUN and Cr and electrolytes   - Renal Following, appreciate recs   - Strict I and O's   - Urine lytes indicate ATN picture     Heme:   - Patient with a history of MDS, and pancytopenia at baseline.     #Anemia:   Likely secondary to anemia from chronic disease and bone marrow suppression.   - S/p 1 unit of PRBC on 5/9 for active melena and Hgb stable   - Monitor CBC and plts   - Transfuse for Hgb <7   - Active type and screen     #Coagulopathy:   - Patient presented with elevated INR to 2.48, with active bleeding.   - S./p 2 units of FFP and vitamin oral K   - Monitor PT/INR   - Correct as needed     :     #Chronic Indwelling Palacio: Patient has a palacio cath due to a possible outlet obstruction, he was to get a cystoscopy and urodynamics outpatient.  - Trend Cr.   - Finasteride and Flomax for BPH held as patient is NPO     ID:   Patient afebrile, with no active sources of infection but is a chronic MRSA colonizer. Currently on ceftriaxone for SBP prophylaxis.   - Monitor for fevers   - C/w with Ceftriaxone for SBP prophylaxis   - F/u Blood Cx     Pysch:     #Depression: On home mirtazapine 7.5 mg daily and Sertraline 10 mg daily.   - Holding in the setting that patient is NPO 75 yo M with MDS, pancytopenia, CKD, ascites of unknown origin presents from [facility] with complaints of decreased po intake and weakness likely secondary to uremia/AAMIR from diuresis for treatment of ascites, hospital course notable for UGIB and pericardial effusion with tamponade physiology , as well as ATN, and ascites of unknown etiology, will be moved to ICU for closer monitoring.     Neuro:   Patient is normally AxO x 3, with episode of hypotension and encephalopathy on 5/10 in early PM and was more acutely lethargic and encephalopathic likely 2/2 hepatic encephalopathy   - F/u Ammonia level   - Consider starting Lactulose and Rifaximin after for hepatic encephalopathy     #Hydrocephalus on CT: CT Head on admission showed evidence of hydrocephalus. No collateral from prior CT on rate of enlargement.   - Consider Neurosurgery consult    Cardiovascular:     #Hypotension: Patient has been persistently hypotensive for the last 24 hours, likely a combination of intravascular volume depletion from diuresis vs GIB vs pericardial tamponade. Patient was started on albumin 5% and midodrine 10 mg by ICU for blood pressure support.    - Continue with Midodrine 10 mg q 8 hours   - Levophed for BP support     Pericardial Effusion:  Moderate in size and unchanged from earlier in day.  Some evidence of tamponade physiology.    - IR for pericardial window/drain   - Cardiology following, appreciate recs     Pulmonary:     Patient currently saturating well on RA, non-hypoxic   - Monitor Respiratory status   - Patient is at aspiration risk given waxing and waning risk     Gastrointestinal:     UGIB: multiple episodes of melena throughout the day on 5/9.  Pt has baseline anemia from MDS and never dropped significantly below his baseline Hgb.  He is stable at Hgb of 10 after 1 unit pRBC.  Pt noted to have coagulopathy and thrombocytopenia.  - Monitor H&H closely  - Continue Protonix 40 mg IVP BID  - Reverse coagulopathy with transfusion of FFP and PO Vitamin K for IR procedures   - Monitor coags  - If H&H continues to downtrend patient would benefit from administration of desmopressin for uremic bleeding  - Transfuse platelets < 50 if continued bleeding    Ascites:  Patient has had new onset ascites in the last 2 months with unknown etiology, not non-drinker   - Paracentesis   - GI recs for optimization of decompensated cirrhosis  - C/w with Ceftriaxone for SBP prophylaxis in setting of decompensated cirrhosis and UGIB  - F/u workup for non-alcoholic causes of cirrhosis     Renal:     AAMIR on CKD   Patient seems to have baseline kidney function in the 2-3 region, with chronic indwelling palacio. Patient on presentation with Cr. in the 4's and uptrending with minimal urine output. US of Kidneys with evidence of mild right hydronephrosis   - Monitor BUN and Cr and electrolytes   - Renal Following, appreciate recs   - Strict I and O's   - Urine lytes indicate ATN picture     Heme:   - Patient with a history of MDS, and pancytopenia at baseline.     #Anemia:   Likely secondary to anemia from chronic disease and bone marrow suppression.   - S/p 1 unit of PRBC on 5/9 for active melena and Hgb stable   - Monitor CBC and plts   - Transfuse for Hgb <7   - Active type and screen     #Coagulopathy:   - Patient presented with elevated INR to 2.48, with active bleeding.   - S./p 2 units of FFP and vitamin oral K   - Monitor PT/INR   - Correct as needed     :     #Chronic Indwelling Palacio: Patient has a palacio cath due to a possible outlet obstruction, he was to get a cystoscopy and urodynamics outpatient.  - Trend Cr.   - Finasteride and Flomax for BPH held as patient is NPO     ID:   Patient afebrile, with no active sources of infection but is a chronic MRSA colonizer. Currently on ceftriaxone for SBP prophylaxis.   - Monitor for fevers   - C/w with Ceftriaxone for SBP prophylaxis   - F/u Blood Cx     Pysch:     #Depression: On home mirtazapine 7.5 mg daily and Sertraline 10 mg daily.   - Holding in the setting that patient is NPO 75 yo M with MDS, pancytopenia, CKD, ascites of unknown origin presents from [facility] with complaints of decreased po intake and weakness likely secondary to uremia/AAMIR from diuresis for treatment of ascites, hospital course notable for UGIB and pericardial effusion with tamponade physiology , as well as ATN, and ascites of unknown etiology, now in ICU s/p paracentesis, pending IR pericardiocentesis, cirrhosis workup    Neuro:   Patient is normally AxO x 3, with episode of hypotension and encephalopathy on 5/10 in early PM and was more acutely lethargic and encephalopathic likely 2/2 hepatic encephalopathy   - F/u Ammonia level   - if ammonia level high, starting Lactulose and Rifaximin after for hepatic encephalopathy     #Hydrocephalus on CT: CT Head on admission showed evidence of hydrocephalus. No collateral from prior CT on rate of enlargement.   - Neurosurgery consult in AM 5/11    Cardiovascular:     #Hypotension: Likely a combination of intravascular volume depletion from diuresis vs GIB vs pericardial tamponade. Patient was started on albumin 5% and midodrine 10 mg by ICU for blood pressure support.    - Continue with Midodrine 10 mg q 8 hours   - dc albumin  - Levophed for BP support   - central line placed with cvp monitor for assess for tamponade    Pericardial Effusion:  Moderate in size and unchanged from earlier in day.  Some evidence of tamponade physiology.    - IR for pericardial window/drain   - Cardiology following, appreciate recs     Pulmonary:     Patient currently saturating well on RA, non-hypoxic   - Monitor Respiratory status   - Patient is at aspiration risk given waxing and waning risk     Gastrointestinal:     UGIB: multiple episodes of melena throughout the day on 5/9.  Pt has baseline anemia from MDS and never dropped significantly below his baseline Hgb.  He is stable at Hgb of 10 after 1 unit pRBC.  Pt noted to have coagulopathy and thrombocytopenia.  - Monitor H&H closely  - Continue Protonix 40 mg IVP BID  - Reverse coagulopathy with transfusion of FFP and PO Vitamin K for IR procedures   - Monitor coags  - If H&H continues to downtrend patient would benefit from administration of desmopressin for uremic bleeding  - Transfuse platelets < 50 if continued bleeding    Ascites:  Patient has had new onset ascites in the last 2 months with unknown etiology, not non-drinker   - Paracentesis removed 2L on 5/10; f/u fluid studies  - GI recs for optimization of decompensated cirrhosis, f/u cirrhosis labs  - was on Ceftriaxone for SBP prophylaxis in setting of decompensated cirrhosis and UGIB; now on zosyn  - F/u workup for non-alcoholic causes of cirrhosis     Renal:     AAMIR on CKD   Patient seems to have baseline kidney function in the 2-3 region, with chronic indwelling palacio. Patient on presentation with Cr. in the 4's and uptrending with minimal urine output. US of Kidneys with evidence of mild right hydronephrosis   - Monitor BUN and Cr and electrolytes   - Renal Following, appreciate recs   - Strict I and O's   - Urine lytes indicate ATN picture   - repeat renal u/s    Heme:   - Patient with a history of MDS, and pancytopenia at baseline.     #Anemia:   Likely secondary to anemia from chronic disease and bone marrow suppression.   - S/p 1 unit of PRBC on 5/9 for active melena and Hgb stable   - Monitor CBC and plts   - Transfuse for Hgb <7   - Active type and screen     #Coagulopathy:   - Patient presented with elevated INR to 2.48, with active bleeding.   - S./p 2 units of FFP and vitamin oral K   - vitamin K 5/10 PM to prep for IR procedure, 3FFP and 1unit plateletse at 5am as well  - Monitor PT/INR   - Correct as needed     #DVT - RLE  - repeat dopplers, if still positive pt may need ivc filter  - eliquis held in setting of melanax3    :     #Chronic Indwelling Palacio: Patient has a palacio cath due to a possible outlet obstruction, he was to get a cystoscopy and urodynamics outpatient.  - Trend Cr.   - Finasteride and Flomax for BPH held as patient is NPO     ID:   Patient afebrile, with no active sources of infection but is a chronic MRSA colonizer. Currently on ceftriaxone for SBP prophylaxis.   - Monitor for fevers   - dc Ceftriaxone, on zosyn  - F/u Blood Cx     Pysch:     #Depression: On home mirtazapine 7.5 mg daily and Sertraline 10 mg daily.   - Holding while NPO

## 2020-05-10 NOTE — CONSULT NOTE ADULT - ASSESSMENT
74 year old male, w/ a PMHx of AFib (on home Eliquis), CKD4, BPH (chronic indwelling palacio placement on last admission), DM, HTN, HLD, SERA, MDS, and pancytopenia, p/w FTT (decreased PO intake and weakness); Cardiology was consulted for eligibility of intervention on a medium sized pericardial effusion.    ·	Pericardial Effusion  Pericardial effusion on TTE w/ partial echocardiographic evidence of cardiac tamponade physiology (new as compared to 2018); along w/ episode of hypotension (BP 76/46, HR 92, RR 18, SpO2 97% on 2L NC, T 97.4). Hypotension resolved s/p Albumin 5% 500cc bolus (/58). Case d/w Interventional Cardiologist on call who found no suitable subcostal pocket for acute intervention and, given HD stability at the time of interaction, no acute intervention indicated.   - if acute HD decompensation (BP decrease or HR increase) pursue IR drainage of effusion  - continue volume resuscitation PRN (now w/ improved BP s/p 500cc bolus Albumin 5%)  - close vital sign monitoring  - consider paracentesis for symptomatic relief and fluid studies (cytology, cell counts, SAAG, etc) to possibly elucidate cause of effusions    Case to be d/w attending physician in AM. 74 year old male, w/ a PMHx of AFib (on home Eliquis), CKD4, BPH (chronic indwelling palacio placement on last admission), DM, HTN, HLD, SERA, MDS, and pancytopenia, p/w FTT (decreased PO intake and weakness); Cardiology was consulted for eligibility of intervention on a medium sized pericardial effusion.    ·	Pericardial Effusion  Pericardial effusion on TTE w/ partial echocardiographic evidence of cardiac tamponade physiology (new as compared to 2018); along w/ episode of hypotension (BP 76/46, HR 92, RR 18, SpO2 97% on 2L NC, T 97.4). Hypotension resolved s/p Albumin 5% 500cc bolus (/58). Case d/w Interventional Cardiologist on call who found no suitable subcostal pocket for acute intervention and, given HD stability at the time of interaction, no acute intervention indicated.   - if acute HD decompensation (BP decrease or HR increase) pursue IR drainage of effusion  - continue volume resuscitation PRN (now w/ improved BP s/p 500cc bolus Albumin 5%)  - close hemodynamic monitoring, and notify Cardiology of changes in status  - consider transfer to telemetry-equipped unit for closer vital sign monitoring  - consider paracentesis for symptomatic relief and fluid studies (cytology, cell counts, SAAG, etc) to possibly elucidate cause of effusions    Case to be d/w attending physician in AM. 74 year old male, w/ a PMHx of AFib (on home Eliquis), CKD4, BPH (chronic indwelling palacio placement on last admission), DM, HTN, HLD, SERA, MDS, and pancytopenia, p/w FTT (decreased PO intake and weakness); Cardiology was consulted for eligibility of intervention on a medium sized pericardial effusion.    ·	Pericardial Effusion  Pericardial effusion on TTE w/ partial echocardiographic evidence of cardiac tamponade physiology (new as compared to 2018); along w/ episode of hypotension (BP 76/46, HR 92, RR 18, SpO2 97% on 2L NC, T 97.4). Hypotension resolved s/p Albumin 5% 500cc bolus (/58). Case d/w Interventional Cardiologist on call who found no suitable subcostal pocket for acute intervention and, given HD stability at the time of interaction, no acute intervention indicated.   - if acute HD decompensation (BP decrease or HR increase) pursue IR drainage of effusion  - continue volume resuscitation PRN (now w/ improved BP s/p 500cc bolus Albumin 5%)  - close hemodynamic monitoring, and notify Cardiology of changes in status  - consider transfer to telemetry-equipped unit for closer vital sign monitoring  - consider paracentesis for symptomatic relief and fluid studies (cytology, cell counts, SAAG, etc) to possibly elucidate cause of effusions    Case to be d/w attending physician.

## 2020-05-10 NOTE — PROGRESS NOTE ADULT - ASSESSMENT
AAMIR on CKD 4--may be oliguric-- suspect may have ATN -- urine na argues against prerenal but possible hypoperfusion jimmy with low BP and possible  tamponade  abd distension/ascites  hypoalbuminemia - not due to nephrotic syndrome as only low grade proteinuria --? liver dz? r/o malignancy  agre with CT C/A/P ASAP (w/o IV contrast)  agree with continue albumin   follow uo, labs  cardiology f/u  consider ICU if no improvement  d/w pt might consider dialysis if renal fxn doesnt improve-- no acute need yet

## 2020-05-10 NOTE — CONSULT NOTE ADULT - CONSULT REASON
Pericardial Effusion Pericardial Effusion: Cardiology consult to determine eligibility for intervention

## 2020-05-10 NOTE — CONSULT NOTE ADULT - SUBJECTIVE AND OBJECTIVE BOX
INCOMPLETE    HPI:  Patient is 75 yo M with past medical history of DM, HTN, HLD, BPH (chronic indwelling palacio placement on last admission, SERA, CKD4, and a-fib  , mds, pancytopenia, presents from [facility] with complaints of decreased po intake and weakness. Patient reports that he recently had a intense bowel regimen for constipation after which he developed loose stools. To prevent further loose stools he stopped eating . Decreased po intake also in setting of poor appetite. Patient also complains for worsening LE and abdominal swelling. As per collateral obtained from Dr Barksdale at [facility]([id number]) patient abdominal usg notable for massive ascites, and increased liver echogenicity(LFTS outpatient wnl )   and he was aggressively diuresed with  Lasix , torsemide 40 mg bid (of note also started on midodrine as his pressures would drop with iv diuresis ). After diuresis his ascites and LE edema improved however he developed worsening AAMIR and so Lasix was stopped and torsemide decreased to 20 mg bid. Of note he was seen by Cardiology and  echo done, concern fluid overload was cardiac in etiology given elevated bnp and echo findings.  As per collateral patient also on Levaquin 1 week ago for intersitial infiltrates on cxr , s/p 7 day course.   Upon arrival to ED vitals bp 92/52, hr 94, rr 16, satting well ra.  Labs notable for anemia, thrombocytopenia and  worsening AAMIR. CT abdomen pelvis pending  ed management : supra pubic cath taken out, with plans to place new one, 1L NS, 1G ceftriaxone  Pt admitted to [facility] for further management (08 May 2020 16:10)      ROS: A 10-point review of systems was otherwise negative.    PAST MEDICAL & SURGICAL HISTORY:  History of pancytopenia  H/O hydrocephalus  Anemia  HLD (hyperlipidemia)  Enlarged prostate  DM (diabetes mellitus)  HTN (hypertension)  H/O prior ablation treatment      SOCIAL HISTORY:  FAMILY HISTORY:  FH: stomach cancer  FHx: breast cancer  FHx: stroke      ALLERGIES: 	  No Known Allergies            MEDICATIONS:  albumin human 25% IVPB 50 milliLiter(s) IV Intermittent every 6 hours  cefTRIAXone   IVPB 2000 milliGRAM(s) IV Intermittent every 24 hours  cefTRIAXone   IVPB      finasteride 5 milliGRAM(s) Oral daily  insulin glargine Injectable (LANTUS) 12 Unit(s) SubCutaneous at bedtime  insulin lispro (HumaLOG) corrective regimen sliding scale   SubCutaneous Before meals and at bedtime  midodrine. 5 milliGRAM(s) Oral once  midodrine. 10 milliGRAM(s) Oral three times a day  mirtazapine 7.5 milliGRAM(s) Oral daily  pantoprazole  Injectable 40 milliGRAM(s) IV Push every 12 hours  sertraline 50 milliGRAM(s) Oral daily  simvastatin 10 milliGRAM(s) Oral at bedtime      PHYSICAL EXAM:  T(C): 36.1 (05-09-20 @ 23:38), Max: 37.1 (05-09-20 @ 00:54)  HR: 96 (05-09-20 @ 23:38) (81 - 97)  BP: 116/58 (05-09-20 @ 23:38) (76/46 - 116/58)  RR: 20 (05-09-20 @ 23:38) (18 - 20)  SpO2: 98% (05-09-20 @ 23:38) (90% - 99%)  Wt(kg): --    GEN: Awake, comfortable. NAD.   HEENT: NCAT, PERRL, EOMI. Mucosa moist. No JVD.   RESP: CTA b/l  CV: RRR, normal s1/s2. No m/r/g.  ABD: Soft, NTND. BS+  EXT: Warm. No edema, clubbing, or cyanosis.   NEURO: AAOx3. No focal deficits.    I&O's Summary      	  LABS:	 	    CARDIAC MARKERS:                                  10.0   12.36 )-----------( 78       ( 09 May 2020 22:29 )             31.8     05-09    131<L>  |  96  |  92<H>  ----------------------------<  222<H>  5.0   |  19<L>  |  4.75<H>    Ca    8.0<L>      09 May 2020 22:29  Mg     1.6     05-09    TPro  5.8<L>  /  Alb  1.9<L>  /  TBili  0.6  /  DBili  x   /  AST  50<H>  /  ALT  20  /  AlkPhos  103  05-09    proBNP:   Lipid Profile:   HgA1c:   TSH:     TELEMETRY: 	    ECG:  	  RADIOLOGY:   ECHO:  STRESS:  CATH: HPI:  74 year old male, w/ a PMHx of AFib (on home Eliquis), CKD4, BPH (chronic indwelling palacio placement on last admission), DM, HTN, HLD, SERA, MDS, and pancytopenia, p/w FTT (decreased PO intake and weakness). Patient also s/o progressively worsening b/l LE and abdominal swelling. As per collateral obtained from Dr Barksdale at [facility]([id number]) patient abdominal usg notable for massive ascites, and increased liver echogenicity (LFTS outpatient WNL) and he was aggressively diuresed with Furosemide, and Torsemide (of note also started on midodrine as his BPs would decrease with IV diuresis ). After diuresis his ascites and LE edema improved however he developed worsening renal function and so Lasix was stopped and Torsemide decreased. Cardiology was consulted for eligibility of intervention on a medium sized pericardial effusion on TTE w/ partial echocardiographic evidence of cardiac tamponade physiology (new as compared to 2018), along w/ episode of hypotension (BP 76/46, HR 92, RR 18, SpO2 97% on 2L NC, T 97.4).     ROS: A 10-point review of systems was otherwise negative.    PAST MEDICAL & SURGICAL HISTORY:  History of pancytopenia  H/O hydrocephalus  Anemia  HLD (hyperlipidemia)  Enlarged prostate  DM (diabetes mellitus)  HTN (hypertension)  H/O prior ablation treatment    SOCIAL HISTORY:  FAMILY HISTORY:  FH: stomach cancer  FHx: breast cancer  FHx: stroke    ALLERGIES: 	  No Known Allergies    Home Medications:  calcium carbonate 500 mg (200 mg elemental calcium) oral tablet, chewable: 1 tab(s) orally once (08 May 2020 17:45)  dronabinol 2.5 mg oral capsule: 1 cap(s) orally 2 times a day (before meals) (08 May 2020 17:45)  Eliquis 2.5 mg oral tablet: 1 tab(s) orally 2 times a day (08 May 2020 17:45)  insulin glargine: 15 unit(s) intramuscular once a day (at bedtime) (08 May 2020 17:45)  mirtazapine 7.5 mg oral tablet: 1 tab(s) orally once a day (08 May 2020 17:45)  tamsulosin 0.4 mg oral capsule: 1 cap(s) orally once a day (at bedtime) (08 May 2020 17:45)  torsemide 20 mg oral tablet: 1 tab(s) orally 2 times a day (08 May 2020 17:45)    MEDICATIONS:  albumin human 25% IVPB 50 milliLiter(s) IV Intermittent every 6 hours  cefTRIAXone   IVPB 2000 milliGRAM(s) IV Intermittent every 24 hours  cefTRIAXone   IVPB      finasteride 5 milliGRAM(s) Oral daily  insulin glargine Injectable (LANTUS) 12 Unit(s) SubCutaneous at bedtime  insulin lispro (HumaLOG) corrective regimen sliding scale   SubCutaneous Before meals and at bedtime  midodrine. 5 milliGRAM(s) Oral once  midodrine. 10 milliGRAM(s) Oral three times a day  mirtazapine 7.5 milliGRAM(s) Oral daily  pantoprazole  Injectable 40 milliGRAM(s) IV Push every 12 hours  sertraline 50 milliGRAM(s) Oral daily  simvastatin 10 milliGRAM(s) Oral at bedtime    PHYSICAL EXAM:  T(C): 36.1 (05-09-20 @ 23:38), Max: 37.1 (05-09-20 @ 00:54)  HR: 96 (05-09-20 @ 23:38) (81 - 97)  BP: 116/58 (05-09-20 @ 23:38) (76/46 - 116/58)  RR: 20 (05-09-20 @ 23:38) (18 - 20)  SpO2: 98% (05-09-20 @ 23:38) (90% - 99%)  Wt(kg): --    GEN: Uncomfortable appearing (patient stating he has abdominal pain)   HEENT: No JVD.   RESP: diminished breath sounds on R  CV: RRR, normal s1/s2. No m/r/g. (not muffled)  ABD: Distended and TTP.  EXT: 1+ b/l non-pitting LE edema.   NEURO: No focal deficits.    I&O's Summary    LABS:	 	                       10.0   12.36 )-----------( 78       ( 09 May 2020 22:29 )             31.8     05-09    131<L>  |  96  |  92<H>  ----------------------------<  222<H>  5.0   |  19<L>  |  4.75<H>    Ca    8.0<L>      09 May 2020 22:29  Mg     1.6     05-09    TPro  5.8<L>  /  Alb  1.9<L>  /  TBili  0.6  /  DBili  x   /  AST  50<H>  /  ALT  20  /  AlkPhos  103  05-09    ECHO:  < from: TTE Echo Complete w/o contrast w/ Doppler (05.09.20 @ 14:30) >  CONCLUSIONS:   1. Normal left and right ventricular size and systolic function.   2. Aortic sclerosis without significant stenosis.   3. Trace aortic regurgitation.   4. Pulmonary hypertension present, pulmonary artery systolic pressure is 35 mmHg.   5. Medium sized pericardial effusion with partial echocardiographic evidence of cardiac tamponade physiology.   6. Results communicated to primary care team.   7. Compared to the previous TTE performed on 4/11/2018, pericardial effusion and ascites are now present.  < end of copied text >    Bedside TTE perfomed by cardiology Fellow on call w/o interval change in pericardial effusion size or any worsened chamber collapse.

## 2020-05-10 NOTE — PROGRESS NOTE ADULT - ASSESSMENT
75 yo M with MDS, pancytopenia, CKD, ascites of unknown origin presents from [facility] with complaints of decreased po intake and weakness likely secondary to uremia/AAMIR from diuresis for treatment of ascites, hospital course notable for UGIB and pericardial effusion with tamponade physiology , as well as ATN, and ascites of unknown etiology, will be moved to ICU for closer monitoring.     Neuro:   Patient is normally AxO x 3, but over the last 12 hours as been more acutely lethargic and encephalopathic likely 2/2 hepatic encephalopathy   - F/u Ammonia level   - Consider starting Lactulose and Rifaximin after for hepatic encephalopathy     Cardiovascular:     #Hypotension: Patient has been persistently hypotensive for the last 24 hours, likely a combination of intravascular volume depletion from diuresis vs GIB vs pericardial tamponade. Patient was started on albumin 5% and midodrine 10 mg by ICU for blood pressure support.    - Continue with Midodrine 10 mg q 8 hours   - Levophed for BP support     Pericardial Effusion:  Moderate in size and unchanged from earlier in day.  Some evidence of tamponade physiology.    - IR for pericardial window/drain   - Cardiology following, appreciate recs     Pulmonary:     Patient currently saturating well on RA, non-hypoxic   - Monitor Respiratory status   - Patient is at aspiration risk given waxing and waning risk     Gastrointestinal:     UGIB: multiple episodes of melena throughout the day on 5/9.  Pt has baseline anemia from MDS and never dropped significantly below his baseline Hgb.  He is stable at Hgb of 10 after 1 unit pRBC.  Pt noted to have coagulopathy and thrombocytopenia.  - Monitor H&H closely  - Continue Protonix 40 mg IVP BID  - Reverse coagulopathy with transfusion of FFP and PO Vitamin K for IR procedures   - Monitor coags  - If H&H continues to downtrend patient would benefit from administration of desmopressin for uremic bleeding  - Transfuse platelets < 50 if continued bleeding    Ascites:  Patient has had new onset ascites in the last 2 months with unknown etiology, not non-drinker   - Paracentesis   - GI recs for optimization of decompensated cirrhosis  - C/w with Ceftriaxone for SBP prophylaxis in setting of decompensated cirrhosis and UGIB    Renal:     AAMIR on CKD   Patient seems to have baseline kidney function in the 2-3 region, with chronic indwelling palacio. Patient on presentation with Cr. in the 4's and uptrending with minimal urine output. US of Kidneys with evidence of mild right hydronephrosis   - Monitor BUN and Cr and electrolytes   - Renal Following, appreciate recs   - Strict I and O's   - Urine lytes indicate ATN picture     Heme:   - Patient with a history of MDS, and pancytopenia at baseline.     Anemia:   Likely secondary to anemia from chronic disease and bone marrow suppression.   - S/p 1 unit of PRBC on 5/9 for active melena and Hgb stable   - Monitor CBC and plts   - Transfuse for Hgb <7   - Active type and screen     Coagulopathy:   - Patient presented with elevated INR to 2.48, with active bleeding.   - S./p 2 units of FFP and vitamin oral K   - Monitor PT/INR   - Correct as needed 73 yo M with MDS, pancytopenia, CKD, ascites of unknown origin presents from [facility] with complaints of decreased po intake and weakness likely secondary to uremia/AAMIR from diuresis for treatment of ascites, hospital course notable for UGIB and pericardial effusion with tamponade physiology , as well as ATN, and ascites of unknown etiology, will be moved to ICU for closer monitoring.     Neuro:   Patient is normally AxO x 3, but over the last 12 hours as been more acutely lethargic and encephalopathic likely 2/2 hepatic encephalopathy   - F/u Ammonia level   - Consider starting Lactulose and Rifaximin after for hepatic encephalopathy     #Hydrocephalus on CT: CT Head on admission showed evidence of hydrocephalus. No collateral from prior CT on rate of enlargement.   - Consider Neurosurgery consult    Cardiovascular:     #Hypotension: Patient has been persistently hypotensive for the last 24 hours, likely a combination of intravascular volume depletion from diuresis vs GIB vs pericardial tamponade. Patient was started on albumin 5% and midodrine 10 mg by ICU for blood pressure support.    - Continue with Midodrine 10 mg q 8 hours   - Levophed for BP support     Pericardial Effusion:  Moderate in size and unchanged from earlier in day.  Some evidence of tamponade physiology.    - IR for pericardial window/drain   - Cardiology following, appreciate recs     Pulmonary:     Patient currently saturating well on RA, non-hypoxic   - Monitor Respiratory status   - Patient is at aspiration risk given waxing and waning risk     Gastrointestinal:     UGIB: multiple episodes of melena throughout the day on 5/9.  Pt has baseline anemia from MDS and never dropped significantly below his baseline Hgb.  He is stable at Hgb of 10 after 1 unit pRBC.  Pt noted to have coagulopathy and thrombocytopenia.  - Monitor H&H closely  - Continue Protonix 40 mg IVP BID  - Reverse coagulopathy with transfusion of FFP and PO Vitamin K for IR procedures   - Monitor coags  - If H&H continues to downtrend patient would benefit from administration of desmopressin for uremic bleeding  - Transfuse platelets < 50 if continued bleeding    Ascites:  Patient has had new onset ascites in the last 2 months with unknown etiology, not non-drinker   - Paracentesis   - GI recs for optimization of decompensated cirrhosis  - C/w with Ceftriaxone for SBP prophylaxis in setting of decompensated cirrhosis and UGIB  - F/u workup for non-alcoholic causes of cirrhosis     Renal:     AAMIR on CKD   Patient seems to have baseline kidney function in the 2-3 region, with chronic indwelling palacio. Patient on presentation with Cr. in the 4's and uptrending with minimal urine output. US of Kidneys with evidence of mild right hydronephrosis   - Monitor BUN and Cr and electrolytes   - Renal Following, appreciate recs   - Strict I and O's   - Urine lytes indicate ATN picture     Heme:   - Patient with a history of MDS, and pancytopenia at baseline.     #Anemia:   Likely secondary to anemia from chronic disease and bone marrow suppression.   - S/p 1 unit of PRBC on 5/9 for active melena and Hgb stable   - Monitor CBC and plts   - Transfuse for Hgb <7   - Active type and screen     #Coagulopathy:   - Patient presented with elevated INR to 2.48, with active bleeding.   - S./p 2 units of FFP and vitamin oral K   - Monitor PT/INR   - Correct as needed     :     #Chronic Indwelling Palacio: Patient has a palacio cath due to a possible outlet obstruction, he was to get a cystoscopy and urodynamics outpatient.  - Trend Cr.   - Finasteride and Flomax for BPH held as patient is NPO     ID:   Patient afebrile, with no active sources of infection but is a chronic MRSA colonizer. Currently on ceftriaxone for SBP prophylaxis.   - Monitor for fevers   - C/w with Ceftriaxone for SBP prophylaxis   - F/u Blood Cx     Pysch:     #Depression: On home mirtazapine 7.5 mg daily and Sertraline 10 mg daily.   - Holding in the setting that patient is NPO

## 2020-05-10 NOTE — PROGRESS NOTE ADULT - SUBJECTIVE AND OBJECTIVE BOX
Patient is a 74y old  Male who presents with a chief complaint of Weakness (09 May 2020 11:16)        INTERVAL HPI/OVERNIGHT EVENTS: called back to se patient with borserline bp    SYMPTOMS lethargic but respond to voice trying to answer questions    DRIPS benjamin        ICU Vital Signs Last 24 Hrs  T(C): 36.5 (10 May 2020 11:37), Max: 36.5 (10 May 2020 11:37)  T(F): 97.7 (10 May 2020 11:37), Max: 97.7 (10 May 2020 11:37)  HR: 85 (10 May 2020 18:00) (85 - 96)  BP: 90/46 (10 May 2020 18:00) (76/46 - 116/58)  BP(mean): 57 (10 May 2020 18:00) (57 - 62)  ABP: --  ABP(mean): --  RR: 20 (10 May 2020 18:00) (18 - 22)  SpO2: 96% (10 May 2020 18:00) (94% - 98%)      I&O's Summary    10 May 2020 07:01  -  10 May 2020 18:34  --------------------------------------------------------  IN: 106.7 mL / OUT: 2000 mL / NET: -1893.3 mL        EXAM    Chest few ronchi    Heart rr    Abdomen soft nontedner with ascites    Extremities edema    Neuro lethargic      LABS:                            9.1    11.15 )-----------( 64       ( 10 May 2020 08:08 )             29.2     05-10    130<L>  |  95<L>  |  98<H>  ----------------------------<  202<H>  4.8   |  21<L>  |  5.03<H>    Ca    7.8<L>      10 May 2020 08:08  Phos  4.7     05-10  Mg     1.5     05-10    TPro  5.3<L>  /  Alb  2.4<L>  /  TBili  0.6  /  DBili  x   /  AST  29  /  ALT  15  /  AlkPhos  84  05-10    PT/INR - ( 10 May 2020 08:08 )   PT: 24.2 sec;   INR: 2.08          PTT - ( 10 May 2020 08:08 )  PTT:37.3 sec    Lactate, Blood: 2.4 mmol/L (05-10 @ 12:13)  Lactate, Blood: 2.2 mmol/L (05-09 @ 22:29)        RADIOLOGY & ADDITIONAL STUDIES:    CRITICAL CARE TIME SPENT: 40 min

## 2020-05-10 NOTE — PROGRESS NOTE ADULT - ASSESSMENT
A -cirrhosis/ liver failure/pericardial effusion/ acute renal failure    Suggest:  transfer to ICU  diagnotic parcentesis  levo for bp, continue albumin though urine indice not hepatorenal  will need pericardiocentesis  cultures  cxr  amonia level, rifaxmim if elevated  lab for etioloy of cirrhosis  repeat lactate  renal fu  intubation if ms deteriorates    prognosis poor, proxy aware

## 2020-05-10 NOTE — PROGRESS NOTE ADULT - SUBJECTIVE AND OBJECTIVE BOX
Hospital Course from F to Non-COVID ICU:     Patient is 75 yo M with past medical history of DM, HTN, HLD, BPH (chronic indwelling palacio placement on last admission, SERA, CKD4, and a-fib  , mds, pancytopenia, presents from [facility] with complaints of decreased po intake and weakness. Patient reports that he recently had a intense bowel regimen for constipation after which he developed loose stools. To prevent further loose stools he stopped eating . Decreased po intake also in setting of poor appetite. Patient also complains for worsening LE and abdominal swelling. As per collateral obtained from Dr Barksdale at [facility]([id number]) patient abdominal usg notable for massive ascites, and increased liver echogenicity(LFTS outpatient wnl )   and he was aggressively diuresed with  Lasix , torsemide 40 mg bid (of note also started on midodrine as his pressures would drop with iv diuresis ). After diuresis his ascites and LE edema improved however he developed worsening AAMIR and so Lasix was stopped and torsemide decreased to 20 mg bid. Of note he was seen by Cardiology and  echo done, concern fluid overload was cardiac in etiology given elevated bnp and echo findings.  On 5/9, ICU was consulted after pt found to have melena and downtrending H&H, transfused 1L PRBC with appropriate/exaggerated response.  TTE performed 5/8 showed moderate pericardial effusion with evidence of tamponade physiology and worsening hypotension. Given his worsening coagulopathy and active melena, he was given 2 units of FFP, and vitamin oral k to help reverse INR with some improvement. He was started on Albumin 25% q 6 hours and Midodrine 10 mg and said stable for the floors. On 5/10, he had persistently worsening hypotension, and no urine output for the last 12 hours, his mental status became more lethargic and encephalopathy, ICU was consulted, and will be stepped up for closer monitoring and BP support.       Vital Signs Last 12 Hrs  T(F): 96.5 (05-10-20 @ 18:47), Max: 97.7 (05-10-20 @ 11:37)  HR: 91 (05-10-20 @ 19:15) (85 - 96)  BP: 105/51 (05-10-20 @ 19:15) (76/52 - 132/49)  BP(mean): 64 (05-10-20 @ 19:15) (57 - 71)  RR: 20 (05-10-20 @ 19:15) (18 - 23)  SpO2: 97% (05-10-20 @ 19:15) (94% - 98%)  I&O's Summary    10 May 2020 07:01  -  10 May 2020 20:22  --------------------------------------------------------  IN: 130.9 mL / OUT: 2000 mL / NET: -1869.1 mL        PHYSICAL EXAM:  General: NAD, grumpy  HEENT: NC/AT; PERRL, clear conjunctiva  Neck: supple, no JVD,   Cardiovascular: +S1/S2; RRR, no M/R/G  Respiratory: diminished breath sounds at the bases, non-tachypneic, no respiratory distress   Gastrointestinal: soft, NT, distended but not tense  Extremities: WWP; 2+ peripheral pulses; no edema   Neurological: AAOx3; conversational, very annoyed and disagreeable but may be baseline personality. Participates fully with interview and able to follow commands    LABS:                        9.1    11.15 )-----------( 64       ( 10 May 2020 08:08 )             29.2     05-10    130<L>  |  95<L>  |  98<H>  ----------------------------<  202<H>  4.8   |  21<L>  |  5.03<H>    Ca    7.8<L>      10 May 2020 08:08  Phos  4.7     05-10  Mg     1.5     05-10    TPro  5.3<L>  /  Alb  2.4<L>  /  TBili  0.6  /  DBili  x   /  AST  29  /  ALT  15  /  AlkPhos  84  05-10    PT/INR - ( 10 May 2020 08:08 )   PT: 24.2 sec;   INR: 2.08          PTT - ( 10 May 2020 08:08 )  PTT:37.3 sec      RADIOLOGY & ADDITIONAL TESTS:    MEDICATIONS  (STANDING):  albumin human 25% IVPB 50 milliLiter(s) IV Intermittent every 6 hours  finasteride 5 milliGRAM(s) Oral daily  insulin glargine Injectable (LANTUS) 8 Unit(s) SubCutaneous at bedtime  insulin lispro (HumaLOG) corrective regimen sliding scale   SubCutaneous Before meals and at bedtime  lactulose Syrup 20 Gram(s) Oral every 8 hours  midodrine. 10 milliGRAM(s) Oral three times a day  mirtazapine 7.5 milliGRAM(s) Oral daily  pantoprazole  Injectable 40 milliGRAM(s) IV Push every 12 hours  piperacillin/tazobactam IVPB.. 4.5 Gram(s) IV Intermittent every 12 hours  sertraline 50 milliGRAM(s) Oral daily  vasopressin Infusion 0.04 Unit(s)/Min (2.4 mL/Hr) IV Continuous <Continuous>    MEDICATIONS  (PRN):

## 2020-05-10 NOTE — PROGRESS NOTE ADULT - SUBJECTIVE AND OBJECTIVE BOX
Hospital Course from [facility] to Non-COVID ICU:     Patient is 75 yo M with past medical history of DM, HTN, HLD, BPH (chronic indwelling palacio placement on last admission, SERA, CKD4, and a-fib  , mds, pancytopenia, presents from [facility] with complaints of decreased po intake and weakness. Patient reports that he recently had a intense bowel regimen for constipation after which he developed loose stools. To prevent further loose stools he stopped eating . Decreased po intake also in setting of poor appetite. Patient also complains for worsening LE and abdominal swelling. As per collateral obtained from Dr Barksdale at [facility]([id number]) patient abdominal usg notable for massive ascites, and increased liver echogenicity(LFTS outpatient wnl )   and he was aggressively diuresed with  Lasix , torsemide 40 mg bid (of note also started on midodrine as his pressures would drop with iv diuresis ). After diuresis his ascites and LE edema improved however he developed worsening AAMIR and so Lasix was stopped and torsemide decreased to 20 mg bid. Of note he was seen by Cardiology and  echo done, concern fluid overload was cardiac in etiology given elevated bnp and echo findings.  On 5/9, ICU was consulted after pt found to have melena and downtrending H&H, transfused 1L PRBC with appropriate/exaggerated response.  TTE performed 5/8 showed moderate pericardial effusion with evidence of tamponade physiology and worsening hypotension. Given his worsening coagulopathy and active melena, he was given 2 units of FFP, and vitamin oral k to help reverse INR with some improvement. He was started on Albumin 25% q 6 hours and Midodrine 10 mg and said stable for the floors. On 5/10, he had persistently worsening hypotension, and no urine output for the last 12 hours, his mental status became more lethargic and encephalopathy, ICU was consulted, and will be stepped up for closer monitoring and BP support.     SUBJECTIVE / INTERVAL HPI: Patient seen and examined at bedside.     VITAL SIGNS:  Vital Signs Last 24 Hrs  T(C): 36.5 (10 May 2020 11:37), Max: 36.5 (10 May 2020 11:37)  T(F): 97.7 (10 May 2020 11:37), Max: 97.7 (10 May 2020 11:37)  HR: 91 (10 May 2020 14:08) (87 - 97)  BP: 95/58 (10 May 2020 14:08) (76/46 - 116/58)  BP(mean): --  RR: 19 (10 May 2020 14:08) (18 - 20)  SpO2: 96% (10 May 2020 14:08) (90% - 98%)    REVIEW OF SYSTEMS:  Non-participative, lethargic     PHYSICAL EXAM:  General: NAD   HEENT: NC/AT; PERRL, clear conjunctiva  Neck: supple, no JVD,   Cardiovascular: +S1/S2; RRR, no M/R/G  Respiratory: diminished breath sounds at the bases, non-tachypneic, no respiratory distress   Gastrointestinal: soft, NT, distended but not tense  Extremities: WWP; 2+ peripheral pulses; no edema   Neurological: AAOx 1-2: waxing and waning mental status, now more lethargic, unable to participate in exam, and answer some questions, would intermittently follow commands.     MEDICATIONS:  MEDICATIONS  (STANDING):  cefTRIAXone   IVPB 2000 milliGRAM(s) IV Intermittent every 24 hours  cefTRIAXone   IVPB      finasteride 5 milliGRAM(s) Oral daily  insulin glargine Injectable (LANTUS) 8 Unit(s) SubCutaneous at bedtime  insulin lispro (HumaLOG) corrective regimen sliding scale   SubCutaneous Before meals and at bedtime  midodrine. 10 milliGRAM(s) Oral three times a day  mirtazapine 7.5 milliGRAM(s) Oral daily  pantoprazole  Injectable 40 milliGRAM(s) IV Push every 12 hours  sertraline 50 milliGRAM(s) Oral daily    MEDICATIONS  (PRN):      ALLERGIES:  Allergies    No Known Allergies    Intolerances        LABS:                        9.1    11.15 )-----------( 64       ( 10 May 2020 08:08 )             29.2     05-10    130<L>  |  95<L>  |  98<H>  ----------------------------<  202<H>  4.8   |  21<L>  |  5.03<H>    Ca    7.8<L>      10 May 2020 08:08  Phos  4.7     05-10  Mg     1.5     05-10    TPro  5.3<L>  /  Alb  2.4<L>  /  TBili  0.6  /  DBili  x   /  AST  29  /  ALT  15  /  AlkPhos  84  05-10    PT/INR - ( 10 May 2020 08:08 )   PT: 24.2 sec;   INR: 2.08          PTT - ( 10 May 2020 08:08 )  PTT:37.3 sec    CAPILLARY BLOOD GLUCOSE      POCT Blood Glucose.: 181 mg/dL (10 May 2020 12:34)      RADIOLOGY & ADDITIONAL TESTS: Reviewed.

## 2020-05-11 NOTE — PROGRESS NOTE ADULT - SUBJECTIVE AND OBJECTIVE BOX
Pt seen and examined   events noted  on IV vasopressin      REVIEW OF SYSTEMS:  Constitutional: No fever,   Cardiovascular: No chest pain, palpitations, dizziness or leg swelling  Gastrointestinal: No abdominal or epigastric pain. No nausea, vomiting or hematemesis; No diarrhea.  Skin: No itching, burning, rashes or lesions       MEDICATIONS:  MEDICATIONS  (STANDING):  albumin human 25% IVPB 50 milliLiter(s) IV Intermittent every 6 hours  chlorhexidine 2% Cloths 1 Application(s) Topical daily  finasteride 5 milliGRAM(s) Oral daily  insulin glargine Injectable (LANTUS) 8 Unit(s) SubCutaneous at bedtime  insulin lispro (HumaLOG) corrective regimen sliding scale   SubCutaneous Before meals and at bedtime  lactulose Syrup 20 Gram(s) Oral every 8 hours  mirtazapine 7.5 milliGRAM(s) Oral daily  norepinephrine Infusion 0.05 MICROgram(s)/kG/Min (6.73 mL/Hr) IV Continuous <Continuous>  pantoprazole  Injectable 40 milliGRAM(s) IV Push every 12 hours  piperacillin/tazobactam IVPB.. 4.5 Gram(s) IV Intermittent every 12 hours  sertraline 50 milliGRAM(s) Oral daily  vasopressin Infusion 0.04 Unit(s)/Min (2.4 mL/Hr) IV Continuous <Continuous>    MEDICATIONS  (PRN):      Allergies    No Known Allergies    Intolerances        Vital Signs Last 24 Hrs  T(C): 35.8 (11 May 2020 10:00), Max: 37 (11 May 2020 07:00)  T(F): 96.5 (11 May 2020 10:00), Max: 98.6 (11 May 2020 07:00)  HR: 78 (11 May 2020 10:00) (78 - 96)  BP: 110/76 (11 May 2020 10:00) (89/52 - 143/54)  BP(mean): 83 (11 May 2020 10:00) (57 - 92)  RR: 13 (11 May 2020 10:00) (13 - 23)  SpO2: 97% (11 May 2020 10:00) (95% - 100%)    05-10 @ 07:01  -  05-11 @ 07:00  --------------------------------------------------------  IN: 1422.5 mL / OUT: 2010 mL / NET: -587.5 mL    05-11 @ 07:01  -  05-11 @ 12:32  --------------------------------------------------------  IN: 7.2 mL / OUT: 400 mL / NET: -392.8 mL        PHYSICAL EXAM:    General: ; in no acute distress  HEENT: MMM, conjunctiva and sclera clear  Lungs: dec insp effort but sounds clear  Heart: reg  Gastrointestinal: Soft non-tender mod distended; Normal bowel sounds;   Skin: Warm and dry. No obvious rash  Ext: no edema  Neuro:  AOx3    LABS:      CBC Full  -  ( 11 May 2020 04:52 )  WBC Count : 15.39 K/uL  RBC Count : 4.13 M/uL  Hemoglobin : 9.4 g/dL  Hematocrit : 30.1 %  Platelet Count - Automated : 57 K/uL  Mean Cell Volume : 72.9 fl  Mean Cell Hemoglobin : 22.8 pg  Mean Cell Hemoglobin Concentration : 31.2 gm/dL  Auto Neutrophil # : 13.83 K/uL  Auto Lymphocyte # : 0.76 K/uL  Auto Monocyte # : 0.64 K/uL  Auto Eosinophil # : 0.03 K/uL  Auto Basophil # : 0.02 K/uL  Auto Neutrophil % : 89.9 %  Auto Lymphocyte % : 4.9 %  Auto Monocyte % : 4.2 %  Auto Eosinophil % : 0.2 %  Auto Basophil % : 0.1 %    05-11    132<L>  |  96  |  104<H>  ----------------------------<  182<H>  4.9   |  21<L>  |  5.49<H>    Ca    7.5<L>      11 May 2020 04:52  Phos  4.7     05-11  Mg     1.8     05-11    TPro  5.5<L>  /  Alb  2.6<L>  /  TBili  1.0  /  DBili  x   /  AST  37  /  ALT  15  /  AlkPhos  96  05-11    PT/INR - ( 11 May 2020 04:52 )   PT: 25.3 sec;   INR: 2.17          PTT - ( 11 May 2020 04:52 )  PTT:39.0 sec                  RADIOLOGY & ADDITIONAL STUDIES (The following images were personally reviewed):

## 2020-05-11 NOTE — PROGRESS NOTE ADULT - ATTENDING COMMENTS
oliguric AAMIR - suspect ATN due to hypoperfusion (high ur na argues against HRS and prerenal-- though possible)  hopefully will improve with rx os tanmponade  agree with albumin  w/u etiolgy ascites , hypotension  no acute need for dialysis

## 2020-05-11 NOTE — PROGRESS NOTE ADULT - SUBJECTIVE AND OBJECTIVE BOX
Subjective: Patient off of Levophed. However still requiring Vasopressin. Patient scheduled to receive a paracentesis today    ROS: A 10-point review of systems was otherwise negative.    ALLERGIES: 	  No Known Allergies    PHYSICAL EXAM:  ICU Vital Signs Last 24 Hrs  T(C): 35.8 (11 May 2020 10:00), Max: 37 (11 May 2020 07:00)  T(F): 96.5 (11 May 2020 10:00), Max: 98.6 (11 May 2020 07:00)  HR: 78 (11 May 2020 10:00) (78 - 96)  BP: 110/76 (11 May 2020 10:00) (89/52 - 143/54)  BP(mean): 83 (11 May 2020 10:00) (57 - 92)  RR: 13 (11 May 2020 10:00) (13 - 23)  SpO2: 97% (11 May 2020 10:00) (95% - 100%)    Not examined, patient was in IR for his paracentesis    I&O's Summary    LABS:	 	                        9.4    15.39 )-----------( 57       ( 11 May 2020 04:52 )             30.1   05-11    132<L>  |  96  |  104<H>  ----------------------------<  182<H>  4.9   |  21<L>  |  5.49<H>    Ca    7.5<L>      11 May 2020 04:52  Phos  4.7     05-11  Mg     1.8     05-11    TPro  5.5<L>  /  Alb  2.6<L>  /  TBili  1.0  /  DBili  x   /  AST  37  /  ALT  15  /  AlkPhos  96  05-11       TTE (5/9/20): Normal BiV function, Medium sized pericardial effusion with partial echocardiographic evidence of cardiac tamponade physiology. PASP 35 Subjective: Patient off of Levophed. However still requiring Vasopressin. Patient scheduled to receive an IR guided pericardiocentesis today    ROS: A 10-point review of systems was otherwise negative.    ALLERGIES: 	  No Known Allergies    PHYSICAL EXAM:  ICU Vital Signs Last 24 Hrs  T(C): 35.8 (11 May 2020 10:00), Max: 37 (11 May 2020 07:00)  T(F): 96.5 (11 May 2020 10:00), Max: 98.6 (11 May 2020 07:00)  HR: 78 (11 May 2020 10:00) (78 - 96)  BP: 110/76 (11 May 2020 10:00) (89/52 - 143/54)  BP(mean): 83 (11 May 2020 10:00) (57 - 92)  RR: 13 (11 May 2020 10:00) (13 - 23)  SpO2: 97% (11 May 2020 10:00) (95% - 100%)    Not examined, patient was in IR for his paracentesis    I&O's Summary    LABS:	 	                        9.4    15.39 )-----------( 57       ( 11 May 2020 04:52 )             30.1   05-11    132<L>  |  96  |  104<H>  ----------------------------<  182<H>  4.9   |  21<L>  |  5.49<H>    Ca    7.5<L>      11 May 2020 04:52  Phos  4.7     05-11  Mg     1.8     05-11    TPro  5.5<L>  /  Alb  2.6<L>  /  TBili  1.0  /  DBili  x   /  AST  37  /  ALT  15  /  AlkPhos  96  05-11       TTE (5/9/20): Normal BiV function, Medium sized pericardial effusion with partial echocardiographic evidence of cardiac tamponade physiology. PASP 35

## 2020-05-11 NOTE — PROGRESS NOTE ADULT - SUBJECTIVE AND OBJECTIVE BOX
OVERNIGHT EVENTS: Stayed on levo (did not tolerate vasopressin). Received ffp and platelets ahead of planned IR pericardiocentesis. Went in AM to IR.     SUBJECTIVE: Pt at IR procedure in AM.     Vital Signs Last 12 Hrs  T(F): 96.5 (05-11-20 @ 10:00), Max: 98.6 (05-11-20 @ 07:00)  HR: 85 (05-11-20 @ 13:00) (78 - 85)  BP: 117/62 (05-11-20 @ 13:00) (110/76 - 143/54)  BP(mean): 75 (05-11-20 @ 13:00) (75 - 83)  RR: 17 (05-11-20 @ 13:00) (13 - 18)  SpO2: 96% (05-11-20 @ 13:00) (95% - 97%)  I&O's Summary    10 May 2020 07:01  -  11 May 2020 07:00  --------------------------------------------------------  IN: 1422.5 mL / OUT: 2010 mL / NET: -587.5 mL    11 May 2020 07:01  -  11 May 2020 14:13  --------------------------------------------------------  IN: 264.4 mL / OUT: 635 mL / NET: -370.6 mL        PHYSICAL EXAM:  General: NAD, grumpy  HEENT: NC/AT; PERRL, clear conjunctiva  Neck: supple, no JVD,   Cardiovascular: +S1/S2; RRR, no M/R/G  Respiratory: diminished breath sounds at the bases, non-tachypneic, no respiratory distress   Gastrointestinal: soft, NT, distended but not tense  Extremities: WWP; 2+ peripheral pulses; no edema   Neurological: AAOx3; conversational, very annoyed and disagreeable but may be baseline personality. Participates fully with interview and able to follow commands        LABS:                        9.4    15.39 )-----------( 57       ( 11 May 2020 04:52 )             30.1     05-11    132<L>  |  96  |  104<H>  ----------------------------<  182<H>  4.9   |  21<L>  |  5.49<H>    Ca    7.5<L>      11 May 2020 04:52  Phos  4.7     05-11  Mg     1.8     05-11    TPro  5.5<L>  /  Alb  2.6<L>  /  TBili  1.0  /  DBili  x   /  AST  37  /  ALT  15  /  AlkPhos  96  05-11    PT/INR - ( 11 May 2020 04:52 )   PT: 25.3 sec;   INR: 2.17          PTT - ( 11 May 2020 04:52 )  PTT:39.0 sec      RADIOLOGY & ADDITIONAL TESTS:    MEDICATIONS  (STANDING):  albumin human  5% IVPB 500 milliLiter(s) IV Intermittent once  albumin human 25% IVPB 50 milliLiter(s) IV Intermittent every 8 hours  chlorhexidine 2% Cloths 1 Application(s) Topical daily  finasteride 5 milliGRAM(s) Oral daily  insulin glargine Injectable (LANTUS) 8 Unit(s) SubCutaneous at bedtime  insulin lispro (HumaLOG) corrective regimen sliding scale   SubCutaneous Before meals and at bedtime  lactulose Syrup 20 Gram(s) Oral every 8 hours  mirtazapine 7.5 milliGRAM(s) Oral daily  norepinephrine Infusion 0.05 MICROgram(s)/kG/Min (6.73 mL/Hr) IV Continuous <Continuous>  pantoprazole  Injectable 40 milliGRAM(s) IV Push every 12 hours  piperacillin/tazobactam IVPB.. 4.5 Gram(s) IV Intermittent every 12 hours  sertraline 50 milliGRAM(s) Oral daily  vasopressin Infusion 0.04 Unit(s)/Min (2.4 mL/Hr) IV Continuous <Continuous>    MEDICATIONS  (PRN):

## 2020-05-11 NOTE — PROGRESS NOTE ADULT - SUBJECTIVE AND OBJECTIVE BOX
O/N Events:  hypotensive with MAPs < 60 mmhg, started on levo and vaso, currently just on vaso, MAPs 75-85 mmhg  satting 97% on RA  Subjective:  NAD, complaints of abdominal distension, one xam abdomen is distended but not firm, non tender, remained on standing Alb, renal fx continues to worsen, recorded  in am/ K 4.9/Bicarb 21/ / appears non uremic    VITALS  Vital Signs Last 24 Hrs  T(C): 35.8 (11 May 2020 10:00), Max: 37 (11 May 2020 07:00)  T(F): 96.5 (11 May 2020 10:00), Max: 98.6 (11 May 2020 07:00)  HR: 78 (11 May 2020 10:00) (78 - 96)  BP: 110/76 (11 May 2020 10:00) (89/52 - 143/54)  BP(mean): 83 (11 May 2020 10:00) (57 - 92)  RR: 13 (11 May 2020 10:00) (13 - 23)  SpO2: 97% (11 May 2020 10:00) (95% - 100%)    PHYSICAL EXAM  General: A&Ox 3; NAD  Eyes: PERRL; EOMI  Neck: no JVD  Respiratory: CTA B/L  Cardiovascular: RRR, no pericardial rub  Gastrointestinal: distended, non tender  Extremities: WWP; no edema  Neurological:  CNII-XII grossly intact; no obvious focal deficits, no flapping tremor or asterixis  T/L/D: palacio draining lindsey color urine     MEDICATIONS  (STANDING):  albumin human 25% IVPB 50 milliLiter(s) IV Intermittent every 6 hours  chlorhexidine 2% Cloths 1 Application(s) Topical daily  finasteride 5 milliGRAM(s) Oral daily  insulin glargine Injectable (LANTUS) 8 Unit(s) SubCutaneous at bedtime  insulin lispro (HumaLOG) corrective regimen sliding scale   SubCutaneous Before meals and at bedtime  lactulose Syrup 20 Gram(s) Oral every 8 hours  mirtazapine 7.5 milliGRAM(s) Oral daily  norepinephrine Infusion 0.05 MICROgram(s)/kG/Min (6.73 mL/Hr) IV Continuous <Continuous>  pantoprazole  Injectable 40 milliGRAM(s) IV Push every 12 hours  piperacillin/tazobactam IVPB.. 4.5 Gram(s) IV Intermittent every 12 hours  sertraline 50 milliGRAM(s) Oral daily  vancomycin  IVPB 1000 milliGRAM(s) IV Intermittent once  vasopressin Infusion 0.04 Unit(s)/Min (2.4 mL/Hr) IV Continuous <Continuous>    MEDICATIONS  (PRN):      LABS                        9.4    15.39 )-----------( 57       ( 11 May 2020 04:52 )             30.1     05-11    132<L>  |  96  |  104<H>  ----------------------------<  182<H>  4.9   |  21<L>  |  5.49<H>    Ca    7.5<L>      11 May 2020 04:52  Phos  4.7     05-11  Mg     1.8     05-11    TPro  5.5<L>  /  Alb  2.6<L>  /  TBili  1.0  /  DBili  x   /  AST  37  /  ALT  15  /  AlkPhos  96  05-11    LIVER FUNCTIONS - ( 11 May 2020 04:52 )  Alb: 2.6 g/dL / Pro: 5.5 g/dL / ALK PHOS: 96 U/L / ALT: 15 U/L / AST: 37 U/L / GGT: 59 U/L       PT/INR - ( 11 May 2020 04:52 )   PT: 25.3 sec;   INR: 2.17          PTT - ( 11 May 2020 04:52 )  PTT:39.0 sec    CARDIAC MARKERS ( 11 May 2020 04:52 )  x     / x     / 10 U/L / x     / x

## 2020-05-11 NOTE — PROGRESS NOTE ADULT - PROBLEM SELECTOR PLAN 1
AAMIR on CKD stage IV, oliguric   ATN from hypotension and ischemic injury vs hypoperfusion from tamponade and pericardial effusion, ascitic fluid analysis c/w portal hypertension and decompensated liver failure, patient with no appropriate response to volume resuscitation with albumin which is arguing against prerenal etiologies, HRS under differentials  lytes are almost WNL, he is overloaded but has acceptable respiratory status and not in distress, appears non uremic   - c/w to hold diuretics, c/w albumin q 6 hours/ would maintain MAPs> 65-70 mmhg for adequate renal perfusion   - please repeat pm BMP w/lytes no indication for RRT at present will monitor renal fx post pericardiocentesis to see if improved hemodynamics would help with kidney function   Will follow

## 2020-05-11 NOTE — PROGRESS NOTE ADULT - ASSESSMENT
74 year old male, w/ a PMHx of AFib (on home Eliquis), CKD4, BPH w chronic indwelling catheter DM, HTN, HLD, SERA, MDS, and pancytopenia, p/w FTT (decreased PO intake and weakness); Cardiology is being followed for incidental finding of moderate pericardial effusion with partial echocardiographic evidence of tamponade     #Pericardial Effusion: No other clinical evidence of acute tamponade at this time. Patient's hypotension can be attributed to a Gram positive septic shock with an intra-abdominal source.   - if acute HD decompensation (Precipitous increasing pressor requirements or development of tachycardia) please call us back immediately for reassessment of the pericardial effusion  - continue adequate volume resuscitation with colloids and or crystalloids to maintain patient's preload  - Would recommend against any AV hector blocking agents at this time.   - close hemodynamic monitoring, and notify Cardiology of changes in status    Plan discussed with primary team   Please refer to cardiology attending addendum for additional recs.     GARCÍA Solis  Cardiology Fellow, PGY 6 74 year old male, w/ a PMHx of AFib (on home Eliquis), CKD4, BPH w chronic indwelling catheter DM, HTN, HLD, SERA, MDS, and pancytopenia, p/w FTT (decreased PO intake and weakness); Cardiology is being followed for incidental finding of moderate pericardial effusion with partial echocardiographic evidence of tamponade     #Pericardial Effusion: No other clinical evidence of acute tamponade at this time. Patient's hypotension can be attributed to a Gram positive septic shock with an intra-abdominal source.   - if acute HD decompensation (Precipitous increasing pressor requirements or development of tachycardia) please call us back immediately for reassessment of the pericardial effusion  - continue adequate volume resuscitation with colloids and or crystalloids to maintain patient's preload  - Repeat limited TTE tomorrow for post pericardiocentesis  - Would recommend against any AV hector blocking agents at this time.     Plan discussed with primary team   Please refer to cardiology attending addendum for additional recs.     GARCÍA Solis  Cardiology Fellow, PGY 6

## 2020-05-11 NOTE — PROGRESS NOTE ADULT - ASSESSMENT
A/p  75 y/o M w/PMHx of DM, HTN, BPH (chronic incontinence/retention, s/p palacio on last admission), a-fib and CKD stage III/IV, admitted for new onset ascites nephrology consulted for AAMIR on CKD.

## 2020-05-11 NOTE — PROGRESS NOTE ADULT - ASSESSMENT
73 yo M with MDS, pancytopenia, CKD, ascites of unknown origin presents from [facility] with complaints of decreased po intake and weakness likely secondary to uremia/AAMIR from diuresis for treatment of ascites, hospital course notable for UGIB and pericardial effusion with tamponade physiology , as well as ATN, and ascites of unknown etiology, now in ICU s/p paracentesis, pending IR pericardiocentesis, cirrhosis workup    Neuro:   Patient is normally AxO x 3, with episode of hypotension and encephalopathy on 5/10 in early PM and was more acutely lethargic and encephalopathic likely 2/2 hepatic encephalopathy   - Ammonia level elevated, started Lactulose. Will add Rifaximin if needed    #Hydrocephalus on CT: CT Head on admission showed evidence of hydrocephalus. No collateral from prior CT on rate of enlargement.   - plan for neurosurgery consult after acute issues resolved    Cardiovascular:   #Hypotension: MRSA + blood cultures on 5/10, hypotensions likely 2/2 septic shock. Possible contribution of intravascular volume depletion from diuresis vs GIB vs pericardial tamponade.   - cw albumin  - Levophed for BP support   - colloids for fluid repletion  - central line placed with cvp monitor for assess for tamponade    #Pericardial Effusion:  Moderate in size and unchanged from earlier in day.  Some evidence of tamponade physiology.    - s/p IR for diagnostic pericardiocentesis on 5/11  - Cardiology following, appreciate recs.     Pulmonary:   Patient currently saturating well on RA, non-hypoxic   - Monitor Respiratory status   - Patient is at aspiration risk given waxing and waning risk     Gastrointestinal:   UGIB: multiple episodes of melena throughout the day on 5/9.  Pt has baseline anemia from MDS and never dropped significantly below his baseline Hgb.  He is stable at Hgb of 10 after 1 unit pRBC.  Pt noted to have coagulopathy and thrombocytopenia.  - Monitor H&H closely  - Continue Protonix 40 mg IVP BID  - Monitor coags  - If H&H continues to downtrend patient would benefit from administration of desmopressin for uremic bleeding  - Transfuse platelets < 50 if continued bleeding    Ascites:  Patient has had new onset ascites in the last 2 months with unknown etiology, not non-drinker   - Paracentesis removed 2L on 5/10; f/u fluid studies  - GI (Dr. Mallory) recs for optimization of decompensated cirrhosis, f/u cirrhosis labs  - was on Ceftriaxone for SBP prophylaxis in setting of decompensated cirrhosis and UGIB; now on zosyn  - F/u workup for non-alcoholic causes of cirrhosis     Renal:   AAMIR on CKD   Patient seems to have baseline kidney function in the 2-3 region, with chronic indwelling palacio. Patient on presentation with Cr. in the 4's and uptrending with minimal urine output. US of Kidneys with evidence of mild right hydronephrosis   - Monitor BUN and Cr and electrolytes   - Renal Following, appreciate recs   - Strict I and O's   - Urine lytes indicate ATN picture     Heme:   - Patient with a history of MDS, and pancytopenia at baseline.     #Anemia:   Likely secondary to anemia from chronic disease and bone marrow suppression.   - S/p 1 unit of PRBC on 5/9 for active melena and Hgb stable   - Monitor CBC and plts   - Transfuse for Hgb <7   - Active type and screen     #Coagulopathy:   - Patient presented with elevated INR to 2.48, with active bleeding.   - S./p 2 units of FFP and vitamin oral K   - vitamin K 5/10 PM to prep for IR procedure, 3FFP and 1unit plateletse at 5am as well  - Monitor PT/INR   - Correct as needed     #DVT - RLE  - repeat dopplers, if still positive pt may need ivc filter  - eliquis held in setting of melanax3    :     #Chronic Indwelling Palacio: Patient has a palacio cath due to a possible outlet obstruction, he was to get a cystoscopy and urodynamics outpatient.  - Trend Cr.   - Finasteride and Flomax for BPH held as patient is NPO     ID:   Patient afebrile, with no active sources of infection but is a chronic MRSA colonizer. Currently on zosyn for possible intraabdominal infection, now with MRSA positive blood cultures  - cw vancomycin for mrsa coverage; renally dose  - cw zosyn  - surveillance cultures on 5/12 AM    Pysch:   #Depression: On home mirtazapine 7.5 mg daily and Sertraline 10 mg daily.   - Holding while NPO

## 2020-05-12 NOTE — SWALLOW BEDSIDE ASSESSMENT ADULT - COMMENTS
Chest Xray today, 5/12, "Frontal view of the chest is compared to 5/11/2020 and demonstrates enteric tube tip below the level of the stomach. Central venous catheter with tip in right atrium. Pericardial drain. Progressive dense left basilar consolidation. Low lung volumes. Central venous Chest Xray today, 5/12, "Frontal view of the chest is compared to 5/11/2020 and demonstrates enteric tube tip below the level of the stomach. Central venous catheter with tip in right atrium. Pericardial drain. Progressive dense left basilar consolidation. Low lung volumes. Central venous."    CT head 5/8 revealed, "Findings are consistent with hydrocephalus, likely communicating."    Pt with c/o a sore throat ~3-4 weeks. Voice is hoarse with reduced loudness.

## 2020-05-12 NOTE — PROGRESS NOTE ADULT - SUBJECTIVE AND OBJECTIVE BOX
O/N Events:  Blood clx + for MRSA  s/p pericardiocentesis 100 cc drained, drain with minimal output   remained on vasopressin, NGT placed overnight for decompression   H&H dropped significantly with worsening of thrombocytopenia to 30, receiving PLT   Subjective:  denies acute complainants, abdominal exam unchanged from yesterday, Cr sl decreased, BUN unchanged, Lytes remained WNL, UOP ~ 400 cc, non uremic on exam     VITALS  Vital Signs Last 24 Hrs  T(C): 36.2 (12 May 2020 10:58), Max: 36.7 (11 May 2020 21:00)  T(F): 97.1 (12 May 2020 10:58), Max: 98 (11 May 2020 21:00)  HR: 82 (12 May 2020 10:58) (82 - 98)  BP: 127/50 (12 May 2020 10:58) (99/56 - 127/50)  BP(mean): 70 (12 May 2020 10:58) (51 - 93)  RR: 20 (12 May 2020 10:58) (16 - 22)  SpO2: 96% (12 May 2020 10:58) (92% - 98%)    PHYSICAL EXAM  General: A&Ox 3; NAD  Neck: no JVD  Respiratory: CTA B/L  Cardiovascular: Regular rhythm/rate; S1/S2, no pericardial rub, pericardial drain in placed c/d/i   Gastrointestinal: distended, non tender  Extremities: WWP; no edema  Neurological:  CNII-XII grossly intact; no obvious focal deficits, no flapping tremor or asterixis  T/L/D: palacio draining lindsey color urine     MEDICATIONS  (STANDING):  chlorhexidine 2% Cloths 1 Application(s) Topical daily  finasteride 5 milliGRAM(s) Oral daily  insulin glargine Injectable (LANTUS) 8 Unit(s) SubCutaneous at bedtime  insulin lispro (HumaLOG) corrective regimen sliding scale   SubCutaneous Before meals and at bedtime  lactulose Syrup 20 Gram(s) Oral every 12 hours  mirtazapine 7.5 milliGRAM(s) Oral daily  pantoprazole  Injectable 40 milliGRAM(s) IV Push every 12 hours  sertraline 50 milliGRAM(s) Oral daily  vancomycin  IVPB 1500 milliGRAM(s) IV Intermittent once  vasopressin Infusion 0.04 Unit(s)/Min (2.4 mL/Hr) IV Continuous <Continuous>    MEDICATIONS  (PRN):      LABS                        6.2    4.75  )-----------( 30       ( 12 May 2020 07:07 )             19.7     05-12    137  |  97  |  108<H>  ----------------------------<  99  4.2   |  24  |  5.25<H>    Ca    8.0<L>      12 May 2020 06:10  Phos  4.3     05-12  Mg     1.8     05-12    TPro  5.9<L>  /  Alb  3.2<L>  /  TBili  0.9  /  DBili  x   /  AST  15  /  ALT  9<L>  /  AlkPhos  61  05-12    LIVER FUNCTIONS - ( 12 May 2020 06:10 )  Alb: 3.2 g/dL / Pro: 5.9 g/dL / ALK PHOS: 61 U/L / ALT: 9 U/L / AST: 15 U/L / GGT: x           PT/INR - ( 12 May 2020 06:10 )   PT: 23.6 sec;   INR: 2.03          PTT - ( 12 May 2020 06:10 )  PTT:39.7 sec    CARDIAC MARKERS ( 11 May 2020 04:52 )  x     / x     / 10 U/L / x     / x O/N Events:  Blood clx + for MRSA  s/p pericardiocentesis 100 cc drained, drain with minimal output   remained on vasopressin, NGT placed overnight for decompression   H&H dropped significantly with worsening of thrombocytopenia to 30, receiving PLT   Subjective:  denies acute complainants, abdominal exam unchanged from yesterday, Cr sl decreased, BUN unchanged, Lytes remained WNL, UOP ~ 400 cc, non uremic on exam     VITALS  Vital Signs Last 24 Hrs  T(C): 36.2 (12 May 2020 10:58), Max: 36.7 (11 May 2020 21:00)  T(F): 97.1 (12 May 2020 10:58), Max: 98 (11 May 2020 21:00)  HR: 82 (12 May 2020 10:58) (82 - 98)  BP: 127/50 (12 May 2020 10:58) (99/56 - 127/50)  BP(mean): 70 (12 May 2020 10:58) (51 - 93)  RR: 20 (12 May 2020 10:58) (16 - 22)  SpO2: 96% (12 May 2020 10:58) (92% - 98%)    PHYSICAL EXAM  General: A&Ox 3; NAD  Neck: no JVD  Respiratory: CTA B/L  Cardiovascular: Regular rhythm/rate; S1/S2, no pericardial rub, pericardial drain in placed c/d/i   Gastrointestinal: distended, non tender  Extremities: WWP; 1+ thigh edema  Neurological:  CNII-XII grossly intact; no obvious focal deficits, no flapping tremor or asterixis  T/L/D: palacio draining lindsey color urine     MEDICATIONS  (STANDING):  chlorhexidine 2% Cloths 1 Application(s) Topical daily  finasteride 5 milliGRAM(s) Oral daily  insulin glargine Injectable (LANTUS) 8 Unit(s) SubCutaneous at bedtime  insulin lispro (HumaLOG) corrective regimen sliding scale   SubCutaneous Before meals and at bedtime  lactulose Syrup 20 Gram(s) Oral every 12 hours  mirtazapine 7.5 milliGRAM(s) Oral daily  pantoprazole  Injectable 40 milliGRAM(s) IV Push every 12 hours  sertraline 50 milliGRAM(s) Oral daily  vancomycin  IVPB 1500 milliGRAM(s) IV Intermittent once  vasopressin Infusion 0.04 Unit(s)/Min (2.4 mL/Hr) IV Continuous <Continuous>    MEDICATIONS  (PRN):      LABS                        6.2    4.75  )-----------( 30       ( 12 May 2020 07:07 )             19.7     05-12    137  |  97  |  108<H>  ----------------------------<  99  4.2   |  24  |  5.25<H>    Ca    8.0<L>      12 May 2020 06:10  Phos  4.3     05-12  Mg     1.8     05-12    TPro  5.9<L>  /  Alb  3.2<L>  /  TBili  0.9  /  DBili  x   /  AST  15  /  ALT  9<L>  /  AlkPhos  61  05-12    LIVER FUNCTIONS - ( 12 May 2020 06:10 )  Alb: 3.2 g/dL / Pro: 5.9 g/dL / ALK PHOS: 61 U/L / ALT: 9 U/L / AST: 15 U/L / GGT: x           PT/INR - ( 12 May 2020 06:10 )   PT: 23.6 sec;   INR: 2.03          PTT - ( 12 May 2020 06:10 )  PTT:39.7 sec    CARDIAC MARKERS ( 11 May 2020 04:52 )  x     / x     / 10 U/L / x     / x

## 2020-05-12 NOTE — PROGRESS NOTE ADULT - PROBLEM SELECTOR PLAN 1
AAMIR on CKD stage IV, oliguric   ATN from hypotension and ischemic injury vs hypoperfusion from tamponade and pericardial effusion, ascitic fluid analysis c/w portal hypertension and decompensated liver failure, patient with no appropriate response to volume resuscitation with albumin which is arguing against prerenal etiologies, HRS under differentials  lytes are almost WNL, he is overloaded but has acceptable respiratory status and NAD, appears non uremic   - c/w to hold diuretics, can stop Alb, please maintain MAPs> 65-70 mmhg for adequate renal perfusion   - please repeat pm BMP w/lytes no indication for RRT at present will monitor renal   Will follow AAMIR on CKD stage IV, oliguric   ATN from hypotension and ischemic injury vs hypoperfusion from tamponade and pericardial effusion, ascitic fluid analysis c/w portal hypertension and decompensated liver failure, patient with no appropriate response to volume resuscitation with albumin which is arguing against prerenal etiologies, HRS under differentials  lytes are almost WNL, he is overloaded but has acceptable respiratory status and NAD, appears non uremic   - c/w to hold diuretics, can stop Alb, please maintain MAPs> 65-70 mmhg for adequate renal perfusion   - please repeat pm BMP w/lytes no urgent indication for RRT at present  - Vanc dosing per level  Will follow

## 2020-05-12 NOTE — PROGRESS NOTE ADULT - SUBJECTIVE AND OBJECTIVE BOX
coverage Dr Roberts  Pt seen and examined   NGT in, stomach softer  npo  Hgb lower    REVIEW OF SYSTEMS:  Constitutional: No fever  Cardiovascular: No chest pain, palpitations, dizziness or leg swelling  Gastrointestinal: No abdominal or epigastric pain. No nausea, No vomiting No hematemesis; No diarrhea no constipation. No melena or hematochezia.  Skin: No itching, burning, rashes or lesions       MEDICATIONS:  MEDICATIONS  (STANDING):  chlorhexidine 2% Cloths 1 Application(s) Topical daily  finasteride 5 milliGRAM(s) Oral daily  insulin glargine Injectable (LANTUS) 8 Unit(s) SubCutaneous at bedtime  insulin lispro (HumaLOG) corrective regimen sliding scale   SubCutaneous Before meals and at bedtime  lactulose Syrup 20 Gram(s) Oral every 12 hours  mirtazapine 7.5 milliGRAM(s) Oral daily  pantoprazole  Injectable 40 milliGRAM(s) IV Push every 12 hours  sertraline 50 milliGRAM(s) Oral daily  vasopressin Infusion 0.04 Unit(s)/Min (2.4 mL/Hr) IV Continuous <Continuous>    MEDICATIONS  (PRN):      Allergies    No Known Allergies    Intolerances        Vital Signs Last 24 Hrs  T(C): 36.4 (12 May 2020 15:00), Max: 36.7 (11 May 2020 21:00)  T(F): 97.5 (12 May 2020 15:00), Max: 98 (11 May 2020 21:00)  HR: 80 (12 May 2020 15:00) (80 - 98)  BP: 130/67 (12 May 2020 15:00) (99/56 - 130/67)  BP(mean): 84 (12 May 2020 15:00) (51 - 93)  RR: 20 (12 May 2020 13:45) (16 - 23)  SpO2: 99% (12 May 2020 13:45) (88% - 99%)    05-11 @ 07:01  -  05-12 @ 07:00  --------------------------------------------------------  IN: 1407.7 mL / OUT: 1242 mL / NET: 165.7 mL    05-12 @ 07:01 - 05-12 @ 16:02  --------------------------------------------------------  IN: 1260.2 mL / OUT: 165 mL / NET: 1095.2 mL        PHYSICAL EXAM:    General:  in no acute distress  HEENT: MMM, conjunctiva and sclera clear  Lungs: clear  Heart: regular  Gastrointestinal: Softer non-tender less-distended; Normal bowel sounds; No hepatosplenomegaly  Skin: Warm and dry. No obvious rash    LABS:      CBC Full  -  ( 12 May 2020 07:07 )  WBC Count : 4.75 K/uL  RBC Count : 2.68 M/uL  Hemoglobin : 6.2 g/dL  Hematocrit : 19.7 %  Platelet Count - Automated : 30 K/uL  Mean Cell Volume : 73.5 fl  Mean Cell Hemoglobin : 23.1 pg  Mean Cell Hemoglobin Concentration : 31.5 gm/dL  Auto Neutrophil # : x  Auto Lymphocyte # : x  Auto Monocyte # : x  Auto Eosinophil # : x  Auto Basophil # : x  Auto Neutrophil % : x  Auto Lymphocyte % : x  Auto Monocyte % : x  Auto Eosinophil % : x  Auto Basophil % : x    05-12    137  |  97  |  108<H>  ----------------------------<  99  4.2   |  24  |  5.25<H>    Ca    8.0<L>      12 May 2020 06:10  Phos  4.3     05-12  Mg     1.8     05-12    TPro  5.9<L>  /  Alb  3.2<L>  /  TBili  0.9  /  DBili  x   /  AST  15  /  ALT  9<L>  /  AlkPhos  61  05-12    PT/INR - ( 12 May 2020 06:10 )   PT: 23.6 sec;   INR: 2.03          PTT - ( 12 May 2020 06:10 )  PTT:39.7 sec                  RADIOLOGY & ADDITIONAL STUDIES (The following images were personally reviewed):

## 2020-05-12 NOTE — PROGRESS NOTE ADULT - ATTENDING COMMENTS
AAMIR likely ATN -- uo somewhat improved and renal fxn seems stabilized with better hemodynamics  cont mgt and w/u per team -- antibiotics for sepsis  no acute need for dialysis/CVVHD - will cont follow

## 2020-05-12 NOTE — PROGRESS NOTE ADULT - ASSESSMENT
75 yo M with MDS, pancytopenia, CKD, ascites of unknown origin presents from [facility] with complaints of decreased po intake and weakness likely secondary to uremia/AAMIR from diuresis for treatment of ascites, hospital course notable for UGIB and pericardial effusion with tamponade physiology , as well as ATN, and ascites of unknown etiology, now in ICU s/p paracentesis 5/10 (2L removed), IR pericardiocentesis 5/11 (drain in place), now with anemia of unknown etiology    Neuro:   Now AxO x 3; Prior to MICU had episode of hypotension and encephalopathy on 5/10 in early PM and was more acutely lethargic and encephalopathic likely 2/2 hepatic encephalopathy   - Ammonia level elevated, started Lactulose, titrate to 2-3 soft BMs a day    #Hydrocephalus on CT: CT Head on admission showed evidence of hydrocephalus. No collateral from prior CT on rate of enlargement.   - plan for neurosurgery consult after acute issues resolved    Cardiovascular:   #Hypotension: MRSA + blood cultures on 5/10, hypotensions likely 2/2 septic shock. Possible contribution of intravascular volume depletion from diuresis vs GIB vs pericardial tamponade.   - cw albumin  - s/p pericardial drain with 100cc's removed (4000 rbcs in fluid)  - Levophed for BP support   - colloids for fluid repletion  - central line placed with cvp monitor for assess for tamponade    #Pericardial Effusion:  Moderate in size and unchanged from earlier in day.  Some evidence of tamponade physiology.    - s/p IR for diagnostic pericardiocentesis on 5/11  - Cardiology following, appreciate recs.     Pulmonary:   Patient currently saturating well on RA, non-hypoxic   - Monitor Respiratory status   - Patient is at aspiration risk given waxing and waning risk     Gastrointestinal:   UGIB: multiple episodes of melena throughout the day on 5/9.  Pt has baseline anemia from MDS and never dropped significantly below his baseline Hgb.  He is stable at Hgb of 10 after 1 unit pRBC.  Pt noted to have coagulopathy and thrombocytopenia.  - Monitor H&H closely  - Continue Protonix 40 mg IVP BID  - Monitor coags  - If H&H continues to downtrend patient would benefit from administration of desmopressin for uremic bleeding  - Transfuse platelets < 50 if continued bleeding    Ascites:  Patient has had new onset ascites in the last 2 months with unknown etiology, not non-drinker   - Paracentesis removed 2L on 5/10; f/u fluid studies  - GI (Dr. Mallory) recs for optimization of decompensated cirrhosis, f/u cirrhosis labs  - was on Ceftriaxone for SBP prophylaxis in setting of decompensated cirrhosis and UGIB; now on zosyn  - F/u workup for non-alcoholic causes of cirrhosis     Renal:   AAMIR on CKD   Patient seems to have baseline kidney function in the 2-3 region, with chronic indwelling palacio. Patient on presentation with Cr. in the 4's and uptrending with minimal urine output. US of Kidneys with evidence of mild right hydronephrosis   - Monitor BUN and Cr and electrolytes   - Renal Following, appreciate recs   - Strict I and O's   - Urine lytes indicate ATN picture     Heme:   - Patient with a history of MDS, and pancytopenia at baseline.     #Anemia:   Likely secondary to anemia from chronic disease and bone marrow suppression.   - S/p 1 unit of PRBC on 5/9 for active melena and Hgb stable   - Monitor CBC and plts   - Transfuse for Hgb <7   - Active type and screen     #Coagulopathy:   - Patient presented with elevated INR to 2.48, with active bleeding.   - S./p 2 units of FFP and vitamin oral K   - vitamin K 5/10 PM to prep for IR procedure, 3FFP and 1unit plateletse at 5am as well  - Monitor PT/INR   - Correct as needed     #DVT - RLE  - repeat dopplers, if still positive pt may need ivc filter  - eliquis held in setting of melanax3    :     #Chronic Indwelling Palacio: Patient has a palacio cath due to a possible outlet obstruction, he was to get a cystoscopy and urodynamics outpatient.  - Trend Cr.   - Finasteride and Flomax for BPH held as patient is NPO     ID:   Patient afebrile, with no active sources of infection but is a chronic MRSA colonizer. Currently on zosyn for possible intraabdominal infection, now with MRSA positive blood cultures  - cw vancomycin for mrsa coverage; renally dose  - cw zosyn  - surveillance cultures on 5/12 AM    Pysch:   #Depression: On home mirtazapine 7.5 mg daily and Sertraline 10 mg daily.   - Holding while NPO 73 yo M with MDS, pancytopenia, CKD, ascites of unknown origin presents from [facility] with complaints of decreased po intake and weakness likely secondary to uremia/AAMIR from diuresis for treatment of ascites, hospital course notable for UGIB and pericardial effusion with tamponade physiology , as well as ATN, and ascites of unknown etiology, now in ICU s/p paracentesis 5/10 (2L removed), IR pericardiocentesis 5/11 (drain in place), now with anemia of unknown etiology    Neuro:   Now AxO x 3; Prior to MICU had episode of hypotension and encephalopathy on 5/10 in early PM and was more acutely lethargic and encephalopathic likely 2/2 hepatic encephalopathy   - Ammonia level elevated, started Lactulose, titrate to 2-3 soft BMs a day    #Hydrocephalus on CT: CT Head on admission showed evidence of hydrocephalus. No collateral from prior CT on rate of enlargement.   - plan for neurosurgery consult after acute issues resolved    Cardiovascular:   #Hypotension: MRSA + blood cultures on 5/10, hypotensions likely 2/2 septic shock. Possible contribution of intravascular volume depletion from diuresis vs GIB vs pericardial tamponade.   - cw albumin  - s/p pericardial drain with 100cc's removed (4000 rbcs in fluid)  - Levophed for BP support   - colloids for fluid repletion  - central line placed with cvp monitor for assess for tamponade  - if pt is to decompensate, would add back on zosyn for broad intraabdominal source coverage    #Pericardial Effusion:  Initial read with some evidence of tamponade physiology; now s/p IR for diagnostic pericardiocentesis on 5/11  - Cardiology following, appreciate recs.   - f/u interval limited echo    Pulmonary:   Patient currently saturating well on RA, non-hypoxic   - Monitor Respiratory status   - Patient is at aspiration risk given waxing and waning risk     Gastrointestinal:   UGIB: multiple episodes of melena throughout the day on 5/9.  Pt has baseline anemia from MDS and never dropped significantly below his baseline Hgb. Pt noted to have coagulopathy and thrombocytopenia.  - Again with acute drop in hgb on 5/12 from 9.2 to <7. Given 1U prbc and 1u platelets  - Monitor H&H closely  - Continue Protonix 40 mg IVP BID  - Monitor coags  - If H&H continues to downtrend patient would benefit from administration of desmopressin for uremic bleeding  - Transfuse platelets < 50 if continued bleeding  - CT ab on 5/12 with no RP bleed    Ascites:  Patient has had new onset ascites in the last 2 months with unknown etiology, not non-drinker   - Paracentesis removed 2L on 5/10; f/u fluid studies  - GI (Dr. Mallory) recs for possible cirrhosis, f/u cirrhosis labs  - was on Ceftriaxone for SBP prophylaxis in setting of decompensated cirrhosis and UGIB; zosyn dc'd after mrsa positive blood cultures on 5/12  - ct abdomen on 5/12 with no evidence of liver cirrhosis, lfts normal    Renal:   AAMIR on CKD   Patient seems to have baseline kidney function in the 2-3 region, with chronic indwelling palacio. Patient on presentation with Cr. in the 4's and uptrending with minimal urine output. US of Kidneys with evidence of mild right hydronephrosis   - Monitor BUN and Cr and electrolytes   - Renal Following, appreciate recs   - Strict I and O's   - Urine lytes indicate ATN picture     Heme:   - Patient with a history of MDS, and pancytopenia at baseline.     #Anemia:   Likely secondary to anemia from chronic disease and bone marrow suppression.   - S/p 1 unit of PRBC on 5/9 for active melena and Hgb stable   - Given 1 unit of prbc and platelets on 5/12 for acute drop, unknown source  - Monitor CBC and plts   - Transfuse for Hgb <7   - Active type and screen     #Coagulopathy:   - Patient presented with elevated INR to 2.48, with active bleeding.   - Monitor PT/INR   - Correct as needed for procedures    #DVT - RLE  - repeat dopplers on 5/11 with no DVT  - eliquis held in setting of melanax3 and drop in hgb    :     #Chronic Indwelling Palacio: Patient has a palacio cath due to a possible outlet obstruction, he was to get a cystoscopy and urodynamics outpatient.  - palacio exchanged 5/11 by nursing  - Trend Cr.   - Finasteride and Flomax for BPH held as patient is NPO     ID:   MRSA positive blood cultures that also grew in urine. On vancomycin, zosyn dc'd on 5/11  - cw vancomycin for mrsa coverage; renally dose  - cw zosyn  - surveillance cultures on 5/12 AM    Pysch:   #Depression: On home mirtazapine 7.5 mg daily and Sertraline 10 mg daily.   - Holding while NPO

## 2020-05-12 NOTE — PROGRESS NOTE ADULT - SUBJECTIVE AND OBJECTIVE BOX
Cardiology Fellow Sign off note    Interval events: Patient's repeat TTE this AM revealed no pericardial effusion. Drain output still 105 cc overnight    74 year old male, w/ a PMHx of AFib (on home Eliquis), CKD4, BPH w chronic indwelling catheter DM, HTN, HLD, SERA, MDS, and pancytopenia, p/w FTT (decreased PO intake and weakness); Cardiology is being followed for incidental finding of moderate pericardial effusion with partial echocardiographic evidence of tamponade, now complicated with acute anemia with unclear bleeding source.     #Pericardial Effusion: No other clinical evidence of acute tamponade at this time. Patient's hypotension can be attributed to a Gram positive septic shock with an intra-abdominal source  - Repeat limited TTE today reveals no pericardial  - Would recommend against any AV hector blocking agents at this time.   - IR managing pericardial drain    Will sign off at this time as there no active cardiac issues that are being managed by the cardiology consult service. Please call us back if there any further questions or concerns  Plan discussed with primary team   Please refer to cardiology attending addendum for additional recs.     GARCÍA Solis  Cardiology Fellow, PGY 6

## 2020-05-12 NOTE — PROGRESS NOTE ADULT - SUBJECTIVE AND OBJECTIVE BOX
OVERNIGHT EVENTS: Given albumin overnight for poor UOP, put out ~100mL shortly after but still low output overall. Am hgb dropped to <7 from 9.4. Repeat cbc confirmed hgb drop.    SUBJECTIVE: Pt seen and examined at bedside, less perky than yesterday but still awake and alert and conversational. Denied any pain and said he was feeling well. Denied cp, sob, abd pain, dysuria, diarrhea.    Vital Signs Last 12 Hrs  T(F): 96.8 (05-12-20 @ 12:46), Max: 97.5 (05-12-20 @ 04:00)  HR: 84 (05-12-20 @ 12:46) (82 - 87)  BP: 123/56 (05-12-20 @ 12:46) (99/56 - 127/50)  BP(mean): 73 (05-12-20 @ 12:46) (65 - 93)  RR: 23 (05-12-20 @ 12:46) (18 - 23)  SpO2: 88% (05-12-20 @ 12:46) (88% - 97%)  I&O's Summary    11 May 2020 07:01  -  12 May 2020 07:00  --------------------------------------------------------  IN: 1407.7 mL / OUT: 1242 mL / NET: 165.7 mL    12 May 2020 07:01  -  12 May 2020 13:15  --------------------------------------------------------  IN: 953 mL / OUT: 165 mL / NET: 788 mL        PHYSICAL EXAM:  General: NAD, grumpy  HEENT: NC/AT; PERRL, clear conjunctiva  Neck: supple, no JVD,   Cardiovascular: +S1/S2; RRR, no M/R/G  Respiratory: diminished breath sounds at the bases, non-tachypneic, no respiratory distress   Gastrointestinal: soft, NT, distended but not tense  Extremities: WWP; 2+ peripheral pulses; no edema   Neurological: AAOx3; conversational, very annoyed and disagreeable but may be baseline personality. Participates fully with interview and able to follow commands      LABS:                        6.2    4.75  )-----------( 30       ( 12 May 2020 07:07 )             19.7     05-12    137  |  97  |  108<H>  ----------------------------<  99  4.2   |  24  |  5.25<H>    Ca    8.0<L>      12 May 2020 06:10  Phos  4.3     05-12  Mg     1.8     05-12    TPro  5.9<L>  /  Alb  3.2<L>  /  TBili  0.9  /  DBili  x   /  AST  15  /  ALT  9<L>  /  AlkPhos  61  05-12    PT/INR - ( 12 May 2020 06:10 )   PT: 23.6 sec;   INR: 2.03          PTT - ( 12 May 2020 06:10 )  PTT:39.7 sec      RADIOLOGY & ADDITIONAL TESTS:    MEDICATIONS  (STANDING):  chlorhexidine 2% Cloths 1 Application(s) Topical daily  finasteride 5 milliGRAM(s) Oral daily  insulin glargine Injectable (LANTUS) 8 Unit(s) SubCutaneous at bedtime  insulin lispro (HumaLOG) corrective regimen sliding scale   SubCutaneous Before meals and at bedtime  lactulose Syrup 20 Gram(s) Oral every 12 hours  mirtazapine 7.5 milliGRAM(s) Oral daily  pantoprazole  Injectable 40 milliGRAM(s) IV Push every 12 hours  sertraline 50 milliGRAM(s) Oral daily  vasopressin Infusion 0.04 Unit(s)/Min (2.4 mL/Hr) IV Continuous <Continuous>    MEDICATIONS  (PRN): OVERNIGHT EVENTS: Given albumin overnight for poor UOP, put out ~100mL shortly after but still low output overall. Am hgb dropped to <7 from 9.4. Repeat cbc confirmed hgb drop.    SUBJECTIVE: Pt seen and examined at bedside, less perky than yesterday but still awake and alert and conversational. Denied any pain and said he was feeling well. Denied cp, sob, abd pain, dysuria, diarrhea.    Vital Signs Last 12 Hrs  T(F): 96.8 (05-12-20 @ 12:46), Max: 97.5 (05-12-20 @ 04:00)  HR: 84 (05-12-20 @ 12:46) (82 - 87)  BP: 123/56 (05-12-20 @ 12:46) (99/56 - 127/50)  BP(mean): 73 (05-12-20 @ 12:46) (65 - 93)  RR: 23 (05-12-20 @ 12:46) (18 - 23)  SpO2: 88% (05-12-20 @ 12:46) (88% - 97%)  I&O's Summary    11 May 2020 07:01  -  12 May 2020 07:00  --------------------------------------------------------  IN: 1407.7 mL / OUT: 1242 mL / NET: 165.7 mL    12 May 2020 07:01  -  12 May 2020 13:15  --------------------------------------------------------  IN: 953 mL / OUT: 165 mL / NET: 788 mL        PHYSICAL EXAM:  General: NAD, pleasant and cooperative  HEENT: NC/AT; clear conjunctiva  Neck: supple, no JVD,   Cardiovascular: +S1/S2; RRR, no M/R/G, pericardial drain in place  Respiratory: diminished breath sounds at the bases, non-tachypneic, no respiratory distress   Gastrointestinal: soft, NT, distended but not tense  Extremities: WWP; 2+ peripheral pulses; no edema   Neurological: AAOx3    LABS:                        6.2    4.75  )-----------( 30       ( 12 May 2020 07:07 )             19.7     05-12    137  |  97  |  108<H>  ----------------------------<  99  4.2   |  24  |  5.25<H>    Ca    8.0<L>      12 May 2020 06:10  Phos  4.3     05-12  Mg     1.8     05-12    TPro  5.9<L>  /  Alb  3.2<L>  /  TBili  0.9  /  DBili  x   /  AST  15  /  ALT  9<L>  /  AlkPhos  61  05-12    PT/INR - ( 12 May 2020 06:10 )   PT: 23.6 sec;   INR: 2.03          PTT - ( 12 May 2020 06:10 )  PTT:39.7 sec      RADIOLOGY & ADDITIONAL TESTS:    MEDICATIONS  (STANDING):  chlorhexidine 2% Cloths 1 Application(s) Topical daily  finasteride 5 milliGRAM(s) Oral daily  insulin glargine Injectable (LANTUS) 8 Unit(s) SubCutaneous at bedtime  insulin lispro (HumaLOG) corrective regimen sliding scale   SubCutaneous Before meals and at bedtime  lactulose Syrup 20 Gram(s) Oral every 12 hours  mirtazapine 7.5 milliGRAM(s) Oral daily  pantoprazole  Injectable 40 milliGRAM(s) IV Push every 12 hours  sertraline 50 milliGRAM(s) Oral daily  vasopressin Infusion 0.04 Unit(s)/Min (2.4 mL/Hr) IV Continuous <Continuous>    MEDICATIONS  (PRN):

## 2020-05-13 NOTE — SWALLOW FEES ASSESSMENT ADULT - COMMENTS
Unremarkable.  No [id number] of edema or erythema in the pharynx/larynx.  TVF appeared to be mildly bowed/atrophic but with intact mobility b/l. Pt with MDS, pancytopenia, CKD, ascites of unknown origin presents from [facility] with complaints of decreased po intake and weakness likely secondary to uremia/AAMIR from diuresis for treatment of ascites, hospital course notable for UGIB and pericardial effusion with tamponade physiology , as well as ATN, and ascites of unknown etiology, now in ICU s/p paracentesis, pending IR pericardiocentesis, cirrhosis workup.    Pt seen by our service yesterday and noted to have dysphonia, overt signs & symptoms of airway protection deficits on po trials.  Pt also with reported throat pain x 3-4 weeks.  Given above, pt recommended NPO and Flexible endoscopic evaluation of swallowing ( FEES) to further assess swallow functions.,

## 2020-05-13 NOTE — PROGRESS NOTE ADULT - SUBJECTIVE AND OBJECTIVE BOX
**INCOMPLETE NOTE    OVERNIGHT EVENTS:    SUBJECTIVE:  Patient seen and examined at bedside.    Vital Signs Last 12 Hrs  T(F): 96.6 (05-13-20 @ 09:00), Max: 97.6 (05-13-20 @ 02:00)  HR: 76 (05-13-20 @ 10:00) (69 - 84)  BP: 126/61 (05-13-20 @ 10:00) (94/54 - 132/53)  BP(mean): 85 (05-13-20 @ 10:00) (62 - 85)  RR: 18 (05-13-20 @ 10:00) (17 - 20)  SpO2: 99% (05-13-20 @ 10:00) (98% - 100%)  I&O's Summary    12 May 2020 07:01  -  13 May 2020 07:00  --------------------------------------------------------  IN: 1293.8 mL / OUT: 395 mL / NET: 898.8 mL    13 May 2020 07:01  -  13 May 2020 11:47  --------------------------------------------------------  IN: 57.2 mL / OUT: 410 mL / NET: -352.8 mL        PHYSICAL EXAM  General: A&Ox 3; NAD  Neck: no JVD  Respiratory: diminished breath sounds at the bases, non-tachypneic, no respiratory distress   Cardiovascular: Regular rhythm/rate; S1/S2, no pericardial rub, pericardial drain in placed c/d/i   Gastrointestinal: distended, non tender  Extremities: WWP; 1+ thigh edema  Neurological:  CNII-XII grossly intact; no obvious focal deficits, no flapping tremor or asterixis  T/L/D: palacio draining lindsey color urine         LABS:                        7.5    6.65  )-----------( 38       ( 13 May 2020 06:02 )             24.0     05-13    138  |  99  |  112<H>  ----------------------------<  187<H>  4.5   |  22  |  5.18<H>    Ca    8.3<L>      13 May 2020 06:02  Phos  5.0     05-13  Mg     1.8     05-13    TPro  6.0  /  Alb  3.1<L>  /  TBili  1.1  /  DBili  x   /  AST  11  /  ALT  7<L>  /  AlkPhos  67  05-13    PT/INR - ( 13 May 2020 06:02 )   PT: 20.6 sec;   INR: 1.78          PTT - ( 13 May 2020 06:02 )  PTT:37.3 sec        RADIOLOGY & ADDITIONAL TESTS:    MEDICATIONS  (STANDING):  chlorhexidine 2% Cloths 1 Application(s) Topical daily  finasteride 5 milliGRAM(s) Oral daily  insulin glargine Injectable (LANTUS) 8 Unit(s) SubCutaneous at bedtime  insulin lispro (HumaLOG) corrective regimen sliding scale   SubCutaneous Before meals and at bedtime  lactulose Syrup 20 Gram(s) Oral every 12 hours  mirtazapine 7.5 milliGRAM(s) Oral daily  phytonadione   Solution 10 milliGRAM(s) Oral once  sertraline 50 milliGRAM(s) Oral daily    MEDICATIONS  (PRN): OVERNIGHT EVENTS: on: MAPs >65, dc'd vaso around 3am, with MAPs 62 at 5 am so vaso put back on     SUBJECTIVE: Patient seen and examined at bedside. He appears comfortable and in no acute distress. His pericardial tube is draining apprpiratley and he denies any pain or discomfort around his site. Otherwise, he denies any fevers, chills, shortness of breath, abdominal pain, n/v/d/c, edema.    Vital Signs Last 12 Hrs  T(F): 96.6 (05-13-20 @ 09:00), Max: 97.6 (05-13-20 @ 02:00)  HR: 76 (05-13-20 @ 10:00) (69 - 84)  BP: 126/61 (05-13-20 @ 10:00) (94/54 - 132/53)  BP(mean): 85 (05-13-20 @ 10:00) (62 - 85)  RR: 18 (05-13-20 @ 10:00) (17 - 20)  SpO2: 99% (05-13-20 @ 10:00) (98% - 100%)  I&O's Summary    12 May 2020 07:01  -  13 May 2020 07:00  --------------------------------------------------------  IN: 1293.8 mL / OUT: 395 mL / NET: 898.8 mL    13 May 2020 07:01  -  13 May 2020 11:47  --------------------------------------------------------  IN: 57.2 mL / OUT: 410 mL / NET: -352.8 mL        PHYSICAL EXAM  General: A&Ox 3; NAD  Neck: no JVD  Respiratory: diminished breath sounds at the bases, non-tachypneic, no respiratory distress   Cardiovascular: Regular rhythm/rate; S1/S2, no pericardial rub, pericardial drain in placed clean/dry/intact, draining serosanguinous fluid   Gastrointestinal: distended, non tender  Extremities: WWP; 1+ thigh edema  Neurological:  CNII-XII grossly intact; no obvious focal deficits, no flapping tremor or asterixis  T/L/D: palacio draining yellow colored urine         LABS:                        7.5    6.65  )-----------( 38       ( 13 May 2020 06:02 )             24.0     05-13    138  |  99  |  112<H>  ----------------------------<  187<H>  4.5   |  22  |  5.18<H>    Ca    8.3<L>      13 May 2020 06:02  Phos  5.0     05-13  Mg     1.8     05-13    TPro  6.0  /  Alb  3.1<L>  /  TBili  1.1  /  DBili  x   /  AST  11  /  ALT  7<L>  /  AlkPhos  67  05-13    PT/INR - ( 13 May 2020 06:02 )   PT: 20.6 sec;   INR: 1.78          PTT - ( 13 May 2020 06:02 )  PTT:37.3 sec        RADIOLOGY & ADDITIONAL TESTS:    MEDICATIONS  (STANDING):  chlorhexidine 2% Cloths 1 Application(s) Topical daily  finasteride 5 milliGRAM(s) Oral daily  insulin glargine Injectable (LANTUS) 8 Unit(s) SubCutaneous at bedtime  insulin lispro (HumaLOG) corrective regimen sliding scale   SubCutaneous Before meals and at bedtime  lactulose Syrup 20 Gram(s) Oral every 12 hours  mirtazapine 7.5 milliGRAM(s) Oral daily  phytonadione   Solution 10 milliGRAM(s) Oral once  sertraline 50 milliGRAM(s) Oral daily    MEDICATIONS  (PRN):

## 2020-05-13 NOTE — SWALLOW FEES ASSESSMENT ADULT - SLP GENERAL OBSERVATIONS
Pt awake and alert, pleasant, in NAD.  Pt followed commands & engaged in discourse.  He reported level "7" [id number] pain at baseline, decreased to level "1.5 to 2" after oral care provided.  Voice moderately breathy.

## 2020-05-13 NOTE — PROGRESS NOTE ADULT - ASSESSMENT
A/p  73 y/o M w/PMHx of DM, HTN, BPH (chronic incontinence/retention, s/p palacio on last admission), a-fib and CKD stage III/IV, admitted for new onset ascites nephrology consulted for AAMIR on CKD.

## 2020-05-13 NOTE — DISCHARGE NOTE NURSING/CASE MANAGEMENT/SOCIAL WORK - PATIENT PORTAL LINK FT
You can access the FollowMyHealth Patient Portal offered by [facility] by registering at the following website: [email or web address]. By joining [facility]’s FollowMyHealth portal, you will also be able to view your health information using other applications (apps) compatible with our system.

## 2020-05-13 NOTE — SWALLOW FEES ASSESSMENT ADULT - PHARYNGEAL PHASE COMMENTS
2018    RE: Heber Rosales, : 1973      [facility] Office Visit Note     Subjective: Heber Rosales is here for her first postoperative visit.  She underwent a laparoscopic R inguinal hernia repair by Dr. Powell on 18.  She complains of a tender lump to the right groin and intermittent discomfort to the right groin. She states she has been taking Ibuprofen and apply heat which have helped and the lump has decreased in size.  She also states she is constipated.  She currently does not need any pain medications.  She is eating a normal diet.  She also states she has not has her period since the surgery.  The patient states she is slowly resuming normal activity.  The patient denies fever/chills, n/v/d, abdominal pain, changes in urination, or wound concerns.       Objective:  Abd: soft, non-tender, non-distended.  Wound(s): clean, dry and intact.  No erythema or drainage noted.  Small, firm mass at the right approximately 2.5cm, slightly tender.      Assessment and Plan:  S/P Lap R inguinal hernia repair  - I suspect a seroma or possibly even a reactive lymph node. I recommend she continue warm compresses and use Tylenol. She should follow up with Dr. Powell in 1 month for a re-check of her right groin.   - For her complaints of constipation, I recommend a stool softener with plenty of fluids.  - I suspect the stress of surgery may have delayed her period and anticipate her body will reset; however, I still recommend she follow up with her GYN.  - Wound(s) healing well, continue to keep clean and dry.  - Advance activity as tolerated.  - She states she understands our discussion and agrees with the plan.      Sandra Hayes PA-C   Generally timely pharyngeal swallow response.  Pharyngeal swallow efficiency was partially reduced, resulting in moderate post-deglutitive residue in the valleculae and laryngeal surface of epiglottis with moist solid and moderate to significant vallecular residue with puree.  Pt with blunted sensation to pharyngeal stasis.  Liquid wash was beneficial in clearing residue.  No pnetration/aspiration noted across trials.

## 2020-05-13 NOTE — CONSULT NOTE ADULT - ASSESSMENT
IMPRESSION:  MRSA bacteremia possibly secondary to  source.  No concern for pneumonia.  In the setting of high IMAN to vancomycin (1.5) and worsening renal function can stop vancomycin and change to alternative therapy    Recommend:  1. Can stop Vancomycin   2.  Start Daptomycin 500 mg IV q48hrs. Check CPK with next blood draw  3.  Check repeat cultures daily until negative     ID team 1 will follow starting 5/14

## 2020-05-13 NOTE — PROGRESS NOTE ADULT - ATTENDING COMMENTS
AAMIR due to hypoperfusion-- may be some improvement   cont follow uo, chem   no need for IVF/ albumin it seems   lasix if needs but volume seems acceptable now with no dyspnea on RA

## 2020-05-13 NOTE — SWALLOW FEES ASSESSMENT ADULT - DIAGNOSTIC IMPRESSIONS
Pt presents with a mild to moderate pharyngeal dysphagia marked by reduced pharyngeal swallow efficiency resulting in moderate to severe ( puree > moist solid) pharyngeal residue but with ability to clear with liquid wash.  Pt cleared to resume po diet with aspiration precautions.

## 2020-05-13 NOTE — PROGRESS NOTE ADULT - ASSESSMENT
73 yo M with MDS, pancytopenia, CKD, ascites of unknown origin presents from [facility] with complaints of decreased po intake and weakness likely secondary to uremia/AAMIR from diuresis for treatment of ascites, hospital course notable for UGIB and pericardial effusion with tamponade physiology , as well as ATN, and ascites of unknown etiology, now in ICU s/p paracentesis 5/10 (2L removed), IR pericardiocentesis 5/11 (drain in place), now with anemia of unknown etiology    Neuro:   Now AxO x 3; Prior to MICU had episode of hypotension and encephalopathy on 5/10 in early PM and was more acutely lethargic and encephalopathic likely 2/2 hepatic encephalopathy   - Ammonia level elevated, started Lactulose, titrate to 2-3 soft BMs a day    #Hydrocephalus on CT: CT Head on admission showed evidence of hydrocephalus. No collateral from prior CT on rate of enlargement.   - plan for neurosurgery consult after acute issues resolved    Cardiovascular:   #Hypotension: MRSA + blood cultures on 5/10, hypotensions likely 2/2 septic shock. Possible contribution of intravascular volume depletion from diuresis vs GIB vs pericardial tamponade.   - cw albumin  - s/p pericardial drain with 100cc's removed (4000 rbcs in fluid)  - Levophed for BP support   - colloids for fluid repletion  - central line placed with cvp monitor for assess for tamponade  - if pt is to decompensate, would add back on zosyn for broad intraabdominal source coverage    #Pericardial Effusion:  Initial read with some evidence of tamponade physiology; now s/p IR for diagnostic pericardiocentesis on 5/11  - Cardiology following, appreciate recs.   - f/u interval limited echo    Pulmonary:   Patient currently saturating well on RA, non-hypoxic   - Monitor Respiratory status   - Patient is at aspiration risk given waxing and waning risk     Gastrointestinal:   UGIB: multiple episodes of melena throughout the day on 5/9.  Pt has baseline anemia from MDS and never dropped significantly below his baseline Hgb. Pt noted to have coagulopathy and thrombocytopenia.  - Again with acute drop in hgb on 5/12 from 9.2 to <7. Given 1U prbc and 1u platelets  - hb today stable at 7.5  - Monitor H&H closely  - Continue Protonix 40 mg IVP daily  - Monitor coags  - If H&H continues to downtrend patient would benefit from administration of desmopressin for uremic bleeding  - Transfuse platelets < 50 if continued bleeding  - CT ab on 5/12 with no RP bleed    Ascites:  Patient has had new onset ascites in the last 2 months with unknown etiology, not non-drinker   - Paracentesis removed 2L on 5/10; f/u fluid studies  - GI (Dr. Mallory) recs for possible cirrhosis, f/u cirrhosis labs  - was on Ceftriaxone for SBP prophylaxis in setting of decompensated cirrhosis and UGIB; zosyn dc'd after mrsa positive blood cultures on 5/12  - ct abdomen on 5/12 with no evidence of liver cirrhosis, lfts normal    Renal:   AAMIR on CKD   Patient seems to have baseline kidney function in the 2-3 region, with chronic indwelling palacio. Patient on presentation with Cr. in the 4's and uptrending with minimal urine output. US of Kidneys with evidence of mild right hydronephrosis   - Monitor BUN and Cr and electrolytes   - Renal Following, appreciate recs   - Strict I and O's   - Urine lytes indicate ATN picture     Heme:   - Patient with a history of MDS, and pancytopenia at baseline.     #Anemia:   Likely secondary to anemia from chronic disease and bone marrow suppression.   - S/p 1 unit of PRBC on 5/9 for active melena and Hgb stable   - Given 1 unit of prbc and platelets on 5/12 for acute drop, unknown source  - Monitor CBC and plts   - Transfuse for Hgb <7   - Active type and screen     #Coagulopathy:   - Patient presented with elevated INR to 2.48, with active bleeding.   - Monitor PT/INR   - Correct as needed for procedures    #DVT - RLE  - repeat dopplers on 5/11 with no DVT  - eliquis held in setting of melanax3 and drop in hgb    :     #Chronic Indwelling Palacio: Patient has a palacio cath due to a possible outlet obstruction, he was to get a cystoscopy and urodynamics outpatient.  - palacio exchanged 5/11 by nursing  - Trend Cr.   - Finasteride and Flomax for BPH held as patient is NPO     ID:   MRSA positive blood cultures that also grew in urine. On vancomycin, zosyn dc'd on 5/11  - surveillance cultures on 5/12 growing MRSA  - surveillance cultures on 5/13 AM  - vanc switched to daptomycin 550mg [id number]    Pysch:   #Depression: On home mirtazapine 7.5 mg daily and Sertraline 10 mg daily.   - Holding while NPO 73 yo M with MDS, pancytopenia, CKD, ascites of unknown origin presents from [facility] with complaints of decreased po intake and weakness likely secondary to uremia/AAMIR from diuresis for treatment of ascites, hospital course notable for UGIB and pericardial effusion with tamponade physiology , as well as ATN, and ascites of unknown etiology, now in ICU s/p paracentesis 5/10 (2L removed), IR pericardiocentesis 5/11 (drain in place), now with anemia of unknown etiology    Neuro:   Now AxO x 3; Prior to MICU had episode of hypotension and encephalopathy on 5/10 in early PM and was more acutely lethargic and encephalopathic likely 2/2 hepatic encephalopathy   - Ammonia level elevated, started Lactulose, titrate to 2-3 soft BMs a day    #Hydrocephalus on CT: CT Head on admission showed evidence of hydrocephalus. No collateral from prior CT on rate of enlargement.   - plan for neurosurgery consult after acute issues resolved    Cardiovascular:   #Hypotension: MRSA + blood cultures on 5/10, hypotensions likely 2/2 septic shock. Possible contribution of intravascular volume depletion from diuresis vs GIB vs pericardial tamponade.   - cw albumin  - s/p pericardial drain with 100cc's removed (4000 rbcs in fluid)  - Levophed for BP support   - colloids for fluid repletion  - central line placed with cvp monitor for assess for tamponade  - if pt is to decompensate, would add back on zosyn for broad intraabdominal source coverage    #Pericardial Effusion:  Initial read with some evidence of tamponade physiology; now s/p IR for diagnostic pericardiocentesis on 5/11  - Cardiology following, appreciate recs.   - f/u interval limited echo    Pulmonary:   Patient currently saturating well on RA, non-hypoxic   - Monitor Respiratory status   - Patient is at aspiration risk given waxing and waning risk     Gastrointestinal:   UGIB: multiple episodes of melena throughout the day on 5/9.  Pt has baseline anemia from MDS and never dropped significantly below his baseline Hgb. Pt noted to have coagulopathy and thrombocytopenia.  - Again with acute drop in hgb on 5/12 from 9.2 to <7. Given 1U prbc and 1U platelets  - hb today stable at 7.5   - Monitor H&H closely  - Continue Protonix 40 mg IVP daily  - Monitor coags  - If H&H continues to downtrend patient would benefit from administration of desmopressin for uremic bleeding  - Transfuse platelets < 50 if continued bleeding  - CT ab on 5/12 with no RP bleed  - NGT removed, passed FEES, started on mechanical soft and thin liquids diet    Ascites:  Patient has had new onset ascites in the last 2 months with unknown etiology, not non-drinker   - Paracentesis removed 2L on 5/10; f/u fluid studies  - GI (Dr. Mallory) recs for possible cirrhosis, f/u cirrhosis labs  - was on Ceftriaxone for SBP prophylaxis in setting of decompensated cirrhosis and UGIB; zosyn dc'd after mrsa positive blood cultures on 5/12  - ct abdomen on 5/12 with no evidence of liver cirrhosis, lfts normal  - plan for para tomorrow if plts>50    Renal:   AAMIR on CKD   Patient seems to have baseline kidney function in the 2-3 region, with chronic indwelling palacio. Patient on presentation with Cr. in the 4's and uptrending with minimal urine output. US of Kidneys with evidence of mild right hydronephrosis   - Monitor BUN and Cr and electrolytes   - Renal Following, appreciate recs   - Strict I and O's   - Urine lytes indicate ATN picture     Heme:   - Patient with a history of MDS, and pancytopenia at baseline.   - Plts 38 today, f/u repeat cbc at 4pm    #Anemia:   Likely secondary to anemia from chronic disease and bone marrow suppression.   - S/p 1 unit of PRBC on 5/9 for active melena and Hgb stable   - Given 1 unit of prbc and platelets on 5/12 for acute drop, unknown source  - Monitor CBC  - plts 38 today, will repeat at 4am  - Transfuse for Hgb <7   - Active type and screen     #Coagulopathy:   - Patient presented with elevated INR to 2.48, with active bleeding.   - Monitor PT/INR   - INR 1.78 today, given vitamin K 10mg  - Correct as needed for procedures    #DVT - RLE  - repeat dopplers on 5/11 with no DVT  - eliquis held in setting of melanax3 and drop in hgb    :     #Chronic Indwelling Palacio: Patient has a palacio cath due to a possible outlet obstruction, he was to get a cystoscopy and urodynamics outpatient.  - palacio exchanged 5/11 by nursing  - Trend Cr.   - Finasteride and Flomax for BPH held as patient is NPO       ID:   MRSA positive blood cultures that also grew in urine. On vancomycin, zosyn dc'd on 5/11  - surveillance cultures on 5/12 growing MRSA  - surveillance cultures on 5/13 AM  - vanc switched to daptomycin 550mg [id number]    Pysch:   #Depression: On home mirtazapine 7.5 mg daily and Sertraline 10 mg daily.   - Holding while NPO

## 2020-05-13 NOTE — PROGRESS NOTE ADULT - SUBJECTIVE AND OBJECTIVE BOX
O/N Events:  KRYS, came off vaso in am  Subjective:  clinical pic unchanged, NAD, renal fx plateauing, remained oliguric uop ~ 320 cc for the past 24 hours, satting 95% on RA  CXR with worsening of pulm congestion   vanc trough 23     VITALS  Vital Signs Last 24 Hrs  T(C): 35.9 (13 May 2020 09:00), Max: 37.1 (12 May 2020 22:09)  T(F): 96.6 (13 May 2020 09:00), Max: 98.7 (12 May 2020 22:09)  HR: 76 (13 May 2020 10:00) (69 - 93)  BP: 126/61 (13 May 2020 10:00) (94/54 - 138/58)  BP(mean): 85 (13 May 2020 10:00) (62 - 85)  RR: 18 (13 May 2020 10:00) (16 - 23)  SpO2: 99% (13 May 2020 10:00) (85% - 100%)    PHYSICAL EXAM  General: A&Ox 3; NAD  Neck: no JVD  Respiratory: CTA B/L  Cardiovascular: Regular rhythm/rate; S1/S2, no pericardial rub, pericardial drain in placed c/d/i   Gastrointestinal: distended, non tender  Extremities: WWP; 1+ thigh edema  Neurological:  CNII-XII grossly intact; no obvious focal deficits, no flapping tremor or asterixis  T/L/D: palacio draining lindsey color urine     MEDICATIONS  (STANDING):  chlorhexidine 2% Cloths 1 Application(s) Topical daily  finasteride 5 milliGRAM(s) Oral daily  insulin glargine Injectable (LANTUS) 8 Unit(s) SubCutaneous at bedtime  insulin lispro (HumaLOG) corrective regimen sliding scale   SubCutaneous Before meals and at bedtime  lactulose Syrup 20 Gram(s) Oral every 12 hours  mirtazapine 7.5 milliGRAM(s) Oral daily  phytonadione   Solution 10 milliGRAM(s) Oral once  sertraline 50 milliGRAM(s) Oral daily    MEDICATIONS  (PRN):      LABS                        7.5    6.65  )-----------( 38       ( 13 May 2020 06:02 )             24.0     05-13    138  |  99  |  112<H>  ----------------------------<  187<H>  4.5   |  22  |  5.18<H>    Ca    8.3<L>      13 May 2020 06:02  Phos  5.0     05-13  Mg     1.8     05-13    TPro  6.0  /  Alb  3.1<L>  /  TBili  1.1  /  DBili  x   /  AST  11  /  ALT  7<L>  /  AlkPhos  67  05-13    LIVER FUNCTIONS - ( 13 May 2020 06:02 )  Alb: 3.1 g/dL / Pro: 6.0 g/dL / ALK PHOS: 67 U/L / ALT: 7 U/L / AST: 11 U/L / GGT: x           PT/INR - ( 13 May 2020 06:02 )   PT: 20.6 sec;   INR: 1.78          PTT - ( 13 May 2020 06:02 )  PTT:37.3 sec O/N Events:  KRYS, came off vaso in am  Subjective:  clinical pic unchanged, NAD, renal fx plateauing, remained oliguric uop ~ 320 cc for the past 24 hours, satting 95% on RA  CXR with worsening of pulm congestion   vanc trough 23     VITALS  Vital Signs Last 24 Hrs  T(C): 35.9 (13 May 2020 09:00), Max: 37.1 (12 May 2020 22:09)  T(F): 96.6 (13 May 2020 09:00), Max: 98.7 (12 May 2020 22:09)  HR: 76 (13 May 2020 10:00) (69 - 93)  BP: 126/61 (13 May 2020 10:00) (94/54 - 138/58)  BP(mean): 85 (13 May 2020 10:00) (62 - 85)  RR: 18 (13 May 2020 10:00) (16 - 23)  SpO2: 99% (13 May 2020 10:00) (85% - 100%)    PHYSICAL EXAM  General: A&Ox 3; NAD on RA  Neck: no JVD  Respiratory: CTA B/L  Cardiovascular: Regular rhythm/rate; S1/S2, no pericardial rub, pericardial drain in placed c/d/i   Gastrointestinal: distended, non tender  Extremities: WWP; 1+ thigh edema  Neurological:  CNII-XII grossly intact; no obvious focal deficits, no flapping tremor or asterixis  T/L/D: palacio draining lindsey color urine     MEDICATIONS  (STANDING):  chlorhexidine 2% Cloths 1 Application(s) Topical daily  finasteride 5 milliGRAM(s) Oral daily  insulin glargine Injectable (LANTUS) 8 Unit(s) SubCutaneous at bedtime  insulin lispro (HumaLOG) corrective regimen sliding scale   SubCutaneous Before meals and at bedtime  lactulose Syrup 20 Gram(s) Oral every 12 hours  mirtazapine 7.5 milliGRAM(s) Oral daily  phytonadione   Solution 10 milliGRAM(s) Oral once  sertraline 50 milliGRAM(s) Oral daily    MEDICATIONS  (PRN):      LABS                        7.5    6.65  )-----------( 38       ( 13 May 2020 06:02 )             24.0     05-13    138  |  99  |  112<H>  ----------------------------<  187<H>  4.5   |  22  |  5.18<H>    Ca    8.3<L>      13 May 2020 06:02  Phos  5.0     05-13  Mg     1.8     05-13    TPro  6.0  /  Alb  3.1<L>  /  TBili  1.1  /  DBili  x   /  AST  11  /  ALT  7<L>  /  AlkPhos  67  05-13    LIVER FUNCTIONS - ( 13 May 2020 06:02 )  Alb: 3.1 g/dL / Pro: 6.0 g/dL / ALK PHOS: 67 U/L / ALT: 7 U/L / AST: 11 U/L / GGT: x           PT/INR - ( 13 May 2020 06:02 )   PT: 20.6 sec;   INR: 1.78          PTT - ( 13 May 2020 06:02 )  PTT:37.3 sec

## 2020-05-13 NOTE — PROGRESS NOTE ADULT - PROBLEM SELECTOR PLAN 1
AAMIR on CKD stage IV, oliguric   most likely ATN from hypotension and ischemic injury, HRS unlikely   Renal Fx plateauing, lytes are almost WNL, he is overloaded but has acceptable respiratory status and NAD, appears non uremic   - c/w to hold diuretics, please maintain MAPs> 65-70 mmhg for adequate renal perfusion, no urgent indication for RRT at present  - Vanc dosing per level, please avoid high levels as his kidney fx just started to plateau   Will follow

## 2020-05-13 NOTE — SWALLOW FEES ASSESSMENT ADULT - RECOMMENDED FEEDING/EATING TECHNIQUES
oral hygiene/maintain upright posture during/after eating for 30 mins/alternate food with liquid/position upright (90 degrees)/small sips/bites/allow for swallow between intakes/crush medication (when feasible)/Pt and RN advised re: aspiration precautions, especially alternating solids and liquids

## 2020-05-13 NOTE — CONSULT NOTE ADULT - SUBJECTIVE AND OBJECTIVE BOX
HPI:  Patient is 75 yo M with past medical history of DM, HTN, HLD, BPH (chronic indwelling palacio placement on last admission, SERA, CKD4, and a-fib  , mds, pancytopenia, presents from [facility] with complaints of decreased po intake and weakness. Patient reports that he recently had a intense bowel regimen for constipation after which he developed loose stools. To prevent further loose stools he stopped eating . Decreased po intake also in setting of poor appetite. Patient also complains for worsening LE and abdominal swelling. As per collateral obtained from Dr Barksdale at [facility]([id number]) patient abdominal usg notable for massive ascites, and increased liver echogenicity(LFTS outpatient wnl )   and he was aggressively diuresed with  Lasix , torsemide 40 mg bid (of note also started on midodrine as his pressures would drop with iv diuresis ). After diuresis his ascites and LE edema improved however he developed worsening AAMIR and so Lasix was stopped and torsemide decreased to 20 mg bid. Of note he was seen by Cardiology and  echo done, concern fluid overload was cardiac in etiology given elevated bnp and echo findings.  As per collateral patient also on Levaquin 1 week ago for intersitial infiltrates on cxr , s/p 7 day course.   Upon arrival to ED vitals bp 92/52, hr 94, rr 16, satting well ra.  Labs notable for anemia, thrombocytopenia and  worsening AAMIR. CT abdomen pelvis pending  ed management : supra pubic cath taken out, with plans to place new one, 1L NS, 1G ceftriaxone    On 5/9, ICU was consulted after pt found to have melena and downtrending H&H, transfused 1L PRBC with appropriate/exaggerated response.  TTE performed 5/8 showed moderate pericardial effusion with evidence of tamponade physiology and worsening hypotension. Given his worsening coagulopathy and active melena, he was given 2 units of FFP, and vitamin oral k to help reverse INR with some improvement. He was started on Albumin 25% q 6 hours and Midodrine 10 mg and said stable for the floors. On 5/10, he had persistently worsening hypotension, and no urine output for the last 12 hours, his mental status became more lethargic and encephalopathy, ICU was consulted, and will be stepped up for closer monitoring and BP support.     He was found to have MRSA bacteremia       PAST MEDICAL & SURGICAL HISTORY:  History of pancytopenia  H/O hydrocephalus  Anemia  HLD (hyperlipidemia)  Enlarged prostate  DM (diabetes mellitus)  HTN (hypertension)  H/O prior ablation treatment        REVIEW OF SYSTEMS:    unable to obtain       ANTIBIOTICS:  MEDICATIONS  (STANDING):  chlorhexidine 2% Cloths 1 Application(s) Topical daily  DAPTOmycin IVPB 500 milliGRAM(s) IV Intermittent every 48 hours  finasteride 5 milliGRAM(s) Oral daily  insulin glargine Injectable (LANTUS) 8 Unit(s) SubCutaneous at bedtime  insulin lispro (HumaLOG) corrective regimen sliding scale   SubCutaneous Before meals and at bedtime  insulin regular  human recombinant 2 Unit(s) SubCutaneous every 6 hours  lactulose Syrup 20 Gram(s) Oral every 12 hours  mirtazapine 7.5 milliGRAM(s) Oral daily  phytonadione   Solution 10 milliGRAM(s) Oral once  sertraline 50 milliGRAM(s) Oral daily    MEDICATIONS  (PRN):      Allergies    No Known Allergies    Intolerances        SOCIAL HISTORY:    FAMILY HISTORY:  FH: stomach cancer  FHx: breast cancer  FHx: stroke      Vital Signs Last 24 Hrs  T(C): 35.9 (13 May 2020 14:00), Max: 37.1 (12 May 2020 22:09)  T(F): 96.6 (13 May 2020 14:00), Max: 98.7 (12 May 2020 22:09)  HR: 78 (13 May 2020 15:00) (69 - 93)  BP: 97/51 (13 May 2020 15:00) (73/37 - 138/58)  BP(mean): 62 (13 May 2020 15:00) (49 - 85)  RR: 18 (13 May 2020 15:00) (16 - 20)  SpO2: 95% (13 May 2020 15:00) (95% - 100%)    PHYSICAL EXAM:  Constitutional: pale, frail, no distress  Eyes: no icterus   Ear/Nose/Throat: no oral lesion, no sinus tenderness on percussion	  Neck:  supple  Respiratory: CTA chuck  Cardiovascular: S1S2 RRR, no murmurs  Gastrointestinal:soft, (+) BS, no HSM  Extremities: + edema  :  indwelling palacio in place  Vascular: DP Pulse:	right normal; left normal            LABS:                        7.5    6.65  )-----------( 38       ( 13 May 2020 06:02 )             24.0     05-13    138  |  99  |  112<H>  ----------------------------<  187<H>  4.5   |  22  |  5.18<H>    Ca    8.3<L>      13 May 2020 06:02  Phos  5.0     05-13  Mg     1.8     05-13    TPro  6.0  /  Alb  3.1<L>  /  TBili  1.1  /  DBili  x   /  AST  11  /  ALT  7<L>  /  AlkPhos  67  05-13    PT/INR - ( 13 May 2020 06:02 )   PT: 20.6 sec;   INR: 1.78          PTT - ( 13 May 2020 06:02 )  PTT:37.3 sec      MICROBIOLOGY:    Culture - Blood (05.12.20 @ 13:17)    Gram Stain:   Aerobic Bottle: Gram Positive Cocci in Clusters  Result called to and read back by_ RYAN Carrion RN  05/13/2020 11:23:32  Anaerobic Bottle: Gram Positive Cocci in Clusters  Floor previously notified.    Specimen Source: .Blood Blood    Culture Results:   Culture in progress    Culture - Blood (05.10.20 @ 21:14)    Gram Stain:   Aerobic Bottle: Gram Positive Cocci in Clusters  Result called to and read back by_ KIRSTEN Blanchard RN  05/11/2020 09:26:06  ***Blood Panel PCR results on this specimen are available  approximately 3 hours after the Gram stain result.***  Gram stain, PCR, and/or culture results may not always  correspond due to difference in methodologies.  ************************************************************  This PCR assay was performed using [employer].  The following targets are tested for: Enterococcus,  vancomycin resistant enterococci, Listeria monocytogenes,  coagulase negative staphylococci, S. aureus,  methicillin resistant S. aureus, Streptococcus agalactiae  (Group B), S. pneumoniae, S. pyogenes (Group A),  Acinetobacter baumannii, Enterobacter cloacae, E. coli,  Klebsiella oxytoca, K. pneumoniae, Proteus sp.,  Serratia marcescens, Haemophilus influenzae,  Neisseria meningitidis, Pseudomonas aeruginosa, Candida  albicans, C. glabrata, C krusei, C parapsilosis,  C. tropicalis and the KPC resistance gene.  Anaerobic Bottle: Gram Positive Cocci in Clusters  Floor previously notified.    -  Methicillin resistant Staphylococcus aureus (MRSA): Detec    -  Vancomycin: S 1.5    -  Trimethoprim/Sulfamethoxazole: R >2/38    -  Daptomycin: S 0.5    -  Clindamycin: S <=0.25    -  Cefazolin: R 8    -  Linezolid: S 4    -  Oxacillin: R >2    -  Erythromycin: R >4    -  RIF- Rifampin: S <=1 Should not be used as monotherapy    -  Tetra/Doxy: S <=1    Specimen Source: .Blood Blood    Organism: Methicillin resistant Staphylococcus aureus    Organism: Methicillin resistant Staphylococcus aureus    Organism: Blood Culture PCR    Culture Results:   Growth in aerobic and anaerobic bottles: Methicillin resistant  Staphylococcus aureus  Floor previously notified.    Organism Identification: Methicillin resistant Staphylococcus aureus  Methicillin resistant Staphylococcus aureus  Blood Culture PCR    Method Type: PCR    Method Type: ETEST    Method Type: IMAN      RADIOLOGY & ADDITIONAL STUDIES:    < from: Xray Chest 1 View- PORTABLE-Routine (05.13.20 @ 03:17) >  IMPRESSION: Frontal view of the chest is compared to 5/12/2020 and demonstrates distant pericardial drain overlying the heart. Enteric tube tip below the level of the diaphragm. Central venous catheter with tip in the SVC. Interval progression of now severe pulmonary edema is alveolar airspace infiltration throughout the right lung with possible consolidation in the left perihilar region. Cardiomegaly.    < end of copied text >    < from: TTE Echo Limited or F/U (05.12.20 @ 09:53) >     1. Limited study obtained for evaluation of pericardial effusion.   2. Normal left ventricular size and function.   3. Mildly dilated right ventricular size.   4. Normal right ventricular systolic function.   5. Mild-to-moderate mitral regurgitation.   6. Aortic sclerosis without significant stenosis.   7. No pericardial effusion.   8. Right pleural effusion.   9. Ascites present.    < end of copied text >

## 2020-05-13 NOTE — DISCHARGE NOTE NURSING/CASE MANAGEMENT/SOCIAL WORK - [id number]_GEN_ALL_CORE
Call 911 for stroke/Stroke support groups for patients, families, and friends/Prescribed medications/Need for follow up after discharge/Stroke education booklet/Stroke warning signs and symptoms/Signs and symptoms of stroke/Risk factors for stroke

## 2020-05-14 NOTE — PROGRESS NOTE ADULT - SUBJECTIVE AND OBJECTIVE BOX
75 yo M s/p IR pericardiocentesis 5/11. Attempted to flush drain with 10 cc syringe, however unable to flush. Also attempted to suction but few clots were removed with no flush. Output appeared with dark clot appearance with 100 cc/24 hour from 80 cc/previous 24 h.    ADDITIONAL REVIEW OF SYSTEMS:    MEDICATIONS  (STANDING):  chlorhexidine 2% Cloths 1 Application(s) Topical daily  DAPTOmycin IVPB 500 milliGRAM(s) IV Intermittent every 48 hours  finasteride 5 milliGRAM(s) Oral daily  insulin glargine Injectable (LANTUS) 8 Unit(s) SubCutaneous at bedtime  insulin lispro (HumaLOG) corrective regimen sliding scale   SubCutaneous Before meals and at bedtime  insulin lispro Injectable (HumaLOG) 2 Unit(s) SubCutaneous three times a day before meals  lactulose Syrup 20 Gram(s) Oral every 12 hours  midodrine 10 milliGRAM(s) Oral every 8 hours  mirtazapine 7.5 milliGRAM(s) Oral daily  pantoprazole  Injectable 40 milliGRAM(s) IV Push every 24 hours  sertraline 50 milliGRAM(s) Oral daily    MEDICATIONS  (PRN):    CAPILLARY BLOOD GLUCOSE      POCT Blood Glucose.: 230 mg/dL (14 May 2020 11:18)  POCT Blood Glucose.: 148 mg/dL (14 May 2020 06:51)  POCT Blood Glucose.: 145 mg/dL (13 May 2020 20:59)  POCT Blood Glucose.: 158 mg/dL (13 May 2020 16:16)    I&O's Summary    13 May 2020 07:01  -  14 May 2020 07:00  --------------------------------------------------------  IN: 236.4 mL / OUT: 1255 mL / NET: -1018.6 mL    14 May 2020 07:01  -  14 May 2020 16:09  --------------------------------------------------------  IN: 120 mL / OUT: 100 mL / NET: 20 mL        PHYSICAL EXAM:  Vital Signs Last 24 Hrs  T(C): 36.2 (14 May 2020 14:00), Max: 36.3 (14 May 2020 00:00)  T(F): 97.1 (14 May 2020 14:00), Max: 97.4 (14 May 2020 09:00)  HR: 82 (14 May 2020 15:00) (71 - 83)  BP: 109/60 (14 May 2020 15:00) (83/65 - 126/73)  BP(mean): 72 (14 May 2020 15:00) (58 - 88)  RR: 16 (14 May 2020 15:00) (14 - 24)  SpO2: 97% (14 May 2020 15:00) (93% - 98%)  CONSTITUTIONAL: NAD, well-developed, well-groomed  ENMT: Moist oral mucosa, no pharyngeal injection or exudates; normal dentition  CARDIOVASCULAR: s/p pericardial drain in palce  ABDOMEN: Distented and fluid filled  PSYCH: A+O x 1    LABS:                        8.6    6.96  )-----------( 35       ( 14 May 2020 05:13 )             26.7     05-14    135  |  97  |  118<H>  ----------------------------<  134<H>  4.4   |  23  |  5.48<H>    Ca    8.3<L>      14 May 2020 05:13  Phos  5.2     05-14  Mg     2.2     05-14    TPro  5.9<L>  /  Alb  2.9<L>  /  TBili  0.9  /  DBili  x   /  AST  9<L>  /  ALT  6<L>  /  AlkPhos  59  05-14    PT/INR - ( 14 May 2020 05:12 )   PT: 19.3 sec;   INR: 1.67          PTT - ( 14 May 2020 05:12 )  PTT:35.5 sec  CARDIAC MARKERS ( 14 May 2020 05:13 )  x     / x     / <7 U/L / x     / x              Culture - Blood (collected 12 May 2020 13:17)  Source: .Blood Blood  Gram Stain (13 May 2020 14:09):    Aerobic Bottle: Gram Positive Cocci in Clusters    Result called to and read back byRodrick Carrion RN  05/13/2020 11:23:32    Anaerobic Bottle: Gram Positive Cocci in Clusters    Floor previously notified.  Preliminary Report (14 May 2020 09:42):    Growth in aerobic and anaerobic bottles: Staphylococcus aureus    presumptive Methicillin resistant    Confirmation to follow within 24 hours Susceptibility to follow.    Floor previously notified.

## 2020-05-14 NOTE — PROGRESS NOTE ADULT - ASSESSMENT
73 yo M with MDS, pancytopenia, CKD, ascites of unknown origin presents from [facility] with complaints of decreased po intake and weakness likely secondary to uremia/AAMIR from diuresis for treatment of ascites, hospital course notable for UGIB and pericardial effusion with tamponade physiology , as well as ATN, and ascites of unknown etiology, now in ICU s/p paracentesis 5/10 (2L removed), IR pericardiocentesis 5/11 (drain in place), now with anemia of unknown etiology    Neuro:   Now AxO x 3; Prior to MICU had episode of hypotension and encephalopathy on 5/10 in early PM and was more acutely lethargic and encephalopathic likely 2/2 hepatic encephalopathy   - Ammonia level elevated, started Lactulose, titrate to 2-3 soft BMs a day    #Hydrocephalus on CT: CT Head on admission showed evidence of hydrocephalus. No collateral from prior CT on rate of enlargement.   - plan for neurosurgery consult after acute issues resolved    Cardiovascular:   #Hypotension: MRSA + blood cultures on 5/10, hypotensions likely 2/2 septic shock. Possible contribution of intravascular volume depletion from diuresis vs GIB vs pericardial tamponade.   - cw albumin  - s/p pericardial drain with 100cc's removed (4000 rbcs in fluid)  - Levophed for BP support   - colloids for fluid repletion  - central line placed with cvp monitor for assess for tamponade  - if pt is to decompensate, would add back on zosyn for broad intraabdominal source coverage    #Pericardial Effusion:  Initial read with some evidence of tamponade physiology; now s/p IR for diagnostic pericardiocentesis on 5/11  - Cardiology following, appreciate recs.   - f/u interval limited echo    Pulmonary:   Patient currently saturating well on RA, non-hypoxic   - Monitor Respiratory status   - Patient is at aspiration risk given waxing and waning risk     Gastrointestinal:   UGIB: multiple episodes of melena throughout the day on 5/9.  Pt has baseline anemia from MDS and never dropped significantly below his baseline Hgb. Pt noted to have coagulopathy and thrombocytopenia.  - Again with acute drop in hgb on 5/12 from 9.2 to <7. Given 1U prbc and 1U platelets  - hb today stable at 7.5   - Monitor H&H closely  - Continue Protonix 40 mg IVP daily  - Monitor coags  - If H&H continues to downtrend patient would benefit from administration of desmopressin for uremic bleeding  - Transfuse platelets < 50 if continued bleeding  - CT ab on 5/12 with no RP bleed  - NGT removed on 5/13, passed FEES, started on mechanical soft and thin liquids diet    Ascites:  Patient has had new onset ascites in the last 2 months with unknown etiology, not non-drinker   - Paracentesis removed 2L on 5/10; f/u fluid studies  - GI (Dr. Mallory) recs for possible cirrhosis, f/u cirrhosis labs  - was on Ceftriaxone for SBP prophylaxis in setting of decompensated cirrhosis and UGIB; zosyn dc'd after mrsa positive blood cultures on 5/12  - ct abdomen on 5/12 with no evidence of liver cirrhosis, lfts normal  - plan for para today    Renal:   AAMIR on CKD   Patient seems to have baseline kidney function in the 2-3 region, with chronic indwelling palacio. Patient on presentation with Cr. in the 4's and uptrending with minimal urine output. US of Kidneys with evidence of mild right hydronephrosis   - Monitor BUN and Cr and electrolytes   - Renal Following, appreciate recs   - Strict I and O's   - Urine lytes indicate most likely ATN likely due to hypotension and ischemic injury, HRS unlikely     Heme:   - Patient with a history of MDS, and pancytopenia at baseline.   - Plts 35 on 5/14, recieved 1 unit platelets    #Anemia:   Likely secondary to anemia from chronic disease and bone marrow suppression.   - S/p 1 unit of PRBC on 5/9 for active melena and Hgb stable   - Given 1 unit of prbc and platelets on 5/12 for acute drop, unknown source  - Monitor CBC  - plts 35 on 5/14, recieved 1 unit platelets  - Transfuse for Hgb <7   - Active type and screen     #Coagulopathy:   - Patient presented with elevated INR to 2.48, with active bleeding.   - Monitor PT/INR   - INR 1.67 today    #DVT - RLE  - repeat dopplers on 5/11 with no DVT  - eliquis held in setting of melanax3 and drop in hgb    :     #Chronic Indwelling Palacio: Patient has a palacio cath due to a possible outlet obstruction, he was to get a cystoscopy and urodynamics outpatient.  - palacio exchanged 5/11 by nursing  - Trend Cr.   - continued Finasteride 5mg daily      ID:   MRSA positive blood cultures that also grew in urine. On vancomycin, zosyn dc'd on 5/11  - surveillance cultures on 5/12 growing MRSA  - surveillance cultures on 5/13 and 5/14 AM  - c/w daptomycin 550mg [id number]    Pysch:   #Depression:   - home mirtazapine 7.5 mg daily and Sertraline 10 mg daily.

## 2020-05-14 NOTE — CHART NOTE - NSCHARTNOTEFT_GEN_A_CORE
Admitting Diagnosis:   Patient is a 74y old  Male who presents with a chief complaint of weakness (14 May 2020 09:48)      PAST MEDICAL & SURGICAL HISTORY:  History of pancytopenia  H/O hydrocephalus  Anemia  HLD (hyperlipidemia)  Enlarged prostate  DM (diabetes mellitus)  HTN (hypertension)  H/O prior ablation treatment      Current Nutrition Order:   Diet, Dysphagia 2 Mechanical Soft-Thin Liquids (05-13-20 @ 11:59)      PO Intake: Good (%) [   ]  Fair (50-75%) [ x  ] Poor (<25%) [   ]    GI Issues: no n/v/c + diarrhea/ liquid BM noted     Pain: no pain noted per pt     Skin Integrity: stage 2 pressure ulcer to sacrum, unstageable to heel     Labs:   05-14    135  |  97  |  118<H>  ----------------------------<  134<H>  4.4   |  23  |  5.48<H>    Ca    8.3<L>      14 May 2020 05:13  Phos  5.2     05-14  Mg     2.2     05-14    TPro  5.9<L>  /  Alb  2.9<L>  /  TBili  0.9  /  DBili  x   /  AST  9<L>  /  ALT  6<L>  /  AlkPhos  59  05-14    CAPILLARY BLOOD GLUCOSE      POCT Blood Glucose.: 148 mg/dL (14 May 2020 06:51)  POCT Blood Glucose.: 145 mg/dL (13 May 2020 20:59)  POCT Blood Glucose.: 158 mg/dL (13 May 2020 16:16)  POCT Blood Glucose.: 183 mg/dL (13 May 2020 11:52)      Medications:  MEDICATIONS  (STANDING):  chlorhexidine 2% Cloths 1 Application(s) Topical daily  DAPTOmycin IVPB 500 milliGRAM(s) IV Intermittent every 48 hours  finasteride 5 milliGRAM(s) Oral daily  insulin glargine Injectable (LANTUS) 8 Unit(s) SubCutaneous at bedtime  insulin lispro (HumaLOG) corrective regimen sliding scale   SubCutaneous Before meals and at bedtime  insulin regular  human recombinant 2 Unit(s) SubCutaneous every 6 hours  lactulose Syrup 20 Gram(s) Oral every 12 hours  midodrine 10 milliGRAM(s) Oral every 8 hours  mirtazapine 7.5 milliGRAM(s) Oral daily  pantoprazole  Injectable 40 milliGRAM(s) IV Push every 24 hours  sertraline 50 milliGRAM(s) Oral daily  vasopressin Infusion 0.04 Unit(s)/Min (2.4 mL/Hr) IV Continuous <Continuous>    MEDICATIONS  (PRN):      Weight: 71.8kg     Weight Change: no new wts     Nutrition Focused Physical Exam: Completed [x-5/9   ]  Not Pertinent [   ]    Estimated energy needs:   ABW 71.8kg, IBW 75kg, 95% IBW, ht 70", BMI 22.7  ABW used for calculations as pt between % of IBW.   Nutrient needs based on [facility] standards of care for maintenance in adults, adjusted for age, fluid per team  1795-2154kcal (25-30kcal/kg)   71.8-86g pro (1-1.2g/kg pro)     Subjective:   73 yo M with MDS, DM2, a.fib, HTN, HLD, BPH s/p palacio, pancytopenia, CKD4, ascites of unknown origin presents from [facility] with complaints of decreased po intake and weakness likely secondary to uremia/AAMIR from diuresis for treatment of ascites, hospital course notable for UGIB and pericardial effusion with tamponade physiology , as well as ATN, and ascites of unknown etiology, now in ICU s/p paracentesis 5/10 (2L removed), IR pericardiocentesis 5/11 (drain in place), now with anemia of unknown etiology.  Underwent FEES 5/13 recommending mech soft diet. Pt notes tolerating well, eager to go to bathroom/ home. Discussed current status and encouraged continuation of intake through day to meet needs.     Previous Nutrition Diagnosis: Mild protein-calorie malnutrition r/t physiologic causes AEB reported poor Po intake for > 5 days PTA and observed mild muscle losses to temporal region coupled with mild fat loss over triceps region.    Active [ x  ]  Resolved [   ]    If resolved, new PES:     Goal: Preserve lean body mass while INPT.  Po intake to meet within 10% of EER.    Recommendations:  - Continue mech soft diet per SLP   - Please obtain weights q 3 days for closer monitoring  - Continue mirtazapine for potential of appetite stimulation  - Bowel regimen as needed  - Check POC q6hrs   - Incorporate food preferences as provided  - Electrolytes wnl    Education: encouraged intake through day     Risk Level: High [ x  ] Moderate [   ] Low [   ]

## 2020-05-14 NOTE — PROGRESS NOTE ADULT - ASSESSMENT
73 yo M s/p IR pericardiocentesis 5/11. Unable to flush drain. REcommend primary to obtain echocardiogram to assess amount of pericardial fluid. If persistent, will exchange catheter    IR will follow. Page IR resident for any inquiries.

## 2020-05-14 NOTE — PROGRESS NOTE ADULT - PROBLEM SELECTOR PLAN 1
AAMIR on CKD stage IV, oliguric   most likely ATN from hypotension and ischemic injury, HRS unlikely   Renal Fx plateauing, lytes are almost WNL, he is overloaded but has acceptable respiratory status and not in distress, appears non uremic   - c/w to hold diuretics, please maintain MAPs> 65-70 mmhg for adequate renal perfusion, midodrine increased to 10 mg TID,  no urgent indication for RRT at present  - plz renally dose daptomycin   Will follow

## 2020-05-14 NOTE — PROGRESS NOTE ADULT - SUBJECTIVE AND OBJECTIVE BOX
O/N Events:  KRYS, remained HDS, did not require pressors  Subjective:  looks NAD, denies any cute complaints except severe weakness, renal fx plateauing for the past few days, remained oliguric, / 1L -FB for 24 hours hypervolemic but unchanged clinically satting 96% on RA/ nonuremic     VITALS  Vital Signs Last 24 Hrs  T(C): 36.2 (14 May 2020 09:44), Max: 36.3 (14 May 2020 00:00)  T(F): 97.1 (14 May 2020 09:44), Max: 97.4 (14 May 2020 09:00)  HR: 83 (14 May 2020 11:00) (71 - 83)  BP: 105/57 (14 May 2020 11:00) (73/37 - 126/73)  BP(mean): 65 (14 May 2020 11:00) (49 - 88)  RR: 21 (14 May 2020 11:00) (14 - 24)  SpO2: 96% (14 May 2020 11:00) (93% - 99%)    PHYSICAL EXAM  General: A&Ox 3; NAD on RA  Neck: no JVD  Respiratory: CTA B/L  Cardiovascular: Regular rhythm/rate; S1/S2, no pericardial rub, pericardial drain in placed c/d/i   Gastrointestinal: distended, non tender  Extremities: WWP; no edema   Neurological:  CNII-XII grossly intact; no obvious focal deficits, no flapping tremor or asterixis    MEDICATIONS  (STANDING):  chlorhexidine 2% Cloths 1 Application(s) Topical daily  DAPTOmycin IVPB 500 milliGRAM(s) IV Intermittent every 48 hours  finasteride 5 milliGRAM(s) Oral daily  insulin glargine Injectable (LANTUS) 8 Unit(s) SubCutaneous at bedtime  insulin lispro (HumaLOG) corrective regimen sliding scale   SubCutaneous Before meals and at bedtime  insulin regular  human recombinant 2 Unit(s) SubCutaneous every 6 hours  lactulose Syrup 20 Gram(s) Oral every 12 hours  midodrine 10 milliGRAM(s) Oral every 8 hours  mirtazapine 7.5 milliGRAM(s) Oral daily  pantoprazole  Injectable 40 milliGRAM(s) IV Push every 24 hours  sertraline 50 milliGRAM(s) Oral daily  vasopressin Infusion 0.04 Unit(s)/Min (2.4 mL/Hr) IV Continuous <Continuous>    MEDICATIONS  (PRN):      LABS                        8.6    6.96  )-----------( 35       ( 14 May 2020 05:13 )             26.7     05-14    135  |  97  |  118<H>  ----------------------------<  134<H>  4.4   |  23  |  5.48<H>    Ca    8.3<L>      14 May 2020 05:13  Phos  5.2     05-14  Mg     2.2     05-14    TPro  5.9<L>  /  Alb  2.9<L>  /  TBili  0.9  /  DBili  x   /  AST  9<L>  /  ALT  6<L>  /  AlkPhos  59  05-14    LIVER FUNCTIONS - ( 14 May 2020 05:13 )  Alb: 2.9 g/dL / Pro: 5.9 g/dL / ALK PHOS: 59 U/L / ALT: 6 U/L / AST: 9 U/L / GGT: x           PT/INR - ( 14 May 2020 05:12 )   PT: 19.3 sec;   INR: 1.67          PTT - ( 14 May 2020 05:12 )  PTT:35.5 sec    CARDIAC MARKERS ( 14 May 2020 05:13 )  x     / x     / <7 U/L / x     / x O/N Events:  KRYS, remained HDS, did not require pressors  Subjective:  looks NAD, denies any acute complaints except severe weakness, renal fx plateauing for the past few days, remained oliguric, / 1L -FB for 24 hours hypervolemic but unchanged clinically satting 96% on RA/ nonuremic     VITALS  Vital Signs Last 24 Hrs  T(C): 36.2 (14 May 2020 09:44), Max: 36.3 (14 May 2020 00:00)  T(F): 97.1 (14 May 2020 09:44), Max: 97.4 (14 May 2020 09:00)  HR: 83 (14 May 2020 11:00) (71 - 83)  BP: 105/57 (14 May 2020 11:00) (73/37 - 126/73)  BP(mean): 65 (14 May 2020 11:00) (49 - 88)  RR: 21 (14 May 2020 11:00) (14 - 24)  SpO2: 96% (14 May 2020 11:00) (93% - 99%)    PHYSICAL EXAM  General: A&Ox 3; NAD on RA  Neck: no JVD  Respiratory: CTA B/L ant  Cardiovascular: Regular rhythm/rate; S1/S2, no pericardial rub, pericardial drain in placed c/d/i   Gastrointestinal: distended, non tender  Extremities: WWP; thigh edema b/l  Neurological:  CNII-XII grossly intact; no obvious focal deficits, no flapping tremor or asterixis    MEDICATIONS  (STANDING):  chlorhexidine 2% Cloths 1 Application(s) Topical daily  DAPTOmycin IVPB 500 milliGRAM(s) IV Intermittent every 48 hours  finasteride 5 milliGRAM(s) Oral daily  insulin glargine Injectable (LANTUS) 8 Unit(s) SubCutaneous at bedtime  insulin lispro (HumaLOG) corrective regimen sliding scale   SubCutaneous Before meals and at bedtime  insulin regular  human recombinant 2 Unit(s) SubCutaneous every 6 hours  lactulose Syrup 20 Gram(s) Oral every 12 hours  midodrine 10 milliGRAM(s) Oral every 8 hours  mirtazapine 7.5 milliGRAM(s) Oral daily  pantoprazole  Injectable 40 milliGRAM(s) IV Push every 24 hours  sertraline 50 milliGRAM(s) Oral daily  vasopressin Infusion 0.04 Unit(s)/Min (2.4 mL/Hr) IV Continuous <Continuous>    MEDICATIONS  (PRN):      LABS                        8.6    6.96  )-----------( 35       ( 14 May 2020 05:13 )             26.7     05-14    135  |  97  |  118<H>  ----------------------------<  134<H>  4.4   |  23  |  5.48<H>    Ca    8.3<L>      14 May 2020 05:13  Phos  5.2     05-14  Mg     2.2     05-14    TPro  5.9<L>  /  Alb  2.9<L>  /  TBili  0.9  /  DBili  x   /  AST  9<L>  /  ALT  6<L>  /  AlkPhos  59  05-14    LIVER FUNCTIONS - ( 14 May 2020 05:13 )  Alb: 2.9 g/dL / Pro: 5.9 g/dL / ALK PHOS: 59 U/L / ALT: 6 U/L / AST: 9 U/L / GGT: x           PT/INR - ( 14 May 2020 05:12 )   PT: 19.3 sec;   INR: 1.67          PTT - ( 14 May 2020 05:12 )  PTT:35.5 sec    CARDIAC MARKERS ( 14 May 2020 05:13 )  x     / x     / <7 U/L / x     / x

## 2020-05-14 NOTE — PROGRESS NOTE ADULT - PROBLEM SELECTOR PLAN 3
History of Present Illness


Consult date: 20


Reason for Consult: 





colitis, biliary stent





Requesting physician: Chano E Sheet


History of present illness: 





CHIEF COMPLAINT: Abdominal pain, biliary stent





HISTORY OF PRESENT ILLNESS: 78-year-old female who presented to the emergency 

room with a chief complaint of abdominal pain and rectal bleeding.  Patient has 

a history of cholangiocarcinoma.  Patient follows with a physician out of 

[facility].  She underwent ERCP with stent placement in 2019.  

Patient examined at the bedside with Dr. Bond.  Patient is currently denying

abdominal pain.  She reports having loose stools.  She reports nausea. 





PAST MEDICAL HISTORY: 


See list.





PAST SURGICAL HISTORY: 


See list.





SOCIAL HISTORY: No illicit drug use.  





REVIEW OF SYSTEMS: 


CONSTITUTIONAL: Denies fever or chills.


HEENT: Denies blurred vision, vision changes, or eye pain. Denies hemoptysis 


CARDIOVASCULAR: Denies chest pain or pressure.


RESPIRATORY: No shortness of breath. 


GASTROINTESTINAL: Refer to [facility] for pertinent findings


HEMATOLOGIC: Denies bleeding disorders.


GENITOURINARY:  Denies any blood in urine.


SKIN: Denies pruitis. Denies rash.





PHYSICAL EXAM: 


VITAL SIGNS: Reviewed.


GENERAL: Well-developed in no acute distress. 


HEENT:  No sclera icterus. Extraocular movements grossly intact.  Moist buccal 

mucosa. Head is atraumatic, normocephalic. 


ABDOMEN:  Soft.  Nondistended. Nontender. 


NEUROLOGIC: Alert and oriented. Cranial nerves II through XII grossly intact.





LABORATORY DATA:


WBC 6.1.  Hemoglobin 11.5.  Platelet count 151.


Bilirubin 0.8.  AST 49.  ALT 44.


Stool for occult blood positive





IMAGING:


CT abdomen pelvis: Dilated biliary tree and markedly dilated gallbladder.  

Possible biliary stent malfunction.  Diffuse wall thickening of the descending 

colon and splenic flexure of the colon.  Suggestive of nonspecific colitis





ASSESSMENT: 


1.  Acute colitis


2.  History of cholangiocarcinoma with previous stent placement





PLAN: 


Dr. Bond evaluated patient at the bedside. Continue treatment of colitis per

GI service. Continue diet as tolerated. 


No surgical intervention recommended at this time. Patient encouraged to follow 

up with her physicians at [facility] post discharge





Nurse practitioner note has been reviewed by physician. Signing provider agrees 

with the documented findings, assessment, and plan of care. 








Past Medical History


Past Medical History: Atrial Fibrillation, Cancer, GERD/Reflux, Osteoarthritis 

(OA)


Additional Past Medical History / Comment(s): Possible hepatitis B years ago, 

UTIs, cardiac murmur, scoliosis,  DJD, cervical pain d/t position, ocular 

migraines, occasional numbness/tingling to toes bilateral feet, hiatal hernia, 

rectal prolapse, bile duct carncinoma


History of Any Multi-Drug Resistant Organisms: None Reported


Past Surgical History: Appendectomy, Breast Surgery, Hernia Repair, Hysterectomy


Additional Past Surgical History / Comment(s): Umbilical hernia repair, EGD, 

colonoscopy, bilateral cataract removals/lens implants, L breast benign biopsy, 

ercp


Past Anesthesia/Blood Transfusion Reactions: Family History of Problems w/ 

Anesthesia, Postoperative Nausea & Vomiting (PONV)


Additional Past Anesthesia/Blood Transfusion Reaction / Comm: Pt states her 

mother's heart stopped during gallbladder surgery.


Past Psychological History: No Psychological Hx Reported


Additional Psychological History / Comment(s): Pt resides alone.  She is indpend

ent.


Smoking Status: Never smoker


Past Alcohol Use History: None Reported


Past Drug Use History: None Reported





- Past Family History


  ** Mother


Family Medical History: Coronary Artery Disease (CAD), CVA/TIA


Additional Family Medical History / Comment(s): Mother had cardiac valve 

disease.  She  from a postop CVA.





  ** Father


Additional Family Medical History / Comment(s): Father had myasthenia gravis.





Medications and Allergies


                                Home Medications











 Medication  Instructions  Recorded  Confirmed  Type


 


Apixaban [Eliquis] 5 mg PO BID #60 tab 17 Rx


 


Pantoprazole Sodium [Protonix] 40 mg PO DAILY 19 History


 


Metoprolol Tartrate [Lopressor] 12.5 mg PO BID 19 History


 


Acetaminophen [Tylenol] 500 mg PO Q8H PRN 20 History


 


Docusate [Colace] 100 mg PO BID PRN 20 History


 


Multivitamins, Thera [Multivitamin 1 tab PO DAILY 20 History





(formulary)]    


 


Polyethylene Glycol 3350 [Miralax] 17 gm PO DAILY PRN 20 History








                                    Allergies











Allergy/AdvReac Type Severity Reaction Status Date / Time


 


adhesive tape Allergy  Unknown Verified 20 08:21


 


cefuroxime Allergy  Itching Verified 20 08:21


 


levofloxacin [From Levaquin] Allergy  Rash/Hives Verified 20 08:21


 


Additives in inhalers AdvReac  Rapid Uncoded 20 01:08





   Heart Rate  














Surgical - Exam


                                   Vital Signs











Temp Pulse Resp BP Pulse Ox


 


 98.1 F   108 H  16   139/74   100 


 


 20 01:03  20 01:03  20 01:03  20 01:03  20 01:03














Results





- Labs





                                 20 06:03





                                 20 06:03


                  Abnormal Lab Results - Last 24 Hours (Table)











  20 Range/Units





  01:35 06:03 06:03 


 


RBC   3.67 L   (3.80-5.40)  m/uL


 


Neutrophils #  8.4 H    (1.3-7.7)  k/uL


 


Lymphocytes #  0.5 L  0.6 L   (1.0-4.8)  k/uL


 


Chloride    110 H  ()  mmol/L


 


Calcium    8.0 L  (8.4-10.2)  mg/dL


 


AST    49 H  (14-36)  U/L


 


ALT    44 H  (4-34)  U/L


 


Alkaline Phosphatase    227 H  ()  U/L


 


Total Protein    5.3 L  (6.3-8.2)  g/dL


 


Albumin    2.6 L  (3.5-5.0)  g/dL








                                 Diabetes panel











  20 Range/Units





  06:03 


 


Sodium  138  (137-145)  mmol/L


 


Potassium  3.6  (3.5-5.1)  mmol/L


 


Chloride  110 H  ()  mmol/L


 


Carbon Dioxide  25  (22-30)  mmol/L


 


BUN  7  (7-17)  mg/dL


 


Creatinine  0.54  (0.52-1.04)  mg/dL


 


Glucose  86  (74-99)  mg/dL


 


Calcium  8.0 L  (8.4-10.2)  mg/dL


 


AST  49 H  (14-36)  U/L


 


ALT  44 H  (4-34)  U/L


 


Alkaline Phosphatase  227 H  ()  U/L


 


Total Protein  5.3 L  (6.3-8.2)  g/dL


 


Albumin  2.6 L  (3.5-5.0)  g/dL








                                  Calcium panel











  20 Range/Units





  06:03 


 


Calcium  8.0 L  (8.4-10.2)  mg/dL


 


Albumin  2.6 L  (3.5-5.0)  g/dL








                                 Pituitary panel











  20 Range/Units





  06:03 


 


Sodium  138  (137-145)  mmol/L


 


Potassium  3.6  (3.5-5.1)  mmol/L


 


Chloride  110 H  ()  mmol/L


 


Carbon Dioxide  25  (22-30)  mmol/L


 


BUN  7  (7-17)  mg/dL


 


Creatinine  0.54  (0.52-1.04)  mg/dL


 


Glucose  86  (74-99)  mg/dL


 


Calcium  8.0 L  (8.4-10.2)  mg/dL








                                  Adrenal panel











  20 Range/Units





  06:03 


 


Sodium  138  (137-145)  mmol/L


 


Potassium  3.6  (3.5-5.1)  mmol/L


 


Chloride  110 H  ()  mmol/L


 


Carbon Dioxide  25  (22-30)  mmol/L


 


BUN  7  (7-17)  mg/dL


 


Creatinine  0.54  (0.52-1.04)  mg/dL


 


Glucose  86  (74-99)  mg/dL


 


Calcium  8.0 L  (8.4-10.2)  mg/dL


 


Total Bilirubin  0.8  (0.2-1.3)  mg/dL


 


AST  49 H  (14-36)  U/L


 


ALT  44 H  (4-34)  U/L


 


Alkaline Phosphatase  227 H  ()  U/L


 


Total Protein  5.3 L  (6.3-8.2)  g/dL


 


Albumin  2.6 L  (3.5-5.0)  g/dL Resolved, creatinine below baseline.  1.9, baseline 2.2.  Creatinine on arrival 3.55, improved with IVF.  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  -urology following.  started flomax and proscar.  c/w indwelling palacio.  -renal U/S with moderate b/l hydro.  -hold lasix

## 2020-05-14 NOTE — PROGRESS NOTE ADULT - SUBJECTIVE AND OBJECTIVE BOX
Pt seen and examined   no complaints  NGT out  transfused, Hgb better  appetite so-so    REVIEW OF SYSTEMS:  Constitutional: No fever,   Cardiovascular: No chest pain, palpitations, dizziness or leg swelling  Gastrointestinal: No abdominal or epigastric pain. No nausea, vomiting ; No diarrhea No melena or hematochezia.  Skin: No itching, burning, rashes or lesions       MEDICATIONS:  MEDICATIONS  (STANDING):  chlorhexidine 2% Cloths 1 Application(s) Topical daily  DAPTOmycin IVPB 500 milliGRAM(s) IV Intermittent every 48 hours  finasteride 5 milliGRAM(s) Oral daily  insulin glargine Injectable (LANTUS) 8 Unit(s) SubCutaneous at bedtime  insulin lispro (HumaLOG) corrective regimen sliding scale   SubCutaneous Before meals and at bedtime  insulin regular  human recombinant 2 Unit(s) SubCutaneous three times a day before meals  lactulose Syrup 20 Gram(s) Oral every 12 hours  midodrine 10 milliGRAM(s) Oral every 8 hours  mirtazapine 7.5 milliGRAM(s) Oral daily  pantoprazole  Injectable 40 milliGRAM(s) IV Push every 24 hours  sertraline 50 milliGRAM(s) Oral daily  vasopressin Infusion 0.04 Unit(s)/Min (2.4 mL/Hr) IV Continuous <Continuous>    MEDICATIONS  (PRN):      Allergies    No Known Allergies    Intolerances        Vital Signs Last 24 Hrs  T(C): 36.2 (14 May 2020 12:00), Max: 36.3 (14 May 2020 00:00)  T(F): 97.1 (14 May 2020 12:00), Max: 97.4 (14 May 2020 09:00)  HR: 79 (14 May 2020 12:00) (71 - 83)  BP: 96/45 (14 May 2020 12:00) (73/37 - 126/73)  BP(mean): 59 (14 May 2020 12:00) (58 - 88)  RR: 22 (14 May 2020 12:00) (14 - 24)  SpO2: 95% (14 May 2020 12:00) (93% - 98%)    05-13 @ 07:01  -  05-14 @ 07:00  --------------------------------------------------------  IN: 236.4 mL / OUT: 1255 mL / NET: -1018.6 mL    05-14 @ 07:01  -  05-14 @ 13:20  --------------------------------------------------------  IN: 120 mL / OUT: 70 mL / NET: 50 mL        PHYSICAL EXAM:    General:  in no acute distress  HEENT: MMM, conjunctiva and sclera clear  Lungs: clear  Heart: regular  Gastrointestinal: Soft non-tender -distended; Normal bowel sounds;   Skin: Warm and dry. No obvious rash    LABS:      CBC Full  -  ( 14 May 2020 05:13 )  WBC Count : 6.96 K/uL  RBC Count : 3.56 M/uL  Hemoglobin : 8.6 g/dL  Hematocrit : 26.7 %  Platelet Count - Automated : 35 K/uL  Mean Cell Volume : 75.0 fl  Mean Cell Hemoglobin : 24.2 pg  Mean Cell Hemoglobin Concentration : 32.2 gm/dL  Auto Neutrophil # : 6.37 K/uL  Auto Lymphocyte # : 0.27 K/uL  Auto Monocyte # : 0.23 K/uL  Auto Eosinophil # : 0.03 K/uL  Auto Basophil # : 0.00 K/uL  Auto Neutrophil % : 91.5 %  Auto Lymphocyte % : 3.9 %  Auto Monocyte % : 3.3 %  Auto Eosinophil % : 0.4 %  Auto Basophil % : 0.0 %    05-14    135  |  97  |  118<H>  ----------------------------<  134<H>  4.4   |  23  |  5.48<H>    Ca    8.3<L>      14 May 2020 05:13  Phos  5.2     05-14  Mg     2.2     05-14    TPro  5.9<L>  /  Alb  2.9<L>  /  TBili  0.9  /  DBili  x   /  AST  9<L>  /  ALT  6<L>  /  AlkPhos  59  05-14    PT/INR - ( 14 May 2020 05:12 )   PT: 19.3 sec;   INR: 1.67          PTT - ( 14 May 2020 05:12 )  PTT:35.5 sec                  RADIOLOGY & ADDITIONAL STUDIES (The following images were personally reviewed):

## 2020-05-14 NOTE — PROGRESS NOTE ADULT - ASSESSMENT
IMPRESSION:  MRSA bacteremia likely secondary to UTI. Stable from ID standpoint    Recommend:  1.  Continue Daptomycin 500 mg IV q48hrs (CK is 7)  2.  Check repeat blood cultures to document clearance    ID team 1 will follow

## 2020-05-14 NOTE — PROGRESS NOTE ADULT - ATTENDING COMMENTS
appears clinically improved  no acute need for dialysis  hypervolemic but no acute need to diurese  cont w'/u for cause ascites -- may try to diurese when renal fxn improved (or if sx)

## 2020-05-14 NOTE — PROGRESS NOTE ADULT - SUBJECTIVE AND OBJECTIVE BOX
INTERVAL HPI/OVERNIGHT EVENTS:    Patient was seen and examined at bedside.  No events     CONSTITUTIONAL:  Negative fever or chills, feels well, good appetite  EYES:  Negative  blurry vision or double vision  CARDIOVASCULAR:  Negative for chest pain or palpitations  RESPIRATORY:  Negative for cough, wheezing, or SOB   GASTROINTESTINAL:  Negative for nausea, vomiting, diarrhea, constipation, or abdominal pain  GENITOURINARY:  Negative frequency, urgency or dysuria  NEUROLOGIC:  No headache, confusion, dizziness, lightheadedness      ANTIBIOTICS/RELEVANT:    MEDICATIONS  (STANDING):  chlorhexidine 2% Cloths 1 Application(s) Topical daily  DAPTOmycin IVPB 500 milliGRAM(s) IV Intermittent every 48 hours  finasteride 5 milliGRAM(s) Oral daily  insulin glargine Injectable (LANTUS) 8 Unit(s) SubCutaneous at bedtime  insulin lispro (HumaLOG) corrective regimen sliding scale   SubCutaneous Before meals and at bedtime  insulin regular  human recombinant 2 Unit(s) SubCutaneous every 6 hours  lactulose Syrup 20 Gram(s) Oral every 12 hours  midodrine 10 milliGRAM(s) Oral every 8 hours  mirtazapine 7.5 milliGRAM(s) Oral daily  pantoprazole  Injectable 40 milliGRAM(s) IV Push every 24 hours  sertraline 50 milliGRAM(s) Oral daily  vasopressin Infusion 0.04 Unit(s)/Min (2.4 mL/Hr) IV Continuous <Continuous>    MEDICATIONS  (PRN):        Vital Signs Last 24 Hrs  T(C): 36.2 (14 May 2020 09:44), Max: 36.3 (14 May 2020 00:00)  T(F): 97.1 (14 May 2020 09:44), Max: 97.4 (14 May 2020 09:00)  HR: 82 (14 May 2020 09:00) (71 - 82)  BP: 104/48 (14 May 2020 09:00) (73/37 - 126/73)  BP(mean): 62 (14 May 2020 09:00) (49 - 88)  RR: 16 (14 May 2020 09:00) (14 - 18)  SpO2: 97% (14 May 2020 09:00) (93% - 99%)    PHYSICAL EXAM:  Constitutional:  non-toxic, no distress  Eyes:  no icterus   Ear/Nose/Throat: no oral lesion  Neck:  supple  Respiratory: CTA chuck  Cardiovascular: S1S2 RRR, no murmurs  Gastrointestinal:soft, (+) BS, no HSM  Extremities:no edema  Vascular: DP Pulse:	right normal; left normal      LABS:                        8.6    6.96  )-----------( 35       ( 14 May 2020 05:13 )             26.7     05-14    135  |  97  |  118<H>  ----------------------------<  134<H>  4.4   |  23  |  5.48<H>    Ca    8.3<L>      14 May 2020 05:13  Phos  5.2     05-14  Mg     2.2     05-14    TPro  5.9<L>  /  Alb  2.9<L>  /  TBili  0.9  /  DBili  x   /  AST  9<L>  /  ALT  6<L>  /  AlkPhos  59  05-14    PT/INR - ( 14 May 2020 05:12 )   PT: 19.3 sec;   INR: 1.67          PTT - ( 14 May 2020 05:12 )  PTT:35.5 sec      MICROBIOLOGY:    Culture - Blood (05.12.20 @ 13:17)    Gram Stain:   Aerobic Bottle: Gram Positive Cocci in Clusters  Result called to and read back byRodrick Carrion RN  05/13/2020 11:23:32  Anaerobic Bottle: Gram Positive Cocci in Clusters  Floor previously notified.    Specimen Source: .Blood Blood    Culture Results:   Growth in aerobic and anaerobic bottles: Staphylococcus aureus  presumptive Methicillin resistant  Confirmation to follow within 24 hours Susceptibility to follow.  Floor previously notified.    Culture - Body Fluid with Gram Stain (05.11.20 @ 12:36)    Gram Stain:   No organisms seen  No WBC's seen.    Specimen Source: .Body Fluid pericardial effusion fluid culture    Culture Results:   No growth to date        RADIOLOGY & ADDITIONAL STUDIES:

## 2020-05-14 NOTE — PROGRESS NOTE ADULT - SUBJECTIVE AND OBJECTIVE BOX
OVERNIGHT EVENTS: given extra dose of 5mg midodrine for MAPs in 50s, restarted vaso at .04; AM plt 35k  1 unit ordered    SUBJECTIVE: Patient seen and examined at bedside. He appears comfortable and in no acute distress, sitting up in bed. His pericardial tube and ascitic bag is draining appropriatekly and the sites of insertions are clean, dry, and intact. He denies any pain or discomfort around his site. Otherwise, he denies any fevers, chills, shortness of breath, abdominal pain, n/v/d/c, edema.    Vital Signs Last 12 Hrs  T(F): 97.1 (05-14-20 @ 09:44), Max: 97.4 (05-14-20 @ 09:00)  HR: 83 (05-14-20 @ 11:00) (71 - 83)  BP: 105/57 (05-14-20 @ 11:00) (83/65 - 126/73)  BP(mean): 65 (05-14-20 @ 11:00) (62 - 88)  RR: 21 (05-14-20 @ 11:00) (14 - 24)  SpO2: 96% (05-14-20 @ 11:00) (93% - 97%)  I&O's Summary    13 May 2020 07:01  -  14 May 2020 07:00  --------------------------------------------------------  IN: 236.4 mL / OUT: 1255 mL / NET: -1018.6 mL    14 May 2020 07:01  -  14 May 2020 12:45  --------------------------------------------------------  IN: 120 mL / OUT: 50 mL / NET: 70 mL      PHYSICAL EXAM  General: A&Ox 3; NAD  Neck: no JVD  Respiratory: diminished breath sounds at the bases, non-tachypneic, no respiratory distress   Cardiovascular: Regular rhythm/rate; S1/S2, no pericardial rub, pericardial drain in placed clean/dry/intact, draining serosanguinous fluid   Gastrointestinal: distended, non tender ascitic drain with appropriate drainage, site clean, dry, and intact  Extremities: WWP; 1+ thigh edema  Neurological:  CNII-XII grossly intact; no obvious focal deficits, no flapping tremor or asterixis  T/L/D: palacio draining yellow colored urine       LABS:                        8.6    6.96  )-----------( 35       ( 14 May 2020 05:13 )             26.7     05-14    135  |  97  |  118<H>  ----------------------------<  134<H>  4.4   |  23  |  5.48<H>    Ca    8.3<L>      14 May 2020 05:13  Phos  5.2     05-14  Mg     2.2     05-14    TPro  5.9<L>  /  Alb  2.9<L>  /  TBili  0.9  /  DBili  x   /  AST  9<L>  /  ALT  6<L>  /  AlkPhos  59  05-14    PT/INR - ( 14 May 2020 05:12 )   PT: 19.3 sec;   INR: 1.67          PTT - ( 14 May 2020 05:12 )  PTT:35.5 sec        RADIOLOGY & ADDITIONAL TESTS:    MEDICATIONS  (STANDING):  chlorhexidine 2% Cloths 1 Application(s) Topical daily  DAPTOmycin IVPB 500 milliGRAM(s) IV Intermittent every 48 hours  finasteride 5 milliGRAM(s) Oral daily  insulin glargine Injectable (LANTUS) 8 Unit(s) SubCutaneous at bedtime  insulin lispro (HumaLOG) corrective regimen sliding scale   SubCutaneous Before meals and at bedtime  insulin regular  human recombinant 2 Unit(s) SubCutaneous every 6 hours  lactulose Syrup 20 Gram(s) Oral every 12 hours  midodrine 10 milliGRAM(s) Oral every 8 hours  mirtazapine 7.5 milliGRAM(s) Oral daily  pantoprazole  Injectable 40 milliGRAM(s) IV Push every 24 hours  sertraline 50 milliGRAM(s) Oral daily  vasopressin Infusion 0.04 Unit(s)/Min (2.4 mL/Hr) IV Continuous <Continuous>    MEDICATIONS  (PRN):

## 2020-05-15 NOTE — PROGRESS NOTE ADULT - ASSESSMENT
75 yo M s/p IR pericardiocentesis 5/11.     Drain flushed with initial resistance, subsequently cleared.  Echo today showed no pericardial effusion.  Output 10 cc over past 24 hours.  Recommend continued monitoring of output over the weekend, will plan for removal 5/18 if output remains low.

## 2020-05-15 NOTE — PROGRESS NOTE ADULT - ASSESSMENT
IMPRESSION:  Persistent MRSA bacteremia - unclear source.  Raises concern for infective endocarditis    Recommend:  1.  Continue Daptomycin 500 mg IV q48hrs  2.  Check repeat blood cultures daily  3.  Consider changing out central line.  If done, please send tip for culture  4.  Check GET    ID team 1 will follow

## 2020-05-15 NOTE — PROGRESS NOTE ADULT - SUBJECTIVE AND OBJECTIVE BOX
O/N Events:  hypotensive, back on vaso, minimal UOP  Subjective:  alert/ awake and oriented, weak and pale, BUN,Cr rising, UOP ~ 200 for the past 24 hour  has mild flapping tremor     VITALS  Vital Signs Last 24 Hrs  T(C): 35.6 (15 May 2020 12:05), Max: 36.2 (14 May 2020 18:00)  T(F): 96.1 (15 May 2020 12:05), Max: 97.1 (14 May 2020 18:00)  HR: 79 (15 May 2020 12:00) (61 - 94)  BP: 118/48 (15 May 2020 12:05) (87/40 - 121/63)  BP(mean): 62 (15 May 2020 12:05) (56 - 73)  RR: 15 (15 May 2020 12:05) (13 - 18)  SpO2: 96% (15 May 2020 12:05) (92% - 97%)    PHYSICAL EXAM  General: A&Ox 3; NAD on RA  Neck: no JVD  Respiratory: CTA B/L ant  Cardiovascular: Regular rhythm/rate; S1/S2, no pericardial rub, pericardial drain in placed c/d/i   Gastrointestinal: distended, non tender  Extremities: WWP; pitting thigh edema b/l up to lower abdomen   Neurological:  CNII-XII grossly intact; no obvious focal deficits, mild asterixis present     MEDICATIONS  (STANDING):  chlorhexidine 2% Cloths 1 Application(s) Topical daily  DAPTOmycin IVPB 500 milliGRAM(s) IV Intermittent every 48 hours  finasteride 5 milliGRAM(s) Oral daily  insulin glargine Injectable (LANTUS) 8 Unit(s) SubCutaneous at bedtime  insulin lispro (HumaLOG) corrective regimen sliding scale   SubCutaneous Before meals and at bedtime  insulin lispro Injectable (HumaLOG) 2 Unit(s) SubCutaneous three times a day before meals  midodrine 10 milliGRAM(s) Oral every 8 hours  mirtazapine 7.5 milliGRAM(s) Oral daily  norepinephrine Infusion 0.05 MICROgram(s)/kG/Min (3.37 mL/Hr) IV Continuous <Continuous>  pantoprazole  Injectable 40 milliGRAM(s) IV Push every 24 hours  sertraline 50 milliGRAM(s) Oral daily    MEDICATIONS  (PRN):      LABS                        9.2    6.18  )-----------( 41       ( 15 May 2020 04:14 )             28.8     05-15    136  |  97  |  129<H>  ----------------------------<  125<H>  4.4   |  22  |  5.92<H>    Ca    8.6      15 May 2020 04:14  Phos  5.7     05-15  Mg     2.2     05-15    TPro  5.6<L>  /  Alb  2.6<L>  /  TBili  0.8  /  DBili  x   /  AST  10  /  ALT  7<L>  /  AlkPhos  63  05-15    LIVER FUNCTIONS - ( 15 May 2020 04:14 )  Alb: 2.6 g/dL / Pro: 5.6 g/dL / ALK PHOS: 63 U/L / ALT: 7 U/L / AST: 10 U/L / GGT: x           PT/INR - ( 15 May 2020 04:14 )   PT: 18.6 sec;   INR: 1.61          PTT - ( 15 May 2020 04:14 )  PTT:35.6 sec    CARDIAC MARKERS ( 15 May 2020 04:14 )  x     / x     / <7 U/L / x     / x      CARDIAC MARKERS ( 14 May 2020 05:13 )  x     / x     / <7 U/L / x     / x O/N Events:  hypotensive, back on vaso, minimal UOP  Subjective:  alert/ awake and oriented, weak and pale, BUN,Cr rising, UOP ~ 200 for the past 24 hour  has mild flapping tremor     VITALS  Vital Signs Last 24 Hrs  T(C): 35.6 (15 May 2020 12:05), Max: 36.2 (14 May 2020 18:00)  T(F): 96.1 (15 May 2020 12:05), Max: 97.1 (14 May 2020 18:00)  HR: 79 (15 May 2020 12:00) (61 - 94)  BP: 118/48 (15 May 2020 12:05) (87/40 - 121/63)  BP(mean): 62 (15 May 2020 12:05) (56 - 73)  RR: 15 (15 May 2020 12:05) (13 - 18)  SpO2: 96% (15 May 2020 12:05) (92% - 97%)    PHYSICAL EXAM  General: A&Ox 3; NAD on RA  Neck: no JVD  Respiratory: CTA B/L ant  Cardiovascular: Regular rhythm/rate; S1/S2, no pericardial rub, pericardial drain in placed c/d/i   Gastrointestinal: distended, non tender  Extremities: WWP; pitting thigh edema b/l up to lower abdomen   Neurological:  CNII-XII grossly intact; no obvious focal deficits, mild asterixis present  Access: RIJ HD cath c/d/i     MEDICATIONS  (STANDING):  chlorhexidine 2% Cloths 1 Application(s) Topical daily  DAPTOmycin IVPB 500 milliGRAM(s) IV Intermittent every 48 hours  finasteride 5 milliGRAM(s) Oral daily  insulin glargine Injectable (LANTUS) 8 Unit(s) SubCutaneous at bedtime  insulin lispro (HumaLOG) corrective regimen sliding scale   SubCutaneous Before meals and at bedtime  insulin lispro Injectable (HumaLOG) 2 Unit(s) SubCutaneous three times a day before meals  midodrine 10 milliGRAM(s) Oral every 8 hours  mirtazapine 7.5 milliGRAM(s) Oral daily  norepinephrine Infusion 0.05 MICROgram(s)/kG/Min (3.37 mL/Hr) IV Continuous <Continuous>  pantoprazole  Injectable 40 milliGRAM(s) IV Push every 24 hours  sertraline 50 milliGRAM(s) Oral daily    MEDICATIONS  (PRN):      LABS                        9.2    6.18  )-----------( 41       ( 15 May 2020 04:14 )             28.8     05-15    136  |  97  |  129<H>  ----------------------------<  125<H>  4.4   |  22  |  5.92<H>    Ca    8.6      15 May 2020 04:14  Phos  5.7     05-15  Mg     2.2     05-15    TPro  5.6<L>  /  Alb  2.6<L>  /  TBili  0.8  /  DBili  x   /  AST  10  /  ALT  7<L>  /  AlkPhos  63  05-15    LIVER FUNCTIONS - ( 15 May 2020 04:14 )  Alb: 2.6 g/dL / Pro: 5.6 g/dL / ALK PHOS: 63 U/L / ALT: 7 U/L / AST: 10 U/L / GGT: x           PT/INR - ( 15 May 2020 04:14 )   PT: 18.6 sec;   INR: 1.61          PTT - ( 15 May 2020 04:14 )  PTT:35.6 sec    CARDIAC MARKERS ( 15 May 2020 04:14 )  x     / x     / <7 U/L / x     / x      CARDIAC MARKERS ( 14 May 2020 05:13 )  x     / x     / <7 U/L / x     / x

## 2020-05-15 NOTE — PROGRESS NOTE ADULT - ASSESSMENT
73 yo M with MDS, pancytopenia, CKD, ascites of unknown origin presents from [facility] with complaints of decreased po intake and weakness likely secondary to uremia/AAMIR from diuresis for treatment of ascites, hospital course notable for UGIB and pericardial effusion with tamponade physiology , as well as ATN, and ascites of unknown etiology, now in ICU s/p paracentesis 5/10 (2L removed), IR pericardiocentesis 5/11 (drain in place), now with anemia of unknown etiology    Neuro:   Now AxO x 3; Prior to MICU had episode of hypotension and encephalopathy on 5/10 in early PM and was more acutely lethargic and encephalopathic likely 2/2 hepatic encephalopathy   - Ammonia level elevated, received lactulose. D/c'd lactulose on 5/15 since patient is having BMs     #Hydrocephalus on CT: CT Head on admission showed evidence of hydrocephalus. No collateral from prior CT on rate of enlargement.   - plan for neurosurgery consult after acute issues resolved    Cardiovascular:   #Hypotension: MRSA + blood cultures on 5/10, hypotensions likely 2/2 septic shock. Possible contribution of intravascular volume depletion from diuresis vs GIB vs pericardial tamponade.   - cw albumin if needed  - s/p pericardial drain with 100cc's removed (4000 rbcs in fluid)  - Levophed for BP support with MAP goal of 80   - colloids for fluid repletion  - central line placed with cvp monitor for assess for tamponade, exchange line 5/15  - if pt is to decompensate, would add back on zosyn for broad intraabdominal source coverage    #Pericardial Effusion:  Initial read with some evidence of tamponade physiology; now s/p IR for diagnostic pericardiocentesis on 5/11  - Cardiology following, appreciate recs.   - Given low pericardial drain output and clog, a follow up limited echo on 5/15 showed no pericardial effusion  - f/u IR recs    Pulmonary:   Patient currently saturating well on RA, non-hypoxic   - Monitor Respiratory status   - Patient is at aspiration risk given waxing and waning risk     Gastrointestinal:   UGIB: multiple episodes of melena throughout the day on 5/9.  Pt has baseline anemia from MDS and never dropped significantly below his baseline Hgb. Pt noted to have coagulopathy and thrombocytopenia.  - Again with acute drop in hgb on 5/12 from 9.2 to <7. Given 1U prbc and 1U platelets  - hb today stable at 9.2   - Monitor H&H closely  - Continue Protonix 40 mg IVP daily  - Monitor coags  - If H&H continues to downtrend patient would benefit from administration of desmopressin for uremic bleeding  - Transfuse platelets < 50 if continued bleeding or anticipated procedure  - CT ab on 5/12 with no RP bleed  - NGT removed on 5/13, passed FEES, started on mechanical soft and thin liquids diet  - f/u duplex of abdomen    Ascites:  Patient has had new onset ascites in the last 2 months with unknown etiology, not non-drinker   - Diagnostic paracentesis removed 2L on 5/10; f/u fluid studies  - GI (Dr. Mallory) recs for possible cirrhosis, f/u cirrhosis labs  - was on Ceftriaxone for SBP prophylaxis in setting of decompensated cirrhosis and UGIB; zosyn dc'd after mrsa positive blood cultures on 5/12  - ct abdomen on 5/12 with no evidence of liver cirrhosis, lfts normal  - para on 5/14 with 1.8L removed  - plan for another para today    Renal:   AAMIR on CKD   Patient seems to have baseline kidney function in the 2-3 region, with chronic indwelling palacio. Patient on presentation with Cr. in the 4's and uptrending with minimal urine output. US of Kidneys with evidence of mild right hydronephrosis   - Monitor BUN and Cr and electrolytes   - Renal Following, appreciate recs   - Strict I and O's   - Urine lytes indicate most likely ATN likely due to hypotension and ischemic injury, HRS unlikely   - HD cath placed on 5/15 for dialysis  - levophed increased with MAP goal 80, to help with urine output.  - f/u repeat urine lytes    Heme:   - Patient with a history of MDS, and pancytopenia at baseline.   - Plts 35 on 5/14, received 1 unit platelets   - Plts 41 on 5/15, received 1 unit platelets     #Anemia:   Likely secondary to anemia from chronic disease and bone marrow suppression.   - S/p 1 unit of PRBC on 5/9 for active melena and Hgb stable   - Given 1 unit of prbc and platelets on 5/12 for acute drop, unknown source  - Monitor CBC  - plts 35 on 5/14, and 41 on 5/15, received 1 unit platelets each day  - Transfuse for Hgb <7   - Active type and screen     #Coagulopathy:   - Patient presented with elevated INR to 2.48, with active bleeding.   - Monitor PT/INR   - INR 1.61 today    #DVT - RLE  - repeat dopplers on 5/11 with no DVT  - Eliquis held in setting of melanax3 and drop in hgb    :   #Chronic Indwelling Palacio: Patient has a palacio cath due to a possible outlet obstruction, he was to get a cystoscopy and urodynamics outpatient.  - palacio exchanged 5/11 by nursing  - Trend Cr.   - continued Finasteride 5mg daily    ID:   MRSA positive blood cultures that also grew in urine. On vancomycin, zosyn dc'd on 5/11  - surveillance cultures on 5/12 growing MRSA  - surveillance cultures on 5/13 and 5/14 still growing MRSA  - Given persistent bacteremia, ordered GET, if negative can consider gallium scan  - central line exhange on 5/15  - f/u surveillance cultures  - c/w daptomycin 550mg [id number], obtain daily CK levels    Pysch:   #Depression:   - home mirtazapine 7.5 mg daily and Sertraline 10 mg daily.

## 2020-05-15 NOTE — PROGRESS NOTE ADULT - SUBJECTIVE AND OBJECTIVE BOX
OVERNIGHT EVENTS: around 3-4am pt noted to have maps in 50s and systolics in 80s, restarted on vaso .04 with immediate improvement in maps, bedside echo done by resident/pulm fellow with no significant pericardial effusion noted, questionable jvd but not tachy, AM labs drawn early + lactate (nl) and labs stable    SUBJECTIVE: Patient seen and examined at bedside. He appears comfortable and in no acute distress. He says he feels well but doesn't seem to have an appetite this morning. He also complains that he is having frequent bowel movements. Otherwise, he denies any fevers, chills, cough, chest pain, shortness of breath, abdominal pain, n/v/d/c.     Vital Signs Last 12 Hrs  T(F): 96.9 (05-15-20 @ 05:00), Max: 96.9 (05-15-20 @ 05:00)  HR: 81 (05-15-20 @ 11:00) (79 - 94)  BP: 98/71 (05-15-20 @ 11:00) (87/40 - 121/63)  BP(mean): 71 (05-15-20 @ 11:00) (59 - 71)  RR: 13 (05-15-20 @ 11:00) (13 - 18)  SpO2: 95% (05-15-20 @ 11:00) (92% - 96%)  I&O's Summary    14 May 2020 07:01  -  15 May 2020 07:00  --------------------------------------------------------  IN: 609.6 mL / OUT: 180 mL / NET: 429.6 mL    15 May 2020 07:01  -  15 May 2020 11:33  --------------------------------------------------------  IN: 129.6 mL / OUT: 30 mL / NET: 99.6 mL        PHYSICAL EXAM  General: NAD, resting comfortably in bed  Neck: no JVD  Respiratory: diminished breath sounds at the bases, non-tachypneic, no respiratory distress   Cardiovascular: Regular rhythm/rate; S1/S2, no pericardial rub, pericardial drain in placed clean/dry/intact, draining 10cc serosanguinous fluid   Gastrointestinal: distended, non tender ascitic drain with appropriate drainage, site clean, dry, and intact  Extremities: WWP; 1+ thigh edema  Neurological:  A&Ox 3, CNII-XII grossly intact; no obvious focal deficits, no flapping tremor, but minimal asterixis  T/L/D: palacio draining yellow-colored urine         LABS:                        9.2    6.18  )-----------( 41       ( 15 May 2020 04:14 )             28.8     05-15    136  |  97  |  129<H>  ----------------------------<  125<H>  4.4   |  22  |  5.92<H>    Ca    8.6      15 May 2020 04:14  Phos  5.7     05-15  Mg     2.2     05-15    TPro  5.6<L>  /  Alb  2.6<L>  /  TBili  0.8  /  DBili  x   /  AST  10  /  ALT  7<L>  /  AlkPhos  63  05-15    PT/INR - ( 15 May 2020 04:14 )   PT: 18.6 sec;   INR: 1.61          PTT - ( 15 May 2020 04:14 )  PTT:35.6 sec        RADIOLOGY & ADDITIONAL TESTS:    MEDICATIONS  (STANDING):  chlorhexidine 2% Cloths 1 Application(s) Topical daily  DAPTOmycin IVPB 500 milliGRAM(s) IV Intermittent every 48 hours  finasteride 5 milliGRAM(s) Oral daily  insulin glargine Injectable (LANTUS) 8 Unit(s) SubCutaneous at bedtime  insulin lispro (HumaLOG) corrective regimen sliding scale   SubCutaneous Before meals and at bedtime  insulin lispro Injectable (HumaLOG) 2 Unit(s) SubCutaneous three times a day before meals  midodrine 10 milliGRAM(s) Oral every 8 hours  mirtazapine 7.5 milliGRAM(s) Oral daily  norepinephrine Infusion 0.05 MICROgram(s)/kG/Min (3.37 mL/Hr) IV Continuous <Continuous>  pantoprazole  Injectable 40 milliGRAM(s) IV Push every 24 hours  sertraline 50 milliGRAM(s) Oral daily    MEDICATIONS  (PRN):

## 2020-05-15 NOTE — PROGRESS NOTE ADULT - ASSESSMENT
A/p  75 y/o M w/PMHx of DM, HTN, BPH (chronic incontinence/retention, s/p paalcio on last admission), a-fib and CKD stage III/IV, admitted for new onset ascites nephrology consulted for AAMIR on CKD.

## 2020-05-15 NOTE — PROGRESS NOTE ADULT - PROBLEM SELECTOR PLAN 1
AAMIR on CKD stage IV, oliguric   most likely ATN from hypotension and ischemic injury, HRS unlikely   renal fx worsening, UOP oligoanuric will plan for RRT as patient has evidences of uremia with asterixis   - HD today with 1 L uF if tolerated   - will reassess in am for further session   - preferred to administer daptomycin post-HD  Will follow

## 2020-05-15 NOTE — PROGRESS NOTE ADULT - SUBJECTIVE AND OBJECTIVE BOX
coverage for Dr Roberts    Pt seen and examined  having central line and dialysis catheter placed       REVIEW OF SYSTEMS:  deferred    MEDICATIONS:  MEDICATIONS  (STANDING):  chlorhexidine 2% Cloths 1 Application(s) Topical daily  DAPTOmycin IVPB 500 milliGRAM(s) IV Intermittent every 48 hours  finasteride 5 milliGRAM(s) Oral daily  insulin glargine Injectable (LANTUS) 8 Unit(s) SubCutaneous at bedtime  insulin lispro (HumaLOG) corrective regimen sliding scale   SubCutaneous Before meals and at bedtime  insulin lispro Injectable (HumaLOG) 2 Unit(s) SubCutaneous three times a day before meals  midodrine 10 milliGRAM(s) Oral every 8 hours  mirtazapine 7.5 milliGRAM(s) Oral daily  norepinephrine Infusion 0.05 MICROgram(s)/kG/Min (3.37 mL/Hr) IV Continuous <Continuous>  pantoprazole  Injectable 40 milliGRAM(s) IV Push every 24 hours  sertraline 50 milliGRAM(s) Oral daily    MEDICATIONS  (PRN):      Allergies    No Known Allergies    Intolerances        Vital Signs Last 24 Hrs  T(C): 35.6 (15 May 2020 12:05), Max: 36.2 (14 May 2020 18:00)  T(F): 96.1 (15 May 2020 12:05), Max: 97.1 (14 May 2020 18:00)  HR: 79 (15 May 2020 12:00) (61 - 94)  BP: 118/48 (15 May 2020 12:05) (87/40 - 121/63)  BP(mean): 62 (15 May 2020 12:05) (56 - 73)  RR: 15 (15 May 2020 12:05) (13 - 18)  SpO2: 96% (15 May 2020 12:05) (92% - 97%)    05-14 @ 07:01  -  05-15 @ 07:00  --------------------------------------------------------  IN: 609.6 mL / OUT: 180 mL / NET: 429.6 mL    05-15 @ 07:01  -  05-15 @ 14:58  --------------------------------------------------------  IN: 129.6 mL / OUT: 30 mL / NET: 99.6 mL        PHYSICAL EXAM:    currently draped for sterile procedure    LABS:      CBC Full  -  ( 15 May 2020 04:14 )  WBC Count : 6.18 K/uL  RBC Count : 3.80 M/uL  Hemoglobin : 9.2 g/dL  Hematocrit : 28.8 %  Platelet Count - Automated : 41 K/uL  Mean Cell Volume : 75.8 fl  Mean Cell Hemoglobin : 24.2 pg  Mean Cell Hemoglobin Concentration : 31.9 gm/dL  Auto Neutrophil # : 5.48 K/uL  Auto Lymphocyte # : 0.36 K/uL  Auto Monocyte # : 0.28 K/uL  Auto Eosinophil # : 0.02 K/uL  Auto Basophil # : 0.00 K/uL  Auto Neutrophil % : 88.8 %  Auto Lymphocyte % : 5.8 %  Auto Monocyte % : 4.5 %  Auto Eosinophil % : 0.3 %  Auto Basophil % : 0.0 %    05-15    136  |  97  |  129<H>  ----------------------------<  125<H>  4.4   |  22  |  5.92<H>    Ca    8.6      15 May 2020 04:14  Phos  5.7     05-15  Mg     2.2     05-15    TPro  5.6<L>  /  Alb  2.6<L>  /  TBili  0.8  /  DBili  x   /  AST  10  /  ALT  7<L>  /  AlkPhos  63  05-15    PT/INR - ( 15 May 2020 04:14 )   PT: 18.6 sec;   INR: 1.61          PTT - ( 15 May 2020 04:14 )  PTT:35.6 sec                  RADIOLOGY & ADDITIONAL STUDIES (The following images were personally reviewed):

## 2020-05-15 NOTE — PROGRESS NOTE ADULT - ATTENDING COMMENTS
oligonauric AAMIR with likely mild uremic sx, whole body overload , ascites  plan HD for gentle UF and clearance as tolerates  cont w/u for splenomegaly,ascites per GI

## 2020-05-15 NOTE — PROGRESS NOTE ADULT - SUBJECTIVE AND OBJECTIVE BOX
INTERVAL HPI/OVERNIGHT EVENTS:    Events noted.  Blood cultures still positive    ROS:    CONSTITUTIONAL:  Negative fever or chills, feels well, good appetite  EYES:  Negative  blurry vision or double vision  CARDIOVASCULAR:  Negative for chest pain or palpitations  RESPIRATORY:  Negative for cough, wheezing, or SOB   GASTROINTESTINAL:  Negative for nausea, vomiting, diarrhea, constipation, or abdominal pain  GENITOURINARY:  Negative frequency, urgency or dysuria  NEUROLOGIC:  No headache, confusion, dizziness, lightheadedness      ANTIBIOTICS/RELEVANT:    MEDICATIONS  (STANDING):  chlorhexidine 2% Cloths 1 Application(s) Topical daily  DAPTOmycin IVPB 500 milliGRAM(s) IV Intermittent every 48 hours  finasteride 5 milliGRAM(s) Oral daily  insulin glargine Injectable (LANTUS) 8 Unit(s) SubCutaneous at bedtime  insulin lispro (HumaLOG) corrective regimen sliding scale   SubCutaneous Before meals and at bedtime  insulin lispro Injectable (HumaLOG) 2 Unit(s) SubCutaneous three times a day before meals  midodrine 10 milliGRAM(s) Oral every 8 hours  mirtazapine 7.5 milliGRAM(s) Oral daily  norepinephrine Infusion 0.05 MICROgram(s)/kG/Min (3.37 mL/Hr) IV Continuous <Continuous>  pantoprazole  Injectable 40 milliGRAM(s) IV Push every 24 hours  sertraline 50 milliGRAM(s) Oral daily    MEDICATIONS  (PRN):        Vital Signs Last 24 Hrs  T(C): 35.6 (15 May 2020 12:05), Max: 36.2 (14 May 2020 14:00)  T(F): 96.1 (15 May 2020 12:05), Max: 97.1 (14 May 2020 14:00)  HR: 79 (15 May 2020 12:00) (61 - 94)  BP: 118/48 (15 May 2020 12:05) (87/40 - 121/63)  BP(mean): 62 (15 May 2020 12:05) (56 - 73)  RR: 15 (15 May 2020 12:05) (13 - 18)  SpO2: 96% (15 May 2020 12:05) (92% - 97%)    PHYSICAL EXAM:  Constitutional:  non-toxic, no distress  Eyes:  no icterus   Ear/Nose/Throat: no oral lesion  Neck:  supple  Respiratory: CTA chuck  Cardiovascular: S1S2 RRR, no murmurs  Gastrointestinal:soft, (+) BS, no HSM  Extremities:no edema  Vascular: DP Pulse:	right normal; left normal    LABS:                        9.2    6.18  )-----------( 41       ( 15 May 2020 04:14 )             28.8     05-15    136  |  97  |  129<H>  ----------------------------<  125<H>  4.4   |  22  |  5.92<H>    Ca    8.6      15 May 2020 04:14  Phos  5.7     05-15  Mg     2.2     05-15    TPro  5.6<L>  /  Alb  2.6<L>  /  TBili  0.8  /  DBili  x   /  AST  10  /  ALT  7<L>  /  AlkPhos  63  05-15    PT/INR - ( 15 May 2020 04:14 )   PT: 18.6 sec;   INR: 1.61          PTT - ( 15 May 2020 04:14 )  PTT:35.6 sec      MICROBIOLOGY:    Culture - Blood (05.14.20 @ 08:15)    Gram Stain:   Anaerobic Bottle: Gram Positive Cocci in Clusters  Result called to and read back by_  Michel Green RN(2WO) 05/15/2020  07:20:08  Aerobic Bottle: Gram Positive Cocci in Clusters    Specimen Source: .Blood Blood    Culture Results:   Culture in progress    Culture - Blood (05.12.20 @ 13:17)    -  Linezolid: S 2    -  Erythromycin: R >4    -  Oxacillin: R >2    -  Daptomycin: S 0.5    -  Clindamycin: S <=0.25    -  Trimethoprim/Sulfamethoxazole: R >2/38    -  Vancomycin: S 2    -  RIF- Rifampin: S <=1 Should not be used as monotherapy    -  Tetra/Doxy: S <=1    Gram Stain:   Aerobic Bottle: Gram Positive Cocci in Clusters  Result called to and read back by_ RYAN Carrion RN  05/13/2020 11:23:32  Anaerobic Bottle: Gram Positive Cocci in Clusters  Floor previously notified.    -  Cefazolin: R 8    Specimen Source: .Blood Blood    Organism: Methicillin resistant Staphylococcus aureus    Organism: Methicillin resistant Staphylococcus aureus    Culture Results:   Growth in aerobic and anaerobic bottles: Methicillin resistant  Staphylococcus aureus  Floor previously notified.    Organism Identification: Methicillin resistant Staphylococcus aureus  Methicillin resistant Staphylococcus aureus    Method Type: ETEST    Method Type: IMAN        Culture - Body Fluid with Gram Stain (05.11.20 @ 12:36)    Gram Stain:   No organisms seen  No WBC's seen.    Specimen Source: .Body Fluid pericardial effusion fluid culture    Culture Results:   No growth to date    RADIOLOGY & ADDITIONAL STUDIES:    < from: TTE Echo Limited or F/U (05.15.20 @ 09:58) >     1. Limited study obtained for evaluation of pericardial effusion.   2. Normal left ventricular size and function.   3. Mildlydilated right ventricular size.   4. Normal right ventricular systolic function. A device lead is noted in the right heart.   5. No evidence of pulmonary hypertension, pulmonary artery systolic pressure is 30 mmHg.   6. No pericardial effusion.   7. Right pleural effusion.

## 2020-05-15 NOTE — PROGRESS NOTE ADULT - ASSESSMENT
on antibiotics for MRSA sepsis  dialysis per renal  pressors for BP  updated clinical course  with HCP

## 2020-05-16 NOTE — PROGRESS NOTE ADULT - SUBJECTIVE AND OBJECTIVE BOX
**INCOMPLETE NOTE    OVERNIGHT EVENTS:    SUBJECTIVE:  Patient seen and examined at bedside.    Vital Signs Last 12 Hrs  T(F): 97.2 (05-16-20 @ 18:00), Max: 97.2 (05-16-20 @ 18:00)  HR: 83 (05-16-20 @ 17:15) (83 - 96)  BP: 62/38 (05-16-20 @ 17:30) (62/38 - 140/57)  BP(mean): 42 (05-16-20 @ 17:30) (42 - 79)  RR: 12 (05-16-20 @ 17:15) (12 - 24)  SpO2: 93% (05-16-20 @ 17:15) (93% - 99%)  I&O's Summary    15 May 2020 07:01  -  16 May 2020 07:00  --------------------------------------------------------  IN: 865.9 mL / OUT: 607 mL / NET: 258.9 mL    16 May 2020 07:01  -  16 May 2020 18:31  --------------------------------------------------------  IN: 95.9 mL / OUT: 95 mL / NET: 0.9 mL        PHYSICAL EXAM:  Constitutional: NAD, comfortable in bed.  HEENT: NC/AT, PERRLA, EOMI, no conjunctival pallor or scleral icterus, MMM  Neck: Supple, no JVD  Respiratory: CTA B/L. No w/r/r.   Cardiovascular: RRR, normal S1 and S2, no m/r/g.   Gastrointestinal: +BS, soft NTND, no guarding or rebound tenderness, no palpable masses   Extremities: wwp; no cyanosis, clubbing or edema.   Vascular: Pulses equal and strong throughout.   Neurological: AAOx3, no CN deficits, strength and sensation intact throughout.   Skin: No gross skin abnormalities or rashes        LABS:                        10.0   11.88 )-----------( 86       ( 16 May 2020 08:09 )             32.0     05-16    138  |  99  |  107<H>  ----------------------------<  163<H>  5.1   |  19<L>  |  5.59<H>    Ca    8.7      16 May 2020 08:09  Phos  6.8     05-16  Mg     2.2     05-16    TPro  6.6  /  Alb  2.7<L>  /  TBili  0.8  /  DBili  x   /  AST  10  /  ALT  6<L>  /  AlkPhos  80  05-16    PT/INR - ( 16 May 2020 08:09 )   PT: 18.7 sec;   INR: 1.62          PTT - ( 16 May 2020 08:09 )  PTT:34.6 sec        RADIOLOGY & ADDITIONAL TESTS:    MEDICATIONS  (STANDING):  chlorhexidine 2% Cloths 1 Application(s) Topical daily  DAPTOmycin IVPB 500 milliGRAM(s) IV Intermittent every 48 hours  finasteride 5 milliGRAM(s) Oral daily  insulin glargine Injectable (LANTUS) 8 Unit(s) SubCutaneous at bedtime  insulin lispro (HumaLOG) corrective regimen sliding scale   SubCutaneous Before meals and at bedtime  insulin lispro Injectable (HumaLOG) 2 Unit(s) SubCutaneous three times a day before meals  midodrine 10 milliGRAM(s) Oral every 8 hours  mirtazapine 7.5 milliGRAM(s) Oral daily  norepinephrine Infusion 0.05 MICROgram(s)/kG/Min (3.37 mL/Hr) IV Continuous <Continuous>  nystatin Cream 1 Application(s) Topical two times a day  pantoprazole  Injectable 40 milliGRAM(s) IV Push every 24 hours  sertraline 50 milliGRAM(s) Oral daily    MEDICATIONS  (PRN): OVERNIGHT EVENTS: tried every possible manner to get in touch with ultrasound (by phone, by vocera, and in person hunting) but unsuccessful so did not get abd u/s    SUBJECTIVE:  Patient seen and examined at bedside. He appears comfortable and in no acute distress. He denies any pain anywhere. He says he doesn't have an appetite. Otherwise, he denies any fevers, chills, chest pain, cough, shortness of breath, abdominal pain, n/v/d/c, edema.    Vital Signs Last 12 Hrs  T(F): 97.2 (05-16-20 @ 18:00), Max: 97.2 (05-16-20 @ 18:00)  HR: 83 (05-16-20 @ 17:15) (83 - 96)  BP: 62/38 (05-16-20 @ 17:30) (62/38 - 140/57)  BP(mean): 42 (05-16-20 @ 17:30) (42 - 79)  RR: 12 (05-16-20 @ 17:15) (12 - 24)  SpO2: 93% (05-16-20 @ 17:15) (93% - 99%)  I&O's Summary    15 May 2020 07:01  -  16 May 2020 07:00  --------------------------------------------------------  IN: 865.9 mL / OUT: 607 mL / NET: 258.9 mL    16 May 2020 07:01  -  16 May 2020 18:31  --------------------------------------------------------  IN: 95.9 mL / OUT: 95 mL / NET: 0.9 mL        PHYSICAL EXAM  General: NAD, resting comfortably in bed  Neck: no JVD  Respiratory: diminished breath sounds at the bases, non-tachypneic, no respiratory distress   Cardiovascular: Regular rhythm/rate; S1/S2, no pericardial rub, pericardial drain in placed clean/dry/intact, draining 10cc serosanguinous fluid   Gastrointestinal: distended, non tender ascitic drain with appropriate drainage, site clean, dry, and intact  Extremities: WWP; 1+ thigh edema  Neurological:  A&Ox 3, CNII-XII grossly intact; no obvious focal deficits, no flapping tremor, but minimal asterixis  T/L/D: palacio draining yellow-colored urine         LABS:                        10.0   11.88 )-----------( 86       ( 16 May 2020 08:09 )             32.0     05-16    138  |  99  |  107<H>  ----------------------------<  163<H>  5.1   |  19<L>  |  5.59<H>    Ca    8.7      16 May 2020 08:09  Phos  6.8     05-16  Mg     2.2     05-16    TPro  6.6  /  Alb  2.7<L>  /  TBili  0.8  /  DBili  x   /  AST  10  /  ALT  6<L>  /  AlkPhos  80  05-16    PT/INR - ( 16 May 2020 08:09 )   PT: 18.7 sec;   INR: 1.62          PTT - ( 16 May 2020 08:09 )  PTT:34.6 sec        RADIOLOGY & ADDITIONAL TESTS:    MEDICATIONS  (STANDING):  chlorhexidine 2% Cloths 1 Application(s) Topical daily  DAPTOmycin IVPB 500 milliGRAM(s) IV Intermittent every 48 hours  finasteride 5 milliGRAM(s) Oral daily  insulin glargine Injectable (LANTUS) 8 Unit(s) SubCutaneous at bedtime  insulin lispro (HumaLOG) corrective regimen sliding scale   SubCutaneous Before meals and at bedtime  insulin lispro Injectable (HumaLOG) 2 Unit(s) SubCutaneous three times a day before meals  midodrine 10 milliGRAM(s) Oral every 8 hours  mirtazapine 7.5 milliGRAM(s) Oral daily  norepinephrine Infusion 0.05 MICROgram(s)/kG/Min (3.37 mL/Hr) IV Continuous <Continuous>  nystatin Cream 1 Application(s) Topical two times a day  pantoprazole  Injectable 40 milliGRAM(s) IV Push every 24 hours  sertraline 50 milliGRAM(s) Oral daily    MEDICATIONS  (PRN):

## 2020-05-16 NOTE — PROGRESS NOTE ADULT - SUBJECTIVE AND OBJECTIVE BOX
INTERVAL HPI/OVERNIGHT EVENTS:    Patient was seen and examined at bedside.  He had an HD catheter placed on 5/15.  He also had his left IJ TLC replaced on 5/15      ROS:  CONSTITUTIONAL:  Negative fever or chills, feels well, good appetite  EYES:  Negative  blurry vision or double vision  CARDIOVASCULAR:  Negative for chest pain or palpitations  RESPIRATORY:  Negative for cough, wheezing, or SOB   GASTROINTESTINAL:  Negative for nausea, vomiting, diarrhea, constipation, or abdominal pain  GENITOURINARY:  Negative frequency, urgency or dysuria  NEUROLOGIC:  No headache, confusion, dizziness, lightheadedness      ANTIBIOTICS/RELEVANT:    MEDICATIONS  (STANDING):  chlorhexidine 2% Cloths 1 Application(s) Topical daily  DAPTOmycin IVPB 500 milliGRAM(s) IV Intermittent every 48 hours  finasteride 5 milliGRAM(s) Oral daily  insulin glargine Injectable (LANTUS) 8 Unit(s) SubCutaneous at bedtime  insulin lispro (HumaLOG) corrective regimen sliding scale   SubCutaneous Before meals and at bedtime  insulin lispro Injectable (HumaLOG) 2 Unit(s) SubCutaneous three times a day before meals  midodrine 10 milliGRAM(s) Oral every 8 hours  mirtazapine 7.5 milliGRAM(s) Oral daily  norepinephrine Infusion 0.05 MICROgram(s)/kG/Min (3.37 mL/Hr) IV Continuous <Continuous>  nystatin Cream 1 Application(s) Topical two times a day  pantoprazole  Injectable 40 milliGRAM(s) IV Push every 24 hours  sertraline 50 milliGRAM(s) Oral daily    MEDICATIONS  (PRN):        Vital Signs Last 24 Hrs  T(C): 36 (16 May 2020 10:00), Max: 36.6 (16 May 2020 06:00)  T(F): 96.8 (16 May 2020 10:00), Max: 97.8 (16 May 2020 06:00)  HR: 87 (16 May 2020 11:00) (86 - 126)  BP: 127/54 (16 May 2020 11:00) (83/62 - 149/49)  BP(mean): 72 (16 May 2020 11:00) (56 - 90)  RR: 16 (16 May 2020 11:00) (12 - 24)  SpO2: 98% (16 May 2020 11:00) (96% - 100%)    PHYSICAL EXAM:  Constitutional:  frail, no distress  Eyes:  no icterus   Ear/Nose/Throat: no sinus tenderness on percussion	  Neck: bilateral central lines   Respiratory: CTA chuck  Cardiovascular: S1S2 RRR, no murmurs  Gastrointestinal:soft, (+) BS, no HSM  Extremities:no edema   Vascular: DP Pulse:	right normal; left normal      LABS:                        10.0   11.88 )-----------( 86       ( 16 May 2020 08:09 )             32.0     05-16    138  |  99  |  107<H>  ----------------------------<  163<H>  5.1   |  19<L>  |  5.59<H>    Ca    8.7      16 May 2020 08:09  Phos  6.8     05-16  Mg     2.2     05-16    TPro  6.6  /  Alb  2.7<L>  /  TBili  0.8  /  DBili  x   /  AST  10  /  ALT  6<L>  /  AlkPhos  80  05-16    PT/INR - ( 16 May 2020 08:09 )   PT: 18.7 sec;   INR: 1.62          PTT - ( 16 May 2020 08:09 )  PTT:34.6 sec      MICROBIOLOGY:    Culture - Blood (05.14.20 @ 08:15)    Gram Stain:   Anaerobic Bottle: Gram Positive Cocci in Clusters  Result called to and read back by_ Mr. Michel Green RN(2WO) 05/15/2020  07:20:08  Aerobic Bottle: Gram Positive Cocci in Clusters    Specimen Source: .Blood Blood    Culture Results:   Growth in aerobic and anaerobic bottles: Staphylococcus aureus  presumptive Methicillin resistant  Confirmation to follow within 24 hours  Floor previously notified.  Susceptibility to follow.        Culture - Blood (05.12.20 @ 13:17)    -  Vancomycin: S 2    -  Trimethoprim/Sulfamethoxazole: R >2/38    -  Oxacillin: R >2    -  Tetra/Doxy: S <=1    Gram Stain:   Aerobic Bottle: Gram Positive Cocci in Clusters  Result called to and read back by_ RYAN Carrion RN  05/13/2020 11:23:32  Anaerobic Bottle: Gram Positive Cocci in Clusters  Floor previously notified.    -  Cefazolin: R 8    -  Linezolid: S 2    -  Clindamycin: S <=0.25    -  RIF- Rifampin: S <=1 Should not be used as monotherapy    -  Daptomycin: S 0.5    -  Erythromycin: R >4    Specimen Source: .Blood Blood    Organism: Methicillin resistant Staphylococcus aureus    Organism: Methicillin resistant Staphylococcus aureus    Culture Results:   Growth in aerobic and anaerobic bottles: Methicillin resistant  Staphylococcus aureus  Floor previously notified.    Organism Identification: Methicillin resistant Staphylococcus aureus  Methicillin resistant Staphylococcus aureus    Method Type: ETEST    Method Type: IMAN    RADIOLOGY & ADDITIONAL STUDIES:

## 2020-05-16 NOTE — PROGRESS NOTE ADULT - ASSESSMENT
75 y/o M w/PMHx of CKD stage III/IV, DM, HTN, BPH (chronic incontinence/retention, s/p palacio on last admission), a-fib admitted for new onset ascites nephrology consulted for ERICKA on CKD.    Ericka on CKD III/IV  oligoanuric  most likely ATN from hypotension and ischemic injury   HRS unlikely   HD initiated on 05/15-- however, pt could only tolerate about 1h 25 min in light of tachycardia  after reviewing labs and volume status, will proceed with   - HD today with 1 L uF if tolerated   - will reassess in am for further session   - preferred to administer daptomycin post-HD  Will follow 73 y/o M w/PMHx of CKD stage III/IV, DM, HTN, BPH (chronic incontinence/retention, s/p palacio on last admission), a-fib admitted for new onset ascites nephrology consulted for ERICKA on CKD.    Ericka on CKD III/IV  oligoanuric  most likely ATN from hypotension and ischemic injury   HRS unlikely   HD initiated on 05/15-- however, pt could only tolerate about 1h 25 min in light of tachycardia  after reviewing labs and volume status, will proceed with IHD agian today with UF goal @ 1.5-2 L  renal diet  will continue assessing HD need daily  renally dose abx    anemia  H.H noted  transfuse prn Hb< 7    renal bone disease  calcium and phos noted  recommend renvela 800 mg po tid

## 2020-05-16 NOTE — PROGRESS NOTE ADULT - SUBJECTIVE AND OBJECTIVE BOX
Patient is a 74y Male seen and evaluated at bedside.   pt seen and discussed with team   was only able to tolerate 1h25 of HD on 05/15 due to tachycardia  labs noted      chlorhexidine 2% Cloths 1 daily  DAPTOmycin IVPB 500 every 48 hours  finasteride 5 daily  insulin glargine Injectable (LANTUS) 8 at bedtime  insulin lispro (HumaLOG) corrective regimen sliding scale  Before meals and at bedtime  insulin lispro Injectable (HumaLOG) 2 three times a day before meals  midodrine 10 every 8 hours  mirtazapine 7.5 daily  norepinephrine Infusion 0.05 <Continuous>  nystatin Cream 1 two times a day  pantoprazole  Injectable 40 every 24 hours  sertraline 50 daily      Allergies    No Known Allergies    Intolerances        T(C): , Max: 36.6 (05-16-20 @ 06:00)  T(F): , Max: 97.8 (05-16-20 @ 06:00)  HR: 87 (05-16-20 @ 10:00)  BP: 128/55 (05-16-20 @ 10:00)  BP(mean): 71 (05-16-20 @ 10:00)  RR: 24 (05-16-20 @ 10:00)  SpO2: 98% (05-16-20 @ 10:00)  Wt(kg): --    05-15 @ 07:01  -  05-16 @ 07:00  --------------------------------------------------------  IN: 865.9 mL / OUT: 607 mL / NET: 258.9 mL    05-16 @ 07:01  -  05-16 @ 10:38  --------------------------------------------------------  IN: 5 mL / OUT: 0 mL / NET: 5 mL          Review of Systems:  CONSTITUTIONAL: No fever or chills, No fatigue or tiredness.  EYES: No blurred or double vision.  RESPIRATORY: No shortness of breath, cough, hemoptysis  CARDIOVASCULAR: No Chest pain or shortness of breath  GASTROINTESTINAL: NO abdominal or flank pain, No nausea or vomiting, No diarrhea  GENITOURINARY: No dysuria or urinary burning, No difficulty passing urine, No hematuria  NEUROLOGICAL: No headaches or blurred vision  SKIN: No skin rashes   MUSCULOSKELETAL: No arthralgia, Joint pain, leg edema, No muscle pains      PHYSICAL EXAM:  General: A&Ox 3; NAD on RA  Neck: no JVD  Respiratory: rales at bases  Cardiovascular: Regular rhythm/rate; S1/S2, no pericardial rub, pericardial drain in placed c/d/i   Gastrointestinal: distended, non tender  Extremities: WWP; pitting thigh edema b/l up to lower abdomen   Neurological:  CNII-XII grossly intact; no obvious focal deficits, mild asterixis present  Access: [facility] HD cath c/d/i           LABS:                        10.0   11.88 )-----------( 86       ( 16 May 2020 08:09 )             32.0     05-16    138  |  99  |  107<H>  ----------------------------<  163<H>  5.1   |  19<L>  |  5.59<H>    Ca    8.7      16 May 2020 08:09  Phos  6.8     05-16  Mg     2.2     05-16    TPro  6.6  /  Alb  2.7<L>  /  TBili  0.8  /  DBili  x   /  AST  10  /  ALT  6<L>  /  AlkPhos  80  05-16      PT/INR - ( 16 May 2020 08:09 )   PT: 18.7 sec;   INR: 1.62          PTT - ( 16 May 2020 08:09 )  PTT:34.6 sec    Creatinine, Random Urine: 128 mg/dL (05-16 @ 08:52)  Sodium, Random Urine: 85 mmol/L (05-16 @ 08:52)        RADIOLOGY & ADDITIONAL STUDIES:

## 2020-05-16 NOTE — PROGRESS NOTE ADULT - ASSESSMENT
IMPRESSION:  MRSA bacteremia - persistent.  Concern for line infection vs endocarditis. Central line changed on 5/15.  Plan for GET on Monday    Recommend:  1.  Continue Daptomycin 500 mg IV q48hrs   2.  Check repeat blood cultures daily until cleared  3.  Follow up GET    ID team 1 will follow

## 2020-05-16 NOTE — PROGRESS NOTE ADULT - ATTENDING COMMENTS
I independently performed the key portions of the evaluation and management service provided. I agree with the above history, physical, and plan which I have reviewed and edited where appropriate. I find AAMIR requiring dialysis, fluid overload, anemia, hypotension. Unable to tolerate dialysis today. Reassess for need for HD Monday. Keep MAP > 65. UF as tolerated. See full note. (Patient seen earlier in day.)

## 2020-05-16 NOTE — PROGRESS NOTE ADULT - ASSESSMENT
75 yo M with MDS, pancytopenia, CKD, ascites of unknown origin presents from [facility] with complaints of decreased po intake and weakness likely secondary to uremia/AAMIR from diuresis for treatment of ascites, hospital course notable for UGIB and pericardial effusion with tamponade physiology , as well as ATN, and ascites of unknown etiology, now in ICU s/p paracentesis 5/10 (2L removed), IR pericardiocentesis 5/11 (drain in place), now with anemia of unknown etiology    Neuro:   Now AxO x 3; Prior to MICU had episode of hypotension and encephalopathy on 5/10 in early PM and was more acutely lethargic and encephalopathic likely 2/2 hepatic encephalopathy   - Ammonia level elevated, received lactulose. D/c'd lactulose on 5/15 since patient is having BMs     #Hydrocephalus on CT: CT Head on admission showed evidence of hydrocephalus. No collateral from prior CT on rate of enlargement.   - plan for neurosurgery consult after acute issues resolved    Cardiovascular:   #Hypotension: MRSA + blood cultures on 5/10, hypotensions likely 2/2 septic shock. Possible contribution of intravascular volume depletion from diuresis vs GIB vs pericardial tamponade.   - cw albumin if needed  - s/p pericardial drain with 100cc's removed (4000 rbcs in fluid)  - Levophed for BP support with MAP goal of 80   - colloids for fluid repletion  - central line placed with cvp monitor for assess for tamponade, exchange line 5/15  - if pt is to decompensate, would add back on zosyn for broad intraabdominal source coverage    #Pericardial Effusion:  Initial read with some evidence of tamponade physiology; now s/p IR for diagnostic pericardiocentesis on 5/11  - Cardiology following, appreciate recs.   - Given low pericardial drain output and clog, a follow up limited echo on 5/15 showed no pericardial effusion  - f/u IR recs    Pulmonary:   Patient currently saturating well on RA, non-hypoxic   - Monitor Respiratory status   - Patient is at aspiration risk given waxing and waning risk     Gastrointestinal:   UGIB: multiple episodes of melena throughout the day on 5/9.  Pt has baseline anemia from MDS and never dropped significantly below his baseline Hgb. Pt noted to have coagulopathy and thrombocytopenia.  - Again with acute drop in hgb on 5/12 from 9.2 to <7. Given 1U prbc and 1U platelets  - hb today stable at 9.2   - Monitor H&H closely  - Continue Protonix 40 mg IVP daily  - Monitor coags  - If H&H continues to downtrend patient would benefit from administration of desmopressin for uremic bleeding  - Transfuse platelets < 50 if continued bleeding or anticipated procedure  - CT ab on 5/12 with no RP bleed  - NGT removed on 5/13, passed FEES, started on mechanical soft and thin liquids diet  - f/u duplex of abdomen    Ascites:  Patient has had new onset ascites in the last 2 months with unknown etiology, not non-drinker   - Diagnostic paracentesis removed 2L on 5/10; f/u fluid studies  - GI (Dr. Mallory) recs for possible cirrhosis, f/u cirrhosis labs  - was on Ceftriaxone for SBP prophylaxis in setting of decompensated cirrhosis and UGIB; zosyn dc'd after mrsa positive blood cultures on 5/12  - ct abdomen on 5/12 with no evidence of liver cirrhosis, lfts normal  - para on 5/14 with 1.8L removed  - plan for another para today    Renal:   AAMIR on CKD   Patient seems to have baseline kidney function in the 2-3 region, with chronic indwelling palacio. Patient on presentation with Cr. in the 4's and uptrending with minimal urine output. US of Kidneys with evidence of mild right hydronephrosis   - Monitor BUN and Cr and electrolytes   - Renal Following, appreciate recs   - Strict I and O's   - Urine lytes indicate most likely ATN likely due to hypotension and ischemic injury, HRS unlikely   - HD cath placed on 5/15 for dialysis  - f/u repeat urine lytes  - HD today 5/16, with increased levophed requirements and stopped early.    Heme:   - Patient with a history of MDS, and pancytopenia at baseline.   - Plts 35 on 5/14, received 1 unit platelets   - Plts 41 on 5/15, received 1 unit platelets     #Anemia:   Likely secondary to anemia from chronic disease and bone marrow suppression.   - S/p 1 unit of PRBC on 5/9 for active melena and Hgb stable   - Given 1 unit of prbc and platelets on 5/12 for acute drop, unknown source  - Monitor CBC  - plts 35 on 5/14, and 41 on 5/15, received 1 unit platelets each day  - Transfuse for Hgb <7   - Active type and screen     #Coagulopathy:   - Patient presented with elevated INR to 2.48, with active bleeding.   - Monitor PT/INR   - INR 1.62 today    #DVT - RLE  - repeat dopplers on 5/11 with no DVT  - Eliquis held in setting of melanax3 and drop in hgb    :   #Chronic Indwelling Palacio: Patient has a palacio cath due to a possible outlet obstruction, he was to get a cystoscopy and urodynamics outpatient.  - palacio exchanged 5/11 by nursing  - Trend Cr.   - continued Finasteride 5mg daily    ID:   MRSA positive blood cultures that also grew in urine. On vancomycin, zosyn dc'd on 5/11  - surveillance cultures on 5/12 growing MRSA  - surveillance cultures on 5/13 and 5/14 still growing MRSA  - Given persistent bacteremia, ordered GET, if negative can consider gallium scan  - central line exhange on 5/15  - f/u surveillance cultures  - c/w daptomycin 550mg [id number], obtain daily CK levels    Pysch:   #Depression:   - home mirtazapine 7.5 mg daily and Sertraline 10 mg daily.

## 2020-05-17 NOTE — PROGRESS NOTE ADULT - SUBJECTIVE AND OBJECTIVE BOX
OVERNIGHT EVENTS: No acute events overnight    SUBJECTIVE / INTERVAL HPI: Patient seen and examined at bedside. No complaints. Denies HA, cp, sob, palpitations, abdominal pain.     Review of systems negative except as noted above.     VITAL SIGNS:  Vital Signs Last 24 Hrs  T(C): 37.1 (17 May 2020 14:48), Max: 37.3 (17 May 2020 06:00)  T(F): 98.7 (17 May 2020 14:48), Max: 99.2 (17 May 2020 06:00)  HR: 98 (17 May 2020 14:00) (83 - 128)  BP: 99/45 (17 May 2020 16:00) (62/38 - 147/97)  BP(mean): 69 (17 May 2020 16:00) (42 - 109)  RR: 19 (17 May 2020 16:00) (12 - 22)  SpO2: 98% (17 May 2020 13:00) (93% - 98%)      05-16-20 @ 07:01  -  05-17-20 @ 07:00  --------------------------------------------------------  IN: 572.9 mL / OUT: 500 mL / NET: 72.9 mL    05-17-20 @ 07:01  -  05-17-20 @ 16:32  --------------------------------------------------------  IN: 67 mL / OUT: 30 mL / NET: 37 mL        PHYSICAL EXAM:    General: Obese, pale, NAD on RA  HEENT: pale conjunctiva  Neck: R HD cath site c/d/i. L IJ c/d/i  Cardiovascular: +S1/S2; RRR. 2/6 systolic murmur  Respiratory: CTA B/L  Gastrointestinal: Distended. NTND BS+4  Extremities: WWP; no edema, vasculitic lesion over soles of feet  Vascular: 2+ radial, DP pulses B/L  Neurological: AAOx3; no focal deficits    MEDICATIONS:  MEDICATIONS  (STANDING):  chlorhexidine 2% Cloths 1 Application(s) Topical daily  DAPTOmycin IVPB 500 milliGRAM(s) IV Intermittent every 48 hours  finasteride 5 milliGRAM(s) Oral daily  insulin glargine Injectable (LANTUS) 4 Unit(s) SubCutaneous at bedtime  insulin lispro (HumaLOG) corrective regimen sliding scale   SubCutaneous Before meals and at bedtime  insulin lispro Injectable (HumaLOG) 2 Unit(s) SubCutaneous three times a day before meals  midodrine 10 milliGRAM(s) Oral every 8 hours  mirtazapine 7.5 milliGRAM(s) Oral daily  norepinephrine Infusion 0.05 MICROgram(s)/kG/Min (3.37 mL/Hr) IV Continuous <Continuous>  nystatin Cream 1 Application(s) Topical two times a day  pantoprazole  Injectable 40 milliGRAM(s) IV Push every 24 hours  sertraline 50 milliGRAM(s) Oral daily    MEDICATIONS  (PRN):      ALLERGIES:  Allergies    No Known Allergies    Intolerances        LABS:                        9.6    11.46 )-----------( 63       ( 17 May 2020 05:53 )             31.7     05-17    137  |  99  |  101<H>  ----------------------------<  183<H>  4.8   |  20<L>  |  5.63<H>    Ca    8.4      17 May 2020 05:53  Phos  6.7     05-17  Mg     2.2     05-17    TPro  5.9<L>  /  Alb  2.5<L>  /  TBili  0.8  /  DBili  x   /  AST  11  /  ALT  <5<L>  /  AlkPhos  66  05-17    PT/INR - ( 17 May 2020 05:53 )   PT: 20.0 sec;   INR: 1.73          PTT - ( 17 May 2020 05:53 )  PTT:35.6 sec    CAPILLARY BLOOD GLUCOSE      POCT Blood Glucose.: 231 mg/dL (17 May 2020 12:02)    CARDIAC MARKERS ( 17 May 2020 05:53 )  x     / x     / 16 U/L / x     / x      CARDIAC MARKERS ( 16 May 2020 08:09 )  x     / x     / 10 U/L / x     / x            RADIOLOGY & ADDITIONAL TESTS: Reviewed.

## 2020-05-17 NOTE — PROGRESS NOTE ADULT - ASSESSMENT
IMPRESSION:  MRSA bacteremia secondary to UTI.  Persistent bacteremia raises concern for nidus of infection    Recommend:  1.  Continue Daptomycin 500 mg IV q48hrs.  CK is normal   2.  Follow up surveillance cultures  3.  For GET on 5/18    ID team 1 will follow

## 2020-05-17 NOTE — PROGRESS NOTE ADULT - SUBJECTIVE AND OBJECTIVE BOX
Patient is a 74y Male seen and evaluated at bedside.   pt seen and discussed   HD session was not completed yesterday as pt was tachycardic and hypotensive  remains on levo- lesser dose  abd US--> hepatic veins are patent, mild r hydronephrosis     chlorhexidine 2% Cloths 1 daily  DAPTOmycin IVPB 500 every 48 hours  finasteride 5 daily  insulin glargine Injectable (LANTUS) 8 at bedtime  insulin lispro (HumaLOG) corrective regimen sliding scale  Before meals and at bedtime  insulin lispro Injectable (HumaLOG) 2 three times a day before meals  midodrine 10 every 8 hours  mirtazapine 7.5 daily  norepinephrine Infusion 0.05 <Continuous>  nystatin Cream 1 two times a day  pantoprazole  Injectable 40 every 24 hours  sertraline 50 daily      Allergies    No Known Allergies    Intolerances        T(C): , Max: 37.3 (05-17-20 @ 06:00)  T(F): , Max: 99.2 (05-17-20 @ 06:00)  HR: 86 (05-17-20 @ 10:00)  BP: 116/64 (05-17-20 @ 10:00)  BP(mean): 76 (05-17-20 @ 10:00)  RR: 19 (05-17-20 @ 10:00)  SpO2: 96% (05-17-20 @ 10:00)  Wt(kg): --    05-16 @ 07:01  -  05-17 @ 07:00  --------------------------------------------------------  IN: 572.9 mL / OUT: 500 mL / NET: 72.9 mL          Review of Systems:  CONSTITUTIONAL: No fever or chills, No fatigue or tiredness.  EYES: No blurred or double vision.  RESPIRATORY: No shortness of breath, cough, hemoptysis  CARDIOVASCULAR: No Chest pain or shortness of breath  GASTROINTESTINAL: NO abdominal or flank pain, No nausea or vomiting, No diarrhea  GENITOURINARY: No dysuria or urinary burning, No difficulty passing urine, No hematuria  NEUROLOGICAL: No headaches or blurred vision  SKIN: No skin rashes   MUSCULOSKELETAL: No arthralgia, Joint pain, leg edema, No muscle pains      PHYSICAL EXAM:  General: A&Ox 3; NAD on RA  Neck: no JVD  Respiratory: rales at bases  Cardiovascular: Regular rhythm/rate; S1/S2, no pericardial rub, pericardial drain in placed c/d/i   Gastrointestinal: distended, non tender  Extremities: WWP; pitting thigh edema b/l up to lower abdomen   Neurological:  CNII-XII grossly intact; no obvious focal deficits, mild asterixis present  Access: [facility] HD cath c/d/i placed on 05/15        LABS:                        9.6    11.46 )-----------( 63       ( 17 May 2020 05:53 )             31.7     05-17    137  |  99  |  101<H>  ----------------------------<  183<H>  4.8   |  20<L>  |  5.63<H>    Ca    8.4      17 May 2020 05:53  Phos  6.7     05-17  Mg     2.2     05-17    TPro  5.9<L>  /  Alb  2.5<L>  /  TBili  0.8  /  DBili  x   /  AST  11  /  ALT  <5<L>  /  AlkPhos  66  05-17      PT/INR - ( 17 May 2020 05:53 )   PT: 20.0 sec;   INR: 1.73          PTT - ( 17 May 2020 05:53 )  PTT:35.6 sec    Creatinine, Random Urine: 128 mg/dL (05-16 @ 08:52)  Sodium, Random Urine: 85 mmol/L (05-16 @ 08:52)        RADIOLOGY & ADDITIONAL STUDIES:

## 2020-05-17 NOTE — PROGRESS NOTE ADULT - ATTENDING COMMENTS
I independently performed the key portions of the evaluation and management service provided. I agree with the above history, physical, and plan which I have reviewed and edited where appropriate. I find unable to tolerate HD due to hypotension. Start CVVHD. Keep MAP > 65.  See full note. (Patient seen earlier in day.)

## 2020-05-17 NOTE — PROGRESS NOTE ADULT - ASSESSMENT
75 yo M with MDS, pancytopenia, CKD, ascites of unknown origin presents from [facility] with complaints of decreased po intake and weakness likely secondary to uremia/AAMIR from diuresis for treatment of ascites, hospital course notable for UGIB and pericardial effusion with tamponade physiology , as well as ATN, and ascites of unknown etiology, now in ICU s/p paracentesis 5/10 (2L removed), IR pericardiocentesis 5/11 (drain in place), now with anemia of unknown etiology    Neuro:   p/w AMS likey 2/2 to uremia, now resolved    Cards:   Hypotension  MRSA bactermia vs.  intravascular volume depletion from diuresis vs GIB. --> c/w dapto, midodrine TID, wean levo as tolerated. most recent echo with no pericaridal effusion. For GET Monday-NPO @ midnight. Hypotension during dialysis is consistent with pericardial effusion.     Pulm:   stable on RA. maintain head of bed 30 degrees, due to aspiration risk     GI:   multiple episodes of melena, with baseline anemia from MDS, although with concern for coagulopathy given decreased PLTS and elevated BUN.--> monitor coags, c/w protonix.   #ascites  -Eleavted SAAG consistent with portal HTN, no evidence of clot on U/S  --> unclear etiology, no cirrohosis on CT.     Renal/gu:   AAMIR on CKD likely due to hypotension now requring HD--> inability to tolerate HD on 5/16.   -f/u bilateral renal US  - will c/w palacio for now due to clear pyuria in palacio     HEME:   Anemia and coagulopathy--> MDS, UGIB, and elevated BUN.  -transfuse PLT as needed  Anemia:   Likely secondary to anemia from chronic disease and bone marrow suppression.   - S/p 1 unit of PRBC on 5/9 for active melena and Hgb stable   - Given 1 unit of prbc and platelets on 5/12 for acute drop, unknown source  - Monitor CBC  - plts 35 on 5/14, and 41 on 5/15, received 1 unit platelets each day  - Transfuse for Hgb <7   - Active type and screen     #Coagulopathy:   - Patient presented with elevated INR to 2.48, with active bleeding.   - Monitor PT/INR   - INR 1.62 today    #DVT - RLE  - repeat dopplers on 5/11 with no DVT  - Eliquis held in setting of melanax3 and drop in hgb     ID: Persistent MRSA bacteremia--> c/w dapto    DVt ppx: SCDs. no chemic ppx due to concern forbleed 75 yo M with MDS, pancytopenia, CKD, ascites of unknown origin presents from [facility] with complaints of decreased po intake and weakness likely secondary to uremia/AAMIR from diuresis for treatment of ascites, hospital course notable for UGIB and pericardial effusion with tamponade physiology , as well as ATN, and ascites of unknown etiology, now in ICU s/p paracentesis 5/10 (2L removed), IR pericardiocentesis 5/11 (drain in place), now with anemia of unknown etiology    Neuro:   p/w AMS likey 2/2 to uremia, now resolved    Cards:   Hypotension  MRSA bactermia vs.  intravascular volume depletion from diuresis vs GIB. --> c/w dapto, midodrine TID, wean levo as tolerated. most recent echo with no pericaridal effusion. For GET Monday-NPO @ midnight. Hypotension during dialysis is consistent with pericardial effusion.     Pulm:   stable on RA. maintain head of bed 30 degrees, due to aspiration risk     GI:   multiple episodes of melena, with baseline anemia from MDS, although with concern for coagulopathy given decreased PLTS and elevated BUN.--> monitor coags, c/w protonix.   #ascites  -Eleavted SAAG consistent with portal HTN, no evidence of clot on U/S  --> unclear etiology, no cirrohosis on CT.     Renal/gu:   AAMIR on CKD likely due to hypotension now requring HD--> inability to tolerate HD on 5/16.   -f/u bilateral renal US  - will c/w palacio for now due to clear pyuria in palacio   - For cvvhd with renal on monday    HEME:   Anemia and coagulopathy--> MDS, UGIB, and elevated BUN.  -transfuse PLT as needed  Anemia:   Likely secondary to anemia from chronic disease and bone marrow suppression.   - S/p 1 unit of PRBC on 5/9 for active melena and Hgb stable   - Given 1 unit of prbc and platelets on 5/12 for acute drop, unknown source  - Monitor CBC  - plts 35 on 5/14, and 41 on 5/15, received 1 unit platelets each day  - Transfuse for Hgb <7   - Active type and screen     #Coagulopathy:   - Patient presented with elevated INR to 2.48, with active bleeding.   - Monitor PT/INR   - INR 1.62 today    #DVT - RLE  - repeat dopplers on 5/11 with no DVT  - Eliquis held in setting of melanax3 and drop in hgb     ID: Persistent MRSA bacteremia--> c/w dapto    DVt ppx: SCDs. no chemic ppx due to concern forbleed

## 2020-05-17 NOTE — PROGRESS NOTE ADULT - ASSESSMENT
75 y/o M w/PMHx of CKD stage III/IV, DM, HTN, BPH (chronic incontinence/retention, s/p palacio on last admission), a-fib admitted for new onset ascites nephrology consulted for ERICKA on CKD.    Ericka on CKD III/IV  oligoanuric  most likely ATN from hypotension and ischemic injury  should also r/o Infection related GN-- check c3, c4   HRS unlikely   HD initiated on 05/15-- however, patient unable to tolerate last 2 HD sessions due to hemodyanmic instability- tachycardia, and hypotension despite beng on levophed during IHD   therefore from now,  would recommend proceeding with CVVHD instead of IHD   electrolytes and volume status noted--. no absolute indications for RRT   renal diet  renally dose abx  if repeat BC-- positive, would recommend removal of HD cath    anemia  H.H noted  transfuse prn Hb< 7    renal bone disease  calcium and phos noted  recommend renvela 800 mg po tid 75 y/o M w/PMHx of CKD stage III/IV, DM, HTN, BPH (chronic incontinence/retention, s/p palacio on last admission), a-fib admitted for new onset ascites nephrology consulted for ERICKA on CKD.    Ericka on CKD III/IV  oligoanuric  most likely ATN from hypotension and ischemic injury  should also r/o Infection related GN-- check c3, c4   HRS unlikely   HD initiated on 05/15-- however, patient unable to tolerate last 2 HD sessions due to hemodyanmic instability- tachycardia, and hypotension despite beng on levophed during IHD   ECho on 05/15---> rule out pericardial effusion   therefore from now,  would recommend proceeding with CVVHD instead of IHD   electrolytes and volume status noted--. no absolute indications for RRT   renal diet  renally dose abx  if repeat BC-- positive, would recommend removal of HD cath    anemia  H.H noted  transfuse prn Hb< 7    renal bone disease  calcium and phos noted  recommend renvela 800 mg po tid

## 2020-05-17 NOTE — PROGRESS NOTE ADULT - SUBJECTIVE AND OBJECTIVE BOX
INTERVAL HPI/OVERNIGHT EVENTS:    Patient seen and examined at bedside.  Events noted      ROS:      CONSTITUTIONAL:  Negative fever or chills, feels well, good appetite  EYES:  Negative  blurry vision or double vision  CARDIOVASCULAR:  Negative for chest pain or palpitations  RESPIRATORY:  Negative for cough, wheezing, or SOB   GASTROINTESTINAL:  Negative for nausea, vomiting, diarrhea, constipation, or abdominal pain  GENITOURINARY:  Negative frequency, urgency or dysuria  NEUROLOGIC:  No headache, confusion, dizziness, lightheadedness      ANTIBIOTICS/RELEVANT:    MEDICATIONS  (STANDING):  chlorhexidine 2% Cloths 1 Application(s) Topical daily  DAPTOmycin IVPB 500 milliGRAM(s) IV Intermittent every 48 hours  finasteride 5 milliGRAM(s) Oral daily  insulin glargine Injectable (LANTUS) 4 Unit(s) SubCutaneous at bedtime  insulin lispro (HumaLOG) corrective regimen sliding scale   SubCutaneous Before meals and at bedtime  insulin lispro Injectable (HumaLOG) 2 Unit(s) SubCutaneous three times a day before meals  midodrine 10 milliGRAM(s) Oral every 8 hours  mirtazapine 7.5 milliGRAM(s) Oral daily  norepinephrine Infusion 0.05 MICROgram(s)/kG/Min (3.37 mL/Hr) IV Continuous <Continuous>  nystatin Cream 1 Application(s) Topical two times a day  pantoprazole  Injectable 40 milliGRAM(s) IV Push every 24 hours  sertraline 50 milliGRAM(s) Oral daily    MEDICATIONS  (PRN):        Vital Signs Last 24 Hrs  T(C): 37.3 (17 May 2020 08:50), Max: 37.3 (17 May 2020 06:00)  T(F): 99.1 (17 May 2020 08:50), Max: 99.2 (17 May 2020 06:00)  HR: 96 (17 May 2020 11:30) (83 - 128)  BP: 88/49 (17 May 2020 11:30) (62/38 - 147/97)  BP(mean): 76 (17 May 2020 10:00) (42 - 109)  RR: 19 (17 May 2020 10:00) (12 - 22)  SpO2: 96% (17 May 2020 11:30) (93% - 98%)    PHYSICAL EXAM:  Constitutional: pale, non-toxic  Eyes:  no icterus   Ear/Nose/Throat: no oral lesion  Neck:  supple  Respiratory: CTA chuck  Cardiovascular: S1S2 RRR, no murmurs  Gastrointestinal:soft, (+) BS, no HSM  Extremities:no edema  Vascular: DP Pulse:	right normal; left normal      LABS:                        9.6    11.46 )-----------( 63       ( 17 May 2020 05:53 )             31.7     05-17    137  |  99  |  101<H>  ----------------------------<  183<H>  4.8   |  20<L>  |  5.63<H>    Ca    8.4      17 May 2020 05:53  Phos  6.7     05-17  Mg     2.2     05-17    TPro  5.9<L>  /  Alb  2.5<L>  /  TBili  0.8  /  DBili  x   /  AST  11  /  ALT  <5<L>  /  AlkPhos  66  05-17    PT/INR - ( 17 May 2020 05:53 )   PT: 20.0 sec;   INR: 1.73          PTT - ( 17 May 2020 05:53 )  PTT:35.6 sec    Creatine Kinase, Serum in AM (05.17.20 @ 05:53)    Creatine Kinase, Serum: 16 U/L        MICROBIOLOGY:    Culture - Blood (05.16.20 @ 20:35)    Specimen Source: .Blood Blood    Culture Results:   No growth at 12 hours        Culture - Blood (05.14.20 @ 08:15)    Gram Stain:   Anaerobic Bottle: Gram Positive Cocci in Clusters  Result called to and read back by_ Mr. Michel Green RN(2WO) 05/15/2020  07:20:08  Aerobic Bottle: Gram Positive Cocci in Clusters    -  Trimethoprim/Sulfamethoxazole: R >2/38    -  Vancomycin: S 2    -  Tetra/Doxy: S <=1    -  Oxacillin: R >2    -  RIF- Rifampin: S <=1 Should not be used as monotherapy    -  Clindamycin: S <=0.25    -  Daptomycin: S 0.5    -  Erythromycin: R >4    -  Linezolid: S 1    -  Cefazolin: R 8    Specimen Source: .Blood Blood    Organism: Methicillin resistant Staphylococcus aureus    Organism: Methicillin resistant Staphylococcus aureus    Culture Results:   Growth in aerobic and anaerobic bottles: Methicillin resistant  Staphylococcus aureus  Floor previously notified.    Organism Identification: Methicillin resistant Staphylococcus aureus  Methicillin resistant Staphylococcus aureus    Method Type: ETEST    Method Type: IMAN        RADIOLOGY & ADDITIONAL STUDIES:    < from: Xray Chest 1 View- PORTABLE-Routine (05.16.20 @ 04:33) >    Portable examination of the chest demonstrates improvement congestion and/or infiltrates in comparison to prior examination of the chest 5/15/2020. No interval change position remaining support devices. Elevation right hemidiaphragm. Small bilateral effusions cannot be excluded    Impression: Improvement congestion and/or infiltrates

## 2020-05-17 NOTE — PROGRESS NOTE ADULT - ASSESSMENT
75 yo M s/p IR pericardiocentesis 5/11. Echo on 5/15 showed no pericardial effusion    Output 0 cc recorded over past 24 hours (5cc in prior 24 period).  Scant serosanginous output with clot in ELOY drain at time of exam.  Flushed with NS, mild resistance.    - Will plan for removal on 5/18 per attending physician.

## 2020-05-18 NOTE — PROGRESS NOTE ADULT - ASSESSMENT
73 yo M with MDS, pancytopenia, CKD, ascites of unknown origin presents from [facility] with complaints of decreased po intake and weakness likely secondary to uremia/AAMIR from diuresis for treatment of ascites, hospital course notable for UGIB and pericardial effusion with tamponade physiology , as well as ATN, and ascites of unknown etiology, now in ICU s/p paracentesis 5/10 (2L removed), IR pericardiocentesis 5/11 (drain in place), now with anemia of unknown etiology    Neuro:   p/w AMS likey 2/2 to uremia, now resolved    Cards:   Hypotension  MRSA bactermia vs.  intravascular volume depletion from diuresis vs GIB. --> c/w dapto, midodrine TID, wean levo as tolerated. most recent echo with no pericaridal effusion. For GET Tuesday-NPO @ midnight.   - Hypotension during dialysis is consistent with pericardial effusion.   - plan for CVVHD on 5/18    Pericardial Effusion:  Initial read with some evidence of tamponade physiology; now s/p IR for diagnostic pericardiocentesis on 5/11  - Cardiology following, appreciate recs.   - Given low pericardial drain output and clog, a follow up limited echo on 5/15 showed no pericardial effusion  - f/u IR recs    Pulm:   stable on RA. maintain head of bed 30 degrees, due to aspiration risk     GI:   multiple episodes of melena, with baseline anemia from MDS, although with concern for coagulopathy given decreased PLTS and elevated BUN.--> monitor coags, c/w protonix.   #ascites  -Eleavted SAAG consistent with portal HTN, no evidence of clot on U/S  --> unclear etiology, no cirrhosis on CT.     Renal/gu:   AAMIR on CKD likely due to hypotension now requring HD--> inability to tolerate HD on 5/16.   - bilateral renal US - mild bilateral hydronephrosis, large ascites. Splenomegaly. Cholelithiasis.  - palacio exchanged on 5/18 and will c/w palacio for now due to clear pyuria    - For cvvhd with renal on 5/18    HEME:   Anemia and coagulopathy--> MDS, UGIB, and elevated BUN.  -transfuse PLT as needed    Anemia:   Likely secondary to anemia from chronic disease and bone marrow suppression.   - S/p 1 unit of PRBC on 5/9 for active melena and Hgb stable   - Given 1 unit of prbc and platelets on 5/12 for acute drop, unknown source  - Monitor CBC  - plts 35 on 5/14, and 41 on 5/15, received 1 unit platelets each day  - Transfuse for Hgb <7   - Active type and screen     #Coagulopathy:   - Patient presented with elevated INR to 2.48, with active bleeding.   - Monitor PT/INR   - INR 1.69 today    #DVT - RLE  - repeat dopplers on 5/11 with no DVT  - Eliquis held in setting of melanax3 and drop in hgb     ID:   - Persistent MRSA bacteremia--> dapto increased to 700mg [id number]  - last blood culture (+) on 5/16, continue with surveillance cultures  - obtain daily CKs  - f/u CT abdomen and pelvis    DVt ppx: SCDs. no chemic ppx due to concern forbleed

## 2020-05-18 NOTE — PROVIDER CONTACT NOTE (OTHER) - SITUATION
Patient is oliguric in renal failure with 14f palacio cath with thick sediment the consistency of sand

## 2020-05-18 NOTE — SWALLOW BEDSIDE ASSESSMENT ADULT - SLP GENERAL OBSERVATIONS
Open-mouth posture at rest; dry oral mucosa. Language skills appear to be intact for at least semi-complex information.

## 2020-05-18 NOTE — PROGRESS NOTE ADULT - ASSESSMENT
73 y/o M w/PMHx of CKD stage III/IV, DM, HTN, BPH (chronic incontinence/retention, s/p palacio on last admission), a-fib admitted for new onset ascites nephrology consulted for AAMIR on CKD.    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, likely ATN from hypotension and ischemic injury  - oligoanuric  - should also r/o Infection related GN-- check c3, c4  - HRS unlikely   - HD initiated on 05/15, however, unable to tolerate last 2 IHD sessions due to hemodynamic instability, tachycardia w hypotension despite being on levophed during IHD   - Echo on 05/15, rule out pericardial effusion   - volume status and electrolytes noted  - would attempt IHD today and if not able to tolerate due to hemodynamic instability will proceeding with CVVHD  - renal diet  - renally adjusted meds/ ABx  - if repeat blood cx positive, would recommend removal of HD cath    # Anemia  - transfuse as indicated     # Renal bone disease  - calcium and phos noted  - would recommend Renvela 800 mg po tid

## 2020-05-18 NOTE — PROVIDER CONTACT NOTE (OTHER) - ACTION/TREATMENT ORDERED:
MICU team Dr. Tobin and Dr. Bruce by the bedside, 12 lead EKG done and pt assessed by MD, Nephrology made aware by Dialysis RN, Dialysis stopped by RN as per renal fellow, will continue to monitor
Continue to monitor. RN gave pericare and wound care.
RN removed 14F Rodriguez and noted pus upon removal. Dr. Bruce aware. New 16f Rodriguez inserted with blood tinged urine output.
Rectal tube placed.  Stool specimen collected for C-diff. Perineal skin cleansed and Nystatin cream and powder applied

## 2020-05-18 NOTE — PROGRESS NOTE ADULT - ASSESSMENT
IMPRESSION:  Persistent MRSA bacteremia.  Unclear reason:  consider abscess vs endocarditis    Recommend:  1.  Continue Daptomycin but increase to 700 mg IV q48hrs (10 mg/kg)  2.  Check surveillance cultures daily until cleared  3.  Follow up GET  4.  Check CT abd/pelvis to look for  abscess given purulence    ID team 1 will follow

## 2020-05-18 NOTE — PROGRESS NOTE ADULT - SUBJECTIVE AND OBJECTIVE BOX
Patient was seen and evaluated on dialysis.   HR: 76 (05-18-20 @ 19:00)  BP: 120/61 (05-18-20 @ 19:00)  Wt(kg): --  05-18    138  |  101  |  111<H>  ----------------------------<  145<H>  5.0   |  21<L>  |  6.00<H>    Ca    8.4      18 May 2020 05:11  Phos  7.2     05-18  Mg     2.2     05-18    TPro  6.1  /  Alb  2.5<L>  /  TBili  0.7  /  DBili  x   /  AST  10  /  ALT  <5<L>  /  AlkPhos  69  05-18    Continue dialysis:   Dialyzer:   160    QB: 400  K bath:  2k   Goal UF:    0.5   L   over         180      min  Patient is tolerating the procedure well.

## 2020-05-18 NOTE — PROVIDER CONTACT NOTE (OTHER) - BACKGROUND
Pt unable to tolerate bedside HD dialysis, became hypotensive, levophed titrated to maintain MAP> 65, pt  rhythm changed to sinus tachycardiac to PVC and 4-5 beats of VT
Multiple bowel movements today
Patient has MRSA in blood
h/o C-diff. As per MD. White patient had a recent bout of C-diff and completed his treatement of Vanco PO.

## 2020-05-18 NOTE — SWALLOW BEDSIDE ASSESSMENT ADULT - COMMENTS
Pt known to this svc from [facility] on 5/13 with rec for mech soft/thin, alternating liquids w solids. Per RNLaura, Pt had coughing w meds on night shift and Pt also c/o difficulty chewing soft solids.

## 2020-05-18 NOTE — PROGRESS NOTE ADULT - ATTENDING COMMENTS
attempt HD today --primarily for clearance   hopefully will be more stable with rx of infection  plan CVVHD if cant tolerate HD

## 2020-05-18 NOTE — PROVIDER CONTACT NOTE (OTHER) - ASSESSMENT
Patient's rectal area is excoriated and has a fungal rash. Pressure ulcer to right sacrum with denuded skin and bleeding when cleansed

## 2020-05-18 NOTE — PROGRESS NOTE ADULT - SUBJECTIVE AND OBJECTIVE BOX
coverage for Dr Roberts  Pt seen and examined   denies pain  chronically ill looking  oriented and responsive    REVIEW OF SYSTEMS:  Constitutional: No fever,   Cardiovascular: No chest pain, palpitations, no dizziness   Gastrointestinal: No abdominal or epigastric pain. No nausea, vomiting ; No diarrhea .  Skin: ? echymosis       MEDICATIONS:  MEDICATIONS  (STANDING):  albumin human 25% IVPB 50 milliLiter(s) IV Intermittent every 1 hour  chlorhexidine 2% Cloths 1 Application(s) Topical daily  collagenase Ointment 1 Application(s) Topical daily  finasteride 5 milliGRAM(s) Oral daily  insulin glargine Injectable (LANTUS) 4 Unit(s) SubCutaneous at bedtime  insulin lispro (HumaLOG) corrective regimen sliding scale   SubCutaneous Before meals and at bedtime  insulin lispro Injectable (HumaLOG) 2 Unit(s) SubCutaneous three times a day before meals  iohexol 300 mG (iodine)/mL Oral Solution 30 milliLiter(s) Oral once  midodrine 10 milliGRAM(s) Oral every 8 hours  mirtazapine 7.5 milliGRAM(s) Oral daily  norepinephrine Infusion 0.05 MICROgram(s)/kG/Min (3.37 mL/Hr) IV Continuous <Continuous>  nystatin Cream 1 Application(s) Topical two times a day  nystatin Powder 1 Application(s) Topical two times a day  pantoprazole  Injectable 40 milliGRAM(s) IV Push every 24 hours  sertraline 50 milliGRAM(s) Oral daily    MEDICATIONS  (PRN):      Allergies    No Known Allergies    Intolerances        Vital Signs Last 24 Hrs  T(C): 36.1 (18 May 2020 14:00), Max: 37.1 (17 May 2020 14:48)  T(F): 96.9 (18 May 2020 14:00), Max: 98.7 (17 May 2020 14:48)  HR: 82 (18 May 2020 12:00) (66 - 98)  BP: 103/51 (18 May 2020 12:00) (80/40 - 124/54)  BP(mean): 61 (18 May 2020 12:00) (58 - 76)  RR: 12 (18 May 2020 12:00) (12 - 20)  SpO2: 100% (18 May 2020 12:00) (94% - 100%)    05-17 @ 07:01  -  05-18 @ 07:00  --------------------------------------------------------  IN: 443 mL / OUT: 222 mL / NET: 221 mL    05-18 @ 07:01  -  05-18 @ 14:11  --------------------------------------------------------  IN: 12 mL / OUT: 30 mL / NET: -18 mL        PHYSICAL EXAM:    General:  in no acute distress  HEENT: MMM, conjunctiva and sclera clear  Gastrointestinal: Soft non-tender softly-distended; Normal bowel sounds;  Skin: lesions noted    LABS:      CBC Full  -  ( 18 May 2020 08:41 )  WBC Count : 9.43 K/uL  RBC Count : 4.05 M/uL  Hemoglobin : 9.7 g/dL  Hematocrit : 30.4 %  Platelet Count - Automated : 55 K/uL  Mean Cell Volume : 75.1 fl  Mean Cell Hemoglobin : 24.0 pg  Mean Cell Hemoglobin Concentration : 31.9 gm/dL  Auto Neutrophil # : x  Auto Lymphocyte # : x  Auto Monocyte # : x  Auto Eosinophil # : x  Auto Basophil # : x  Auto Neutrophil % : x  Auto Lymphocyte % : x  Auto Monocyte % : x  Auto Eosinophil % : x  Auto Basophil % : x    05-18    138  |  101  |  111<H>  ----------------------------<  145<H>  5.0   |  21<L>  |  6.00<H>    Ca    8.4      18 May 2020 05:11  Phos  7.2     05-18  Mg     2.2     05-18    TPro  6.1  /  Alb  2.5<L>  /  TBili  0.7  /  DBili  x   /  AST  10  /  ALT  <5<L>  /  AlkPhos  69  05-18    PT/INR - ( 18 May 2020 05:11 )   PT: 19.6 sec;   INR: 1.69          PTT - ( 18 May 2020 05:11 )  PTT:36.7 sec                  RADIOLOGY & ADDITIONAL STUDIES (The following images were personally reviewed):

## 2020-05-18 NOTE — PROGRESS NOTE ADULT - SUBJECTIVE AND OBJECTIVE BOX
no acute events overnight      Meds:  albumin human 25% IVPB 50 every 1 hour  chlorhexidine 2% Cloths 1 daily  collagenase Ointment 1 daily  finasteride 5 daily  insulin glargine Injectable (LANTUS) 4 at bedtime  insulin lispro (HumaLOG) corrective regimen sliding scale  Before meals and at bedtime  insulin lispro Injectable (HumaLOG) 2 three times a day before meals  iohexol 300 mG (iodine)/mL Oral Solution 30 once  midodrine 10 every 8 hours  mirtazapine 7.5 daily  norepinephrine Infusion 0.05 <Continuous>  nystatin Cream 1 two times a day  nystatin Powder 1 two times a day  pantoprazole  Injectable 40 every 24 hours  sertraline 50 daily      T(C): , Max: 36.9 (05-17-20 @ 16:00)  T(F): , Max: 98.4 (05-17-20 @ 16:00)  HR: 82 (05-18-20 @ 14:00)  BP: 100/53 (05-18-20 @ 14:00)  BP(mean): 65 (05-18-20 @ 14:00)  RR: 12 (05-18-20 @ 14:00)  SpO2: 100% (05-18-20 @ 14:00)  Wt(kg): --    05-17 @ 07:01  -  05-18 @ 07:00  --------------------------------------------------------  IN: 443 mL / OUT: 222 mL / NET: 221 mL    05-18 @ 07:01  -  05-18 @ 15:15  --------------------------------------------------------  IN: 28 mL / OUT: 30 mL / NET: -2 mL      PHYSICAL EXAM:  General: NAD on RA  Neck: no JVD  Respiratory: rales at bases  Cardiovascular: Regular rhythm/rate; S1/S2  Gastrointestinal: distended, non tender  Extremities: pitting thigh edema b/l up to lower abdomen   Access: [facility] HD cath c/d/i, placed on 05/15      LABS:                        9.7    9.43  )-----------( 55       ( 18 May 2020 08:41 )             30.4     05-18    138  |  101  |  111<H>  ----------------------------<  145<H>  5.0   |  21<L>  |  6.00<H>    Ca    8.4      18 May 2020 05:11  Phos  7.2     05-18  Mg     2.2     05-18    TPro  6.1  /  Alb  2.5<L>  /  TBili  0.7  /  DBili  x   /  AST  10  /  ALT  <5<L>  /  AlkPhos  69  05-18      PT/INR - ( 18 May 2020 05:11 )   PT: 19.6 sec;   INR: 1.69          PTT - ( 18 May 2020 05:11 )  PTT:36.7 sec          RADIOLOGY & ADDITIONAL STUDIES:    < from: US Retroperitoneal B-Scan Limited (05.17.20 @ 17:26) >    EXAM:  US RETROPERITONEAL LIMITED                          PROCEDURE DATE:  05/17/2020          INTERPRETATION:  RENAL ULTRASOUND dated 5/17/2020 5:26 PM    Indication: Poor renal function.    Prior studies: Abdominal ultrasound 5/16/2020. CT abdomen and pelvis 5/12/2020.    Findings:    RIGHT KIDNEY:  Length: 10.2 cm  Lesions: None  Hydronephrosis: Mild.  Stones: None  Echogenicity: Normal    LEFT KIDNEY:  Length: 10.5 cm  Lesions: None  Hydronephrosis: Mild.  Stones: None  Echogenicity: Normal    Urinary bladder: There is a Rodriguez catheter within a decompressed urinary bladder.    Also noted: Large ascites. Splenomegaly measuring 14.0 cm in length. Cholelithiasis.      IMPRESSION:  No change in mild bilateral hydronephrosis.    < end of copied text > no acute events overnight  no specific complaints   remains on pressors , oliguric       Meds:  albumin human 25% IVPB 50 every 1 hour  chlorhexidine 2% Cloths 1 daily  collagenase Ointment 1 daily  finasteride 5 daily  insulin glargine Injectable (LANTUS) 4 at bedtime  insulin lispro (HumaLOG) corrective regimen sliding scale  Before meals and at bedtime  insulin lispro Injectable (HumaLOG) 2 three times a day before meals  iohexol 300 mG (iodine)/mL Oral Solution 30 once  midodrine 10 every 8 hours  mirtazapine 7.5 daily  norepinephrine Infusion 0.05 <Continuous>  nystatin Cream 1 two times a day  nystatin Powder 1 two times a day  pantoprazole  Injectable 40 every 24 hours  sertraline 50 daily      T(C): , Max: 36.9 (05-17-20 @ 16:00)  T(F): , Max: 98.4 (05-17-20 @ 16:00)  HR: 82 (05-18-20 @ 14:00)  BP: 100/53 (05-18-20 @ 14:00)  BP(mean): 65 (05-18-20 @ 14:00)  RR: 12 (05-18-20 @ 14:00)  SpO2: 100% (05-18-20 @ 14:00)  Wt(kg): --    05-17 @ 07:01  -  05-18 @ 07:00  --------------------------------------------------------  IN: 443 mL / OUT: 222 mL / NET: 221 mL    05-18 @ 07:01  -  05-18 @ 15:15  --------------------------------------------------------  IN: 28 mL / OUT: 30 mL / NET: -2 mL      PHYSICAL EXAM:  General: NAD on RA  Neck: no JVD  Respiratory: rales at bases  Cardiovascular: Regular rhythm/rate; S1/S2  Gastrointestinal: distended, non tender  Extremities: pitting thigh edema b/l up to lower abdomen   Access: [facility] HD cath c/d/i, placed on 05/15      LABS:                        9.7    9.43  )-----------( 55       ( 18 May 2020 08:41 )             30.4     05-18    138  |  101  |  111<H>  ----------------------------<  145<H>  5.0   |  21<L>  |  6.00<H>    Ca    8.4      18 May 2020 05:11  Phos  7.2     05-18  Mg     2.2     05-18    TPro  6.1  /  Alb  2.5<L>  /  TBili  0.7  /  DBili  x   /  AST  10  /  ALT  <5<L>  /  AlkPhos  69  05-18      PT/INR - ( 18 May 2020 05:11 )   PT: 19.6 sec;   INR: 1.69          PTT - ( 18 May 2020 05:11 )  PTT:36.7 sec          RADIOLOGY & ADDITIONAL STUDIES:    < from: US Retroperitoneal B-Scan Limited (05.17.20 @ 17:26) >    EXAM:  US RETROPERITONEAL LIMITED                          PROCEDURE DATE:  05/17/2020          INTERPRETATION:  RENAL ULTRASOUND dated 5/17/2020 5:26 PM    Indication: Poor renal function.    Prior studies: Abdominal ultrasound 5/16/2020. CT abdomen and pelvis 5/12/2020.    Findings:    RIGHT KIDNEY:  Length: 10.2 cm  Lesions: None  Hydronephrosis: Mild.  Stones: None  Echogenicity: Normal    LEFT KIDNEY:  Length: 10.5 cm  Lesions: None  Hydronephrosis: Mild.  Stones: None  Echogenicity: Normal    Urinary bladder: There is a Rodriguez catheter within a decompressed urinary bladder.    Also noted: Large ascites. Splenomegaly measuring 14.0 cm in length. Cholelithiasis.      IMPRESSION:  No change in mild bilateral hydronephrosis.    < end of copied text >

## 2020-05-18 NOTE — PROGRESS NOTE ADULT - SUBJECTIVE AND OBJECTIVE BOX
OVERNIGHT EVENTS: KRYS    SUBJECTIVE:  Patient seen and examined at bedside. He appears comfortable and in no acute distress. He says he has pain on his back coming from a lower back bed sore. Otherwise, he denies any fevers, chills, chest pain, shortness of breath, abdominal pain, n/v/d/c.    Vital Signs Last 12 Hrs  T(F): 96.8 (05-18-20 @ 12:00), Max: 96.8 (05-18-20 @ 12:00)  HR: 82 (05-18-20 @ 12:00) (66 - 88)  BP: 103/51 (05-18-20 @ 12:00) (93/54 - 107/53)  BP(mean): 61 (05-18-20 @ 12:00) (58 - 64)  RR: 12 (05-18-20 @ 12:00) (12 - 20)  SpO2: 100% (05-18-20 @ 12:00) (94% - 100%)  I&O's Summary    17 May 2020 07:01  -  18 May 2020 07:00  --------------------------------------------------------  IN: 443 mL / OUT: 222 mL / NET: 221 mL    18 May 2020 07:01  -  18 May 2020 13:32  --------------------------------------------------------  IN: 12 mL / OUT: 30 mL / NET: -18 mL        PHYSICAL EXAM  General: NAD, resting comfortably in bed. Breathing well on nasal cannula  Neck: no JVD  Respiratory: diminished breath sounds at the bases, non-tachypneic, no respiratory distress   Cardiovascular: Regular rhythm/rate; S1/S2, no pericardial rub, pericardial drain in placed clean/dry/intact, draining 5cc serosanguinous fluid   Gastrointestinal: distended, non tender ascitic drain with appropriate drainage, site clean, dry, and intact  Extremities: WWP; 1+ thigh edema  Neurological:  A&Ox 3, CNII-XII grossly intact; no obvious focal deficits, no flapping tremor, but minimal asterixis  T/L/D: palacio draining yellow-colored urine         LABS:                        9.7    9.43  )-----------( 55       ( 18 May 2020 08:41 )             30.4     05-18    138  |  101  |  111<H>  ----------------------------<  145<H>  5.0   |  21<L>  |  6.00<H>    Ca    8.4      18 May 2020 05:11  Phos  7.2     05-18  Mg     2.2     05-18    TPro  6.1  /  Alb  2.5<L>  /  TBili  0.7  /  DBili  x   /  AST  10  /  ALT  <5<L>  /  AlkPhos  69  05-18    PT/INR - ( 18 May 2020 05:11 )   PT: 19.6 sec;   INR: 1.69          PTT - ( 18 May 2020 05:11 )  PTT:36.7 sec        RADIOLOGY & ADDITIONAL TESTS:    MEDICATIONS  (STANDING):  albumin human 25% IVPB 50 milliLiter(s) IV Intermittent every 1 hour  chlorhexidine 2% Cloths 1 Application(s) Topical daily  collagenase Ointment 1 Application(s) Topical daily  finasteride 5 milliGRAM(s) Oral daily  insulin glargine Injectable (LANTUS) 4 Unit(s) SubCutaneous at bedtime  insulin lispro (HumaLOG) corrective regimen sliding scale   SubCutaneous Before meals and at bedtime  insulin lispro Injectable (HumaLOG) 2 Unit(s) SubCutaneous three times a day before meals  midodrine 10 milliGRAM(s) Oral every 8 hours  mirtazapine 7.5 milliGRAM(s) Oral daily  norepinephrine Infusion 0.05 MICROgram(s)/kG/Min (3.37 mL/Hr) IV Continuous <Continuous>  nystatin Cream 1 Application(s) Topical two times a day  pantoprazole  Injectable 40 milliGRAM(s) IV Push every 24 hours  sertraline 50 milliGRAM(s) Oral daily    MEDICATIONS  (PRN):

## 2020-05-18 NOTE — PROGRESS NOTE ADULT - SUBJECTIVE AND OBJECTIVE BOX
INTERVAL HPI/OVERNIGHT EVENTS:    Patient was seen and examined at bedside.  Events noted.  Purulence noted from penis on palacio insertion     CONSTITUTIONAL:  Negative fever or chills, feels well, good appetite  EYES:  Negative  blurry vision or double vision  CARDIOVASCULAR:  Negative for chest pain or palpitations  RESPIRATORY:  Negative for cough, wheezing, or SOB   GASTROINTESTINAL:  Negative for nausea, vomiting, diarrhea, constipation, or abdominal pain  GENITOURINARY:  Negative frequency, urgency or dysuria  NEUROLOGIC:  No headache, confusion, dizziness, lightheadedness      ANTIBIOTICS/RELEVANT:    MEDICATIONS  (STANDING):  albumin human 25% IVPB 50 milliLiter(s) IV Intermittent every 1 hour  chlorhexidine 2% Cloths 1 Application(s) Topical daily  collagenase Ointment 1 Application(s) Topical daily  finasteride 5 milliGRAM(s) Oral daily  insulin glargine Injectable (LANTUS) 4 Unit(s) SubCutaneous at bedtime  insulin lispro (HumaLOG) corrective regimen sliding scale   SubCutaneous Before meals and at bedtime  insulin lispro Injectable (HumaLOG) 2 Unit(s) SubCutaneous three times a day before meals  midodrine 10 milliGRAM(s) Oral every 8 hours  mirtazapine 7.5 milliGRAM(s) Oral daily  norepinephrine Infusion 0.05 MICROgram(s)/kG/Min (3.37 mL/Hr) IV Continuous <Continuous>  nystatin Cream 1 Application(s) Topical two times a day  pantoprazole  Injectable 40 milliGRAM(s) IV Push every 24 hours  sertraline 50 milliGRAM(s) Oral daily    MEDICATIONS  (PRN):        Vital Signs Last 24 Hrs  T(C): 36 (18 May 2020 12:00), Max: 37.1 (17 May 2020 14:48)  T(F): 96.8 (18 May 2020 12:00), Max: 98.7 (17 May 2020 14:48)  HR: 82 (18 May 2020 12:00) (66 - 98)  BP: 103/51 (18 May 2020 12:00) (80/40 - 124/54)  BP(mean): 61 (18 May 2020 12:00) (58 - 76)  RR: 12 (18 May 2020 12:00) (12 - 20)  SpO2: 100% (18 May 2020 12:00) (94% - 100%)    PHYSICAL EXAM:  Constitutional:  frail, no distress  Eyes:  no icterus   Ear/Nose/Throat: no sinus tenderness on percussion	  Neck:  supple  Respiratory: CTA chuck  Cardiovascular: S1S2 RRR, no murmurs  Gastrointestinal:soft, (+) BS, no HSM  Extremities:no edema  Vascular: DP Pulse:	right normal; left normal      LABS:                        9.7    9.43  )-----------( 55       ( 18 May 2020 08:41 )             30.4     05-18    138  |  101  |  111<H>  ----------------------------<  145<H>  5.0   |  21<L>  |  6.00<H>    Ca    8.4      18 May 2020 05:11  Phos  7.2     05-18  Mg     2.2     05-18    TPro  6.1  /  Alb  2.5<L>  /  TBili  0.7  /  DBili  x   /  AST  10  /  ALT  <5<L>  /  AlkPhos  69  05-18    PT/INR - ( 18 May 2020 05:11 )   PT: 19.6 sec;   INR: 1.69          PTT - ( 18 May 2020 05:11 )  PTT:36.7 sec      MICROBIOLOGY:    Culture - Blood (05.16.20 @ 20:35)    Gram Stain:   Aerobic Bottle: Gram Positive Cocci in Clusters  Result called to and read back by_ [facility] RN (2WO)  05/17/2020 17:21:54    Specimen Source: .Blood Blood    Culture Results:   Growth in anaerobic bottle: Staphylococcus aureus presumptive Methicillin  resistant  Confirmation to follow within 24 hours  Susceptibility to follow.  Floor previously notified.  Culture in progress        RADIOLOGY & ADDITIONAL STUDIES:

## 2020-05-18 NOTE — PROGRESS NOTE ADULT - ATTENDING COMMENTS
seen on HD, agree with above   tolerating rx with stable pressors and VSS, no complaints  cont rx as above

## 2020-05-19 NOTE — PROGRESS NOTE ADULT - SUBJECTIVE AND OBJECTIVE BOX
remains on pressors w increased requirements overnight  s/p HD completed 5/18 w tolerated UF of 0.9 L  anuric   plan for GET in bacteremia       Meds:  chlorhexidine 2% Cloths 1 daily  collagenase Ointment 1 daily  DAPTOmycin IVPB 700 every 48 hours  finasteride 5 daily  insulin glargine Injectable (LANTUS) 4 at bedtime  insulin lispro (HumaLOG) corrective regimen sliding scale  Before meals and at bedtime  insulin lispro Injectable (HumaLOG) 2 three times a day before meals  midodrine 10 every 8 hours  mirtazapine 7.5 daily  norepinephrine Infusion 0.05 <Continuous>  nystatin Cream 1 two times a day  nystatin Powder 1 two times a day  pantoprazole  Injectable 40 every 24 hours  sertraline 50 daily  sevelamer carbonate 800 every 8 hours      T(C): , Max: 36.4 (05-19-20 @ 04:00)  T(F): , Max: 97.5 (05-19-20 @ 04:00)  HR: 99 (05-19-20 @ 11:00)  BP: 115/50 (05-19-20 @ 11:00)  BP(mean): 65 (05-19-20 @ 11:00)  RR: 18 (05-19-20 @ 10:00)  SpO2: 98% (05-19-20 @ 10:00)  Wt(kg): --    05-18 @ 07:01  -  05-19 @ 07:00  --------------------------------------------------------  IN: 1572.9 mL / OUT: 1128 mL / NET: 444.9 mL      PHYSICAL EXAM:  General: NAD, on 2 L NC  Neck: no JVD  Respiratory: rales at bases  Cardiovascular: S1/S2  Gastrointestinal: distended, non tender  Extremities: pitting thigh edema b/l up to lower abdomen   Skin: petechiae, ?Janeway lesions   Access: [facility] HD cath c/d/i, placed on 05/15        LABS:                        8.5    8.42  )-----------( 55       ( 19 May 2020 13:00 )             27.4     05-19    141  |  100  |  70<H>  ----------------------------<  108<H>  4.0   |  25  |  4.31<H>    Ca    8.1<L>      19 May 2020 05:11  Phos  5.4     05-19  Mg     2.0     05-19    TPro  5.9<L>  /  Alb  2.9<L>  /  TBili  1.0  /  DBili  x   /  AST  10  /  ALT  <5<L>  /  AlkPhos  54  05-19    C3 Complement, Serum: 38 mg/dL <L> [81 - 157] (05-19 @ 11:04)  C4 Complement, Serum: 11 mg/dL <L> [13 - 39] (05-19 @ 11:04)    PT/INR - ( 19 May 2020 05:11 )   PT: 19.1 sec;   INR: 1.65          PTT - ( 19 May 2020 05:11 )  PTT:35.5 sec          RADIOLOGY & ADDITIONAL STUDIES:    < from: Xray Chest 1 View- PORTABLE-Routine (05.19.20 @ 04:02) >    EXAM:  XR CHEST PORTABLE ROUTINE 1V                          PROCEDURE DATE:  05/19/2020          INTERPRETATION:  CLINICAL INDICATION: 74-year-old with shortness of breath.      IMPRESSION: Frontal view of the chest is compared to 5/16/2020 and demonstrates central venous catheter with the tip in the SVC. Additional central venous catheter with the tip in the right atrium. Normal heart size. Low lung volumes. Overall interval improvement with persistent moderate interstitial pulmonary edema and central venous congestion. No interval change in position of pericardial drain.      < end of copied text > remains on pressors w increased requirements overnight  s/p HD completed 5/18 w tolerated UF of 0.9 L  Rodriguez replaced 5/18, as per RN ureteral orifice purulent discharge noted, anuric  plan for GET in bacteremia       Meds:  chlorhexidine 2% Cloths 1 daily  collagenase Ointment 1 daily  DAPTOmycin IVPB 700 every 48 hours  finasteride 5 daily  insulin glargine Injectable (LANTUS) 4 at bedtime  insulin lispro (HumaLOG) corrective regimen sliding scale  Before meals and at bedtime  insulin lispro Injectable (HumaLOG) 2 three times a day before meals  midodrine 10 every 8 hours  mirtazapine 7.5 daily  norepinephrine Infusion 0.05 <Continuous>  nystatin Cream 1 two times a day  nystatin Powder 1 two times a day  pantoprazole  Injectable 40 every 24 hours  sertraline 50 daily  sevelamer carbonate 800 every 8 hours      T(C): , Max: 36.4 (05-19-20 @ 04:00)  T(F): , Max: 97.5 (05-19-20 @ 04:00)  HR: 99 (05-19-20 @ 11:00)  BP: 115/50 (05-19-20 @ 11:00)  BP(mean): 65 (05-19-20 @ 11:00)  RR: 18 (05-19-20 @ 10:00)  SpO2: 98% (05-19-20 @ 10:00)  Wt(kg): --    05-18 @ 07:01  -  05-19 @ 07:00  --------------------------------------------------------  IN: 1572.9 mL / OUT: 1128 mL / NET: 444.9 mL      PHYSICAL EXAM:  General: NAD, on 2 L NC  Neck: no JVD  Respiratory: rales at bases  Cardiovascular: S1/S2  Gastrointestinal: distended, non tender  Extremities: pitting thigh edema b/l up to lower abdomen   Skin: petechiae, ?Janeway lesions   Access: [facility] HD cath c/d/i, placed on 05/15        LABS:                        8.5    8.42  )-----------( 55       ( 19 May 2020 13:00 )             27.4     05-19    141  |  100  |  70<H>  ----------------------------<  108<H>  4.0   |  25  |  4.31<H>    Ca    8.1<L>      19 May 2020 05:11  Phos  5.4     05-19  Mg     2.0     05-19    TPro  5.9<L>  /  Alb  2.9<L>  /  TBili  1.0  /  DBili  x   /  AST  10  /  ALT  <5<L>  /  AlkPhos  54  05-19    C3 Complement, Serum: 38 mg/dL <L> [81 - 157] (05-19 @ 11:04)  C4 Complement, Serum: 11 mg/dL <L> [13 - 39] (05-19 @ 11:04)    PT/INR - ( 19 May 2020 05:11 )   PT: 19.1 sec;   INR: 1.65          PTT - ( 19 May 2020 05:11 )  PTT:35.5 sec          RADIOLOGY & ADDITIONAL STUDIES:    < from: Xray Chest 1 View- PORTABLE-Routine (05.19.20 @ 04:02) >    EXAM:  XR CHEST PORTABLE ROUTINE 1V                          PROCEDURE DATE:  05/19/2020          INTERPRETATION:  CLINICAL INDICATION: 74-year-old with shortness of breath.      IMPRESSION: Frontal view of the chest is compared to 5/16/2020 and demonstrates central venous catheter with the tip in the SVC. Additional central venous catheter with the tip in the right atrium. Normal heart size. Low lung volumes. Overall interval improvement with persistent moderate interstitial pulmonary edema and central venous congestion. No interval change in position of pericardial drain.      < end of copied text > remains on pressors w increased requirements overnight  s/p HD completed 5/18 w tolerated UF of 0.9 L  Rodriguez replaced 5/18, as per RN ureteral orifice purulent discharge noted, anuric  plan for GET in bacteremia r/o endocarditis      Meds:  chlorhexidine 2% Cloths 1 daily  collagenase Ointment 1 daily  DAPTOmycin IVPB 700 every 48 hours  finasteride 5 daily  insulin glargine Injectable (LANTUS) 4 at bedtime  insulin lispro (HumaLOG) corrective regimen sliding scale  Before meals and at bedtime  insulin lispro Injectable (HumaLOG) 2 three times a day before meals  midodrine 10 every 8 hours  mirtazapine 7.5 daily  norepinephrine Infusion 0.05 <Continuous>  nystatin Cream 1 two times a day  nystatin Powder 1 two times a day  pantoprazole  Injectable 40 every 24 hours  sertraline 50 daily  sevelamer carbonate 800 every 8 hours      T(C): , Max: 36.4 (05-19-20 @ 04:00)  T(F): , Max: 97.5 (05-19-20 @ 04:00)  HR: 99 (05-19-20 @ 11:00)  BP: 115/50 (05-19-20 @ 11:00)  BP(mean): 65 (05-19-20 @ 11:00)  RR: 18 (05-19-20 @ 10:00)  SpO2: 98% (05-19-20 @ 10:00)  Wt(kg): --    05-18 @ 07:01  -  05-19 @ 07:00  --------------------------------------------------------  IN: 1572.9 mL / OUT: 1128 mL / NET: 444.9 mL      PHYSICAL EXAM:  General: NAD, on 2 L NC  Neck: no JVD  Respiratory: rales at bases  Cardiovascular: S1/S2  Gastrointestinal: distended, non tender  Extremities: pitting thigh edema b/l up to lower abdomen   Skin: petechiae, ?Janeway lesions   Access: [facility] HD cath c/d/i, placed on 05/15        LABS:                        8.5    8.42  )-----------( 55       ( 19 May 2020 13:00 )             27.4     05-19    141  |  100  |  70<H>  ----------------------------<  108<H>  4.0   |  25  |  4.31<H>    Ca    8.1<L>      19 May 2020 05:11  Phos  5.4     05-19  Mg     2.0     05-19    TPro  5.9<L>  /  Alb  2.9<L>  /  TBili  1.0  /  DBili  x   /  AST  10  /  ALT  <5<L>  /  AlkPhos  54  05-19    C3 Complement, Serum: 38 mg/dL <L> [81 - 157] (05-19 @ 11:04)  C4 Complement, Serum: 11 mg/dL <L> [13 - 39] (05-19 @ 11:04)    PT/INR - ( 19 May 2020 05:11 )   PT: 19.1 sec;   INR: 1.65          PTT - ( 19 May 2020 05:11 )  PTT:35.5 sec          RADIOLOGY & ADDITIONAL STUDIES:    < from: Xray Chest 1 View- PORTABLE-Routine (05.19.20 @ 04:02) >    EXAM:  XR CHEST PORTABLE ROUTINE 1V                          PROCEDURE DATE:  05/19/2020          INTERPRETATION:  CLINICAL INDICATION: 74-year-old with shortness of breath.      IMPRESSION: Frontal view of the chest is compared to 5/16/2020 and demonstrates central venous catheter with the tip in the SVC. Additional central venous catheter with the tip in the right atrium. Normal heart size. Low lung volumes. Overall interval improvement with persistent moderate interstitial pulmonary edema and central venous congestion. No interval change in position of pericardial drain.      < end of copied text >

## 2020-05-19 NOTE — PROGRESS NOTE ADULT - ASSESSMENT
73 y/o M w/PMHx of CKD stage III/IV, DM, HTN, BPH (chronic incontinence/retention, s/p palacio on last admission), a-fib admitted for new onset ascites nephrology consulted for AAMIR on CKD.      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, likely ATN from hypotension and ischemic injury  - anuric  - HD initiated on 05/15, not tolerated few IHD sessions due to hemodynamic instability w tachycardia and hypotension despite being on levophed during HD (no pericardial effusion/cardiac tamponade); last IHD attempted 5/18, able to tolerate UF of 0.9 L   - volume status and electrolytes noted, acceptable   - defer HD today   - renal diet  - renally adjusted meds/ ABx  - if repeat blood cx positive, would recommend removal of HD cath  - thrombocytopenia/petechiae/?Janeway lesions could be in setting of endocarditis/ MRSA bacteremia, plan for GET today      # Renal bone disease  - on Renvela 800 mg po tid 73 y/o M w/PMHx of CKD stage III/IV, DM, HTN, BPH (chronic incontinence/retention, s/p palacio on last admission), a-fib admitted for new onset ascites nephrology consulted for AAMIR on CKD.      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, likely ATN from hypotension and ischemic injury  - anuric  - HD initiated on 05/15, not tolerated few IHD sessions due to hemodynamic instability w tachycardia and hypotension despite being on levophed during HD (no pericardial effusion/cardiac tamponade); last IHD attempted 5/18, able to tolerate UF of 0.9 L   - volume status and electrolytes noted, acceptable   - defer HD today   - renal diet  - renally adjusted meds/ ABx  - if repeat blood cx positive, would recommend removal of HD cath  - consider  evaluation for mild bilateral hydronephrosis, pyuria from ureteral orifice, standing palacio    - thrombocytopenia/petechiae/?Janeway lesions could be in setting of endocarditis/ MRSA bacteremia, plan for GET today      # Renal bone disease  - on Renvela 800 mg po tid

## 2020-05-19 NOTE — CHART NOTE - NSCHARTNOTEFT_GEN_A_CORE
Admitting Diagnosis:   Patient is a 74y old  Male who presents with a chief complaint of sepsis (19 May 2020 15:28)      PAST MEDICAL & SURGICAL HISTORY:  History of pancytopenia  H/O hydrocephalus  Anemia  HLD (hyperlipidemia)  Enlarged prostate  DM (diabetes mellitus)  HTN (hypertension)  H/O prior ablation treatment      Current Nutrition Order:  Puree 1/Thin Liquids 5/18     PO Intake: Good (%) [   ]  Fair (50-75%) [ x  ] Poor (<25%) [   ]  Please see Below     GI Issues:   soft/nontender, hyperactive  +BM 5/17  Recal tube 150ml 5/19   CT on 5/18 showing pancolitis  CDiff sample sent 5/18     Pain:   per flow sheets, denies pain/discomfort    Skin Integrity:  3+ generalized edema  unstageable pressure ulcers to sacrum, R heel       Labs:   05-19    141  |  100  |  70<H>  ----------------------------<  108<H>  4.0   |  25  |  4.31<H>    Ca    8.1<L>      19 May 2020 05:11  Phos  5.4     05-19  Mg     2.0     05-19    TPro  5.9<L>  /  Alb  2.9<L>  /  TBili  1.0  /  DBili  x   /  AST  10  /  ALT  <5<L>  /  AlkPhos  54  05-19    CAPILLARY BLOOD GLUCOSE      POCT Blood Glucose.: 113 mg/dL (19 May 2020 11:14)  POCT Blood Glucose.: 106 mg/dL (19 May 2020 06:44)  POCT Blood Glucose.: 131 mg/dL (18 May 2020 21:23)      Medications:  MEDICATIONS  (STANDING):  ceftaroline fosamil IVPB      ceftaroline fosamil IVPB 400 milliGRAM(s) IV Intermittent once  chlorhexidine 2% Cloths 1 Application(s) Topical daily  collagenase Ointment 1 Application(s) Topical daily  DAPTOmycin IVPB 700 milliGRAM(s) IV Intermittent every 48 hours  finasteride 5 milliGRAM(s) Oral daily  insulin glargine Injectable (LANTUS) 4 Unit(s) SubCutaneous at bedtime  insulin lispro (HumaLOG) corrective regimen sliding scale   SubCutaneous Before meals and at bedtime  insulin lispro Injectable (HumaLOG) 2 Unit(s) SubCutaneous three times a day before meals  midodrine 10 milliGRAM(s) Oral every 8 hours  mirtazapine 7.5 milliGRAM(s) Oral daily  norepinephrine Infusion 0.05 MICROgram(s)/kG/Min (3.37 mL/Hr) IV Continuous <Continuous>  nystatin Cream 1 Application(s) Topical two times a day  nystatin Powder 1 Application(s) Topical two times a day  pantoprazole  Injectable 40 milliGRAM(s) IV Push every 24 hours  sertraline 50 milliGRAM(s) Oral daily  sevelamer carbonate 800 milliGRAM(s) Oral every 8 hours    MEDICATIONS  (PRN):    Ht: 5'10''  pounds (75kg) +-10%   5/8 158 pounds %IBW=95% BMI 22.7   5/16 191.5 pounds  5/18 193 pounds / 194 pounds   Wt Change: varying EMR wts noted, Pt with less edema during time of admission - has since increased and Pt now started on HD    Nutrition Focused Physical Exam: Completed [x-5/9  ]  Not Pertinent [   ]    Estimated energy needs:   IBW for EER d/t varying EMR wts in HD pt   Nutrient needs based on [facility] standards of care for maintenance in adults, adjusted for age, HD, fluid per team  2154kcal (30kcal/kg)   90-105g pro (1.2-1.4gm/kg pro) --- To be adjsutd if CVVHD needed, will benefit from higher needs     Subjective:   75 yo M with MDS, DM2, afib, HTN, HLD, BPH s/p palacio, pancytopenia, CKD4, ascites of unknown origin presents from [facility] with complaints of decreased PO intake and weakness likely secondary to uremia/AAMIR from diuresis for treatment of ascites. Pt hospital course notable for UGIB and pericardial effusion with tamponade physiology as well as ATN and ascites of unknown etiology s/p PRBC. Pt s/p paracentesis 5/10 (2L removed), IR pericardiocentesis 5/11, now with anemia of unknown etiology. Pt with Persistent MRSA bacteremia, concern for endocarditis vs cardiac abscess.  Pt Not a candidate for GET as per cardiology, plan for Gallium 5/20. Noted p/w AMS likey 2/2 to uremia. Pt now needing HD d/t AAMIR/CKD Likely ATN from hypotension and ischemic injury, Started 5/15 and Attempted HD 5/16 however did not tolerate, +IHD 5/18 with possible plan for CVVHD if unable to tolerate further (BUN/Cr 70/4.31, K 4.0, Phos 5.4).   Pt Underwent FEES 5/13 recommending mech soft diet. Now s/p Swallow Eval 5/18 which notes poor dention, noted with recs for puree/thin liquids. Now ordered for dys 1 puree/thin liquids 5/18.    Previous Nutrition Diagnosis: Mild protein-calorie malnutrition r/t physiologic causes AEB reported poor Po intake for > 5 days PTA and observed mild muscle losses to temporal region coupled with mild fat loss over triceps region.  Active [ x  ]  Resolved [   ]  Goal: Meet 75% EER via feasible route     Recommendations:  - Continue mech soft diet per SLP   - Please obtain weights q 3 days for closer monitoring  - Continue mirtazapine for potential of appetite stimulation  - Bowel regimen as needed  - Check POC q6hrs   - Incorporate food preferences as provided  - Electrolytes wnl    Education: encouraged intake through day   Risk Level: High [ x  ] Moderate [   ] Low [   ]. Admitting Diagnosis:   Patient is a 74y old  Male who presents with a chief complaint of sepsis (19 May 2020 15:28)      PAST MEDICAL & SURGICAL HISTORY:  History of pancytopenia  H/O hydrocephalus  Anemia  HLD (hyperlipidemia)  Enlarged prostate  DM (diabetes mellitus)  HTN (hypertension)  H/O prior ablation treatment      Current Nutrition Order:  Puree 1/Thin Liquids 5/18     PO Intake: Good (%) [   ]  Fair (50-75%) [ x  ] Poor (<25%) [   ]  Please see Below     GI Issues:   soft/nontender, hyperactive  +BM 5/17  Recal tube 150ml 5/19   CT on 5/18 showing pancolitis  CDiff sample sent 5/18 - per RN, negative     Pain:   per flow sheets, denies pain/discomfort    Skin Integrity:  3+ generalized edema  unstageable pressure ulcers to sacrum, R heel       Labs:   05-19    141  |  100  |  70<H>  ----------------------------<  108<H>  4.0   |  25  |  4.31<H>    Ca    8.1<L>      19 May 2020 05:11  Phos  5.4     05-19  Mg     2.0     05-19    TPro  5.9<L>  /  Alb  2.9<L>  /  TBili  1.0  /  DBili  x   /  AST  10  /  ALT  <5<L>  /  AlkPhos  54  05-19    CAPILLARY BLOOD GLUCOSE      POCT Blood Glucose.: 113 mg/dL (19 May 2020 11:14)  POCT Blood Glucose.: 106 mg/dL (19 May 2020 06:44)  POCT Blood Glucose.: 131 mg/dL (18 May 2020 21:23)      Medications:  MEDICATIONS  (STANDING):  ceftaroline fosamil IVPB      ceftaroline fosamil IVPB 400 milliGRAM(s) IV Intermittent once  chlorhexidine 2% Cloths 1 Application(s) Topical daily  collagenase Ointment 1 Application(s) Topical daily  DAPTOmycin IVPB 700 milliGRAM(s) IV Intermittent every 48 hours  finasteride 5 milliGRAM(s) Oral daily  insulin glargine Injectable (LANTUS) 4 Unit(s) SubCutaneous at bedtime  insulin lispro (HumaLOG) corrective regimen sliding scale   SubCutaneous Before meals and at bedtime  insulin lispro Injectable (HumaLOG) 2 Unit(s) SubCutaneous three times a day before meals  midodrine 10 milliGRAM(s) Oral every 8 hours  mirtazapine 7.5 milliGRAM(s) Oral daily  norepinephrine Infusion 0.05 MICROgram(s)/kG/Min (3.37 mL/Hr) IV Continuous <Continuous>  nystatin Cream 1 Application(s) Topical two times a day  nystatin Powder 1 Application(s) Topical two times a day  pantoprazole  Injectable 40 milliGRAM(s) IV Push every 24 hours  sertraline 50 milliGRAM(s) Oral daily  sevelamer carbonate 800 milliGRAM(s) Oral every 8 hours    MEDICATIONS  (PRN):    Ht: 5'10''  pounds (75kg) +-10%   5/8 158 pounds %IBW=95% BMI 22.7   5/16 191.5 pounds  5/18 193 pounds / 194 pounds   Wt Change: varying EMR wts noted, Pt with less edema during time of admission - has since increased and Pt now started on HD    Nutrition Focused Physical Exam: Completed [x-5/9  ]  Not Pertinent [   ]    Estimated energy needs:   IBW for EER d/t varying EMR wts in HD pt   Nutrient needs based on [facility] standards of care for maintenance in adults, adjusted for age, HD, fluid per team  2154kcal (30kcal/kg)   90-105g pro (1.2-1.4gm/kg pro) --- To be adjsutd if CVVHD needed, will benefit from higher needs     Subjective:   73 yo M with MDS, DM2, afib, HTN, HLD, BPH s/p palacio, pancytopenia, CKD4, ascites of unknown origin presents from [facility] with complaints of decreased PO intake and weakness likely secondary to uremia/AAMIR from diuresis for treatment of ascites. Pt hospital course notable for UGIB and pericardial effusion with tamponade physiology as well as ATN and ascites of unknown etiology s/p PRBC. Pt s/p paracentesis 5/10 (2L removed), IR pericardiocentesis 5/11, now with anemia of unknown etiology. Pt with Persistent MRSA bacteremia, concern for endocarditis vs cardiac abscess. Pt Not a candidate for GET as per cardiology, plan for Gallium 5/20. Noted p/w AMS likey 2/2 to uremia. Pt now needing HD d/t AAMIR/CKD Likely ATN from hypotension and ischemic injury, Started 5/15 and Attempted HD 5/16 however did not tolerate, +IHD 5/18 with possible plan for CVVHD if unable to tolerate further (BUN/Cr 70/4.31, K 4.0, Phos 5.4).   [facility]. Now s/p Swallow Eval 5/18 which notes poor dentition, noted with recs for puree/thin liquids. Now ordered for dys 1 puree/thin liquids 5/18. RN reports pt had been NPO today for planned w/u (had been planned for GET). Reports prior to NPO very poor PO intake, consuming only spoonfuls.  Please see below for nutritions recommendations. Paged team for Recs.     Previous Nutrition Diagnosis: Mild protein-calorie malnutrition r/t physiologic causes AEB reported poor Po intake for > 5 days PTA and observed mild muscle losses to temporal region coupled with mild fat loss over triceps region.  Active [ x  ]  Resolved [   ]  Goal: Meet 75% EER via feasible route     Recommendations:  1. Monitor need for pt being NPO Pending GI- noted pancolitis.   2. While PO diet feasible: regular, Nepro x3/day as oral nutrition supplements for 350kcal/20gm prot per 1 can, PO consistency per SLP.  >> Assume to benefit from low fiber, however likely not needed until PO intake improves.  3. Monitor Labs- monitor BMP, CBC, glucose, lytes, trend renal indices, LFTs, POCT.  4. Monitor Skin, Wts (pre/post HD), GOC.  5. Monitor GI- consider probiotic use PRN.    6. Nephrocaps daily.   7. RD to remain available for additional nutrition interventions as needed.     Education: NA   Risk Level: High [ x  ] Moderate [   ] Low [   ]. Admitting Diagnosis:   Patient is a 74y old  Male who presents with a chief complaint of sepsis (19 May 2020 15:28)      PAST MEDICAL & SURGICAL HISTORY:  History of pancytopenia  H/O hydrocephalus  Anemia  HLD (hyperlipidemia)  Enlarged prostate  DM (diabetes mellitus)  HTN (hypertension)  H/O prior ablation treatment      Current Nutrition Order:  Puree 1/Thin Liquids 5/18     PO Intake: Good (%) [   ]  Fair (50-75%) [ x  ] Poor (<25%) [   ]  Please see Below     GI Issues:   soft/nontender, hyperactive  +BM 5/17  Recal tube 150ml 5/19   CT on 5/18 showing pancolitis  CDiff sample sent 5/18 - per RN, negative     Pain:   per flow sheets, denies pain/discomfort    Skin Integrity:  3+ generalized edema  unstageable pressure ulcers to sacrum, R heel       Labs:   05-19    141  |  100  |  70<H>  ----------------------------<  108<H>  4.0   |  25  |  4.31<H>    Ca    8.1<L>      19 May 2020 05:11  Phos  5.4     05-19  Mg     2.0     05-19    TPro  5.9<L>  /  Alb  2.9<L>  /  TBili  1.0  /  DBili  x   /  AST  10  /  ALT  <5<L>  /  AlkPhos  54  05-19    CAPILLARY BLOOD GLUCOSE      POCT Blood Glucose.: 113 mg/dL (19 May 2020 11:14)  POCT Blood Glucose.: 106 mg/dL (19 May 2020 06:44)  POCT Blood Glucose.: 131 mg/dL (18 May 2020 21:23)      Medications:  MEDICATIONS  (STANDING):  ceftaroline fosamil IVPB      ceftaroline fosamil IVPB 400 milliGRAM(s) IV Intermittent once  chlorhexidine 2% Cloths 1 Application(s) Topical daily  collagenase Ointment 1 Application(s) Topical daily  DAPTOmycin IVPB 700 milliGRAM(s) IV Intermittent every 48 hours  finasteride 5 milliGRAM(s) Oral daily  insulin glargine Injectable (LANTUS) 4 Unit(s) SubCutaneous at bedtime  insulin lispro (HumaLOG) corrective regimen sliding scale   SubCutaneous Before meals and at bedtime  insulin lispro Injectable (HumaLOG) 2 Unit(s) SubCutaneous three times a day before meals  midodrine 10 milliGRAM(s) Oral every 8 hours  mirtazapine 7.5 milliGRAM(s) Oral daily  norepinephrine Infusion 0.05 MICROgram(s)/kG/Min (3.37 mL/Hr) IV Continuous <Continuous>  nystatin Cream 1 Application(s) Topical two times a day  nystatin Powder 1 Application(s) Topical two times a day  pantoprazole  Injectable 40 milliGRAM(s) IV Push every 24 hours  sertraline 50 milliGRAM(s) Oral daily  sevelamer carbonate 800 milliGRAM(s) Oral every 8 hours    MEDICATIONS  (PRN):    Ht: 5'10''  pounds (75kg) +-10%   5/8 158 pounds %IBW=95% BMI 22.7   5/16 191.5 pounds  5/18 193 pounds / 194 pounds   Wt Change: varying EMR wts noted, Pt with less edema during time of admission - has since increased and Pt now started on HD    Nutrition Focused Physical Exam: Completed [x-5/9  ]  Not Pertinent [   ]    Estimated energy needs:   IBW for EER d/t varying EMR wts in HD pt   Nutrient needs based on [facility] standards of care for maintenance in adults, adjusted for age, HD, fluid per team  2154kcal (30kcal/kg)   90-105g pro (1.2-1.4gm/kg pro) --- To be adjsutd if CVVHD needed, will benefit from higher needs     Subjective:   75 yo M with MDS, DM2, afib, HTN, HLD, BPH s/p palacio, pancytopenia, CKD4, ascites of unknown origin presents from [facility] with complaints of decreased PO intake and weakness likely secondary to uremia/AAMIR from diuresis for treatment of ascites. Pt hospital course notable for UGIB and pericardial effusion with tamponade physiology as well as ATN and ascites of unknown etiology s/p PRBC. Pt s/p paracentesis 5/10 (2L removed), IR pericardiocentesis 5/11, now with anemia of unknown etiology. Pt with Persistent MRSA bacteremia, concern for endocarditis vs cardiac abscess. Pt Not a candidate for GET as per cardiology, plan for Gallium 5/20. Noted p/w AMS likey 2/2 to uremia. Pt now needing HD d/t AAMIR/CKD Likely ATN from hypotension and ischemic injury, Started 5/15 and Attempted HD 5/16 however did not tolerate, +IHD 5/18 with possible plan for CVVHD if unable to tolerate further (BUN/Cr 70/4.31, K 4.0, Phos 5.4).   [facility]. Now s/p Swallow Eval 5/18 which notes poor dentition, noted with recs for puree/thin liquids. Now ordered for dys 1 puree/thin liquids 5/18. RN reports pt had been NPO today for planned w/u (had been planned for GET). Reports prior to NPO very poor PO intake, consuming only spoonfuls.  Please see below for nutritions recommendations. Recs made with team- Pending order placed.    Previous Nutrition Diagnosis: Mild protein-calorie malnutrition r/t physiologic causes AEB reported poor Po intake for > 5 days PTA and observed mild muscle losses to temporal region coupled with mild fat loss over triceps region.  Active [ x  ]  Resolved [   ]  Goal: Meet 75% EER via feasible route     Recommendations:  1. Monitor need for pt being NPO Pending GI- noted pancolitis.   2. While PO diet feasible: regular, Nepro x3/day as oral nutrition supplements for 350kcal/20gm prot per 1 can, PO consistency per SLP.  >> Assume to benefit from low fiber, however likely not needed until PO intake improves.  3. Monitor Labs- monitor BMP, CBC, glucose, lytes, trend renal indices, LFTs, POCT.  4. Monitor Skin, Wts (pre/post HD), GOC.  5. Monitor GI- consider probiotic use PRN.    6. Nephrocaps daily.   7. RD to remain available for additional nutrition interventions as needed.     Education: NA   Risk Level: High [ x  ] Moderate [   ] Low [   ].

## 2020-05-19 NOTE — PROGRESS NOTE ADULT - SUBJECTIVE AND OBJECTIVE BOX
INTERVAL HPI/OVERNIGHT EVENTS:    Events noted.  Patient with arrythmia.  GET has been cancelled    ROS:    unable to obtain       ANTIBIOTICS/RELEVANT:    MEDICATIONS  (STANDING):  chlorhexidine 2% Cloths 1 Application(s) Topical daily  collagenase Ointment 1 Application(s) Topical daily  DAPTOmycin IVPB 700 milliGRAM(s) IV Intermittent every 48 hours  finasteride 5 milliGRAM(s) Oral daily  insulin glargine Injectable (LANTUS) 4 Unit(s) SubCutaneous at bedtime  insulin lispro (HumaLOG) corrective regimen sliding scale   SubCutaneous Before meals and at bedtime  insulin lispro Injectable (HumaLOG) 2 Unit(s) SubCutaneous three times a day before meals  midodrine 10 milliGRAM(s) Oral every 8 hours  mirtazapine 7.5 milliGRAM(s) Oral daily  norepinephrine Infusion 0.05 MICROgram(s)/kG/Min (3.37 mL/Hr) IV Continuous <Continuous>  nystatin Cream 1 Application(s) Topical two times a day  nystatin Powder 1 Application(s) Topical two times a day  pantoprazole  Injectable 40 milliGRAM(s) IV Push every 24 hours  sertraline 50 milliGRAM(s) Oral daily  sevelamer carbonate 800 milliGRAM(s) Oral every 8 hours    MEDICATIONS  (PRN):        Vital Signs Last 24 Hrs  T(C): 36.1 (19 May 2020 14:20), Max: 36.4 (19 May 2020 04:00)  T(F): 97 (19 May 2020 14:20), Max: 97.5 (19 May 2020 04:00)  HR: 99 (19 May 2020 11:00) (76 - 99)  BP: 115/50 (19 May 2020 11:00) (93/56 - 144/66)  BP(mean): 65 (19 May 2020 11:00) (60 - 84)  RR: 18 (19 May 2020 10:00) (12 - 20)  SpO2: 98% (19 May 2020 10:00) (96% - 100%)    PHYSICAL EXAM:  Constitutional:  frail, lethargic  Eyes: no icterus   Ear/Nose/Throat: no oral lesion,  Neck:  supple  Respiratory: CTA chuck  Cardiovascular: S1S2 RRR, no murmurs  Gastrointestinal:soft, (+) BS, no HSM  Extremities:no edema   :  + palacio   Vascular: DP Pulse:	right normal; left normal      LABS:                        8.5    8.42  )-----------( 55       ( 19 May 2020 13:00 )             27.4     05-19    141  |  100  |  70<H>  ----------------------------<  108<H>  4.0   |  25  |  4.31<H>    Ca    8.1<L>      19 May 2020 05:11  Phos  5.4     05-19  Mg     2.0     05-19    TPro  5.9<L>  /  Alb  2.9<L>  /  TBili  1.0  /  DBili  x   /  AST  10  /  ALT  <5<L>  /  AlkPhos  54  05-19    PT/INR - ( 19 May 2020 05:11 )   PT: 19.1 sec;   INR: 1.65          PTT - ( 19 May 2020 05:11 )  PTT:35.5 sec      MICROBIOLOGY:    Culture - Blood (05.18.20 @ 13:01)    Specimen Source: .Blood Blood    Culture Results:   No growth at 1 day.    Culture - Blood (05.16.20 @ 20:35)    -  Daptomycin: S 0.5    -  Erythromycin: R >4    -  Clindamycin: S <=0.25    -  Linezolid: S 2    -  Oxacillin: R >2    -  Tetra/Doxy: S <=1    -  RIF- Rifampin: S <=1 Should not be used as monotherapy    -  Vancomycin: S 1    -  Trimethoprim/Sulfamethoxazole: R >2/38    Gram Stain:   Aerobic Bottle: Gram Positive Cocci in Clusters  Result called to and read back by_ M [facility] RN ([id number])  05/17/2020 17:21:54    -  Cefazolin: R <=4    Specimen Source: .Blood Blood    Organism: Methicillin resistant Staphylococcus aureus    Organism: Methicillin resistant Staphylococcus aureus    Culture Results:   Growth in anaerobic bottle: Methicillin resistant Staphylococcus aureus  Floor previously notified.    Organism Identification: Methicillin resistant Staphylococcus aureus  Methicillin resistant Staphylococcus aureus    Method Type: IMAN    Method Type: ETEST        RADIOLOGY & ADDITIONAL STUDIES:    < from: TTE Echo Limited or F/U (05.15.20 @ 09:58) >  1. Limited study obtained for evaluation of pericardial effusion.   2. Normal left ventricular size and function.   3. Mildlydilated right ventricular size.   4. Normal right ventricular systolic function. A device lead is noted in the right heart.   5. No evidence of pulmonary hypertension, pulmonary artery systolic pressure is 30 mmHg.   6. No pericardial effusion.   7. Right pleural effusion.    < end of copied text >

## 2020-05-19 NOTE — CONSULT NOTE ADULT - ASSESSMENT
73 y/o M with h/o MDS, pancytopenia, CKD, ascites of unknown origin p/w uremia/AAMIR from diuresis from tx of ascities. Hospital course here with UGIB and pericardial effusion with tamponade physiology. S/p pericardiocentesis on 5/11.  Still has pericardial drain in place with 5cc serosanginous fluid collected today. Today, it was noted on telemetry that he had "slow VT" episodes this morning and this afternoon. Per team, he has been on pressors since admission. However, these episodes have not affecting his BP or need to increase on pressor dosage as per team. Pt was also asymptomatic.  Has normal LVEF on echo on 5/15.   - Idioventricular conduction rhythm likely secondary to inflammation from recent pericardial effusion. Unlikely ischemic cause.  If more burden, may consider short term use of Amiodarone. When pt can be tapered off pressors, consider beta-blocker use.    - d/w Dr. Dexter and primary team.

## 2020-05-19 NOTE — PROGRESS NOTE ADULT - ASSESSMENT
75 yo M with MDS, pancytopenia, CKD, ascites of unknown origin presents from [facility] with complaints of decreased po intake and weakness likely secondary to uremia/AAMIR from diuresis for treatment of ascites, hospital course notable for UGIB and pericardial effusion with tamponade physiology , as well as ATN, and ascites of unknown etiology, now in ICU s/p paracentesis 5/10 (2L removed), IR pericardiocentesis 5/11 (drain in place), now with anemia of unknown etiology    Neuro:   p/w AMS likey 2/2 to uremia, now resolved    Cards:   Hypotension  MRSA bactermia vs.  intravascular volume depletion from diuresis vs GIB. --> c/w dapto, midodrine TID, wean levo as tolerated. most recent echo with no pericaridal effusion. Not a candidate for GET as per cardiology  - plan for gallium scan on 5/20  - CVVHD on 5/18    Pericardial Effusion:  Initial read with some evidence of tamponade physiology; now s/p IR for diagnostic pericardiocentesis on 5/11  - Cardiology following, appreciate recs.   - Given low pericardial drain output and clog, a follow up limited echo on 5/15 showed no pericardial effusion  - f/u IR recs    Pulm:   stable on RA. maintain head of bed 30 degrees, due to aspiration risk     GI:   multiple episodes of melena, with baseline anemia from MDS, although with concern for coagulopathy given decreased PLTS and elevated BUN.--> monitor coags, c/w protonix.   #ascites  -Elevated SAAG consistent with portal HTN, no evidence of clot on U/S  --> unclear etiology, no cirrhosis on CT.     #colitis  - CT on 5/18 showing pancolitis  - c.diff neg  - f/u GI PCR  - still having diarrhea, placed rectal tube on 5/18    Renal/gu:   AAMIR on CKD likely due to hypotension now requring HD--> inability to tolerate HD on 5/16.   - bilateral renal US - mild bilateral hydronephrosis, large ascites. Splenomegaly. Cholelithiasis.  - palacio exchanged on 5/18 and will c/w palacio for now due to clear pyuria    - cvvhd with renal on 5/18  - bladder wall thickening on CT on 5/18    HEME:   Anemia and coagulopathy--> MDS, UGIB, and elevated BUN.  -transfuse PLT as needed    Anemia:   Likely secondary to anemia from chronic disease and bone marrow suppression.   - S/p 1 unit of PRBC on 5/9 for active melena and Hgb stable   - Given 1 unit of prbc and platelets on 5/12 for acute drop, unknown source  - Monitor CBC  - plts 35 on 5/14, 41 on 5/15, and 28 on 5/19 received 1 unit platelets each day  - Transfuse for Hgb <7   - Active type and screen     #Coagulopathy:   - Patient presented with elevated INR to 2.48, with active bleeding.   - Monitor PT/INR   - INR 1.65 today    #DVT - RLE  - repeat dopplers on 5/11 with no DVT  - Eliquis held in setting of melanax3 and drop in hgb     ID:   - Persistent MRSA bacteremia--> dapto increased to 700mg [id number]  - added ceftaroline  - last blood culture (+) on 5/16, continue with surveillance cultures  - obtain daily CKs  - CT abdomen and pelvis - no significant change in bilateral pleural effusions, moderate to large intra-abdominal ascites, and anasarca. Slightly nodular liver contour and splenomegaly cirrhosis, pancolitis, mild bilateral hydroureteronephrosis extending to the thick-walled urinary bladder, unchanged. Prostatic nodule indenting into the urinary bladder base, as seen on 4/11/2018, dilated main pulmonary artery suggesting pulmonary hypertension.      DVt ppx: SCDs. no chemic ppx due to concern forbleed 75 yo M with MDS, pancytopenia, CKD, ascites of unknown origin presents from [facility] with complaints of decreased po intake and weakness likely secondary to uremia/AAMIR from diuresis for treatment of ascites, hospital course notable for UGIB and pericardial effusion with tamponade physiology , as well as ATN, and ascites of unknown etiology, now in ICU s/p paracentesis 5/10 (2L removed), IR pericardiocentesis 5/11 (drain in place), now with anemia of unknown etiology    Neuro:   p/w AMS likey 2/2 to uremia, now resolved    Cards:   Hypotension  MRSA bactermia vs.  intravascular volume depletion from diuresis vs GIB. --> c/w dapto, midodrine TID, wean levo as tolerated. most recent echo with no pericaridal effusion. Not a candidate for GET as per cardiology  - plan for gallium scan on 5/20  - CVVHD on 5/18    Pericardial Effusion:  Initial read with some evidence of tamponade physiology; now s/p IR for diagnostic pericardiocentesis on 5/11  - Cardiology following, appreciate recs.   - Given low pericardial drain output and clog, a follow up limited echo on 5/15 showed no pericardial effusion  - f/u IR recs    Pulm:   stable on RA. maintain head of bed 30 degrees, due to aspiration risk     GI:   multiple episodes of melena, with baseline anemia from MDS, although with concern for coagulopathy given decreased PLTS and elevated BUN.--> monitor coags, c/w protonix.   #ascites  -Elevated SAAG consistent with portal HTN, no evidence of clot on U/S  --> unclear etiology, no cirrhosis on CT.     #colitis  - CT on 5/18 showing pancolitis  - c.diff neg  - f/u GI PCR  - still having diarrhea, placed rectal tube on 5/18    Renal/gu:   AMAIR on CKD likely due to hypotension now requring HD--> inability to tolerate HD on 5/16.   - bilateral renal US - mild bilateral hydronephrosis, large ascites. Splenomegaly. Cholelithiasis.  - palacio exchanged on 5/18 and will c/w palacio for now due to clear pyuria    - cvvhd with renal on 5/18  - bladder wall thickening on CT on 5/18    HEME:   Anemia and coagulopathy--> MDS, UGIB, and elevated BUN.  -transfuse PLT as needed    Anemia:   Likely secondary to anemia from chronic disease and bone marrow suppression.   - S/p 1 unit of PRBC on 5/9 for active melena and Hgb stable   - Given 1 unit of prbc and platelets on 5/12 for acute drop, unknown source  - Monitor CBC  - plts 35 on 5/14, 41 on 5/15, and 28 on 5/19 received 1 unit platelets each day  - Transfuse for Hgb <7   - Active type and screen     #Coagulopathy:   - Patient presented with elevated INR to 2.48, with active bleeding.   - Monitor PT/INR   - INR 1.65 today    #DVT - RLE  - repeat dopplers on 5/11 with no DVT  - Eliquis held in setting of melanax3 and drop in hgb     ID:   - Persistent MRSA bacteremia--> dapto increased to 700mg [id number]  - added Ceftaroline 400 mg IV q12 hrs for syngergy  - last blood culture (+) on 5/16, continue with surveillance cultures  - obtain daily CKs  - CT abdomen and pelvis - no significant change in bilateral pleural effusions, moderate to large intra-abdominal ascites, and anasarca. Slightly nodular liver contour and splenomegaly cirrhosis, pancolitis, mild bilateral hydroureteronephrosis extending to the thick-walled urinary bladder, unchanged. Prostatic nodule indenting into the urinary bladder base, as seen on 4/11/2018, dilated main pulmonary artery suggesting pulmonary hypertension.  - thrombocytopenia/petechiae/?[place] lesions could be in setting of endocarditis/ MRSA bacteremia, plan gallium scan tomorrow.        DVt ppx: SCDs. no chemic ppx due to concern forbleed

## 2020-05-19 NOTE — PROGRESS NOTE ADULT - SUBJECTIVE AND OBJECTIVE BOX
OVERNIGHT EVENTS: patient went for CT a/p, npo for julianna    SUBJECTIVE:  Patient seen and examined at bedside. He appears comfortable and in no acute distress. He denies any fevers, chills, chest pain, shortness of breath, abdominal pain, n/v/d/c.      Vital Signs Last 12 Hrs  T(F): 97 (05-19-20 @ 14:20), Max: 97.5 (05-19-20 @ 04:00)  HR: 99 (05-19-20 @ 11:00) (85 - 99)  BP: 115/50 (05-19-20 @ 11:00) (94/49 - 125/57)  BP(mean): 65 (05-19-20 @ 11:00) (60 - 74)  RR: 18 (05-19-20 @ 10:00) (12 - 18)  SpO2: 98% (05-19-20 @ 10:00) (98% - 100%)  I&O's Summary    18 May 2020 07:01  -  19 May 2020 07:00  --------------------------------------------------------  IN: 1572.9 mL / OUT: 1128 mL / NET: 444.9 mL        PHYSICAL EXAM  General: NAD, resting comfortably in bed. Breathing well on nasal cannula  Neck: no JVD  Respiratory: diminished breath sounds at the bases, non-tachypneic, no respiratory distress   Cardiovascular: Regular rhythm/rate; S1/S2, no pericardial rub, pericardial drain in placed clean/dry/intact, draining 5cc serosanguinous fluid   Gastrointestinal: distended, non tender ascitic drain with appropriate drainage, site clean, dry, and intact  Extremities: WWP; 1+ thigh edema  Neurological:  A&Ox 3, CNII-XII grossly intact; no obvious focal deficits, no flapping tremor, but minimal asterixis  T/L/D: palacio draining red-orange colored urine       LABS:                        8.5    8.42  )-----------( 55       ( 19 May 2020 13:00 )             27.4     05-19    141  |  100  |  70<H>  ----------------------------<  108<H>  4.0   |  25  |  4.31<H>    Ca    8.1<L>      19 May 2020 05:11  Phos  5.4     05-19  Mg     2.0     05-19    TPro  5.9<L>  /  Alb  2.9<L>  /  TBili  1.0  /  DBili  x   /  AST  10  /  ALT  <5<L>  /  AlkPhos  54  05-19    PT/INR - ( 19 May 2020 05:11 )   PT: 19.1 sec;   INR: 1.65          PTT - ( 19 May 2020 05:11 )  PTT:35.5 sec        RADIOLOGY & ADDITIONAL TESTS:    MEDICATIONS  (STANDING):  chlorhexidine 2% Cloths 1 Application(s) Topical daily  collagenase Ointment 1 Application(s) Topical daily  DAPTOmycin IVPB 700 milliGRAM(s) IV Intermittent every 48 hours  finasteride 5 milliGRAM(s) Oral daily  insulin glargine Injectable (LANTUS) 4 Unit(s) SubCutaneous at bedtime  insulin lispro (HumaLOG) corrective regimen sliding scale   SubCutaneous Before meals and at bedtime  insulin lispro Injectable (HumaLOG) 2 Unit(s) SubCutaneous three times a day before meals  midodrine 10 milliGRAM(s) Oral every 8 hours  mirtazapine 7.5 milliGRAM(s) Oral daily  norepinephrine Infusion 0.05 MICROgram(s)/kG/Min (3.37 mL/Hr) IV Continuous <Continuous>  nystatin Cream 1 Application(s) Topical two times a day  nystatin Powder 1 Application(s) Topical two times a day  pantoprazole  Injectable 40 milliGRAM(s) IV Push every 24 hours  sertraline 50 milliGRAM(s) Oral daily  sevelamer carbonate 800 milliGRAM(s) Oral every 8 hours    MEDICATIONS  (PRN):

## 2020-05-19 NOTE — PROGRESS NOTE ADULT - SUBJECTIVE AND OBJECTIVE BOX
Seen and examined bedside  Complicated hospital course [id number] UGIB, pericardiocentesis, paracentesis, HD  CT reviewed- no ureteral stones seen, palacio in place without urine in bladder and with thickened bladder likely 2/2 chronic WALLER. Mild collecting system fullness. Ascites of unknown etiology  No SPT or CVAT. Palacio with clear yellow urine.  Intrinsic AAMIR/ATN, from diuresis for treatment of ascites and hypotension on HD  Can get bladder sono with palacio clamped 1hr before to assess for ureteral jets  c/w palacio catheter  Patient admitted last month for severe urinary retention and b/l hydronephrosis necesittating indwelling catheter until a definitive bladder outlet procedure  Patient needs to followup with Dr. Adams for UDS and cystoscopy     Vital Signs Last 24 Hrs  T(C): 36.2 (19 May 2020 16:45), Max: 36.4 (19 May 2020 04:00)  T(F): 97.1 (19 May 2020 16:45), Max: 97.5 (19 May 2020 04:00)  HR: 88 (19 May 2020 18:00) (76 - 150)  BP: 111/55 (19 May 2020 18:00) (94/49 - 144/66)  BP(mean): 77 (19 May 2020 18:00) (59 - 84)  RR: 14 (19 May 2020 18:00) (12 - 20)  SpO2: 100% (19 May 2020 18:00) (96% - 100%)                          8.5    8.42  )-----------( 55       ( 19 May 2020 13:00 )             27.4   19 May 2020 05:11    141    |  100    |  70     ----------------------------<  108    4.0     |  25     |  4.31     Ca    8.1        19 May 2020 05:11  Phos  5.4       19 May 2020 05:11  Mg     2.0       19 May 2020 05:11    TPro  5.9    /  Alb  2.9    /  TBili  1.0    /  DBili  x      /  AST  10     /  ALT  <5     /  AlkPhos  54     19 May 2020 05:11  PT/INR - ( 19 May 2020 05:11 )   PT: 19.1 sec;   INR: 1.65          PTT - ( 19 May 2020 05:11 )  PTT:35.5 sec\ Seen and examined bedside  Complicated hospital course [id number] UGIB, pericardiocentesis, paracentesis, HD  CT reviewed- no ureteral stones seen, palacio in place without urine in bladder and with thickened bladder likely 2/2 chronic WALLER. Mild collecting system fullness. Ascites of unknown etiology  No SPT or CVAT. Palacio with clear yellow urine.  Intrinsic AAMIR/ATN, from diuresis for treatment of ascites and hypotension on HD  Can get bladder sono with palacio clamped 1hr before to assess for ureteral jets  c/w palacio catheter  Patient admitted in February for severe urinary retention and b/l hydronephrosis necesittating indwelling catheter until a definitive bladder outlet procedure  Patient needs to followup with Dr. Adams for UDS and cystoscopy     Vital Signs Last 24 Hrs  T(C): 36.2 (19 May 2020 16:45), Max: 36.4 (19 May 2020 04:00)  T(F): 97.1 (19 May 2020 16:45), Max: 97.5 (19 May 2020 04:00)  HR: 88 (19 May 2020 18:00) (76 - 150)  BP: 111/55 (19 May 2020 18:00) (94/49 - 144/66)  BP(mean): 77 (19 May 2020 18:00) (59 - 84)  RR: 14 (19 May 2020 18:00) (12 - 20)  SpO2: 100% (19 May 2020 18:00) (96% - 100%)                          8.5    8.42  )-----------( 55       ( 19 May 2020 13:00 )             27.4   19 May 2020 05:11    141    |  100    |  70     ----------------------------<  108    4.0     |  25     |  4.31     Ca    8.1        19 May 2020 05:11  Phos  5.4       19 May 2020 05:11  Mg     2.0       19 May 2020 05:11    TPro  5.9    /  Alb  2.9    /  TBili  1.0    /  DBili  x      /  AST  10     /  ALT  <5     /  AlkPhos  54     19 May 2020 05:11  PT/INR - ( 19 May 2020 05:11 )   PT: 19.1 sec;   INR: 1.65          PTT - ( 19 May 2020 05:11 )  PTT:35.5 sec\

## 2020-05-19 NOTE — PROGRESS NOTE ADULT - SUBJECTIVE AND OBJECTIVE BOX
Pt seen and examined   denies pain  looks comfortable    REVIEW OF SYSTEMS:  Constitutional: No fever,   Cardiovascular: No chest pain, palpitations, dizziness or leg swelling  Gastrointestinal: No abdominal or epigastric pain. No nausea, vomiting ; No diarrhea. No melena   Skin: No itching, burning, rashes or lesions       MEDICATIONS:  MEDICATIONS  (STANDING):  chlorhexidine 2% Cloths 1 Application(s) Topical daily  collagenase Ointment 1 Application(s) Topical daily  DAPTOmycin IVPB 700 milliGRAM(s) IV Intermittent every 48 hours  finasteride 5 milliGRAM(s) Oral daily  insulin glargine Injectable (LANTUS) 4 Unit(s) SubCutaneous at bedtime  insulin lispro (HumaLOG) corrective regimen sliding scale   SubCutaneous Before meals and at bedtime  insulin lispro Injectable (HumaLOG) 2 Unit(s) SubCutaneous three times a day before meals  midodrine 10 milliGRAM(s) Oral every 8 hours  mirtazapine 7.5 milliGRAM(s) Oral daily  norepinephrine Infusion 0.05 MICROgram(s)/kG/Min (3.37 mL/Hr) IV Continuous <Continuous>  nystatin Cream 1 Application(s) Topical two times a day  nystatin Powder 1 Application(s) Topical two times a day  pantoprazole  Injectable 40 milliGRAM(s) IV Push every 24 hours  sertraline 50 milliGRAM(s) Oral daily  sevelamer carbonate 800 milliGRAM(s) Oral every 8 hours    MEDICATIONS  (PRN):      Allergies    No Known Allergies    Intolerances        Vital Signs Last 24 Hrs  T(C): 36.1 (19 May 2020 14:20), Max: 36.4 (19 May 2020 04:00)  T(F): 97 (19 May 2020 14:20), Max: 97.5 (19 May 2020 04:00)  HR: 99 (19 May 2020 14:00) (76 - 150)  BP: 126/53 (19 May 2020 15:00) (93/56 - 144/66)  BP(mean): 71 (19 May 2020 15:00) (59 - 84)  RR: 20 (19 May 2020 15:00) (12 - 20)  SpO2: 100% (19 May 2020 15:00) (96% - 100%)    05-18 @ 07:01  -  05-19 @ 07:00  --------------------------------------------------------  IN: 1572.9 mL / OUT: 1128 mL / NET: 444.9 mL        PHYSICAL EXAM:    General:  in no acute distress  HEENT: MMM, conjunctiva and sclera clear  \Lungs: clear  Heart: reg  Gastrointestinal: Soft non-tender softly-distended; Normal bowel sounds;   Skin:  rash  Ext: edema    LABS:      CBC Full  -  ( 19 May 2020 13:00 )  WBC Count : 8.42 K/uL  RBC Count : 3.62 M/uL  Hemoglobin : 8.5 g/dL  Hematocrit : 27.4 %  Platelet Count - Automated : 55 K/uL  Mean Cell Volume : 75.7 fl  Mean Cell Hemoglobin : 23.5 pg  Mean Cell Hemoglobin Concentration : 31.0 gm/dL  Auto Neutrophil # : x  Auto Lymphocyte # : x  Auto Monocyte # : x  Auto Eosinophil # : x  Auto Basophil # : x  Auto Neutrophil % : x  Auto Lymphocyte % : x  Auto Monocyte % : x  Auto Eosinophil % : x  Auto Basophil % : x    05-19    141  |  100  |  70<H>  ----------------------------<  108<H>  4.0   |  25  |  4.31<H>    Ca    8.1<L>      19 May 2020 05:11  Phos  5.4     05-19  Mg     2.0     05-19    TPro  5.9<L>  /  Alb  2.9<L>  /  TBili  1.0  /  DBili  x   /  AST  10  /  ALT  <5<L>  /  AlkPhos  54  05-19    PT/INR - ( 19 May 2020 05:11 )   PT: 19.1 sec;   INR: 1.65          PTT - ( 19 May 2020 05:11 )  PTT:35.5 sec                  RADIOLOGY & ADDITIONAL STUDIES (The following images were personally reviewed):< from: CT Abdomen and Pelvis w/ Oral Cont (05.18.20 @ 23:00) >  CT ABDOMEN AND PELVIS OC     < end of copied text >  < from: CT Abdomen and Pelvis w/ Oral Cont (05.18.20 @ 23:00) >  1.  Since 5/12/2020, there has been no significant change in bilateral pleural effusions, moderate to large intra-abdominal ascites, and anasarca. Slightly nodular liver contour and splenomegaly cirrhosis, correlate clinically.  2.  Pancolitis of infectious or inflammatory etiology.  3.  Mild bilateral hydroureteronephrosis extending to the thick-walled urinary bladder, unchanged. Prostatic nodule indenting into the urinary bladder base, as seen on 4/11/2018  4.  Dilated main pulmonary artery suggesting pulmonaryhypertension.    < end of copied text >

## 2020-05-19 NOTE — PROGRESS NOTE ADULT - ASSESSMENT
IMPRESSION:  Persistent MRSA bacteremia - -> concern for endocarditis vs cardiac abscess.      Recommend:  1.  Continue Daptomycin 700 mg IV q48hrs  2.  Add Ceftaroline 400 mg IV q12 hrs for syngergy  3.  Check Gallium scan to evaluate for other source  4.  Check repeat blood cultures daily until cleared    ID team 1 will follow

## 2020-05-19 NOTE — CONSULT NOTE ADULT - SUBJECTIVE AND OBJECTIVE BOX
Electrophysiology Consult Note:     CHIEF COMPLAINT:  Patient is a 74y old  Male who presents with a chief complaint of sepsis (19 May 2020 15:28)        HISTORY OF PRESENT ILLNESS:     75 y/o M with h/o MDS, pancytopenia, CKD, ascites of unknown origin p/w uremia/AAMIR from diuresis from tx of ascities. Hospital course here with UGIB and pericardial effusion with tamponade physiology. S/p pericardiocentesis on .  Still has pericardial drain in place with 5cc serosanginous fluid collected today. Today, it was noted on telemetry that he had "slow VT" episodes this morning and this afternoon. Per team, he has been on pressors since admission. However, these episodes have not affecting his BP or need to increase on pressor dosage as per team. Pt was also asymptomatic.          PAST MEDICAL & SURGICAL HISTORY:  History of pancytopenia  H/O hydrocephalus  Anemia  HLD (hyperlipidemia)  Enlarged prostate  DM (diabetes mellitus)  HTN (hypertension)  H/O prior ablation treatment      FAMILY HISTORY:  FH: stomach cancer  FHx: breast cancer  FHx: stroke      SOCIAL HISTORY:    denies smoking, drinking,drug use    Allergies:   No Known Allergies      MEDICATIONS  (STANDING):  ceftaroline fosamil IVPB      chlorhexidine 2% Cloths 1 Application(s) Topical daily  collagenase Ointment 1 Application(s) Topical daily  DAPTOmycin IVPB 700 milliGRAM(s) IV Intermittent every 48 hours  finasteride 5 milliGRAM(s) Oral daily  insulin glargine Injectable (LANTUS) 4 Unit(s) SubCutaneous at bedtime  insulin lispro (HumaLOG) corrective regimen sliding scale   SubCutaneous Before meals and at bedtime  insulin lispro Injectable (HumaLOG) 2 Unit(s) SubCutaneous three times a day before meals  midodrine 10 milliGRAM(s) Oral every 8 hours  mirtazapine 7.5 milliGRAM(s) Oral daily  norepinephrine Infusion 0.05 MICROgram(s)/kG/Min (3.37 mL/Hr) IV Continuous <Continuous>  nystatin Cream 1 Application(s) Topical two times a day  nystatin Powder 1 Application(s) Topical two times a day  pantoprazole  Injectable 40 milliGRAM(s) IV Push every 24 hours  sertraline 50 milliGRAM(s) Oral daily  sevelamer carbonate 800 milliGRAM(s) Oral every 8 hours        REVIEW OF SYSTEMS:  CONSTITUTIONAL: No fever, weight loss, or fatigue  EYES: No eye pain, visual disturbances, or discharge  ENMT:  No difficulty hearing, tinnitus, vertigo; No sinus or throat pain  NECK: No pain or stiffness  BREASTS: No pain, masses, or nipple discharge  RESPIRATORY: No cough, wheezing, chills or hemoptysis; No Shortness of Breath  CARDIOVASCULAR: No chest pain, palpitations, dizziness, or leg swelling  GASTROINTESTINAL: No abdominal or epigastric pain. No nausea, vomiting, or hematemesis; No diarrhea or constipation. No melena or hematochezia.  GENITOURINARY: No dysuria, frequency, hematuria, or incontinence  NEUROLOGICAL: No headaches, memory loss, loss of strength, numbness, or tremors  SKIN: No itching, burning, rashes, or lesions   LYMPH Nodes: No enlarged glands  ENDOCRINE: No heat or cold intolerance; No hair loss  MUSCULOSKELETAL: No joint pain or swelling; No muscle, back, or extremity pain  PSYCHIATRIC: No depression, anxiety, mood swings, or difficulty sleeping  HEME/LYMPH: No easy bruising, or bleeding gums  ALLERY AND IMMUNOLOGIC: No hives or eczema	      PHYSICAL EXAM:  Vital Signs Last 24 Hrs  T(C): 36.1 (19 May 2020 14:20), Max: 36.4 (19 May 2020 04:00)  T(F): 97 (19 May 2020 14:20), Max: 97.5 (19 May 2020 04:00)  HR: 99 (19 May 2020 14:00) (76 - 150)  BP: 126/53 (19 May 2020 15:00) (93/56 - 144/66)  BP(mean): 71 (19 May 2020 15:00) (59 - 84)  RR: 20 (19 May 2020 15:00) (12 - 20)  SpO2: 100% (19 May 2020 15:00) (96% - 100%)  Daily Weight in k.6 (18 May 2020 19:25)      Constitutional: NAD	  HEENT:   Normal oral mucosa, PERRL, EOMI	  Neck: No JVD  CVS: Normal S1 / S2, RRR, pericardial drain.   Pulm: decreased BS.   GI:  + BS, soft, NT / ND   Ext: + pitting LE edema  Vascular: Peripheral pulses palpable 2+ bilaterally  MS: full range of motion in all joints  Neurologic: A&O x 3, Non-focal  Skin: No rash or lesion       	  LABS:	                         8.5    8.42  )-----------( 55       ( 19 May 2020 13:00 )             27.4         141  |  100  |  70<H>  ----------------------------<  108<H>  4.0   |  25  |  4.31<H>    Ca    8.1<L>      19 May 2020 05:11  Phos  5.4       Mg     2.0         TPro  5.9<L>  /  Alb  2.9<L>  /  TBili  1.0  /  DBili  x   /  AST  10  /  ALT  <5<L>  /  AlkPhos  54      EK20: NSr 94 bpm.  ms. Left axis deviation.   Telemetry: Idioventricular conduction  morning and afternoon (14- 28 seconds) -- Rate 94 bpm.      Echo Limited or F/U (05.15.20 @ 09:58)   1. Limited study obtained for evaluation of pericardial effusion.   2. Normal left ventricular size and function.   3. Mildly dilated right ventricular size.   4. Normal right ventricular systolic function. A device lead is noted in the right heart.   5. No evidence of pulmonary hypertension, pulmonary artery systolic pressure is 30 mmHg.   6. No pericardial effusion.   7. Right pleural effusion.

## 2020-05-20 NOTE — ADVANCED PRACTICE NURSE CONSULT - REASON FOR CONSULT
[facility] nurse consult to assess pressure injuries. The patient is 73 yo M with past medical history of DM, HTN, HLD, BPH (chronic indwelling palacio placement on last admission), SERA, CKD4, and a-fib  , mds, pancytopenia, presented with complaints of decreased po intake and weakness. Patient reported he recently had a intense bowel regimen for constipation after which he developed loose stools. To prevent further loose stools he stopped eating. Decreased po intake also in setting of poor appetite. Patient also complained of worsening LE and abdominal swelling.

## 2020-05-20 NOTE — PROGRESS NOTE ADULT - SUBJECTIVE AND OBJECTIVE BOX
OVERNIGHT EVENTS: GI PCR negative, no sig VT burden did not start amio    SUBJECTIVE: Patient seen and examined at bedside. He appears comfortable and in no acute distress. He denies any fevers, chills, chest pain, shortness of breath, abdominal pain, n/v/d/c.    Vital Signs Last 12 Hrs  T(F): 97.2 (05-20-20 @ 14:24), Max: 97.5 (05-20-20 @ 11:00)  HR: 84 (05-20-20 @ 13:00) (82 - 90)  BP: 113/56 (05-20-20 @ 13:00) (95/48 - 129/61)  BP(mean): 78 (05-20-20 @ 13:00) (64 - 87)  RR: 13 (05-20-20 @ 13:00) (11 - 17)  SpO2: 99% (05-20-20 @ 13:00) (95% - 100%)  I&O's Summary    19 May 2020 07:01  -  20 May 2020 07:00  --------------------------------------------------------  IN: 507 mL / OUT: 180 mL / NET: 327 mL    20 May 2020 07:01  -  20 May 2020 14:48  --------------------------------------------------------  IN: 9.4 mL / OUT: 25 mL / NET: -15.6 mL        PHYSICAL EXAM  General: NAD, resting comfortably in bed. Breathing well on room air  Neck: no JVD  Respiratory: diminished breath sounds at the bases, non-tachypneic, no respiratory distress   Cardiovascular: Regular rhythm/rate; S1/S2, no pericardial rub, pericardial drain in placed clean/dry/intact, draining 0cc serosanguinous fluid   Gastrointestinal: distended, non tender ascitic drain with appropriate drainage, site clean, dry, and intact  Extremities: WWP; 1+ thigh edema  Neurological:  A&Ox 3, CNII-XII grossly intact; no obvious focal deficits, no flapping tremor, but minimal asterixis  T/L/D: palacio draining red-orange colored urine   Skin: petechiae diffusely         LABS:                        8.8    9.43  )-----------( 56       ( 20 May 2020 04:53 )             28.4     05-20    133<L>  |  96  |  79<H>  ----------------------------<  175<H>  4.3   |  19<L>  |  4.85<H>    Ca    8.3<L>      20 May 2020 04:53  Phos  7.1     05-20  Mg     2.1     05-20    TPro  5.7<L>  /  Alb  2.8<L>  /  TBili  0.7  /  DBili  x   /  AST  9<L>  /  ALT  <5<L>  /  AlkPhos  57  05-20    PT/INR - ( 20 May 2020 04:53 )   PT: 17.2 sec;   INR: 1.49          PTT - ( 20 May 2020 04:53 )  PTT:34.6 sec        RADIOLOGY & ADDITIONAL TESTS:    MEDICATIONS  (STANDING):  ceftaroline fosamil IVPB      ceftaroline fosamil IVPB 400 milliGRAM(s) IV Intermittent every 12 hours  chlorhexidine 2% Cloths 1 Application(s) Topical daily  collagenase Ointment 1 Application(s) Topical daily  CRRT Treatment    <Continuous>  DAPTOmycin IVPB 700 milliGRAM(s) IV Intermittent every 48 hours  finasteride 5 milliGRAM(s) Oral daily  insulin glargine Injectable (LANTUS) 4 Unit(s) SubCutaneous at bedtime  insulin lispro (HumaLOG) corrective regimen sliding scale   SubCutaneous Before meals and at bedtime  insulin lispro Injectable (HumaLOG) 2 Unit(s) SubCutaneous three times a day before meals  midodrine 10 milliGRAM(s) Oral every 8 hours  mirtazapine 7.5 milliGRAM(s) Oral daily  norepinephrine Infusion 0.05 MICROgram(s)/kG/Min (3.37 mL/Hr) IV Continuous <Continuous>  nystatin Cream 1 Application(s) Topical two times a day  nystatin Powder 1 Application(s) Topical two times a day  pantoprazole  Injectable 40 milliGRAM(s) IV Push every 24 hours  PureFlow Dialysate RFP-401 (K 4 / Ca 3) 5000 milliLiter(s) (2000 mL/Hr) CRRT <Continuous>  sertraline 50 milliGRAM(s) Oral daily  sevelamer carbonate 800 milliGRAM(s) Oral every 8 hours    MEDICATIONS  (PRN):

## 2020-05-20 NOTE — PROGRESS NOTE ADULT - SUBJECTIVE AND OBJECTIVE BOX
remains on pressors w increasing requirements overnight  positive fluid balance   last HD completed 5/18  on RA, sat 98%        Meds:  ceftaroline fosamil IVPB    ceftaroline fosamil IVPB 400 every 12 hours  chlorhexidine 2% Cloths 1 daily  CRRT Treatment  <Continuous>  DAPTOmycin IVPB 700 every 48 hours  finasteride 5 daily  insulin glargine Injectable (LANTUS) 4 at bedtime  insulin lispro (HumaLOG) corrective regimen sliding scale  Before meals and at bedtime  insulin lispro Injectable (HumaLOG) 2 three times a day before meals  midodrine 10 every 8 hours  mirtazapine 7.5 daily  norepinephrine Infusion 0.05 <Continuous>  nystatin Cream 1 two times a day  nystatin Powder 1 two times a day  pantoprazole  Injectable 40 every 24 hours  PureFlow Dialysate RFP-401 (K 4 / Ca 3) 5000 <Continuous>  sertraline 50 daily  sevelamer carbonate 800 every 8 hours      T(C): , Max: 36.2 (05-19-20 @ 16:45)  T(F): , Max: 97.1 (05-19-20 @ 16:45)  HR: 86 (05-20-20 @ 12:00)  BP: 123/60 (05-20-20 @ 12:00)  BP(mean): 87 (05-20-20 @ 12:00)  RR: 13 (05-20-20 @ 12:00)  SpO2: 98% (05-20-20 @ 12:00)  Wt(kg): --    05-19 @ 07:01  -  05-20 @ 07:00  --------------------------------------------------------  IN: 507 mL / OUT: 180 mL / NET: 327 mL    05-20 @ 07:01  -  05-20 @ 12:43  --------------------------------------------------------  IN: 9.4 mL / OUT: 25 mL / NET: -15.6 mL      PHYSICAL EXAM:  General: NAD, on RA  Neck: no JVD  Respiratory: rales at bases  Cardiovascular: S1/S2  Gastrointestinal: distended, non tender  Extremities: pitting thigh edema b/l up to lower abdomen   Skin: petechiae, ?Janeway lesions   Access: [facility] cath c/d/i, placed on 05/15      LABS:                        8.8    9.43  )-----------( 56       ( 20 May 2020 04:53 )             28.4     05-20    133<L>  |  96  |  79<H>  ----------------------------<  175<H>  4.3   |  19<L>  |  4.85<H>    Ca    8.3<L>      20 May 2020 04:53  Phos  7.1     05-20  Mg     2.1     05-20    TPro  5.7<L>  /  Alb  2.8<L>  /  TBili  0.7  /  DBili  x   /  AST  9<L>  /  ALT  <5<L>  /  AlkPhos  57  05-20      PT/INR - ( 20 May 2020 04:53 )   PT: 17.2 sec;   INR: 1.49          PTT - ( 20 May 2020 04:53 )  PTT:34.6 sec          RADIOLOGY & ADDITIONAL STUDIES:    < from: Xray Chest 1 View- PORTABLE-Routine (05.19.20 @ 04:02) >    EXAM:  XR CHEST PORTABLE ROUTINE 1V                          PROCEDURE DATE:  05/19/2020          INTERPRETATION:  CLINICAL INDICATION: 74-year-old with shortness of breath.      IMPRESSION: Frontal view of the chest is compared to 5/16/2020 and demonstrates central venous catheter with the tip in the SVC. Additional central venous catheter with the tip in the right atrium. Normal heart size. Low lung volumes. Overall interval improvement with persistent moderate interstitial pulmonary edema and central venous congestion. No interval change in position of pericardial drain.    < end of copied text > remains on pressors w increasing requirements overnight  positive fluid balance   last HD completed 5/18  on RA, sat 98%  oliguric still         Meds:  ceftaroline fosamil IVPB    ceftaroline fosamil IVPB 400 every 12 hours  chlorhexidine 2% Cloths 1 daily  CRRT Treatment  <Continuous>  DAPTOmycin IVPB 700 every 48 hours  finasteride 5 daily  insulin glargine Injectable (LANTUS) 4 at bedtime  insulin lispro (HumaLOG) corrective regimen sliding scale  Before meals and at bedtime  insulin lispro Injectable (HumaLOG) 2 three times a day before meals  midodrine 10 every 8 hours  mirtazapine 7.5 daily  norepinephrine Infusion 0.05 <Continuous>  nystatin Cream 1 two times a day  nystatin Powder 1 two times a day  pantoprazole  Injectable 40 every 24 hours  PureFlow Dialysate RFP-401 (K 4 / Ca 3) 5000 <Continuous>  sertraline 50 daily  sevelamer carbonate 800 every 8 hours      T(C): , Max: 36.2 (05-19-20 @ 16:45)  T(F): , Max: 97.1 (05-19-20 @ 16:45)  HR: 86 (05-20-20 @ 12:00)  BP: 123/60 (05-20-20 @ 12:00)  BP(mean): 87 (05-20-20 @ 12:00)  RR: 13 (05-20-20 @ 12:00)  SpO2: 98% (05-20-20 @ 12:00)  Wt(kg): --    05-19 @ 07:01  -  05-20 @ 07:00  --------------------------------------------------------  IN: 507 mL / OUT: 180 mL / NET: 327 mL    05-20 @ 07:01  -  05-20 @ 12:43  --------------------------------------------------------  IN: 9.4 mL / OUT: 25 mL / NET: -15.6 mL      PHYSICAL EXAM:  General: NAD, on RA  Neck: no JVD  Respiratory: rales at bases  Cardiovascular: S1/S2  Gastrointestinal: distended, non tender  Extremities: pitting thigh edema b/l up to lower abdomen   Skin: petechiae, ?Janeway lesions   Access: [facility] HD cath c/d/i, placed on 05/15      LABS:                        8.8    9.43  )-----------( 56       ( 20 May 2020 04:53 )             28.4     05-20    133<L>  |  96  |  79<H>  ----------------------------<  175<H>  4.3   |  19<L>  |  4.85<H>    Ca    8.3<L>      20 May 2020 04:53  Phos  7.1     05-20  Mg     2.1     05-20    TPro  5.7<L>  /  Alb  2.8<L>  /  TBili  0.7  /  DBili  x   /  AST  9<L>  /  ALT  <5<L>  /  AlkPhos  57  05-20      PT/INR - ( 20 May 2020 04:53 )   PT: 17.2 sec;   INR: 1.49          PTT - ( 20 May 2020 04:53 )  PTT:34.6 sec          RADIOLOGY & ADDITIONAL STUDIES:    < from: Xray Chest 1 View- PORTABLE-Routine (05.19.20 @ 04:02) >    EXAM:  XR CHEST PORTABLE ROUTINE 1V                          PROCEDURE DATE:  05/19/2020          INTERPRETATION:  CLINICAL INDICATION: 74-year-old with shortness of breath.      IMPRESSION: Frontal view of the chest is compared to 5/16/2020 and demonstrates central venous catheter with the tip in the SVC. Additional central venous catheter with the tip in the right atrium. Normal heart size. Low lung volumes. Overall interval improvement with persistent moderate interstitial pulmonary edema and central venous congestion. No interval change in position of pericardial drain.    < end of copied text >

## 2020-05-20 NOTE — ADVANCED PRACTICE NURSE CONSULT - RECOMMEDATIONS
Right sacral pressure injury - score center of eschar to improve absorption of Collagenase cleanse with normal saline, apply Cavilon skin prep to periwound, Collagenase to wound, cover with foam dressing daily. Include left sacral pressure injury in foam dressing, but do not apply Collagenase.  Bilateral heel pressure injuries - apply Cavilon skin prep, 4x4 gauze, warp with cornel every other day.   Juana-rectal, bilateral buttocks and inguinal area fungal rash - apply Nystatin powder twice daily.   WOCN discussed assessment and recommendations with EMILY Ware and house staff Dr Tobin. Right sacral pressure injury - score center of eschar to improve absorption of Collagenase, cleanse with normal saline, apply Cavilon skin prep to periwound, Collagenase to wound, cover with foam dressing daily. Include left sacral pressure injury in foam dressing, but do not apply Collagenase.  Bilateral heel pressure injuries - apply Cavilon skin prep, 4x4 gauze, wrap with cornel every other day.   Juana-rectal, bilateral buttocks and inguinal area fungal rash - apply Nystatin powder twice daily.   WOCN discussed assessment and recommendations with EMILY Ware and house staff Dr Tobin.

## 2020-05-20 NOTE — PROGRESS NOTE ADULT - ASSESSMENT
75 y/o M w/PMHx of CKD stage III/IV, DM, HTN, BPH (chronic incontinence/retention, s/p palacio on last admission), a-fib admitted for new onset ascites nephrology consulted for AAMIR on CKD.      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, likely ATN from hypotension and ischemic injury  - anuric  - HD initiated on 05/15, not tolerated few IHD sessions due to hemodynamic instability w tachycardia and hypotension despite being on levophed during HD (no pericardial effusion/cardiac tamponade); last IHD attempted 5/18, able to tolerate UF of 0.9 L, currently w increasing pressor requir positive fluid balance, will anticipate CVVHD for continuous UF as tolerated and clearance  - CVVHD settings: 4K Pureflow,  ml/min, DFR 2.0 L/hr, start w net even UF  - renally adjusted meds/ ABx  - if repeat blood cx positive, would recommend removal of HD cath  - thrombocytopenia/petechiae/?Janeway lesions could be in setting of endocarditis/ MRSA bacteremia, planned for GET     # Renal bone disease  - on Renvela 800 mg po tid

## 2020-05-20 NOTE — PROGRESS NOTE ADULT - ATTENDING COMMENTS
remains oligoanuric on pressors   plan CVVHD for clearance primarily -- gentle UF if tolerates as hypervolemic  cont w/u per team

## 2020-05-20 NOTE — PROGRESS NOTE ADULT - SUBJECTIVE AND OBJECTIVE BOX
75 yo M s/p IR pericardiocentesis 5/11. Echo on 5/15 showed no pericardial effusion.    Output 0 cc recorded over past 24 hours.  Will continue to monitor.

## 2020-05-20 NOTE — PROGRESS NOTE ADULT - SUBJECTIVE AND OBJECTIVE BOX
INTERVAL HPI/OVERNIGHT EVENTS:    Patient was seen and examined at bedside.  Remains on pressors.  Not tolerating HD    CONSTITUTIONAL:  Negative fever or chills, feels well, good appetite  EYES:  Negative  blurry vision or double vision  CARDIOVASCULAR:  Negative for chest pain or palpitations  RESPIRATORY:  Negative for cough, wheezing, or SOB   GASTROINTESTINAL:  Negative for nausea, vomiting, diarrhea, constipation, or abdominal pain  GENITOURINARY:  Negative frequency, urgency or dysuria  NEUROLOGIC:  No headache, confusion, dizziness, lightheadedness      ANTIBIOTICS/RELEVANT:    MEDICATIONS  (STANDING):  ceftaroline fosamil IVPB      ceftaroline fosamil IVPB 400 milliGRAM(s) IV Intermittent every 12 hours  chlorhexidine 2% Cloths 1 Application(s) Topical daily  collagenase Ointment 1 Application(s) Topical daily  CRRT Treatment    <Continuous>  DAPTOmycin IVPB 700 milliGRAM(s) IV Intermittent every 48 hours  finasteride 5 milliGRAM(s) Oral daily  insulin glargine Injectable (LANTUS) 4 Unit(s) SubCutaneous at bedtime  insulin lispro (HumaLOG) corrective regimen sliding scale   SubCutaneous Before meals and at bedtime  insulin lispro Injectable (HumaLOG) 2 Unit(s) SubCutaneous three times a day before meals  midodrine 10 milliGRAM(s) Oral every 8 hours  mirtazapine 7.5 milliGRAM(s) Oral daily  norepinephrine Infusion 0.05 MICROgram(s)/kG/Min (3.37 mL/Hr) IV Continuous <Continuous>  nystatin Cream 1 Application(s) Topical two times a day  nystatin Powder 1 Application(s) Topical two times a day  pantoprazole  Injectable 40 milliGRAM(s) IV Push every 24 hours  PureFlow Dialysate RFP-401 (K 4 / Ca 3) 5000 milliLiter(s) (2000 mL/Hr) CRRT <Continuous>  sertraline 50 milliGRAM(s) Oral daily  sevelamer carbonate 800 milliGRAM(s) Oral every 8 hours    MEDICATIONS  (PRN):        Vital Signs Last 24 Hrs  T(C): 36.4 (20 May 2020 11:00), Max: 36.4 (20 May 2020 11:00)  T(F): 97.5 (20 May 2020 11:00), Max: 97.5 (20 May 2020 11:00)  HR: 84 (20 May 2020 13:00) (82 - 99)  BP: 113/56 (20 May 2020 13:00) (94/48 - 129/61)  BP(mean): 78 (20 May 2020 13:00) (63 - 87)  RR: 13 (20 May 2020 13:00) (11 - 20)  SpO2: 99% (20 May 2020 13:00) (95% - 100%)    PHYSICAL EXAM:  Constitutional:  non-toxic, no distress  Eyes:  no icterus   Ear/Nose/Throat: no sinus tenderness on percussion	  Neck:  supple  Respiratory: CTA chuck  Cardiovascular: S1S2 RRR, no murmurs  Gastrointestinal:soft, (+) BS, no HSM  Extremities:no edema   Vascular: DP Pulse:	right normal; left normal      LABS:                        8.8    9.43  )-----------( 56       ( 20 May 2020 04:53 )             28.4     05-20    133<L>  |  96  |  79<H>  ----------------------------<  175<H>  4.3   |  19<L>  |  4.85<H>    Ca    8.3<L>      20 May 2020 04:53  Phos  7.1     05-20  Mg     2.1     05-20    TPro  5.7<L>  /  Alb  2.8<L>  /  TBili  0.7  /  DBili  x   /  AST  9<L>  /  ALT  <5<L>  /  AlkPhos  57  05-20    PT/INR - ( 20 May 2020 04:53 )   PT: 17.2 sec;   INR: 1.49          PTT - ( 20 May 2020 04:53 )  PTT:34.6 sec      MICROBIOLOGY:    GI PCR Panel, Stool (05.19.20 @ 22:11)    Culture Results:   GI PCR Results: NOT detected  *******Please Note:*******  GI panel PCR evaluates for:  Campylobacter, Plesiomonas shigelloides, Salmonella,  Vibrio, Yersinia enterocolitica, Enteroaggregative  Escherichia coli (EAEC), Enteropathogenic E.coli (EPEC),  Enterotoxigenic E. coli (ETEC) lt/st, Shiga-like  toxin-producing E. coli (STEC) stx1/stx2,  Shigella/ Enteroinvasive E. coli (EIEC), Cryptosporidium,  Cyclospora cayetanensis, Entamoeba histolytica,  Giardia lamblia, Adenovirus F 40/41, Astrovirus,  Norovirus GI/GII, Rotavirus A, Sapovirus    Culture - Blood (05.18.20 @ 13:01)    Specimen Source: .Blood Blood    Culture Results:   No growth at 1 day.      Culture - Blood (05.16.20 @ 20:35)    -  Tetra/Doxy: S <=1    -  Trimethoprim/Sulfamethoxazole: R >2/38    -  Vancomycin: S 1    Gram Stain:   Aerobic Bottle: Gram Positive Cocci in Clusters  Result called to and read back by_ M [facility] RN (2WO)  05/17/2020 17:21:54    -  Cefazolin: R <=4    -  Daptomycin: S 0.5    -  Erythromycin: R >4    -  Clindamycin: S <=0.25    -  Linezolid: S 2    -  RIF- Rifampin: S <=1 Should not be used as monotherapy    -  Oxacillin: R >2    Specimen Source: .Blood Blood    Organism: Methicillin resistant Staphylococcus aureus    Organism: Methicillin resistant Staphylococcus aureus    Culture Results:   Growth in anaerobic bottle: Methicillin resistant Staphylococcus aureus  Floor previously notified.    Organism Identification: Methicillin resistant Staphylococcus aureus  Methicillin resistant Staphylococcus aureus    Method Type: IMAN    Method Type: ETEST      RADIOLOGY & ADDITIONAL STUDIES:

## 2020-05-20 NOTE — PROGRESS NOTE ADULT - ASSESSMENT
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    Neuro:   p/w AMS likey 2/2 to uremia, now resolved    Cards:   Hypotension  MRSA bactermia vs.  intravascular volume depletion from diuresis vs GIB. --> c/w dapto, midodrine TID, wean levo as tolerated. most recent echo with no pericaridal effusion. Not a candidate for GET as per cardiology  - gallium scan today  - CVVHD today    Pericardial Effusion:  Initial read with some evidence of tamponade physiology; now s/p IR for diagnostic pericardiocentesis on 5/11  - Cardiology following, appreciate recs.   - Given low pericardial drain output and clog, a follow up limited echo on 5/15 showed no pericardial effusion  - f/u IR recs    Pulm:   stable on RA. maintain head of bed 30 degrees, due to aspiration risk     GI:   multiple episodes of melena, with baseline anemia from MDS, although with concern for coagulopathy given decreased PLTS and elevated BUN.--> monitor coags, c/w protonix.   #ascites  -Elevated SAAG consistent with portal HTN, no evidence of clot on U/S  --> unclear etiology, no cirrhosis on CT.     #colitis  - CT on 5/18 showing pancolitis  - c.diff neg  - GI PCR negative  - placed rectal tube on 5/18    Renal/:   AAMIR on CKD likely due to hypotension now requring HD--> inability to tolerate HD on 5/16.   - bilateral renal US - mild bilateral hydronephrosis, large ascites. Splenomegaly. Cholelithiasis.  - palacio exchanged on 5/18 and will c/w palacio for now due to clear pyuria    - cvvhd with renal on 5/18  - bladder wall thickening on CT on 5/18  - Urology consulted and recommended bladder sono with palacio clamped 1hr before to assess for ureteral jets  - c/w palacio catheter  - Patient admitted in February for severe urinary retention and b/l hydronephrosis necessitating indwelling catheter until a definitive bladder outlet procedure    HEME:   Anemia and coagulopathy--> MDS, UGIB, and elevated BUN.  -transfuse PLT as needed    Anemia:   Likely secondary to anemia from chronic disease and bone marrow suppression.   - S/p 1 unit of PRBC on 5/9 for active melena and Hgb stable   - Given 1 unit of prbc and platelets on 5/12 for acute drop, unknown source  - Monitor CBC  - plts 35 on 5/14, 41 on 5/15, and 28 on 5/19 received 1 unit platelets each day  - Transfuse for Hgb <7   - Active type and screen     #Coagulopathy:   - Patient presented with elevated INR to 2.48, with active bleeding.   - Monitor PT/INR   - INR 1.65 today    #DVT - RLE  - repeat dopplers on 5/11 with no DVT  - Eliquis held in setting of melanax3 and drop in hgb     ID:   - Persistent MRSA bacteremia--> dapto increased to 700mg [id number]  - added Ceftaroline 400 mg IV q12 hrs for syngergy  - last blood culture (+) on 5/16, continue with surveillance cultures  - obtain daily CKs  - CT abdomen and pelvis - no significant change in bilateral pleural effusions, moderate to large intra-abdominal ascites, and anasarca. Slightly nodular liver contour and splenomegaly cirrhosis, pancolitis, mild bilateral hydroureteronephrosis extending to the thick-walled urinary bladder, unchanged. Prostatic nodule indenting into the urinary bladder base, as seen on 4/11/2018, dilated main pulmonary artery suggesting pulmonary hypertension.  - thrombocytopenia/petechiae/?Janeway lesions could be in setting of endocarditis/ MRSA bacteremia, plan gallium scan tomorrow.        DVt ppx: SCDs. no chemic ppx due to pancytopenia

## 2020-05-20 NOTE — ADVANCED PRACTICE NURSE CONSULT - ASSESSMENT
[facility] nurse assessed multiple pressure injuries with assistance from EMILY Ware.   Right sacral unstageable pressure injury measuring 8 cm x 8 cm with 90% soft eschar and 10% pink, non-granulating tissue; draining small amount of serous exudate.   Left sacral DTPI measuring 1.5 cm x 2 cm.   Left heel unstageable pressure injury with 100% eschar draining small amount of serous exudate.   Fungal rash with denuded skin noted around santi-rectal, bilateral buttock and inguinal area.   Right heel dry unstageable pressure injury measuring 1 cm x 1 cm with 100% slough.   The patient requires a 2 person assist to turn and reposition in bed. Patient lying on an AirTAP positioning system, pillow being used to turn and position patient as he reports wedges are uncomfortable when used for repositioning. Patient wearing bilateral heel protectors to offload heels. [facility] nurse assessed multiple pressure injuries with assistance from EMILY Ware.   Right sacral unstageable pressure injury measuring 8 cm x 8 cm with 90% soft eschar and 10% pink, non-granulating tissue; draining small amount of serous exudate.   Left sacral DTPI measuring 1.5 cm x 2 cm.   Left heel unstageable pressure injury measuring 9.5 cm x 11 cm with 100% eschar draining small amount of serous exudate.   Fungal rash with denuded skin noted around santi-rectal, bilateral buttock and inguinal area.   Right heel dry unstageable pressure injury measuring 1 cm x 1 cm with 100% slough.   The patient requires a 2 person assist to turn and reposition in bed. Patient lying on an AirTAP positioning system, pillow being used to turn and position patient as he reports wedges are uncomfortable when used for repositioning. Patient wearing bilateral heel protectors to offload heels.

## 2020-05-20 NOTE — PROGRESS NOTE ADULT - ASSESSMENT
IMPRESSION:  Persistent MRSA bacteremia - -> concern for endocarditis vs cardiac abscess.      Recommend:  1.  Continue Daptomycin 700 mg IV q48hrs.  Monitor CPK level 2-3 times a week   2.  Continue Ceftaroline 400 mg IV q12 hrs for synergy  3.  Check Gallium scan to evaluate for other source  4.  Check repeat blood cultures daily until cleared  5.  Check GET     ID team 1 will follow

## 2020-05-21 NOTE — PROGRESS NOTE ADULT - ASSESSMENT
75 yo M with MDS, pancytopenia, CKD, ascites of unknown origin presents from [facility] with complaints of decreased po intake and weakness likely secondary to uremia/AAMIR from diuresis for treatment of ascites, hospital course notable for UGIB and pericardial effusion with tamponade physiology , as well as ATN, and ascites of unknown etiology, now in ICU s/p paracentesis 5/10 (2L removed), IR pericardiocentesis 5/11 (drain in place), now with anemia of unknown etiology.    Neuro:   p/w AMS likey 2/2 to uremia, now resolved    Cards:   Hypotension  MRSA bactermia vs.  intravascular volume depletion from diuresis vs GIB. --> c/w dapto, midodrine TID, wean levo as tolerated. most recent echo with no pericaridal effusion. Not a candidate for GET as per cardiology  - gallium scan today  - CVVHD today, started on hep gtt due to clotting    Pericardial Effusion:  Initial read with some evidence of tamponade physiology; now s/p IR for diagnostic pericardiocentesis on 5/11  - Cardiology following, appreciate recs.   - Given low pericardial drain output and clog, a follow up limited echo on 5/15 showed no pericardial effusion  - IR removed drain on 5/21    Pulm:   stable on RA. maintain head of bed 30 degrees, due to aspiration risk     GI:   multiple episodes of melena, with baseline anemia from MDS, although with concern for coagulopathy given decreased PLTS and elevated BUN.--> monitor coags, c/w protonix.   #ascites  -Elevated SAAG consistent with portal HTN, no evidence of clot on U/S  --> unclear etiology, no cirrhosis on CT.     #colitis  - CT on 5/18 showing pancolitis  - c.diff neg  - GI PCR negative  - placed rectal tube on 5/18    Renal/:   AAMIR on CKD likely due to hypotension now requring HD--> inability to tolerate HD on 5/16.   - bilateral renal US - mild bilateral hydronephrosis, large ascites. Splenomegaly. Cholelithiasis.  - palacio exchanged on 5/18 and will c/w palacio for now due to clear pyuria    - cvvhd with renal on 5/18  - bladder wall thickening on CT on 5/18  - Urology consulted and recommended bladder sono with palacio clamped 1hr before to assess for ureteral jets  - c/w palacio catheter  - Patient admitted in February for severe urinary retention and b/l hydronephrosis necessitating indwelling catheter until a definitive bladder outlet procedure    HEME:   Anemia and coagulopathy--> MDS, UGIB, and elevated BUN.  -transfuse PLT as needed    Anemia:   Likely secondary to anemia from chronic disease and bone marrow suppression.   - S/p 1 unit of PRBC on 5/9 for active melena and Hgb stable   - Given 1 unit of prbc and platelets on 5/12 for acute drop, unknown source  - Monitor CBC  - plts 35 on 5/14, 41 on 5/15, and 28 on 5/19 received 1 unit platelets each day  - Transfuse for Hgb <7   - Active type and screen     #Coagulopathy:   - Patient presented with elevated INR to 2.48, with active bleeding.   - Monitor PT/INR   - INR 1.5 today    #DVT - RLE  - repeat dopplers on 5/11 with no DVT  - Eliquis held in setting of melanax3 and drop in hgb     ID:   - Persistent MRSA bacteremia--> dapto increased to 700mg [id number]  - added Ceftaroline 400 mg IV q12 hrs for syngergy  - last blood culture (+) on 5/16, continue with surveillance cultures  - obtain daily CKs  - CT abdomen and pelvis - no significant change in bilateral pleural effusions, moderate to large intra-abdominal ascites, and anasarca. Slightly nodular liver contour and splenomegaly cirrhosis, pancolitis, mild bilateral hydroureteronephrosis extending to the thick-walled urinary bladder, unchanged. Prostatic nodule indenting into the urinary bladder base, as seen on 4/11/2018, dilated main pulmonary artery suggesting pulmonary hypertension.  - thrombocytopenia/petechiae/?Janeway lesions could be in setting of endocarditis/ MRSA bacteremia, plan gallium scan tomorrow.        DVt ppx: SCDs. no chemic ppx due to pancytopenia

## 2020-05-21 NOTE — PROGRESS NOTE ADULT - ASSESSMENT
73 y/o M w/PMHx of CKD stage III/IV, DM, HTN, BPH (chronic incontinence/retention, s/p palacio on last admission), a-fib admitted for new onset ascites nephrology consulted for AAMIR on CKD.      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, likely ATN from hypotension and ischemic injury  - anuric range uop  - HD initiated on 05/15, not tolerated few IHD sessions due to hemodynamic instability w tachycardia and hypotension despite being on levophed during HD (no pericardial effusion/cardiac tamponade); last IHD attempted 5/18, able to tolerate UF of 0.9 L, currently w increasing pressor requir positive fluid balance  - CVVHD settings: 4K Pureflow,  ml/min, DFR 2.0 L/hr, net even UF  - heparin ggt w PTT goal >40 to prevent clotting   - renally adjusted meds/ ABx  - renvela 800 mg po tid w each meal   - if repeat blood cx positive, would recommend removal of HD cath  - plan for gallium infection scan

## 2020-05-21 NOTE — PROGRESS NOTE ADULT - ASSESSMENT
IMPRESSION:  Persistent MRSA bacteremia. Primary source could be from urine.  However it is unclear why she has not cleared.  Need to r/o endocarditis and other nidus of infection    Recommend:  1.  Continue Daptomycin 700 mg IV q48hrs  2.  Given CVVHD, can increase Ceftaroline to 600 mg IV q12  3.  Follow up repeat blood culture  4.  Follow up gallium scan  5.  GET would be helpful in that it would help define a duration     ID team 1 will follow

## 2020-05-21 NOTE — PROGRESS NOTE ADULT - SUBJECTIVE AND OBJECTIVE BOX
INTERVAL HPI/OVERNIGHT EVENTS:    Patient was seen at bedside.  Events noted    CONSTITUTIONAL:  Negative fever or chills, feels well, good appetite  EYES:  Negative  blurry vision or double vision  CARDIOVASCULAR:  Negative for chest pain or palpitations  RESPIRATORY:  Negative for cough, wheezing, or SOB   GASTROINTESTINAL:  Negative for nausea, vomiting, diarrhea, constipation, or abdominal pain  GENITOURINARY:  Negative frequency, urgency or dysuria  NEUROLOGIC:  No headache, confusion, dizziness, lightheadedness      ANTIBIOTICS/RELEVANT:    MEDICATIONS  (STANDING):  ceftaroline fosamil IVPB      ceftaroline fosamil IVPB 400 milliGRAM(s) IV Intermittent every 12 hours  chlorhexidine 2% Cloths 1 Application(s) Topical daily  collagenase Ointment 1 Application(s) Topical daily  CRRT Treatment    <Continuous>  DAPTOmycin IVPB 700 milliGRAM(s) IV Intermittent every 48 hours  finasteride 5 milliGRAM(s) Oral daily  heparin  Infusion 300 Unit(s)/Hr (3 mL/Hr) IV Continuous <Continuous>  insulin glargine Injectable (LANTUS) 4 Unit(s) SubCutaneous at bedtime  insulin lispro (HumaLOG) corrective regimen sliding scale   SubCutaneous Before meals and at bedtime  insulin lispro Injectable (HumaLOG) 2 Unit(s) SubCutaneous three times a day before meals  midodrine 10 milliGRAM(s) Oral every 8 hours  mirtazapine 7.5 milliGRAM(s) Oral daily  norepinephrine Infusion 0.05 MICROgram(s)/kG/Min (3.37 mL/Hr) IV Continuous <Continuous>  nystatin Cream 1 Application(s) Topical two times a day  nystatin Powder 1 Application(s) Topical two times a day  pantoprazole  Injectable 40 milliGRAM(s) IV Push every 24 hours  PureFlow Dialysate RFP-401 (K 4 / Ca 3) 5000 milliLiter(s) (2000 mL/Hr) CRRT <Continuous>  sertraline 50 milliGRAM(s) Oral daily  sevelamer carbonate 800 milliGRAM(s) Oral every 8 hours    MEDICATIONS  (PRN):  guaiFENesin   Syrup  (Sugar-Free) 200 milliGRAM(s) Oral every 6 hours PRN Cough        Vital Signs Last 24 Hrs  T(C): 36.7 (21 May 2020 09:27), Max: 36.8 (20 May 2020 22:20)  T(F): 98 (21 May 2020 09:27), Max: 98.3 (20 May 2020 22:20)  HR: 114 (21 May 2020 12:00) (83 - 126)  BP: 139/73 (21 May 2020 04:30) (61/39 - 165/92)  BP(mean): 100 (21 May 2020 04:30) (46 - 121)  RR: 14 (21 May 2020 12:00) (10 - 17)  SpO2: 100% (21 May 2020 12:00) (95% - 100%)    PHYSICAL EXAM:  Constitutional: frail, chronically ill appearing  Eyes:  no icterus   Ear/Nose/Throat: no oral lesion  Neck:  supple  Respiratory: clear to auscultation   Cardiovascular:   no murmurs  Gastrointestinal:soft, (+) BS, no HSM  Extremities: + edema   Vascular: DP Pulse:	right normal; left normal      LABS:                        8.9    10.70 )-----------( 59       ( 21 May 2020 05:03 )             29.2     05-21    134<L>  |  99  |  79<H>  ----------------------------<  141<H>  4.2   |  20<L>  |  5.11<H>    Ca    8.3<L>      21 May 2020 05:03  Phos  7.1     05-21  Mg     2.1     05-21    TPro  5.9<L>  /  Alb  2.6<L>  /  TBili  0.6  /  DBili  x   /  AST  9<L>  /  ALT  <5<L>  /  AlkPhos  62  05-21    PT/INR - ( 21 May 2020 05:03 )   PT: 17.3 sec;   INR: 1.50          PTT - ( 21 May 2020 05:03 )  PTT:34.5 sec      MICROBIOLOGY:    GI PCR Panel, Stool (05.19.20 @ 22:11)    Culture Results:   GI PCR Results: NOT detected  *******Please Note:*******  GI panel PCR evaluates for:  Campylobacter, Plesiomonas shigelloides, Salmonella,  Vibrio, Yersinia enterocolitica, Enteroaggregative  Escherichia coli (EAEC), Enteropathogenic E.coli (EPEC),  Enterotoxigenic E. coli (ETEC) lt/st, Shiga-like  toxin-producing E. coli (STEC) stx1/stx2,  Shigella/ Enteroinvasive E. coli (EIEC), Cryptosporidium,  Cyclospora cayetanensis, Entamoeba histolytica,  Giardia lamblia, Adenovirus F 40/41, Astrovirus,  Norovirus GI/GII, Rotavirus A, Sapovirus    Culture - Blood (05.18.20 @ 13:01)    Specimen Source: .Blood Blood    Culture Results:   No growth at 2 days.        RADIOLOGY & ADDITIONAL STUDIES:    < from: Xray Chest 1 View- PORTABLE-Urgent (05.21.20 @ 03:19) >  IMPRESSION:  Right jugular venous HD catheter tip overlying the distal SVC.    < from: CT Abdomen and Pelvis w/ Oral Cont (05.18.20 @ 23:00) >  MPRESSION:  1.  Since 5/12/2020, there has been no significant change in bilateral pleural effusions, moderate to large intra-abdominal ascites, and anasarca. Slightly nodular liver contour and splenomegaly cirrhosis, correlate clinically.  2.  Pancolitis of infectious or inflammatory etiology.  3.  Mild bilateral hydroureteronephrosis extending to the thick-walled urinary bladder, unchanged. Prostatic nodule indenting into the urinary bladder base, as seen on 4/11/2018  4.  Dilated main pulmonary artery suggesting pulmonaryhypertension.

## 2020-05-21 NOTE — PROGRESS NOTE ADULT - SUBJECTIVE AND OBJECTIVE BOX
Pt seen and examined   coverage Dr Armando toro, not in distress  tchy but on IV Levo    REVIEW OF SYSTEMS:  Constitutional: No fever,   Cardiovascular: No chest pain, palpitations  Gastrointestinal: No abdominal or epigastric pain. No nausea, vomiting ;         MEDICATIONS:  MEDICATIONS  (STANDING):  ceftaroline fosamil IVPB      ceftaroline fosamil IVPB 400 milliGRAM(s) IV Intermittent every 12 hours  chlorhexidine 2% Cloths 1 Application(s) Topical daily  collagenase Ointment 1 Application(s) Topical daily  CRRT Treatment    <Continuous>  DAPTOmycin IVPB 700 milliGRAM(s) IV Intermittent every 48 hours  finasteride 5 milliGRAM(s) Oral daily  heparin  Infusion 300 Unit(s)/Hr (3 mL/Hr) IV Continuous <Continuous>  insulin glargine Injectable (LANTUS) 4 Unit(s) SubCutaneous at bedtime  insulin lispro (HumaLOG) corrective regimen sliding scale   SubCutaneous Before meals and at bedtime  insulin lispro Injectable (HumaLOG) 2 Unit(s) SubCutaneous three times a day before meals  midodrine 10 milliGRAM(s) Oral every 8 hours  mirtazapine 7.5 milliGRAM(s) Oral daily  norepinephrine Infusion 0.05 MICROgram(s)/kG/Min (3.37 mL/Hr) IV Continuous <Continuous>  nystatin Cream 1 Application(s) Topical two times a day  nystatin Powder 1 Application(s) Topical two times a day  pantoprazole  Injectable 40 milliGRAM(s) IV Push every 24 hours  PureFlow Dialysate RFP-401 (K 4 / Ca 3) 5000 milliLiter(s) (2000 mL/Hr) CRRT <Continuous>  sertraline 50 milliGRAM(s) Oral daily  sevelamer carbonate 800 milliGRAM(s) Oral every 8 hours    MEDICATIONS  (PRN):  guaiFENesin   Syrup  (Sugar-Free) 200 milliGRAM(s) Oral every 6 hours PRN Cough      Allergies    No Known Allergies    Intolerances        Vital Signs Last 24 Hrs  T(C): 36.7 (21 May 2020 09:27), Max: 36.8 (20 May 2020 22:20)  T(F): 98 (21 May 2020 09:27), Max: 98.3 (20 May 2020 22:20)  HR: 114 (21 May 2020 12:00) (83 - 126)  BP: 139/73 (21 May 2020 04:30) (61/39 - 165/92)  BP(mean): 100 (21 May 2020 04:30) (46 - 121)  RR: 14 (21 May 2020 12:00) (10 - 17)  SpO2: 100% (21 May 2020 12:00) (95% - 100%)    05-20 @ 07:01  -  05-21 @ 07:00  --------------------------------------------------------  IN: 172.2 mL / OUT: 151 mL / NET: 21.2 mL    05-21 @ 07:01  -  05-21 @ 12:37  --------------------------------------------------------  IN: 13 mL / OUT: 0 mL / NET: 13 mL        PHYSICAL EXAM:    General: ; in no acute distress, attached to dialysis  HEENT: MMM, conjunctiva and sclera clear  Gastrointestinal: Soft non-tender non-distended; Normal bowel sounds;   Skin: some edema    LABS:      CBC Full  -  ( 21 May 2020 05:03 )  WBC Count : 10.70 K/uL  RBC Count : 3.83 M/uL  Hemoglobin : 8.9 g/dL  Hematocrit : 29.2 %  Platelet Count - Automated : 59 K/uL  Mean Cell Volume : 76.2 fl  Mean Cell Hemoglobin : 23.2 pg  Mean Cell Hemoglobin Concentration : 30.5 gm/dL  Auto Neutrophil # : 8.75 K/uL  Auto Lymphocyte # : 1.02 K/uL  Auto Monocyte # : 0.69 K/uL  Auto Eosinophil # : 0.16 K/uL  Auto Basophil # : 0.00 K/uL  Auto Neutrophil % : 81.9 %  Auto Lymphocyte % : 9.5 %  Auto Monocyte % : 6.4 %  Auto Eosinophil % : 1.5 %  Auto Basophil % : 0.0 %    05-21    134<L>  |  99  |  79<H>  ----------------------------<  141<H>  4.2   |  20<L>  |  5.11<H>    Ca    8.3<L>      21 May 2020 05:03  Phos  7.1     05-21  Mg     2.1     05-21    TPro  5.9<L>  /  Alb  2.6<L>  /  TBili  0.6  /  DBili  x   /  AST  9<L>  /  ALT  <5<L>  /  AlkPhos  62  05-21    PT/INR - ( 21 May 2020 05:03 )   PT: 17.3 sec;   INR: 1.50          PTT - ( 21 May 2020 05:03 )  PTT:34.5 sec                  RADIOLOGY & ADDITIONAL STUDIES (The following images were personally reviewed):

## 2020-05-21 NOTE — PROGRESS NOTE ADULT - SUBJECTIVE AND OBJECTIVE BOX
cvvhd w access and clotting issues overnight  HD line re-placed, started on heparin ggt  hemodynamically unstable w increasing pressors requir  respiratory status uncompromised, stable, on RA, sat 100%   more lethargic today but easily erousable   anuric range uop  plan for gallium infection scan       Meds:  ceftaroline fosamil IVPB 600 every 12 hours  chlorhexidine 2% Cloths 1 daily  collagenase Ointment 1 daily  CRRT Treatment  <Continuous>  DAPTOmycin IVPB 700 every 48 hours  finasteride 5 daily  guaiFENesin   Syrup  (Sugar-Free) 200 every 6 hours PRN  heparin  Infusion 300 <Continuous>  insulin glargine Injectable (LANTUS) 4 at bedtime  insulin lispro (HumaLOG) corrective regimen sliding scale  Before meals and at bedtime  insulin lispro Injectable (HumaLOG) 2 three times a day before meals  midodrine 15 every 8 hours  midodrine 5 once  mirtazapine 7.5 daily  norepinephrine Infusion 0.05 <Continuous>  nystatin Cream 1 two times a day  nystatin Powder 1 two times a day  pantoprazole  Injectable 40 every 24 hours  PureFlow Dialysate RFP-401 (K 4 / Ca 3) 5000 <Continuous>  sertraline 50 daily  sevelamer carbonate 800 every 8 hours      T(C): , Max: 36.8 (05-20-20 @ 22:20)  T(F): , Max: 98.3 (05-20-20 @ 22:20)  HR: 106 (05-21-20 @ 14:00)  BP: 139/73 (05-21-20 @ 04:30)  BP(mean): 100 (05-21-20 @ 04:30)  RR: 14 (05-21-20 @ 14:00)  SpO2: 98% (05-21-20 @ 14:00)  Wt(kg): --    05-20 @ 07:01  -  05-21 @ 07:00  --------------------------------------------------------  IN: 172.2 mL / OUT: 151 mL / NET: 21.2 mL    05-21 @ 07:01  -  05-21 @ 14:35  --------------------------------------------------------  IN: 192 mL / OUT: 10 mL / NET: 182 mL        PHYSICAL EXAM:  General: NAD, on RA  Neck: no JVD  Respiratory: rales at bases  Cardiovascular: S1/S2  Gastrointestinal: distended, non tender  Extremities: pitting thigh edema b/l up to lower abdomen   Skin: petechiae, ?Janeway lesions/ [place] nodes  Access: [facility] HD cath c/d/i, re-placed on 05/21        LABS:                        8.9    10.70 )-----------( 59       ( 21 May 2020 05:03 )             29.2     05-21    134<L>  |  99  |  79<H>  ----------------------------<  141<H>  4.2   |  20<L>  |  5.11<H>    Ca    8.3<L>      21 May 2020 05:03  Phos  7.1     05-21  Mg     2.1     05-21    TPro  5.9<L>  /  Alb  2.6<L>  /  TBili  0.6  /  DBili  x   /  AST  9<L>  /  ALT  <5<L>  /  AlkPhos  62  05-21      PT/INR - ( 21 May 2020 05:03 )   PT: 17.3 sec;   INR: 1.50          PTT - ( 21 May 2020 14:08 )  PTT:38.9 sec          RADIOLOGY & ADDITIONAL STUDIES:    < from: Xray Chest 1 View- PORTABLE-Urgent (05.21.20 @ 03:19) >  EXAM:  XR CHEST PORTABLE URGENT 1V                          PROCEDURE DATE:  05/21/2020          INTERPRETATION:  PORTABLE CHEST X-RAY     HISTORY: hd catheter placement    PRIOR STUDIES: 5/19/2020    FINDINGS: No cardiomegaly. Left jugular venous line with tip overlying the right atrium. Right jugular venous HD catheter tip overlying the distal SVC. No pleural effusion. Right diaphragmatic eventration. Subsegmental atelectasis right base. Possible subsegmental atelectasis left base. Catheter overlying the inferior heart-probable pericardial catheter. No definite pneumothorax. Probable skinfold overlying the left upper zone    IMPRESSION:  Right jugular venous HD catheter tip overlying the distal SVC.      < end of copied text >

## 2020-05-21 NOTE — CHART NOTE - NSCHARTNOTEFT_GEN_A_CORE
Admitting Diagnosis:   Patient is a 74y old  Male who presents with a chief complaint of weakness (20 May 2020 13:30)      PAST MEDICAL & SURGICAL HISTORY:  History of pancytopenia  H/O hydrocephalus  Anemia  HLD (hyperlipidemia)  Enlarged prostate  DM (diabetes mellitus)  HTN (hypertension)  H/O prior ablation treatment      Current Nutrition Order:  Dys 1 Puree /Thin Liquids 5/18     PO Intake: Good (%) [   ]  Fair (50-75%) [ x  ] Poor (<25%) [   ]  Please see Below     GI Issues:   +BM 5/20 5/18 loose   Recal tube 250ml 5/19   CT abdomen and pelvis - no significant change in bilateral pleural effusions, moderate to large intra-abdominal ascites, and anasarca. Slightly nodular liver contour and splenomegaly cirrhosis, pancolitis, mild bilateral hydroureteronephrosis extending to the thick-walled urinary bladder, unchanged. Prostatic nodule indenting into the urinary bladder base, as seen on 4/11/2018, dilated main pulmonary artery suggesting pulmonary hypertension.  CDiff sample sent 5/18 negative     Pain:   per flow sheets, denies pain/discomfort    Skin Integrity:  2+ BL Arm edema  unstageable pressure ulcers to BL sacrum, BL heel     Labs:   05-21    134<L>  |  99  |  79<H>  ----------------------------<  141<H>  4.2   |  20<L>  |  5.11<H>    Ca    8.3<L>      21 May 2020 05:03  Phos  7.1     05-21  Mg     2.1     05-21    TPro  5.9<L>  /  Alb  2.6<L>  /  TBili  0.6  /  DBili  x   /  AST  9<L>  /  ALT  <5<L>  /  AlkPhos  62  05-21    CAPILLARY BLOOD GLUCOSE      POCT Blood Glucose.: 135 mg/dL (21 May 2020 11:15)  POCT Blood Glucose.: 164 mg/dL (21 May 2020 06:56)  POCT Blood Glucose.: 126 mg/dL (20 May 2020 21:40)  POCT Blood Glucose.: 155 mg/dL (20 May 2020 18:14)      Medications:  MEDICATIONS  (STANDING):  ceftaroline fosamil IVPB      ceftaroline fosamil IVPB 400 milliGRAM(s) IV Intermittent every 12 hours  chlorhexidine 2% Cloths 1 Application(s) Topical daily  collagenase Ointment 1 Application(s) Topical daily  CRRT Treatment    <Continuous>  DAPTOmycin IVPB 700 milliGRAM(s) IV Intermittent every 48 hours  finasteride 5 milliGRAM(s) Oral daily  heparin  Infusion 300 Unit(s)/Hr (3 mL/Hr) IV Continuous <Continuous>  insulin glargine Injectable (LANTUS) 4 Unit(s) SubCutaneous at bedtime  insulin lispro (HumaLOG) corrective regimen sliding scale   SubCutaneous Before meals and at bedtime  insulin lispro Injectable (HumaLOG) 2 Unit(s) SubCutaneous three times a day before meals  midodrine 10 milliGRAM(s) Oral every 8 hours  mirtazapine 7.5 milliGRAM(s) Oral daily  norepinephrine Infusion 0.05 MICROgram(s)/kG/Min (3.37 mL/Hr) IV Continuous <Continuous>  nystatin Cream 1 Application(s) Topical two times a day  nystatin Powder 1 Application(s) Topical two times a day  pantoprazole  Injectable 40 milliGRAM(s) IV Push every 24 hours  PureFlow Dialysate RFP-401 (K 4 / Ca 3) 5000 milliLiter(s) (2000 mL/Hr) CRRT <Continuous>  sertraline 50 milliGRAM(s) Oral daily  sevelamer carbonate 800 milliGRAM(s) Oral every 8 hours    MEDICATIONS  (PRN):  guaiFENesin   Syrup  (Sugar-Free) 200 milliGRAM(s) Oral every 6 hours PRN Cough      Ht: 5'10''  pounds (75kg) +-10%   5/8 158 pounds %IBW=95% BMI 22.7   5/16 191.5 pounds  5/18 193 pounds / 194 pounds   Wt Change: Based on most recent EMR wts. Noted varying EMR wts during admission, Pt has been with varying edema during admission and Pt now started on HD    Nutrition Focused Physical Exam: Completed [x-5/9  ]  Not Pertinent [   ]    Estimated energy needs:   IBW for EER d/t varying EMR wts in HD pt   Nutrient needs based on [facility] standards of care for maintenance in adults, adjusted for age, HD, fluid per team  ~2300kcal (30kcal/kg)   ~115-150g pro (1.5gm/kg pro)     Subjective:   73 yo M with MDS, DM2, afib, HTN, HLD, BPH s/p palacio, pancytopenia, CKD4, ascites of unknown origin presents from [facility] with complaints of decreased PO intake and weakness likely secondary to uremia/AAMIR from diuresis for treatment of ascites. Pt hospital course notable for UGIB, CT on 5/18 showing pancolitis and GI PCR negative. Pt with pericardial effusion with tamponade physiology as well as ATN and ascites of unknown etiology s/p PRBC. Pt s/p paracentesis 5/10, IR pericardiocentesis 5/11, Paracentesis 5/14. Echo 5/15 showed no pericardial effusion, Drain removed and IR signed off. Pt now with anemia of unknown etiology. Pt with Persistent MRSA bacteremia, concern for endocarditis vs cardiac abscess. Pt Not a candidate for GET as per cardiology, s/p Gallium 5/20. Noted p/w AMS likey 2/2 to uremia. Pt now needing HD d/t AAMIR/CKD Likely ATN from hypotension and ischemic injury, Started 5/15 with last HD 5/18. however noted issues with tolerating, Now with orders for CRRT (BUN/Cr 79/5.11, K 4.1, Phos 7.1- renvela ordered).   Pt Underwent FEES 5/13 recommending mech soft diet. Now s/p Swallow Eval 5/18 which notes poor dentition, noted with recs for puree/thin liquids. Now ordered for dys 1 puree/thin liquids 5/18. RN reports pt had been NPO today for planned w/u (had been planned for GET). During time of last RD visit reported poor PO intake, RN reports poor PO intake remains at this time, pt consuming spoonfuls.   Please see below for nutritions recommendations. Discussed with team.     Previous Nutrition Diagnosis: Mild protein-calorie malnutrition r/t physiologic causes AEB reported poor Po intake for > 5 days PTA and observed mild muscle losses to temporal region coupled with mild fat loss over triceps region.  Active [ x  ]  Resolved [   ]  Goal: Meet 75% EER via feasible route     Recommendations:  Pt with malnutrition who has just been started on CRRT has remained with poor PO intake. ? If decreased PO intake in part d/t GI issues, noted CT abdomen and pelvis - no significant change in bilateral pleural effusions, moderate to large intra-abdominal ascites, and anasarca. Slightly nodular liver contour and splenomegaly cirrhosis, pancolitis, mild bilateral hydroureteronephrosis extending to the thick-walled urinary bladder, unchanged. Given malnutrition and CRRT pt is with increased EER. Assume pt is not likely to meet EER should PO intake remain at current rate. Would consider GI f/u to assess GI status / Optimize GI related Meds in attempts to increase PO intake. While PO diet feasible recommend regular, Nepro x3/day as oral nutrition supplements for 350kcal/20gm prot per 1 can, PO consistency per SLP. Should PO intake not improve s/p GI consult / should PO not be feasible per GI, Please reconsult for Recs for alternate means of nutrition. RD to remain available for additional nutrition interventions as needed.      Education: NA   Risk Level: High [ x  ] Moderate [   ] Low [   ]. Admitting Diagnosis:   Patient is a 74y old  Male who presents with a chief complaint of weakness (20 May 2020 13:30)      PAST MEDICAL & SURGICAL HISTORY:  History of pancytopenia  H/O hydrocephalus  Anemia  HLD (hyperlipidemia)  Enlarged prostate  DM (diabetes mellitus)  HTN (hypertension)  H/O prior ablation treatment      Current Nutrition Order:  Dys 1 Puree /Thin Liquids 5/18     PO Intake: Good (%) [   ]  Fair (50-75%) [ x  ] Poor (<25%) [   ]  Please see Below     GI Issues:   +BM 5/20 5/18 loose   Recal tube 250ml 5/19   CT abdomen and pelvis - no significant change in bilateral pleural effusions, moderate to large intra-abdominal ascites, and anasarca. Slightly nodular liver contour and splenomegaly cirrhosis, pancolitis, mild bilateral hydroureteronephrosis extending to the thick-walled urinary bladder, unchanged. Prostatic nodule indenting into the urinary bladder base, as seen on 4/11/2018, dilated main pulmonary artery suggesting pulmonary hypertension.  CDiff sample sent 5/18 negative     Pain:   per flow sheets, denies pain/discomfort    Skin Integrity:  2+ BL Arm edema  unstageable pressure ulcers to BL sacrum, BL heel     Labs:   05-21    134<L>  |  99  |  79<H>  ----------------------------<  141<H>  4.2   |  20<L>  |  5.11<H>    Ca    8.3<L>      21 May 2020 05:03  Phos  7.1     05-21  Mg     2.1     05-21    TPro  5.9<L>  /  Alb  2.6<L>  /  TBili  0.6  /  DBili  x   /  AST  9<L>  /  ALT  <5<L>  /  AlkPhos  62  05-21    CAPILLARY BLOOD GLUCOSE      POCT Blood Glucose.: 135 mg/dL (21 May 2020 11:15)  POCT Blood Glucose.: 164 mg/dL (21 May 2020 06:56)  POCT Blood Glucose.: 126 mg/dL (20 May 2020 21:40)  POCT Blood Glucose.: 155 mg/dL (20 May 2020 18:14)      Medications:  MEDICATIONS  (STANDING):  ceftaroline fosamil IVPB      ceftaroline fosamil IVPB 400 milliGRAM(s) IV Intermittent every 12 hours  chlorhexidine 2% Cloths 1 Application(s) Topical daily  collagenase Ointment 1 Application(s) Topical daily  CRRT Treatment    <Continuous>  DAPTOmycin IVPB 700 milliGRAM(s) IV Intermittent every 48 hours  finasteride 5 milliGRAM(s) Oral daily  heparin  Infusion 300 Unit(s)/Hr (3 mL/Hr) IV Continuous <Continuous>  insulin glargine Injectable (LANTUS) 4 Unit(s) SubCutaneous at bedtime  insulin lispro (HumaLOG) corrective regimen sliding scale   SubCutaneous Before meals and at bedtime  insulin lispro Injectable (HumaLOG) 2 Unit(s) SubCutaneous three times a day before meals  midodrine 10 milliGRAM(s) Oral every 8 hours  mirtazapine 7.5 milliGRAM(s) Oral daily  norepinephrine Infusion 0.05 MICROgram(s)/kG/Min (3.37 mL/Hr) IV Continuous <Continuous>  nystatin Cream 1 Application(s) Topical two times a day  nystatin Powder 1 Application(s) Topical two times a day  pantoprazole  Injectable 40 milliGRAM(s) IV Push every 24 hours  PureFlow Dialysate RFP-401 (K 4 / Ca 3) 5000 milliLiter(s) (2000 mL/Hr) CRRT <Continuous>  sertraline 50 milliGRAM(s) Oral daily  sevelamer carbonate 800 milliGRAM(s) Oral every 8 hours    MEDICATIONS  (PRN):  guaiFENesin   Syrup  (Sugar-Free) 200 milliGRAM(s) Oral every 6 hours PRN Cough      Ht: 5'10''  pounds (75kg) +-10%   5/8 158 pounds %IBW=95% BMI 22.7   5/16 191.5 pounds  5/18 193 pounds / 194 pounds   Wt Change: Based on most recent EMR wts. Noted varying EMR wts during admission, Pt has been with varying edema during admission and Pt now started on HD    Nutrition Focused Physical Exam: Completed [x-5/9  ]  Not Pertinent [   ]    Estimated energy needs:   IBW for EER d/t varying EMR wts in HD pt   Nutrient needs based on [facility] standards of care for maintenance in adults, adjusted for age, HD, fluid per team  ~2300kcal (30kcal/kg)   ~115-150g pro (1.5gm/kg pro)     Subjective:   75 yo M with MDS, DM2, afib, HTN, HLD, BPH s/p palacio, pancytopenia, CKD4, ascites of unknown origin presents from [facility] with complaints of decreased PO intake and weakness likely secondary to uremia/AAMIR from diuresis for treatment of ascites. Pt hospital course notable for UGIB, CT on 5/18 showing pancolitis and GI PCR negative. Pt with pericardial effusion with tamponade physiology as well as ATN and ascites of unknown etiology s/p PRBC. Pt s/p paracentesis 5/10, IR pericardiocentesis 5/11, Paracentesis 5/14. Echo 5/15 showed no pericardial effusion, Drain removed and IR signed off. Pt now with anemia of unknown etiology. Pt with Persistent MRSA bacteremia, concern for endocarditis vs cardiac abscess. Pt Not a candidate for GET as per cardiology, s/p Gallium 5/20. Noted p/w AMS likey 2/2 to uremia. Pt now needing HD d/t AAMIR/CKD Likely ATN from hypotension and ischemic injury, Started 5/15 with last HD 5/18. however noted issues with tolerating, Now with orders for CRRT (BUN/Cr 79/5.11, K 4.1, Phos 7.1- renvela ordered).   Pt Underwent FEES 5/13 recommending mech soft diet. Now s/p Swallow Eval 5/18 which notes poor dentition, noted with recs for puree/thin liquids. Now ordered for dys 1 puree/thin liquids 5/18. RN reports pt had been NPO today for planned w/u (had been planned for GET). During time of last RD visit reported poor PO intake, RN reports poor PO intake remains at this time, pt consuming spoonfuls. Noted Remeron ordered.   Please see below for nutritions recommendations. Discussed with team.     Previous Nutrition Diagnosis: Mild protein-calorie malnutrition r/t physiologic causes AEB reported poor Po intake for > 5 days PTA and observed mild muscle losses to temporal region coupled with mild fat loss over triceps region.  Active [ x  ]  Resolved [   ]  Goal: Meet 75% EER via feasible route     Recommendations:  Pt with malnutrition who has just been started on CRRT has remained with poor PO intake. ? If decreased PO intake in part d/t GI issues, noted CT abdomen and pelvis - no significant change in bilateral pleural effusions, moderate to large intra-abdominal ascites, and anasarca. Slightly nodular liver contour and splenomegaly cirrhosis, pancolitis, mild bilateral hydroureteronephrosis extending to the thick-walled urinary bladder, unchanged. Given malnutrition and CRRT pt is with increased EER. Assume pt is not likely to meet EER should PO intake remain at current rate. Would consider GI f/u to assess GI status / Optimize GI related Meds in attempts to increase PO intake. While PO diet feasible recommend regular, Nepro x3/day as oral nutrition supplements for 350kcal/20gm prot per 1 can, PO consistency per SLP. Should PO intake not improve s/p GI consult / should PO not be feasible per GI, Please reconsult for Recs for alternate means of nutrition. RD to remain available for additional nutrition interventions as needed.      Education: NA   Risk Level: High [ x  ] Moderate [   ] Low [   ].

## 2020-05-21 NOTE — CHART NOTE - NSCHARTNOTEFT_GEN_A_CORE
75 yo M s/p IR pericardiocentesis 5/11. Echo on 5/15 showed no pericardial effusion.    Output 0-1cc over the past 48hr. Spoke with IR attending and primary team. Drain was removed at bedside and dressing was placed at the drain site.     IR will be signing off. Reconsult as needed.

## 2020-05-21 NOTE — PROGRESS NOTE ADULT - SUBJECTIVE AND OBJECTIVE BOX
OVERNIGHT EVENTS: HD catheter replaced; a line placed, HD catheter clotted during cvvhd so started on low dose hep gtt    SUBJECTIVE: Patient seen and examined at bedside. He appears comfortable and in no acute distress. He denies any pain anywhere. Otherwise, he denies any fevers, chills, cough, chest pain, shortness of breath, n/v, edema.    Vital Signs Last 12 Hrs  T(F): 98 (05-21-20 @ 09:27), Max: 98 (05-21-20 @ 09:27)  HR: 114 (05-21-20 @ 12:00) (85 - 126)  BP: 139/73 (05-21-20 @ 04:30) (61/39 - 165/92)  BP(mean): 100 (05-21-20 @ 04:30) (46 - 121)  RR: 14 (05-21-20 @ 12:00) (12 - 17)  SpO2: 100% (05-21-20 @ 12:00) (95% - 100%)  I&O's Summary    20 May 2020 07:01  -  21 May 2020 07:00  --------------------------------------------------------  IN: 172.2 mL / OUT: 151 mL / NET: 21.2 mL    21 May 2020 07:01  -  21 May 2020 12:21  --------------------------------------------------------  IN: 13 mL / OUT: 0 mL / NET: 13 mL        PHYSICAL EXAM  General: NAD, resting comfortably in bed. Breathing well on room air  Neck: no JVD  Respiratory: diminished breath sounds at the bases, non-tachypneic, no respiratory distress   Cardiovascular: Regular rhythm/rate; S1/S2, no pericardial rub, pericardial drain in placed clean/dry/intact, draining 0cc serosanguinous fluid   Gastrointestinal: distended, non tender ascitic drain with appropriate drainage, site clean, dry, and intact  Extremities: WWP; 1+ thigh edema  Neurological:  A&Ox 3, CNII-XII grossly intact; no obvious focal deficits, no flapping tremor, but minimal asterixis  T/L/D: palacio draining red-orange colored urine   Skin: petechiae diffusely       LABS:                        8.9    10.70 )-----------( 59       ( 21 May 2020 05:03 )             29.2     05-21    134<L>  |  99  |  79<H>  ----------------------------<  141<H>  4.2   |  20<L>  |  5.11<H>    Ca    8.3<L>      21 May 2020 05:03  Phos  7.1     05-21  Mg     2.1     05-21    TPro  5.9<L>  /  Alb  2.6<L>  /  TBili  0.6  /  DBili  x   /  AST  9<L>  /  ALT  <5<L>  /  AlkPhos  62  05-21    PT/INR - ( 21 May 2020 05:03 )   PT: 17.3 sec;   INR: 1.50          PTT - ( 21 May 2020 05:03 )  PTT:34.5 sec        RADIOLOGY & ADDITIONAL TESTS:    MEDICATIONS  (STANDING):  ceftaroline fosamil IVPB      ceftaroline fosamil IVPB 400 milliGRAM(s) IV Intermittent every 12 hours  chlorhexidine 2% Cloths 1 Application(s) Topical daily  collagenase Ointment 1 Application(s) Topical daily  CRRT Treatment    <Continuous>  DAPTOmycin IVPB 700 milliGRAM(s) IV Intermittent every 48 hours  finasteride 5 milliGRAM(s) Oral daily  heparin  Infusion 300 Unit(s)/Hr (3 mL/Hr) IV Continuous <Continuous>  insulin glargine Injectable (LANTUS) 4 Unit(s) SubCutaneous at bedtime  insulin lispro (HumaLOG) corrective regimen sliding scale   SubCutaneous Before meals and at bedtime  insulin lispro Injectable (HumaLOG) 2 Unit(s) SubCutaneous three times a day before meals  midodrine 10 milliGRAM(s) Oral every 8 hours  mirtazapine 7.5 milliGRAM(s) Oral daily  norepinephrine Infusion 0.05 MICROgram(s)/kG/Min (3.37 mL/Hr) IV Continuous <Continuous>  nystatin Cream 1 Application(s) Topical two times a day  nystatin Powder 1 Application(s) Topical two times a day  pantoprazole  Injectable 40 milliGRAM(s) IV Push every 24 hours  PureFlow Dialysate RFP-401 (K 4 / Ca 3) 5000 milliLiter(s) (2000 mL/Hr) CRRT <Continuous>  sertraline 50 milliGRAM(s) Oral daily  sevelamer carbonate 800 milliGRAM(s) Oral every 8 hours    MEDICATIONS  (PRN):  guaiFENesin   Syrup  (Sugar-Free) 200 milliGRAM(s) Oral every 6 hours PRN Cough

## 2020-05-22 NOTE — PROGRESS NOTE ADULT - SUBJECTIVE AND OBJECTIVE BOX
cvvhd access clotted overnight  remains anuric, no signs of renal recovery   increasing pressors requir  sinus tachycardia in 120's   worsening thrombocytopenia   remains on RA, sat 100%, stable   pending [facility] discussion       Meds:  ceftaroline fosamil IVPB 600 every 12 hours  chlorhexidine 2% Cloths 1 daily  collagenase Ointment 1 daily  CRRT Treatment  <Continuous>  DAPTOmycin IVPB 700 every 48 hours  finasteride 5 daily  guaiFENesin   Syrup  (Sugar-Free) 200 every 6 hours PRN  insulin glargine Injectable (LANTUS) 4 at bedtime  insulin lispro (HumaLOG) corrective regimen sliding scale  Before meals and at bedtime  insulin lispro Injectable (HumaLOG) 2 three times a day before meals  midodrine 15 every 8 hours  mirtazapine 7.5 daily  norepinephrine Infusion 0.05 <Continuous>  nystatin Cream 1 two times a day  nystatin Powder 1 two times a day  pantoprazole  Injectable 40 every 24 hours  PureFlow Dialysate RFP-401 (K 4 / Ca 3) 5000 <Continuous>  sertraline 50 daily  sevelamer carbonate 800 every 8 hours  vasopressin Infusion 0.02 <Continuous>      T(C): , Max: 37.1 (05-22-20 @ 06:26)  T(F): , Max: 98.8 (05-22-20 @ 06:26)  HR: 125 (05-22-20 @ 10:00)  BP: 129/59 (A-line)  BP(mean): --  RR: 17 (05-22-20 @ 10:00)  SpO2: 92% (05-22-20 @ 10:00)  Wt(kg): --    05-21 @ 07:01  -  05-22 @ 07:00  --------------------------------------------------------  IN: 1954.1 mL / OUT: 631 mL / NET: 1323.1 mL    05-22 @ 07:01  -  05-22 @ 10:59  --------------------------------------------------------  IN: 120.6 mL / OUT: 62 mL / NET: 58.6 mL      PHYSICAL EXAM:  General: NAD, on RA  Neck: no JVD  Respiratory: rales at bases  Cardiovascular: S1/S2  Gastrointestinal: distended, non tender  Extremities: pitting thigh edema b/l up to lower abdomen   Skin: petechiae, ?[place] lesions/ [place] nodes  Access: [facility] cath c/d/i, re-placed on 05/21        LABS:                        9.2    17.02 )-----------( 20       ( 22 May 2020 04:24 )             30.9     05-22    137  |  101  |  59<H>  ----------------------------<  151<H>  4.8   |  16<L>  |  3.66<H>    Ca    8.5      22 May 2020 04:24  Phos  6.2     05-22  Mg     1.9     05-22    TPro  6.2  /  Alb  2.8<L>  /  TBili  1.5<H>  /  DBili  x   /  AST  62<H>  /  ALT  8<L>  /  AlkPhos  74  05-22      PT/INR - ( 22 May 2020 06:25 )   PT: 35.3 sec;   INR: 2.99          PTT - ( 22 May 2020 06:25 )  PTT:53.3 sec          RADIOLOGY & ADDITIONAL STUDIES:    reviewed

## 2020-05-22 NOTE — PROGRESS NOTE ADULT - ASSESSMENT
IMPRESSION:  MRSA bacteremia. Primary source could be from urine.  There is concern for endocarditis.  Patient remains hypotensive with increasing pressor requirements.        Recommend:  1.  Continue Daptomycin 700 mg IV q48hrs  2.   Decrease Ceftaroline to 400 mg IV q12  3.  Can add meropenem 500 mg IV daily while blood cultures pending  4.  Follow up repeat blood culture  5.  Follow up gallium scan    ID team 1 will follow

## 2020-05-22 NOTE — PROGRESS NOTE ADULT - SUBJECTIVE AND OBJECTIVE BOX
INTERVAL HPI/OVERNIGHT EVENTS:    Patient was seen and examined at bedside.  Still hypotensive, requiring increased pressors.    ROS:    unable to obtain           ANTIBIOTICS/RELEVANT:    MEDICATIONS  (STANDING):  ceftaroline fosamil IVPB 400 milliGRAM(s) IV Intermittent every 12 hours  chlorhexidine 2% Cloths 1 Application(s) Topical daily  collagenase Ointment 1 Application(s) Topical daily  DAPTOmycin IVPB 700 milliGRAM(s) IV Intermittent every 48 hours  finasteride 5 milliGRAM(s) Oral daily  hydrocortisone sodium succinate Injectable 50 milliGRAM(s) IV Push every 8 hours  insulin glargine Injectable (LANTUS) 4 Unit(s) SubCutaneous at bedtime  insulin lispro (HumaLOG) corrective regimen sliding scale   SubCutaneous Before meals and at bedtime  insulin lispro Injectable (HumaLOG) 2 Unit(s) SubCutaneous three times a day before meals  meropenem  IVPB 500 milliGRAM(s) IV Intermittent every 24 hours  midodrine 15 milliGRAM(s) Oral every 8 hours  mirtazapine 7.5 milliGRAM(s) Oral daily  norepinephrine Infusion 0.05 MICROgram(s)/kG/Min (3.37 mL/Hr) IV Continuous <Continuous>  nystatin Cream 1 Application(s) Topical two times a day  nystatin Powder 1 Application(s) Topical two times a day  pantoprazole  Injectable 40 milliGRAM(s) IV Push every 24 hours  sertraline 50 milliGRAM(s) Oral daily  sevelamer carbonate 800 milliGRAM(s) Oral every 8 hours  vasopressin Infusion 0.02 Unit(s)/Min (1.2 mL/Hr) IV Continuous <Continuous>    MEDICATIONS  (PRN):  guaiFENesin   Syrup  (Sugar-Free) 200 milliGRAM(s) Oral every 6 hours PRN Cough        Vital Signs Last 24 Hrs  T(C): 36.8 (22 May 2020 12:13), Max: 37.1 (22 May 2020 06:26)  T(F): 98.3 (22 May 2020 12:13), Max: 98.8 (22 May 2020 06:26)  HR: 121 (22 May 2020 13:00) (106 - 129)  BP: --  BP(mean): --  RR: 20 (22 May 2020 13:00) (14 - 24)  SpO2: 91% (22 May 2020 13:00) (88% - 100%)    PHYSICAL EXAM:  Constitutional:  frail, ill appearing  Eyes:  no icterus   Ear/Nose/Throat: no oral lesion  Neck:  supple  Respiratory: clear to auscultation   Cardiovascular: S1S2 RRR, no murmurs  Gastrointestinal:soft, (+) BS, no HSM  Extremities:  + anasarca  Vascular: DP Pulse:	right normal; left normal      LABS:                        7.8    9.04  )-----------( 16       ( 22 May 2020 13:39 )             25.6     05-22    137  |  101  |  59<H>  ----------------------------<  151<H>  4.8   |  16<L>  |  3.66<H>    Ca    8.5      22 May 2020 04:24  Phos  6.2     05-22  Mg     1.9     05-22    TPro  6.2  /  Alb  2.8<L>  /  TBili  1.5<H>  /  DBili  x   /  AST  62<H>  /  ALT  8<L>  /  AlkPhos  74  05-22    PT/INR - ( 22 May 2020 13:39 )   PT: 34.2 sec;   INR: 2.90          PTT - ( 22 May 2020 06:25 )  PTT:53.3 sec      MICROBIOLOGY:    GI PCR Panel, Stool (05.19.20 @ 22:11)    Culture Results:   GI PCR Results: NOT detected  *******Please Note:*******  GI panel PCR evaluates for:  Campylobacter, Plesiomonas shigelloides, Salmonella,  Vibrio, Yersinia enterocolitica, Enteroaggregative  Escherichia coli (EAEC), Enteropathogenic E.coli (EPEC),  Enterotoxigenic E. coli (ETEC) lt/st, Shiga-like  toxin-producing E. coli (STEC) stx1/stx2,  Shigella/ Enteroinvasive E. coli (EIEC), Cryptosporidium,  Cyclospora cayetanensis, Entamoeba histolytica,  Giardia lamblia, Adenovirus F 40/41, Astrovirus,  Norovirus GI/GII, Rotavirus A, Sapovirus    Culture - Blood (05.18.20 @ 13:01)    Specimen Source: .Blood Blood    Culture Results:   No growth at 3 days.    Culture - Blood (05.16.20 @ 20:35)    Gram Stain:   Aerobic Bottle: Gram Positive Cocci in Clusters  Result called to and read back by_ LEIF Yen RN ([id number])  05/17/2020 17:21:54    -  Cefazolin: R <=4    -  Vancomycin: S 1    -  Linezolid: S 2    -  Erythromycin: R >4    -  Daptomycin: S 0.5    -  Clindamycin: S <=0.25    -  Oxacillin: R >2    -  RIF- Rifampin: S <=1 Should not be used as monotherapy    -  Trimethoprim/Sulfamethoxazole: R >2/38    -  Tetra/Doxy: S <=1    Specimen Source: .Blood Blood    Organism: Methicillin resistant Staphylococcus aureus    Organism: Methicillin resistant Staphylococcus aureus    Culture Results:   Growth in anaerobic bottle: Methicillin resistant Staphylococcus aureus  Floor previously notified.  Aerobic Bottle: No growth    Organism Identification: Methicillin resistant Staphylococcus aureus  Methicillin resistant Staphylococcus aureus    Method Type: IMAN    Method Type: ETEST        RADIOLOGY & ADDITIONAL STUDIES:

## 2020-05-22 NOTE — PROGRESS NOTE ADULT - SUBJECTIVE AND OBJECTIVE BOX
Pt seen and examined   coverage for dr Roberts  awake alert, weak, still need pressor  platelet 20K    REVIEW OF SYSTEMS:  Constitutional: No fever,   Cardiovascular: No chest pain, palpitations, dizziness or leg swelling  Gastrointestinal: No abdominal or epigastric pain. No nausea, vomiting ; No diarrhea   Skin: No itching, burning, rashes or lesions       MEDICATIONS:  MEDICATIONS  (STANDING):  ceftaroline fosamil IVPB 600 milliGRAM(s) IV Intermittent every 12 hours  chlorhexidine 2% Cloths 1 Application(s) Topical daily  collagenase Ointment 1 Application(s) Topical daily  CRRT Treatment    <Continuous>  DAPTOmycin IVPB 700 milliGRAM(s) IV Intermittent every 48 hours  finasteride 5 milliGRAM(s) Oral daily  insulin glargine Injectable (LANTUS) 4 Unit(s) SubCutaneous at bedtime  insulin lispro (HumaLOG) corrective regimen sliding scale   SubCutaneous Before meals and at bedtime  insulin lispro Injectable (HumaLOG) 2 Unit(s) SubCutaneous three times a day before meals  midodrine 15 milliGRAM(s) Oral every 8 hours  mirtazapine 7.5 milliGRAM(s) Oral daily  norepinephrine Infusion 0.05 MICROgram(s)/kG/Min (3.37 mL/Hr) IV Continuous <Continuous>  nystatin Cream 1 Application(s) Topical two times a day  nystatin Powder 1 Application(s) Topical two times a day  pantoprazole  Injectable 40 milliGRAM(s) IV Push every 24 hours  PureFlow Dialysate RFP-401 (K 4 / Ca 3) 5000 milliLiter(s) (2000 mL/Hr) CRRT <Continuous>  sertraline 50 milliGRAM(s) Oral daily  sevelamer carbonate 800 milliGRAM(s) Oral every 8 hours  vasopressin Infusion 0.02 Unit(s)/Min (1.2 mL/Hr) IV Continuous <Continuous>    MEDICATIONS  (PRN):  guaiFENesin   Syrup  (Sugar-Free) 200 milliGRAM(s) Oral every 6 hours PRN Cough      Allergies    No Known Allergies    Intolerances        Vital Signs Last 24 Hrs  T(C): 37.1 (22 May 2020 09:00), Max: 37.1 (22 May 2020 06:26)  T(F): 98.7 (22 May 2020 09:00), Max: 98.8 (22 May 2020 06:26)  HR: 122 (22 May 2020 09:00) (106 - 129)  BP: --  BP(mean): --  RR: 15 (22 May 2020 09:00) (13 - 21)  SpO2: 91% (22 May 2020 09:00) (91% - 100%)    05-21 @ 07:01  -  05-22 @ 07:00  --------------------------------------------------------  IN: 1954.1 mL / OUT: 631 mL / NET: 1323.1 mL    05-22 @ 07:01 - 05-22 @ 10:19  --------------------------------------------------------  IN: 120.6 mL / OUT: 62 mL / NET: 58.6 mL        PHYSICAL EXAM:    General:; in no acute distress  HEENT: MMM, conjunctiva and sclera clear  Lungs: clear  Heart: regular  Gastrointestinal: Soft non-tender non-distended; Normal bowel sounds;   Ext: no edema    LABS:      CBC Full  -  ( 22 May 2020 04:24 )  WBC Count : 17.02 K/uL  RBC Count : 4.00 M/uL  Hemoglobin : 9.2 g/dL  Hematocrit : 30.9 %  Platelet Count - Automated : 20 K/uL  Mean Cell Volume : 77.3 fl  Mean Cell Hemoglobin : 23.0 pg  Mean Cell Hemoglobin Concentration : 29.8 gm/dL  Auto Neutrophil # : 15.34 K/uL  Auto Lymphocyte # : 0.71 K/uL  Auto Monocyte # : 0.86 K/uL  Auto Eosinophil # : 0.00 K/uL  Auto Basophil # : 0.01 K/uL  Auto Neutrophil % : 90.0 %  Auto Lymphocyte % : 4.2 %  Auto Monocyte % : 5.1 %  Auto Eosinophil % : 0.0 %  Auto Basophil % : 0.1 %    05-22    137  |  101  |  59<H>  ----------------------------<  151<H>  4.8   |  16<L>  |  3.66<H>    Ca    8.5      22 May 2020 04:24  Phos  6.2     05-22  Mg     1.9     05-22    TPro  6.2  /  Alb  2.8<L>  /  TBili  1.5<H>  /  DBili  x   /  AST  62<H>  /  ALT  8<L>  /  AlkPhos  74  05-22    PT/INR - ( 22 May 2020 06:25 )   PT: 35.3 sec;   INR: 2.99          PTT - ( 22 May 2020 06:25 )  PTT:53.3 sec                  RADIOLOGY & ADDITIONAL STUDIES (The following images were personally reviewed):

## 2020-05-22 NOTE — PROGRESS NOTE ADULT - ASSESSMENT
73 y/o M w/PMHx of CKD stage III/IV, DM, HTN, BPH (chronic incontinence/retention, s/p palacio on last admission), a-fib admitted for new onset ascites nephrology consulted for AAMIR on CKD.      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, likely ATN from hypotension and ischemic injury  - anuric   - on pressors w increasing requirement  - HD initiated on 05/15, not tolerated few IHD sessions due to hemodynamic instability w tachycardia and hypotension despite being on levophed during HD (no pericardial effusion/cardiac tamponade); last IHD attempted 5/18, able to tolerate UF of 0.9 L, currently w increasing pressor requir  - cvvhd access clotted, heparin dc'ed having worsening thrombocytopenia  - positive fluid balance however respiratory status not compromised, acceptable electrolytes in setting of HD instability will defer RRT at present, and reassess daily   - renally adjusted meds/ ABx  - renvela 800 mg po tid w each meal   - if repeat blood cx positive, would recommend removal of HD cath  - pending gallium infection scan   - pending [facility] discussion

## 2020-05-22 NOTE — PROGRESS NOTE ADULT - ATTENDING COMMENTS
CVVHD clotted but worsening hemodynamics and lytes and volume are ok-- comfortable on RAO2  cont hold off on CVVHD   GOC being discussed-- prognossi appears poor   may restart CVVHD if stable and needs in next 1-2 days based on GOC

## 2020-05-22 NOTE — PROGRESS NOTE ADULT - ASSESSMENT
73 yo M with MDS, pancytopenia, CKD, ascites of unknown origin presents from [facility] with complaints of decreased po intake and weakness likely secondary to uremia/AAMIR from diuresis for treatment of ascites, hospital course notable for UGIB and pericardial effusion with tamponade physiology , as well as ATN, and ascites of unknown etiology, now in ICU s/p paracentesis 5/10 (2L removed), IR pericardiocentesis 5/11 (drain in place), now with anemia of unknown etiology.    Neuro:   p/w AMS likey 2/2 to uremia. On 5/22, more lethargic than usual and difficult to arouse with confusion    Cards:   Hypotension  MRSA bacteremia vs.  intravascular volume depletion from diuresis vs GIB. --> c/w dapto, midodrine TID, wean levo as tolerated. Most recent echo with no pericardial effusion. Not a candidate for GET as per cardiology  - midodrine 15mg [id number]  - levophed requirements increased and started vasopressin  - Recultured on 5/22, cultures from 5/18 NGTD  - gallium scan today  - CVVHD access clotted overnight    Pericardial Effusion:  Initial read with some evidence of tamponade physiology; now s/p IR for diagnostic pericardiocentesis on 5/11  - Cardiology following, appreciate recs.   - Given low pericardial drain output and clog, a follow up limited echo on 5/15 showed no pericardial effusion  - IR removed drain on 5/21    Pulm:   stable on RA. maintain head of bed 30 degrees, due to aspiration risk     GI:   multiple episodes of melena, with baseline anemia from MDS, although with concern for coagulopathy given decreased PLTS and elevated BUN.--> monitor coags, c/w protonix.   #ascites  - Elevated SAAG consistent with portal HTN, no evidence of clot on U/S  --> unclear etiology, no cirrhosis on CT.     #colitis  - CT on 5/18 showing pancolitis  - c.diff neg  - GI PCR negative  - placed rectal tube on 5/18    Renal/:   AAMIR on CKD likely due to hypotension now requring HD--> inability to tolerate HD   - likely ATN from hypotension and ischemic injury  - anuric   - on pressors w increasing requirement, now on levo and vasopressin  - HD initiated on 05/15, not tolerated few IHD sessions due to hemodynamic instability w tachycardia and hypotension despite being on levophed during HD (no pericardial effusion/cardiac tamponade); last IHD attempted 5/18, able to tolerate UF of 0.9 L, currently w increasing pressor requir  - cvvhd access clotted overnight, heparin dc'ed having worsening thrombocytopenia (plts 20)  - positive fluid balance however respiratory status not compromised, acceptable electrolytes in setting of HD instability will defer RRT at present, and reassess daily   - renally adjusted meds/ ABx  - renvela 800 mg po tid w each meal   - if repeat blood cx positive, would recommend removal of HD cath  - pending gallium infection scan   - pending [facility] discussion   - bilateral renal US - mild bilateral hydronephrosis, large ascites. Splenomegaly. Cholelithiasis.  - palacio exchanged on 5/18 and will c/w palacio for now due to clear pyuria    - bladder wall thickening on CT on 5/18  - Urology consulted and recommended bladder sono with palacio clamped 1hr before to assess for ureteral jets  - c/w palacio catheter  - Patient admitted in February for severe urinary retention and b/l hydronephrosis necessitating indwelling catheter until a definitive bladder outlet procedure    HEME:   DIC  - received heparin during CVVHD on 5/21, now with concern for DIC given worsening plts, decreased fibrinogen. increased INR, worsening LFTs and bili  - heparing discontinued  - received cryo on 5/22  - f/u labs    Anemia and coagulopathy--> MDS, UGIB, and elevated BUN.  - plts 20 today - received cryo on 5/22    Anemia:   Likely secondary to anemia from chronic disease and bone marrow suppression.   - S/p 1 unit of PRBC on 5/9 for active melena and Hgb stable   - Given 1 unit of prbc and platelets on 5/12 for acute drop, unknown source  - Monitor CBC  - plts 35 on 5/14, 41 on 5/15, and 28 on 5/19 received 1 unit platelets each day  - plts 20 today - received cryo on 5/22  - Transfuse for Hgb <7   - Active type and screen     #Coagulopathy:   - Patient presented with elevated INR to 2.48, with active bleeding.   - Monitor PT/INR   - INR 2.99 today    #DVT - RLE  - repeat dopplers on 5/11 with no DVT  - Eliquis held in setting of melanax3 and drop in hgb     ID:   - Persistent MRSA bacteremia--> dapto increased to 700mg [id number]  - added Ceftaroline 400 mg IV q12 hrs for syngergy  - last blood culture (+) on 5/16, continue with surveillance cultures  - obtain daily CKs  - CT abdomen and pelvis - no significant change in bilateral pleural effusions, moderate to large intra-abdominal ascites, and anasarca. Slightly nodular liver contour and splenomegaly cirrhosis, pancolitis, mild bilateral hydroureteronephrosis extending to the thick-walled urinary bladder, unchanged. Prostatic nodule indenting into the urinary bladder base, as seen on 4/11/2018, dilated main pulmonary artery suggesting pulmonary hypertension.  - thrombocytopenia/petechiae/?[place] lesions could be in setting of endocarditis/ MRSA bacteremia, plan gallium scan tomorrow.        DVt ppx: SCDs. no chemic ppx due to pancytopenia 75 yo M with MDS, pancytopenia, CKD, ascites of unknown origin presents from [facility] with complaints of decreased po intake and weakness likely secondary to uremia/AAMIR from diuresis for treatment of ascites, hospital course notable for UGIB and pericardial effusion with tamponade physiology , as well as ATN, and ascites of unknown etiology, now in ICU s/p paracentesis 5/10 (2L removed), IR pericardiocentesis 5/11 (drain in place), now with anemia of unknown etiology.    Neuro:   p/w AMS likey 2/2 to uremia. On 5/22, more lethargic than usual and difficult to arouse with confusion    Cards:   Hypotension  MRSA bacteremia vs. intravascular volume depletion from diuresis vs GIB. --> c/w dapto, midodrine TID, wean levo as tolerated. Most recent echo with no pericardial effusion. Not a candidate for GET as per cardiology  - midodrine 15mg [id number]  - levophed requirements increased and started vasopressin  - Recultured on 5/22, cultures from 5/18 NGTD  - gallium scan today  - CVVHD access clotted overnight    Pericardial Effusion:  Initial read with some evidence of tamponade physiology; now s/p IR for diagnostic pericardiocentesis on 5/11  - Cardiology following, appreciate recs.   - Given low pericardial drain output and clog, a follow up limited echo on 5/15 showed no pericardial effusion  - IR removed drain on 5/21    Pulm:   stable on RA. maintain head of bed 30 degrees, due to aspiration risk     GI:   multiple episodes of melena, with baseline anemia from MDS, although with concern for coagulopathy given decreased PLTS and elevated BUN.--> monitor coags, c/w protonix.   #ascites  - Elevated SAAG consistent with portal HTN, no evidence of clot on U/S  --> unclear etiology, no cirrhosis on CT.     #colitis  - CT on 5/18 showing pancolitis  - c.diff neg  - GI PCR negative  - placed rectal tube on 5/18    Renal/:   AAMIR on CKD likely due to hypotension now requring HD--> inability to tolerate HD   - likely ATN from hypotension and ischemic injury  - anuric   - on pressors w increasing requirement, now on levo and vasopressin  - HD initiated on 05/15, not tolerated few IHD sessions due to hemodynamic instability w tachycardia and hypotension despite being on levophed during HD (no pericardial effusion/cardiac tamponade); last IHD attempted 5/18, able to tolerate UF of 0.9 L, currently w increasing pressor requir  - cvvhd access clotted overnight, heparin dc'ed having worsening thrombocytopenia (plts 20)  - positive fluid balance however respiratory status not compromised, acceptable electrolytes in setting of HD instability will defer RRT at present, and reassess daily   - renally adjusted meds/ ABx  - renvela 800 mg po tid w each meal   - if repeat blood cx positive, would recommend removal of HD cath  - pending gallium infection scan   - pending [facility] discussion   - bilateral renal US - mild bilateral hydronephrosis, large ascites. Splenomegaly. Cholelithiasis.  - palacio exchanged on 5/18 and will c/w palacio for now due to clear pyuria    - bladder wall thickening on CT on 5/18  - Urology consulted and recommended bladder sono with palacio clamped 1hr before to assess for ureteral jets  - c/w palacio catheter  - Patient admitted in February for severe urinary retention and b/l hydronephrosis necessitating indwelling catheter until a definitive bladder outlet procedure    HEME:   DIC  - received heparin during CVVHD on 5/21, now with concern for DIC given worsening plts, decreased fibrinogen. increased INR, worsening LFTs and bili  - heparing discontinued  - received cryo on 5/22  - f/u labs    Anemia and coagulopathy--> MDS, UGIB, and elevated BUN.  - plts 20 today - received cryo on 5/22    Anemia:   Likely secondary to anemia from chronic disease and bone marrow suppression.   - S/p 1 unit of PRBC on 5/9 for active melena and Hgb stable   - Given 1 unit of prbc and platelets on 5/12 for acute drop, unknown source  - Monitor CBC  - plts 35 on 5/14, 41 on 5/15, and 28 on 5/19 received 1 unit platelets each day  - plts 20 today - received cryo on 5/22  - Transfuse for Hgb <7   - Active type and screen     #Coagulopathy:   - Patient presented with elevated INR to 2.48, with active bleeding.   - Monitor PT/INR   - INR 2.99 today    #DVT - RLE  - repeat dopplers on 5/11 with no DVT  - Eliquis held in setting of melanax3 and drop in hgb     ID:   - Persistent MRSA bacteremia--> dapto increased to 700mg [id number]  - added Ceftaroline 400 mg IV q12 hrs for syngergy  - started meropenem 500mg IV daily on 5/22 while blood cultures pending as per ID  - last blood culture (+) on 5/16, continue with surveillance cultures  - obtain daily CKs  - CT abdomen and pelvis - no significant change in bilateral pleural effusions, moderate to large intra-abdominal ascites, and anasarca. Slightly nodular liver contour and splenomegaly cirrhosis, pancolitis, mild bilateral hydroureteronephrosis extending to the thick-walled urinary bladder, unchanged. Prostatic nodule indenting into the urinary bladder base, as seen on 4/11/2018, dilated main pulmonary artery suggesting pulmonary hypertension.  - thrombocytopenia/petechiae/?[place] lesions could be in setting of endocarditis/ MRSA bacteremia, plan gallium scan.          DVt ppx: SCDs. no chemic ppx due to pancytopenia

## 2020-05-22 NOTE — GOALS OF CARE CONVERSATION - ADVANCED CARE PLANNING - CONVERSATION DETAILS
Discussed at length with Patient and with his friend and HCP Teresa Sheritacomfortin   Explained poor prognosis and what his wishes would be   The patient states that he is not sure what his wishes would be however was quite lethargic during conversation   Teresa states that she will talk to him too but she thinks he would not want compressions however she does not have anything in writing

## 2020-05-22 NOTE — PROGRESS NOTE ADULT - SUBJECTIVE AND OBJECTIVE BOX
OVERNIGHT EVENTS: hep gtt increased to 4.5 and then 5 due to increased clotting in cartridge -- ran smoothly afterward, electrolytes checked and okay; am ptt 68 so hep gtt dropped back to 4, also noted to have INR 2.85 and plts 20k; decided to stop hep gtt and send off HIT studies, coags redrawn to confirm    SUBJECTIVE: Patient seen and examined at bedside. He appears more leathrgic and harder to arouse. He is able to follow simple commands. He says that he is feeling weak today and slightly nauseous but denies pain anywhere. He denies any shortness of breath, chest pain, abdominal pain, vomiting.     Vital Signs Last 12 Hrs  T(F): 98.3 (05-22-20 @ 11:00), Max: 98.8 (05-22-20 @ 06:26)  HR: 123 (05-22-20 @ 11:00) (122 - 129)  BP: --  BP(mean): --  RR: 19 (05-22-20 @ 11:00) (14 - 19)  SpO2: 92% (05-22-20 @ 11:00) (91% - 100%)  I&O's Summary    21 May 2020 07:01  -  22 May 2020 07:00  --------------------------------------------------------  IN: 1954.1 mL / OUT: 631 mL / NET: 1323.1 mL    22 May 2020 07:01  -  22 May 2020 11:58  --------------------------------------------------------  IN: 120.6 mL / OUT: 62 mL / NET: 58.6 mL        PHYSICAL EXAM  General: NAD, resting comfortably in bed. Breathing well on room air  Neck: no JVD  Respiratory: diminished breath sounds at the bases, non-tachypneic, no respiratory distress   Cardiovascular: Regular rhythm/rate; S1/S2, no pericardial rub, pericardial drain removed with site clean, dry, and intact  Gastrointestinal: distended, non tender ascitic drain with appropriate drainage, site clean, dry, and intact  Extremities: WWP; 1+ thigh edema  Neurological:  A&Ox 3, more lethargic than before, arousable but not as interactive. Is able to follow simple commands and answer simple questions. CNII-XII grossly intact; no obvious focal deficits, no flapping tremor, but minimal asterixis  T/L/D: palacio draining red-orange colored urine   Skin: petechiae diffusely         LABS:                        9.2    17.02 )-----------( 20       ( 22 May 2020 04:24 )             30.9     05-22    137  |  101  |  59<H>  ----------------------------<  151<H>  4.8   |  16<L>  |  3.66<H>    Ca    8.5      22 May 2020 04:24  Phos  6.2     05-22  Mg     1.9     05-22    TPro  6.2  /  Alb  2.8<L>  /  TBili  1.5<H>  /  DBili  x   /  AST  62<H>  /  ALT  8<L>  /  AlkPhos  74  05-22    PT/INR - ( 22 May 2020 06:25 )   PT: 35.3 sec;   INR: 2.99          PTT - ( 22 May 2020 06:25 )  PTT:53.3 sec        RADIOLOGY & ADDITIONAL TESTS:    MEDICATIONS  (STANDING):  ceftaroline fosamil IVPB 600 milliGRAM(s) IV Intermittent every 12 hours  chlorhexidine 2% Cloths 1 Application(s) Topical daily  collagenase Ointment 1 Application(s) Topical daily  CRRT Treatment    <Continuous>  DAPTOmycin IVPB 700 milliGRAM(s) IV Intermittent every 48 hours  finasteride 5 milliGRAM(s) Oral daily  insulin glargine Injectable (LANTUS) 4 Unit(s) SubCutaneous at bedtime  insulin lispro (HumaLOG) corrective regimen sliding scale   SubCutaneous Before meals and at bedtime  insulin lispro Injectable (HumaLOG) 2 Unit(s) SubCutaneous three times a day before meals  midodrine 15 milliGRAM(s) Oral every 8 hours  mirtazapine 7.5 milliGRAM(s) Oral daily  norepinephrine Infusion 0.05 MICROgram(s)/kG/Min (3.37 mL/Hr) IV Continuous <Continuous>  nystatin Cream 1 Application(s) Topical two times a day  nystatin Powder 1 Application(s) Topical two times a day  pantoprazole  Injectable 40 milliGRAM(s) IV Push every 24 hours  PureFlow Dialysate RFP-401 (K 4 / Ca 3) 5000 milliLiter(s) (2000 mL/Hr) CRRT <Continuous>  sertraline 50 milliGRAM(s) Oral daily  sevelamer carbonate 800 milliGRAM(s) Oral every 8 hours  vasopressin Infusion 0.02 Unit(s)/Min (1.2 mL/Hr) IV Continuous <Continuous>    MEDICATIONS  (PRN):  guaiFENesin   Syrup  (Sugar-Free) 200 milliGRAM(s) Oral every 6 hours PRN Cough

## 2020-05-23 NOTE — PROGRESS NOTE ADULT - SUBJECTIVE AND OBJECTIVE BOX
CC: ACUTE ON CHRONICRENAL INSUFFICIENCY      INTERVAL HISTORY:  lethargic  on RA      ROS: No chest pain, no sob, no abd pain. No n/v/d    PAST MEDICAL & SURGICAL HISTORY:  History of pancytopenia  H/O hydrocephalus  Anemia  HLD (hyperlipidemia)  Enlarged prostate  DM (diabetes mellitus)  HTN (hypertension)  H/O prior ablation treatment      PHYSICAL EXAM:  T(C): 36.8 (05-23-20 @ 09:00), Max: 37.3 (05-22-20 @ 17:00)  HR: 110 (05-23-20 @ 08:00)  BP: --  RR: 16 (05-23-20 @ 08:00)  SpO2: 98% (05-23-20 @ 08:00)  Wt(kg): --  I&O's Summary    22 May 2020 07:01  -  23 May 2020 07:00  --------------------------------------------------------  IN: 1322.6 mL / OUT: 102 mL / NET: 1220.6 mL    23 May 2020 07:01  -  23 May 2020 09:00  --------------------------------------------------------  IN: 51.6 mL / OUT: 0 mL / NET: 51.6 mL      Weight   General:   NAD.  HEENT: moist mucous membranes, no pallor/cyanosis.  Neck: no JVD visible.  Cardiac: S1, S2. RRR. No murmurs   Respratory: CTA b/l, no access muscle use.   Abdomen: soft. nontender. nondistended  Skin: no rashes.  Extremities:+  edema b/l  Access: R IJ      DATA:                        7.6<L>  8.11  )-----------( 32<L>    ( 23 May 2020 05:19 )             25.2<L>    Ferritin, Serum: 173 ng/mL (05-11 @ 04:52)      138    |  103    |  69<H>  ----------------------------<  223<H>  Ca:8.1<L> (23 May 2020 05:19)  5.1     |  16<L>  |  4.16<H>      eGFR if Non : 13 <L>  eGFR if : 15 <L>    TPro  5.9<L>  /  Alb  2.8<L>  /  TBili  1.2    /  DBili  x      /  AST  81<H>  /  ALT  20     /  AlkPhos  69     23 May 2020 05:19  Lactate Dehydrogenase, Serum: 257 U/L <H> (05-23 @ 05:19)  Lactate Dehydrogenase, Serum: 331 U/L <H> (05-22 @ 04:24)            Protein/Creatinine Ratio Calculation: 1.4 Ratio <H> (05-10 @ 00:25)          MEDICATIONS  (STANDING):  ceftaroline fosamil IVPB 400 milliGRAM(s) IV Intermittent every 12 hours  chlorhexidine 2% Cloths 1 Application(s) Topical daily  collagenase Ointment 1 Application(s) Topical daily  DAPTOmycin IVPB 700 milliGRAM(s) IV Intermittent every 48 hours  finasteride 5 milliGRAM(s) Oral daily  hydrocortisone sodium succinate Injectable 50 milliGRAM(s) IV Push every 8 hours  insulin glargine Injectable (LANTUS) 4 Unit(s) SubCutaneous at bedtime  insulin lispro (HumaLOG) corrective regimen sliding scale   SubCutaneous Before meals and at bedtime  insulin lispro Injectable (HumaLOG) 2 Unit(s) SubCutaneous three times a day before meals  meropenem  IVPB 500 milliGRAM(s) IV Intermittent every 24 hours  midodrine 15 milliGRAM(s) Oral every 8 hours  mirtazapine 7.5 milliGRAM(s) Oral daily  norepinephrine Infusion 0.05 MICROgram(s)/kG/Min (3.37 mL/Hr) IV Continuous <Continuous>  nystatin Cream 1 Application(s) Topical two times a day  nystatin Powder 1 Application(s) Topical two times a day  pantoprazole  Injectable 40 milliGRAM(s) IV Push every 24 hours  sertraline 50 milliGRAM(s) Oral daily  sevelamer carbonate 800 milliGRAM(s) Oral every 8 hours  vasopressin Infusion 0.02 Unit(s)/Min (1.2 mL/Hr) IV Continuous <Continuous>    MEDICATIONS  (PRN):  guaiFENesin   Syrup  (Sugar-Free) 200 milliGRAM(s) Oral every 6 hours PRN Cough

## 2020-05-23 NOTE — PROGRESS NOTE ADULT - PROBLEM SELECTOR PLAN 1
anuric AAMIR (probable ATN) on pressors but with slightly decreasing requirements  did not tolerate CVVHD secondary to clotting  +1.2L in past 24 hours and +2L in 48 hours  volume status and electrolytes WNL but with +AG acidosis secondary to AAMIR  CVVHD probably not an option since tends to clot and cannot use anti-coagulation at this time  GOC to be reviewed and determined  continue supportive care

## 2020-05-23 NOTE — PROGRESS NOTE ADULT - SUBJECTIVE AND OBJECTIVE BOX
INTERVAL HPI/OVERNIGHT EVENTS:    Patient was seen at bedside.  Still on pressors (unchanged from prior).  Not on renal replacement therapy again.      ROS:    unable to obtain     ANTIBIOTICS/RELEVANT:    MEDICATIONS  (STANDING):  ceftaroline fosamil IVPB 400 milliGRAM(s) IV Intermittent every 12 hours  chlorhexidine 2% Cloths 1 Application(s) Topical daily  collagenase Ointment 1 Application(s) Topical daily  DAPTOmycin IVPB 700 milliGRAM(s) IV Intermittent every 48 hours  finasteride 5 milliGRAM(s) Oral daily  hydrocortisone sodium succinate Injectable 50 milliGRAM(s) IV Push every 6 hours  insulin glargine Injectable (LANTUS) 4 Unit(s) SubCutaneous at bedtime  insulin lispro (HumaLOG) corrective regimen sliding scale   SubCutaneous Before meals and at bedtime  insulin lispro Injectable (HumaLOG) 2 Unit(s) SubCutaneous three times a day before meals  meropenem  IVPB 500 milliGRAM(s) IV Intermittent every 24 hours  midodrine 15 milliGRAM(s) Oral every 8 hours  mirtazapine 7.5 milliGRAM(s) Oral every 24 hours  norepinephrine Infusion 0.05 MICROgram(s)/kG/Min (3.37 mL/Hr) IV Continuous <Continuous>  nystatin Cream 1 Application(s) Topical two times a day  nystatin Powder 1 Application(s) Topical two times a day  pantoprazole  Injectable 40 milliGRAM(s) IV Push every 24 hours  sertraline 50 milliGRAM(s) Oral daily  sevelamer carbonate 800 milliGRAM(s) Oral every 8 hours  vasopressin Infusion 0.02 Unit(s)/Min (1.2 mL/Hr) IV Continuous <Continuous>    MEDICATIONS  (PRN):  guaiFENesin   Syrup  (Sugar-Free) 200 milliGRAM(s) Oral every 6 hours PRN Cough        Vital Signs Last 24 Hrs  T(C): 37.4 (23 May 2020 09:20), Max: 37.4 (23 May 2020 09:20)  T(F): 99.3 (23 May 2020 09:20), Max: 99.3 (23 May 2020 09:20)  HR: 106 (23 May 2020 12:00) (106 - 122)  BP: --  BP(mean): --  RR: 19 (23 May 2020 12:00) (12 - 28)  SpO2: 93% (23 May 2020 12:00) (90% - 100%)    PHYSICAL EXAM:  Constitutional: frail, chronically ill appearing, no distress  Eyes:  no icterus   Ear/Nose/Throat: no oral lesion   Neck:  supple  Respiratory: CTA chuck  Cardiovascular: S1S2 RRR, no murmurs  Gastrointestinal:soft, (+) BS, no HSM  Extremities:  + anasarca  Vascular: DP Pulse:	right normal; left normal      LABS:                        7.6    8.11  )-----------( 32       ( 23 May 2020 05:19 )             25.2     05-23    138  |  103  |  69<H>  ----------------------------<  223<H>  5.1   |  16<L>  |  4.16<H>    Ca    8.1<L>      23 May 2020 05:19  Phos  7.3     05-23  Mg     1.9     05-23    TPro  5.9<L>  /  Alb  2.8<L>  /  TBili  1.2  /  DBili  x   /  AST  81<H>  /  ALT  20  /  AlkPhos  69  05-23    PT/INR - ( 23 May 2020 05:19 )   PT: 38.6 sec;   INR: 3.26          PTT - ( 23 May 2020 05:19 )  PTT:42.8 sec      MICROBIOLOGY:    Culture - Blood (05.22.20 @ 13:17)    Specimen Source: .Blood Blood    Culture Results:   No growth at 12 hours    GI PCR Panel, Stool (05.19.20 @ 22:11)    Culture Results:   GI PCR Results: NOT detected  *******Please Note:*******  GI panel PCR evaluates for:  Campylobacter, Plesiomonas shigelloides, Salmonella,  Vibrio, Yersinia enterocolitica, Enteroaggregative  Escherichia coli (EAEC), Enteropathogenic E.coli (EPEC),  Enterotoxigenic E. coli (ETEC) lt/st, Shiga-like  toxin-producing E. coli (STEC) stx1/stx2,  Shigella/ Enteroinvasive E. coli (EIEC), Cryptosporidium,  Cyclospora cayetanensis, Entamoeba histolytica,  Giardia lamblia, Adenovirus F 40/41, Astrovirus,  Norovirus GI/GII, Rotavirus A, Sapovirus      Culture - Blood (05.22.20 @ 13:17)    Specimen Source: .Blood Blood    Culture Results:   No growth at 12 hours        Culture - Blood (05.18.20 @ 13:01)    Specimen Source: .Blood Blood    Culture Results:   No growth at 4 days.      RADIOLOGY & ADDITIONAL STUDIES:    < from: Xray Chest 1 View- PORTABLE-Routine (05.23.20 @ 05:00) >  Impression: Prominent bronchovascular markings. Patchy right perihilar infiltrates cannot be excluded    < end of copied text >    < from: NM SPECT/CT Inflammatory Loc, Single Area Single Day (05.22.20 @ 17:42) >    Findings: Anterior and posterior whole body images demonstrate small liver activity. Diminished radiotracer activity in the abdomen may reflect ascites. Focal irregular radiotracer activity projecting over the right posterior chest wall.  The SPECT-CT of the chest demonstrate focal increased radiotracer activity in the right posterior chest wall correlating to fluid along the rib cage/chest wall (3:). Focal radiotracer activity in the left  hilum without definite CT correlation. And mild activity in the perihilar right lower lobe, corresponding to passive atelectasis. Bilateral moderate pleural effusion, slightly increased in extent since 5/18/2020, without radiotracer activity.  No focal suspicious radiotracer activity within the heart is seen. Faint patchy radiotracer activity along the myocardium are nonspecific.  Large upper abdominal ascites. Cirrhotic liver.    Impression:   No suspicious focal radiotracer activity in the heart.  Patchy radiotracer activity along the right posterior chest wall, correlating to fluid/fat stranding and soft tissue swelling along the ribs and intercostal muscles. Clinical correlation for cellulitis and osteomyelitis is recommended.    < end of copied text >

## 2020-05-23 NOTE — PROGRESS NOTE ADULT - ASSESSMENT
75 y/o M w/PMHx of CKD stage III/IV, DM, HTN, BPH (chronic incontinence/retention, s/p palacio on last admission), a-fib admitted for new onset ascites nephrology consulted for AAMIR on CKD.

## 2020-05-23 NOTE — PROGRESS NOTE ADULT - ASSESSMENT
75 yo M with MDS, pancytopenia, CKD, ascites of unknown origin presents from [facility] with complaints of decreased po intake and weakness likely secondary to uremia/AAMIR from diuresis for treatment of ascites, hospital course notable for UGIB and pericardial effusion with tamponade physiology , as well as ATN, and ascites of unknown etiology, now in ICU s/p paracentesis 5/10 (2L removed), IR pericardiocentesis 5/11 (drain in place), now with anemia of unknown etiology.    Neuro:   p/w AMS likey 2/2 to uremia. On 5/22, more lethargic than usual and difficult to arouse with confusion  Awake and alert this am although minimally engaging.   -Adjust remron timing    Cards:   Hypotension  MRSA bacteremia vs. intravascular volume depletion from diuresis vs hemolysis? --> c/w dapto, midodrine TID, wean levo as tolerated. Most recent echo with no pericardial effusion. Not a candidate for GET as per cardiology  - midodrine 15mg [id number]  - levophed requirements increased and started vasopressin-Levo requirments decreasing overnight with no clear pericardial effusion on POCUS  - Recultured on 5/22, cultures from 5/18 NGTD  - CVVHD access clotted overnight  ---> Stress dose steroids interval to be increased to q6hrs  -Last blood ctx currently with NGTD although patient still requiring pressors    Pericardial Effusion:  Initial read with some evidence of tamponade physiology; now s/p IR for diagnostic pericardiocentesis on 5/11  - Cardiology following, appreciate recs.   - Given low pericardial drain output and clog, a follow up limited echo on 5/15 showed no pericardial effusion  - IR removed drain on 5/21  --> See above, recent hx of pericardial effusion s/p drain--> possible etiology of hypotension with HD-no effusion on repeat POCUS    Pulm:   stable on RA. maintain head of bed 30 degrees, due to aspiration risk     GI:   multiple episodes of melena, with baseline anemia from MDS, although with concern for coagulopathy given decreased PLTS and elevated BUN.--> monitor coags, c/w protonix.   #ascites  - Elevated SAAG consistent with portal HTN, no evidence of clot on U/S  --> unclear etiology, no cirrhosis on CT.     #colitis  - CT on 5/18 showing pancolitis  - c.diff neg  - GI PCR negative  - placed rectal tube on 5/18    Renal/:   AAMIR on CKD likely due to hypotension now requring HD--> inability to tolerate HD. Now HD complicated by frequent clotting of HD cath while on hep gtt. see heme section below  - likely ATN from hypotension and ischemic injury  - anuric   - on pressors w increasing requirement, now on levo and vasopressin  - HD initiated on 05/15, not tolerated few IHD sessions due to hemodynamic instability w tachycardia and hypotension despite being on levophed during HD (no pericardial effusion/cardiac tamponade); last IHD attempted 5/18, able to tolerate UF of 0.9 L, currently w increasing pressor requir  - cvvhd access clotted overnight, heparin dc'ed having worsening thrombocytopenia (plts 20)  - positive fluid balance however respiratory status not compromised, acceptable electrolytes in setting of HD instability will defer RRT at present, and reassess daily   - renally adjusted meds/ ABx  - renvela 800 mg po tid w each meal   - if repeat blood cx positive, would recommend removal of HD cath  - pending gallium infection scan--> no clear source of infxn on gallium scan  - pending [facility] discussion   - bilateral renal US - mild bilateral hydronephrosis, large ascites. Splenomegaly. Cholelithiasis.  - palacio exchanged on 5/18 and will c/w palacio for now due to clear pyuria    - bladder wall thickening on CT on 5/18  - Urology consulted and recommended bladder sono with palacio clamped 1hr before to assess for ureteral jets  - c/w palacio catheter  - Patient admitted in February for severe urinary retention and b/l hydronephrosis necessitating indwelling catheter until a definitive bladder outlet procedure    HEME:   #Anemia, thrombocytopenia, splenomegaly   --> Patient with frequent clotting while on HD, with associated and protracted course of worsening thrombocytopenia since february--> Will check AdamsTs-13 and anti-phospholipid for further work up. Given splenomegaly and slightly elevated bili possible extravascular hemolysis  -F/u hemolysis labs  - formal heme consult    DIC? vs. TTp vs. HUS  - formal heme consult- with service hematology  - received heparin during CVVHD on 5/21, now with concern for DIC given worsening plts, decreased fibrinogen. increased INR, worsening LFTs and bili  - heparing discontinued  - received cryo on 5/22  - f/u labs    Anemia and coagulopathy--> MDS, UGIB, and elevated BUN.  - plts 20 today - received cryo on 5/22    Anemia:   Likely secondary to anemia from chronic disease and bone marrow suppression.   - S/p 1 unit of PRBC on 5/9 for active melena and Hgb stable   - Given 1 unit of prbc and platelets on 5/12 for acute drop, unknown source  - Monitor CBC  - plts 35 on 5/14, 41 on 5/15, and 28 on 5/19 received 1 unit platelets each day  - plts 20 today - received cryo on 5/22  - Transfuse for Hgb <7   - Active type and screen     #Coagulopathy:   - Patient presented with elevated INR to 2.48, with active bleeding.   - Monitor PT/INR   - INR 2.99 today    #DVT - RLE  - repeat dopplers on 5/11 with no DVT  - Eliquis held in setting of melanax3 and drop in hgb     ID:   - Persistent MRSA bacteremia--> dapto increased to 700mg [id number]  - added Ceftaroline 400 mg IV q12 hrs for syngergy  - started meropenem 500mg IV daily on 5/22 while blood cultures pending as per ID--> Pending further blood cultures will discontinue meropenem if no growth by tomorrow  - last blood culture (+) on 5/16, continue with surveillance cultures  - obtain daily CKs  - CT abdomen and pelvis - no significant change in bilateral pleural effusions, moderate to large intra-abdominal ascites, and anasarca. Slightly nodular liver contour and splenomegaly cirrhosis, pancolitis, mild bilateral hydroureteronephrosis extending to the thick-walled urinary bladder, unchanged. Prostatic nodule indenting into the urinary bladder base, as seen on 4/11/2018, dilated main pulmonary artery suggesting pulmonary hypertension.  - thrombocytopenia/petechiae/?[place] lesions could be in setting of endocarditis/ MRSA bacteremia, plan gallium scan.  - Gallium scan as above suggestive of cellulitis and osteo although with no clinical correlation on physical exam, and not previously seen on chest CT--> MRSA bacteremia? from indwelling palacio cathter as patient with chronic indwelling palacio prior to admission.  - With extensive fungal infection in inguinal area--> will start of PO flucanazole x5 days       DVt ppx: SCDs. no chemic ppx due to pancytopenia  Lines: L IJ HD cath, R IJ CVC 75 yo M with MDS, pancytopenia, CKD, ascites of unknown origin presents from [facility] with complaints of decreased po intake and weakness likely secondary to uremia/AAMIR from diuresis for treatment of ascites, hospital course notable for UGIB and pericardial effusion with tamponade physiology , as well as ATN, and ascites of unknown etiology, now in ICU s/p paracentesis 5/10 (2L removed), IR pericardiocentesis 5/11 (drain in place), now with anemia and thrombocytopenia with hypercoagulability.    Neuro:   p/w AMS likey 2/2 to uremia. On 5/22, more lethargic than usual and difficult to arouse with confusion  Awake and alert this am although minimally engaging.   -Adjust remeron timing to give at night    Cardiology:   Hypotension  MRSA bacteremia vs. intravascular volume depletion from diuresis vs hemolysis? --> c/w dapto, midodrine TID, wean levo as tolerated. Most recent echo with no pericardial effusion. Not a candidate for GET as per cardiology  - midodrine 15mg [id number]  - levophed requirements increased and started vasopressin-Levo requirments decreasing overnight with no clear pericardial effusion on POCUS  - Recultured on 5/22, cultures from 5/18 NGTD  - CVVHD access clotted overnight  ---> Stress dose steroids interval to be increased to q6hrs  -Last blood ctx currently with NGTD although patient still requiring pressors suggesting persistent vasoplegia    Pericardial Effusion:  Initial read with some evidence of tamponade physiology; now s/p IR for diagnostic pericardiocentesis on 5/11  - Cardiology following, appreciate recs.   - Given low pericardial drain output and clog, a follow up limited echo on 5/15 showed no pericardial effusion  - IR removed drain on 5/21  --> See above, recent hx of pericardial effusion s/p drain--> possible etiology of hypotension with HD-no effusion on repeat POCUS    Pulm:   stable on RA. maintain head of bed 30 degrees, due to aspiration risk     GI:   multiple episodes of melena, with baseline anemia from MDS, although with concern for coagulopathy given decreased PLTS and elevated BUN.--> monitor coags, c/w protonix.   #ascites  - Elevated SAAG consistent with portal HTN, no evidence of clot on U/S  --> unclear etiology, no cirrhosis on CT.     #colitis  - CT on 5/18 showing pancolitis  - c.diff neg  - GI PCR negative  - placed rectal tube on 5/18    Renal/:   AAMIR on CKD likely due to hypotension now requring HD--> inability to tolerate HD. Now HD complicated by frequent clotting of HD cath while on hep gtt. see heme section below  - likely ATN from hypotension and ischemic injury  - anuric   - on pressors w increasing requirement, now on levo and vasopressin  - HD initiated on 05/15, not tolerated few IHD sessions due to hemodynamic instability w tachycardia and hypotension despite being on levophed during HD (no pericardial effusion/cardiac tamponade); last IHD attempted 5/18, able to tolerate UF of 0.9 L, currently w increasing pressor requir  - cvvhd access clotted overnight, heparin dc'ed having worsening thrombocytopenia (plts 20)  - positive fluid balance however respiratory status not compromised, acceptable electrolytes in setting of HD instability will defer RRT at present, and reassess daily   - renally adjusted meds/ ABx  - renvela 800 mg po tid w each meal   - if repeat blood cx positive, would recommend removal of HD cath  - pending gallium infection scan--> no clear source of infxn on gallium scan  - pending [facility] discussion   - bilateral renal US - mild bilateral hydronephrosis, large ascites. Splenomegaly. Cholelithiasis.  - palacio exchanged on 5/18 and will c/w palacio for now due to clear pyuria    - bladder wall thickening on CT on 5/18  - Urology consulted and recommended bladder sono with palacio clamped 1hr before to assess for ureteral jets  - c/w palacio catheter  - Patient admitted in February for severe urinary retention and b/l hydronephrosis necessitating indwelling catheter until a definitive bladder outlet procedure    HEME:   #Anemia, thrombocytopenia, splenomegaly   --> Patient with frequent clotting while on HD, with associated and protracted course of worsening thrombocytopenia since february--> Will check AdamsTs-13 and anti-phospholipid for further work up. Given splenomegaly and slightly elevated bili possible extravascular hemolysis  -F/u hemolysis labs  - formal heme consult    DIC? vs. TTp vs. HUS  - formal heme consult- with service hematology  - received heparin during CVVHD on 5/21, now with concern for DIC given worsening plts, decreased fibrinogen. increased INR, worsening LFTs and bili  - heparing discontinued  - received cryo on 5/22  - f/u labs    Anemia and coagulopathy--> MDS, UGIB, and elevated BUN.  - plts 20 today - received cryo on 5/22    Anemia:   Likely secondary to anemia from chronic disease and bone marrow suppression.   - S/p 1 unit of PRBC on 5/9 for active melena and Hgb stable   - Given 1 unit of prbc and platelets on 5/12 for acute drop, unknown source  - Monitor CBC  - plts 35 on 5/14, 41 on 5/15, and 28 on 5/19 received 1 unit platelets each day  - plts 20 today - received cryo on 5/22  - Transfuse for Hgb <7   - Active type and screen     #Coagulopathy:   - Patient presented with elevated INR to 2.48, with active bleeding.   - Monitor PT/INR   - INR 2.99 today    #DVT - RLE  - repeat dopplers on 5/11 with no DVT  - Eliquis held in setting of melanax3 and drop in hgb     ID:   - Persistent MRSA bacteremia--> dapto increased to 700mg [id number]  - added Ceftaroline 400 mg IV q12 hrs for syngergy  - started meropenem 500mg IV daily on 5/22 while blood cultures pending as per ID--> Pending further blood cultures will discontinue meropenem if no growth by tomorrow  - last blood culture (+) on 5/16, continue with surveillance cultures  - obtain daily CKs  - CT abdomen and pelvis - no significant change in bilateral pleural effusions, moderate to large intra-abdominal ascites, and anasarca. Slightly nodular liver contour and splenomegaly cirrhosis, pancolitis, mild bilateral hydroureteronephrosis extending to the thick-walled urinary bladder, unchanged. Prostatic nodule indenting into the urinary bladder base, as seen on 4/11/2018, dilated main pulmonary artery suggesting pulmonary hypertension.  - thrombocytopenia/petechiae/?[place] lesions could be in setting of endocarditis/ MRSA bacteremia, plan gallium scan.  - Gallium scan as above suggestive of cellulitis and osteo although with no clinical correlation on physical exam, and not previously seen on chest CT--> MRSA bacteremia? from indwelling palacio cathter as patient with chronic indwelling palacio prior to admission.  - With extensive fungal infection in inguinal area--> will start of PO flucanazole x5 days       DVt ppx: SCDs. no chemic ppx due to pancytopenia  Lines: L IJ HD cath, R IJ CVC

## 2020-05-23 NOTE — PROGRESS NOTE ADULT - SUBJECTIVE AND OBJECTIVE BOX
OVERNIGHT EVENTS: POCUS overnight with no evidence of recurrent pericardial effusion, Hgb and PLT count stable overnight w/ fibrinogen> 100 after cryopercipitate administration yesterday.     SUBJECTIVE / INTERVAL HPI: Patient seen and examined at bedside.     Review of systems negative except as noted above.     VITAL SIGNS:  Vital Signs Last 24 Hrs  T(C): 36.8 (23 May 2020 09:00), Max: 37.3 (22 May 2020 17:00)  T(F): 98.3 (23 May 2020 09:00), Max: 99.2 (22 May 2020 17:00)  HR: 110 (23 May 2020 08:00) (109 - 125)  BP: --  BP(mean): --  RR: 16 (23 May 2020 08:00) (12 - 28)  SpO2: 98% (23 May 2020 08:00) (88% - 100%)      05-22-20 @ 07:01  -  05-23-20 @ 07:00  --------------------------------------------------------  IN: 1322.6 mL / OUT: 102 mL / NET: 1220.6 mL    05-23-20 @ 07:01  -  05-23-20 @ 09:14  --------------------------------------------------------  IN: 51.6 mL / OUT: 0 mL / NET: 51.6 mL        PHYSICAL EXAM:    General: Toxic and pale appearing  HEENT: DMM  Neck: R IJ HD cath c/d/i, L IJ CVC c/d/i  Cardiovascular: +S1/S2; RRR  Respiratory: CTA B/L; no W/R/R  Gastrointestinal: soft, NT/ND; +BS  Extremities: WWP; no edema, clubbing or cyanosis  Vascular: 2+ radial, DP pulses B/L  Neurological: AAOx3; no focal deficits    MEDICATIONS:  MEDICATIONS  (STANDING):  ceftaroline fosamil IVPB 400 milliGRAM(s) IV Intermittent every 12 hours  chlorhexidine 2% Cloths 1 Application(s) Topical daily  collagenase Ointment 1 Application(s) Topical daily  DAPTOmycin IVPB 700 milliGRAM(s) IV Intermittent every 48 hours  finasteride 5 milliGRAM(s) Oral daily  hydrocortisone sodium succinate Injectable 50 milliGRAM(s) IV Push every 8 hours  insulin glargine Injectable (LANTUS) 4 Unit(s) SubCutaneous at bedtime  insulin lispro (HumaLOG) corrective regimen sliding scale   SubCutaneous Before meals and at bedtime  insulin lispro Injectable (HumaLOG) 2 Unit(s) SubCutaneous three times a day before meals  meropenem  IVPB 500 milliGRAM(s) IV Intermittent every 24 hours  midodrine 15 milliGRAM(s) Oral every 8 hours  mirtazapine 7.5 milliGRAM(s) Oral daily  norepinephrine Infusion 0.05 MICROgram(s)/kG/Min (3.37 mL/Hr) IV Continuous <Continuous>  nystatin Cream 1 Application(s) Topical two times a day  nystatin Powder 1 Application(s) Topical two times a day  pantoprazole  Injectable 40 milliGRAM(s) IV Push every 24 hours  sertraline 50 milliGRAM(s) Oral daily  sevelamer carbonate 800 milliGRAM(s) Oral every 8 hours  vasopressin Infusion 0.02 Unit(s)/Min (1.2 mL/Hr) IV Continuous <Continuous>    MEDICATIONS  (PRN):  guaiFENesin   Syrup  (Sugar-Free) 200 milliGRAM(s) Oral every 6 hours PRN Cough      ALLERGIES:  Allergies    No Known Allergies    Intolerances        LABS:                        7.6    8.11  )-----------( 32       ( 23 May 2020 05:19 )             25.2     05-23    138  |  103  |  69<H>  ----------------------------<  223<H>  5.1   |  16<L>  |  4.16<H>    Ca    8.1<L>      23 May 2020 05:19  Phos  7.3     05-23  Mg     1.9     05-23    TPro  5.9<L>  /  Alb  2.8<L>  /  TBili  1.2  /  DBili  x   /  AST  81<H>  /  ALT  20  /  AlkPhos  69  05-23    PT/INR - ( 23 May 2020 05:19 )   PT: 38.6 sec;   INR: 3.26          PTT - ( 23 May 2020 05:19 )  PTT:42.8 sec    CAPILLARY BLOOD GLUCOSE      POCT Blood Glucose.: 211 mg/dL (23 May 2020 05:50)    CARDIAC MARKERS ( 22 May 2020 04:24 )  x     / x     / 18 U/L / x     / x            RADIOLOGY & ADDITIONAL TESTS: Reviewed. OVERNIGHT EVENTS: POCUS overnight with no evidence of recurrent pericardial effusion, Hgb and PLT count stable overnight w/ fibrinogen> 100 after cryopercipitate administration yesterday.     SUBJECTIVE / INTERVAL HPI: Patient seen and examined at bedside.     Review of systems negative except as noted above.     VITAL SIGNS:  Vital Signs Last 24 Hrs  T(C): 36.8 (23 May 2020 09:00), Max: 37.3 (22 May 2020 17:00)  T(F): 98.3 (23 May 2020 09:00), Max: 99.2 (22 May 2020 17:00)  HR: 110 (23 May 2020 08:00) (109 - 125)  BP: --  BP(mean): --  RR: 16 (23 May 2020 08:00) (12 - 28)  SpO2: 98% (23 May 2020 08:00) (88% - 100%)      05-22-20 @ 07:01  -  05-23-20 @ 07:00  --------------------------------------------------------  IN: 1322.6 mL / OUT: 102 mL / NET: 1220.6 mL    05-23-20 @ 07:01  -  05-23-20 @ 09:14  --------------------------------------------------------  IN: 51.6 mL / OUT: 0 mL / NET: 51.6 mL        PHYSICAL EXAM:    General: Toxic and pale appearing  HEENT: DMM  Neck: R IJ HD cath c/d/i, L IJ CVC c/d/i  Cardiovascular: +S1/S2; RRR  Respiratory: CTA B/L; no W/R/R  Gastrointestinal: Distended, +fluid wave.   Extremities: Cool to touch, dependent edema  Vascular: 2+ radial, DP pulses B/L  Neurological: AAOx3; no focal deficits  Skin: Sacral ulcer with no fluctuance. Inguinal fungal rash. No rashes over back    MEDICATIONS:  MEDICATIONS  (STANDING):  ceftaroline fosamil IVPB 400 milliGRAM(s) IV Intermittent every 12 hours  chlorhexidine 2% Cloths 1 Application(s) Topical daily  collagenase Ointment 1 Application(s) Topical daily  DAPTOmycin IVPB 700 milliGRAM(s) IV Intermittent every 48 hours  finasteride 5 milliGRAM(s) Oral daily  hydrocortisone sodium succinate Injectable 50 milliGRAM(s) IV Push every 8 hours  insulin glargine Injectable (LANTUS) 4 Unit(s) SubCutaneous at bedtime  insulin lispro (HumaLOG) corrective regimen sliding scale   SubCutaneous Before meals and at bedtime  insulin lispro Injectable (HumaLOG) 2 Unit(s) SubCutaneous three times a day before meals  meropenem  IVPB 500 milliGRAM(s) IV Intermittent every 24 hours  midodrine 15 milliGRAM(s) Oral every 8 hours  mirtazapine 7.5 milliGRAM(s) Oral daily  norepinephrine Infusion 0.05 MICROgram(s)/kG/Min (3.37 mL/Hr) IV Continuous <Continuous>  nystatin Cream 1 Application(s) Topical two times a day  nystatin Powder 1 Application(s) Topical two times a day  pantoprazole  Injectable 40 milliGRAM(s) IV Push every 24 hours  sertraline 50 milliGRAM(s) Oral daily  sevelamer carbonate 800 milliGRAM(s) Oral every 8 hours  vasopressin Infusion 0.02 Unit(s)/Min (1.2 mL/Hr) IV Continuous <Continuous>    MEDICATIONS  (PRN):  guaiFENesin   Syrup  (Sugar-Free) 200 milliGRAM(s) Oral every 6 hours PRN Cough      ALLERGIES:  Allergies    No Known Allergies    Intolerances        LABS:                        7.6    8.11  )-----------( 32       ( 23 May 2020 05:19 )             25.2     05-23    138  |  103  |  69<H>  ----------------------------<  223<H>  5.1   |  16<L>  |  4.16<H>    Ca    8.1<L>      23 May 2020 05:19  Phos  7.3     05-23  Mg     1.9     05-23    TPro  5.9<L>  /  Alb  2.8<L>  /  TBili  1.2  /  DBili  x   /  AST  81<H>  /  ALT  20  /  AlkPhos  69  05-23    PT/INR - ( 23 May 2020 05:19 )   PT: 38.6 sec;   INR: 3.26          PTT - ( 23 May 2020 05:19 )  PTT:42.8 sec    CAPILLARY BLOOD GLUCOSE      POCT Blood Glucose.: 211 mg/dL (23 May 2020 05:50)    CARDIAC MARKERS ( 22 May 2020 04:24 )  x     / x     / 18 U/L / x     / x            < from: NM Inflammatory Loc Wholebody Gallium, Single Day (05.22.20 @ 17:41) >  INTERPRETATION:    INFLAMMATION IMAGING - GALLIUM-67, WITH SPECT-CT OF THE CHEST    Indication: Endocarditis    Procedure: The patient received an intravenous injection of 6 mCi of gallium-67 citrate on 5/20/2020 and images of the whole body were obtained at about 48 hours. SPECT-CT of the chest was also obtained.    Findings: Anterior and posterior whole body images demonstrate small liver activity. Diminished radiotracer activity in the abdomen may reflect ascites. Focal irregular radiotracer activity projecting over the right posterior chest wall.  The SPECT-CT of the chest demonstrate focal increased radiotracer activity in the right posterior chest wall correlating to fluid along the rib cage/chest wall (3:). Focal radiotracer activity in the left  hilum without definite CT correlation. And mild activity in the perihilar right lower lobe, corresponding to passive atelectasis. Bilateral moderate pleural effusion, slightly increased in extent since 5/18/2020, without radiotracer activity.  No focal suspicious radiotracer activity within the heart is seen. Faint patchy radiotracer activity along the myocardium are nonspecific.  Large upper abdominal ascites. Cirrhotic liver.    Impression:   No suspicious focal radiotracer activity in the heart.  Patchy radiotracer activity along the right posterior chest wall, correlating to fluid/fat stranding and soft tissue swelling along the ribs and intercostal muscles. Clinical correlation for cellulitis and osteomyelitis is recommended.    < end of copied text > OVERNIGHT EVENTS: POCUS overnight with no evidence of recurrent pericardial effusion, Hgb and PLT count stable overnight w/ fibrinogen> 100 after cryopercipitate administration yesterday.     SUBJECTIVE / INTERVAL HPI: Patient seen and examined at bedside. Denies SOB, N/V, CP    Review of systems negative except as noted above.     VITAL SIGNS:  Vital Signs Last 24 Hrs  T(C): 36.8 (23 May 2020 09:00), Max: 37.3 (22 May 2020 17:00)  T(F): 98.3 (23 May 2020 09:00), Max: 99.2 (22 May 2020 17:00)  HR: 110 (23 May 2020 08:00) (109 - 125)  BP: --  BP(mean): --  RR: 16 (23 May 2020 08:00) (12 - 28)  SpO2: 98% (23 May 2020 08:00) (88% - 100%)      05-22-20 @ 07:01  -  05-23-20 @ 07:00  --------------------------------------------------------  IN: 1322.6 mL / OUT: 102 mL / NET: 1220.6 mL    05-23-20 @ 07:01  -  05-23-20 @ 09:14  --------------------------------------------------------  IN: 51.6 mL / OUT: 0 mL / NET: 51.6 mL        PHYSICAL EXAM:    General: Toxic and pale appearing  HEENT: DMM  Neck: R IJ HD cath c/d/i, L IJ CVC c/d/i  Cardiovascular: +S1/S2; RRR  Respiratory: CTA B/L; no W/R/R  Gastrointestinal: Distended, +fluid wave.   Extremities: Cool to touch, dependent edema  Vascular: 2+ radial, DP pulses B/L  Neurological: AAOx3; no focal deficits  Skin: Unstageable sacral ulcer with no fluctuance. Inguinal fungal rash. No rashes over back    MEDICATIONS:  MEDICATIONS  (STANDING):  ceftaroline fosamil IVPB 400 milliGRAM(s) IV Intermittent every 12 hours  chlorhexidine 2% Cloths 1 Application(s) Topical daily  collagenase Ointment 1 Application(s) Topical daily  DAPTOmycin IVPB 700 milliGRAM(s) IV Intermittent every 48 hours  finasteride 5 milliGRAM(s) Oral daily  hydrocortisone sodium succinate Injectable 50 milliGRAM(s) IV Push every 8 hours  insulin glargine Injectable (LANTUS) 4 Unit(s) SubCutaneous at bedtime  insulin lispro (HumaLOG) corrective regimen sliding scale   SubCutaneous Before meals and at bedtime  insulin lispro Injectable (HumaLOG) 2 Unit(s) SubCutaneous three times a day before meals  meropenem  IVPB 500 milliGRAM(s) IV Intermittent every 24 hours  midodrine 15 milliGRAM(s) Oral every 8 hours  mirtazapine 7.5 milliGRAM(s) Oral daily  norepinephrine Infusion 0.05 MICROgram(s)/kG/Min (3.37 mL/Hr) IV Continuous <Continuous>  nystatin Cream 1 Application(s) Topical two times a day  nystatin Powder 1 Application(s) Topical two times a day  pantoprazole  Injectable 40 milliGRAM(s) IV Push every 24 hours  sertraline 50 milliGRAM(s) Oral daily  sevelamer carbonate 800 milliGRAM(s) Oral every 8 hours  vasopressin Infusion 0.02 Unit(s)/Min (1.2 mL/Hr) IV Continuous <Continuous>    MEDICATIONS  (PRN):  guaiFENesin   Syrup  (Sugar-Free) 200 milliGRAM(s) Oral every 6 hours PRN Cough      ALLERGIES:  Allergies    No Known Allergies    Intolerances        LABS:                        7.6    8.11  )-----------( 32       ( 23 May 2020 05:19 )             25.2     05-23    138  |  103  |  69<H>  ----------------------------<  223<H>  5.1   |  16<L>  |  4.16<H>    Ca    8.1<L>      23 May 2020 05:19  Phos  7.3     05-23  Mg     1.9     05-23    TPro  5.9<L>  /  Alb  2.8<L>  /  TBili  1.2  /  DBili  x   /  AST  81<H>  /  ALT  20  /  AlkPhos  69  05-23    PT/INR - ( 23 May 2020 05:19 )   PT: 38.6 sec;   INR: 3.26          PTT - ( 23 May 2020 05:19 )  PTT:42.8 sec    CAPILLARY BLOOD GLUCOSE      POCT Blood Glucose.: 211 mg/dL (23 May 2020 05:50)    CARDIAC MARKERS ( 22 May 2020 04:24 )  x     / x     / 18 U/L / x     / x            < from: NM Inflammatory Loc Wholebody Gallium, Single Day (05.22.20 @ 17:41) >  INTERPRETATION:    INFLAMMATION IMAGING - GALLIUM-67, WITH SPECT-CT OF THE CHEST    Indication: Endocarditis    Procedure: The patient received an intravenous injection of 6 mCi of gallium-67 citrate on 5/20/2020 and images of the whole body were obtained at about 48 hours. SPECT-CT of the chest was also obtained.    Findings: Anterior and posterior whole body images demonstrate small liver activity. Diminished radiotracer activity in the abdomen may reflect ascites. Focal irregular radiotracer activity projecting over the right posterior chest wall.  The SPECT-CT of the chest demonstrate focal increased radiotracer activity in the right posterior chest wall correlating to fluid along the rib cage/chest wall (3:). Focal radiotracer activity in the left  hilum without definite CT correlation. And mild activity in the perihilar right lower lobe, corresponding to passive atelectasis. Bilateral moderate pleural effusion, slightly increased in extent since 5/18/2020, without radiotracer activity.  No focal suspicious radiotracer activity within the heart is seen. Faint patchy radiotracer activity along the myocardium are nonspecific.  Large upper abdominal ascites. Cirrhotic liver.    Impression:   No suspicious focal radiotracer activity in the heart.  Patchy radiotracer activity along the right posterior chest wall, correlating to fluid/fat stranding and soft tissue swelling along the ribs and intercostal muscles. Clinical correlation for cellulitis and osteomyelitis is recommended.    < end of copied text >

## 2020-05-23 NOTE — PROGRESS NOTE ADULT - ASSESSMENT
IMPRESSION:  MRSA bacteremia. Primary source could be from urine.  There is concern for endocarditis.  Patient remains hypotensive with increasing pressor requirements.  Gallium scan does not reveal nidus of infection      Recommend:  1.  Continue Daptomycin 700 mg IV q48hrs  2.   Decrease Ceftaroline to 200 mg IV q12 in the setting of no renal replacement therapy   3.  Can continue meropenem 500 mg IV daily while blood cultures pending.  Can stop once blood cultures negative x 48hrs  4.  Follow up repeat blood culture      ID team 1 will follow

## 2020-05-24 NOTE — PROGRESS NOTE ADULT - SUBJECTIVE AND OBJECTIVE BOX
**INCOMPLETE NOTE    OVERNIGHT EVENTS:    SUBJECTIVE:  Patient seen and examined at bedside.    Vital Signs Last 12 Hrs  T(F): 96.4 (05-24-20 @ 09:27), Max: 98.4 (05-24-20 @ 05:51)  HR: 82 (05-24-20 @ 09:00) (82 - 95)  BP: --  BP(mean): --  RR: 14 (05-24-20 @ 09:00) (13 - 21)  SpO2: 100% (05-24-20 @ 09:00) (92% - 100%)  I&O's Summary    23 May 2020 07:01  -  24 May 2020 07:00  --------------------------------------------------------  IN: 746 mL / OUT: 85 mL / NET: 661 mL    24 May 2020 07:01  -  24 May 2020 12:48  --------------------------------------------------------  IN: 169.4 mL / OUT: 0 mL / NET: 169.4 mL        PHYSICAL EXAM:  Constitutional: NAD, comfortable in bed.  HEENT: NC/AT, PERRLA, EOMI, no conjunctival pallor or scleral icterus, MMM  Neck: Supple, no JVD  Respiratory: CTA B/L. No w/r/r.   Cardiovascular: RRR, normal S1 and S2, no m/r/g.   Gastrointestinal: +BS, soft NTND, no guarding or rebound tenderness, no palpable masses   Extremities: wwp; no cyanosis, clubbing or edema.   Vascular: Pulses equal and strong throughout.   Neurological: AAOx3, no CN deficits, strength and sensation intact throughout.   Skin: No gross skin abnormalities or rashes        LABS:                        6.5    5.15  )-----------( 25       ( 24 May 2020 05:42 )             21.9     05-24    141  |  107  |  81<H>  ----------------------------<  238<H>  4.4   |  16<L>  |  4.74<H>    Ca    7.5<L>      24 May 2020 07:04  Phos  7.1     05-24  Mg     2.0     05-24    TPro  4.9<L>  /  Alb  2.3<L>  /  TBili  0.9  /  DBili  x   /  AST  53<H>  /  ALT  17  /  AlkPhos  75  05-24    PT/INR - ( 24 May 2020 05:44 )   PT: 32.4 sec;   INR: 2.75          PTT - ( 24 May 2020 05:44 )  PTT:41.2 sec        RADIOLOGY & ADDITIONAL TESTS:    MEDICATIONS  (STANDING):  ceftaroline fosamil IVPB 200 milliGRAM(s) IV Intermittent every 12 hours  chlorhexidine 2% Cloths 1 Application(s) Topical daily  collagenase Ointment 1 Application(s) Topical daily  DAPTOmycin IVPB 700 milliGRAM(s) IV Intermittent every 48 hours  finasteride 5 milliGRAM(s) Oral daily  hydrocortisone sodium succinate Injectable 50 milliGRAM(s) IV Push every 6 hours  insulin glargine Injectable (LANTUS) 4 Unit(s) SubCutaneous at bedtime  insulin lispro (HumaLOG) corrective regimen sliding scale   SubCutaneous Before meals and at bedtime  insulin lispro Injectable (HumaLOG) 2 Unit(s) SubCutaneous three times a day before meals  midodrine 15 milliGRAM(s) Oral every 8 hours  mirtazapine 7.5 milliGRAM(s) Oral every 24 hours  norepinephrine Infusion 0.05 MICROgram(s)/kG/Min (3.37 mL/Hr) IV Continuous <Continuous>  nystatin Cream 1 Application(s) Topical two times a day  nystatin Powder 1 Application(s) Topical two times a day  pantoprazole  Injectable 40 milliGRAM(s) IV Push every 24 hours  sertraline 50 milliGRAM(s) Oral daily  sevelamer carbonate 800 milliGRAM(s) Oral every 8 hours  vasopressin Infusion 0.02 Unit(s)/Min (1.2 mL/Hr) IV Continuous <Continuous>    MEDICATIONS  (PRN):  guaiFENesin   Syrup  (Sugar-Free) 200 milliGRAM(s) Oral every 6 hours PRN Cough OVERNIGHT EVENTS: gradually coming down on pressors, first with vasopressin then levo. AM Hb 6.8 from 7.6, so sent a repeat which showed Hb of 6.5, fibrinogen < 80, ordered for 1U prbc and 10u cryo    SUBJECTIVE: Patient seen and examined at bedside. He appears lethargic but is arousable and able to answer questions. He says he feels weak and complains of dryness in his throat. Otherwise, he denies any fevers, chills, chest pain, shortness of breath, abdominal pain, n/v. He says he doesn't have an appetite to eat.    Vital Signs Last 12 Hrs  T(F): 96.4 (05-24-20 @ 09:27), Max: 98.4 (05-24-20 @ 05:51)  HR: 82 (05-24-20 @ 09:00) (82 - 95)  BP: --  BP(mean): --  RR: 14 (05-24-20 @ 09:00) (13 - 21)  SpO2: 100% (05-24-20 @ 09:00) (92% - 100%)  I&O's Summary    23 May 2020 07:01  -  24 May 2020 07:00  --------------------------------------------------------  IN: 746 mL / OUT: 85 mL / NET: 661 mL    24 May 2020 07:01  -  24 May 2020 12:48  --------------------------------------------------------  IN: 169.4 mL / OUT: 0 mL / NET: 169.4 mL        PHYSICAL EXAM  General: NAD, resting comfortably in bed. Breathing well on room air  Neck: no JVD  Respiratory: diminished breath sounds at the bases, non-tachypneic, no respiratory distress   Cardiovascular: Regular rhythm/rate; S1/S2, no pericardial rub, pericardial drain removed with site clean, dry, and intact  Gastrointestinal: distended, non tender ascitic drain with appropriate drainage, site clean, dry, and intact  Extremities: WWP; 1+ thigh edema  Neurological:  A&Ox 3, more lethargic than before, arousable but not as interactive. Is able to follow simple commands and answer simple questions. CNII-XII grossly intact; no obvious focal deficits, no flapping tremor, but minimal asterixis  T/L/D: palacio draining red-orange colored urine   Skin: petechiae diffusely         LABS:                        6.5    5.15  )-----------( 25       ( 24 May 2020 05:42 )             21.9     05-24    141  |  107  |  81<H>  ----------------------------<  238<H>  4.4   |  16<L>  |  4.74<H>    Ca    7.5<L>      24 May 2020 07:04  Phos  7.1     05-24  Mg     2.0     05-24    TPro  4.9<L>  /  Alb  2.3<L>  /  TBili  0.9  /  DBili  x   /  AST  53<H>  /  ALT  17  /  AlkPhos  75  05-24    PT/INR - ( 24 May 2020 05:44 )   PT: 32.4 sec;   INR: 2.75          PTT - ( 24 May 2020 05:44 )  PTT:41.2 sec        RADIOLOGY & ADDITIONAL TESTS:    MEDICATIONS  (STANDING):  ceftaroline fosamil IVPB 200 milliGRAM(s) IV Intermittent every 12 hours  chlorhexidine 2% Cloths 1 Application(s) Topical daily  collagenase Ointment 1 Application(s) Topical daily  DAPTOmycin IVPB 700 milliGRAM(s) IV Intermittent every 48 hours  finasteride 5 milliGRAM(s) Oral daily  hydrocortisone sodium succinate Injectable 50 milliGRAM(s) IV Push every 6 hours  insulin glargine Injectable (LANTUS) 4 Unit(s) SubCutaneous at bedtime  insulin lispro (HumaLOG) corrective regimen sliding scale   SubCutaneous Before meals and at bedtime  insulin lispro Injectable (HumaLOG) 2 Unit(s) SubCutaneous three times a day before meals  midodrine 15 milliGRAM(s) Oral every 8 hours  mirtazapine 7.5 milliGRAM(s) Oral every 24 hours  norepinephrine Infusion 0.05 MICROgram(s)/kG/Min (3.37 mL/Hr) IV Continuous <Continuous>  nystatin Cream 1 Application(s) Topical two times a day  nystatin Powder 1 Application(s) Topical two times a day  pantoprazole  Injectable 40 milliGRAM(s) IV Push every 24 hours  sertraline 50 milliGRAM(s) Oral daily  sevelamer carbonate 800 milliGRAM(s) Oral every 8 hours  vasopressin Infusion 0.02 Unit(s)/Min (1.2 mL/Hr) IV Continuous <Continuous>    MEDICATIONS  (PRN):  guaiFENesin   Syrup  (Sugar-Free) 200 milliGRAM(s) Oral every 6 hours PRN Cough

## 2020-05-24 NOTE — PROGRESS NOTE ADULT - ASSESSMENT
73 yo M with MDS, pancytopenia, CKD, ascites of unknown origin presents from [facility] with complaints of decreased po intake and weakness likely secondary to uremia/AAMIR from diuresis for treatment of ascites, hospital course notable for UGIB and pericardial effusion with tamponade physiology , as well as ATN, and ascites of unknown etiology, now in ICU s/p paracentesis 5/10 (2L removed), IR pericardiocentesis 5/11 (drain in place), now with anemia and thrombocytopenia with hypercoagulability.    Neuro:   p/w AMS likey 2/2 to uremia. On 5/22, more lethargic than usual and difficult to arouse with confusion  Awake and alert this am although minimally engaging.   -Adjust remeron timing to give at night    Cardiology:   Hypotension  MRSA bacteremia vs. intravascular volume depletion from diuresis vs hemolysis? --> c/w dapto, midodrine TID, wean levo as tolerated. Most recent echo with no pericardial effusion. Not a candidate for GET as per cardiology  - midodrine 15mg [id number]  - levophed requirements increased and started vasopressin-Levo requirments decreasing overnight with no clear pericardial effusion on POCUS  - Recultured on 5/22, cultures from 5/18 NGTD  - CVVHD access clotted overnight  ---> Stress dose steroids interval to be increased to q6hrs  -Last blood ctx currently with NGTD although patient still requiring pressors suggesting persistent vasoplegia    Pericardial Effusion:  Initial read with some evidence of tamponade physiology; now s/p IR for diagnostic pericardiocentesis on 5/11  - Cardiology following, appreciate recs.   - Given low pericardial drain output and clog, a follow up limited echo on 5/15 showed no pericardial effusion  - IR removed drain on 5/21  --> See above, recent hx of pericardial effusion s/p drain--> possible etiology of hypotension with HD-no effusion on repeat POCUS    Pulm:   stable on RA. maintain head of bed 30 degrees, due to aspiration risk     GI:   multiple episodes of melena, with baseline anemia from MDS, although with concern for coagulopathy given decreased PLTS and elevated BUN.--> monitor coags, c/w protonix.   #ascites  - Elevated SAAG consistent with portal HTN, no evidence of clot on U/S  --> unclear etiology, no cirrhosis on CT.     #colitis  - CT on 5/18 showing pancolitis  - c.diff neg  - GI PCR negative  - placed rectal tube on 5/18    Renal/:   AAMIR on CKD likely due to hypotension now requring HD--> inability to tolerate HD. Now HD complicated by frequent clotting of HD cath while on hep gtt. see heme section below  - likely ATN from hypotension and ischemic injury  - anuric   - on pressors w increasing requirement, now on levo and vasopressin  - HD initiated on 05/15, not tolerated few IHD sessions due to hemodynamic instability w tachycardia and hypotension despite being on levophed during HD (no pericardial effusion/cardiac tamponade); last IHD attempted 5/18, able to tolerate UF of 0.9 L, currently w increasing pressor requir  - cvvhd access clotted overnight, heparin dc'ed having worsening thrombocytopenia (plts 20)  - positive fluid balance however respiratory status not compromised, acceptable electrolytes in setting of HD instability will defer RRT at present, and reassess daily   - renally adjusted meds/ ABx  - renvela 800 mg po tid w each meal   - if repeat blood cx positive, would recommend removal of HD cath  - pending gallium infection scan--> no clear source of infxn on gallium scan  - pending [facility] discussion   - bilateral renal US - mild bilateral hydronephrosis, large ascites. Splenomegaly. Cholelithiasis.  - palacio exchanged on 5/18 and will c/w palacio for now due to clear pyuria    - bladder wall thickening on CT on 5/18  - Urology consulted and recommended bladder sono with palacio clamped 1hr before to assess for ureteral jets  - c/w palacio catheter  - Patient admitted in February for severe urinary retention and b/l hydronephrosis necessitating indwelling catheter until a definitive bladder outlet procedure    HEME:   #Anemia, thrombocytopenia, splenomegaly   --> Patient with frequent clotting while on HD, with associated and protracted course of worsening thrombocytopenia since february--> Will check AdamsTs-13 and anti-phospholipid for further work up. Given splenomegaly and slightly elevated bili possible extravascular hemolysis  -F/u hemolysis labs  - formal heme consult    DIC? vs. TTp vs. HUS  - formal heme consult- with service hematology  - received heparin during CVVHD on 5/21, now with concern for DIC given worsening plts, decreased fibrinogen. increased INR, worsening LFTs and bili  - heparing discontinued  - received cryo on 5/22  - f/u labs    Anemia and coagulopathy--> MDS, UGIB, and elevated BUN.  - plts 20 today - received cryo on 5/22    Anemia:   Likely secondary to anemia from chronic disease and bone marrow suppression.   - S/p 1 unit of PRBC on 5/9 for active melena and Hgb stable   - Given 1 unit of prbc and platelets on 5/12 for acute drop, unknown source  - Monitor CBC  - plts 35 on 5/14, 41 on 5/15, and 28 on 5/19 received 1 unit platelets each day  - plts 20 today - received cryo on 5/22  - Transfuse for Hgb <7   - Active type and screen     #Coagulopathy:   - Patient presented with elevated INR to 2.48, with active bleeding.   - Monitor PT/INR   - INR 2.99 today    #DVT - RLE  - repeat dopplers on 5/11 with no DVT  - Eliquis held in setting of melanax3 and drop in hgb     ID:   - Persistent MRSA bacteremia--> dapto increased to 700mg [id number]  - added Ceftaroline 400 mg IV q12 hrs for syngergy  - started meropenem 500mg IV daily on 5/22 while blood cultures pending as per ID--> Pending further blood cultures will discontinue meropenem if no growth by tomorrow  - last blood culture (+) on 5/16, continue with surveillance cultures  - obtain daily CKs  - CT abdomen and pelvis - no significant change in bilateral pleural effusions, moderate to large intra-abdominal ascites, and anasarca. Slightly nodular liver contour and splenomegaly cirrhosis, pancolitis, mild bilateral hydroureteronephrosis extending to the thick-walled urinary bladder, unchanged. Prostatic nodule indenting into the urinary bladder base, as seen on 4/11/2018, dilated main pulmonary artery suggesting pulmonary hypertension.  - thrombocytopenia/petechiae/?Janeway lesions could be in setting of endocarditis/ MRSA bacteremia, plan gallium scan.  - Gallium scan as above suggestive of cellulitis and osteo although with no clinical correlation on physical exam, and not previously seen on chest CT--> MRSA bacteremia? from indwelling palacio cathter as patient with chronic indwelling palacio prior to admission.  - With extensive fungal infection in inguinal area--> will start of PO flucanazole x5 days       DVt ppx: SCDs. no chemic ppx due to pancytopenia  Lines: L IJ HD cath, R IJ CVC 75 yo M with MDS, pancytopenia, CKD, ascites of unknown origin presents from [facility] with complaints of decreased po intake and weakness likely secondary to uremia/AAMIR from diuresis for treatment of ascites, hospital course notable for UGIB and pericardial effusion with tamponade physiology , as well as ATN, and ascites of unknown etiology, now in ICU s/p paracentesis 5/10 (2L removed), IR pericardiocentesis 5/11 (drain in place), now with anemia and thrombocytopenia with hypercoagulability.    Neuro:   p/w AMS likey 2/2 to uremia. On 5/22, more lethargic than usual and difficult to arouse with confusion  Awake and alert this am although minimally engaging.   Remeron in the PM    Cardiology:   Hypotension  MRSA bacteremia vs. intravascular volume depletion from diuresis vs hemolysis? --> c/w dapto, midodrine TID, wean levo as tolerated. Most recent echo with no pericardial effusion. Not a candidate for GET as per cardiology  - midodrine 15mg [id number]  - levophed requirements increased and started vasopressin-Levo requirements decreasing overnight with no clear pericardial effusion on POCUS  - Recultured on 5/22, cultures from 5/18 NGTD  - CVVHD access clotted overnight  ---> solucortef 50mg q6hrs  - Recultured on 5/22, cultures from 5/18 NGTD although patient still requiring pressors suggesting persistent vasoplegia    Pericardial Effusion:  Initial read with some evidence of tamponade physiology; now s/p IR for diagnostic pericardiocentesis on 5/11  - Cardiology following, appreciate recs.   - Given low pericardial drain output and clog, a follow up limited echo on 5/15 showed no pericardial effusion  - IR removed drain on 5/21  --> See above, recent hx of pericardial effusion s/p drain--> possible etiology of hypotension with HD-no effusion on repeat POCUS    Pulm:   stable on RA. maintain head of bed 30 degrees, due to aspiration risk     GI:   multiple episodes of melena, with baseline anemia from MDS, although with concern for coagulopathy given decreased PLTS and elevated BUN.--> monitor coags, c/w protonix.   #ascites  - Elevated SAAG consistent with portal HTN, no evidence of clot on U/S  --> unclear etiology, no cirrhosis on CT.     - Feeds: low appetite but eats with encouragement. Refuses NGT.    #colitis  - CT on 5/18 showing pancolitis  - c.diff neg  - GI PCR negative  - placed rectal tube on 5/18    Renal/:   AAMIR on CKD likely due to hypotension now requring HD--> inability to tolerate HD. Now HD complicated by frequent clotting of HD cath while on hep gtt. see heme section below  - likely ATN from hypotension and ischemic injury  - anuric   - on pressors w increasing requirement, now on levo and vasopressin  - HD initiated on 05/15, not tolerated few IHD sessions due to hemodynamic instability w tachycardia and hypotension despite being on levophed during HD (no pericardial effusion/cardiac tamponade); last IHD attempted 5/18, able to tolerate UF of 0.9 L, currently w increasing pressor requir  - cvvhd access clotted overnight, heparin dc'ed having worsening thrombocytopenia (plts 20)  - positive fluid balance however respiratory status not compromised, acceptable electrolytes in setting of HD instability will defer RRT at present, and reassess daily   - renally adjusted meds/ ABx  - renvela 800 mg po tid w each meal   - if repeat blood cx positive, would recommend removal of HD cath  - pending gallium infection scan--> no clear source of infxn on gallium scan  - pending [facility] discussion   - bilateral renal US - mild bilateral hydronephrosis, large ascites. Splenomegaly. Cholelithiasis.  - palacio exchanged on 5/18 and will c/w palacio for now due to clear pyuria    - bladder wall thickening on CT on 5/18  - Urology consulted and recommended bladder sono with palacio clamped 1hr before to assess for ureteral jets  - c/w palacio catheter  - Patient admitted in February for severe urinary retention and b/l hydronephrosis necessitating indwelling catheter until a definitive bladder outlet procedure    HEME:   #Anemia, thrombocytopenia, splenomegaly   --> Patient with frequent clotting while on HD, with associated and protracted course of worsening thrombocytopenia since february--> Will check AdamsTs-13 and anti-phospholipid for further work up. Given splenomegaly and slightly elevated bili possible extravascular hemolysis  - F/u hemolysis labs  - formal heme consult    DIC? vs. TTp vs. HUS  - formal heme consult- with service hematology  - received heparin during CVVHD on 5/21, now with concern for DIC given worsening plts, decreased fibrinogen (<80). increased INR, worsening LFTs and bili  - heparin discontinued  - hb 6.5 today, plts 25 ->recieved 1unit prbc and 10 units cryo  - received 10 units of cryo on 5/22 and 5/24  - f/u labs    Anemia and coagulopathy--> MDS, UGIB, and elevated BUN.  - plts 25 today - received 10 units cryo    Anemia:   Likely secondary to anemia from chronic disease and bone marrow suppression.   - S/p 1 unit of PRBC on 5/9 for active melena and Hgb stable   - Given 1 unit of prbc and platelets on 5/12 for acute drop, unknown source  - Monitor CBC  - plts 35 on 5/14, 41 on 5/15, and 28 on 5/19 received 1 unit platelets each day  - plts 20 today - received cryo on 5/22  - Transfuse for Hgb <7   - Active type and screen     #Coagulopathy:   - Patient presented with elevated INR to 2.48, with active bleeding.   - Monitor PT/INR   - INR 2.75 today    #DVT - RLE  - repeat dopplers on 5/11 with no DVT  - Eliquis held in setting of melanax3 and drop in hgb     ID:   - Persistent MRSA bacteremia--> dapto increased to 700mg [id number]  -  Ceftaroline 200 mg IV q12 hrs for syngergy  - d/c'd meropenem 500mg IV daily on 5/24 while blood cultures pending NGTD for 48hours  - last blood culture (+) on 5/16, continue with surveillance cultures  - obtain daily CKs  - CT abdomen and pelvis - no significant change in bilateral pleural effusions, moderate to large intra-abdominal ascites, and anasarca. Slightly nodular liver contour and splenomegaly cirrhosis, pancolitis, mild bilateral hydroureteronephrosis extending to the thick-walled urinary bladder, unchanged. Prostatic nodule indenting into the urinary bladder base, as seen on 4/11/2018, dilated main pulmonary artery suggesting pulmonary hypertension.  - thrombocytopenia/petechiae/?Janeway lesions could be in setting of endocarditis/ MRSA bacteremia, plan gallium scan.  - Gallium scan as above suggestive of cellulitis and osteo although with no clinical correlation on physical exam, and not previously seen on chest CT--> MRSA bacteremia? from indwelling palacio cathter as patient with chronic indwelling palacio prior to admission.      ENDO  - fingersticks in 200s, likely secondary to steroids  - 4 lispro, 8 lantus  - monitor sugars      DVt ppx: SCDs. no chemic ppx due to pancytopenia  Lines: L IJ HD cath, R IJ CVC 75 yo M with MDS, pancytopenia, CKD, ascites of unknown origin presents from [facility] with complaints of decreased po intake and weakness likely secondary to uremia/AAMIR from diuresis for treatment of ascites, hospital course notable for UGIB and pericardial effusion with tamponade physiology , as well as ATN, and ascites of unknown etiology, now in ICU s/p paracentesis 5/10 (2L removed), IR pericardiocentesis 5/11 (drain in place), now with anemia and thrombocytopenia with hypercoagulability.    Neuro:   p/w AMS likey 2/2 to uremia. On 5/22, more lethargic than usual and difficult to arouse with confusion  Awake and alert this am although minimally engaging.   Remeron at bedtime    Cardiology:   Hypotension  MRSA bacteremia vs. intravascular volume depletion from diuresis vs hemolysis? --> c/w dapto, midodrine TID, wean levo as tolerated. Most recent echo with no pericardial effusion. Not a candidate for GET as per cardiology  - midodrine 15mg [id number]  -Levo requirements decreasing overnight, vasopressin stopped with no clear pericardial effusion on POCUS  - Recultured on 5/22, cultures from 5/18 NGTD  - CVVHD access clotted overnight  ---> solucortef 50mg q6hrs  - Recultured on 5/22, cultures from 5/18 NGTD although patient still requiring pressors suggesting persistent vasoplegia    Pericardial Effusion:  Initial read with some evidence of tamponade physiology; now s/p IR for diagnostic pericardiocentesis on 5/11  - Cardiology following, appreciate recs.   - Given low pericardial drain output and clog, a follow up limited echo on 5/15 showed no pericardial effusion  - IR removed drain on 5/21  --> See above, recent hx of pericardial effusion s/p drain--> possible etiology of hypotension with HD-no effusion on repeat POCUS    Pulm:   stable on RA. maintain head of bed 30 degrees, due to aspiration risk     GI:   multiple episodes of melena, with baseline anemia from MDS, although with concern for coagulopathy given decreased PLTS and elevated BUN.--> monitor coags, c/w protonix.   #ascites  - Elevated SAAG consistent with portal HTN, no evidence of clot on U/S  --> unclear etiology, no cirrhosis on CT.     - Feeds: low appetite but eats with encouragement. Refuses NGT.    #colitis  - CT on 5/18 showing pancolitis  - c.diff neg  - GI PCR negative  - placed rectal tube on 5/18    Renal/:   AAMIR on CKD likely due to hypotension now requring HD--> inability to tolerate HD. Now HD complicated by frequent clotting of HD cath while on hep gtt. see heme section below  - likely ATN from hypotension and ischemic injury  - anuric   - on pressors w increasing requirement, now on levo and vasopressin  - HD initiated on 05/15, not tolerated few IHD sessions due to hemodynamic instability w tachycardia and hypotension despite being on levophed during HD (no pericardial effusion/cardiac tamponade); last IHD attempted 5/18, able to tolerate UF of 0.9 L, currently w increasing pressor requir  - cvvhd access clotted overnight, heparin dc'ed having worsening thrombocytopenia (plts 20)  - positive fluid balance however respiratory status not compromised, acceptable electrolytes in setting of HD instability will defer RRT at present, and reassess daily   - renally adjusted meds/ ABx  - renvela 800 mg po tid w each meal   - if repeat blood cx positive, would recommend removal of HD cath  - no clear source of infxn on gallium scan  - pending [facility] discussion   - bilateral renal US - mild bilateral hydronephrosis, large ascites. Splenomegaly. Cholelithiasis.  - palacio exchanged on 5/18 and will c/w palacio for now due to clear pyuria    - bladder wall thickening on CT on 5/18  - Urology consulted and recommended bladder sono with palacio clamped 1hr before to assess for ureteral jets  - c/w palacio catheter  - Patient admitted in February for severe urinary retention and b/l hydronephrosis necessitating indwelling catheter until a definitive bladder outlet procedure    HEME:   #Anemia, thrombocytopenia, splenomegaly   --> Patient with frequent clotting while on HD, with associated and protracted course of worsening thrombocytopenia since february--> Will check AdamsTs-13 and anti-phospholipid for further work up. Given splenomegaly and slightly elevated bili possible extravascular hemolysis  - F/u hemolysis labs  - formal heme consult to discuss usefulness of repeat BM bx and possibility of extravascular hemolysis with thrombocytopenia related to splenomegaly    DIC? vs. TTp vs. HUS  - formal heme consult- with service hematology  - received heparin during CVVHD on 5/21, now with concern for DIC given worsening plts, decreased fibrinogen (<80). increased INR, worsening LFTs and bili  - heparin discontinued  - hb 6.5 today, plts 25 ->recieved 1unit prbc and 10 units cryo  - received 10 units of cryo on 5/22 and 5/24  - f/u labs    Anemia and coagulopathy--> MDS, UGIB, and elevated BUN.  - plts 25 today - received 10 units cryo    Anemia:   Likely secondary to anemia from chronic disease and bone marrow suppression.   - S/p 1 unit of PRBC on 5/9 for active melena and Hgb stable   - Given 1 unit of prbc and platelets on 5/12 for acute drop, unknown source  - Monitor CBC  - plts 35 on 5/14, 41 on 5/15, and 28 on 5/19 received 1 unit platelets each day  - plts 20 today - received cryo on 5/22  - Transfuse for Hgb <7   - Active type and screen     #Coagulopathy:   - Patient presented with elevated INR to 2.48, with active bleeding.   - Monitor PT/INR   - INR 2.75 today    #DVT - RLE  - repeat dopplers on 5/11 with no DVT  - Eliquis held in setting of melanax3 and drop in hgb     ID:   - Persistent MRSA bacteremia--> dapto increased to 700mg [id number]  -  Ceftaroline 200 mg IV q12 hrs for syngergy  - d/c'd meropenem 500mg IV daily on 5/24 while blood cultures pending NGTD for 48hours  - last blood culture (+) on 5/16, continue with surveillance cultures  - obtain daily CKs  - CT abdomen and pelvis - no significant change in bilateral pleural effusions, moderate to large intra-abdominal ascites, and anasarca. Slightly nodular liver contour and splenomegaly cirrhosis, pancolitis, mild bilateral hydroureteronephrosis extending to the thick-walled urinary bladder, unchanged. Prostatic nodule indenting into the urinary bladder base, as seen on 4/11/2018, dilated main pulmonary artery suggesting pulmonary hypertension.  - thrombocytopenia/petechiae/?Janeway lesions could be in setting of endocarditis/ MRSA bacteremia, plan gallium scan.  - Gallium scan as above suggestive of cellulitis or rib osteo although with no clinical correlation on physical exam, and not previously seen on chest CT--> MRSA bacteremia? from indwelling palacio cathter as patient with chronic indwelling palacio prior to admission; decubiti are not fluctuant.      ENDO  - fingersticks in 200s, likely secondary to steroids  - 4 lispro, 8 lantus  - monitor sugars      DVt ppx: SCDs. no chemic ppx due to pancytopenia  Lines: L IJ HD cath, R IJ CVC

## 2020-05-24 NOTE — PROGRESS NOTE ADULT - ASSESSMENT
75 yo M with MDS, pancytopenia, CKD, ascites of unknown origin presents from [facility] with complaints of decreased po intake and weakness likely secondary to uremia/AAMIR from diuresis for treatment of ascites, hospital course notable for UGIB and pericardial effusion with tamponade physiology , as well as ATN, and ascites of unknown etiology, now in ICU s/p paracentesis 5/10 (2L removed), IR pericardiocentesis 5/11 (drain in place), now with anemia and thrombocytopenia with hypercoagulability.

## 2020-05-24 NOTE — PROGRESS NOTE ADULT - SUBJECTIVE AND OBJECTIVE BOX
CC: ACUTE ON CHRONICRENAL INSUFFICIENCY      INTERVAL HISTORY:  lying in bed  responsive to touch      ROS: No chest pain, no sob, no abd pain. No n/v/d    PAST MEDICAL & SURGICAL HISTORY:  History of pancytopenia  H/O hydrocephalus  Anemia  HLD (hyperlipidemia)  Enlarged prostate  DM (diabetes mellitus)  HTN (hypertension)  H/O prior ablation treatment      PHYSICAL EXAM:  T(C): 36.9 (05-24-20 @ 05:51), Max: 37.4 (05-23-20 @ 09:20)  HR: 88 (05-24-20 @ 08:00)  BP: --  RR: 15 (05-24-20 @ 08:00)  SpO2: 100% (05-24-20 @ 08:00)  Wt(kg): --  I&O's Summary    23 May 2020 07:01  -  24 May 2020 07:00  --------------------------------------------------------  IN: 746 mL / OUT: 85 mL / NET: 661 mL    24 May 2020 07:01  -  24 May 2020 08:30  --------------------------------------------------------  IN: 169.4 mL / OUT: 0 mL / NET: 169.4 mL      Weight   General:,  NAD.  HEENT: moist mucous membranes, no pallor/cyanosis.  Neck: no JVD visible.  Cardiac: S1, S2. RRR. No murmurs   Respratory: rhonchi  Abdomen: soft. nontender. nondistended  Skin: no rashes.  Extremities: + edema b/l  Access:       DATA:                        6.5<LL>  5.15  )-----------( 25<L>    ( 24 May 2020 05:42 )             21.9<L>    Ferritin, Serum: 222 ng/mL (05-24 @ 07:04)   Iron Total, Serum: 32 ug/dL <L> (05-24 @ 07:04)     141    |  107    |  81<H>  ----------------------------<  238<H>  Ca:7.5<L> (24 May 2020 07:04)  4.4     |  16<L>  |  4.74<H>      eGFR if Non : 11 <L>  eGFR if : 13 <L>    TPro  4.9<L>  /  Alb  2.3<L>  /  TBili  0.9    /  DBili  x      /  AST  53<H>  /  ALT  17     /  AlkPhos  75     24 May 2020 07:04  Lactate Dehydrogenase, Serum: 421 U/L <H> (05-24 @ 04:47)  Lactate Dehydrogenase, Serum: 257 U/L <H> (05-23 @ 05:19)            Protein/Creatinine Ratio Calculation: 1.4 Ratio <H> (05-10 @ 00:25)          MEDICATIONS  (STANDING):  ceftaroline fosamil IVPB 200 milliGRAM(s) IV Intermittent every 12 hours  chlorhexidine 2% Cloths 1 Application(s) Topical daily  collagenase Ointment 1 Application(s) Topical daily  DAPTOmycin IVPB 700 milliGRAM(s) IV Intermittent every 48 hours  finasteride 5 milliGRAM(s) Oral daily  hydrocortisone sodium succinate Injectable 50 milliGRAM(s) IV Push every 6 hours  insulin glargine Injectable (LANTUS) 4 Unit(s) SubCutaneous at bedtime  insulin lispro (HumaLOG) corrective regimen sliding scale   SubCutaneous Before meals and at bedtime  insulin lispro Injectable (HumaLOG) 2 Unit(s) SubCutaneous three times a day before meals  lidocaine 2% Viscous 5 milliLiter(s) Swish and Spit once  meropenem  IVPB 500 milliGRAM(s) IV Intermittent every 24 hours  midodrine 15 milliGRAM(s) Oral every 8 hours  mirtazapine 7.5 milliGRAM(s) Oral every 24 hours  norepinephrine Infusion 0.05 MICROgram(s)/kG/Min (3.37 mL/Hr) IV Continuous <Continuous>  nystatin Cream 1 Application(s) Topical two times a day  nystatin Powder 1 Application(s) Topical two times a day  pantoprazole  Injectable 40 milliGRAM(s) IV Push every 24 hours  sertraline 50 milliGRAM(s) Oral daily  sevelamer carbonate 800 milliGRAM(s) Oral every 8 hours  vasopressin Infusion 0.02 Unit(s)/Min (1.2 mL/Hr) IV Continuous <Continuous>    MEDICATIONS  (PRN):  guaiFENesin   Syrup  (Sugar-Free) 200 milliGRAM(s) Oral every 6 hours PRN Cough

## 2020-05-24 NOTE — PROGRESS NOTE ADULT - ASSESSMENT
IMPRESSION:  MRSA bacteremia. Primary source could be from urine.  There is concern for endocarditis.  Patient remains hypotensive with  pressor requirements.  Gallium scan does not reveal nidus of infection      Recommend:  1.  Continue Daptomycin 700 mg IV q48hrs  2.  Continue Ceftaroline 200 mg IV q12 in the setting of no renal replacement therapy   3.  Can stop meropenem 500 mg IV daily  4.  Follow up repeat blood culture      ID team 1 will follow

## 2020-05-24 NOTE — PROGRESS NOTE ADULT - SUBJECTIVE AND OBJECTIVE BOX
INTERVAL HPI/OVERNIGHT EVENTS:    Patient was seen at bedside.  Still on pressors.  No fever    ROS:    unable to obtain     CONSTITUTIONAL:  Negative fever or chills, feels well, good appetite  EYES:  Negative  blurry vision or double vision  CARDIOVASCULAR:  Negative for chest pain or palpitations  RESPIRATORY:  Negative for cough, wheezing, or SOB   GASTROINTESTINAL:  Negative for nausea, vomiting, diarrhea, constipation, or abdominal pain  GENITOURINARY:  Negative frequency, urgency or dysuria  NEUROLOGIC:  No headache, confusion, dizziness, lightheadedness      ANTIBIOTICS/RELEVANT:    MEDICATIONS  (STANDING):  ceftaroline fosamil IVPB 200 milliGRAM(s) IV Intermittent every 12 hours  chlorhexidine 2% Cloths 1 Application(s) Topical daily  collagenase Ointment 1 Application(s) Topical daily  DAPTOmycin IVPB 700 milliGRAM(s) IV Intermittent every 48 hours  finasteride 5 milliGRAM(s) Oral daily  hydrocortisone sodium succinate Injectable 50 milliGRAM(s) IV Push every 6 hours  insulin glargine Injectable (LANTUS) 8 Unit(s) SubCutaneous at bedtime  insulin lispro (HumaLOG) corrective regimen sliding scale   SubCutaneous Before meals and at bedtime  insulin lispro Injectable (HumaLOG) 4 Unit(s) SubCutaneous three times a day before meals  midodrine 15 milliGRAM(s) Oral every 8 hours  mirtazapine 7.5 milliGRAM(s) Oral every 24 hours  norepinephrine Infusion 0.05 MICROgram(s)/kG/Min (3.37 mL/Hr) IV Continuous <Continuous>  nystatin Cream 1 Application(s) Topical two times a day  nystatin Powder 1 Application(s) Topical two times a day  pantoprazole  Injectable 40 milliGRAM(s) IV Push every 24 hours  sertraline 50 milliGRAM(s) Oral daily  sevelamer carbonate 800 milliGRAM(s) Oral every 8 hours  vasopressin Infusion 0.02 Unit(s)/Min (1.2 mL/Hr) IV Continuous <Continuous>    MEDICATIONS  (PRN):  guaiFENesin   Syrup  (Sugar-Free) 200 milliGRAM(s) Oral every 6 hours PRN Cough        Vital Signs Last 24 Hrs  T(C): 36.1 (24 May 2020 12:00), Max: 37.3 (23 May 2020 14:47)  T(F): 97 (24 May 2020 12:00), Max: 99.1 (23 May 2020 14:47)  HR: 80 (24 May 2020 12:00) (80 - 106)  BP: --  BP(mean): --  RR: 12 (24 May 2020 12:00) (12 - 24)  SpO2: 100% (24 May 2020 12:00) (74% - 100%)    PHYSICAL EXAM:  Constitutional: frail, lethargic  Eyes:  no icterus   Ear/Nose/Throat: no oral lesion  Neck::  supple  Respiratory: CTA chuck  Cardiovascular: S1S2 RRR, no murmurs  Gastrointestinal:soft, (+) BS, no HSM  Extremities:  + anasarca  Vascular: DP Pulse:	right normal; left normal      LABS:                        6.5    5.15  )-----------( 25       ( 24 May 2020 05:42 )             21.9     05-24    141  |  107  |  81<H>  ----------------------------<  238<H>  4.4   |  16<L>  |  4.74<H>    Ca    7.5<L>      24 May 2020 07:04  Phos  7.1     05-24  Mg     2.0     05-24    TPro  4.9<L>  /  Alb  2.3<L>  /  TBili  0.9  /  DBili  x   /  AST  53<H>  /  ALT  17  /  AlkPhos  75  05-24    PT/INR - ( 24 May 2020 05:44 )   PT: 32.4 sec;   INR: 2.75          PTT - ( 24 May 2020 05:44 )  PTT:41.2 sec      MICROBIOLOGY:    Culture - Blood (05.22.20 @ 13:17)    Specimen Source: .Blood Blood    Culture Results:   No growth at 1 day.        RADIOLOGY & ADDITIONAL STUDIES:    < from: Xray Chest 1 View- PORTABLE-Routine (05.24.20 @ 04:28) >  Impression: Bilateral effusions. Congestion and/or infiltrates cannot be excluded INTERVAL HPI/OVERNIGHT EVENTS:    Patient was seen at bedside.  Still on pressors.  No fever    ROS:    unable to obtain         ANTIBIOTICS/RELEVANT:    MEDICATIONS  (STANDING):  ceftaroline fosamil IVPB 200 milliGRAM(s) IV Intermittent every 12 hours  chlorhexidine 2% Cloths 1 Application(s) Topical daily  collagenase Ointment 1 Application(s) Topical daily  DAPTOmycin IVPB 700 milliGRAM(s) IV Intermittent every 48 hours  finasteride 5 milliGRAM(s) Oral daily  hydrocortisone sodium succinate Injectable 50 milliGRAM(s) IV Push every 6 hours  insulin glargine Injectable (LANTUS) 8 Unit(s) SubCutaneous at bedtime  insulin lispro (HumaLOG) corrective regimen sliding scale   SubCutaneous Before meals and at bedtime  insulin lispro Injectable (HumaLOG) 4 Unit(s) SubCutaneous three times a day before meals  midodrine 15 milliGRAM(s) Oral every 8 hours  mirtazapine 7.5 milliGRAM(s) Oral every 24 hours  norepinephrine Infusion 0.05 MICROgram(s)/kG/Min (3.37 mL/Hr) IV Continuous <Continuous>  nystatin Cream 1 Application(s) Topical two times a day  nystatin Powder 1 Application(s) Topical two times a day  pantoprazole  Injectable 40 milliGRAM(s) IV Push every 24 hours  sertraline 50 milliGRAM(s) Oral daily  sevelamer carbonate 800 milliGRAM(s) Oral every 8 hours  vasopressin Infusion 0.02 Unit(s)/Min (1.2 mL/Hr) IV Continuous <Continuous>    MEDICATIONS  (PRN):  guaiFENesin   Syrup  (Sugar-Free) 200 milliGRAM(s) Oral every 6 hours PRN Cough        Vital Signs Last 24 Hrs  T(C): 36.1 (24 May 2020 12:00), Max: 37.3 (23 May 2020 14:47)  T(F): 97 (24 May 2020 12:00), Max: 99.1 (23 May 2020 14:47)  HR: 80 (24 May 2020 12:00) (80 - 106)  BP: --  BP(mean): --  RR: 12 (24 May 2020 12:00) (12 - 24)  SpO2: 100% (24 May 2020 12:00) (74% - 100%)    PHYSICAL EXAM:  Constitutional: frail, lethargic  Eyes:  no icterus   Ear/Nose/Throat: no oral lesion  Neck::  supple  Respiratory: CTA chuck  Cardiovascular: S1S2 RRR, no murmurs  Gastrointestinal:soft, (+) BS, no HSM  Extremities:  + anasarca  Vascular: DP Pulse:	right normal; left normal      LABS:                        6.5    5.15  )-----------( 25       ( 24 May 2020 05:42 )             21.9     05-24    141  |  107  |  81<H>  ----------------------------<  238<H>  4.4   |  16<L>  |  4.74<H>    Ca    7.5<L>      24 May 2020 07:04  Phos  7.1     05-24  Mg     2.0     05-24    TPro  4.9<L>  /  Alb  2.3<L>  /  TBili  0.9  /  DBili  x   /  AST  53<H>  /  ALT  17  /  AlkPhos  75  05-24    PT/INR - ( 24 May 2020 05:44 )   PT: 32.4 sec;   INR: 2.75          PTT - ( 24 May 2020 05:44 )  PTT:41.2 sec      MICROBIOLOGY:    Culture - Blood (05.22.20 @ 13:17)    Specimen Source: .Blood Blood    Culture Results:   No growth at 1 day.        RADIOLOGY & ADDITIONAL STUDIES:    < from: Xray Chest 1 View- PORTABLE-Routine (05.24.20 @ 04:28) >  Impression: Bilateral effusions. Congestion and/or infiltrates cannot be excluded

## 2020-05-24 NOTE — PROGRESS NOTE ADULT - PROBLEM SELECTOR PLAN 1
anuric AAMIR secondary to probable ATN   hypercoaguable status precludes dialytic intervention  currently IV Levophed slowly being titrated down  GOC to be discussed with HCP anuric AAMIR secondary to probable ATN   hypercoaguable status precludes dialytic intervention- may try CVVHD again but will discuss with CCU team  currently IV Levophed slowly being titrated down  GOC to be discussed with HCP

## 2020-05-25 NOTE — PROGRESS NOTE ADULT - PROBLEM SELECTOR PLAN 1
worsening anuric AAMIR  patient's hypercoaguable state causing clotting of dialysis filters precludes HD at this time  Palliative care and Heme/Onc to be called to assess further intervention  will continue to follow for possible further attempts at hemodialysis

## 2020-05-25 NOTE — PROGRESS NOTE ADULT - ASSESSMENT
75 yo M with MDS, pancytopenia, CKD, ascites of unknown origin presents from [facility] with complaints of decreased po intake and weakness likely secondary to uremia/AAMIR from diuresis for treatment of ascites, hospital course notable for UGIB and pericardial effusion with tamponade physiology , as well as ATN, and ascites of unknown etiology, now in ICU s/p paracentesis 5/10 (2L removed), IR pericardiocentesis 5/11 (drain in place), now with anemia and thrombocytopenia with hypercoagulability.    Neuro:   p/w AMS likey 2/2 to uremia. On 5/22, more lethargic than usual and difficult to arouse with confusion  Awake and alert this am although minimally engaging.   Remeron at bedtime    Cardiology:   Hypotension  MRSA bacteremia vs. intravascular volume depletion from diuresis vs hemolysis? --> c/w dapto, midodrine TID, wean levo as tolerated. Most recent echo with no pericardial effusion. Not a candidate for GET as per cardiology  - midodrine 15mg [id number]  -Levo requirements decreasing overnight, vasopressin stopped with no clear pericardial effusion on POCUS  - Recultured on 5/22, cultures from 5/18 NGTD  - CVVHD access clotted overnight  ---> solucortef 50mg q6hrs  - Recultured on 5/22, cultures from 5/18 NGTD although patient still requiring pressors suggesting persistent vasoplegia    Pericardial Effusion:  Initial read with some evidence of tamponade physiology; now s/p IR for diagnostic pericardiocentesis on 5/11  - Cardiology following, appreciate recs.   - Given low pericardial drain output and clog, a follow up limited echo on 5/15 showed no pericardial effusion  - IR removed drain on 5/21  --> See above, recent hx of pericardial effusion s/p drain--> possible etiology of hypotension with HD-no effusion on repeat POCUS    Pulm:   stable on RA. maintain head of bed 30 degrees, due to aspiration risk     GI:   multiple episodes of melena, with baseline anemia from MDS, although with concern for coagulopathy given decreased PLTS and elevated BUN.--> monitor coags, c/w protonix.   #ascites  - Elevated SAAG consistent with portal HTN, no evidence of clot on U/S  --> unclear etiology, no cirrhosis on CT.     - Feeds: low appetite but eats with encouragement. Refuses NGT.    #colitis  - CT on 5/18 showing pancolitis  - c.diff neg  - GI PCR negative  - placed rectal tube on 5/18    Renal/:   AAMIR on CKD likely due to hypotension now requring HD--> inability to tolerate HD. Now HD complicated by frequent clotting of HD cath while on hep gtt. see heme section below  - likely ATN from hypotension and ischemic injury  - anuric   - on pressors w increasing requirement, now on levo and vasopressin  - HD initiated on 05/15, not tolerated few IHD sessions due to hemodynamic instability w tachycardia and hypotension despite being on levophed during HD (no pericardial effusion/cardiac tamponade); last IHD attempted 5/18, able to tolerate UF of 0.9 L, currently w increasing pressor requir  - cvvhd access clotted overnight, heparin dc'ed having worsening thrombocytopenia (plts 20)  - positive fluid balance however respiratory status not compromised, acceptable electrolytes in setting of HD instability will defer RRT at present, and reassess daily   - renally adjusted meds/ ABx  - renvela 800 mg po tid w each meal   - if repeat blood cx positive, would recommend removal of HD cath  - no clear source of infxn on gallium scan  - pending [facility] discussion   - bilateral renal US - mild bilateral hydronephrosis, large ascites. Splenomegaly. Cholelithiasis.  - palacio exchanged on 5/18 and will c/w palacio for now due to clear pyuria    - bladder wall thickening on CT on 5/18  - Urology consulted and recommended bladder sono with palacio clamped 1hr before to assess for ureteral jets  - c/w palacio catheter  - Patient admitted in February for severe urinary retention and b/l hydronephrosis necessitating indwelling catheter until a definitive bladder outlet procedure    HEME:   #Anemia, thrombocytopenia, splenomegaly   --> Patient with frequent clotting while on HD, with associated and protracted course of worsening thrombocytopenia since february--> Will check AdamsTs-13 and anti-phospholipid for further work up. Given splenomegaly and slightly elevated bili possible extravascular hemolysis  - F/u hemolysis labs  - formal heme consult to discuss usefulness of repeat BM bx and possibility of extravascular hemolysis with thrombocytopenia related to splenomegaly    DIC? vs. TTp vs. HUS  - formal heme consult- with service hematology  - received heparin during CVVHD on 5/21, now with concern for DIC given worsening plts, fibrinogen 118. increased INR, worsening LFTs and bili  - heparin discontinued  - received 10 units of cryo on 5/22 and 5/24  - f/u labs    Anemia and coagulopathy--> MDS, UGIB, and elevated BUN.  - plts 36 today    Anemia:   Likely secondary to anemia from chronic disease and bone marrow suppression.   - S/p 1 unit of PRBC on 5/9 for active melena and Hgb stable   - Given 1 unit of prbc and platelets on 5/12 for acute drop, unknown source  - Monitor CBC  - plts 36 today  - Transfuse for Hgb <7   - Active type and screen     #Coagulopathy:   - Patient presented with elevated INR to 2.48, with active bleeding.   - Monitor PT/INR   - INR 2.04 today    #DVT - RLE  - repeat dopplers on 5/11 with no DVT  - Eliquis held in setting of melanax3 and drop in hgb     ID:   - Persistent MRSA bacteremia--> dapto 700mg [id number]  - Ceftaroline 200 mg IV q12 hrs for syngergy  - d/c'd meropenem 500mg IV daily on 5/24 while blood cultures pending NGTD for 48hours  - last blood culture (+) on 5/16, continue with surveillance cultures  - obtain daily CKs  - CT abdomen and pelvis - no significant change in bilateral pleural effusions, moderate to large intra-abdominal ascites, and anasarca. Slightly nodular liver contour and splenomegaly cirrhosis, pancolitis, mild bilateral hydroureteronephrosis extending to the thick-walled urinary bladder, unchanged. Prostatic nodule indenting into the urinary bladder base, as seen on 4/11/2018, dilated main pulmonary artery suggesting pulmonary hypertension.  - thrombocytopenia/petechiae/?[place] lesions could be in setting of endocarditis/ MRSA bacteremia, plan gallium scan.  - Gallium scan as above suggestive of cellulitis or rib osteo although with no clinical correlation on physical exam, and not previously seen on chest CT--> MRSA bacteremia? from indwelling palacio cathter as patient with chronic indwelling palacio prior to admission; decubiti are not fluctuant.      ENDO  - fingersticks in 200s, likely secondary to steroids  - 4 lispro, 8 lantus  - monitor sugars      DVt ppx: SCDs. no chemic ppx due to pancytopenia  Lines: L IJ HD cath, R IJ CVC 73 yo M with MDS, pancytopenia, CKD, ascites of unknown origin presents from [facility] with complaints of decreased po intake and weakness likely secondary to uremia/AAMIR from diuresis for treatment of ascites, hospital course notable for UGIB and pericardial effusion with tamponade physiology , as well as ATN, and ascites of unknown etiology, now in ICU s/p paracentesis 5/10 (2L removed), IR pericardiocentesis 5/11 (drain in place), now with anemia and thrombocytopenia with hypercoagulability.    Neuro:   p/w AMS likey 2/2 to uremia. On 5/22, more lethargic than usual and difficult to arouse with confusion  Awake and alert this am although minimally engaging.   Remeron at bedtime    Cardiology:   Hypotension  MRSA bacteremia vs. intravascular volume depletion from diuresis vs hemolysis? --> c/w dapto, midodrine TID, wean levo as tolerated. Most recent echo with no pericardial effusion. Not a candidate for GET as per cardiology  - midodrine 15mg [id number]  -Levo requirements decreasing overnight, vasopressin stopped with no clear pericardial effusion on POCUS  - Recultured on 5/22, cultures from 5/18 NGTD  - CVVHD access clotted overnight  ---> solucortef 50mg q6hrs  - Recultured on 5/22, cultures from 5/18 NGTD although patient still requiring pressors suggesting persistent vasoplegia    Pericardial Effusion:  Initial read with some evidence of tamponade physiology; now s/p IR for diagnostic pericardiocentesis on 5/11  - Cardiology following, appreciate recs.   - Given low pericardial drain output and clog, a follow up limited echo on 5/15 showed no pericardial effusion  - IR removed drain on 5/21  --> See above, recent hx of pericardial effusion s/p drain--> possible etiology of hypotension with HD-no effusion on repeat POCUS    Pulm:   stable on RA. maintain head of bed 30 degrees, due to aspiration risk     GI:   multiple episodes of melena, with baseline anemia from MDS, although with concern for coagulopathy given decreased PLTS and elevated BUN.--> monitor coags, c/w protonix.   #ascites  - Elevated SAAG consistent with portal HTN, no evidence of clot on U/S  --> unclear etiology, no cirrhosis on CT.     - Feeds: low appetite but eats with encouragement. Refuses NGT.    #colitis  - CT on 5/18 showing pancolitis  - c.diff neg  - GI PCR negative  - placed rectal tube on 5/18    Renal/:   AAMIR on CKD likely due to hypotension now requring HD--> inability to tolerate HD. Now HD complicated by frequent clotting of HD cath while on hep gtt. see heme section below  - likely ATN from hypotension and ischemic injury  - anuric   - on pressors w increasing requirement, now on levo and vasopressin  - HD initiated on 05/15, not tolerated few IHD sessions due to hemodynamic instability w tachycardia and hypotension despite being on levophed during HD (no pericardial effusion/cardiac tamponade);  - positive fluid balance however respiratory status not compromised, acceptable electrolytes in setting of HD instability will defer RRT at present, and reassess daily   - renally adjusted meds/ ABx  - renvela increased to 1600mg PO TID w/each meal   - if repeat blood cx positive, would recommend removal of HD cath  - no clear source of infxn on gallium scan  - pending [facility] discussion   - bilateral renal US - mild bilateral hydronephrosis, large ascites. Splenomegaly. Cholelithiasis.  - palacio exchanged on 5/18 and will c/w palacio for now due to clear pyuria    - bladder wall thickening on CT on 5/18  - Urology consulted and recommended bladder sono with palacio clamped 1hr before to assess for ureteral jets  - c/w palacio catheter  - Patient admitted in February for severe urinary retention and b/l hydronephrosis necessitating indwelling catheter until a definitive bladder outlet procedure    HEME:   #Anemia, thrombocytopenia, splenomegaly   --> Patient with frequent clotting while on HD, with associated and protracted course of worsening thrombocytopenia since february--> Will check AdamsTs-13 and anti-phospholipid for further work up. Given splenomegaly and slightly elevated bili possible extravascular hemolysis  - F/u hemolysis labs  - formal heme consult to discuss usefulness of repeat BM bx and possibility of extravascular hemolysis with thrombocytopenia related to splenomegaly    DIC? vs. TTp vs. HUS  - formal heme consult- with service hematology  - received heparin during CVVHD on 5/21, now with concern for DIC given worsening plts, fibrinogen 118. increased INR, worsening LFTs and bili  - heparin discontinued  - received 10 units of cryo on 5/22 and 5/24  - f/u labs    Anemia and coagulopathy--> MDS, UGIB, and elevated BUN.  - plts 36 today    Anemia:   Likely secondary to anemia from chronic disease and bone marrow suppression.   - S/p 1 unit of PRBC on 5/9 for active melena and Hgb stable   - Given 1 unit of prbc and platelets on 5/12 for acute drop, unknown source  - Monitor CBC  - plts 36 today  - Transfuse for Hgb <7   - Active type and screen     #Coagulopathy:   - Patient presented with elevated INR to 2.48, with active bleeding.   - Monitor PT/INR   - INR 2.04 today    #DVT - RLE  - repeat dopplers on 5/11 with no DVT  - Eliquis held in setting of melanax3 and drop in hgb     ID:   - Persistent MRSA bacteremia--> dapto 700mg [id number]  - Ceftaroline 200 mg IV q12 hrs for syngergy  - d/c'd meropenem 500mg IV daily on 5/24 while blood cultures pending NGTD for 48hours  - last blood culture (+) on 5/16, continue with surveillance cultures  - obtain daily CKs  - CT abdomen and pelvis - no significant change in bilateral pleural effusions, moderate to large intra-abdominal ascites, and anasarca. Slightly nodular liver contour and splenomegaly cirrhosis, pancolitis, mild bilateral hydroureteronephrosis extending to the thick-walled urinary bladder, unchanged. Prostatic nodule indenting into the urinary bladder base, as seen on 4/11/2018, dilated main pulmonary artery suggesting pulmonary hypertension.  - thrombocytopenia/petechiae/?[place] lesions could be in setting of endocarditis/ MRSA bacteremia, plan gallium scan.  - Gallium scan as above suggestive of cellulitis or rib osteo although with no clinical correlation on physical exam, and not previously seen on chest CT--> MRSA bacteremia? from indwelling palacio cathter as patient with chronic indwelling palacio prior to admission; decubiti are not fluctuant.      ENDO  - fingersticks in 200s, likely secondary to steroids  - 4 lispro, 8 lantus  - monitor sugars      DVt ppx: SCDs. no chemic ppx due to pancytopenia  Lines: L IJ HD cath, R IJ CVC

## 2020-05-25 NOTE — PROGRESS NOTE ADULT - SUBJECTIVE AND OBJECTIVE BOX
INTERVAL HPI/OVERNIGHT EVENTS:    Patient was seen at bedside.  Events noted      ROS:    unable to obtain         ANTIBIOTICS/RELEVANT:    MEDICATIONS  (STANDING):  ceftaroline fosamil IVPB 200 milliGRAM(s) IV Intermittent every 12 hours  chlorhexidine 2% Cloths 1 Application(s) Topical daily  collagenase Ointment 1 Application(s) Topical daily  DAPTOmycin IVPB 700 milliGRAM(s) IV Intermittent every 48 hours  finasteride 5 milliGRAM(s) Oral daily  hydrocortisone sodium succinate Injectable 50 milliGRAM(s) IV Push every 6 hours  insulin glargine Injectable (LANTUS) 8 Unit(s) SubCutaneous at bedtime  insulin lispro (HumaLOG) corrective regimen sliding scale   SubCutaneous Before meals and at bedtime  insulin lispro Injectable (HumaLOG) 4 Unit(s) SubCutaneous three times a day before meals  midodrine 15 milliGRAM(s) Oral every 8 hours  mirtazapine 7.5 milliGRAM(s) Oral every 24 hours  norepinephrine Infusion 0.05 MICROgram(s)/kG/Min (3.37 mL/Hr) IV Continuous <Continuous>  nystatin Cream 1 Application(s) Topical two times a day  nystatin Powder 1 Application(s) Topical two times a day  pantoprazole  Injectable 40 milliGRAM(s) IV Push every 24 hours  sertraline 50 milliGRAM(s) Oral daily  sevelamer carbonate 1600 milliGRAM(s) Oral every 8 hours  vasopressin Infusion 0.02 Unit(s)/Min (1.2 mL/Hr) IV Continuous <Continuous>    MEDICATIONS  (PRN):  guaiFENesin   Syrup  (Sugar-Free) 200 milliGRAM(s) Oral every 6 hours PRN Cough        Vital Signs Last 24 Hrs  T(C): 35 (25 May 2020 13:00), Max: 36.5 (25 May 2020 02:01)  T(F): 95 (25 May 2020 13:00), Max: 97.7 (25 May 2020 02:01)  HR: 80 (25 May 2020 13:00) (74 - 82)  BP: 98/53 (25 May 2020 13:00) (97/53 - 117/60)  BP(mean): 70 (25 May 2020 13:00) (70 - 84)  RR: 15 (25 May 2020 13:00) (11 - 19)  SpO2: 100% (25 May 2020 13:00) (92% - 100%)    PHYSICAL EXAM:  Constitutional: frail, no distress  Eyes:  no icterus   Ear/Nose/Throat: no oral lesion   Neck:  supple  Respiratory: CTA chuck  Cardiovascular: S1S2 RRR, no murmurs  Gastrointestinal:soft, (+) BS, no HSM  Extremities:  + anasarca  Vascular: DP Pulse:	right normal; left normal      LABS:                        8.6    8.39  )-----------( 36       ( 25 May 2020 04:48 )             28.7     05-25    139  |  104  |  90<H>  ----------------------------<  203<H>  4.3   |  18<L>  |  5.28<H>    Ca    7.6<L>      25 May 2020 04:48  Phos  7.2     05-25  Mg     2.0     05-25    TPro  5.4<L>  /  Alb  2.6<L>  /  TBili  0.8  /  DBili  x   /  AST  37  /  ALT  14  /  AlkPhos  83  05-25    PT/INR - ( 25 May 2020 04:48 )   PT: 23.8 sec;   INR: 2.04          PTT - ( 25 May 2020 04:48 )  PTT:40.7 sec      MICROBIOLOGY:    Culture - Blood (05.22.20 @ 13:17)    Specimen Source: .Blood Blood    Culture Results:   No growth at 2 days.    GI PCR Panel, Stool (05.19.20 @ 22:11)    Culture Results:   GI PCR Results: NOT detected  *******Please Note:*******  GI panel PCR evaluates for:  Campylobacter, Plesiomonas shigelloides, Salmonella,  Vibrio, Yersinia enterocolitica, Enteroaggregative  Escherichia coli (EAEC), Enteropathogenic E.coli (EPEC),  Enterotoxigenic E. coli (ETEC) lt/st, Shiga-like  toxin-producing E. coli (STEC) stx1/stx2,  Shigella/ Enteroinvasive E. coli (EIEC), Cryptosporidium,  Cyclospora cayetanensis, Entamoeba histolytica,  Giardia lamblia, Adenovirus F 40/41, Astrovirus,  Norovirus GI/GII, Rotavirus A, Sapovirus        Culture - Blood (05.18.20 @ 13:01)    Specimen Source: .Blood Blood    Culture Results:   No growth at 5 days.    RADIOLOGY & ADDITIONAL STUDIES:    < from: NM Inflammatory Loc Wholebody Gallium, Single Day (05.22.20 @ 17:41) >  Patchy radiotracer activity along the right posterior chest wall, correlating to fluid/fat stranding and soft tissue swelling along the ribs and intercostal muscles. Clinical correlation for cellulitis and osteomyelitis is recommended.

## 2020-05-25 NOTE — PROGRESS NOTE ADULT - ASSESSMENT
IMPRESSION:  MRSA bacteremia. Primary source could be from urine.  There is concern for endocarditis -> this has not been evaluated for with GET.   Gallium scan does not reveal nidus of infection      Recommend:  1.  Continue Daptomycin 700 mg IV q48hrs  2.  Can stop Ceftaroline as there is no evidence of lung involvement  3.  Can stop meropenem 500 mg IV daily  4.  Follow up repeat blood culture  5.  I would favor a 6 week course of IV daptomycin. Day 1 is the date of the first negative culture which is 5/18    ID team 1 will follow

## 2020-05-25 NOTE — PROGRESS NOTE ADULT - SUBJECTIVE AND OBJECTIVE BOX
CC: ACUTE ON CHRONICRENAL INSUFFICIENCY      INTERVAL HISTORY:  lying in bed in NAD      ROS: No chest pain, no sob, no abd pain. No n/v/d    PAST MEDICAL & SURGICAL HISTORY:  History of pancytopenia  H/O hydrocephalus  Anemia  HLD (hyperlipidemia)  Enlarged prostate  DM (diabetes mellitus)  HTN (hypertension)  H/O prior ablation treatment      PHYSICAL EXAM:  T(C): 36.5 (05-25-20 @ 05:56), Max: 36.5 (05-25-20 @ 02:01)  HR: 80 (05-25-20 @ 08:00)  BP: 102/58 (05-25-20 @ 08:00) (102/58 - 117/60)  RR: 14 (05-25-20 @ 08:00)  SpO2: 100% (05-25-20 @ 08:00)  Wt(kg): --  I&O's Summary    24 May 2020 07:01  -  25 May 2020 07:00  --------------------------------------------------------  IN: 205.4 mL / OUT: 60 mL / NET: 145.4 mL    25 May 2020 07:01  -  25 May 2020 08:39  --------------------------------------------------------  IN: 3 mL / OUT: 0 mL / NET: 3 mL      Weight   General: ,  NAD.  HEENT: moist mucous membranes, no pallor/cyanosis.  Neck: no JVD visible.  Cardiac: S1, S2. RRR. No murmurs   Respratory: CTA b/l, no access muscle use.   Abdomen: soft. nontender. nondistended  Skin: no rashes.  Extremities: + LE edema b/l  Access:       DATA:                        8.6<L>  8.39  )-----------( 36<L>    ( 25 May 2020 04:48 )             28.7<L>    Ferritin, Serum: 222 ng/mL (05-24 @ 07:04)   Iron Total, Serum: 32 ug/dL <L> (05-24 @ 07:04)     139    |  104    |  90<H>  ----------------------------<  203<H>  Ca:7.6<L> (25 May 2020 04:48)  4.3     |  18<L>  |  5.28<H>      eGFR if Non : 10 <L>  eGFR if : 11 <L>    TPro  5.4<L>  /  Alb  2.6<L>  /  TBili  0.8    /  DBili  x      /  AST  37     /  ALT  14     /  AlkPhos  83     25 May 2020 04:48  Lactate Dehydrogenase, Serum: 421 U/L <H> (05-24 @ 04:47)            Protein/Creatinine Ratio Calculation: 1.4 Ratio <H> (05-10 @ 00:25)          MEDICATIONS  (STANDING):  ceftaroline fosamil IVPB 200 milliGRAM(s) IV Intermittent every 12 hours  chlorhexidine 2% Cloths 1 Application(s) Topical daily  collagenase Ointment 1 Application(s) Topical daily  DAPTOmycin IVPB 700 milliGRAM(s) IV Intermittent every 48 hours  finasteride 5 milliGRAM(s) Oral daily  hydrocortisone sodium succinate Injectable 50 milliGRAM(s) IV Push every 6 hours  insulin glargine Injectable (LANTUS) 8 Unit(s) SubCutaneous at bedtime  insulin lispro (HumaLOG) corrective regimen sliding scale   SubCutaneous Before meals and at bedtime  insulin lispro Injectable (HumaLOG) 4 Unit(s) SubCutaneous three times a day before meals  midodrine 15 milliGRAM(s) Oral every 8 hours  mirtazapine 7.5 milliGRAM(s) Oral every 24 hours  norepinephrine Infusion 0.05 MICROgram(s)/kG/Min (3.37 mL/Hr) IV Continuous <Continuous>  nystatin Cream 1 Application(s) Topical two times a day  nystatin Powder 1 Application(s) Topical two times a day  pantoprazole  Injectable 40 milliGRAM(s) IV Push every 24 hours  sertraline 50 milliGRAM(s) Oral daily  sevelamer carbonate 800 milliGRAM(s) Oral every 8 hours  vasopressin Infusion 0.02 Unit(s)/Min (1.2 mL/Hr) IV Continuous <Continuous>    MEDICATIONS  (PRN):  guaiFENesin   Syrup  (Sugar-Free) 200 milliGRAM(s) Oral every 6 hours PRN Cough

## 2020-05-25 NOTE — GOALS OF CARE CONVERSATION - ADVANCED CARE PLANNING - CONVERSATION DETAILS
Updated Teresa Gutierrez, HCP, on plan to re-attempt intermittent HD given failure of CVVHD. Did explain that he has not tolerated intermittent HD in the past and is still on pressors. Teresa reports she spoke to patient the other day and he expressed his wishes at this time to remain full code and have chest compressions or ventilator if needed. I asked her if he would want to be on a ventilator if he could never come off of it and she was not sure about that. I assured her that these decisions could be re-discussed or changed if needed as new situations arise and that any decision at this time is not permanent, per se. She seemed very focused on his improved mental status today, which may be attributable to clearing infection or getting better sleep now that CVVHD not going all night but I emphasized that if he cannot tolerate dialysis or get off pressors, we are looking at an end-of-life situation and she should continue to engage with him about his wishes. Per discussion, will remain full code at this time.

## 2020-05-25 NOTE — PROGRESS NOTE ADULT - SUBJECTIVE AND OBJECTIVE BOX
Pt seen and examined   awake, not in distress  looks comfortable rectal tube  on pressor    REVIEW OF SYSTEMS:  Constitutional: No fever,  Cardiovascular: No chest pain, palpitations,   Gastrointestinal: No abdominal or epigastric pain. No nausea, vomiting  Skin: No itching,        MEDICATIONS:  MEDICATIONS  (STANDING):  ceftaroline fosamil IVPB 200 milliGRAM(s) IV Intermittent every 12 hours  chlorhexidine 2% Cloths 1 Application(s) Topical daily  collagenase Ointment 1 Application(s) Topical daily  DAPTOmycin IVPB 700 milliGRAM(s) IV Intermittent every 48 hours  finasteride 5 milliGRAM(s) Oral daily  hydrocortisone sodium succinate Injectable 50 milliGRAM(s) IV Push every 6 hours  insulin glargine Injectable (LANTUS) 8 Unit(s) SubCutaneous at bedtime  insulin lispro (HumaLOG) corrective regimen sliding scale   SubCutaneous Before meals and at bedtime  insulin lispro Injectable (HumaLOG) 4 Unit(s) SubCutaneous three times a day before meals  midodrine 15 milliGRAM(s) Oral every 8 hours  mirtazapine 7.5 milliGRAM(s) Oral every 24 hours  norepinephrine Infusion 0.05 MICROgram(s)/kG/Min (3.37 mL/Hr) IV Continuous <Continuous>  nystatin Cream 1 Application(s) Topical two times a day  nystatin Powder 1 Application(s) Topical two times a day  pantoprazole  Injectable 40 milliGRAM(s) IV Push every 24 hours  sertraline 50 milliGRAM(s) Oral daily  sevelamer carbonate 1600 milliGRAM(s) Oral every 8 hours  vasopressin Infusion 0.02 Unit(s)/Min (1.2 mL/Hr) IV Continuous <Continuous>    MEDICATIONS  (PRN):  guaiFENesin   Syrup  (Sugar-Free) 200 milliGRAM(s) Oral every 6 hours PRN Cough      Allergies    No Known Allergies    Intolerances        Vital Signs Last 24 Hrs  T(C): 34.7 (25 May 2020 10:00), Max: 36.5 (25 May 2020 02:01)  T(F): 94.5 (25 May 2020 10:00), Max: 97.7 (25 May 2020 02:01)  HR: 80 (25 May 2020 11:00) (74 - 82)  BP: 97/53 (25 May 2020 11:00) (97/53 - 117/60)  BP(mean): 70 (25 May 2020 11:00) (70 - 84)  RR: 15 (25 May 2020 11:00) (11 - 19)  SpO2: 100% (25 May 2020 11:00) (92% - 100%)    05-24 @ 07:01  -  05-25 @ 07:00  --------------------------------------------------------  IN: 205.4 mL / OUT: 60 mL / NET: 145.4 mL    05-25 @ 07:01  -  05-25 @ 11:16  --------------------------------------------------------  IN: 6 mL / OUT: 30 mL / NET: -24 mL        PHYSICAL EXAM:    General: in no acute distress  HEENT: MMM, conjunctiva and sclera clear  Lungs: clear  Heart: regular  Gastrointestinal: Soft non-tender non-distended; Normal bowel sounds; No hepatosplenomegaly  Skin: Warm and dry. No obvious rash    LABS:      CBC Full  -  ( 25 May 2020 04:48 )  WBC Count : 8.39 K/uL  RBC Count : 3.55 M/uL  Hemoglobin : 8.6 g/dL  Hematocrit : 28.7 %  Platelet Count - Automated : 36 K/uL  Mean Cell Volume : 80.8 fl  Mean Cell Hemoglobin : 24.2 pg  Mean Cell Hemoglobin Concentration : 30.0 gm/dL  Auto Neutrophil # : 7.77 K/uL  Auto Lymphocyte # : 0.37 K/uL  Auto Monocyte # : 0.22 K/uL  Auto Eosinophil # : 0.00 K/uL  Auto Basophil # : 0.00 K/uL  Auto Neutrophil % : 92.6 %  Auto Lymphocyte % : 4.4 %  Auto Monocyte % : 2.6 %  Auto Eosinophil % : 0.0 %  Auto Basophil % : 0.0 %    05-25    139  |  104  |  90<H>  ----------------------------<  203<H>  4.3   |  18<L>  |  5.28<H>    Ca    7.6<L>      25 May 2020 04:48  Phos  7.2     05-25  Mg     2.0     05-25    TPro  5.4<L>  /  Alb  2.6<L>  /  TBili  0.8  /  DBili  x   /  AST  37  /  ALT  14  /  AlkPhos  83  05-25    PT/INR - ( 25 May 2020 04:48 )   PT: 23.8 sec;   INR: 2.04          PTT - ( 25 May 2020 04:48 )  PTT:40.7 sec                  RADIOLOGY & ADDITIONAL STUDIES (The following images were personally reviewed):  < from: NM SPECT/CT Inflammatory Loc, Single Area Single Day (05.22.20 @ 17:42) >   NM INFLAMM LOC GALLIUM WB SD      < from: NM SPECT/CT Inflammatory Loc, Single Area Single Day (05.22.20 @ 17:42) >  Impression:   No suspicious focal radiotracer activity in the heart.  Patchy radiotracer activity along the right posterior chest wall, correlating to fluid/fat stranding and soft tissue swelling along the ribs and intercostal muscles. Clinical correlation for cellulitis and osteomyelitis is recommended.

## 2020-05-25 NOTE — PROGRESS NOTE ADULT - SUBJECTIVE AND OBJECTIVE BOX
OVERNIGHT EVENTS: 8pm labs okay - no transfusions needed; Halima poor wave form/does not correlate with cuff     SUBJECTIVE: Patient seen and examined at bedside. He appears comfortable and in no acute distress. He denies feeling pain anywhere but admits to feeling weak. Otherwise, he denies fevers, chills, chest pain, shortness of breath, abdominal pain, n/v.     Vital Signs Last 12 Hrs  T(F): 97.7 (05-25-20 @ 05:56), Max: 97.7 (05-25-20 @ 02:01)  HR: 76 (05-25-20 @ 07:00) (74 - 78)  BP: 109/57 (05-25-20 @ 07:00) (103/58 - 117/60)  BP(mean): 77 (05-25-20 @ 07:00) (76 - 84)  RR: 11 (05-25-20 @ 07:00) (11 - 18)  SpO2: 100% (05-25-20 @ 07:00) (92% - 100%)  I&O's Summary    24 May 2020 07:01  -  25 May 2020 07:00  --------------------------------------------------------  IN: 205.4 mL / OUT: 60 mL / NET: 145.4 mL    25 May 2020 07:01  -  25 May 2020 08:06  --------------------------------------------------------  IN: 3 mL / OUT: 0 mL / NET: 3 mL        PHYSICAL EXAM  General: NAD, resting comfortably in bed. Breathing well on room air  Neck: no JVD  Respiratory: diminished breath sounds at the bases, non-tachypneic, no respiratory distress   Cardiovascular: Regular rhythm/rate; S1/S2, no pericardial rub, pericardial drain removed with site clean, dry, and intact  Gastrointestinal: distended, non tender ascitic drain with appropriate drainage, site clean, dry, and intact  Extremities: WWP; 1+ thigh edema  Neurological:  A&Ox 3, more lethargic than before, arousable but not as interactive. Is able to follow simple commands and answer simple questions. CNII-XII grossly intact; no obvious focal deficits, no flapping tremor, but minimal asterixis  T/L/D: palacio draining red-orange colored urine   Skin: petechiae diffusely         LABS:                        8.6    8.39  )-----------( 36       ( 25 May 2020 04:48 )             28.7     05-25    139  |  104  |  90<H>  ----------------------------<  203<H>  4.3   |  18<L>  |  5.28<H>    Ca    7.6<L>      25 May 2020 04:48  Phos  7.2     05-25  Mg     2.0     05-25    TPro  5.4<L>  /  Alb  2.6<L>  /  TBili  0.8  /  DBili  x   /  AST  37  /  ALT  14  /  AlkPhos  83  05-25    PT/INR - ( 25 May 2020 04:48 )   PT: 23.8 sec;   INR: 2.04          PTT - ( 25 May 2020 04:48 )  PTT:40.7 sec        RADIOLOGY & ADDITIONAL TESTS:    MEDICATIONS  (STANDING):  ceftaroline fosamil IVPB 200 milliGRAM(s) IV Intermittent every 12 hours  chlorhexidine 2% Cloths 1 Application(s) Topical daily  collagenase Ointment 1 Application(s) Topical daily  DAPTOmycin IVPB 700 milliGRAM(s) IV Intermittent every 48 hours  finasteride 5 milliGRAM(s) Oral daily  hydrocortisone sodium succinate Injectable 50 milliGRAM(s) IV Push every 6 hours  insulin glargine Injectable (LANTUS) 8 Unit(s) SubCutaneous at bedtime  insulin lispro (HumaLOG) corrective regimen sliding scale   SubCutaneous Before meals and at bedtime  insulin lispro Injectable (HumaLOG) 4 Unit(s) SubCutaneous three times a day before meals  midodrine 15 milliGRAM(s) Oral every 8 hours  mirtazapine 7.5 milliGRAM(s) Oral every 24 hours  norepinephrine Infusion 0.05 MICROgram(s)/kG/Min (3.37 mL/Hr) IV Continuous <Continuous>  nystatin Cream 1 Application(s) Topical two times a day  nystatin Powder 1 Application(s) Topical two times a day  pantoprazole  Injectable 40 milliGRAM(s) IV Push every 24 hours  sertraline 50 milliGRAM(s) Oral daily  sevelamer carbonate 800 milliGRAM(s) Oral every 8 hours  vasopressin Infusion 0.02 Unit(s)/Min (1.2 mL/Hr) IV Continuous <Continuous>    MEDICATIONS  (PRN):  guaiFENesin   Syrup  (Sugar-Free) 200 milliGRAM(s) Oral every 6 hours PRN Cough

## 2020-05-25 NOTE — PROGRESS NOTE ADULT - ASSESSMENT
73 yo M with MDS, pancytopenia, CKD, ascites of unknown origin presents from [facility] with complaints of decreased po intake and weakness likely secondary to uremia/AAMIR from diuresis for treatment of ascites, hospital course notable for UGIB and pericardial effusion with tamponade physiology , as well as ATN, and ascites of unknown etiology, now in ICU s/p paracentesis 5/10 (2L removed), IR pericardiocentesis 5/11 (drain in place), now with anemia and thrombocytopenia with hypercoagulability.

## 2020-05-26 NOTE — CONSULT NOTE ADULT - PROBLEM SELECTOR RECOMMENDATION 6
Mental status would prevent from activity. Last evaluated by PT on 5/9/2020 found to require 2 person assist. Continues to require nursing assistance for all ADLs.

## 2020-05-26 NOTE — PROGRESS NOTE ADULT - ASSESSMENT
IMPRESSION:  MRSA bacteremia.  Likely due to urine source.  There is concern for endocarditis -> this has not been evaluated for with GET.   Gallium scan does not reveal nidus of infection      Recommend:  1.  Continue Daptomycin 700 mg IV q48hrs  2.  Can stop Ceftaroline as there is no evidence of lung involvement  3.  Recommend 6 weeks of IV daptomycin. Day 1 = 5/18/20  4.  Needs weekly CBC, CMP, CPK  5.  Can follow up with me as outpatient:    [address], [address] floor  [place], [place]  [phone], option 2  fax:  [phone]      ID team 1 will sign off.  Reconsult as needed

## 2020-05-26 NOTE — PROGRESS NOTE ADULT - ATTENDING COMMENTS
events reviewed  pt seen and case discussed with ICU team  oliguric AAMIR with underlying CKD 3- remains dialysis dependent  CVVHD circuit with repeat clotting- and unable to anticoagulate due to thrombocytopenia  drop in BP with acute HD-  defer RRT today-   reassessing goals of care  plan for now is repeat acute HD tomorrow- will proceed with [facility] if hemodynamics do not improve

## 2020-05-26 NOTE — PROGRESS NOTE ADULT - SUBJECTIVE AND OBJECTIVE BOX
OVERNIGHT EVENTS: updated Venus, confirmed full code status - see [facility] note    SUBJECTIVE: Patient seen and examined at bedside. He appears comfortable and in no acute distress. He says he feels weak but otherwise denies pain anywhere. He says he doesn't have an appetite to eat and refuses an NG tube.     Vital Signs Last 12 Hrs  T(F): 95.8 (05-26-20 @ 09:00), Max: 98.2 (05-26-20 @ 01:11)  HR: 80 (05-26-20 @ 10:00) (76 - 80)  BP: 92/50 (05-26-20 @ 10:00) (92/50 - 109/56)  BP(mean): 66 (05-26-20 @ 10:00) (66 - 77)  RR: 19 (05-26-20 @ 10:00) (9 - 19)  SpO2: 100% (05-26-20 @ 10:00) (97% - 100%)  I&O's Summary    25 May 2020 07:01  -  26 May 2020 07:00  --------------------------------------------------------  IN: 200.8 mL / OUT: 165 mL / NET: 35.8 mL    26 May 2020 07:01  -  26 May 2020 10:52  --------------------------------------------------------  IN: 5.4 mL / OUT: 0 mL / NET: 5.4 mL        PHYSICAL EXAM  General: NAD, resting comfortably in bed. Breathing well on room air  Neck: no JVD  Respiratory: diminished breath sounds at the bases, non-tachypneic, no respiratory distress   Cardiovascular: Regular rhythm/rate; S1/S2, no pericardial rub, pericardial drain removed with site clean, dry, and intact  Gastrointestinal: distended, non tender ascitic drain with appropriate drainage, site clean, dry, and intact  Extremities: WWP; 1+ thigh edema  Neurological:  A&Ox 3, arousable and interactive but still weak. Is able to follow simple commands and answer simple questions. CNII-XII grossly intact; no obvious focal deficits, no flapping tremor, but minimal asterixis  T/L/D: palacio present, anuric now  Skin: petechiae diffusely           LABS:                        9.0    9.05  )-----------( 50       ( 26 May 2020 04:58 )             30.1     05-26    140  |  106  |  107<H>  ----------------------------<  180<H>  4.6   |  18<L>  |  5.85<H>    Ca    8.0<L>      26 May 2020 04:58  Phos  7.3     05-26  Mg     2.1     05-26    TPro  5.2<L>  /  Alb  2.5<L>  /  TBili  0.7  /  DBili  x   /  AST  34  /  ALT  12  /  AlkPhos  77  05-26    PT/INR - ( 26 May 2020 04:58 )   PT: 20.4 sec;   INR: 1.76          PTT - ( 26 May 2020 04:58 )  PTT:38.5 sec        RADIOLOGY & ADDITIONAL TESTS:    MEDICATIONS  (STANDING):  ceftaroline fosamil IVPB 200 milliGRAM(s) IV Intermittent every 12 hours  chlorhexidine 2% Cloths 1 Application(s) Topical daily  collagenase Ointment 1 Application(s) Topical daily  DAPTOmycin IVPB 700 milliGRAM(s) IV Intermittent every 48 hours  finasteride 5 milliGRAM(s) Oral daily  hydrocortisone sodium succinate Injectable 50 milliGRAM(s) IV Push every 6 hours  insulin glargine Injectable (LANTUS) 10 Unit(s) SubCutaneous at bedtime  insulin lispro (HumaLOG) corrective regimen sliding scale   SubCutaneous Before meals and at bedtime  insulin lispro Injectable (HumaLOG) 5 Unit(s) SubCutaneous three times a day before meals  midodrine 15 milliGRAM(s) Oral every 8 hours  mirtazapine 7.5 milliGRAM(s) Oral every 24 hours  norepinephrine Infusion 0.05 MICROgram(s)/kG/Min (3.37 mL/Hr) IV Continuous <Continuous>  nystatin Cream 1 Application(s) Topical two times a day  nystatin Powder 1 Application(s) Topical two times a day  pantoprazole  Injectable 40 milliGRAM(s) IV Push every 24 hours  sertraline 50 milliGRAM(s) Oral daily  sevelamer carbonate 1600 milliGRAM(s) Oral every 8 hours    MEDICATIONS  (PRN):  guaiFENesin   Syrup  (Sugar-Free) 200 milliGRAM(s) Oral every 6 hours PRN Cough

## 2020-05-26 NOTE — PROGRESS NOTE ADULT - SUBJECTIVE AND OBJECTIVE BOX
INTERVAL HPI/OVERNIGHT EVENTS:    Patient was seen and examined at bedside.  Events noted.  Still on low dose pressors    ROS:    CONSTITUTIONAL:  Negative fever or chills, feels well, good appetite  EYES:  Negative  blurry vision or double vision  CARDIOVASCULAR:  Negative for chest pain or palpitations  RESPIRATORY:  Negative for cough, wheezing, or SOB   GASTROINTESTINAL:  Negative for nausea, vomiting, diarrhea, constipation, or abdominal pain  GENITOURINARY:  Negative frequency, urgency or dysuria  NEUROLOGIC:  No headache, confusion, dizziness, lightheadedness      ANTIBIOTICS/RELEVANT:    MEDICATIONS  (STANDING):  chlorhexidine 2% Cloths 1 Application(s) Topical daily  collagenase Ointment 1 Application(s) Topical daily  DAPTOmycin IVPB 700 milliGRAM(s) IV Intermittent every 48 hours  finasteride 5 milliGRAM(s) Oral daily  hydrocortisone sodium succinate Injectable 50 milliGRAM(s) IV Push every 6 hours  insulin glargine Injectable (LANTUS) 10 Unit(s) SubCutaneous at bedtime  insulin lispro (HumaLOG) corrective regimen sliding scale   SubCutaneous Before meals and at bedtime  insulin lispro Injectable (HumaLOG) 5 Unit(s) SubCutaneous three times a day before meals  midodrine 15 milliGRAM(s) Oral every 8 hours  mirtazapine 7.5 milliGRAM(s) Oral every 24 hours  norepinephrine Infusion 0.05 MICROgram(s)/kG/Min (3.37 mL/Hr) IV Continuous <Continuous>  nystatin Cream 1 Application(s) Topical two times a day  nystatin Powder 1 Application(s) Topical two times a day  pantoprazole  Injectable 40 milliGRAM(s) IV Push every 24 hours  sertraline 50 milliGRAM(s) Oral daily  sevelamer carbonate 1600 milliGRAM(s) Oral every 8 hours    MEDICATIONS  (PRN):  guaiFENesin   Syrup  (Sugar-Free) 200 milliGRAM(s) Oral every 6 hours PRN Cough        Vital Signs Last 24 Hrs  T(C): 35.4 (26 May 2020 09:00), Max: 36.8 (26 May 2020 01:11)  T(F): 95.8 (26 May 2020 09:00), Max: 98.2 (26 May 2020 01:11)  HR: 78 (26 May 2020 11:00) (76 - 80)  BP: 93/55 (26 May 2020 11:00) (92/50 - 113/55)  BP(mean): 70 (26 May 2020 11:00) (66 - 79)  RR: 10 (26 May 2020 11:00) (9 - 19)  SpO2: 100% (26 May 2020 11:00) (97% - 100%)    PHYSICAL EXAM:  Constitutional: frail, chronically ill appearing   Eyes:  no icterus   Ear/Nose/Throat: no oral lesion   Neck:  supple  Respiratory: CTA chuck  Cardiovascular: S1S2 RRR, no murmurs  Gastrointestinal:soft, (+) BS, no HSM  Extremities: + anasarca  Vascular: DP Pulse:	right normal; left normal      LABS:                        9.0    9.05  )-----------( 50       ( 26 May 2020 04:58 )             30.1     05-26    140  |  106  |  107<H>  ----------------------------<  180<H>  4.6   |  18<L>  |  5.85<H>    Ca    8.0<L>      26 May 2020 04:58  Phos  7.3     05-26  Mg     2.1     05-26    TPro  5.2<L>  /  Alb  2.5<L>  /  TBili  0.7  /  DBili  x   /  AST  34  /  ALT  12  /  AlkPhos  77  05-26    PT/INR - ( 26 May 2020 04:58 )   PT: 20.4 sec;   INR: 1.76          PTT - ( 26 May 2020 04:58 )  PTT:38.5 sec      MICROBIOLOGY:    Culture - Blood (05.22.20 @ 13:17)    Specimen Source: .Blood Blood    Culture Results:   No growth at 3 days.    Culture - Blood (05.18.20 @ 13:01)    Specimen Source: .Blood Blood    Culture Results:   No growth at 5 days.        RADIOLOGY & ADDITIONAL STUDIES:    < from: Xray Chest 1 View- PORTABLE-Routine (05.26.20 @ 02:50) >  IMPRESSION: Frontal view of the chest is compared to 5/25/2020 and demonstrates normal heart size. Large bilateral pleural effusions. Severe pulmonary edema progressive since prior study. Central venous catheter with tip at the cavoatrial junction. Additional central venous catheter in the right atrium. Bibasilar atelectasis.

## 2020-05-26 NOTE — PROGRESS NOTE ADULT - SUBJECTIVE AND OBJECTIVE BOX
coverage Dr Roberts    Pt seen and examined   looks better  responsive   ? constipation  on pressor    REVIEW OF SYSTEMS:  Constitutional: No fever,   Cardiovascular: No chest pain, palpitations,  Gastrointestinal: No abdominal or epigastric pain. No nausea, vomiting   Skin: No itching, burning, rashes or lesions       MEDICATIONS:  MEDICATIONS  (STANDING):  chlorhexidine 2% Cloths 1 Application(s) Topical daily  collagenase Ointment 1 Application(s) Topical daily  DAPTOmycin IVPB 700 milliGRAM(s) IV Intermittent every 48 hours  finasteride 5 milliGRAM(s) Oral daily  hydrocortisone sodium succinate Injectable 50 milliGRAM(s) IV Push every 6 hours  insulin glargine Injectable (LANTUS) 10 Unit(s) SubCutaneous at bedtime  insulin lispro (HumaLOG) corrective regimen sliding scale   SubCutaneous Before meals and at bedtime  midodrine 15 milliGRAM(s) Oral every 8 hours  mirtazapine 7.5 milliGRAM(s) Oral every 24 hours  norepinephrine Infusion 0.05 MICROgram(s)/kG/Min (3.37 mL/Hr) IV Continuous <Continuous>  nystatin Cream 1 Application(s) Topical two times a day  nystatin Powder 1 Application(s) Topical two times a day  pantoprazole  Injectable 40 milliGRAM(s) IV Push every 24 hours  sertraline 50 milliGRAM(s) Oral daily  sevelamer carbonate 1600 milliGRAM(s) Oral every 8 hours    MEDICATIONS  (PRN):  guaiFENesin   Syrup  (Sugar-Free) 200 milliGRAM(s) Oral every 6 hours PRN Cough      Allergies    No Known Allergies    Intolerances        Vital Signs Last 24 Hrs  T(C): 36.7 (26 May 2020 13:00), Max: 36.8 (26 May 2020 01:11)  T(F): 98.1 (26 May 2020 13:00), Max: 98.2 (26 May 2020 01:11)  HR: 80 (26 May 2020 15:00) (76 - 85)  BP: 90/54 (26 May 2020 15:00) (90/54 - 113/55)  BP(mean): 66 (26 May 2020 15:00) (66 - 79)  RR: 10 (26 May 2020 15:00) (9 - 19)  SpO2: 100% (26 May 2020 15:00) (97% - 100%)    05-25 @ 07:01 - 05-26 @ 07:00  --------------------------------------------------------  IN: 200.8 mL / OUT: 165 mL / NET: 35.8 mL    05-26 @ 07:01 - 05-26 @ 15:30  --------------------------------------------------------  IN: 18.1 mL / OUT: 37 mL / NET: -18.9 mL        PHYSICAL EXAM:    General:; in no acute distress  HEENT: MMM, conjunctiva and sclera clear  Lungs: clear  Heart: regular  Gastrointestinal: Soft non-tender non-distended; Normal bowel sounds; No hepatosplenomegaly  Ext: edema    LABS:      CBC Full  -  ( 26 May 2020 04:58 )  WBC Count : 9.05 K/uL  RBC Count : 3.73 M/uL  Hemoglobin : 9.0 g/dL  Hematocrit : 30.1 %  Platelet Count - Automated : 50 K/uL  Mean Cell Volume : 80.7 fl  Mean Cell Hemoglobin : 24.1 pg  Mean Cell Hemoglobin Concentration : 29.9 gm/dL  Auto Neutrophil # : 8.50 K/uL  Auto Lymphocyte # : 0.40 K/uL  Auto Monocyte # : 0.15 K/uL  Auto Eosinophil # : 0.00 K/uL  Auto Basophil # : 0.00 K/uL  Auto Neutrophil % : 93.0 %  Auto Lymphocyte % : 4.4 %  Auto Monocyte % : 1.7 %  Auto Eosinophil % : 0.0 %  Auto Basophil % : 0.0 %    05-26    140  |  106  |  107<H>  ----------------------------<  180<H>  4.6   |  18<L>  |  5.85<H>    Ca    8.0<L>      26 May 2020 04:58  Phos  7.3     05-26  Mg     2.1     05-26    TPro  5.2<L>  /  Alb  2.5<L>  /  TBili  0.7  /  DBili  x   /  AST  34  /  ALT  12  /  AlkPhos  77  05-26    PT/INR - ( 26 May 2020 04:58 )   PT: 20.4 sec;   INR: 1.76          PTT - ( 26 May 2020 04:58 )  PTT:38.5 sec                  RADIOLOGY & ADDITIONAL STUDIES (The following images were personally reviewed):

## 2020-05-26 NOTE — CONSULT NOTE ADULT - PROBLEM SELECTOR RECOMMENDATION 7
No documented HCP found on [place], paper chart or [place]. Patient agreed to have his friend Teresa "Venus" Matt [phone] / [phone] involved in medical care and decisions.

## 2020-05-26 NOTE — CONSULT NOTE ADULT - PROBLEM SELECTOR RECOMMENDATION 3
Has not tolerated HD due to hypotension and did not tolerate CVVHD due to worsening thrombocytopenia/ coagulopathy related  clotting. As per Nephro note and primary team resident patient to be reassessed for HD tomorrow.    Management as per Nephrology Has not tolerated HD due to hypotension and did not tolerate CVVHD due to worsening thrombocytopenia/ coagulopathy related  clotting. As per Nephro note and primary team resident patient to be reassessed for HD tomorrow. Patient would qualify for inpatient hospice care if no longer tolerating HD/CVVHD with prognosis of days to weeks.    Management as per Nephrology

## 2020-05-26 NOTE — CONSULT NOTE ADULT - PROBLEM SELECTOR RECOMMENDATION 5
Albumin, Serum: 2.5 g/dL (05.26.20 @ 04:58) Weight (kg): 71.8 (08 May 2020 22:02)  BMI (kg/m2): 22.7 (08 May 2020 22:02)  Skin breakdown documented.     As per Nutrition: regular diet + Suplena x3/day as oral nutrition supplement with PO consistency per SLP Recs.  As per Dietician: Nepro x3/day as oral nutrition supplements for 350kcal/20gm prot per 1 can, PO consistency per SLP

## 2020-05-26 NOTE — CHART NOTE - NSCHARTNOTEFT_GEN_A_CORE
PALLIATIVE MEDICINE COORDINATION OF CARE NOTE FOR SANIYA LOZANO  [  ] ED Trigger   [  ] MICU Trigger     [ X ] Consult     30 Minutes; Start: 9:30am End: 10:00am, of non-face-to-face prolonged service provided that relates to (face-to-face) care that has or will occur and ongoing patient management, including one or more of the following:   - Reviewed records from other physicians or other health care professional services, including one or more of the following: other medical records and diagnostic / radiology study results     HPI:  Patient is 75 yo M with past medical history of DM, HTN, HLD, BPH (chronic indwelling palacio placement on last admission, SERA, CKD4, and a-fib  , mds, pancytopenia, presents from [facility] with complaints of decreased po intake and weakness. Patient reports that he recently had a intense bowel regimen for constipation after which he developed loose stools. To prevent further loose stools he stopped eating . Decreased po intake also in setting of poor appetite. Patient also complains for worsening LE and abdominal swelling. As per collateral obtained from Dr Barksdale at [facility]([id number]) patient abdominal usg notable for massive ascites, and increased liver echogenicity(LFTS outpatient wnl )   and he was aggressively diuresed with  Lasix , torsemide 40 mg bid (of note also started on midodrine as his pressures would drop with iv diuresis ). After diuresis his ascites and LE edema improved however he developed worsening AAMIR and so Lasix was stopped and torsemide decreased to 20 mg bid. Of note he was seen by Cardiology and  echo done, concern fluid overload was cardiac in etiology given elevated bnp and echo findings.  As per collateral patient also on Levaquin 1 week ago for intersitial infiltrates on cxr , s/p 7 day course.   Upon arrival to ED vitals bp 92/52, hr 94, rr 16, satting well ra.  Labs notable for anemia, thrombocytopenia and  worsening AAMIR. CT abdomen pelvis pending  ed management : supra pubic cath taken out, with plans to place new one, 1L NS, 1G ceftriaxone  Pt admitted to [facility] for further management (08 May 2020 16:10)    - Other: iStop reviewed.    Rx found for Dronabinol on iStop review. Ref #: [id number]    - Other: Medication reviewed.    The patient HAS NOT used PRN's in the last 24h. MME: 0mg  Remains on Levophed gtt.     MEDICATIONS  (STANDING):  ceftaroline fosamil IVPB 200 milliGRAM(s) IV Intermittent every 12 hours  chlorhexidine 2% Cloths 1 Application(s) Topical daily  collagenase Ointment 1 Application(s) Topical daily  DAPTOmycin IVPB 700 milliGRAM(s) IV Intermittent every 48 hours  finasteride 5 milliGRAM(s) Oral daily  hydrocortisone sodium succinate Injectable 50 milliGRAM(s) IV Push every 6 hours  insulin glargine Injectable (LANTUS) 10 Unit(s) SubCutaneous at bedtime  insulin lispro (HumaLOG) corrective regimen sliding scale   SubCutaneous Before meals and at bedtime  insulin lispro Injectable (HumaLOG) 5 Unit(s) SubCutaneous three times a day before meals  midodrine 15 milliGRAM(s) Oral every 8 hours  mirtazapine 7.5 milliGRAM(s) Oral every 24 hours  norepinephrine Infusion 0.05 MICROgram(s)/kG/Min (3.37 mL/Hr) IV Continuous <Continuous>  nystatin Cream 1 Application(s) Topical two times a day  nystatin Powder 1 Application(s) Topical two times a day  pantoprazole  Injectable 40 milliGRAM(s) IV Push every 24 hours  sertraline 50 milliGRAM(s) Oral daily  sevelamer carbonate 1600 milliGRAM(s) Oral every 8 hours    MEDICATIONS  (PRN):  guaiFENesin   Syrup  (Sugar-Free) 200 milliGRAM(s) Oral every 6 hours PRN Cough    - Other: Advanced directives     Full Code      No documented MOLST found on [facility]     No documented HCP form found on [facility]     Other surrogates: Friend Teresa Gutierrez [phone]     No Living will / POA / Advance directives found on [place] / [facility].     Documented GOC notes on [facility] on 5/22/2020 and 5/25/2020    - Other: Coordination/Plan of care     2 admissions in 1 year     Current admission LOS: 18 days     LACE score: 17 ADVANCE ILLNESS PATIENT since 2/19/2020.     Patient NOT previously seen by palliative medicine consult service.      Consult requested for: "Pt is 75 yo M with MDS, pancytopenia, CKD, ascites of unknown origin presents from [facility] with complaints of decreased po intake and weakness. Throughout admission, noted to have MRSA bacteremia and subsequently DIC. Therefore has not been able to undergo HD in setting of renal failure as he keeps clotting off. Still anuric. The patient has increased confusion and has been unable to make decisions regarding ceasing HD, DNR/DNI status. He keeps stating he does not know. Teresa (Peggy_ is his long time best friend and only living friend left. He has stated he would want her to help make decisions and she notes they have discussed being each other's proxy, however does not have a HCP form. She is also unclear regarding his status and what he would want should it come to that decision. At this time, cannot anticoag in setting of DIC however cannot undergo HD due to clotting"    Full consult to be completed within 24h. PALLIATIVE MEDICINE COORDINATION OF CARE NOTE FOR SANIYA LOZANO  [  ] ED Trigger   [  ] MICU Trigger     [ X ] Consult     30 Minutes; Start: 9:30am End: 10:00am, of non-face-to-face prolonged service provided that relates to (face-to-face) care that has or will occur and ongoing patient management, including one or more of the following:   - Reviewed records from other physicians or other health care professional services, including one or more of the following: other medical records and diagnostic / radiology study results     HPI:  Patient is 75 yo M with past medical history of DM, HTN, HLD, BPH (chronic indwelling palacio placement on last admission, SERA, CKD4, and a-fib  , mds, pancytopenia, presents from [facility] with complaints of decreased po intake and weakness. Patient reports that he recently had a intense bowel regimen for constipation after which he developed loose stools. To prevent further loose stools he stopped eating . Decreased po intake also in setting of poor appetite. Patient also complains for worsening LE and abdominal swelling. As per collateral obtained from Dr Barksdale at [facility]([id number]) patient abdominal usg notable for massive ascites, and increased liver echogenicity(LFTS outpatient wnl )   and he was aggressively diuresed with  Lasix , torsemide 40 mg bid (of note also started on midodrine as his pressures would drop with iv diuresis ). After diuresis his ascites and LE edema improved however he developed worsening AAMIR and so Lasix was stopped and torsemide decreased to 20 mg bid. Of note he was seen by Cardiology and  echo done, concern fluid overload was cardiac in etiology given elevated bnp and echo findings.  As per collateral patient also on Levaquin 1 week ago for intersitial infiltrates on cxr , s/p 7 day course.   Upon arrival to ED vitals bp 92/52, hr 94, rr 16, satting well ra.  Labs notable for anemia, thrombocytopenia and  worsening AAMIR. CT abdomen pelvis pending  ed management : supra pubic cath taken out, with plans to place new one, 1L NS, 1G ceftriaxone  Pt admitted to [facility] for further management (08 May 2020 16:10)    - Other: iStop reviewed.    Rx found for Dronabinol on iStop review. Ref #: [id number]    - Other: Medication reviewed.    The patient HAS NOT used PRN's in the last 24h. MME: 0mg  Remains on Levophed gtt.     MEDICATIONS  (STANDING):  ceftaroline fosamil IVPB 200 milliGRAM(s) IV Intermittent every 12 hours  chlorhexidine 2% Cloths 1 Application(s) Topical daily  collagenase Ointment 1 Application(s) Topical daily  DAPTOmycin IVPB 700 milliGRAM(s) IV Intermittent every 48 hours  finasteride 5 milliGRAM(s) Oral daily  hydrocortisone sodium succinate Injectable 50 milliGRAM(s) IV Push every 6 hours  insulin glargine Injectable (LANTUS) 10 Unit(s) SubCutaneous at bedtime  insulin lispro (HumaLOG) corrective regimen sliding scale   SubCutaneous Before meals and at bedtime  insulin lispro Injectable (HumaLOG) 5 Unit(s) SubCutaneous three times a day before meals  midodrine 15 milliGRAM(s) Oral every 8 hours  mirtazapine 7.5 milliGRAM(s) Oral every 24 hours  norepinephrine Infusion 0.05 MICROgram(s)/kG/Min (3.37 mL/Hr) IV Continuous <Continuous>  nystatin Cream 1 Application(s) Topical two times a day  nystatin Powder 1 Application(s) Topical two times a day  pantoprazole  Injectable 40 milliGRAM(s) IV Push every 24 hours  sertraline 50 milliGRAM(s) Oral daily  sevelamer carbonate 1600 milliGRAM(s) Oral every 8 hours    MEDICATIONS  (PRN):  guaiFENesin   Syrup  (Sugar-Free) 200 milliGRAM(s) Oral every 6 hours PRN Cough    - Other: Advanced directives     Full Code      No documented MOLST found on [facility]     No documented HCP form found on Alpha     Other surrogates: Friend Teresa Gutierrez [phone] / [phone]     No Living will / POA / Advance directives found on [place] / [facility].     Documented GOC notes on [facility] on 5/22/2020 and 5/25/2020    - Other: Coordination/Plan of care     2 admissions in 1 year     Current admission LOS: 18 days     LACE score: 17 ADVANCE ILLNESS PATIENT since 2/19/2020.     Patient NOT previously seen by palliative medicine consult service.      Consult requested for: "Pt is 75 yo M with MDS, pancytopenia, CKD, ascites of unknown origin presents from [facility] with complaints of decreased po intake and weakness. Throughout admission, noted to have MRSA bacteremia and subsequently DIC. Therefore has not been able to undergo HD in setting of renal failure as he keeps clotting off. Still anuric. The patient has increased confusion and has been unable to make decisions regarding ceasing HD, DNR/DNI status. He keeps stating he does not know. Teresa (Venus_ is his long time best friend and only living friend left. He has stated he would want her to help make decisions and she notes they have discussed being each other's proxy, however does not have a HCP form. She is also unclear regarding his status and what he would want should it come to that decision. At this time, cannot anticoag in setting of DIC however cannot undergo HD due to clotting"    Full consult to be completed within 24h.

## 2020-05-26 NOTE — PROGRESS NOTE ADULT - ASSESSMENT
73 yo M with MDS, pancytopenia, CKD, ascites of unknown origin presents from [facility] with complaints of decreased po intake and weakness likely secondary to uremia/AAMIR from diuresis for treatment of ascites, hospital course notable for UGIB and pericardial effusion with tamponade physiology , as well as ATN, and ascites of unknown etiology, now in ICU s/p paracentesis 5/10 (2L removed), IR pericardiocentesis 5/11 (drain in place), now with anemia and thrombocytopenia with hypercoagulability.    Neuro:   p/w AMS likey 2/2 to uremia. On 5/22, more lethargic than usual and difficult to arouse with confusion  Awake and alert this am although minimally engaging.   Remeron at bedtime    Cardiology:   Hypotension  MRSA bacteremia vs. intravascular volume depletion from diuresis vs hemolysis? --> c/w dapto, midodrine TID, wean levo as tolerated. Most recent echo with no pericardial effusion. Not a candidate for GET as per cardiology  - midodrine 15mg [id number]  - Levo requirements decreasing today  - Recultured on 5/22, cultures from 5/18 NGTD  - CVVHD access clotted overnight  ---> solucortef 50mg q6hrs  - Recultured on 5/22, cultures from 5/18 NGTD although patient still requiring pressors suggesting persistent vasoplegia    Pericardial Effusion:  Initial read with some evidence of tamponade physiology; now s/p IR for diagnostic pericardiocentesis on 5/11  - Cardiology following, appreciate recs.   - Given low pericardial drain output and clog, a follow up limited echo on 5/15 showed no pericardial effusion  - IR removed drain on 5/21  --> See above, recent hx of pericardial effusion s/p drain--> possible etiology of hypotension with HD-no effusion on repeat POCUS    Pulm:   stable on RA. maintain head of bed 30 degrees, due to aspiration risk     GI:   multiple episodes of melena, with baseline anemia from MDS, although with concern for coagulopathy given decreased PLTS and elevated BUN.--> monitor coags, c/w protonix.   #ascites  - Elevated SAAG consistent with portal HTN, no evidence of clot on U/S  --> unclear etiology, no cirrhosis on CT.     - Feeds: low appetite but eats with encouragement. Refuses NGT.    #colitis  - CT on 5/18 showing pancolitis  - c.diff neg  - GI PCR negative  - placed rectal tube on 5/18    Renal/:   AAMIR on CKD likely due to hypotension now requring HD--> inability to tolerate HD. Now HD complicated by frequent clotting of HD cath while on hep gtt. see heme section below  - likely ATN from hypotension and ischemic injury  - anuric   - on pressors w increasing requirement, now on levo and vasopressin  - HD initiated on 05/15, not tolerated few IHD sessions due to hemodynamic instability w tachycardia and hypotension despite being on levophed during HD (no pericardial effusion/cardiac tamponade);  - positive fluid balance however respiratory status not compromised, acceptable electrolytes in setting of HD instability will defer RRT at present, and reassess daily   - renally adjusted meds/ ABx  - renvela 1600mg PO TID w/each meal   - if repeat blood cx positive, would recommend removal of HD cath  - no clear source of infxn on gallium scan  - pending C discussion   - bilateral renal US - mild bilateral hydronephrosis, large ascites. Splenomegaly. Cholelithiasis.  - palacio exchanged on 5/18 and will c/w palacio for now due to clear pyuria    - bladder wall thickening on CT on 5/18  - Urology consulted and recommended bladder sono with palacio clamped 1hr before to assess for ureteral jets  - c/w palacio catheter  - Patient admitted in February for severe urinary retention and b/l hydronephrosis necessitating indwelling catheter until a definitive bladder outlet procedure    HEME:   #Anemia, thrombocytopenia, splenomegaly   --> Patient with frequent clotting while on HD, with associated and protracted course of worsening thrombocytopenia since february--> Will check AdamsTs-13 and anti-phospholipid for further work up. Given splenomegaly and slightly elevated bili possible extravascular hemolysis  - F/u hemolysis labs  - f/u with Dr. Roberts to discuss usefulness of repeat BM bx and possibility of extravascular hemolysis with thrombocytopenia related to splenomegaly    DIC? vs. TTp vs. HUS  - formal heme consult- with service hematology  - received heparin during CVVHD on 5/21, now with concern for DIC   - today plts, INR, and LFTs improved, fibrinogen still low at 93  - heparin discontinued  - received 10 units of cryo on 5/22 and 5/24  - f/u labs    Anemia and coagulopathy--> MDS, UGIB, and elevated BUN.  - plts improved to 50 today    Anemia:   Likely secondary to anemia from chronic disease and bone marrow suppression.   - S/p 1 unit of PRBC on 5/9 for active melena and Hgb stable   - Given 1 unit of prbc and platelets on 5/12 for acute drop, unknown source  - Monitor CBC  - plts improved to 50 today  - Transfuse for Hgb <7   - Active type and screen     #Coagulopathy:   - Patient presented with elevated INR to 2.48, with active bleeding.   - Monitor PT/INR   - INR 1.76 today    #DVT - RLE  - repeat dopplers on 5/11 with no DVT  - Eliquis held in setting DIC concern    ID:   - Persistent MRSA bacteremia--> dapto 700mg h42mlsks  - Ceftaroline 200 mg IV q12 hrs for syngergy  - d/c'd meropenem 500mg IV daily on 5/24 while blood cultures pending NGTD for 48hours  - last blood culture (+) on 5/16, continue with surveillance cultures  - obtain daily CKs  - CT abdomen and pelvis - no significant change in bilateral pleural effusions, moderate to large intra-abdominal ascites, and anasarca. Slightly nodular liver contour and splenomegaly cirrhosis, pancolitis, mild bilateral hydroureteronephrosis extending to the thick-walled urinary bladder, unchanged. Prostatic nodule indenting into the urinary bladder base, as seen on 4/11/2018, dilated main pulmonary artery suggesting pulmonary hypertension.  - thrombocytopenia/petechiae/?[place] lesions could be in setting of endocarditis/ MRSA bacteremia, plan gallium scan.  - Gallium scan as above suggestive of cellulitis or rib osteo although with no clinical correlation on physical exam, and not previously seen on chest CT--> MRSA bacteremia? from indwelling palacio cathter as patient with chronic indwelling palacio prior to admission; decubiti are not fluctuant.      ENDO  - fingersticks in 200s, likely secondary to steroids  - 4 lispro, 8 lantus  - monitor sugars      DVt ppx: SCDs. no chemic ppx due to pancytopenia  Lines: L IJ HD cath, R IJ CVC

## 2020-05-26 NOTE — CHART NOTE - NSCHARTNOTEFT_GEN_A_CORE
Admitting Diagnosis:   Patient is a 74y old  Male who presents with a chief complaint of weakness (26 May 2020 12:53)      PAST MEDICAL & SURGICAL HISTORY:  History of pancytopenia  H/O hydrocephalus  Anemia  HLD (hyperlipidemia)  Enlarged prostate  DM (diabetes mellitus)  HTN (hypertension)  H/O prior ablation treatment      Current Nutrition Order:  Dys 1 Puree /Thin Liquids  Nepro 1x3/day  Suplena 1x3/day    PO Intake: Good (%) [   ]  Fair (50-75%) [ x  ] Poor (<25%) [   ]  Please see Below     GI Issues:   Per flow sheets: +BM 5/24 x1, Recal tube 150ml 5/25   Per RN: pt is not having BM often however when he does they are watery, reports pt is not tender or distended   CT abdomen and pelvis - no significant change in bilateral pleural effusions, moderate to large intra-abdominal ascites, and anasarca. Slightly nodular liver contour and splenomegaly cirrhosis, pancolitis, mild bilateral hydroureteronephrosis extending to the thick-walled urinary bladder, unchanged. Prostatic nodule indenting into the urinary bladder base, as seen on 4/11/2018, dilated main pulmonary artery suggesting pulmonary hypertension.  CDiff negative / GI PCR negative   Team reports pt GI w/u negative     Pain:   None per flow sheets     Skin Integrity:  2+ Gen edema / 3+ BL Arm  / edema 4+ BL foot (varying during admission)  Skin tear to Sacrum However also noted as unstageable  BL heel unstageable pressure ulcers       Labs:   05-26    140  |  106  |  107<H>  ----------------------------<  180<H>  4.6   |  18<L>  |  5.85<H>    Ca    8.0<L>      26 May 2020 04:58  Phos  7.3     05-26  Mg     2.1     05-26    TPro  5.2<L>  /  Alb  2.5<L>  /  TBili  0.7  /  DBili  x   /  AST  34  /  ALT  12  /  AlkPhos  77  05-26    CAPILLARY BLOOD GLUCOSE      POCT Blood Glucose.: 216 mg/dL (26 May 2020 11:31)  POCT Blood Glucose.: 195 mg/dL (26 May 2020 06:45)  POCT Blood Glucose.: 188 mg/dL (25 May 2020 21:30)  POCT Blood Glucose.: 219 mg/dL (25 May 2020 16:32)      Medications:  MEDICATIONS  (STANDING):  chlorhexidine 2% Cloths 1 Application(s) Topical daily  collagenase Ointment 1 Application(s) Topical daily  DAPTOmycin IVPB 700 milliGRAM(s) IV Intermittent every 48 hours  finasteride 5 milliGRAM(s) Oral daily  hydrocortisone sodium succinate Injectable 50 milliGRAM(s) IV Push every 6 hours  insulin glargine Injectable (LANTUS) 10 Unit(s) SubCutaneous at bedtime  insulin lispro (HumaLOG) corrective regimen sliding scale   SubCutaneous Before meals and at bedtime  insulin lispro Injectable (HumaLOG) 5 Unit(s) SubCutaneous three times a day before meals  midodrine 15 milliGRAM(s) Oral every 8 hours  mirtazapine 7.5 milliGRAM(s) Oral every 24 hours  norepinephrine Infusion 0.05 MICROgram(s)/kG/Min (3.37 mL/Hr) IV Continuous <Continuous>  nystatin Cream 1 Application(s) Topical two times a day  nystatin Powder 1 Application(s) Topical two times a day  pantoprazole  Injectable 40 milliGRAM(s) IV Push every 24 hours  sertraline 50 milliGRAM(s) Oral daily  sevelamer carbonate 1600 milliGRAM(s) Oral every 8 hours    MEDICATIONS  (PRN):  guaiFENesin   Syrup  (Sugar-Free) 200 milliGRAM(s) Oral every 6 hours PRN Cough      Ht: 5'10''  pounds (75kg) +-10%   5/8 158 pounds %IBW=95% BMI 22.7   5/16 191.5 pounds  5/18 193 pounds / 194 pounds   Wt Change: Based on most recent EMR wts. Noted varying EMR wts during admission, Pt has been with varying edema during admission and Pt now started on HD    Nutrition Focused Physical Exam: Completed [x-5/9]  Not Pertinent [   ]    Estimated energy needs:   IBW for EER d/t varying EMR wts in HD pt   Nutrient needs based on [facility] standards of care for maintenance in adults, adjusted for age, HD, fluid per team  ~2300kcal (30kcal/kg)   ~90-113g pro (1.2-1.5gm/kg pro) -- trend renal indices, Prot needs based on HD     Subjective:   73 yo M with MDS, DM2, afib, HTN, HLD, BPH s/p palacio, pancytopenia, CKD4, ascites of unknown origin presents from [facility] with complaints of decreased PO intake and weakness likely secondary to uremia/AAMIR from diuresis for treatment of ascites. Pt hospital course notable for UGIB, CT on 5/18 showing pancolitis and GI PCR negative. Pt with pericardial effusion with tamponade physiology as well as ATN and ascites of unknown etiology s/p PRBC. Pt s/p paracentesis 5/10, IR pericardiocentesis 5/11, Paracentesis 5/14. Echo 5/15 showed no pericardial effusion, Drain removed and IR signed off. Pt now with anemia of unknown etiology. Pt with Persistent MRSA bacteremia, concern for endocarditis vs cardiac abscess. Pt s/p Gallium 5/20, suggestive of cellulitis or rib osteo. Noted p/w AMS likey 2/2 to uremia. Pt now needing HD d/t AAMIR/CKD Likely ATN from hypotension and ischemic injury, started 5/15, however inability to tolerate HD + c/b frequent clotting of HD cath during CVVHD while on hep gtt, plan to reassess daily need for HD (, Cr 5.85, K WDL, Phos 7.3 with renvela order). Pt with poor prognosis, GOC noted, pt remains full code at this time, Pallative consult Pending 5/24.   Pt Underwent FEES 5/13 recommending mech soft diet. Now s/p Swallow Eval 5/18 which notes poor dentition, noted with recs for puree/thin liquids. Now ordered for dys 1 puree/thin liquids + Nepro/Suplena x3/day each as ONS. Pt remains with poor PO intake, per progress notes refusing NGT. Noted Remeron ordered.   Please see below for nutritions recommendations- Discussed pt with team.     Previous Nutrition Diagnosis: Mild protein-calorie malnutrition r/t physiologic causes AEB reported poor Po intake for > 5 days PTA and observed mild muscle losses to temporal region coupled with mild fat loss over triceps region.  Active [ x  ]  Resolved [   ]  Goal: Meet 75% EER via feasible route that is consistent with GOC     Recommendations:  Pt with malnutrition and pressure ulcers who has been on/off HD - pt with increased EER given the following conditions, however has remained with poor PO intake during admission. Pt had been noted with bilateral pleural effusions, moderate to large intra-abdominal ascites, and anasarca however team does not feel d/t GI issues as GI w/u negative. Feels decreased PO intake May be in part d/t renal dysfunction. Pt would benefit from adfiticlal means of nutrtion if unable to meer via PO alone, however Team confirms pt refusing NGT daily. GOC ongoing with palliative care Pending. Should less aggressive measures be wanted RD to [place] wishes. In the event pt should be agreeable to NGT and TF to begin Please reconsult for Recs PRN. While PO diet remains, regular diet + Suplena x3/day as oral nutrition supplement with PO consistency per SLP Recs. RD to remain available for additional nutrition interventions as needed.  Please reconsult PRN.     Education: NA   Risk Level: High [ x  ] Moderate [   ] Low [   ].

## 2020-05-26 NOTE — PROGRESS NOTE ADULT - ASSESSMENT
75 y/o M w/PMHx of CKD stage III/IV, DM, HTN, BPH (chronic incontinence/retention, s/p palacio on last admission), a-fib admitted for new onset ascites nephrology consulted for AAMIR on CKD.      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, likely ATN from hypotension and ischemic injury  - worsening anuric AAMIR  - on pressors   - HD initiated on 05/15, not able to tolerated 2/2 unstable HD, switched to cvvhd, however access clotted, heparin dc'ed having worsening thrombocytopenia/ coagulopathy  - positive fluid balance, however respiratory status not compromised, acceptable electrolytes; in setting of HD instability will defer RRT at present, and reassess daily   - renally adjusted meds/ ABx  - renvela 1600 mg po tid w each meal   - pending GOC discussion

## 2020-05-26 NOTE — CONSULT NOTE ADULT - PROBLEM SELECTOR RECOMMENDATION 4
Received multiple transfusions of cryoprecipitate, PRBC, plasma, and platelets during this hospital stay. Concerns for DIC raised. Not seen by [facility] on this admission. Known to Dr Roberts since 2018.     Recommend [facility] evaluation.

## 2020-05-26 NOTE — CONSULT NOTE ADULT - ASSESSMENT
73 yo M with past medical history of DM, HTN, HLD, BPH (chronic indwelling palacio placement on last admission, SERA, CKD4, and a-fib  , mds, pancytopenia, presents from [facility] with complaints of decreased po intake and weakness. Patient reports that he recently had a intense bowel regimen for constipation after which he developed loose stools. To prevent further loose stools he stopped eating . Decreased po intake also in setting of poor appetite. Patient also complains for worsening LE and abdominal swelling. As per collateral obtained from Dr Barksdale at [facility]([id number]) patient abdominal usg notable for massive ascites, and increased liver echogenicity(LFTS outpatient wnl )   and he was aggressively diuresed with  Lasix , torsemide 40 mg bid (of note also started on midodrine as his pressures would drop with iv diuresis ). After diuresis his ascites and LE edema improved however he developed worsening AAMIR and so Lasix was stopped and torsemide decreased to 20 mg bid. Of note he was seen by Cardiology and  echo done, concern fluid overload was cardiac in etiology given elevated bnp and echo findings.

## 2020-05-26 NOTE — CONSULT NOTE ADULT - PROBLEM SELECTOR RECOMMENDATION 8
Call to be placed to friend Teresa. Patient would not discuss acute medical issues at bedside. He states Teresa and him speak daily. [facility] documentation from 5/25/2020 in [place] stating Full code status would stand.

## 2020-05-26 NOTE — CONSULT NOTE ADULT - PROBLEM SELECTOR RECOMMENDATION 2
Likely multifactorial but high likely montoya its related to Blood Urea Nitrogen, Serum: 107 mg/dL (05.26.20 @ 04:58).     Consider trial of Ritalin or Provigil daily ~6am.

## 2020-05-26 NOTE — CONSULT NOTE ADULT - SUBJECTIVE AND OBJECTIVE BOX
SANIYA LOZANO          MRN-[id number]            (1945)    HPI:  Patient is 73 yo M with past medical history of DM, HTN, HLD, BPH (chronic indwelling palacio placement on last admission, SERA, CKD4, and a-fib  , mds, pancytopenia, presents from [facility] with complaints of decreased po intake and weakness. Patient reports that he recently had a intense bowel regimen for constipation after which he developed loose stools. To prevent further loose stools he stopped eating . Decreased po intake also in setting of poor appetite. Patient also complains for worsening LE and abdominal swelling. As per collateral obtained from Dr Barksdale at [facility]([id number]) patient abdominal usg notable for massive ascites, and increased liver echogenicity(LFTS outpatient wnl )   and he was aggressively diuresed with  Lasix , torsemide 40 mg bid (of note also started on midodrine as his pressures would drop with iv diuresis ). After diuresis his ascites and LE edema improved however he developed worsening AAMIR and so Lasix was stopped and torsemide decreased to 20 mg bid. Of note he was seen by Cardiology and  echo done, concern fluid overload was cardiac in etiology given elevated bnp and echo findings.  As per collateral patient also on Levaquin 1 week ago for intersitial infiltrates on cxr , s/p 7 day course.   Upon arrival to ED vitals bp 92/52, hr 94, rr 16, satting well ra.  Labs notable for anemia, thrombocytopenia and  worsening AAMIR. CT abdomen pelvis pending  ed management : supra pubic cath taken out, with plans to place new one, 1L NS, 1G ceftriaxone  Pt admitted to [facility] for further management (08 May 2020 16:10)    PAST MEDICAL & SURGICAL HISTORY:  History of pancytopenia  H/O hydrocephalus  Anemia  HLD (hyperlipidemia)  Enlarged prostate  DM (diabetes mellitus)  HTN (hypertension)  H/O prior ablation treatment    FAMILY HISTORY:  FH: stomach cancer  FHx: breast cancer  FHx: stroke  Reviewed and found non contributory in mother or father    SOCIAL HISTORY: Single. Never . No children. 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 Not [employer]. Medicare/Medicaid. Has friend Teresa Melgoza" Matt [phone] / [phone] involved in his care.     ROS:                    Dyspnea (Cristel 0-10): 0                       N/V (Y/N): No                             Secretions (Y/N) : No                Agitation(Y/N): No  Pain (Y/N): No       -Provocation/Palliation: N/A  -Quality/Quantity: N/A  -Radiating: N/A  -Severity: No pain  -Timing/Frequency: N/A  -Impact on ADLs: N/A    General:  Dry mouth  HEENT:    Painful swallowing  Neck:  Denied  CVS:  Denied  Resp:  Denied  GI:  Denied  :  Denied  Musc:  Swollen   Neuro:  Denied  Psych:  Denied  Skin:  Bruising   Lymph:  Edema    Allergies: No Known Allergies or Intolerances    Opiate Naive (Y/N): Yes  -iStop reviewed (Y/N): Yes. Rx found for Dronabinol on iStop review. Ref #: [id number]    Medications:      MEDICATIONS  (STANDING):  chlorhexidine 2% Cloths 1 Application(s) Topical daily  collagenase Ointment 1 Application(s) Topical daily  DAPTOmycin IVPB 700 milliGRAM(s) IV Intermittent every 48 hours  finasteride 5 milliGRAM(s) Oral daily  hydrocortisone sodium succinate Injectable 50 milliGRAM(s) IV Push every 6 hours  insulin glargine Injectable (LANTUS) 10 Unit(s) SubCutaneous at bedtime  insulin lispro (HumaLOG) corrective regimen sliding scale   SubCutaneous Before meals and at bedtime  midodrine 15 milliGRAM(s) Oral every 8 hours  mirtazapine 7.5 milliGRAM(s) Oral every 24 hours  norepinephrine Infusion 0.05 MICROgram(s)/kG/Min (3.37 mL/Hr) IV Continuous <Continuous>  nystatin Cream 1 Application(s) Topical two times a day  nystatin Powder 1 Application(s) Topical two times a day  pantoprazole  Injectable 40 milliGRAM(s) IV Push every 24 hours  sertraline 50 milliGRAM(s) Oral daily  sevelamer carbonate 1600 milliGRAM(s) Oral every 8 hours    MEDICATIONS  (PRN):  guaiFENesin   Syrup  (Sugar-Free) 200 milliGRAM(s) Oral every 6 hours PRN Cough    Labs:    CBC:                          9.0    9.05  )----( 50       ( 26 May 2020 04:58 )              30.1     Manual Differential (20 @ 04:58)    Red Cell Morphology: Abnormal    Platelet Morphology: Abnormal    Giant Platelets: Present    Manual Smear Verification: Performed    Ovalocytes: Moderate    Spherocytes: Slight    Poikilocytosis: Moderate    Elliptocytes: Slight    Macrocytosis: Slight    Hypochromia: Slight    Acanthocytes: Slight    Crenated Cells: Moderate    Anisocytosis: Slight    Band Neutrophils %: 0.9 %    CMP:      140  |  106  |  107<H>  ----------------------------<  180<H>  4.6   |  18<L>  |  5.85<H>    Ca    8.0<L>      26 May 2020 04:58  Phos  7.3       Mg     2.1         TPro  5.2<L>  /  Alb  2.5<L>  /  TBili  0.7  /  DBili  x   /  AST  34  /  ALT  12  /  AlkPhos  77    Albumin, Serum: 2.5 g/dL (20 @ 04:58)    PT/INR - ( 26 May 2020 04:58 )   PT: 20.4 sec;   INR: 1.76     PTT - ( 26 May 2020 04:58 )  PTT:38.5 sec     POCT Blood Glucose.: 216 mg/dL (20 @ 11:31)    Fibrinogen Assay: 93 mg/dL (20 @ 04:58)  Fibrinogen Assay: 118 mg/dL (20 @ 04:48)  Fibrinogen Assay: 140 mg/dL (20 @ 20:19)  Fibrinogen Assay: <80 mg/dL (20 @ 05:44)  Fibrinogen Assay: 104 mg/dL (20 @ 05:19)  Fibrinogen Assay: 113 mg/dL (20 @ 01:26)  Fibrinogen Assay: 109 mg/dL (20 @ 19:22)  Fibrinogen Assay: 121 mg/dL (20 @ 13:39)  Fibrinogen Assay: <80 mg/dL (20 @ 04:24)  Fibrinogen Assay: 936 mg/dL (18 @ 15:50)    Type + Screen (20 @ 05:30)    ABO Interpretation: O    Rh Interpretation: Positive    Antibody Screen: Negative    Thyroid Stimulating Hormone, Serum: 1.362 uIU/mL (20 @ 07:04)  Thyroid Stimulating Hormone, Serum: 1.923 uIU/mL (20 @ 08:11)    Free Thyroxine, Serum: 0.69 ng/dL (20 @ 08:11)  Free Thyroxine, Serum: 0.54 ng/dL (20 @ 06:32)  Free Thyroxine, Serum: 0.54 ng/dL (02.15.20 @ 06:23)    Triiodothyronine, Total (T3 Total): 54 ng/dL (20 @ 10:52)  Triiodothyronine, Total (T3 Total): 48 ng/dL (20 @ 11:14)    Reticulocyte Count (20 @ :47)    Reticulocyte Percent: 3.3 %    Absolute Reticulocytes: 93.7 K/uL    Iron with Total Binding Capacity (20 @ 07:04)    % Saturation, Iron: 29 %    Iron - Total Binding Capacity.: 109 ug/dL    Iron Total, Serum: 32 ug/dL    Unsaturated Iron Binding Capacity: 77 ug/dL    Haptoglobin, Serum: 54 mg/dL (20 @ 04:47)  Haptoglobin, Serum: 48 mg/dL (20 @ 14:17)  Haptoglobin, Serum: 22 mg/dL (20 @ 19:22)  Haptoglobin, Serum: <10 mg/dL (20 @ 13:39)  Haptoglobin, Serum: 66 mg/dL (20 @ 04:24)  Haptoglobin, Serum: 158 mg/dL (05.10.20 @ 08:08)  Haptoglobin, Serum: 288 mg/dL (20 @ 06:22)    Transferrin, Serum: 95 mg/dL (20 @ 07:04)  Transferrin, Serum: 238 mg/dL (18 @ 06:15)    Gamma Glutamyl Transferase, Serum: 59 U/L (20 @ 04:52)    Protein Electrophoresis with Interpretation and Reflex, Urine (05.10.20 @ 04:04)    Total Protein, Random Urine: 252: Test Repeated mg/dL    Total Protein, Urine: 252 mg/dL    % Albumin, Urine: 31.0 %    % Alpha 1, Urine: 13.8 %    % Alpha 2, Urine: 15.4 %    % Beta, Urine: 17.4 %    % Gamma, Urine: 22.4 %    Bence Burkett/Protein, Urine: Absent    Urine Electrophoresis Interpretation: Mild Selective (Glomerular) Proteinuria    Immunofixation, Urine: Reference Range: None Detected    Fibrin Split Products: >=5 <20: The fibrin/fibrinogen degradation product manual agglutination assay is  not adequately sensitive for evaluation of deep vein thrombosis and/or  pulmonary embolism. ug/mL (20 @ 23:56)    Mixing Study - PT/APTT (18 @ 15:50)    Fibrinogen Assay: 936 mg/dL    PT 50/50: 12.1 secs    Diluted Thrombin Time: 22.8 sec    Prothrombin Time, Plasma: 16 sec    INR: 1.43      D-Dimer Assay, Quantitative: 2166 ng/mL DDU (20 @ 05:19)  D-Dimer Assay, Quantitative: 2166 ng/mL DDU (20 @ 05:19)    Cortisol PM, Serum: 37.4 ug/dL (20 @ 13:39)  Cortisol Free, Serum (20 @ 22:35)    Corticosteroid Binding Globulin Result: 1.2  ug/dL    Cortisol, Free Result: 6.2 ug/dL    Percent Free Cortisol: 44  %    Chlamydia/GC Nucleic Acid Amplification (20 @ 11:51)    Source Amp: Urine     Chlamydia Amplification Result: NotDetec     Culture - Blood (20 @ 13:17)    Specimen Source: .Blood Blood    Culture Results:   No growth at 4 days.    Culture - Blood (20 @ 13:01)    Specimen Source: .Blood Blood    Culture Results:   No growth at 5 days.    Culture - Blood (20 @ 20:35)    Gram Stain:   Aerobic Bottle: Gram Positive Cocci in Clusters  Result called to and read back by_ M [facility] RN (2WO)  2020 17:21:54    -  Vancomycin: S 1    -  Tetra/Doxy: S <=1    -  Trimethoprim/Sulfamethoxazole: R >2/38    -  Cefazolin: R <=4    -  Daptomycin: S 0.5    -  Linezolid: S 2    -  Erythromycin: R >4    -  Clindamycin: S <=0.25    -  RIF- Rifampin: S <=1 Should not be used as monotherapy    -  Oxacillin: R >2    Specimen Source: .Blood Blood    Organism: Methicillin resistant Staphylococcus aureus    Organism: Methicillin resistant Staphylococcus aureus    Culture Results:   Growth in anaerobic bottle: Methicillin resistant Staphylococcus aureus  Floor previously notified.  Aerobic Bottle: No growth    Organism Identification: Methicillin resistant Staphylococcus aureus  Methicillin resistant Staphylococcus aureus    Method Type: IMAN    Method Type: ETEST    Culture - Blood (20 @ 08:15)    Gram Stain:   Anaerobic Bottle: Gram Positive Cocci in Clusters  Result called to and read back by_  Michel Green RN(2WO) 05/15/2020  07:20:08  Aerobic Bottle: Gram Positive Cocci in Clusters    -  Oxacillin: R >2    -  RIF- Rifampin: S <=1 Should not be used as monotherapy    -  Erythromycin: R >4    -  Linezolid: S 1    -  Clindamycin: S <=0.25    -  Daptomycin: S 0.5    -  Cefazolin: R 8    -  Trimethoprim/Sulfamethoxazole: R >2/38    -  Tetra/Doxy: S <=1    -  Vancomycin: S 2    Specimen Source: .Blood Blood    Organism: Methicillin resistant Staphylococcus aureus    Organism: Methicillin resistant Staphylococcus aureus    Culture Results:   Growth in aerobic and anaerobic bottles: Methicillin resistant  Staphylococcus aureus  Floor previously notified.    Organism Identification: Methicillin resistant Staphylococcus aureus  Methicillin resistant Staphylococcus aureus    Method Type: ETEST    Method Type: IMAN    Culture - Fungal, Body Fluid (20 @ 04:12)    Specimen Source: .Body Fluid Peritoneal Fluid    Culture Results:   No growth    Culture - Urine (20 @ 12:53)    -  Vancomycin: S 1.5    -  Trimethoprim/Sulfamethoxazole: R >2/38    -  Tetra/Doxy: S <=1    -  Oxacillin: R >2    -  RIF- Rifampin: S <=1 Should not be used as monotherapy    -  Linezolid: S 2    -  Daptomycin: S 0.5    -  Cefazolin: R <=4    Specimen Source: .Urine Catheterized    Culture Results:   >100,000 CFU/ml Methicillin resistant Staphylococcus aureus  Result called to and read back by_ KAREN Dhaliwal RN  2020 10:37:15    Organism Identification: Methicillin resistant Staphylococcus aureus  Methicillin resistant Staphylococcus aureus    Organism: Methicillin resistant Staphylococcus aureus    Organism: Methicillin resistant Staphylococcus aureus    Method Type: IMAN    Method Type: ETEST    Ammonia, Serum: <10 umol/L (20 @ 05:11)  Ammonia, Serum: <10 umol/L (20 @ 04:50)    Troponin T, Serum: 0.05 ng/mL (20 @ 14:30)  Troponin T, Serum: 0.02 ng/mL (18 @ 06:15)  Troponin T, Serum: 0.02 ng/mL (04.10.18 @ 23:08)    CKMB Units: 1.4 ng/mL (20 @ 14:30)  CKMB Units: 1.6 ng/mL (18 @ 06:15)    Creatine Kinase, Serum: 26 U/L (20 @ 04:58)  Creatine Kinase, Serum: 20 U/L (20 @ 05:19)  Creatine Kinase, Serum: 18 U/L (20 @ 04:24)  Creatine Kinase, Serum: 14 U/L (20 @ 05:03)  Creatine Kinase, Serum: 13 U/L (20 @ 04:53)  Creatine Kinase, Serum: 16 U/L (20 @ 05:11)  Creatine Kinase, Serum: 16 U/L (20 @ 05:53)  Creatine Kinase, Serum: 10 U/L (20 @ 08:09)  Creatine Kinase, Serum: <7 U/L (05.15.20 @ 04:14)  Creatine Kinase, Serum: <7: less than 7 U/L (20 @ 05:13)  Creatine Kinase, Serum: 10 U/L (20 @ 04:52)  Creatine Kinase, Serum: 25 U/L (20 @ 14:30)  Creatine Kinase, Serum: 33 U/L (18 @ 06:15)  Creatine Kinase, Serum: 49 U/L (04.10.18 @ 23:08)     C. difficile GDH &amp; toxins A/B by EIA (20 @ 19:18)    Clostridium difficile GDH Toxins A&amp;B, EIA: Negative    Clostridium difficile GDH Interpretation: Negative for toxigenic C. Difficile.    COVID-19 PCR: NotDetec (05.18.20 @ 12:32)  COVID-19 PCR: NotDetec(20 @ 12:19)    C3 Complement, Serum: 38 mg/dL (20 @ 11:04)  C4 Complement, Serum: 11 mg/dL (20 @ 11:04)    Culture - Fungal, Body Fluid (20 @ 04:12)    Specimen Source: .Body Fluid Peritoneal Fluid    Culture Results: No growth    Imaging:  Reviewed    EXAM:  XR CHEST PORTABLE ROUTINE 1V                        PROCEDURE DATE:  2020    INTERPRETATION:  CLINICAL INDICATION: 74-year-old with shortness of breath.  IMPRESSION: Frontal view of the chest is compared to 2020 and demonstrates normal heart size. Large bilateral pleural effusions. Severe pulmonary edema progressive since prior study. Central venous catheter with tip at the cavoatrial junction. Additional central venous catheter in the right atrium. Bibasilar atelectasis.    EXAM:  XR ABDOMEN PORTABLE URGENT 1V                        PROCEDURE DATE:  2020    INTERPRETATION:  CLINICAL INDICATION: 74-year-old with pericardial drain.  IMPRESSION: Frontal view of the lower abdomen is compared to 2020and demonstrates no change in position of pericardial drain overlying the lower mediastinum. There is a paucity of bowel gas overlying the abdomen.    EXAM:  US DPLX ABDOMEN                        PROCEDURE DATE:  2020    INTERPRETATION:  Duplex ultrasound of the abdomen  Impression:  1.  Hepatic veins, portal veins, and hepatic arteries are patent. Pulsatile flow within the hepatic veins may be related to right heart failure.  2.  Moderate ascites and small right pleural effusion.  3.  Echogenic right kidney with mild right hydronephrosis, unchanged.  4.  Gallbladder filled sludge and stones.    EXAM:  US RETROPERITONEAL LIMITED                        PROCEDURE DATE:  2020    INTERPRETATION:  RENAL ULTRASOUND dated 2020 5:26 PM  IMPRESSION:  No change in mild bilateral hydronephrosis.    EXAM:  US DPLX LWR EXT VEINS COMPL BI                        PROCEDURE DATE:  2020    INTERPRETATION:  VENOUS DUPLEX DOPPLER OF BOTH LOWER EXTREMITIES dated 2020 5:50 PM  IMPRESSION:  No deep vein thrombosis seen. Left peroneal and posterior tibial calf veins not visualized.    EXAM:  CT BRAIN                        PROCEDURE DATE:  2020    INTERPRETATION:  Fall.  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    EXAM:  CT ABDOMEN AND PELVIS OC                        PROCEDURE DATE:  2020    INTERPRETATION:  CT of the ABDOMEN and PELVIS without intravenous contrast dated 2020 11:00 PM  IMPRESSION:  1.  Since 2020, there has been no significant change in bilateral pleural effusions, moderate to large intra-abdominal ascites, and anasarca. Slightly nodular liver contour and splenomegaly cirrhosis, correlate clinically.  2.  Pancolitis of infectious or inflammatory etiology.  3.  Mild bilateral hydroureteronephrosis extending to the thick-walled urinary bladder, unchanged. Prostatic nodule indenting into the urinary bladder base, as seen on 2018  4.  Dilated main pulmonary artery suggesting pulmonaryhypertension.    EXAM:  NM INFLAMM LOC GALLIUM WB SD                        EXAM:  NM INFLAMM LOC SPECT CT SA SD                        PROCEDURE DATE:  2020    Impression:   No suspicious focal radiotracer activity in the heart.  Patchy radiotracer activity along the right posterior chest wall, correlating to fluid/fat stranding and soft tissue swelling along the ribs and intercostal muscles. Clinical correlation for cellulitis and osteomyelitis is recommended.    TTE Echo Limited or F/U (05.15.20 @ 09:58)   CONCLUSIONS:   1. Limited study obtained for evaluation of pericardial effusion.   2. Normal left ventricular size and function.   3. Mildlydilated right ventricular size.   4. Normal right ventricular systolic function. A device lead is noted in the right heart.   5. No evidence of pulmonary hypertension, pulmonary artery systolic pressure is 30 mmHg.   6. No pericardial effusion.   7. Right pleural effusion.     TTE Echo Complete w/o contrast w/ Doppler (20 @ 14:30)   LV EF (MOD BP):  70 %      (>55%)    12 Lead ECG (20 @ 14:12)   Ventricular Rate 94 BPM  Atrial Rate 94 BPM  P-R Interval 134 ms  QRS Duration 112 ms  Q-T Interval 384 ms  QTC Calculation(Bezet) 480 ms  P Axis 40 degrees  R Axis -44 degrees  T Axis 94 degrees  Diagnosis Line Normal sinus rhythm  Left axis deviation  Septal infarct , age undetermined  Confirmed by ANN MARIE HERNANDEZ, BRETT ([id number]) on 2020 2:51:35 PM    PEx:  T(C): 36.7 (20 @ 13:00), Max: 36.8 (20 @ 01:11)  HR: 80 (20 @ 13:45) (76 - 85)  BP: 95/58 (20 @ 13:45) (92/50 - 113/55)  RR: 11 (20 @ 13:45) (9 - 19)  SpO2: 99% (20 @ 13:45) (97% - 100%)  Wt(kg): 71.8Kg    General: AAOx2 man found laying in bed in NAD  HEENT: Dry eyes and mucosas Lower lip lesion Poor dentition and eyesight   Neck: Supple RIJ+  CVS: RR S1S2 No M/G/R  Resp: Unlabored Non tachypneic Good air entry B/L anteriorly and decreased at bases  GI:  Distended Drain+ Soft NT Rectal tube+  :  Palacio+   Musc:  No C/C Decreased strength upper extremities   Neuro: No focal deficits. Follows commands. Able to answer questions.  Psych: Calm slow to answer at times but appropriate    Skin: Ecchymotic / bruised   Lymph: Edema+  Preadmit Karnofsky: 60 %           Current Karnofsky:   30%  Cachexia (Y/N):  No  BMI: 22.7    Advanced Directives:     Full Code      No documented MOLST found on [facility]     No documented HCP form found on [facility]     Other surrogates: Friend Teresa "Venus" Matt [phone] / [phone]     No Living will / POA / Advance directives found on [place] / Alpha.     Documented GOC notes on [facility] on 2020 and 2020    Decision maker: The patient is able to carry conversation but does not demonstrate capacity for complex medical decision making given lack of response to questions regarding medical issues.   Legal surrogate: No documented HCP found in [facility] or paper chart.  Other surrogate: Friend Teresa Melgoza" Matt [phone] / [phone]    GOALS OF CARE DISCUSSION       Palliative care info/counseling provided	           Advanced Directives addressed please see Advance Care Planning Note	           Documentation of GOC: Full code. Intro to Palliative medicine and information sharing session today. Pending conversation with patient's friend Teresa later today. Patient agreed to discuss medical issues with her.    REFERRALS	        Unit SW/Case Mgmt        - Declined       Speech/Swallow - Following       Patient/Family Support - Following       Music Therapy - Following       Nutrition - Following       Dietician - Following       PT/OT - Following SANIYA LOZANO          MRN-[id number]            (1945)    HPI:  Patient is 75 yo M with past medical history of DM, HTN, HLD, BPH (chronic indwelling palacio placement on last admission, SERA, CKD4, and a-fib  , mds, pancytopenia, presents from [facility] with complaints of decreased po intake and weakness. Patient reports that he recently had a intense bowel regimen for constipation after which he developed loose stools. To prevent further loose stools he stopped eating . Decreased po intake also in setting of poor appetite. Patient also complains for worsening LE and abdominal swelling. As per collateral obtained from Dr Barksdale at [facility]([id number]) patient abdominal usg notable for massive ascites, and increased liver echogenicity(LFTS outpatient wnl )   and he was aggressively diuresed with  Lasix , torsemide 40 mg bid (of note also started on midodrine as his pressures would drop with iv diuresis ). After diuresis his ascites and LE edema improved however he developed worsening AAMIR and so Lasix was stopped and torsemide decreased to 20 mg bid. Of note he was seen by Cardiology and  echo done, concern fluid overload was cardiac in etiology given elevated bnp and echo findings.  As per collateral patient also on Levaquin 1 week ago for intersitial infiltrates on cxr , s/p 7 day course.   Upon arrival to ED vitals bp 92/52, hr 94, rr 16, satting well ra.  Labs notable for anemia, thrombocytopenia and  worsening AAMIR. CT abdomen pelvis pending  ed management : supra pubic cath taken out, with plans to place new one, 1L NS, 1G ceftriaxone  Pt admitted to [facility] for further management (08 May 2020 16:10)    PAST MEDICAL & SURGICAL HISTORY:  History of pancytopenia  H/O hydrocephalus  Anemia  HLD (hyperlipidemia)  Enlarged prostate  DM (diabetes mellitus)  HTN (hypertension)  H/O prior ablation treatment    FAMILY HISTORY:  FH: stomach cancer  FHx: breast cancer  FHx: stroke  Reviewed and found non contributory in mother or father    SOCIAL HISTORY: Single. Never . No children. 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 Not Faith. Medicare/Medicaid. Has friend Teresa Melgoza" Matt [phone] / [phone] involved in his care.     ROS:                    Dyspnea (Cristel 0-10): 0                       N/V (Y/N): No                             Secretions (Y/N) : No                Agitation(Y/N): No  Pain (Y/N): No       -Provocation/Palliation: N/A  -Quality/Quantity: N/A  -Radiating: N/A  -Severity: No pain  -Timing/Frequency: N/A  -Impact on ADLs: N/A    General:  Dry mouth  HEENT:    Painful swallowing  Neck:  Denied  CVS:  Denied  Resp:  Denied  GI:  Denied  :  Denied  Musc:  Swollen   Neuro:  Denied  Psych:  Denied  Skin:  Bruising   Lymph:  Edema    Allergies: No Known Allergies or Intolerances    Opiate Naive (Y/N): Yes  -iStop reviewed (Y/N): Yes. Rx found for Dronabinol on iStop review. Ref #: [id number]    Medications:      MEDICATIONS  (STANDING):  chlorhexidine 2% Cloths 1 Application(s) Topical daily  collagenase Ointment 1 Application(s) Topical daily  DAPTOmycin IVPB 700 milliGRAM(s) IV Intermittent every 48 hours  finasteride 5 milliGRAM(s) Oral daily  hydrocortisone sodium succinate Injectable 50 milliGRAM(s) IV Push every 6 hours  insulin glargine Injectable (LANTUS) 10 Unit(s) SubCutaneous at bedtime  insulin lispro (HumaLOG) corrective regimen sliding scale   SubCutaneous Before meals and at bedtime  midodrine 15 milliGRAM(s) Oral every 8 hours  mirtazapine 7.5 milliGRAM(s) Oral every 24 hours  norepinephrine Infusion 0.05 MICROgram(s)/kG/Min (3.37 mL/Hr) IV Continuous <Continuous>  nystatin Cream 1 Application(s) Topical two times a day  nystatin Powder 1 Application(s) Topical two times a day  pantoprazole  Injectable 40 milliGRAM(s) IV Push every 24 hours  sertraline 50 milliGRAM(s) Oral daily  sevelamer carbonate 1600 milliGRAM(s) Oral every 8 hours    MEDICATIONS  (PRN):  guaiFENesin   Syrup  (Sugar-Free) 200 milliGRAM(s) Oral every 6 hours PRN Cough    Labs:    CBC:                          9.0    9.05  )----( 50       ( 26 May 2020 04:58 )              30.1     Manual Differential (20 @ 04:58)    Red Cell Morphology: Abnormal    Platelet Morphology: Abnormal    Giant Platelets: Present    Manual Smear Verification: Performed    Ovalocytes: Moderate    Spherocytes: Slight    Poikilocytosis: Moderate    Elliptocytes: Slight    Macrocytosis: Slight    Hypochromia: Slight    Acanthocytes: Slight    Crenated Cells: Moderate    Anisocytosis: Slight    Band Neutrophils %: 0.9 %    CMP:      140  |  106  |  107<H>  ----------------------------<  180<H>  4.6   |  18<L>  |  5.85<H>    Ca    8.0<L>      26 May 2020 04:58  Phos  7.3       Mg     2.1         TPro  5.2<L>  /  Alb  2.5<L>  /  TBili  0.7  /  DBili  x   /  AST  34  /  ALT  12  /  AlkPhos  77    Albumin, Serum: 2.5 g/dL (20 @ 04:58)    PT/INR - ( 26 May 2020 04:58 )   PT: 20.4 sec;   INR: 1.76     PTT - ( 26 May 2020 04:58 )  PTT:38.5 sec     POCT Blood Glucose.: 216 mg/dL (20 @ 11:31)    Fibrinogen Assay: 93 mg/dL (20 @ 04:58)  Fibrinogen Assay: 118 mg/dL (20 @ 04:48)  Fibrinogen Assay: 140 mg/dL (20 @ 20:19)  Fibrinogen Assay: <80 mg/dL (20 @ 05:44)  Fibrinogen Assay: 104 mg/dL (20 @ 05:19)  Fibrinogen Assay: 113 mg/dL (20 @ 01:26)  Fibrinogen Assay: 109 mg/dL (20 @ 19:22)  Fibrinogen Assay: 121 mg/dL (20 @ 13:39)  Fibrinogen Assay: <80 mg/dL (20 @ 04:24)  Fibrinogen Assay: 936 mg/dL (18 @ 15:50)    Type + Screen (20 @ 05:30)    ABO Interpretation: O    Rh Interpretation: Positive    Antibody Screen: Negative    Thyroid Stimulating Hormone, Serum: 1.362 uIU/mL (20 @ 07:04)  Thyroid Stimulating Hormone, Serum: 1.923 uIU/mL (20 @ 08:11)    Free Thyroxine, Serum: 0.69 ng/dL (20 @ 08:11)  Free Thyroxine, Serum: 0.54 ng/dL (20 @ 06:32)  Free Thyroxine, Serum: 0.54 ng/dL (02.15.20 @ 06:23)    Triiodothyronine, Total (T3 Total): 54 ng/dL (20 @ 10:52)  Triiodothyronine, Total (T3 Total): 48 ng/dL (20 @ 11:14)    Reticulocyte Count (20 @ :47)    Reticulocyte Percent: 3.3 %    Absolute Reticulocytes: 93.7 K/uL    Iron with Total Binding Capacity (20 @ 07:04)    % Saturation, Iron: 29 %    Iron - Total Binding Capacity.: 109 ug/dL    Iron Total, Serum: 32 ug/dL    Unsaturated Iron Binding Capacity: 77 ug/dL    Haptoglobin, Serum: 54 mg/dL (20 @ 04:47)  Haptoglobin, Serum: 48 mg/dL (20 @ 14:17)  Haptoglobin, Serum: 22 mg/dL (20 @ 19:22)  Haptoglobin, Serum: <10 mg/dL (20 @ 13:39)  Haptoglobin, Serum: 66 mg/dL (20 @ 04:24)  Haptoglobin, Serum: 158 mg/dL (05.10.20 @ 08:08)  Haptoglobin, Serum: 288 mg/dL (20 @ 06:22)    Transferrin, Serum: 95 mg/dL (20 @ 07:04)  Transferrin, Serum: 238 mg/dL (18 @ 06:15)    Gamma Glutamyl Transferase, Serum: 59 U/L (20 @ 04:52)    Protein Electrophoresis with Interpretation and Reflex, Urine (05.10.20 @ 04:04)    Total Protein, Random Urine: 252: Test Repeated mg/dL    Total Protein, Urine: 252 mg/dL    % Albumin, Urine: 31.0 %    % Alpha 1, Urine: 13.8 %    % Alpha 2, Urine: 15.4 %    % Beta, Urine: 17.4 %    % Gamma, Urine: 22.4 %    Bence Burkett/Protein, Urine: Absent    Urine Electrophoresis Interpretation: Mild Selective (Glomerular) Proteinuria    Immunofixation, Urine: Reference Range: None Detected    Fibrin Split Products: >=5 <20: The fibrin/fibrinogen degradation product manual agglutination assay is  not adequately sensitive for evaluation of deep vein thrombosis and/or  pulmonary embolism. ug/mL (20 @ 23:56)    Mixing Study - PT/APTT (18 @ 15:50)    Fibrinogen Assay: 936 mg/dL    PT 50/50: 12.1 secs    Diluted Thrombin Time: 22.8 sec    Prothrombin Time, Plasma: 16 sec    INR: 1.43      D-Dimer Assay, Quantitative: 2166 ng/mL DDU (20 @ 05:19)  D-Dimer Assay, Quantitative: 2166 ng/mL DDU (20 @ 05:19)    Cortisol PM, Serum: 37.4 ug/dL (20 @ 13:39)  Cortisol Free, Serum (20 @ 22:35)    Corticosteroid Binding Globulin Result: 1.2  ug/dL    Cortisol, Free Result: 6.2 ug/dL    Percent Free Cortisol: 44  %    Chlamydia/GC Nucleic Acid Amplification (20 @ 11:51)    Source Amp: Urine     Chlamydia Amplification Result: NotDetec     Culture - Blood (20 @ 13:17)    Specimen Source: .Blood Blood    Culture Results:   No growth at 4 days.    Culture - Blood (20 @ 13:01)    Specimen Source: .Blood Blood    Culture Results:   No growth at 5 days.    Culture - Blood (20 @ 20:35)    Gram Stain:   Aerobic Bottle: Gram Positive Cocci in Clusters  Result called to and read back by_ M [facility] RN (2WO)  2020 17:21:54    -  Vancomycin: S 1    -  Tetra/Doxy: S <=1    -  Trimethoprim/Sulfamethoxazole: R >2/38    -  Cefazolin: R <=4    -  Daptomycin: S 0.5    -  Linezolid: S 2    -  Erythromycin: R >4    -  Clindamycin: S <=0.25    -  RIF- Rifampin: S <=1 Should not be used as monotherapy    -  Oxacillin: R >2    Specimen Source: .Blood Blood    Organism: Methicillin resistant Staphylococcus aureus    Organism: Methicillin resistant Staphylococcus aureus    Culture Results:   Growth in anaerobic bottle: Methicillin resistant Staphylococcus aureus  Floor previously notified.  Aerobic Bottle: No growth    Organism Identification: Methicillin resistant Staphylococcus aureus  Methicillin resistant Staphylococcus aureus    Method Type: IMAN    Method Type: ETEST    Culture - Blood (20 @ 08:15)    Gram Stain:   Anaerobic Bottle: Gram Positive Cocci in Clusters  Result called to and read back by_  Michel Green RN(2WO) 05/15/2020  07:20:08  Aerobic Bottle: Gram Positive Cocci in Clusters    -  Oxacillin: R >2    -  RIF- Rifampin: S <=1 Should not be used as monotherapy    -  Erythromycin: R >4    -  Linezolid: S 1    -  Clindamycin: S <=0.25    -  Daptomycin: S 0.5    -  Cefazolin: R 8    -  Trimethoprim/Sulfamethoxazole: R >2/38    -  Tetra/Doxy: S <=1    -  Vancomycin: S 2    Specimen Source: .Blood Blood    Organism: Methicillin resistant Staphylococcus aureus    Organism: Methicillin resistant Staphylococcus aureus    Culture Results:   Growth in aerobic and anaerobic bottles: Methicillin resistant  Staphylococcus aureus  Floor previously notified.    Organism Identification: Methicillin resistant Staphylococcus aureus  Methicillin resistant Staphylococcus aureus    Method Type: ETEST    Method Type: IMAN    Culture - Fungal, Body Fluid (20 @ 04:12)    Specimen Source: .Body Fluid Peritoneal Fluid    Culture Results:   No growth    Culture - Urine (20 @ 12:53)    -  Vancomycin: S 1.5    -  Trimethoprim/Sulfamethoxazole: R >2/38    -  Tetra/Doxy: S <=1    -  Oxacillin: R >2    -  RIF- Rifampin: S <=1 Should not be used as monotherapy    -  Linezolid: S 2    -  Daptomycin: S 0.5    -  Cefazolin: R <=4    Specimen Source: .Urine Catheterized    Culture Results:   >100,000 CFU/ml Methicillin resistant Staphylococcus aureus  Result called to and read back by_ KAREN Dhaliwal RN  2020 10:37:15    Organism Identification: Methicillin resistant Staphylococcus aureus  Methicillin resistant Staphylococcus aureus    Organism: Methicillin resistant Staphylococcus aureus    Organism: Methicillin resistant Staphylococcus aureus    Method Type: IMAN    Method Type: ETEST    Ammonia, Serum: <10 umol/L (20 @ 05:11)  Ammonia, Serum: <10 umol/L (20 @ 04:50)    Troponin T, Serum: 0.05 ng/mL (20 @ 14:30)  Troponin T, Serum: 0.02 ng/mL (18 @ 06:15)  Troponin T, Serum: 0.02 ng/mL (04.10.18 @ 23:08)    CKMB Units: 1.4 ng/mL (20 @ 14:30)  CKMB Units: 1.6 ng/mL (18 @ 06:15)    Creatine Kinase, Serum: 26 U/L (20 @ 04:58)  Creatine Kinase, Serum: 20 U/L (20 @ 05:19)  Creatine Kinase, Serum: 18 U/L (20 @ 04:24)  Creatine Kinase, Serum: 14 U/L (20 @ 05:03)  Creatine Kinase, Serum: 13 U/L (20 @ 04:53)  Creatine Kinase, Serum: 16 U/L (20 @ 05:11)  Creatine Kinase, Serum: 16 U/L (20 @ 05:53)  Creatine Kinase, Serum: 10 U/L (20 @ 08:09)  Creatine Kinase, Serum: <7 U/L (05.15.20 @ 04:14)  Creatine Kinase, Serum: <7: less than 7 U/L (20 @ 05:13)  Creatine Kinase, Serum: 10 U/L (20 @ 04:52)  Creatine Kinase, Serum: 25 U/L (20 @ 14:30)  Creatine Kinase, Serum: 33 U/L (18 @ 06:15)  Creatine Kinase, Serum: 49 U/L (04.10.18 @ 23:08)     C. difficile GDH &amp; toxins A/B by EIA (20 @ 19:18)    Clostridium difficile GDH Toxins A&amp;B, EIA: Negative    Clostridium difficile GDH Interpretation: Negative for toxigenic C. Difficile.    COVID-19 PCR: NotDetec (05.18.20 @ 12:32)  COVID-19 PCR: NotDetec(20 @ 12:19)    C3 Complement, Serum: 38 mg/dL (20 @ 11:04)  C4 Complement, Serum: 11 mg/dL (20 @ 11:04)    Culture - Fungal, Body Fluid (20 @ 04:12)    Specimen Source: .Body Fluid Peritoneal Fluid    Culture Results: No growth    Imaging:  Reviewed    EXAM:  XR CHEST PORTABLE ROUTINE 1V                        PROCEDURE DATE:  2020    INTERPRETATION:  CLINICAL INDICATION: 74-year-old with shortness of breath.  IMPRESSION: Frontal view of the chest is compared to 2020 and demonstrates normal heart size. Large bilateral pleural effusions. Severe pulmonary edema progressive since prior study. Central venous catheter with tip at the cavoatrial junction. Additional central venous catheter in the right atrium. Bibasilar atelectasis.    EXAM:  XR ABDOMEN PORTABLE URGENT 1V                        PROCEDURE DATE:  2020    INTERPRETATION:  CLINICAL INDICATION: 74-year-old with pericardial drain.  IMPRESSION: Frontal view of the lower abdomen is compared to 2020and demonstrates no change in position of pericardial drain overlying the lower mediastinum. There is a paucity of bowel gas overlying the abdomen.    EXAM:  US DPLX ABDOMEN                        PROCEDURE DATE:  2020    INTERPRETATION:  Duplex ultrasound of the abdomen  Impression:  1.  Hepatic veins, portal veins, and hepatic arteries are patent. Pulsatile flow within the hepatic veins may be related to right heart failure.  2.  Moderate ascites and small right pleural effusion.  3.  Echogenic right kidney with mild right hydronephrosis, unchanged.  4.  Gallbladder filled sludge and stones.    EXAM:  US RETROPERITONEAL LIMITED                        PROCEDURE DATE:  2020    INTERPRETATION:  RENAL ULTRASOUND dated 2020 5:26 PM  IMPRESSION:  No change in mild bilateral hydronephrosis.    EXAM:  US DPLX LWR EXT VEINS COMPL BI                        PROCEDURE DATE:  2020    INTERPRETATION:  VENOUS DUPLEX DOPPLER OF BOTH LOWER EXTREMITIES dated 2020 5:50 PM  IMPRESSION:  No deep vein thrombosis seen. Left peroneal and posterior tibial calf veins not visualized.    EXAM:  CT BRAIN                        PROCEDURE DATE:  2020    INTERPRETATION:  Fall.  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    EXAM:  CT ABDOMEN AND PELVIS OC                        PROCEDURE DATE:  2020    INTERPRETATION:  CT of the ABDOMEN and PELVIS without intravenous contrast dated 2020 11:00 PM  IMPRESSION:  1.  Since 2020, there has been no significant change in bilateral pleural effusions, moderate to large intra-abdominal ascites, and anasarca. Slightly nodular liver contour and splenomegaly cirrhosis, correlate clinically.  2.  Pancolitis of infectious or inflammatory etiology.  3.  Mild bilateral hydroureteronephrosis extending to the thick-walled urinary bladder, unchanged. Prostatic nodule indenting into the urinary bladder base, as seen on 2018  4.  Dilated main pulmonary artery suggesting pulmonaryhypertension.    EXAM:  NM INFLAMM LOC GALLIUM WB SD                        EXAM:  NM INFLAMM LOC SPECT CT SA SD                        PROCEDURE DATE:  2020    Impression:   No suspicious focal radiotracer activity in the heart.  Patchy radiotracer activity along the right posterior chest wall, correlating to fluid/fat stranding and soft tissue swelling along the ribs and intercostal muscles. Clinical correlation for cellulitis and osteomyelitis is recommended.    TTE Echo Limited or F/U (05.15.20 @ 09:58)   CONCLUSIONS:   1. Limited study obtained for evaluation of pericardial effusion.   2. Normal left ventricular size and function.   3. Mildlydilated right ventricular size.   4. Normal right ventricular systolic function. A device lead is noted in the right heart.   5. No evidence of pulmonary hypertension, pulmonary artery systolic pressure is 30 mmHg.   6. No pericardial effusion.   7. Right pleural effusion.     TTE Echo Complete w/o contrast w/ Doppler (20 @ 14:30)   LV EF (MOD BP):  70 %      (>55%)    12 Lead ECG (20 @ 14:12)   Ventricular Rate 94 BPM  Atrial Rate 94 BPM  P-R Interval 134 ms  QRS Duration 112 ms  Q-T Interval 384 ms  QTC Calculation(Bezet) 480 ms  P Axis 40 degrees  R Axis -44 degrees  T Axis 94 degrees  Diagnosis Line Normal sinus rhythm  Left axis deviation  Septal infarct , age undetermined  Confirmed by ANN MARIE HERNANDEZ, BRETT ([id number]) on 2020 2:51:35 PM    PEx:  T(C): 36.7 (20 @ 13:00), Max: 36.8 (20 @ 01:11)  HR: 80 (20 @ 13:45) (76 - 85)  BP: 95/58 (20 @ 13:45) (92/50 - 113/55)  RR: 11 (20 @ 13:45) (9 - 19)  SpO2: 99% (20 @ 13:45) (97% - 100%)  Wt(kg): 71.8Kg    General: AAOx2 man found laying in bed in NAD  HEENT: Dry eyes and mucosas Lower lip lesion Poor dentition and eyesight   Neck: Supple RIJ+  CVS: RR S1S2 No M/G/R  Resp: Unlabored Non tachypneic Good air entry B/L anteriorly and decreased at bases  GI:  Distended Drain+ Soft NT Rectal tube+  :  Palacio+   Musc:  No C/C Decreased strength upper extremities   Neuro: No focal deficits. Follows commands. Able to answer questions.  Psych: Calm slow to answer at times but appropriate    Skin: Ecchymotic / bruised   B/L Heel and Sacrum tissue injuries.  Lymph: Edema+  Preadmit Karnofsky: 60 %           Current Karnofsky:   30%  Cachexia (Y/N):  No  BMI: 22.7    Advanced Directives:     Full Code      No documented MOLST found on [facility]     No documented HCP form found on [facility]     Other surrogates: Friend Teresa "Venus" Matt [phone] / [phone]     No Living will / POA / Advance directives found on [place] / [facility].     Documented GOC notes on [facility] on 2020 and 2020    Decision maker: The patient is able to carry conversation but does not demonstrate capacity for complex medical decision making given lack of response to questions regarding medical issues.   Legal surrogate: No documented HCP found in [facility] or paper chart.  Other surrogate: Friend Teresa Melgoza" Matt [phone] / [phone]    GOALS OF CARE DISCUSSION       Palliative care info/counseling provided	           Advanced Directives addressed please see Advance Care Planning Note	           Documentation of GOC: Full code. Intro to Palliative medicine and information sharing session today. Pending conversation with patient's friend Teresa later today. Patient agreed to discuss medical issues with her.    REFERRALS	        Unit SW/Case Mgmt        - Declined       Speech/Swallow - Following       Patient/Family Support - Following       Music Therapy - Following       Nutrition - Following       Dietician - Following       PT/OT - Following

## 2020-05-26 NOTE — PROGRESS NOTE ADULT - SUBJECTIVE AND OBJECTIVE BOX
no acute events overnight  remains on pressors  anuric, no signs of renal recovery   breathing comfortably on RA, sat 100%, not in distress      Meds:  ceftaroline fosamil IVPB 200 every 12 hours  chlorhexidine 2% Cloths 1 daily  collagenase Ointment 1 daily  DAPTOmycin IVPB 700 every 48 hours  finasteride 5 daily  guaiFENesin   Syrup  (Sugar-Free) 200 every 6 hours PRN  hydrocortisone sodium succinate Injectable 50 every 6 hours  insulin glargine Injectable (LANTUS) 10 at bedtime  insulin lispro (HumaLOG) corrective regimen sliding scale  Before meals and at bedtime  insulin lispro Injectable (HumaLOG) 5 three times a day before meals  midodrine 15 every 8 hours  mirtazapine 7.5 every 24 hours  norepinephrine Infusion 0.05 <Continuous>  nystatin Cream 1 two times a day  nystatin Powder 1 two times a day  pantoprazole  Injectable 40 every 24 hours  sertraline 50 daily  sevelamer carbonate 1600 every 8 hours      T(C): , Max: 36.8 (05-26-20 @ 01:11)  T(F): , Max: 98.2 (05-26-20 @ 01:11)  HR: 80 (05-26-20 @ 10:00)  BP: 92/50 (05-26-20 @ 10:00)  BP(mean): 66 (05-26-20 @ 10:00)  RR: 19 (05-26-20 @ 10:00)  SpO2: 100% (05-26-20 @ 10:00)  Wt(kg): --    05-25 @ 07:01  -  05-26 @ 07:00  --------------------------------------------------------  IN: 200.8 mL / OUT: 165 mL / NET: 35.8 mL    05-26 @ 07:01  -  05-26 @ 10:26  --------------------------------------------------------  IN: 5.4 mL / OUT: 0 mL / NET: 5.4 mL    Physical Exam:  General: alert, NAD, on RA  HEENT: moist mucous membranes  Neck: no JVD visible  Cardiac: S1, S2. No murmurs   Respiratory: bilateral basal rales   Abdomen: soft, nontender, nondistended, +BS  Skin: petechiae  Extremities: + LE edema b/l  Access: R IJ HD cath      LABS:                        9.0    9.05  )-----------( 50       ( 26 May 2020 04:58 )             30.1     05-26    140  |  106  |  107<H>  ----------------------------<  180<H>  4.6   |  18<L>  |  5.85<H>    Ca    8.0<L>      26 May 2020 04:58  Phos  7.3     05-26  Mg     2.1     05-26    TPro  5.2<L>  /  Alb  2.5<L>  /  TBili  0.7  /  DBili  x   /  AST  34  /  ALT  12  /  AlkPhos  77  05-26      PT/INR - ( 26 May 2020 04:58 )   PT: 20.4 sec;   INR: 1.76          PTT - ( 26 May 2020 04:58 )  PTT:38.5 sec          RADIOLOGY & ADDITIONAL STUDIES:    < from: Xray Chest 1 View- PORTABLE-Routine (05.26.20 @ 02:50) >    EXAM:  XR CHEST PORTABLE ROUTINE 1V                          PROCEDURE DATE:  05/26/2020          INTERPRETATION:  CLINICAL INDICATION: 74-year-old with shortness of breath.      IMPRESSION: Frontal view of the chest is compared to 5/25/2020 and demonstrates normal heart size. Large bilateral pleural effusions. Severe pulmonary edema progressive since prior study. Central venous catheter with tip at the cavoatrial junction. Additional central venous catheter in the right atrium. Bibasilar atelectasis.        < end of copied text >

## 2020-05-26 NOTE — PROGRESS NOTE ADULT - ASSESSMENT
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

## 2020-05-27 NOTE — PROGRESS NOTE ADULT - SUBJECTIVE AND OBJECTIVE BOX
OVERNIGHT EVENTS: Patient took midodrine in the PM.     SUBJECTIVE / INTERVAL HPI: Patient seen and examined at bedside. Patient resting comfortably in bed. No acute complaints other than some rectal pain. Explained the patient has a rectal tube, and that he can have a bowel movement at any time, does not need to wait for a nurse use the bathroom. He continues to report lack of interest in po intake.     VITAL SIGNS:  Vital Signs Last 24 Hrs  T(C): 36.6 (27 May 2020 09:00), Max: 36.7 (26 May 2020 13:00)  T(F): 97.8 (27 May 2020 09:00), Max: 98.1 (26 May 2020 13:00)  HR: 76 (27 May 2020 10:00) (76 - 85)  BP: 94/54 (27 May 2020 10:00) (83/51 - 124/58)  BP(mean): 67 (27 May 2020 10:00) (62 - 84)  RR: 12 (27 May 2020 10:00) (10 - 22)  SpO2: 98% (27 May 2020 10:00) (96% - 100%)  I&O's Summary    26 May 2020 07:01  -  27 May 2020 07:00  --------------------------------------------------------  IN: 46.1 mL / OUT: 37 mL / NET: 9.1 mL    27 May 2020 07:01  -  27 May 2020 11:07  --------------------------------------------------------  IN: 2.1 mL / OUT: 5 mL / NET: -2.9 mL        PHYSICAL EXAM:    General: NAD, resting comfortably in bed. Breathing well on room air  Neck: no JVD  Respiratory: diminished breath sounds at the bases, non-tachypneic, no respiratory distress   Cardiovascular: Regular rhythm/rate; S1/S2, no pericardial rub, pericardial drain removed with site clean, dry, and intact  Gastrointestinal: distended, non tender ascitic drain with appropriate drainage, site clean, dry, and intact  Extremities: WWP; 2+ bilateral dependent pitting edema up to his thigh  Neurological:  A&Ox 3, arousable and interactive but still weak. Is able to follow simple commands and answer simple questions. CNII-XII grossly intact; no obvious focal deficits, no flapping tremor, but minimal asterixis  T/L/D: palacio present, currently anuric  Skin: petechiae diffusely     MEDICATIONS:  MEDICATIONS  (STANDING):  chlorhexidine 2% Cloths 1 Application(s) Topical daily  collagenase Ointment 1 Application(s) Topical daily  DAPTOmycin IVPB 700 milliGRAM(s) IV Intermittent every 48 hours  dronabinol 2.5 milliGRAM(s) Oral two times a day before meals  finasteride 5 milliGRAM(s) Oral daily  heparin injectable 1000 units/ml 1000 Unit(s) 1000 Unit(s) IV Push once  heparin injectable 1000 units/ml 500 Unit(s) 500 Unit(s) IV Push every 1 hour  hydrocortisone sodium succinate Injectable 50 milliGRAM(s) IV Push every 8 hours  insulin glargine Injectable (LANTUS) 10 Unit(s) SubCutaneous at bedtime  insulin lispro (HumaLOG) corrective regimen sliding scale   SubCutaneous Before meals and at bedtime  midodrine 15 milliGRAM(s) Oral every 8 hours  mirtazapine 7.5 milliGRAM(s) Oral every 24 hours  norepinephrine Infusion 0.05 MICROgram(s)/kG/Min (3.37 mL/Hr) IV Continuous <Continuous>  nystatin Cream 1 Application(s) Topical two times a day  pantoprazole  Injectable 40 milliGRAM(s) IV Push every 24 hours  psyllium Powder 1 Packet(s) Oral daily  sertraline 50 milliGRAM(s) Oral daily  sevelamer carbonate 1600 milliGRAM(s) Oral three times a day with meals    MEDICATIONS  (PRN):  guaiFENesin   Syrup  (Sugar-Free) 200 milliGRAM(s) Oral every 6 hours PRN Cough      ALLERGIES:  Allergies    No Known Allergies    Intolerances        LABS:                        9.0    7.93  )-----------( 42       ( 27 May 2020 05:28 )             29.5     05-27    140  |  105  |  115<H>  ----------------------------<  142<H>  4.3   |  17<L>  |  6.19<H>    Ca    8.1<L>      27 May 2020 05:28  Phos  7.6     05-27  Mg     2.1     05-27    TPro  5.5<L>  /  Alb  2.8<L>  /  TBili  0.7  /  DBili  x   /  AST  22  /  ALT  10  /  AlkPhos  77  05-27    PT/INR - ( 27 May 2020 05:28 )   PT: 21.6 sec;   INR: 1.86          PTT - ( 27 May 2020 05:28 )  PTT:38.7 sec    CAPILLARY BLOOD GLUCOSE      POCT Blood Glucose.: 145 mg/dL (27 May 2020 10:46)      RADIOLOGY & ADDITIONAL TESTS: Reviewed.

## 2020-05-27 NOTE — PROGRESS NOTE ADULT - ATTENDING COMMENTS
AAMIR  BUN rising to make arrangements for HD today  to try SLED as pt hypotensive- stable on low dose levo

## 2020-05-27 NOTE — CHART NOTE - NSCHARTNOTEFT_GEN_A_CORE
PALLIATIVE MEDICINE COORDINATION OF CARE NOTE FOR SANIYA LOZANO    Patient last assessed: 5/26/2020 to manage: GOC/AD, Symptoms, and support was recommended: Ongoing conversation and information sharing with patient and friend Teresa    30 Minutes; Start: 10:30am End: 11:00am, of non-face-to-face prolonged service provided that relates to (face-to-face) care that has or will occur and ongoing patient management, including one or more of the following:   - Reviewed records from other physicians or other health care professional services, including one or more of the following: other medical records and diagnostic / radiology study results     - Other: Medication reviewed.    The patient HAS NOT used PRN's in the last 24h. Received 1 dose of Remeron 7.5mg in the last 24h. MME: 0mg    MEDICATIONS  (STANDING):  chlorhexidine 2% Cloths 1 Application(s) Topical daily  collagenase Ointment 1 Application(s) Topical daily  DAPTOmycin IVPB 700 milliGRAM(s) IV Intermittent every 48 hours  dronabinol 2.5 milliGRAM(s) Oral two times a day before meals  finasteride 5 milliGRAM(s) Oral daily  heparin injectable 1000 units/ml 1000 Unit(s) 1000 Unit(s) IV Push once  heparin injectable 1000 units/ml 500 Unit(s) 500 Unit(s) IV Push every 1 hour  hydrocortisone sodium succinate Injectable 50 milliGRAM(s) IV Push every 8 hours  insulin glargine Injectable (LANTUS) 10 Unit(s) SubCutaneous at bedtime  insulin lispro (HumaLOG) corrective regimen sliding scale   SubCutaneous Before meals and at bedtime  midodrine 15 milliGRAM(s) Oral every 8 hours  mirtazapine 7.5 milliGRAM(s) Oral every 24 hours  norepinephrine Infusion 0.05 MICROgram(s)/kG/Min (3.37 mL/Hr) IV Continuous <Continuous>  nystatin Cream 1 Application(s) Topical two times a day  pantoprazole  Injectable 40 milliGRAM(s) IV Push every 24 hours  psyllium Powder 1 Packet(s) Oral daily  sertraline 50 milliGRAM(s) Oral daily  sevelamer carbonate Powder 1600 milliGRAM(s) Oral three times a day with meals  MEDICATIONS  (PRN):  guaiFENesin   Syrup  (Sugar-Free) 200 milliGRAM(s) Oral every 6 hours PRN Cough    - Other: Advanced directives     Full Code      No documented MOLST found on [facility]     No documented HCP form found on [facility]     Other surrogates: Friend Teresa "Venus" Matt [phone] / [phone]     No Living will / POA / Advance directives found on [place] / [facility].     Documented GOC notes on [facility] on 5/22/2020 and 5/25/2020    - Other: Coordination/Plan of care  Attempted to see patient but currently having SLP bedside evaluation.   Discussed with primary team and reviewed renal notes stating patient will receive sustained low efficiency dialysis today.  Ongoing conversation with friend Teresa about the patient's medical issues and advance directives.  Will inform ewelina Moore of option to come and visit to the bedside with the hope of having a family meeting when she does.

## 2020-05-27 NOTE — CHART NOTE - NSCHARTNOTEFT_GEN_A_CORE
Upon Nutritional Assessment by the Registered Dietitian your patient was determined to meet criteria / has evidence of the following diagnosis/diagnoses:          [ ]  Mild Protein Calorie Malnutrition        [ ]  Moderate Protein Calorie Malnutrition        [x] Severe Protein Calorie Malnutrition        [ ] Unspecified Protein Calorie Malnutrition        [ ] Underweight / BMI <19        [ ] Morbid Obesity / BMI > 40      Findings as based on:  •  Comprehensive nutrition assessment and consultation  >>> Suspected severe Malnutrition r/t intake<EER in setting of increased EER AEB poor PO (<50% >5 days), Edema (1/3+),  moderate wasting to orbital region, muscle loss to temple appear to be mild/moderate in nature.     Treatment/The following diet has been recommended:  Pt with malnutrition and pressure ulcers who has been on/off HD - pt with increased EER given the following conditions, however has remained with poor PO intake during admission. Pt had been noted with bilateral pleural effusions, moderate to large intra-abdominal ascites, and anasarca however reported prior by team GI w/u negative - pt with c/o stomach acid at this time despite use of Protonix. Pt also noted with not wanting to eat d/t not wanting to have BM despite rectal tube, noted now ordered for metamucil. Pt would benefit from artifical means of nutrition if unable to meet via PO alone, however pt refusing NGT. GOC ongoing with palliative care Pending Discussion today. Should less aggressive measures be wanted RD to [place] wishes. In the event pt should be agreeable to NGT and TF to begin Please reconsult for Recs PRN. While PO diet remains, Recommend to Optimize GI med Regime to ensure comfort during meals, suggest use of regular diet + Suplena x1/day as oral nutrition supplement with PO consistency per SLP Recs (If pt willing to consume oral nutrition supplement, suggest increasing use). RD to remain available for additional nutrition interventions as needed.  Please reconsult PRN.     PROVIDER Section:     By signing this assessment you are acknowledging and agree with the diagnosis/diagnoses assigned by the Registered Dietitian    Comments:

## 2020-05-27 NOTE — CONSULT NOTE ADULT - SUBJECTIVE AND OBJECTIVE BOX
75y/o [place] M with h/o MDS (pancytopenia, splenomegaly , bone marrow biopsy) iron deficiency anemia   2/2 upper GI bleeding, and anemia of chronic diesese  renal failure 2/2 AAMIR 2/2 diuresis for ascites currently on HD, anasarca, pericardial effusion with tamponade,  patient had MRSA with unidentified source, which has resolved.  Currently patient has DIC with with elevated PT/PTT/INR, and thrombocytopenia.  At the same time he has clots in venous access lines.  Plan:  supportive care:   -transfuse PRBC for Hg <7  -Transfuse platelets for platelet count<15k or at 20k if febrile  -cryoprecipitate if active bleeding and coags elevated  - Heparine flushe for venpus access line  Olivia Roberts mD

## 2020-05-27 NOTE — GOALS OF CARE CONVERSATION - ADVANCED CARE PLANNING - LOCATION OF DISCUSSION
I have personally seen and examined this patient.  I have fully participated in the care of this patient. I have reviewed all pertinent clinical information, including history, physical exam, plan and the Resident’s note and agree except as noted. Face to face

## 2020-05-27 NOTE — PROGRESS NOTE ADULT - SUBJECTIVE AND OBJECTIVE BOX
no acute events overnight  anuric, no signs of renal recovery   on touch of levophed  stable respiratory status         Meds:  chlorhexidine 2% Cloths 1 daily  collagenase Ointment 1 daily  DAPTOmycin IVPB 700 every 48 hours  dronabinol 2.5 two times a day before meals  finasteride 5 daily  guaiFENesin   Syrup  (Sugar-Free) 200 every 6 hours PRN  hydrocortisone sodium succinate Injectable 50 every 8 hours  insulin glargine Injectable (LANTUS) 10 at bedtime  insulin lispro (HumaLOG) corrective regimen sliding scale  Before meals and at bedtime  midodrine 15 every 8 hours  mirtazapine 7.5 every 24 hours  norepinephrine Infusion 0.05 <Continuous>  nystatin Cream 1 two times a day  pantoprazole  Injectable 40 every 24 hours  psyllium Powder 1 daily  sertraline 50 daily  sevelamer carbonate 1600 three times a day with meals      T(C): , Max: 36.7 (05-26-20 @ 13:00)  T(F): , Max: 98.1 (05-26-20 @ 13:00)  HR: 76 (05-27-20 @ 10:00)  BP: 94/54 (05-27-20 @ 10:00)  BP(mean): 67 (05-27-20 @ 10:00)  RR: 12 (05-27-20 @ 10:00)  SpO2: 98% (05-27-20 @ 10:00)  Wt(kg): --    05-26 @ 07:01  -  05-27 @ 07:00  --------------------------------------------------------  IN: 46.1 mL / OUT: 37 mL / NET: 9.1 mL    05-27 @ 07:01  -  05-27 @ 10:40  --------------------------------------------------------  IN: 2.1 mL / OUT: 5 mL / NET: -2.9 mL        Physical Exam:  General: alert, NAD, on RA  HEENT: moist mucous membranes  Neck: no JVD visible  Cardiac: S1, S2. No murmurs   Respiratory: bilateral basal rales   Abdomen: soft, nontender, nondistended, +BS  Skin: petechiae  Extremities: + LE edema b/l  Access: R IJ HD cath      LABS:                        9.0    7.93  )-----------( 42       ( 27 May 2020 05:28 )             29.5     05-27    140  |  105  |  115<H>  ----------------------------<  142<H>  4.3   |  17<L>  |  6.19<H>    Ca    8.1<L>      27 May 2020 05:28  Phos  7.6     05-27  Mg     2.1     05-27    TPro  5.5<L>  /  Alb  2.8<L>  /  TBili  0.7  /  DBili  x   /  AST  22  /  ALT  10  /  AlkPhos  77  05-27      PT/INR - ( 27 May 2020 05:28 )   PT: 21.6 sec;   INR: 1.86          PTT - ( 27 May 2020 05:28 )  PTT:38.7 sec          RADIOLOGY & ADDITIONAL STUDIES:    reviewed

## 2020-05-27 NOTE — PROGRESS NOTE ADULT - ASSESSMENT
75 y/o M w/PMHx of CKD stage III/IV, DM, HTN, BPH (chronic incontinence/retention, s/p palacio on last admission), a-fib admitted for new onset ascites nephrology consulted for AAMIR on CKD.      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, likely ATN from hypotension and ischemic injury  - worsening anuric AAMIR  - on levophed, small dose   - RRT initiated on 05/15, remains HD-dependent   - positive fluid balance, with worsening uremia, metabolic acidosis  - plan for SLED today, UF 3.0 L over 6 hr   - renally adjusted meds/ ABx  - renvela 1600 mg po tid w each meal   - pending GOC discussion

## 2020-05-27 NOTE — PROGRESS NOTE ADULT - SUBJECTIVE AND OBJECTIVE BOX
for Dr Roberts    Pt seen and examined   says to me "that's it" "i'm dying"  in no distress  dialysis on    REVIEW OF SYSTEMS:  Constitutional: No fever,   Cardiovascular: No chest pain,   Gastrointestinal: No abdominal or epigastric pain.   Skin: No itching, burning, rashes or lesions       MEDICATIONS:  MEDICATIONS  (STANDING):  chlorhexidine 2% Cloths 1 Application(s) Topical daily  collagenase Ointment 1 Application(s) Topical daily  DAPTOmycin IVPB 700 milliGRAM(s) IV Intermittent every 48 hours  dronabinol 2.5 milliGRAM(s) Oral two times a day before meals  finasteride 5 milliGRAM(s) Oral daily  heparin injectable 1000 units/ml 1000 Unit(s) 1000 Unit(s) IV Push once  heparin injectable 1000 units/ml 500 Unit(s) 500 Unit(s) IV Push every 1 hour  hydrocortisone sodium succinate Injectable 50 milliGRAM(s) IV Push every 8 hours  insulin glargine Injectable (LANTUS) 10 Unit(s) SubCutaneous at bedtime  insulin lispro (HumaLOG) corrective regimen sliding scale   SubCutaneous Before meals and at bedtime  midodrine 15 milliGRAM(s) Oral every 8 hours  mirtazapine 7.5 milliGRAM(s) Oral every 24 hours  norepinephrine Infusion 0.05 MICROgram(s)/kG/Min (3.37 mL/Hr) IV Continuous <Continuous>  nystatin Cream 1 Application(s) Topical two times a day  pantoprazole  Injectable 40 milliGRAM(s) IV Push every 24 hours  psyllium Powder 1 Packet(s) Oral daily  sertraline 50 milliGRAM(s) Oral daily  sevelamer carbonate Powder 1600 milliGRAM(s) Oral three times a day with meals    MEDICATIONS  (PRN):  guaiFENesin   Syrup  (Sugar-Free) 200 milliGRAM(s) Oral every 6 hours PRN Cough      Allergies    No Known Allergies    Intolerances        Vital Signs Last 24 Hrs  T(C): 36.4 (27 May 2020 12:01), Max: 36.6 (27 May 2020 01:05)  T(F): 97.6 (27 May 2020 12:01), Max: 97.9 (27 May 2020 01:05)  HR: 82 (27 May 2020 13:18) (76 - 82)  BP: 128/60 (27 May 2020 13:18) (51/36 - 128/60)  BP(mean): 86 (27 May 2020 13:18) (41 - 86)  RR: 14 (27 May 2020 13:18) (9 - 22)  SpO2: 96% (27 May 2020 13:18) (96% - 100%)    05-26 @ 07:01  -  05-27 @ 07:00  --------------------------------------------------------  IN: 46.1 mL / OUT: 37 mL / NET: 9.1 mL    05-27 @ 07:01 - 05-27 @ 13:21  --------------------------------------------------------  IN: 4.8 mL / OUT: 5 mL / NET: -0.2 mL        PHYSICAL EXAM:    General: ; in no acute distress  HEENT: MMM, conjunctiva and sclera clear  Lungs: clear  Heart: reg  Gastrointestinal: Soft non-tender sl-distended; Normal bowel sounds;   Ext: edema    LABS:      CBC Full  -  ( 27 May 2020 05:28 )  WBC Count : 7.93 K/uL  RBC Count : 3.68 M/uL  Hemoglobin : 9.0 g/dL  Hematocrit : 29.5 %  Platelet Count - Automated : 42 K/uL  Mean Cell Volume : 80.2 fl  Mean Cell Hemoglobin : 24.5 pg  Mean Cell Hemoglobin Concentration : 30.5 gm/dL  Auto Neutrophil # : 7.14 K/uL  Auto Lymphocyte # : 0.44 K/uL  Auto Monocyte # : 0.30 K/uL  Auto Eosinophil # : 0.00 K/uL  Auto Basophil # : 0.00 K/uL  Auto Neutrophil % : 90.1 %  Auto Lymphocyte % : 5.5 %  Auto Monocyte % : 3.8 %  Auto Eosinophil % : 0.0 %  Auto Basophil % : 0.0 %    05-27    140  |  105  |  115<H>  ----------------------------<  142<H>  4.3   |  17<L>  |  6.19<H>    Ca    8.1<L>      27 May 2020 05:28  Phos  7.6     05-27  Mg     2.1     05-27    TPro  5.5<L>  /  Alb  2.8<L>  /  TBili  0.7  /  DBili  x   /  AST  22  /  ALT  10  /  AlkPhos  77  05-27    PT/INR - ( 27 May 2020 05:28 )   PT: 21.6 sec;   INR: 1.86          PTT - ( 27 May 2020 05:28 )  PTT:38.7 sec                  RADIOLOGY & ADDITIONAL STUDIES (The following images were personally reviewed):

## 2020-05-27 NOTE — CHART NOTE - NSCHARTNOTEFT_GEN_A_CORE
Admitting Diagnosis:   Patient is a 74y old  Male who presents with a chief complaint of sepsis (27 May 2020 13:20)      PAST MEDICAL & SURGICAL HISTORY:  History of pancytopenia  H/O hydrocephalus  Anemia  HLD (hyperlipidemia)  Enlarged prostate  DM (diabetes mellitus)  HTN (hypertension)  H/O prior ablation treatment      Current Nutrition Order:  Dys 1 Puree /Thin Liquids  Nepro 1x3/day  Suplena 1x3/day    PO Intake: Good (%) [   ]  Fair (50-75%) [ x  ] Poor (<25%) [   ]  Please see Below     GI Issues:   Rectal Tube OP: 150ml 5/25, 30ml 5/23, 10ml 5/22   BM+ x1 5/24   CT abdomen and pelvis - no significant change in bilateral pleural effusions, moderate to large intra-abdominal ascites, and anasarca. Slightly nodular liver contour and splenomegaly cirrhosis, pancolitis, mild bilateral hydroureteronephrosis extending to the thick-walled urinary bladder, unchanged. Prostatic nodule indenting into the urinary bladder base, as seen on 4/11/2018, dilated main pulmonary artery suggesting pulmonary hypertension.  CDiff negative / GI PCR negative     Pain:   None per flow sheets     Skin Integrity:  1+ Edema (left arm;  right arm;  left hand;  right hand), 3+ Edema (left ankle;  right ankle;  left foot;  right foot)   Skin tear to Sacrum However also noted as unstageable  BL heel unstageable pressure ulcers       Labs:   05-27    140  |  105  |  115<H>  ----------------------------<  142<H>  4.3   |  17<L>  |  6.19<H>    Ca    8.1<L>      27 May 2020 05:28  Phos  7.6     05-27  Mg     2.1     05-27    TPro  5.5<L>  /  Alb  2.8<L>  /  TBili  0.7  /  DBili  x   /  AST  22  /  ALT  10  /  AlkPhos  77  05-27    CAPILLARY BLOOD GLUCOSE      POCT Blood Glucose.: 145 mg/dL (27 May 2020 10:46)  POCT Blood Glucose.: 137 mg/dL (27 May 2020 05:47)  POCT Blood Glucose.: 203 mg/dL (26 May 2020 21:19)  POCT Blood Glucose.: 235 mg/dL (26 May 2020 16:23)      Medications:  MEDICATIONS  (STANDING):  chlorhexidine 2% Cloths 1 Application(s) Topical daily  collagenase Ointment 1 Application(s) Topical daily  DAPTOmycin IVPB 700 milliGRAM(s) IV Intermittent every 48 hours  dronabinol 2.5 milliGRAM(s) Oral two times a day before meals  finasteride 5 milliGRAM(s) Oral daily  heparin injectable 1000 units/ml 1000 Unit(s) 1000 Unit(s) IV Push once  heparin injectable 1000 units/ml 500 Unit(s) 500 Unit(s) IV Push every 1 hour  hydrocortisone sodium succinate Injectable 50 milliGRAM(s) IV Push every 8 hours  insulin glargine Injectable (LANTUS) 10 Unit(s) SubCutaneous at bedtime  insulin lispro (HumaLOG) corrective regimen sliding scale   SubCutaneous Before meals and at bedtime  midodrine 15 milliGRAM(s) Oral every 8 hours  mirtazapine 7.5 milliGRAM(s) Oral every 24 hours  norepinephrine Infusion 0.05 MICROgram(s)/kG/Min (3.37 mL/Hr) IV Continuous <Continuous>  nystatin Cream 1 Application(s) Topical two times a day  pantoprazole  Injectable 40 milliGRAM(s) IV Push every 24 hours  psyllium Powder 1 Packet(s) Oral daily  sertraline 50 milliGRAM(s) Oral daily  sevelamer carbonate Powder 1600 milliGRAM(s) Oral three times a day with meals    MEDICATIONS  (PRN):  guaiFENesin   Syrup  (Sugar-Free) 200 milliGRAM(s) Oral every 6 hours PRN Cough      Ht: 5'10''  pounds (75kg) +-10%   5/8 158 pounds %IBW=95% BMI 22.7   5/15 129.6 pounds   5/16 191.5 pounds  5/18 193 pounds / 194 pounds   5/27 136.9 pounds / 137 pounds   Wt Change: Based on most recent EMR wts. Noted varying EMR wts during admission, Pt has been with varying edema during admission and Pt now started on HD, pt remains with edema at this time which is likely masking true wt.     Malnutrition note placed 5/9- pt had been noted with Mild Protein Calorie Malnutrition, during this time noted with mild muscle losses to temporal region and Mild fat loss over triceps region noted.  During RD visit today 5/27 pt noted with moderate wasting to orbital region, muscle loss to temple appear to be mild/moderate in natural, new malnutrition note placed today 5/27.       Estimated energy needs:   IBW for EER d/t varying EMR wts in HD pt   Nutrient needs based on [facility] standards of care for maintenance in adults, adjusted for age, HD, fluid per team  ~2300kcal (30kcal/kg)   ~90-113g pro (1.2-1.5gm/kg pro) -- trend renal indices, Prot needs based on HD, Increased Prot should pt be on HD      Subjective:   75 yo M with MDS, DM2, afib, HTN, HLD, BPH s/p palacio, pancytopenia, CKD4, ascites of unknown origin presents from [facility] with complaints of decreased PO intake and weakness likely secondary to uremia/AAMIR from diuresis for treatment of ascites. Pt hospital course notable for UGIB, CT on 5/18 showing pancolitis and GI PCR negative; team reportes GI w/u overall negative. Pt with pericardial effusion with tamponade physiology as well as ATN and ascites of unknown etiology s/p PRBC. Pt s/p paracentesis 5/10, IR pericardiocentesis 5/11, Paracentesis 5/14. Echo 5/15 showed no pericardial effusion, Drain removed and IR signed off. Pt now with anemia of unknown etiology. Pt not a candidate for bone marrow transplant. Pt with Persistent MRSA bacteremia, concern for endocarditis vs cardiac abscess. Pt s/p Gallium 5/20, suggestive of cellulitis or rib osteo. Noted p/w AMS likey 2/2 to uremia. Pt now needing HD d/t AAMIR/CKD Likely ATN from hypotension and ischemic injury, started 5/15, however inability to tolerate HD + c/b frequent clotting of HD cath during CVVHD while on hep gtt, poss plan now for SLED HD (, Cr 6.19, K WDL, Phos 7.6 with renvela order). Hemolysis labs negative. Pt with poor prognosis, GOC noted, pt remains full code at this time, Pallative consult noted, per progress notes plan to speak with HCP today.   Pt Underwent FEES 5/13 recommending mech soft diet. Now s/p Swallow Eval 5/18 which notes poor dentition, noted with recs for puree/thin liquids. Now ordered for dys 1 puree/thin liquids + Nepro/Suplena x3/day each as ONS. Pt remains with poor PO intake, brakfast at bedside untouched and pt noted with various ONS bottles at bedside. Pt visited, encouraged pt to try breakfast however requesting stephanie be removed from room. RN reports pt does not want to eat out of fear of having BMs, pt now ordered for metamucil. Pt reported to RD does not want to eat d/t stomach acid; noted order for Protonix. Per progress notes refusing NGT. Noted Remeron and marionol ordered.   Please see below for nutritions recommendations- Discussed pt with team. Pending order placed.     Previous Nutrition Diagnosis: Mild protein-calorie malnutrition r/t physiologic causes AEB reported poor Po intake for > 5 days PTA and observed mild muscle losses to temporal region coupled with mild fat loss over triceps region.  NEW PES: Suspected severe Malnutrition r/t intake<EER in setting of increased EER AEB poor PO (<50% >5 days), Edema (1/3+),  moderate wasting to orbital region, muscle loss to temple appear to be mild/moderate in nature.   Goal: Meet 75% EER via feasible route that is consistent with GOC     Recommendations:  Pt with malnutrition and pressure ulcers who has been on/off HD - pt with increased EER given the following conditions, however has remained with poor PO intake during admission. Pt had been noted with bilateral pleural effusions, moderate to large intra-abdominal ascites, and anasarca however reported prior by team GI w/u negative - pt with c/o stomach acid at this time despite use of Protonix. Pt also noted with not wanting to eat d/t not wanting to have BM depsite rectal tube, noted now orderd for metamucil. Pt would benefit from artifical means of nutrition if unable to meet via PO alone, however pt refusing NGT. GOC ongoing with palliative care Pending Discussion today. Should less aggressive measures be wanted RD to [place] wishes. In the event pt should be agreeable to NGT and TF to begin Please reconsult for Recs PRN. While PO diet remains, Recommend to Optimize GI med Regime to ensure comfort during meals, suggest use of regular diet + Suplena x1/day as oral nutrition supplement with PO consistency per SLP Recs (If pt willing to consume oral nutrition supplement, suggest increasing use). RD to remain available for additional nutrition interventions as needed.  Please reconsult PRN.     Education: Encouraged PO intake during meals & reviewed importance. Pt unwilling to listen.   Risk Level: High [ x  ] Moderate [   ] Low [   ].

## 2020-05-27 NOTE — PROGRESS NOTE ADULT - ATTENDING COMMENTS
AAMIR  hypotension  BP too labile today to proceed with acute HD or SLED  and   unable to tolerate CVVHD as clotting system and thrombocytopenic, so no heparin  reassess in AM  reviewed with med team

## 2020-05-27 NOTE — PROGRESS NOTE ADULT - ASSESSMENT
75 yo M with MDS, pancytopenia, CKD, ascites of unknown origin presents from [facility] with complaints of decreased po intake and weakness likely secondary to uremia/AAMIR from diuresis for treatment of ascites, hospital course notable for UGIB and pericardial effusion with tamponade physiology , as well as ATN, and ascites of unknown etiology, now in ICU s/p paracentesis 5/10 (2L removed), IR pericardiocentesis 5/11 (drain in place), now with anemia and thrombocytopenia with hypercoagulability 2/2 DIC    GOC:   Palliative to continue to GOC discussion with HCP Venus today. Will discuss permcath and PICC placement as well.     Neuro:   p/w AMS likey 2/2 to uremia. On 5/22, more lethargic than usual and difficult to arouse with confusion  Awake and alert today  Remeron 7.5 at bedtime    Cardiology:   Hypotension  MRSA bacteremia vs. intravascular volume depletion from diuresis vs hemolysis? --> c/w dapto, midodrine TID, wean levo as tolerated. Most recent echo with no pericardial effusion. Not a candidate for GET as per cardiology  - midodrine 15mg [id number]  - Levo requirements decreased to 0.1 this morning, but may need to increased for SLED today  - solucortef 50mg q8hrs  - Recultured on 5/22, cultures from 5/18 NGTD although patient still requiring pressors suggesting persistent vasoplegia    Pericardial Effusion:  Initial read with some evidence of tamponade physiology; now s/p IR for diagnostic pericardiocentesis on 5/11  - Cardiology following, appreciate recs.   - Given low pericardial drain output and clog, a follow up limited echo on 5/15 showed no pericardial effusion  - IR removed drain on 5/21  --> See above, recent hx of pericardial effusion s/p drain--> possible etiology of hypotension with HD-no effusion on repeat POCUS    Pulm:   stable on RA. maintain head of bed 30 degrees, due to aspiration risk     GI:   multiple episodes of melena, with baseline anemia from MDS, although with concern for coagulopathy given decreased PLTS and elevated BUN.--> monitor coags, c/w protonix.   #ascites  - Elevated SAAG consistent with portal HTN, no evidence of clot on U/S  --> unclear etiology, no cirrhosis on CT.     - Feeds: low appetite but eats with encouragement. Refuses NGT. started dronabinol     #colitis  - CT on 5/18 showing pancolitis  - c.diff neg  - GI PCR negative  - placed rectal tube on 5/18    Renal/:   AAMIR on CKD likely due to hypotension, requiring HD--> - HD initiated on 05/15 c/b frequent clotting of the HD cath while on heparin, not tolerated few IHD sessions due to hemodynamic instability w tachycardia and hypotension despite being on levophed during HD (no pericardial effusion/cardiac tamponade);  - likely ATN from hypotension and ischemic injury  - anuric   - on levophed, midodrine, and solumedrol  - positive fluid balance however respiratory status not compromised, acceptable electrolytes in setting of HD instability will defer RRT at present, and reassess daily   - renally adjusted meds/ ABx  - renvela 1600mg PO TID w/each meal   - no clear source of infxn on gallium scan  - pending GOC discussion   - bilateral renal US - mild bilateral hydronephrosis, large ascites. Splenomegaly. Cholelithiasis.  - bladder wall thickening on CT on 5/18  - c/w palacio catheter  - Patient admitted in February for severe urinary retention and b/l hydronephrosis necessitating indwelling catheter until a definitive bladder outlet procedure per urology  -Plan for SLED HD today per renal    HEME:   #Anemia, thrombocytopenia, splenomegaly   --> Patient with frequent clotting while on HD, with associated and protracted course of worsening thrombocytopenia since february-->  Given splenomegaly and slightly elevated bili possible extravascular hemolysis  - Hemolysis labs negative  - Dr. Roberts will see the patient today, but says the patient is not a candidate for bone marrow transplant, and further work-up should be held.     DIC? vs. TTp vs. HUS  - f/u Dr. Roberts recs  - received heparin during CVVHD on 5/21, now with concern for DIC   - today plts decreased from 50 to 42, fibrinogen still low at 90  - received 10 units of cryo on 5/22 and 5/24  - f/u labs    Anemia and coagulopathy--> MDS, UGIB, and elevated BUN.  - plts decreased to 42 today    Anemia:   Likely secondary to anemia from chronic disease and bone marrow suppression.   - S/p 1 unit of PRBC on 5/9 for active melena and Hgb stable   - Given 1 unit of prbc and platelets on 5/12 for acute drop, unknown source  - Monitor CBC  - plts decreased to 42 today  - Transfuse for Hgb <7   - Active type and screen     #Coagulopathy:   - Patient presented with elevated INR to 2.48, with active bleeding.   - Monitor PT/INR     #DVT - RLE  - repeat dopplers on 5/11 with no DVT  - Eliquis held in setting DIC concern    ID:   - Persistent MRSA bacteremia--> dapto 700mg [id number]  - d/c'd meropenem 500mg IV daily on 5/24 while blood cultures pending NGTD for 48hours  - last blood culture (+) on 5/16, continue with surveillance cultures  - obtain daily CKs  - CT abdomen and pelvis - no significant change in bilateral pleural effusions, moderate to large intra-abdominal ascites, and anasarca. Slightly nodular liver contour and splenomegaly cirrhosis, pancolitis, mild bilateral hydroureteronephrosis extending to the thick-walled urinary bladder, unchanged. Prostatic nodule indenting into the urinary bladder base, as seen on 4/11/2018, dilated main pulmonary artery suggesting pulmonary hypertension.  - thrombocytopenia/petechiae/?[place] lesions could be in setting of endocarditis/ MRSA bacteremia, plan gallium scan.  - Gallium scan as above suggestive of cellulitis or rib osteo although with no clinical correlation on physical exam, and not previously seen on chest CT--> MRSA bacteremia? from indwelling palacio cathter as patient with chronic indwelling palacio prior to admission; decubiti are not fluctuant.      ENDO  - fingersticks in 200s, likely secondary to steroids  - 10 lantus  - monitor sugars      DVt ppx: SCDs. no chemic ppx due to pancytopenia  Lines: L IJ HD cath, R IJ CVC

## 2020-05-27 NOTE — PROGRESS NOTE ADULT - ASSESSMENT
appreciate palliative and critical care  on antibiotics per ID  prognosis poor  low platelets: avoid platelet transfusion if possible appreciate palliative and critical care  on antibiotics per ID  prognosis poor  low platelets: avoid platelet transfusion if possible   appetite poor

## 2020-05-27 NOTE — GOALS OF CARE CONVERSATION - ADVANCED CARE PLANNING - CONVERSATION DETAILS
In addition to the EM visit, an advance care planning meeting was performed  Start time: 3:15pm  End time: 4:15pm  Total time: 60min  A face to face meeting to discuss advance care planning was held today regarding: SANIYA LOZANO  Primary decision maker: Teresa Gutierrez (Peggy) [phone] / [phone]  Discussed advance directives including, but not limited to, healthcare proxy and code status.  Decision regarding code status: Trial of CPR and intubation  Documentation completed today: None In addition to the EM visit, an advance care planning meeting was performed  Start time: 3:15pm  End time: 4:15pm  Total time: 60min  A face to face meeting to discuss advance care planning was held today regarding: SANIYA LOZANO  Primary decision maker: Teresa Alexgy"Matt [phone] / [phone]  Discussed advance directives including, but not limited to, healthcare proxy and code status.  Decision regarding code status: Trial of CPR and intubation  Documentation completed today: None    Had a lengthy conversation with the patient's friend and surrogate decision maker to discuss the current state of the patient as well as the concerns raised by the primary team. She voiced wanting to respect the wishes of the patient to continue acute care escalations. She reports several discussions with him to which he stated that he will think about it. Our conversation included allow a natural death scenario as well as all heroic measures to the point of prolonged intubation and the high likely montoya of the patient having a sudden cardiac arrest and or not being able to tolerate further HD. She will visit the patient at the bedside and will once again discuss what he would want to do given this new information and options. She will reach out to the primary ICU team if the patient does change his mind. Discussed with ICU fellow. Patient remains Full code with trial of intubation and resuscitation.

## 2020-05-27 NOTE — GOALS OF CARE CONVERSATION - ADVANCED CARE PLANNING - CONVERSATION/DISCUSSION
Diagnosis/Prognosis/Treatment Options
Treatment Options/Diagnosis/Prognosis
Treatment Options/Diagnosis/Prognosis/Palliative Care Referral

## 2020-05-27 NOTE — PROGRESS NOTE ADULT - SUBJECTIVE AND OBJECTIVE BOX
Patient was seen and evaluated no dialysis.     HR: 80 (05-27-20 @ 13:00)  BP: 51/36 (05-27-20 @ 13:00)  Wt(kg): not able to stand    05-27    140  |  105  |  115<H>  ----------------------------<  142<H>  4.3   |  17<L>  |  6.19<H>    Ca    8.1<L>      27 May 2020 05:28  Phos  7.6     05-27  Mg     2.1     05-27    TPro  5.5<L>  /  Alb  2.8<L>  /  TBili  0.7  /  DBili  x   /  AST  22  /  ALT  10  /  AlkPhos  77  05-27    Continue dialysis:   Dialyzer:   160    QB: 250  K bath: 2  Goal UF:    3.0   L   over       360        min  Patient is not tolerating the procedure well due to hypotension.  Required levophed increase from 0.01 to 0.12.  HD access w no flow, reversed unsuccessful.  Plan to stop treatment, change HD access.  As there is no urgent indication for RRT at present, will attempt RRT/ SLED tomorrow.  Pending [facility] discussion.  Discussed with attending Dr Velasquez and primary ICU team.

## 2020-05-28 NOTE — PROGRESS NOTE ADULT - SUBJECTIVE AND OBJECTIVE BOX
SLED 5/27 attempted, hypotensive w increasing pressor requir as well as HD cath malfunction   s/p cathflo to HD line   stable uremia w acceptable electrolytes  positive fluid balance w signs of fluid overload, now on 2L NC         Meds:  chlorhexidine 2% Cloths 1 daily  collagenase Ointment 1 daily  DAPTOmycin IVPB 700 every 48 hours  dronabinol 2.5 two times a day before meals  finasteride 5 daily  guaiFENesin   Syrup  (Sugar-Free) 200 every 6 hours PRN  heparin  flush 100 Units/mL Injectable 1000 once  heparin  flush 100 Units/mL Injectable 500 every 1 hour  hydrocortisone sodium succinate Injectable 50 every 8 hours  insulin glargine Injectable (LANTUS) 10 at bedtime  insulin lispro (HumaLOG) corrective regimen sliding scale  Before meals and at bedtime  midodrine 15 every 8 hours  mirtazapine 7.5 every 24 hours  norepinephrine Infusion 0.05 <Continuous>  nystatin Cream 1 two times a day  pantoprazole   Suspension 40 before lunch  psyllium Powder 1 daily  sertraline 50 daily  sevelamer carbonate Powder 1600 three times a day with meals      T(C): , Max: 36.7 (05-28-20 @ 05:00)  T(F): , Max: 98 (05-28-20 @ 05:00)  HR: 86 (05-28-20 @ 10:00)  BP: 97/55 (05-28-20 @ 10:00)  BP(mean): 72 (05-28-20 @ 10:00)  RR: 12 (05-28-20 @ 10:00)  SpO2: 100% (05-28-20 @ 10:00)  Wt(kg): --    05-27 @ 07:01  -  05-28 @ 07:00  --------------------------------------------------------  IN: 537 mL / OUT: 725 mL / NET: -188 mL    05-28 @ 07:01  -  05-28 @ 11:07  --------------------------------------------------------  IN: 1.3 mL / OUT: 0 mL / NET: 1.3 mL      Physical Exam:  General: sleeping, NAD, on 2L NC   HEENT: moist mucous membranes  Neck: no JVD visible  Cardiac: S1, S2. No murmurs   Respiratory: decreased breath sounds   Abdomen: soft, nontender, nondistended, +BS  Skin: petechiae  Extremities: edema   Access: R IJ HD cath      LABS:                        8.4    5.75  )-----------( 30       ( 28 May 2020 03:15 )             26.6     05-28    142  |  106  |  101<H>  ----------------------------<  151<H>  4.4   |  20<L>  |  6.10<H>    Ca    8.0<L>      28 May 2020 03:15  Phos  7.6     05-28  Mg     2.0     05-28    TPro  5.2<L>  /  Alb  2.6<L>  /  TBili  0.7  /  DBili  x   /  AST  18  /  ALT  9<L>  /  AlkPhos  72  05-28      PT/INR - ( 28 May 2020 03:15 )   PT: 20.8 sec;   INR: 1.79          PTT - ( 28 May 2020 03:15 )  PTT:38.2 sec          RADIOLOGY & ADDITIONAL STUDIES:    reviewed

## 2020-05-28 NOTE — PROGRESS NOTE ADULT - SUBJECTIVE AND OBJECTIVE BOX
Pt seen and examined   Dr. Roberts's note read    REVIEW OF SYSTEMS:  defered, patient answers questions but weak and goes back to sleep       MEDICATIONS:  MEDICATIONS  (STANDING):  chlorhexidine 2% Cloths 1 Application(s) Topical daily  collagenase Ointment 1 Application(s) Topical daily  DAPTOmycin IVPB 700 milliGRAM(s) IV Intermittent every 48 hours  dronabinol 2.5 milliGRAM(s) Oral two times a day before meals  finasteride 5 milliGRAM(s) Oral daily  heparin  flush 100 Units/mL Injectable 1000 Unit(s) IV Push once  heparin  flush 100 Units/mL Injectable 500 Unit(s) IV Push every 1 hour  hydrocortisone sodium succinate Injectable 50 milliGRAM(s) IV Push every 8 hours  insulin glargine Injectable (LANTUS) 10 Unit(s) SubCutaneous at bedtime  insulin lispro (HumaLOG) corrective regimen sliding scale   SubCutaneous Before meals and at bedtime  midodrine 15 milliGRAM(s) Oral every 8 hours  mirtazapine 7.5 milliGRAM(s) Oral every 24 hours  norepinephrine Infusion 0.05 MICROgram(s)/kG/Min (3.37 mL/Hr) IV Continuous <Continuous>  nystatin Cream 1 Application(s) Topical two times a day  pantoprazole   Suspension 40 milliGRAM(s) Oral before lunch  psyllium Powder 1 Packet(s) Oral daily  sertraline 50 milliGRAM(s) Oral daily  sevelamer carbonate Powder 1600 milliGRAM(s) Oral three times a day with meals    MEDICATIONS  (PRN):  guaiFENesin   Syrup  (Sugar-Free) 200 milliGRAM(s) Oral every 6 hours PRN Cough      Allergies    No Known Allergies    Intolerances        Vital Signs Last 24 Hrs  T(C): 36.4 (28 May 2020 09:00), Max: 36.7 (28 May 2020 05:00)  T(F): 97.6 (28 May 2020 09:00), Max: 98 (28 May 2020 05:00)  HR: 85 (28 May 2020 11:00) (73 - 86)  BP: 86/54 (28 May 2020 11:00) (51/36 - 128/60)  BP(mean): 66 (28 May 2020 11:00) (41 - 86)  RR: 10 (28 May 2020 11:00) (8 - 20)  SpO2: 96% (28 May 2020 11:00) (96% - 100%)    05-27 @ 07:01 - 05-28 @ 07:00  --------------------------------------------------------  IN: 537 mL / OUT: 725 mL / NET: -188 mL    05-28 @ 07:01 - 05-28 @ 11:59  --------------------------------------------------------  IN: 1.3 mL / OUT: 0 mL / NET: 1.3 mL        PHYSICAL EXAM:    General: ; in no acute distress  HEENT: MMM, conjunctiva and sclera clear  Lungs: clear  Heart: regular  Gastrointestinal: Soft non-tender sl-distended; Normal bowel sounds;   Ext: + edema    LABS:      CBC Full  -  ( 28 May 2020 03:15 )  WBC Count : 5.75 K/uL  RBC Count : 3.40 M/uL  Hemoglobin : 8.4 g/dL  Hematocrit : 26.6 %  Platelet Count - Automated : 30 K/uL  Mean Cell Volume : 78.2 fl  Mean Cell Hemoglobin : 24.7 pg  Mean Cell Hemoglobin Concentration : 31.6 gm/dL  Auto Neutrophil # : 5.14 K/uL  Auto Lymphocyte # : 0.33 K/uL  Auto Monocyte # : 0.26 K/uL  Auto Eosinophil # : 0.00 K/uL  Auto Basophil # : 0.00 K/uL  Auto Neutrophil % : 89.5 %  Auto Lymphocyte % : 5.7 %  Auto Monocyte % : 4.5 %  Auto Eosinophil % : 0.0 %  Auto Basophil % : 0.0 %    05-28    142  |  106  |  101<H>  ----------------------------<  151<H>  4.4   |  20<L>  |  6.10<H>    Ca    8.0<L>      28 May 2020 03:15  Phos  7.6     05-28  Mg     2.0     05-28    TPro  5.2<L>  /  Alb  2.6<L>  /  TBili  0.7  /  DBili  x   /  AST  18  /  ALT  9<L>  /  AlkPhos  72  05-28    PT/INR - ( 28 May 2020 03:15 )   PT: 20.8 sec;   INR: 1.79          PTT - ( 28 May 2020 03:15 )  PTT:38.2 sec                  RADIOLOGY & ADDITIONAL STUDIES (The following images were personally reviewed):

## 2020-05-28 NOTE — PROGRESS NOTE ADULT - ATTENDING COMMENTS
dialysis teminated as not tolerating due to worsening hypotension despite increased pressors  discussed poor prognosis with HCP

## 2020-05-28 NOTE — PROGRESS NOTE ADULT - SUBJECTIVE AND OBJECTIVE BOX
Patient was seen and evaluated on dialysis.   HR: 93 (05-28-20 @ 13:00)  BP: 81/52 (05-28-20 @ 12:00)  Wt(kg): not able to stand      05-28    142  |  106  |  101<H>  ----------------------------<  151<H>  4.4   |  20<L>  |  6.10<H>    Ca    8.0<L>      28 May 2020 03:15  Phos  7.6     05-28  Mg     2.0     05-28    TPro  5.2<L>  /  Alb  2.6<L>  /  TBili  0.7  /  DBili  x   /  AST  18  /  ALT  9<L>  /  AlkPhos  72  05-28    Continue dialysis:   Dialyzer:   160    QB: 250  K bath: 2  Goal UF:   3.0    L   over      360         min  Patient is not tolerating the procedure due to hypotension.  BP dropped to 59/43 mmHg, despite uptitrating pressors, / min.  Dialysis was terminated. Patient was seen and evaluated on dialysis.   HR: 93 (05-28-20 @ 13:00)  BP: 81/52 (05-28-20 @ 12:00)  Wt(kg): not able to stand      05-28    142  |  106  |  101<H>  ----------------------------<  151<H>  4.4   |  20<L>  |  6.10<H>    Ca    8.0<L>      28 May 2020 03:15  Phos  7.6     05-28  Mg     2.0     05-28    TPro  5.2<L>  /  Alb  2.6<L>  /  TBili  0.7  /  DBili  x   /  AST  18  /  ALT  9<L>  /  AlkPhos  72  05-28    Continue dialysis:   Dialyzer:   160    QB: 250  K bath: 2  Goal UF:   3.0    L   over      360         min  Patient is not tolerating the procedure due to hypotension.  BP dropped to 59/43 mmHg, despite uptitrating pressors, / min.  Dialysis was terminated.  Continue w supporting measures.  Primary team made aware, again to discuss GOC w HCP.

## 2020-05-28 NOTE — PROGRESS NOTE ADULT - ASSESSMENT
73 y/o M w/PMHx of CKD stage III/IV, DM, HTN, BPH (chronic incontinence/retention, s/p palacio on last admission), a-fib admitted for new onset ascites nephrology consulted for AAMIR on CKD.      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, likely ATN from hypotension and ischemic injury  - anuric AAMIR w fluid overload, stable uremia and acceptable electrolytes  - remains on levophed, uptitrating requir  - RRT initiated on 05/15, remains HD-dependent, however due to unstable HD, thrombocytopenia/ DIC RRT modes are limited   - will attempt SLED today (UF 3.0 L over 6 hr), if HD cath remains malfunction s/p cathflo, will need new HD cath placement  - renally adjusted meds/ ABx  - renvela 1600 mg po tid w each meal   - GOC at present: trial of CPR and intubation. Continue HD and pressor support

## 2020-05-28 NOTE — CHART NOTE - NSCHARTNOTEFT_GEN_A_CORE
PALLIATIVE MEDICINE COORDINATION OF CARE NOTE FOR SANIYA LOZANO    Patient last assessed: 5/27/2020 to manage: GOC/AD, Symptoms, and Support was recommended: Ongoing information sharing and goals of care conversation with patient's surrogate friend.    30 Minutes; Start: 9:30am End: 10:00am, of non-face-to-face prolonged service provided that relates to (face-to-face) care that has or will occur and ongoing patient management, including one or more of the following:   - Reviewed records from other physicians or other health care professional services, including one or more of the following: other medical records and diagnostic / radiology study results     - Other: Medication reviewed.    The patient HAS used PRN's in the last 24h. Patient received 1 dose of Zoloft, 1 dose of Metamucil, 2 doses of Marinol, and 1 dose of Remeron in the last 24h. MME: 0mg    MEDICATIONS  (STANDING):  chlorhexidine 2% Cloths 1 Application(s) Topical daily  collagenase Ointment 1 Application(s) Topical daily  DAPTOmycin IVPB 700 milliGRAM(s) IV Intermittent every 48 hours  dronabinol 2.5 milliGRAM(s) Oral two times a day before meals  finasteride 5 milliGRAM(s) Oral daily  heparin  flush 100 Units/mL Injectable 1000 Unit(s) IV Push once  heparin  flush 100 Units/mL Injectable 500 Unit(s) IV Push every 1 hour  hydrocortisone sodium succinate Injectable 50 milliGRAM(s) IV Push every 8 hours  insulin glargine Injectable (LANTUS) 10 Unit(s) SubCutaneous at bedtime  insulin lispro (HumaLOG) corrective regimen sliding scale   SubCutaneous Before meals and at bedtime  midodrine 15 milliGRAM(s) Oral every 8 hours  mirtazapine 7.5 milliGRAM(s) Oral every 24 hours  norepinephrine Infusion 0.05 MICROgram(s)/kG/Min (3.37 mL/Hr) IV Continuous <Continuous>  nystatin Cream 1 Application(s) Topical two times a day  pantoprazole   Suspension 40 milliGRAM(s) Oral before lunch  psyllium Powder 1 Packet(s) Oral daily  sertraline 50 milliGRAM(s) Oral daily  sevelamer carbonate Powder 1600 milliGRAM(s) Oral three times a day with meals    MEDICATIONS  (PRN):  guaiFENesin   Syrup  (Sugar-Free) 200 milliGRAM(s) Oral every 6 hours PRN Cough    - Other: Advanced directives     Full Code / Trial of CPR and Intubation     No documented MOLST found on [facility]     No documented HCP form found on [facility]     Other surrogates: Friend Teresa "Venus" Matt [phone] / [phone]     No Living will / POA / Advance directives found on [place] / [facility].     Documented GOC notes on [place] on 5/22/2020, 5/25/2020, and 5/27/2020.    - Other: Coordination/Plan of care     2 admissions in 1 year     Current admission LOS: 20 days     LACE score: 17 ADVANCE ILLNESS PATIENT since 2/19/2020.    Ewelina Edward, Palliative patient and family support, will be providing ongoing support to the patient's surrogate during rest of hospital stay.  Will be peripherally following patient's case going forward.

## 2020-05-28 NOTE — PROGRESS NOTE ADULT - ASSESSMENT
to get HD  palliative note read  supportive  as there is no role for me in case I will sign out for now

## 2020-05-28 NOTE — PROGRESS NOTE ADULT - ASSESSMENT
75 yo M with MDS, pancytopenia, CKD, ascites of unknown origin presents from [facility] with complaints of decreased po intake and weakness likely secondary to uremia/AAMIR from diuresis for treatment of ascites, hospital course notable for UGIB and pericardial effusion with tamponade physiology , as well as ATN, and ascites of unknown etiology, now in ICU s/p paracentesis 5/10 (2L removed), IR pericardiocentesis 5/11 (drain in place), now with anemia and thrombocytopenia with hypercoagulability 2/2 DIC    GOC:   Palliative to continue to GOC discussion with HCP Venus today. Will discuss permcath and PICC placement as well.     Neuro:   p/w AMS likey 2/2 to uremia. On 5/22, more lethargic than usual and difficult to arouse with confusion  awake, more lethargic this morning  Remeron 7.5 at bedtime    Cardiology:   Hypotension  MRSA bacteremia vs. intravascular volume depletion from diuresis vs hemolysis? --> c/w dapto, midodrine TID, wean levo as tolerated. Most recent echo with no pericardial effusion. Not a candidate for GET as per cardiology  - midodrine 15mg [id number]  - Levo requirements decreased to 0.02 this morning, Will attempt SLED again today  - solucortef 50mg q8hrs  - Recultured on 5/22, cultures from 5/18 NGTD although patient still requiring pressors suggesting persistent vasoplegia    Pericardial Effusion:  Initial read with some evidence of tamponade physiology; now s/p IR for diagnostic pericardiocentesis on 5/11  - Cardiology following, appreciate recs.   - Given low pericardial drain output and clog, a follow up limited echo on 5/15 showed no pericardial effusion  - IR removed drain on 5/21  --> See above, recent hx of pericardial effusion s/p drain--> possible etiology of hypotension with HD-no effusion on repeat POCUS    Pulm:   stable on RA. maintain head of bed 30 degrees, due to aspiration risk     GI:   multiple episodes of melena, with baseline anemia from MDS, although with concern for coagulopathy given decreased PLTS and elevated BUN.--> monitor coags, c/w protonix.   #ascites  - Elevated SAAG consistent with portal HTN, no evidence of clot on U/S  --> unclear etiology, no cirrhosis on CT.     - Feeds: low appetite but eats with encouragement. Refuses NGT. c/w dronabinol     #colitis  - CT on 5/18 showing pancolitis  - c.diff neg  - GI PCR negative  - placed rectal tube on 5/18    Renal/:   AAMIR on CKD likely due to hypotension, requiring HD--> - HD initiated on 05/15 c/b frequent clotting of the HD cath while on heparin, not tolerated few IHD sessions due to hemodynamic instability w tachycardia and hypotension despite being on levophed during HD (no pericardial effusion/cardiac tamponade);  - likely ATN from hypotension and ischemic injury  - anuric   - on levophed, midodrine, and solumedrol  - positive fluid balance however respiratory status not compromised, acceptable electrolytes in setting of HD instability will defer RRT at present, and reassess daily   - renally adjusted meds/ ABx  - renvela 1600mg PO TID w/each meal   - no clear source of infxn on gallium scan  - pending C discussion   - bilateral renal US - mild bilateral hydronephrosis, large ascites. Splenomegaly. Cholelithiasis.  - bladder wall thickening on CT on 5/18  - c/w palacio catheter  - Patient admitted in February for severe urinary retention and b/l hydronephrosis necessitating indwelling catheter until a definitive bladder outlet procedure per urology  -Plan for SLED HD today per renal    HEME:   #Anemia, thrombocytopenia, splenomegaly   --> Patient with frequent clotting while on HD, with associated and protracted course of worsening thrombocytopenia since february-->  Given splenomegaly and slightly elevated bili possible extravascular hemolysis  - Hemolysis labs negative  - Dr. Roberts recommended supportive care as patient is not a bone marrow transplant candidate.     DIC? vs. TTp vs. HUS  - f/u Dr. Roberts recs  - received heparin during CVVHD on 5/21, now with concern for DIC   - today plts decreased from 50 to 42, fibrinogen still low at 90  - received 10 units of cryo on 5/22 and 5/24  - f/u labs    Anemia and coagulopathy--> MDS, UGIB, and elevated BUN.  - plts decreased to 30 today    Anemia:   Likely secondary to anemia from chronic disease and bone marrow suppression.   - S/p 1 unit of PRBC on 5/9 for active melena and Hgb stable   - Given 1 unit of prbc and platelets on 5/12 for acute drop, unknown source  - Monitor CBC  - plts decreased to 30 today  - Transfuse for Hgb <7   - Active type and screen     #Coagulopathy:   - Patient presented with elevated INR to 2.48, with active bleeding.   - Monitor PT/INR     #DVT - RLE  - repeat dopplers on 5/11 with no DVT  - Eliquis held in setting DIC concern    ID:   - Persistent MRSA bacteremia--> dapto 700mg [id number] until 6/29, will follow-up with Dr. De Luna  - d/c'd meropenem 500mg IV daily on 5/24 while blood cultures pending NGTD for 48hours  - last blood culture (+) on 5/16, continue with surveillance cultures  - obtain daily CKs  - CT abdomen and pelvis - no significant change in bilateral pleural effusions, moderate to large intra-abdominal ascites, and anasarca. Slightly nodular liver contour and splenomegaly cirrhosis, pancolitis, mild bilateral hydroureteronephrosis extending to the thick-walled urinary bladder, unchanged. Prostatic nodule indenting into the urinary bladder base, as seen on 4/11/2018, dilated main pulmonary artery suggesting pulmonary hypertension.  - thrombocytopenia/petechiae/?Jan[place]ay lesions could be in setting of endocarditis/ MRSA bacteremia, plan gallium scan.  - Gallium scan as above suggestive of cellulitis or rib osteo although with no clinical correlation on physical exam, and not previously seen on chest CT--> MRSA bacteremia? from indwelling palacio cathter as patient with chronic indwelling palacio prior to admission; decubiti are not fluctuant.      ENDO  - fingersticks in 200s, likely secondary to steroids  - 10 lantus  - monitor sugars      DVt ppx: SCDs. no chemic ppx due to pancytopenia  Lines: L IJ HD cath, R IJ CVC

## 2020-05-28 NOTE — PROGRESS NOTE ADULT - SUBJECTIVE AND OBJECTIVE BOX
OVERNIGHT EVENTS: levo at 0.02, anuric    SUBJECTIVE / INTERVAL HPI: Patient seen and examined at bedside. Patient more lethargic this morning, staring into space, only responding when prompted several times. Patient denies any abdominal pain. ROS otherwise negative.     VITAL SIGNS:  Vital Signs Last 24 Hrs  T(C): 36.4 (28 May 2020 12:00), Max: 36.7 (28 May 2020 05:00)  T(F): 97.5 (28 May 2020 12:00), Max: 98 (28 May 2020 05:00)  HR: 85 (28 May 2020 12:00) (73 - 86)  BP: 81/52 (28 May 2020 12:00) (51/36 - 128/60)  BP(mean): 62 (28 May 2020 12:00) (41 - 86)  RR: 11 (28 May 2020 12:00) (8 - 20)  SpO2: 97% (28 May 2020 12:00) (96% - 100%)  I&O's Summary    27 May 2020 07:01  -  28 May 2020 07:00  --------------------------------------------------------  IN: 537 mL / OUT: 725 mL / NET: -188 mL    28 May 2020 07:01  -  28 May 2020 12:58  --------------------------------------------------------  IN: 1.3 mL / OUT: 0 mL / NET: 1.3 mL        PHYSICAL EXAM:    General: NAD, resting comfortably in bed. Breathing well on room air  Neck: no JVD  Respiratory: diminished breath sounds at the bases, non-tachypneic, no respiratory distress   Cardiovascular: Regular rhythm/rate; S1/S2, no pericardial rub, pericardial drain removed with site clean, dry, and intact  Gastrointestinal: distended, non tender ascitic drain with appropriate drainage, site clean, dry, and intact  Extremities: WWP; 2+ bilateral dependent pitting edema up to his thigh  Neurological:  A&Ox 3, arousable and interactive but still weak. Is able to follow simple commands and answer simple questions. CNII-XII grossly intact; no obvious focal deficits, no flapping tremor, but minimal asterixis  T/L/D: palacio present, currently anuric  Skin: petechiae diffusely     MEDICATIONS:  MEDICATIONS  (STANDING):  chlorhexidine 2% Cloths 1 Application(s) Topical daily  collagenase Ointment 1 Application(s) Topical daily  DAPTOmycin IVPB 700 milliGRAM(s) IV Intermittent every 48 hours  dronabinol 2.5 milliGRAM(s) Oral two times a day before meals  finasteride 5 milliGRAM(s) Oral daily  heparin  flush 100 Units/mL Injectable 1000 Unit(s) IV Push once  heparin  flush 100 Units/mL Injectable 500 Unit(s) IV Push every 1 hour  hydrocortisone sodium succinate Injectable 50 milliGRAM(s) IV Push every 8 hours  insulin glargine Injectable (LANTUS) 10 Unit(s) SubCutaneous at bedtime  insulin lispro (HumaLOG) corrective regimen sliding scale   SubCutaneous Before meals and at bedtime  midodrine 15 milliGRAM(s) Oral every 8 hours  mirtazapine 7.5 milliGRAM(s) Oral every 24 hours  norepinephrine Infusion 0.05 MICROgram(s)/kG/Min (3.37 mL/Hr) IV Continuous <Continuous>  nystatin Cream 1 Application(s) Topical two times a day  pantoprazole   Suspension 40 milliGRAM(s) Oral before lunch  psyllium Powder 1 Packet(s) Oral daily  sertraline 50 milliGRAM(s) Oral daily  sevelamer carbonate Powder 1600 milliGRAM(s) Oral three times a day with meals    MEDICATIONS  (PRN):  guaiFENesin   Syrup  (Sugar-Free) 200 milliGRAM(s) Oral every 6 hours PRN Cough      ALLERGIES:  Allergies    No Known Allergies    Intolerances        LABS:                        8.4    5.75  )-----------( 30       ( 28 May 2020 03:15 )             26.6     05-28    142  |  106  |  101<H>  ----------------------------<  151<H>  4.4   |  20<L>  |  6.10<H>    Ca    8.0<L>      28 May 2020 03:15  Phos  7.6     05-28  Mg     2.0     05-28    TPro  5.2<L>  /  Alb  2.6<L>  /  TBili  0.7  /  DBili  x   /  AST  18  /  ALT  9<L>  /  AlkPhos  72  05-28    PT/INR - ( 28 May 2020 03:15 )   PT: 20.8 sec;   INR: 1.79          PTT - ( 28 May 2020 03:15 )  PTT:38.2 sec    CAPILLARY BLOOD GLUCOSE      POCT Blood Glucose.: 192 mg/dL (28 May 2020 11:29)      RADIOLOGY & ADDITIONAL TESTS: Reviewed.

## 2020-05-28 NOTE — PROGRESS NOTE ADULT - ATTENDING COMMENTS
Pt remains on norepi and dialysis to be attempted today . If HD not tolerated will discuss with proxy forgoing any additional attempts.

## 2020-05-29 NOTE — PROGRESS NOTE ADULT - SUBJECTIVE AND OBJECTIVE BOX
SLED attempted 5/28, was terminated due to worsening hypotension despite increased pressors  discussed poor prognosis with HCP, who insists on trying CVVHD as well  patient is refusing to eat, take oral medications         Meds:  albumin human 25% IVPB 50 every 1 hour  chlorhexidine 2% Cloths 1 daily  collagenase Ointment 1 daily  CRRT Treatment  <Continuous>  DAPTOmycin IVPB 700 every 48 hours  finasteride 5 daily  guaiFENesin   Syrup  (Sugar-Free) 200 every 6 hours PRN  hydrocortisone sodium succinate Injectable 50 every 8 hours  insulin glargine Injectable (LANTUS) 10 at bedtime  insulin lispro (HumaLOG) corrective regimen sliding scale  Before meals and at bedtime  midodrine 15 every 8 hours  mirtazapine 7.5 every 24 hours  norepinephrine Infusion 0.05 <Continuous>  nystatin Cream 1 two times a day  pantoprazole   Suspension 40 before lunch  psyllium Powder 1 daily  PureFlow Dialysate RFP-400 (K 2 / Ca 3) 5000 <Continuous>  sertraline 50 daily  sevelamer carbonate Powder 1600 three times a day with meals      T(C): , Max: 37.1 (05-29-20 @ 04:19)  T(F): , Max: 98.7 (05-29-20 @ 04:19)  HR: 90 (05-29-20 @ 09:00)  BP: 138/60 (05-29-20 @ 09:00)  BP(mean): 86 (05-29-20 @ 09:00)  RR: 11 (05-29-20 @ 09:00)  SpO2: 98% (05-29-20 @ 09:00)  Wt(kg): --    05-28 @ 07:01  -  05-29 @ 07:00  --------------------------------------------------------  IN: 697.6 mL / OUT: 125 mL / NET: 572.6 mL    05-29 @ 07:01  -  05-29 @ 10:23  --------------------------------------------------------  IN: 14.8 mL / OUT: 0 mL / NET: 14.8 mL      Physical Exam:  General: sleeping, NAD, on RA, sat 98%   HEENT: moist mucous membranes  Neck: no JVD visible  Cardiac: S1, S2. No murmurs   Respiratory: decreased breath sounds   Abdomen: soft, nontender, nondistended, +BS  Skin: petechiae  Extremities: edema   Access: R IJ HD cath, placed on 5/21        LABS:                        8.9    9.15  )-----------( 48       ( 29 May 2020 05:40 )             29.4     05-29    142  |  105  |  113<H>  ----------------------------<  186<H>  4.5   |  18<L>  |  6.23<H>    Ca    8.3<L>      29 May 2020 05:40  Phos  8.2     05-29  Mg     2.1     05-29    TPro  5.5<L>  /  Alb  2.7<L>  /  TBili  0.8  /  DBili  x   /  AST  19  /  ALT  7<L>  /  AlkPhos  83  05-29      PT/INR - ( 29 May 2020 05:40 )   PT: 18.6 sec;   INR: 1.61          PTT - ( 29 May 2020 05:40 )  PTT:35.6 sec          RADIOLOGY & ADDITIONAL STUDIES:    reviewed

## 2020-05-29 NOTE — PROGRESS NOTE ADULT - ASSESSMENT
73 y/o M w/PMHx of CKD stage III/IV, DM, HTN, BPH (chronic incontinence/retention, s/p palacio on last admission), a-fib admitted for new onset ascites nephrology consulted for AAMIR on CKD.      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, likely ATN from hypotension and ischemic injury  - anuric AAMIR w fluid overload, stable uremia and acceptable electrolytes  - remains on levophed  - RRT initiated on 05/15, remains HD-dependent, however due to unstable HD, thrombocytopenia/ DIC RRT modes are limited   - SLED attempted 5/28, terminated due to worsening hypotension despite increased pressors  - discussed poor prognosis with HCP, who insists on trying CVVHD as well  - will initiate CVVHD: 2K pureflow, DFR 2.0 L/hr,  ml/min, net even UF   - check bmp w lytes including phos 6hr after CRRT initiated, then q12hr if stable   - renally adjusted meds/ ABx  - please hold renvela while on CRRT  - GOC at present: trial of CPR and intubation. Continue HD and pressor support

## 2020-05-29 NOTE — CHART NOTE - NSCHARTNOTEFT_GEN_A_CORE
PALLIATIVE MEDICINE COORDINATION OF CARE NOTE FOR SANIYA LOZANO    Patient last assessed: 5/28/2020 to manage: GOC/AD, Symptoms, and Support was recommended: Ongoing conversation with friend     30 Minutes; Start: 9:30am End: 10:00am of non-face-to-face prolonged service provided that relates to (face-to-face) care that has or will occur and ongoing patient management, including one or more of the following:   - Reviewed records from other physicians or other health care professional services, including one or more of the following: other medical records and diagnostic / radiology study results     - Other: Medication reviewed.    The patient HAS NOT used PRN's in the last 24h. Patient remains on Levophed. MME: 0mg    MEDICATIONS  (STANDING):  albumin human 25% IVPB 50 milliLiter(s) IV Intermittent every 1 hour  chlorhexidine 2% Cloths 1 Application(s) Topical daily  collagenase Ointment 1 Application(s) Topical daily  CRRT Treatment    <Continuous>  DAPTOmycin IVPB 700 milliGRAM(s) IV Intermittent every 48 hours  finasteride 5 milliGRAM(s) Oral daily  hydrocortisone sodium succinate Injectable 50 milliGRAM(s) IV Push every 8 hours  insulin glargine Injectable (LANTUS) 10 Unit(s) SubCutaneous at bedtime  insulin lispro (HumaLOG) corrective regimen sliding scale   SubCutaneous Before meals and at bedtime  midodrine 15 milliGRAM(s) Oral every 8 hours  mirtazapine 7.5 milliGRAM(s) Oral every 24 hours  norepinephrine Infusion 0.05 MICROgram(s)/kG/Min (3.37 mL/Hr) IV Continuous <Continuous>  nystatin Cream 1 Application(s) Topical two times a day  pantoprazole   Suspension 40 milliGRAM(s) Oral before lunch  psyllium Powder 1 Packet(s) Oral daily  PureFlow Dialysate RFP-400 (K 2 / Ca 3) 5000 milliLiter(s) (2000 mL/Hr) CRRT <Continuous>  sertraline 50 milliGRAM(s) Oral daily  sevelamer carbonate Powder 1600 milliGRAM(s) Oral three times a day with meals  MEDICATIONS  (PRN):  guaiFENesin   Syrup  (Sugar-Free) 200 milliGRAM(s) Oral every 6 hours PRN Cough    - Other: Advanced directives     Full Code / Trial of CPR and Intubation     No documented MOLST found on [facility]     No documented HCP form found on [facility]     Other surrogates: Friend Teresa "Venus" Matt [phone] / [phone]     No Living will / POA / Advance directives found on [place] / [facility].     Documented GOC notes on [place] on 5/22/2020, 5/25/2020, and 5/27/2020.    - Other: Coordination/Plan of care     2 admissions in 1 year     Current admission LOS: 21 days     LACE score: 17 ADVANCE ILLNESS PATIENT since 2/19/2020.    Spoke to the patient's friend Teresa after hour yesterday evening. She enquired about assistance in helping the patient do a POA in order for her to be able to coordinate his bills and rent payments. She was concerned of the patient's belonging not being with him and missing his wallet and CC's. I directed her to / and security to enquire about which artifacts and belongings were with the patient upon arrival to [facility] and also to check at [facility] facility. Patient and family support, music therapy, and myself have been providing the patient's surrogate friend support and information sharing over the phone and in person in regards to advance directives. As it stands she would like the patient to remain Full code / Trial of CPR and intubation and she would feel she followed his wishes after which she would then consider and feel empowered to no longer pursuing heroic measures. Palliative will continue to provide support and coordinate conversations with surrogate.

## 2020-05-29 NOTE — PROGRESS NOTE ADULT - ATTENDING COMMENTS
Pt for trial ov CVVHD today and if becomes hypotensive will continue to inform proxy of pts inability to tolerate dialysis and to encourage further goals of care decisions.

## 2020-05-29 NOTE — PROGRESS NOTE ADULT - ASSESSMENT
75 yo M with MDS, pancytopenia, CKD, ascites of unknown origin presents from [facility] with complaints of decreased po intake and weakness likely secondary to uremia/AAMIR from diuresis for treatment of ascites, hospital course notable for UGIB and pericardial effusion with tamponade physiology , as well as ATN, and ascites of unknown etiology, now in ICU s/p paracentesis 5/10 (2L removed), IR pericardiocentesis 5/11 (drain in place), now with anemia and thrombocytopenia with hypercoagulability 2/2 DIC, c/b unable to tolerate SLED, will attempt CVVHD today.     GOC:   Palliative to continue to GOC discussion with HCP Venus today after attempting CVVHD    Neuro:   p/w AMS likely 2/2 to uremia.  awake, more lethargic this morning  Remeron 7.5 at bedtime    Cardiology:   Hypotension  MRSA bacteremia vs. intravascular volume depletion from diuresis vs hemolysis? --> c/w dapto, midodrine TID, wean levo as tolerated. Most recent echo with no pericardial effusion. Not a candidate for GET as per cardiology  - midodrine 15mg [id number]  - Levo requirements increased to .11 Will attempt CVVHD today after failing SLED  - solucortef 50mg q8hrs  - Recultured on 5/22, cultures from 5/18 NGTD although patient still requiring pressors suggesting persistent vasoplegia    Pericardial Effusion:  Initial read with some evidence of tamponade physiology; now s/p IR for diagnostic pericardiocentesis on 5/11  - Cardiology following, appreciate recs.   - Given low pericardial drain output and clog, a follow up limited echo on 5/15 showed no pericardial effusion  - IR removed drain on 5/21  --> See above, recent hx of pericardial effusion s/p drain--> possible etiology of hypotension with HD-no effusion on repeat POCUS    Pulm:   stable on RA. maintain head of bed 30 degrees, due to aspiration risk     GI:   multiple episodes of melena, with baseline anemia from MDS, although with concern for coagulopathy given decreased PLTS and elevated BUN.--> monitor coags, c/w protonix.   #ascites  - Elevated SAAG consistent with portal HTN, no evidence of clot on U/S  --> unclear etiology, no cirrhosis on CT.     - Feeds: low appetite but eats with encouragement. Refuses NGT. d/c dronabinol     #colitis  - CT on 5/18 showing pancolitis  - c.diff neg  - GI PCR negative  - placed rectal tube on 5/18    Renal/:   AAMIR on CKD likely due to hypotension, requiring HD--> - HD initiated on 05/15 c/b frequent clotting of the HD cath while on heparin, not tolerated few IHD sessions due to hemodynamic instability w tachycardia and hypotension despite being on levophed during HD (no pericardial effusion/cardiac tamponade);  - likely ATN from hypotension and ischemic injury  - anuric   - on levophed, midodrine, and solumedrol  - positive fluid balance however respiratory status not compromised, acceptable electrolytes in setting of HD instability will defer RRT at present, and reassess daily   - renally adjusted meds/ ABx  - renvela 1600mg PO TID w/each meal   - no clear source of infxn on gallium scan  - pending [facility] discussion   - bilateral renal US - mild bilateral hydronephrosis, large ascites. Splenomegaly. Cholelithiasis.  - bladder wall thickening on CT on 5/18  - c/w palacio catheter  - Patient admitted in February for severe urinary retention and b/l hydronephrosis necessitating indwelling catheter until a definitive bladder outlet procedure per urology  -Plan for CVVHD today after failing SLED yesterday    HEME:   #Anemia, thrombocytopenia, splenomegaly   --> Patient with frequent clotting while on HD, with associated and protracted course of worsening thrombocytopenia since february-->  Given splenomegaly and slightly elevated bili possible extravascular hemolysis  - Hemolysis labs negative  - Dr. Roberts recommended supportive care as patient is not a bone marrow transplant candidate.     DIC? vs. TTp vs. HUS  - f/u Dr. Roberts recs  - received heparin during CVVHD on 5/21, now with concern for DIC   - today plts improved slightly this am  - received 10 units of cryo on 5/22 and 5/24    Anemia and coagulopathy--> MDS, UGIB, and elevated BUN.  - plts increased slightly to 48    Anemia:   Likely secondary to anemia from chronic disease and bone marrow suppression.   - S/p 1 unit of PRBC on 5/9 for active melena and Hgb stable   - Given 1 unit of prbc and platelets on 5/12 for acute drop, unknown source  - Monitor CBC  - plts increased to 48  - Transfuse for Hgb <7   - Active type and screen     #Coagulopathy:   - Patient presented with elevated INR to 2.48, with active bleeding.   - Monitor PT/INR     #DVT - RLE  - repeat dopplers on 5/11 with no DVT  - Eliquis held in setting DIC concern    ID:   - Persistent MRSA bacteremia--> dapto 700mg [id number] until 6/29, will follow-up with Dr. De Luna  - d/c'd meropenem 500mg IV daily on 5/24 while blood cultures pending NGTD for 48hours  - last blood culture (+) on 5/16, continue with surveillance cultures  - obtain daily CKs  - CT abdomen and pelvis - no significant change in bilateral pleural effusions, moderate to large intra-abdominal ascites, and anasarca. Slightly nodular liver contour and splenomegaly cirrhosis, pancolitis, mild bilateral hydroureteronephrosis extending to the thick-walled urinary bladder, unchanged. Prostatic nodule indenting into the urinary bladder base, as seen on 4/11/2018, dilated main pulmonary artery suggesting pulmonary hypertension.  - thrombocytopenia/petechiae/?[place]ay lesions could be in setting of endocarditis/ MRSA bacteremia, plan gallium scan.  - Gallium scan as above suggestive of cellulitis or rib osteo although with no clinical correlation on physical exam, and not previously seen on chest CT--> MRSA bacteremia? from indwelling palacio cathter as patient with chronic indwelling palacio prior to admission; decubiti are not fluctuant.    ENDO  - fingersticks in 200s, likely secondary to steroids  - 10 lantus  - monitor sugars    DVt ppx: SCDs. no chemic ppx due to pancytopenia  Lines: L IJ HD cath, R IJ CVC

## 2020-05-29 NOTE — PROGRESS NOTE ADULT - SUBJECTIVE AND OBJECTIVE BOX
OVERNIGHT EVENTS: Refused po meds. Levo 0.11. Anuric    SUBJECTIVE / INTERVAL HPI: Patient seen and examined at bedside. Patient resting comfortably in bed. More awake and alert this morning, but remains lethargic. Responds to verbal commands. Denies any pain at this time. No diarrhea overnight. ROS otherwise negative.     VITAL SIGNS:  Vital Signs Last 24 Hrs  T(C): 36.4 (29 May 2020 09:22), Max: 37.1 (29 May 2020 04:19)  T(F): 97.6 (29 May 2020 09:22), Max: 98.7 (29 May 2020 04:19)  HR: 89 (29 May 2020 10:00) (85 - 100)  BP: 116/63 (29 May 2020 10:00) (81/52 - 138/60)  BP(mean): 85 (29 May 2020 10:00) (62 - 92)  RR: 16 (29 May 2020 10:00) (10 - 21)  SpO2: 98% (29 May 2020 10:00) (95% - 99%)  I&O's Summary    28 May 2020 07:01  -  29 May 2020 07:00  --------------------------------------------------------  IN: 697.6 mL / OUT: 125 mL / NET: 572.6 mL    29 May 2020 07:01  -  29 May 2020 10:39  --------------------------------------------------------  IN: 22.2 mL / OUT: 0 mL / NET: 22.2 mL        PHYSICAL EXAM:    General: responds to verbal commands, lethargic  HEENT: NC/AT; PERRL, anicteric sclera; MMM  Neck: supple  Cardiovascular: +S1/S2; RRR  Respiratory: decreased breath sounds in anterior lung fields, unable to assess posterior  Gastrointestinal: soft, distended, no rebound or guarding, non tender to palpation  Extremities: cold upper and lower extremities, anasarcic  Vascular: 2+ radial, DP/PT pulses B/L  Neurological: AAOx3; no focal deficits    MEDICATIONS:  MEDICATIONS  (STANDING):  albumin human 25% IVPB 50 milliLiter(s) IV Intermittent every 1 hour  chlorhexidine 2% Cloths 1 Application(s) Topical daily  collagenase Ointment 1 Application(s) Topical daily  CRRT Treatment    <Continuous>  DAPTOmycin IVPB 700 milliGRAM(s) IV Intermittent every 48 hours  finasteride 5 milliGRAM(s) Oral daily  hydrocortisone sodium succinate Injectable 50 milliGRAM(s) IV Push every 8 hours  insulin glargine Injectable (LANTUS) 10 Unit(s) SubCutaneous at bedtime  insulin lispro (HumaLOG) corrective regimen sliding scale   SubCutaneous Before meals and at bedtime  midodrine 15 milliGRAM(s) Oral every 8 hours  mirtazapine 7.5 milliGRAM(s) Oral every 24 hours  norepinephrine Infusion 0.05 MICROgram(s)/kG/Min (3.37 mL/Hr) IV Continuous <Continuous>  nystatin Cream 1 Application(s) Topical two times a day  pantoprazole   Suspension 40 milliGRAM(s) Oral before lunch  psyllium Powder 1 Packet(s) Oral daily  PureFlow Dialysate RFP-400 (K 2 / Ca 3) 5000 milliLiter(s) (2000 mL/Hr) CRRT <Continuous>  sertraline 50 milliGRAM(s) Oral daily  sevelamer carbonate Powder 1600 milliGRAM(s) Oral three times a day with meals    MEDICATIONS  (PRN):  guaiFENesin   Syrup  (Sugar-Free) 200 milliGRAM(s) Oral every 6 hours PRN Cough      ALLERGIES:  Allergies    No Known Allergies    Intolerances        LABS:                        8.9    9.15  )-----------( 48       ( 29 May 2020 05:40 )             29.4     05-29    142  |  105  |  113<H>  ----------------------------<  186<H>  4.5   |  18<L>  |  6.23<H>    Ca    8.3<L>      29 May 2020 05:40  Phos  8.2     05-29  Mg     2.1     05-29    TPro  5.5<L>  /  Alb  2.7<L>  /  TBili  0.8  /  DBili  x   /  AST  19  /  ALT  7<L>  /  AlkPhos  83  05-29    PT/INR - ( 29 May 2020 05:40 )   PT: 18.6 sec;   INR: 1.61          PTT - ( 29 May 2020 05:40 )  PTT:35.6 sec    CAPILLARY BLOOD GLUCOSE      POCT Blood Glucose.: 168 mg/dL (29 May 2020 10:34)      RADIOLOGY & ADDITIONAL TESTS: Reviewed.

## 2020-05-29 NOTE — PROGRESS NOTE ADULT - ATTENDING COMMENTS
AAMIR no recovery  did not tolerate acute HD yesterday  HD line not working today  prior attempts at CVVHD were unsuccessful due to repeated clotting of system and contraindication to use on anticoagulation with severe thrombocytopenia and prior bleeding.  detailed discussion with patient's health care proxy, Venus,  at bedside.  Detailed discussion about pt's hemodynamic instability with acute HD and unsuccessful trials of CVVHD.   Additional attempts of RRT will be futile at this time.  Will continue to monitor condition; will reattempt RRT if significant improvement in his hemodynamics or platelet count/ bleeding risk  Answered all of Venus's questions in detail.

## 2020-05-30 NOTE — PROGRESS NOTE ADULT - ATTENDING COMMENTS
d/w team, unable to tolerate dialysis  Start Lokelma 10mg daily for hyperkalemia and bicarb for acidosis

## 2020-05-30 NOTE — PROGRESS NOTE ADULT - SUBJECTIVE AND OBJECTIVE BOX
Patient with no acute events overnight. Not very conversive during encounter. Denied any ROS.    T(C): 36.6 (05-30-20 @ 09:57), Max: 36.7 (05-30-20 @ 06:00)  HR: 88 (05-30-20 @ 11:00) (88 - 98)  BP: 92/53 (05-30-20 @ 11:00) (92/53 - 127/64)  RR: 11 (05-30-20 @ 11:00) (9 - 27)  SpO2: 99% (05-30-20 @ 11:00) (94% - 99%)  Wt(kg): --Vital Signs Last 24 Hrs    Review of Systems:  -All other ROS negative, except those noted in HPI    PHYSICAL EXAM:  General: NAD, resting comfortably in bed. Breathing well on room air  Neck: no JVD  Respiratory: diminished breath sounds at the bases, non-tachypneic, no respiratory distress   Cardiovascular: Regular rhythm/rate; S1/S2, no pericardial rub, pericardial drain removed with site clean, dry, and intact  Gastrointestinal: distended, non tender ascitic drain with appropriate drainage, site clean, dry, and intact  Extremities: WWP; 2+ bilateral dependent pitting edema up to his thigh  Neurological:  A&Ox 3, arousable and interactive but still weak. Is able to follow simple commands and answer simple questions. CNII-XII grossly intact; no obvious focal deficits, no flapping tremor, but minimal asterixis  T/L/D: palacio present, currently anuric  Skin: petechiae diffusely       albumin human 25% IVPB 50 milliLiter(s) IV Intermittent every 1 hour  chlorhexidine 2% Cloths 1 Application(s) Topical daily  collagenase Ointment 1 Application(s) Topical daily  CRRT Treatment    <Continuous>  DAPTOmycin IVPB 700 milliGRAM(s) IV Intermittent every 48 hours  finasteride 5 milliGRAM(s) Oral daily  guaiFENesin   Syrup  (Sugar-Free) 200 milliGRAM(s) Oral every 6 hours PRN  hydrocortisone sodium succinate Injectable 50 milliGRAM(s) IV Push every 8 hours  insulin glargine Injectable (LANTUS) 10 Unit(s) SubCutaneous at bedtime  insulin lispro (HumaLOG) corrective regimen sliding scale   SubCutaneous Before meals and at bedtime  midodrine 15 milliGRAM(s) Oral every 8 hours  mirtazapine 7.5 milliGRAM(s) Oral every 24 hours  norepinephrine Infusion 0.05 MICROgram(s)/kG/Min IV Continuous <Continuous>  nystatin Cream 1 Application(s) Topical two times a day  pantoprazole  Injectable 40 milliGRAM(s) IV Push daily  psyllium Powder 1 Packet(s) Oral daily  PureFlow Dialysate RFP-400 (K 2 / Ca 3) 5000 milliLiter(s) CRRT <Continuous>  sertraline 50 milliGRAM(s) Oral daily  sevelamer carbonate Powder 1600 milliGRAM(s) Oral three times a day with meals      LABS:                        8.4    7.30  )-----------( 40       ( 30 May 2020 04:10 )             26.9     05-30    142  |  104  |  126<H>  ----------------------------<  198<H>  5.0   |  17<L>  |  6.80<H>    Ca    8.4      30 May 2020 04:10  Phos  9.1     05-30  Mg     2.1     05-30    TPro  5.3<L>  /  Alb  2.7<L>  /  TBili  0.8  /  DBili  x   /  AST  21  /  ALT  7<L>  /  AlkPhos  83  05-30    PT/INR - ( 30 May 2020 04:10 )   PT: 18.8 sec;   INR: 1.62          PTT - ( 30 May 2020 04:10 )  PTT:35.2 sec    CAPILLARY BLOOD GLUCOSE      POCT Blood Glucose.: 203 mg/dL (30 May 2020 05:23)  POCT Blood Glucose.: 188 mg/dL (29 May 2020 21:18)  POCT Blood Glucose.: 192 mg/dL (29 May 2020 15:22)

## 2020-05-30 NOTE — PROGRESS NOTE ADULT - REASON FOR ADMISSION
ARF
Sepsis
adult failure to thrive
adult failure to thrive
failure to thrive
failure to thrive, worsening renal
sepsis
weakness

## 2020-05-30 NOTE — PROGRESS NOTE ADULT - ASSESSMENT
73 yo M with MDS, pancytopenia, CKD, ascites of unknown origin presents from [facility] with complaints of decreased po intake and weakness likely secondary to uremia/AAMIR from diuresis for treatment of ascites, hospital course notable for UGIB and pericardial effusion with tamponade physiology , as well as ATN, and ascites of unknown etiology, now in ICU s/p paracentesis 5/10 (2L removed), IR pericardiocentesis 5/11 (drain in place), now with anemia and thrombocytopenia with hypercoagulability 2/2 DIC, c/b unable to tolerate SLED, will attempt CVVHD today.     GOC:   Palliative to continue to GOC discussion with HCP Venus today after attempting CVVHD    Neuro:   p/w AMS likely 2/2 to uremia.  awake, more lethargic this morning  Remeron 7.5 at bedtime    Cardiology:   Hypotension  MRSA bacteremia vs. intravascular volume depletion from diuresis vs hemolysis? --> c/w dapto, midodrine TID, wean levo as tolerated. Most recent echo with no pericardial effusion. Not a candidate for GET as per cardiology  - midodrine 15mg [id number]  - Levo requirements to be weaned today (.07 at this time)  - solucortef 50mg q8hrs  - Recultured on 5/22, cultures from 5/18 NGTD although patient still requiring pressors suggesting persistent vasoplegia    Pericardial Effusion:  Initial read with some evidence of tamponade physiology; now s/p IR for diagnostic pericardiocentesis on 5/11  - Cardiology following, appreciate recs.   - Given low pericardial drain output and clog, a follow up limited echo on 5/15 showed no pericardial effusion  - IR removed drain on 5/21  --> See above, recent hx of pericardial effusion s/p drain--> possible etiology of hypotension with HD-no effusion on repeat POCUS    Pulm:   stable on RA. maintain head of bed 30 degrees, due to aspiration risk     GI:   multiple episodes of melena, with baseline anemia from MDS, although with concern for coagulopathy given decreased PLTS and elevated BUN.--> monitor coags, c/w protonix.   #ascites  - Elevated SAAG consistent with portal HTN, no evidence of clot on U/S  --> unclear etiology, no cirrhosis on CT.     - Feeds: low appetite but eats with encouragement. Refuses NGT. d/c dronabinol     #colitis  - CT on 5/18 showing pancolitis  - c.diff neg  - GI PCR negative  - placed rectal tube on 5/18    Renal/:   AAMIR on CKD likely due to hypotension, requiring HD--> - HD initiated on 05/15 c/b frequent clotting of the HD cath while on heparin, not tolerated few IHD sessions due to hemodynamic instability w tachycardia and hypotension despite being on levophed during HD (no pericardial effusion/cardiac tamponade);  - likely ATN from hypotension and ischemic injury  - anuric   - on levophed, midodrine, and solumedrol  - positive fluid balance however respiratory status not compromised, acceptable electrolytes in setting of HD instability will defer RRT at present, and reassess daily   - renally adjusted meds/ ABx  - renvela 1600mg PO TID w/each meal   - no clear source of infxn on gallium scan  - pending [facility] discussion   - bilateral renal US - mild bilateral hydronephrosis, large ascites. Splenomegaly. Cholelithiasis.  - bladder wall thickening on CT on 5/18  - c/w palacio catheter  - Patient admitted in February for severe urinary retention and b/l hydronephrosis necessitating indwelling catheter until a definitive bladder outlet procedure per urology      HEME:   #Anemia, thrombocytopenia, splenomegaly   --> Patient with frequent clotting while on HD, with associated and protracted course of worsening thrombocytopenia since february-->  Given splenomegaly and slightly elevated bili possible extravascular hemolysis  - Hemolysis labs negative  - Dr. Roberts recommended supportive care as patient is not a bone marrow transplant candidate.     DIC? vs. TTp vs. HUS  - f/u Dr. Roberts recs  - received heparin during CVVHD on 5/21, now with concern for DIC   - today plts improved slightly this am  - received 10 units of cryo on 5/22 and 5/24    Anemia and coagulopathy--> MDS, UGIB, and elevated BUN.  - plts decreased to 40 today (from 48)    Anemia:   Likely secondary to anemia from chronic disease and bone marrow suppression.   - S/p 1 unit of PRBC on 5/9 for active melena and Hgb stable   - Given 1 unit of prbc and platelets on 5/12 for acute drop, unknown source  - Monitor CBC  - Transfuse for Hgb <7   - Active type and screen     #Coagulopathy:   - Patient presented with elevated INR to 2.48, with active bleeding.   - Monitor PT/INR     #DVT - RLE  - repeat dopplers on 5/11 with no DVT  - Eliquis held in setting DIC concern    ID:   - Persistent MRSA bacteremia--> dapto 700mg [id number] until 6/29, will follow-up with Dr. De Luna  - d/c'd meropenem 500mg IV daily on 5/24 while blood cultures pending NGTD for 48hours  - last blood culture (+) on 5/16, continue with surveillance cultures  - obtain daily CKs  - CT abdomen and pelvis - no significant change in bilateral pleural effusions, moderate to large intra-abdominal ascites, and anasarca. Slightly nodular liver contour and splenomegaly cirrhosis, pancolitis, mild bilateral hydroureteronephrosis extending to the thick-walled urinary bladder, unchanged. Prostatic nodule indenting into the urinary bladder base, as seen on 4/11/2018, dilated main pulmonary artery suggesting pulmonary hypertension.  - thrombocytopenia/petechiae/?Janeway lesions could be in setting of endocarditis/ MRSA bacteremia, plan gallium scan.  - Gallium scan as above suggestive of cellulitis or rib osteo although with no clinical correlation on physical exam, and not previously seen on chest CT--> MRSA bacteremia? from indwelling palacio cathter as patient with chronic indwelling palacio prior to admission; decubiti are not fluctuant.    ENDO  - fingersticks in 200s, likely secondary to steroids  - 10 lantus  - monitor sugars    DVt ppx: SCDs. no chemic ppx due to pancytopenia  Lines: L IJ HD cath, R IJ CVC

## 2020-05-30 NOTE — PROGRESS NOTE ADULT - ASSESSMENT
73 y/o M w/PMHx of CKD stage III/IV, DM, HTN, BPH (chronic incontinence/retention, s/p palacio on last admission), a-fib admitted for new onset ascites nephrology consulted for AAMIR on CKD.      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, likely ATN from hypotension and ischemic injury  - worsening renal function  - instability with acute HD and unsuccessful trials of CVVHD  - additional attempts of RRT will be futile at this time  - will continue to monitor condition; will reattempt RRT if significant improvement in hemodynamics or platelet count/ bleeding risk  - would recommend lokelma 10 mg po qd to prevent hyperkalemia  - sodium bicarb 650 mg po q8hr for worsening metabolic acidosis likely due to renal failure/ uremia   - renvela 1600 mg po q8hr   - renally adjusted meds/ ABx  - pending [facility] discussion

## 2020-05-30 NOTE — PROGRESS NOTE ADULT - SUBJECTIVE AND OBJECTIVE BOX
remains anuric w worsening renal function  instability with acute HD/ SLED and unsuccessful trials of CVVHD  on stress dose steroids, levophed and midodrine  worsening thrombocytopenia   stable respiratory status, breathing comfortably on RA, sat 98%  intermittently refusing oral medications, meals       Meds:  albumin human 25% IVPB 50 every 1 hour  chlorhexidine 2% Cloths 1 daily  collagenase Ointment 1 daily  DAPTOmycin IVPB 700 every 48 hours  finasteride 5 daily  guaiFENesin   Syrup  (Sugar-Free) 200 every 6 hours PRN  hydrocortisone sodium succinate Injectable 50 every 8 hours  insulin glargine Injectable (LANTUS) 10 at bedtime  insulin lispro (HumaLOG) corrective regimen sliding scale  Before meals and at bedtime  midodrine 15 every 8 hours  mirtazapine 7.5 every 24 hours  norepinephrine Infusion 0.05 <Continuous>  nystatin Cream 1 two times a day  pantoprazole  Injectable 40 daily  psyllium Powder 1 daily  sertraline 50 daily  sevelamer carbonate Powder 1600 three times a day with meals      T(C): , Max: 36.7 (05-30-20 @ 06:00)  T(F): , Max: 98 (05-30-20 @ 06:00)  HR: 89 (05-30-20 @ 13:00)  BP: 94/50 (05-30-20 @ 13:00)  BP(mean): 66 (05-30-20 @ 13:00)  RR: 13 (05-30-20 @ 13:00)  SpO2: 98% (05-30-20 @ 13:00)  Wt(kg): --    05-29 @ 07:01  -  05-30 @ 07:00  --------------------------------------------------------  IN: 177.6 mL / OUT: 42 mL / NET: 135.6 mL    05-30 @ 07:01  -  05-30 @ 13:54  --------------------------------------------------------  IN: 28 mL / OUT: 10 mL / NET: 18 mL      Physical Exam:  General: sleeping, NAD, on RA, sat 98%   HEENT: moist mucous membranes  Neck: no JVD visible  Cardiac: S1, S2. No murmurs   Respiratory: decreased breath sounds   Abdomen: soft, nontender, nondistended, +BS  Skin: petechiae  Extremities: edema   Access: R IJ HD cath, placed on 5/21      LABS:                        8.4    7.30  )-----------( 40       ( 30 May 2020 04:10 )             26.9     05-30    142  |  104  |  126<H>  ----------------------------<  198<H>  5.0   |  17<L>  |  6.80<H>    Ca    8.4      30 May 2020 04:10  Phos  9.1     05-30  Mg     2.1     05-30    TPro  5.3<L>  /  Alb  2.7<L>  /  TBili  0.8  /  DBili  x   /  AST  21  /  ALT  7<L>  /  AlkPhos  83  05-30      PT/INR - ( 30 May 2020 04:10 )   PT: 18.8 sec;   INR: 1.62          PTT - ( 30 May 2020 04:10 )  PTT:35.2 sec          RADIOLOGY & ADDITIONAL STUDIES:    reviewed

## 2020-05-31 NOTE — PROGRESS NOTE ADULT - ASSESSMENT
73 yo M with MDS, pancytopenia, CKD, ascites of unknown origin presents from [facility] with complaints of decreased po intake and weakness likely secondary to uremia/AAMIR from diuresis for treatment of ascites, hospital course notable for UGIB and pericardial effusion with tamponade physiology, as well as ATN, and ascites of unknown etiology, now in ICU s/p paracentesis 5/10 (2L removed), IR pericardiocentesis 5/11 (drain in place), now with anemia and thrombocytopenia with hypercoagulability 2/2 DIC, c/b unable to tolerate SLED, will attempt CVVHD today.     GOC:   Palliative to continue to GOC discussion with HCP Venus today after attempting CVVHD    Neuro:   p/w AMS likely 2/2 to uremia.  awake, more lethargic this morning  Remeron 7.5 at bedtime    Cardiology:   Hypotension  MRSA bacteremia vs. intravascular volume depletion from diuresis vs hemolysis? --> c/w dapto, midodrine TID, wean levo as tolerated. Most recent echo with no pericardial effusion. Not a candidate for GET as per cardiology  - midodrine 15mg [id number]  - Levo requirements to be weaned today  - solucortef 50mg q8hrs  - Recultured on 5/22, cultures from 5/18 NGTD although patient still requiring pressors suggesting persistent vasoplegia    Pericardial Effusion:  Initial read with some evidence of tamponade physiology; now s/p IR for diagnostic pericardiocentesis on 5/11  - Cardiology following, appreciate recs.   - Given low pericardial drain output and clog, a follow up limited echo on 5/15 showed no pericardial effusion  - IR removed drain on 5/21  --> See above, recent hx of pericardial effusion s/p drain--> possible etiology of hypotension with HD-no effusion on repeat POCUS    Pulm:   stable on RA. maintain head of bed 30 degrees, due to aspiration risk     GI:   multiple episodes of melena, with baseline anemia from MDS, although with concern for coagulopathy given decreased PLTS and elevated BUN.--> monitor coags, c/w protonix.   #ascites  - Elevated SAAG consistent with portal HTN, no evidence of clot on U/S  --> unclear etiology, no cirrhosis on CT.     - Feeds: low appetite but eats with encouragement. Refuses NGT. d/c dronabinol     #colitis  - CT on 5/18 showing pancolitis  - c.diff neg  - GI PCR negative  - placed rectal tube on 5/18    Renal/:   AAMIR on CKD likely due to hypotension, requiring HD--> - HD initiated on 05/15 c/b frequent clotting of the HD cath while on heparin, not tolerated few IHD sessions due to hemodynamic instability w tachycardia and hypotension despite being on levophed during HD (no pericardial effusion/cardiac tamponade);  - likely ATN from hypotension and ischemic injury  - anuric   - on levophed, midodrine, and solumedrol  - positive fluid balance however respiratory status not compromised, acceptable electrolytes in setting of HD instability will defer RRT at present, and reassess daily   - renally adjusted meds/ ABx  - renvela 1600mg PO TID w/each meal   - no clear source of infxn on gallium scan  - pending [facility] discussion   - bilateral renal US - mild bilateral hydronephrosis, large ascites. Splenomegaly. Cholelithiasis.  - bladder wall thickening on CT on 5/18  - c/w palacio catheter  - Patient admitted in February for severe urinary retention and b/l hydronephrosis necessitating indwelling catheter until a definitive bladder outlet procedure per urology  -Ordered lokelma 10 q8 and sodium bicarb 650 TID as patient is no longer dialysis candidate      HEME:   #Anemia, thrombocytopenia, splenomegaly   --> Patient with frequent clotting while on HD, with associated and protracted course of worsening thrombocytopenia since february-->  Given splenomegaly and slightly elevated bili possible extravascular hemolysis  - Hemolysis labs negative  - Dr. Roberts recommended supportive care as patient is not a bone marrow transplant candidate.     DIC? vs. TTp vs. HUS  - f/u Dr. Roberts recs  - received heparin during CVVHD on 5/21, now with concern for DIC   - today plts improved slightly this am  - received 10 units of cryo on 5/22 and 5/24    Anemia and coagulopathy--> MDS, UGIB, and elevated BUN.  - plts decreased to 40 today (from 48)    Anemia:   Likely secondary to anemia from chronic disease and bone marrow suppression.   - S/p 1 unit of PRBC on 5/9 for active melena and Hgb stable   - Given 1 unit of prbc and platelets on 5/12 for acute drop, unknown source  - Monitor CBC  - Transfuse for Hgb <7   - Active type and screen     #Coagulopathy:   - Patient presented with elevated INR to 2.48, with active bleeding.   - Monitor PT/INR     #DVT - RLE  - repeat dopplers on 5/11 with no DVT  - Eliquis held in setting DIC concern    ID:   - Persistent MRSA bacteremia--> dapto 700mg [id number] until 6/29, will follow-up with Dr. De Luna  - d/c'd meropenem 500mg IV daily on 5/24 while blood cultures pending NGTD for 48hours  - last blood culture (+) on 5/16, continue with surveillance cultures  - obtain daily CKs  - CT abdomen and pelvis - no significant change in bilateral pleural effusions, moderate to large intra-abdominal ascites, and anasarca. Slightly nodular liver contour and splenomegaly cirrhosis, pancolitis, mild bilateral hydroureteronephrosis extending to the thick-walled urinary bladder, unchanged. Prostatic nodule indenting into the urinary bladder base, as seen on 4/11/2018, dilated main pulmonary artery suggesting pulmonary hypertension.  - thrombocytopenia/petechiae/?Janeway lesions could be in setting of endocarditis/ MRSA bacteremia, plan gallium scan.  - Gallium scan as above suggestive of cellulitis or rib osteo although with no clinical correlation on physical exam, and not previously seen on chest CT--> MRSA bacteremia? from indwelling palacio cathter as patient with chronic indwelling palacio prior to admission; decubiti are not fluctuant.    ENDO  - fingersticks in 200s, likely secondary to steroids  - 10 lantus  - monitor sugars    DVt ppx: SCDs. no chemic ppx due to pancytopenia  Lines: L IJ HD cath, R IJ CVC

## 2020-05-31 NOTE — PROGRESS NOTE ADULT - ASSESSMENT
73 y/o M w/PMHx of CKD stage III/IV, DM, HTN, BPH (chronic incontinence/retention, s/p palacio on last admission), a-fib admitted for new onset ascites nephrology consulted for AAMIR on CKD.      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, likely ATN from hypotension and ischemic injury  - worsening renal function  - instability with acute HD and unsuccessful trials of CVVHD  - additional attempts of RRT will be futile at this time  - will continue to monitor condition; will reattempt RRT if significant improvement in hemodynamics or platelet count/ bleeding risk  - lokelma 10 mg po qd to prevent hyperkalemia  - sodium bicarb 650 mg po q8hr for metabolic acidosis  - renvela 1600 mg po q8hr   - renally adjusted meds/ ABx  - pending [facility] discussion

## 2020-05-31 NOTE — PROGRESS NOTE ADULT - ATTENDING COMMENTS
Patient seen and examined with house-staff during bedside rounds.  Resident note read, including vitals, physical findings, laboratory data, and radiological reports.   Revisions included below.  Direct personal management at bed side and extensive interpretation of the data.  Plan was outlined and discussed in details with the housestaff.  Decision making of high complexity  Action taken for acute disease activity to reflect the level of care provided:  - medication reconciliation  - review laboratory data  follow with palliative medicince.  H e is on antibiotic.  Levo is being weaned down.  On NaHCO3

## 2020-05-31 NOTE — PROGRESS NOTE ADULT - SUBJECTIVE AND OBJECTIVE BOX
no acute events overnight  hypotensive, requiring pressors  anuric w worsening renal function  worsening thrombocytopenia         Meds:  albumin human 25% IVPB 50 every 1 hour  chlorhexidine 2% Cloths 1 daily  collagenase Ointment 1 daily  DAPTOmycin IVPB 700 every 48 hours  finasteride 5 daily  guaiFENesin   Syrup  (Sugar-Free) 200 every 6 hours PRN  hydrocortisone sodium succinate Injectable 50 every 8 hours  insulin glargine Injectable (LANTUS) 10 at bedtime  insulin lispro (HumaLOG) corrective regimen sliding scale  Before meals and at bedtime  midodrine 15 every 8 hours  mirtazapine 7.5 every 24 hours  norepinephrine Infusion 0.05 <Continuous>  nystatin Cream 1 two times a day  pantoprazole  Injectable 40 daily  psyllium Powder 1 daily  sertraline 50 daily  sevelamer carbonate Powder 1600 three times a day with meals      T(C): , Max: 36.6 (05-30-20 @ 14:48)  T(F): , Max: 97.9 (05-30-20 @ 14:48)  HR: 91 (05-31-20 @ 13:00)  BP: 92/57 (05-31-20 @ 13:00)  BP(mean): 68 (05-31-20 @ 13:00)  RR: 11 (05-31-20 @ 13:00)  SpO2: 96% (05-31-20 @ 13:00)  Wt(kg): --    05-30 @ 07:01  -  05-31 @ 07:00  --------------------------------------------------------  IN: 44.8 mL / OUT: 30 mL / NET: 14.8 mL    05-31 @ 07:01  -  05-31 @ 13:28  --------------------------------------------------------  IN: 12.9 mL / OUT: 0 mL / NET: 12.9 mL    Physical Exam:  General: sleeping, NAD  HEENT: moist mucous membranes  Neck: no JVD visible  Cardiac: S1, S2. No murmurs   Respiratory: decreased breath sounds   Abdomen: soft, nontender, nondistended, +BS  Skin: petechiae  Extremities: edema   Access: R IJ HD cath, placed on 5/21      LABS:                        7.8    4.23  )-----------( 28       ( 31 May 2020 05:28 )             25.3     05-31    142  |  105  |  134<H>  ----------------------------<  178<H>  4.7   |  17<L>  |  7.41<H>    Ca    8.2<L>      31 May 2020 05:28  Phos  9.4     05-31  Mg     2.1     05-31    TPro  5.1<L>  /  Alb  2.5<L>  /  TBili  0.7  /  DBili  x   /  AST  20  /  ALT  6<L>  /  AlkPhos  82  05-31      PT/INR - ( 31 May 2020 05:28 )   PT: 18.3 sec;   INR: 1.58          PTT - ( 31 May 2020 05:28 )  PTT:35.6 sec          RADIOLOGY & ADDITIONAL STUDIES:    reviewed

## 2020-05-31 NOTE — PROGRESS NOTE ADULT - ATTENDING COMMENTS
d/w team, unable to tolerate dialysis (clotting but unable to AC, hypotension)  Started Lokelma 10mg daily for hyperkalemia and bicarb for acidosis. Ongoing GOC discussion between team and family about poor prognosis  Pressor requirements are down today however thrombocytopenia worse.

## 2020-05-31 NOTE — PROGRESS NOTE ADULT - SUBJECTIVE AND OBJECTIVE BOX
OVERNIGHT EVENTS: KRYS    SUBJECTIVE / INTERVAL HPI: Patient seen and examined at bedside.     VITAL SIGNS:  Vital Signs Last 24 Hrs  T(C): 35.6 (31 May 2020 09:32), Max: 36.6 (30 May 2020 14:48)  T(F): 96 (31 May 2020 09:32), Max: 97.9 (30 May 2020 14:48)  HR: 91 (31 May 2020 11:00) (79 - 92)  BP: 99/57 (31 May 2020 11:00) (90/53 - 106/55)  BP(mean): 72 (31 May 2020 11:00) (66 - 75)  RR: 9 (31 May 2020 11:00) (9 - 18)  SpO2: 97% (31 May 2020 11:00) (89% - 100%)  I&O's Summary    30 May 2020 07:01  -  31 May 2020 07:00  --------------------------------------------------------  IN: 44.8 mL / OUT: 30 mL / NET: 14.8 mL    31 May 2020 07:01  -  31 May 2020 13:15  --------------------------------------------------------  IN: 12.9 mL / OUT: 0 mL / NET: 12.9 mL        PHYSICAL EXAM:    General: WDWN  HEENT: NC/AT; PERRL, anicteric sclera; MMM  Neck: supple  Cardiovascular: +S1/S2; RRR  Respiratory: CTA B/L; no W/R/R  Gastrointestinal: soft, NT/ND; +BSx4  Extremities: WWP; no edema, clubbing or cyanosis  Vascular: 2+ radial, DP/PT pulses B/L  Neurological: AAOx3; no focal deficits    MEDICATIONS:  MEDICATIONS  (STANDING):  albumin human 25% IVPB 50 milliLiter(s) IV Intermittent every 1 hour  chlorhexidine 2% Cloths 1 Application(s) Topical daily  collagenase Ointment 1 Application(s) Topical daily  DAPTOmycin IVPB 700 milliGRAM(s) IV Intermittent every 48 hours  finasteride 5 milliGRAM(s) Oral daily  hydrocortisone sodium succinate Injectable 50 milliGRAM(s) IV Push every 8 hours  insulin glargine Injectable (LANTUS) 10 Unit(s) SubCutaneous at bedtime  insulin lispro (HumaLOG) corrective regimen sliding scale   SubCutaneous Before meals and at bedtime  midodrine 15 milliGRAM(s) Oral every 8 hours  mirtazapine 7.5 milliGRAM(s) Oral every 24 hours  norepinephrine Infusion 0.05 MICROgram(s)/kG/Min (3.37 mL/Hr) IV Continuous <Continuous>  nystatin Cream 1 Application(s) Topical two times a day  pantoprazole  Injectable 40 milliGRAM(s) IV Push daily  psyllium Powder 1 Packet(s) Oral daily  sertraline 50 milliGRAM(s) Oral daily  sevelamer carbonate Powder 1600 milliGRAM(s) Oral three times a day with meals    MEDICATIONS  (PRN):  guaiFENesin   Syrup  (Sugar-Free) 200 milliGRAM(s) Oral every 6 hours PRN Cough      ALLERGIES:  Allergies    No Known Allergies    Intolerances        LABS:                        7.8    4.23  )-----------( 28       ( 31 May 2020 05:28 )             25.3     05-31    142  |  105  |  134<H>  ----------------------------<  178<H>  4.7   |  17<L>  |  7.41<H>    Ca    8.2<L>      31 May 2020 05:28  Phos  9.4     05-31  Mg     2.1     05-31    TPro  5.1<L>  /  Alb  2.5<L>  /  TBili  0.7  /  DBili  x   /  AST  20  /  ALT  6<L>  /  AlkPhos  82  05-31    PT/INR - ( 31 May 2020 05:28 )   PT: 18.3 sec;   INR: 1.58          PTT - ( 31 May 2020 05:28 )  PTT:35.6 sec    CAPILLARY BLOOD GLUCOSE      POCT Blood Glucose.: 199 mg/dL (31 May 2020 10:23)      RADIOLOGY & ADDITIONAL TESTS: Reviewed. OVERNIGHT EVENTS: mildly hypothermic to 95.6, placed on bear hugger and temp improved to 97.8. bear hugger removed.     SUBJECTIVE / INTERVAL HPI: Patient seen and examined at bedside. Patient lethargic this morning but responds to questions. denies any pain at this time. ROS otherwise negative.     VITAL SIGNS:  Vital Signs Last 24 Hrs  T(C): 35.6 (31 May 2020 09:32), Max: 36.6 (30 May 2020 14:48)  T(F): 96 (31 May 2020 09:32), Max: 97.9 (30 May 2020 14:48)  HR: 91 (31 May 2020 11:00) (79 - 92)  BP: 99/57 (31 May 2020 11:00) (90/53 - 106/55)  BP(mean): 72 (31 May 2020 11:00) (66 - 75)  RR: 9 (31 May 2020 11:00) (9 - 18)  SpO2: 97% (31 May 2020 11:00) (89% - 100%)  I&O's Summary    30 May 2020 07:01  -  31 May 2020 07:00  --------------------------------------------------------  IN: 44.8 mL / OUT: 30 mL / NET: 14.8 mL    31 May 2020 07:01  -  31 May 2020 13:15  --------------------------------------------------------  IN: 12.9 mL / OUT: 0 mL / NET: 12.9 mL        PHYSICAL EXAM:    General: NAD, resting comfortably in bed. Breathing well on room air  Neck: no JVD  Respiratory: diminished breath sounds at the bases, non-tachypneic, no respiratory distress   Cardiovascular: Regular rhythm/rate; S1/S2, no pericardial rub, pericardial drain removed with site clean, dry, and intact  Gastrointestinal: distended, non tender ascitic drain with appropriate drainage, site clean, dry, and intact  Extremities: WWP; 2+ bilateral dependent pitting edema up to his thigh  Neurological:  A&Ox 3, arousable and interactive but still weak. Is able to follow simple commands and answer simple questions. CNII-XII grossly intact; no obvious focal deficits, no flapping tremor, but minimal asterixis  T/L/D: palacio present, currently anuric  Skin: petechiae diffusely     MEDICATIONS:  MEDICATIONS  (STANDING):  albumin human 25% IVPB 50 milliLiter(s) IV Intermittent every 1 hour  chlorhexidine 2% Cloths 1 Application(s) Topical daily  collagenase Ointment 1 Application(s) Topical daily  DAPTOmycin IVPB 700 milliGRAM(s) IV Intermittent every 48 hours  finasteride 5 milliGRAM(s) Oral daily  hydrocortisone sodium succinate Injectable 50 milliGRAM(s) IV Push every 8 hours  insulin glargine Injectable (LANTUS) 10 Unit(s) SubCutaneous at bedtime  insulin lispro (HumaLOG) corrective regimen sliding scale   SubCutaneous Before meals and at bedtime  midodrine 15 milliGRAM(s) Oral every 8 hours  mirtazapine 7.5 milliGRAM(s) Oral every 24 hours  norepinephrine Infusion 0.05 MICROgram(s)/kG/Min (3.37 mL/Hr) IV Continuous <Continuous>  nystatin Cream 1 Application(s) Topical two times a day  pantoprazole  Injectable 40 milliGRAM(s) IV Push daily  psyllium Powder 1 Packet(s) Oral daily  sertraline 50 milliGRAM(s) Oral daily  sevelamer carbonate Powder 1600 milliGRAM(s) Oral three times a day with meals    MEDICATIONS  (PRN):  guaiFENesin   Syrup  (Sugar-Free) 200 milliGRAM(s) Oral every 6 hours PRN Cough      ALLERGIES:  Allergies    No Known Allergies    Intolerances        LABS:                        7.8    4.23  )-----------( 28       ( 31 May 2020 05:28 )             25.3     05-31    142  |  105  |  134<H>  ----------------------------<  178<H>  4.7   |  17<L>  |  7.41<H>    Ca    8.2<L>      31 May 2020 05:28  Phos  9.4     05-31  Mg     2.1     05-31    TPro  5.1<L>  /  Alb  2.5<L>  /  TBili  0.7  /  DBili  x   /  AST  20  /  ALT  6<L>  /  AlkPhos  82  05-31    PT/INR - ( 31 May 2020 05:28 )   PT: 18.3 sec;   INR: 1.58          PTT - ( 31 May 2020 05:28 )  PTT:35.6 sec    CAPILLARY BLOOD GLUCOSE      POCT Blood Glucose.: 199 mg/dL (31 May 2020 10:23)      RADIOLOGY & ADDITIONAL TESTS: Reviewed.

## 2020-06-01 NOTE — PROGRESS NOTE ADULT - ASSESSMENT
75 yo M with past medical history of DM, HTN, HLD, BPH (chronic indwelling palacio placement on last admission, SERA, CKD4, and a-fib  , mds, pancytopenia, presents from [facility] with complaints of decreased po intake and weakness. Patient reports that he recently had a intense bowel regimen for constipation after which he developed loose stools. To prevent further loose stools he stopped eating . Decreased po intake also in setting of poor appetite. Patient also complains for worsening LE and abdominal swelling. As per collateral obtained from Dr Barksdale at [facility]([id number]) patient abdominal usg notable for massive ascites, and increased liver echogenicity(LFTS outpatient wnl )   and he was aggressively diuresed with  Lasix , torsemide 40 mg bid (of note also started on midodrine as his pressures would drop with iv diuresis ). After diuresis his ascites and LE edema improved however he developed worsening AAMIR and so Lasix was stopped and torsemide decreased to 20 mg bid. Of note he was seen by Cardiology and  echo done, concern fluid overload was cardiac in etiology given elevated bnp and echo findings.

## 2020-06-01 NOTE — PROGRESS NOTE ADULT - PROBLEM SELECTOR PLAN 8
In addition to the EM visit, an advance care planning meeting was performed  Start time: 2:40pm  End time: 3:00pm  Total time: 20min  A face to face meeting to discuss advance care planning was held today regarding: SANIYA LOZANO  Primary decision maker: Teresa Gutierrez  Discussed advance directives including, but not limited to, healthcare proxy and code status.  Decision regarding code status: DNR DNI  Documentation completed today: ALETHEA

## 2020-06-01 NOTE — PROGRESS NOTE ADULT - ASSESSMENT
75 yo M with MDS, pancytopenia, CKD, ascites of unknown origin presents from [facility] with complaints of decreased po intake and weakness likely secondary to uremia/AAMIR from diuresis for treatment of ascites, hospital course notable for UGIB and pericardial effusion with tamponade physiology, as well as ATN, and ascites of unknown etiology, now in ICU s/p paracentesis 5/10 (2L removed), IR pericardiocentesis 5/11 (drain in place), now with anemia and thrombocytopenia with hypercoagulability 2/2 DIC, c/b unable to tolerate SLED, will attempt CVVHD today.     GOC:   Palliative to continue to GOC discussion with HCP Venus today after attempting CVVHD    Neuro:   p/w AMS likely 2/2 to uremia.  awake, more lethargic this morning  Remeron 7.5 at bedtime    Cardiology:   Hypotension  MRSA bacteremia vs. intravascular volume depletion from diuresis vs hemolysis? --> c/w dapto, midodrine TID, wean levo as tolerated. Most recent echo with no pericardial effusion. Not a candidate for GET as per cardiology  - midodrine 15mg [id number]  - Levo requirements to be weaned today  - solucortef 50mg q8hrs  - Recultured on 5/22, cultures from 5/18 NGTD although patient still requiring pressors suggesting persistent vasoplegia    Pericardial Effusion:  Initial read with some evidence of tamponade physiology; now s/p IR for diagnostic pericardiocentesis on 5/11  - Cardiology following, appreciate recs.   - Given low pericardial drain output and clog, a follow up limited echo on 5/15 showed no pericardial effusion  - IR removed drain on 5/21  --> See above, recent hx of pericardial effusion s/p drain--> possible etiology of hypotension with HD-no effusion on repeat POCUS    Pulm:   stable on RA. maintain head of bed 30 degrees, due to aspiration risk     GI:   multiple episodes of melena, with baseline anemia from MDS, although with concern for coagulopathy given decreased PLTS and elevated BUN.--> monitor coags, c/w protonix.   #ascites  - Elevated SAAG consistent with portal HTN, no evidence of clot on U/S  --> unclear etiology, no cirrhosis on CT.     - Feeds: low appetite but eats with encouragement. Refuses NGT. d/c dronabinol     #colitis  - CT on 5/18 showing pancolitis  - c.diff neg  - GI PCR negative  - placed rectal tube on 5/18    Renal/:   AAMIR on CKD likely due to hypotension, requiring HD--> - HD initiated on 05/15 c/b frequent clotting of the HD cath while on heparin, not tolerated few IHD sessions due to hemodynamic instability w tachycardia and hypotension despite being on levophed during HD (no pericardial effusion/cardiac tamponade);  - likely ATN from hypotension and ischemic injury  - anuric   - on levophed, midodrine, and solumedrol  - positive fluid balance however respiratory status not compromised, acceptable electrolytes in setting of HD instability will defer RRT at present, and reassess daily   - renally adjusted meds/ ABx  - renvela 1600mg PO TID w/each meal   - no clear source of infxn on gallium scan  - pending [facility] discussion   - bilateral renal US - mild bilateral hydronephrosis, large ascites. Splenomegaly. Cholelithiasis.  - bladder wall thickening on CT on 5/18  - c/w palacio catheter  - Patient admitted in February for severe urinary retention and b/l hydronephrosis necessitating indwelling catheter until a definitive bladder outlet procedure per urology  -Ordered lokelma 10 q8 and sodium bicarb 650 TID as patient is no longer dialysis candidate      HEME:   #Anemia, thrombocytopenia, splenomegaly   --> Patient with frequent clotting while on HD, with associated and protracted course of worsening thrombocytopenia since february-->  Given splenomegaly and slightly elevated bili possible extravascular hemolysis  - Hemolysis labs negative  - Dr. Roberts recommended supportive care as patient is not a bone marrow transplant candidate.     DIC? vs. TTp vs. HUS  - f/u Dr. Roberts recs  - received heparin during CVVHD on 5/21, now with concern for DIC   - today plts improved slightly this am  - received 10 units of cryo on 5/22 and 5/24    Anemia and coagulopathy--> MDS, UGIB, and elevated BUN.  - plts decreased to 40 today (from 48)    Anemia:   Likely secondary to anemia from chronic disease and bone marrow suppression.   - S/p 1 unit of PRBC on 5/9 for active melena and Hgb stable   - Given 1 unit of prbc and platelets on 5/12 for acute drop, unknown source  - Monitor CBC  - Transfuse for Hgb <7   - Active type and screen     #Coagulopathy:   - Patient presented with elevated INR to 2.48, with active bleeding.   - Monitor PT/INR     #DVT - RLE  - repeat dopplers on 5/11 with no DVT  - Eliquis held in setting DIC concern    ID:   - Persistent MRSA bacteremia--> dapto 700mg [id number] until 6/29, will follow-up with Dr. De Luna  - d/c'd meropenem 500mg IV daily on 5/24 while blood cultures pending NGTD for 48hours  - last blood culture (+) on 5/16, continue with surveillance cultures  - obtain daily CKs  - CT abdomen and pelvis - no significant change in bilateral pleural effusions, moderate to large intra-abdominal ascites, and anasarca. Slightly nodular liver contour and splenomegaly cirrhosis, pancolitis, mild bilateral hydroureteronephrosis extending to the thick-walled urinary bladder, unchanged. Prostatic nodule indenting into the urinary bladder base, as seen on 4/11/2018, dilated main pulmonary artery suggesting pulmonary hypertension.  - thrombocytopenia/petechiae/?[place] lesions could be in setting of endocarditis/ MRSA bacteremia, plan gallium scan.  - Gallium scan as above suggestive of cellulitis or rib osteo although with no clinical correlation on physical exam, and not previously seen on chest CT--> MRSA bacteremia? from indwelling palacio cathter as patient with chronic indwelling palacio prior to admission; decubiti are not fluctuant.    ENDO  - fingersticks in 200s, likely secondary to steroids  - 10 lantus  - monitor sugars    DVt ppx: SCDs. no chemic ppx due to pancytopenia  Lines: L IJ HD cath, R IJ CVC 73 yo M with MDS, pancytopenia, CKD, ascites of unknown origin presents from [facility] with complaints of decreased po intake and weakness likely secondary to uremia/AAMIR from diuresis for treatment of ascites, hospital course notable for UGIB and pericardial effusion with tamponade physiology, as well as ATN, and ascites of unknown etiology, now in ICU s/p paracentesis 5/10 (2L removed), IR pericardiocentesis 5/11 (drain in place), now with anemia and thrombocytopenia with hypercoagulability 2/2 DIC, c/b unable to tolerate SLED, will attempt CVVHD today.     GOC:   Palliative to continue to GOC discussion with HCP Venus regarding comfort care    Neuro:   p/w AMS likely 2/2 to uremia.  awake, lethargic  Remeron 7.5 at bedtime    Cardiology:   Hypotension  MRSA bacteremia vs. intravascular volume depletion from diuresis vs hemolysis? --> c/w dapto, midodrine TID, wean levo as tolerated. Most recent echo with no pericardial effusion. Not a candidate for GET as per cardiology  - midodrine 15mg [id number]  - Levo requirements to be weaned today, capped.  - solucortef 50mg q8hrs  - Recultured on 5/22, cultures from 5/18 NGTD although patient still requiring pressors suggesting persistent vasoplegia    Pericardial Effusion:  Initial read with some evidence of tamponade physiology; now s/p IR for diagnostic pericardiocentesis on 5/11  - Cardiology following, appreciate recs.   - Given low pericardial drain output and clog, a follow up limited echo on 5/15 showed no pericardial effusion  - IR removed drain on 5/21  --> See above, recent hx of pericardial effusion s/p drain--> possible etiology of hypotension with HD-no effusion on repeat POCUS    Pulm:   stable on RA. maintain head of bed 30 degrees, due to aspiration risk     GI:   multiple episodes of melena, with baseline anemia from MDS, although with concern for coagulopathy given decreased PLTS and elevated BUN.--> monitor coags, c/w protonix.   #ascites  - Elevated SAAG consistent with portal HTN, no evidence of clot on U/S  --> unclear etiology, no cirrhosis on CT.     - Feeds: low appetite but eats with encouragement. Refuses NGT. d/c dronabinol     #colitis  - CT on 5/18 showing pancolitis  - c.diff neg  - GI PCR negative  - placed rectal tube on 5/18    Renal/:   AAMIR on CKD likely due to hypotension, requiring HD--> - HD initiated on 05/15 c/b frequent clotting of the HD cath while on heparin, not tolerated few IHD sessions due to hemodynamic instability w tachycardia and hypotension despite being on levophed during HD (no pericardial effusion/cardiac tamponade);  - likely ATN from hypotension and ischemic injury  - anuric   - on levophed, midodrine, and solumedrol  - positive fluid balance however respiratory status not compromised, acceptable electrolytes in setting of HD instability will defer RRT at present, and reassess daily   - renally adjusted meds/ ABx  - renvela 1600mg PO TID w/each meal   - no clear source of infxn on gallium scan  - pending [facility] discussion   - bilateral renal US - mild bilateral hydronephrosis, large ascites. Splenomegaly. Cholelithiasis.  - bladder wall thickening on CT on 5/18  - c/w palacio catheter  - Patient admitted in February for severe urinary retention and b/l hydronephrosis necessitating indwelling catheter until a definitive bladder outlet procedure per urology  -Ordered lokelma 10 q8 and sodium bicarb 650 TID as patient is no longer dialysis candidate    HEME:   #Anemia, thrombocytopenia, splenomegaly   --> Patient with frequent clotting while on HD, with associated and protracted course of worsening thrombocytopenia since february-->  Given splenomegaly and slightly elevated bili possible extravascular hemolysis  - Hemolysis labs negative  - Dr. Roberts recommended supportive care as patient is not a bone marrow transplant candidate.     DIC? vs. TTp vs. HUS  - f/u Dr. Roberts recs  - received heparin during CVVHD on 5/21, now with concern for DIC   - patient remains thrombocytopenic  - received 10 units of cryo on 5/22 and 5/24    Anemia and coagulopathy--> MDS, UGIB, and elevated BUN.  - patient remains thrombocytopenic    Anemia:   Likely secondary to anemia from chronic disease and bone marrow suppression.   - S/p 1 unit of PRBC on 5/9 for active melena and Hgb stable   - Given 1 unit of prbc and platelets on 5/12 for acute drop, unknown source  - Monitor CBC  - Transfuse for Hgb <7   - Active type and screen     #Coagulopathy:   - Patient presented with elevated INR to 2.48, with active bleeding.   - Monitor PT/INR     #DVT - RLE  - repeat dopplers on 5/11 with no DVT  - Eliquis held in setting DIC concern    ID:   - Persistent MRSA bacteremia--> dapto 700mg b55kgleh until 6/29, will follow-up with Dr. De Luna  - d/c'd meropenem 500mg IV daily on 5/24 while blood cultures pending NGTD for 48hours  - last blood culture (+) on 5/16, continue with surveillance cultures  - obtain daily CKs  - CT abdomen and pelvis - no significant change in bilateral pleural effusions, moderate to large intra-abdominal ascites, and anasarca. Slightly nodular liver contour and splenomegaly cirrhosis, pancolitis, mild bilateral hydroureteronephrosis extending to the thick-walled urinary bladder, unchanged. Prostatic nodule indenting into the urinary bladder base, as seen on 4/11/2018, dilated main pulmonary artery suggesting pulmonary hypertension.  - thrombocytopenia/petechiae/?[place] lesions could be in setting of endocarditis/ MRSA bacteremia, plan gallium scan.  - Gallium scan as above suggestive of cellulitis or rib osteo although with no clinical correlation on physical exam, and not previously seen on chest CT--> MRSA bacteremia? from indwelling palacio cathter as patient with chronic indwelling palacio prior to admission; decubiti are not fluctuant.    ENDO  - fingersticks in 200s, likely secondary to steroids  - 10 lantus  - monitor sugars    DVt ppx: SCDs. no chemic ppx due to pancytopenia  Lines: L IJ HD cath, R IJ CVC

## 2020-06-01 NOTE — PROGRESS NOTE ADULT - PROBLEM SELECTOR PLAN 3
Known to [facility] Dr Roberts. Currently with DIC and elevated PT/PTT/INR, and thrombocytopenia.

## 2020-06-01 NOTE — PROGRESS NOTE ADULT - PROBLEM SELECTOR PLAN 9
Support provided to patient and family. Patient to have access to supportive services during rest of hospital stay as the patient/family deemed necessary ie. Chaplaincy, Massage therapy, Music therapy, Patient and family supportive services, Palliative SW, etc.    As discussed during the palliative IDT meeting and as identified during the patients PSSA screening the patient would benefit from: Music therapy, Patient and family support, Primary SW/CM.

## 2020-06-01 NOTE — PROGRESS NOTE ADULT - PROBLEM SELECTOR PLAN 2
Has been unable to undergo HD/CVVHD. Per Nephrology: no urgent need for dialysis --might consider another attempt at HD or CVVHD    Management as per Nephrology

## 2020-06-01 NOTE — PROGRESS NOTE ADULT - PROBLEM SELECTOR PLAN 1
Debility and elevated BUN related.     Recommend to discontinue psychotropics eg. Zoloft and Remeron  Consider trial of Ritalin or Provigil daily ~6am.

## 2020-06-01 NOTE — PROGRESS NOTE ADULT - SUBJECTIVE AND OBJECTIVE BOX
Patient is a 74y Male seen and evaluated at bedside.   No new complaints.  Vitals, meds, labs reviewed.      Meds:    albumin human 25% IVPB 50 every 1 hour  chlorhexidine 2% Cloths 1 daily  collagenase Ointment 1 daily  DAPTOmycin IVPB 700 every 48 hours  finasteride 5 daily  guaiFENesin   Syrup  (Sugar-Free) 200 every 6 hours PRN  hydrocortisone sodium succinate Injectable 50 every 8 hours  insulin glargine Injectable (LANTUS) 10 at bedtime  insulin lispro (HumaLOG) corrective regimen sliding scale  Before meals and at bedtime  midodrine 15 every 8 hours  mirtazapine 7.5 every 24 hours  norepinephrine Infusion 0.045 <Continuous>  nystatin Cream 1 two times a day  pantoprazole  Injectable 40 daily  psyllium Powder 1 daily  sertraline 50 daily  sevelamer carbonate Powder 1600 three times a day with meals  sodium bicarbonate 650 three times a day  sodium zirconium cyclosilicate 10 every 8 hours      Allergies    No Known Allergies    Intolerances        T(C): , Max: 36.5 (06-01-20 @ 00:44)  T(F): , Max: 97.7 (06-01-20 @ 00:44)  HR: 89 (06-01-20 @ 13:00)  BP: 90/53 (06-01-20 @ 13:00)  BP(mean): 66 (06-01-20 @ 13:00)  RR: 10 (06-01-20 @ 13:00)  SpO2: 93% (06-01-20 @ 13:00)  Wt(kg): --    05-31 @ 07:01  -  06-01 @ 07:00  --------------------------------------------------------  IN: 251 mL / OUT: 45 mL / NET: 206 mL    06-01 @ 07:01  -  06-01 @ 13:57  --------------------------------------------------------  IN: 19 mL / OUT: 15 mL / NET: 4 mL          Review of Systems:  uniable - minimally conversant      PHYSICAL EXAM:  GENERAL: ill appearing  awake, no acute distress at present  NECK: Neck supple, No JVD  CHEST/LUNG: Clear to auscultation bilaterally; No wheeze, no rales, no crepitations  HEART: Regular rate and rhythm. S1S2+  No gallop, no rub   ABDOMEN: Soft, Nontender, BS+nt, No flank tenderness.   EXTREMITIES: No clubbing, cyanosis, or edema  Neurology: awake        ACCESS: hd cath    LABS:                        8.0    4.41  )-----------( 33       ( 01 Jun 2020 05:23 )             26.2     06-01    143  |  106  |  136<H>  ----------------------------<  226<H>  5.1   |  15<L>  |  7.81<H>    Ca    8.4      01 Jun 2020 05:22  Phos  9.9     06-01  Mg     2.2     06-01    TPro  5.1<L>  /  Alb  2.5<L>  /  TBili  0.7  /  DBili  x   /  AST  20  /  ALT  7<L>  /  AlkPhos  91  06-01      PT/INR - ( 01 Jun 2020 05:23 )   PT: 18.8 sec;   INR: 1.62          PTT - ( 01 Jun 2020 05:23 )  PTT:35.0 sec          RADIOLOGY & ADDITIONAL STUDIES: Patient is a 74y Male seen and evaluated at bedside.   No new complaints.  Vitals, meds, labs reviewed.      Meds:    albumin human 25% IVPB 50 every 1 hour  chlorhexidine 2% Cloths 1 daily  collagenase Ointment 1 daily  DAPTOmycin IVPB 700 every 48 hours  finasteride 5 daily  guaiFENesin   Syrup  (Sugar-Free) 200 every 6 hours PRN  hydrocortisone sodium succinate Injectable 50 every 8 hours  insulin glargine Injectable (LANTUS) 10 at bedtime  insulin lispro (HumaLOG) corrective regimen sliding scale  Before meals and at bedtime  midodrine 15 every 8 hours  mirtazapine 7.5 every 24 hours  norepinephrine Infusion 0.045 <Continuous>  nystatin Cream 1 two times a day  pantoprazole  Injectable 40 daily  psyllium Powder 1 daily  sertraline 50 daily  sevelamer carbonate Powder 1600 three times a day with meals  sodium bicarbonate 650 three times a day  sodium zirconium cyclosilicate 10 every 8 hours      Allergies    No Known Allergies    Intolerances        T(C): , Max: 36.5 (06-01-20 @ 00:44)  T(F): , Max: 97.7 (06-01-20 @ 00:44)  HR: 89 (06-01-20 @ 13:00)  BP: 90/53 (06-01-20 @ 13:00)  BP(mean): 66 (06-01-20 @ 13:00)  RR: 10 (06-01-20 @ 13:00)  SpO2: 93% (06-01-20 @ 13:00)  Wt(kg): --    05-31 @ 07:01  -  06-01 @ 07:00  --------------------------------------------------------  IN: 251 mL / OUT: 45 mL / NET: 206 mL    06-01 @ 07:01  -  06-01 @ 13:57  --------------------------------------------------------  IN: 19 mL / OUT: 15 mL / NET: 4 mL          Review of Systems:  uniable - minimally conversant      PHYSICAL EXAM:  GENERAL: ill appearing  awake, no acute distress at present  NECK:  JVD  CHEST/LUNG: decreased BS ant No wheeze, no rales, no crepitations  HEART: Regular rate and rhythm. S1S2+  No gallop, no rub   ABDOMEN: Soft, Nontender, distended   EXTREMITIES: No clubbing, cyanosis, has anasarca  Neurology: awake        ACCESS: hd cath    LABS:                        8.0    4.41  )-----------( 33       ( 01 Jun 2020 05:23 )             26.2     06-01    143  |  106  |  136<H>  ----------------------------<  226<H>  5.1   |  15<L>  |  7.81<H>    Ca    8.4      01 Jun 2020 05:22  Phos  9.9     06-01  Mg     2.2     06-01    TPro  5.1<L>  /  Alb  2.5<L>  /  TBili  0.7  /  DBili  x   /  AST  20  /  ALT  7<L>  /  AlkPhos  91  06-01      PT/INR - ( 01 Jun 2020 05:23 )   PT: 18.8 sec;   INR: 1.62          PTT - ( 01 Jun 2020 05:23 )  PTT:35.0 sec          RADIOLOGY & ADDITIONAL STUDIES:

## 2020-06-01 NOTE — PROGRESS NOTE ADULT - ASSESSMENT
73 y/o M w/PMHx of CKD stage III/IV, DM, HTN, BPH (chronic incontinence/retention, s/p palacio on last admission), a-fib admitted for new onset ascites nephrology consulted for AAMIR on CKD.      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, likely ATN from hypotension and ischemic injury  - worsening renal function  - instability with acute HD and unsuccessful trials of CVVHD  - additional attempts of RRT will be futile at this time  - volume and electrolytes noted  - will continue to monitor condition; will reattempt RRT if significant improvement in hemodynamics or platelet count/ bleeding risk  - lokelma 10 mg po qd to prevent hyperkalemia  - sodium bicarb 650 mg po q8hr for metabolic acidosis  - renvela 1600 mg po q8hr   - renally adjusted meds/ ABx  - pending [facility] discussion

## 2020-06-01 NOTE — PROGRESS NOTE ADULT - ATTENDING COMMENTS
Pt remains on low dose vasopressors and inability to tolerate dialysis. will continue discussions with HCP regarding goals of care.

## 2020-06-01 NOTE — PROGRESS NOTE ADULT - PROBLEM SELECTOR PLAN 4
What Is The Reason For Today's Visit?: Full Body Skin Examination What Is The Reason For Today's Visit? (Being Monitored For X): concerning skin lesions on an annual basis Albumin, Serum: 2.5 g/dL (06.01.20 @ 05:22)  Third spacing and anasarca. Tissue injuries present with poor healing. Per Nutrition: Meet 75% of EER via feasible route that is consistent with GOC. Recommendations: 1. Keep nutrition aligned with goals of care 2. Honor food preferences and encourage/assist w/PO intake 3. If goals of care to change to include nutrition support, please reconsult for EN recs 4. Pain regimen per team

## 2020-06-01 NOTE — PROGRESS NOTE ADULT - PROBLEM SELECTOR PLAN 6
Patient's friend upon frequent reevaluations at the bedside and after conversations with the patient and primary team over the weekend decided to prevent further suffering and has made the patient DNR DNI.

## 2020-06-01 NOTE — CHART NOTE - NSCHARTNOTEFT_GEN_A_CORE
Admitting Diagnosis:   Patient is a 74y old  Male who presents with a chief complaint of Sepsis (30 May 2020 11:24)      PAST MEDICAL & SURGICAL HISTORY:  History of pancytopenia  H/O hydrocephalus  Anemia  HLD (hyperlipidemia)  Enlarged prostate  DM (diabetes mellitus)  HTN (hypertension)  H/O prior ablation treatment      Current Nutrition Order:  Dys 1 Puree /Thin Liquids  Nepro with Carb Steady TID (1275 kcal, 57.3g protein, 516 mL free H2O)   Suplena w/Carb Steady TID (1275 kcal, 31.8g protein)     PO Intake: Good (%) [   ]  Fair (50-75%) [   ] Poor (<25%) [ X  ]    GI Issues:   No complaints of N/V  BM 5/31 (rectal tube no longer in place)    Pain:   Pt denied pain     Skin Integrity:  1+ Edema (left arm;  right arm;  left hand;  right hand), 3+ Edema (left ankle;  right ankle;  left foot;  right foot)   Sacrum unstageable pressure injury   B/L heels unstageable pressure ulcers     Labs:   06-01    143  |  106  |  136<H>  ----------------------------<  226<H>  5.1   |  15<L>  |  7.81<H>    Ca    8.4      01 Jun 2020 05:22  Phos  9.9     06-01  Mg     2.2     06-01    TPro  5.1<L>  /  Alb  2.5<L>  /  TBili  0.7  /  DBili  x   /  AST  20  /  ALT  7<L>  /  AlkPhos  91  06-01    CAPILLARY BLOOD GLUCOSE      POCT Blood Glucose.: 218 mg/dL (01 Jun 2020 11:51)  POCT Blood Glucose.: 227 mg/dL (01 Jun 2020 06:35)  POCT Blood Glucose.: 185 mg/dL (31 May 2020 21:17)  POCT Blood Glucose.: 198 mg/dL (31 May 2020 16:41)      Medications:  MEDICATIONS  (STANDING):  albumin human 25% IVPB 50 milliLiter(s) IV Intermittent every 1 hour  chlorhexidine 2% Cloths 1 Application(s) Topical daily  collagenase Ointment 1 Application(s) Topical daily  DAPTOmycin IVPB 700 milliGRAM(s) IV Intermittent every 48 hours  finasteride 5 milliGRAM(s) Oral daily  hydrocortisone sodium succinate Injectable 50 milliGRAM(s) IV Push every 8 hours  insulin glargine Injectable (LANTUS) 10 Unit(s) SubCutaneous at bedtime  insulin lispro (HumaLOG) corrective regimen sliding scale   SubCutaneous Before meals and at bedtime  midodrine 15 milliGRAM(s) Oral every 8 hours  mirtazapine 7.5 milliGRAM(s) Oral every 24 hours  norepinephrine Infusion 0.045 MICROgram(s)/kG/Min (3 mL/Hr) IV Continuous <Continuous>  nystatin Cream 1 Application(s) Topical two times a day  pantoprazole  Injectable 40 milliGRAM(s) IV Push daily  psyllium Powder 1 Packet(s) Oral daily  sertraline 50 milliGRAM(s) Oral daily  sevelamer carbonate Powder 1600 milliGRAM(s) Oral three times a day with meals  sodium bicarbonate 650 milliGRAM(s) Oral three times a day  sodium zirconium cyclosilicate 10 Gram(s) Oral every 8 hours    MEDICATIONS  (PRN):  guaiFENesin   Syrup  (Sugar-Free) 200 milliGRAM(s) Oral every 6 hours PRN Cough      Ht: 5'10''  pounds (75kg) +-10%   5/8 158 pounds %IBW=95% BMI 22.7   5/15 129.6 pounds   5/16 191.5 pounds  5/18 193 pounds / 194 pounds   5/27 136.9 pounds / 137 pounds   5/28 139 pounds  Wt Change: Based on most recent EMR wts. Noted varying EMR wts during admission, Pt has been with varying edema during admission and Pt now started on HD, pt remains with edema at this time which is likely masking true wt.     Malnutrition note placed 5/9- pt had been noted with Mild Protein Calorie Malnutrition, during this time noted with mild muscle losses to temporal region and Mild fat loss over triceps region noted.  During RD visit today 5/27 pt noted with moderate wasting to orbital region, muscle loss to temple appear to be mild/moderate in natural, new malnutrition note placed today 5/27.     Estimated energy needs:   IBW for EER d/t varying EMR wts in HD pt   Nutrient needs based on [facility] standards of care for maintenance in adults, adjusted for age, wounds. Fluids per team  Calories: 25-30 kcal/kg = 1682-3304 kcal/day  Protein: ~1.0-1.2 g/kg = 75-90g protein *if pt to resume HD, will consider increasing protein needs  Fluids per team     Subjective:   75 yo M with MDS, DM2, afib, HTN, HLD, BPH s/p palacio, pancytopenia, CKD4, ascites of unknown origin presents from [facility] with complaints of decreased PO intake and weakness likely secondary to uremia/AAMIR from diuresis for treatment of ascites. Pt hospital course notable for UGIB, CT on 5/18 showing pancolitis and GI PCR negative; team reportes GI w/u overall negative. Pt with pericardial effusion with tamponade physiology as well as ATN and ascites of unknown etiology s/p PRBC. Pt s/p paracentesis 5/10, IR pericardiocentesis 5/11, Paracentesis 5/14. Echo 5/15 showed no pericardial effusion, Drain removed and IR signed off. Pt now with anemia of unknown etiology. Pt not a candidate for bone marrow transplant. Pt with persistent MRSA bacteremia, concern for endocarditis vs cardiac abscess. Pt s/p Gallium 5/20, suggestive of cellulitis or rib osteo. Pt now needing HD d/t AAMIR/CKD likely ATN from hypotension and ischemic injury. Started IHD 5/15, though unable to tolerate 2/2 hypotension/hemodynamic instability, so started on CVVHD from 5/20-5/21. Pt did not tolerate CVVHD 2/2 frequent clotting of HD cath, so CVVHD held. Pt trialed on SLED, though was unable to tolerate this method as well. On 5/29 trialed again on CVVHD, though unsuccessful, and future HD attempts deemed to be futile by nephrology team. Started on lokelma and sodium bicarb. Pt now DNR/DNI.         Pt Underwent FEES 5/13 recommending [place] soft diet. Now s/p Swallow Eval 5/18 which notes poor dentition, noted with recs for puree/thin liquids. Now ordered for dys 1 puree/thin liquids + Nepro/Suplena x3/day each as ONS. Pt remains with poor PO intake, brakfast at bedside untouched and pt noted with various ONS bottles at bedside. Pt visited, encouraged pt to try breakfast however requesting stephanie be removed from room. RN reports pt does not want to eat out of fear of having BMs, pt now ordered for metamucil. Pt reported to RD does not want to eat d/t stomach acid; noted order for Protonix. Per progress notes refusing NGT. Noted Remeron and marionol ordered.   Please see below for nutritions recommendations- Discussed pt with team. Pending order placed.     Previous Nutrition Diagnosis: Suspected severe Malnutrition r/t intake<EER in setting of increased EER AEB poor PO (<50% >5 days), Edema (1/3+),  moderate wasting to orbital region, muscle loss to temple appear to be mild/moderate in nature.     Active [ X ] Resolved [   ]    Goal: Meet 75% of EER via feasible route that is consistent with GOC     Recommendations:  1. Keep nutrition aligned with goals of care  2. Honor food preferences   3. If goals of care to change to include nutrition support, please reconsult for EN recs   4. Pain regimen per team     Education: Pt previously educated and uninterested at this time     Risk Level: High [    ] Moderate [ X  ] Low [   ]. Admitting Diagnosis:   Patient is a 74y old  Male who presents with a chief complaint of Sepsis (30 May 2020 11:24)      PAST MEDICAL & SURGICAL HISTORY:  History of pancytopenia  H/O hydrocephalus  Anemia  HLD (hyperlipidemia)  Enlarged prostate  DM (diabetes mellitus)  HTN (hypertension)  H/O prior ablation treatment      Current Nutrition Order:  Dys 1 Puree /Thin Liquids  Nepro with Carb Steady TID (1275 kcal, 57.3g protein, 516 mL free H2O)   Suplena w/Carb Steady TID (1275 kcal, 31.8g protein)     PO Intake: Good (%) [   ]  Fair (50-75%) [   ] Poor (<25%) [ X  ]    GI Issues:   No complaints of N/V  BM 5/31 (rectal tube no longer in place)    Pain:   Pt denied pain     Skin Integrity:  1+ Edema (left arm;  right arm;  left hand;  right hand), 3+ Edema (left ankle;  right ankle;  left foot;  right foot)   Sacrum unstageable pressure injury   B/L heels unstageable pressure ulcers     Labs:   06-01    143  |  106  |  136<H>  ----------------------------<  226<H>  5.1   |  15<L>  |  7.81<H>    Ca    8.4      01 Jun 2020 05:22  Phos  9.9     06-01  Mg     2.2     06-01    TPro  5.1<L>  /  Alb  2.5<L>  /  TBili  0.7  /  DBili  x   /  AST  20  /  ALT  7<L>  /  AlkPhos  91  06-01    CAPILLARY BLOOD GLUCOSE      POCT Blood Glucose.: 218 mg/dL (01 Jun 2020 11:51)  POCT Blood Glucose.: 227 mg/dL (01 Jun 2020 06:35)  POCT Blood Glucose.: 185 mg/dL (31 May 2020 21:17)  POCT Blood Glucose.: 198 mg/dL (31 May 2020 16:41)      Medications:  MEDICATIONS  (STANDING):  albumin human 25% IVPB 50 milliLiter(s) IV Intermittent every 1 hour  chlorhexidine 2% Cloths 1 Application(s) Topical daily  collagenase Ointment 1 Application(s) Topical daily  DAPTOmycin IVPB 700 milliGRAM(s) IV Intermittent every 48 hours  finasteride 5 milliGRAM(s) Oral daily  hydrocortisone sodium succinate Injectable 50 milliGRAM(s) IV Push every 8 hours  insulin glargine Injectable (LANTUS) 10 Unit(s) SubCutaneous at bedtime  insulin lispro (HumaLOG) corrective regimen sliding scale   SubCutaneous Before meals and at bedtime  midodrine 15 milliGRAM(s) Oral every 8 hours  mirtazapine 7.5 milliGRAM(s) Oral every 24 hours  norepinephrine Infusion 0.045 MICROgram(s)/kG/Min (3 mL/Hr) IV Continuous <Continuous>  nystatin Cream 1 Application(s) Topical two times a day  pantoprazole  Injectable 40 milliGRAM(s) IV Push daily  psyllium Powder 1 Packet(s) Oral daily  sertraline 50 milliGRAM(s) Oral daily  sevelamer carbonate Powder 1600 milliGRAM(s) Oral three times a day with meals  sodium bicarbonate 650 milliGRAM(s) Oral three times a day  sodium zirconium cyclosilicate 10 Gram(s) Oral every 8 hours    MEDICATIONS  (PRN):  guaiFENesin   Syrup  (Sugar-Free) 200 milliGRAM(s) Oral every 6 hours PRN Cough      Ht: 5'10''  pounds (75kg) +-10%   5/8 158 pounds %IBW=95% BMI 22.7   5/15 129.6 pounds   5/16 191.5 pounds  5/18 193 pounds / 194 pounds   5/27 136.9 pounds / 137 pounds   5/28 139 pounds  Wt Change: Based on most recent EMR wts. Noted varying EMR wts during admission, Pt has been with varying edema during admission and Pt now started on HD, pt remains with edema at this time which is likely masking true wt.     Malnutrition note placed 5/9- pt had been noted with Mild Protein Calorie Malnutrition, during this time noted with mild muscle losses to temporal region and Mild fat loss over triceps region noted.  During RD visit 5/27 pt noted with moderate wasting to orbital region, muscle loss to temple appear to be mild/moderate in natural, new malnutrition note placed today 5/27.     Estimated energy needs:   IBW for EER d/t varying EMR wts in HD pt   Nutrient needs based on [facility] standards of care for maintenance in adults, adjusted for age, wounds. Fluids per team  Calories: 25-30 kcal/kg = 5090-4053 kcal/day  Protein: ~1.0-1.2 g/kg = 75-90g protein *if pt to resume HD, will consider increasing protein needs  Fluids per team     Subjective:   73 yo M with MDS, DM2, afib, HTN, HLD, BPH s/p palacio, pancytopenia, CKD4, ascites of unknown origin presents from [facility] with complaints of decreased PO intake and weakness likely secondary to uremia/AAMIR from diuresis for treatment of ascites. Pt hospital course notable for UGIB, CT on 5/18 showing pancolitis and GI PCR negative; team reportes GI w/u overall negative. Pt with pericardial effusion with tamponade physiology as well as ATN and ascites of unknown etiology s/p PRBC. Pt s/p paracentesis 5/10, IR pericardiocentesis 5/11, Paracentesis 5/14. Echo 5/15 showed no pericardial effusion, Drain removed and IR signed off. Pt now with anemia of unknown etiology. Pt not a candidate for bone marrow transplant. Pt with persistent MRSA bacteremia, concern for endocarditis vs cardiac abscess. Pt s/p Gallium 5/20, suggestive of cellulitis or rib osteo. Pt now needing HD d/t AAMIR/CKD likely ATN from hypotension and ischemic injury. Started IHD 5/15, though unable to tolerate 2/2 hypotension/hemodynamic instability, so started on CVVHD from 5/20-5/21. Pt did not tolerate CVVHD 2/2 frequent clotting of HD cath, so CVVHD held. Pt trialed on SLED, though was unable to tolerate this method as well. On 5/29 trialed again on CVVHD, though unsuccessful, and future HD attempts deemed to be futile by nephrology team. Started on lokelma and sodium bicarb. Pt now DNR/DNI.     Pt seen in room, awake but lethargic and slow to respond. MAP 70, on levophed for BP support. Levo capped at 3cc. Pt again refused breakfast, noted a few sips of Suplena and apple juice. Asked pt if there was anything he would eat at this time but he said no. On further attempts to obtain food preferences pt declined. Continuing to decline NGT as well. No complaints of N/V/C/D- BM 5/31, no longer w/rectal tube in place. He denied pain. 97.2F overnight. /Cr 7.81. Phos 9.9 (H). Will continue to monitor for goals of care and will keep nutrition aligned at all times. Will continue to follow per RD protocol.     Previous Nutrition Diagnosis: Suspected severe Malnutrition r/t intake<EER in setting of increased EER AEB poor PO (<50% >5 days), Edema (1/3+),  moderate wasting to orbital region, muscle loss to temple appear to be mild/moderate in nature.     Active [ X ] Resolved [   ]    Goal: Meet 75% of EER via feasible route that is consistent with GOC     Recommendations:  1. Keep nutrition aligned with goals of care  2. Honor food preferences and encourage/assist w/PO intake   3. If goals of care to change to include nutrition support, please reconsult for EN recs   4. Pain regimen per team     Education: Pt previously educated and uninterested at this time     Risk Level: High [    ] Moderate [ X  ] Low [   ].

## 2020-06-01 NOTE — PROGRESS NOTE ADULT - PROBLEM SELECTOR PLAN 5
No documented HCP form found on [employer]. Current surrogates: Friend Teresa "Venus" Matt [phone] / [phone]. Patient has voiced his preference for Teresa to be involved in decision making and his ongoing care. She is willing to continue to assist as a surrogate.

## 2020-06-01 NOTE — PROGRESS NOTE ADULT - ATTENDING COMMENTS
persistent shock , AAMIR   no urgent need for dialysis --might consider another attempt at HD or CVVHD when requires for clearance -- especially if hemodynamics improve--  will likely need heparin if gets CVVHD  overall prognosis quite poor

## 2020-06-01 NOTE — PROGRESS NOTE ADULT - SUBJECTIVE AND OBJECTIVE BOX
SANIYA CARRINGTON             MRN-[id number]    CC: GOC/AD, Symptoms and support    HPI:  Patient is 73 yo M with past medical history of DM, HTN, HLD, BPH (chronic indwelling palacio placement on last admission, SERA, CKD4, and a-fib  , mds, pancytopenia, presents from [facility] with complaints of decreased po intake and weakness. Patient reports that he recently had a intense bowel regimen for constipation after which he developed loose stools. To prevent further loose stools he stopped eating . Decreased po intake also in setting of poor appetite. Patient also complains for worsening LE and abdominal swelling. As per collateral obtained from Dr Barksdale at [facility]([id number]) patient abdominal usg notable for massive ascites, and increased liver echogenicity(LFTS outpatient wnl )   and he was aggressively diuresed with  Lasix , torsemide 40 mg bid (of note also started on midodrine as his pressures would drop with iv diuresis ). After diuresis his ascites and LE edema improved however he developed worsening AAMIR and so Lasix was stopped and torsemide decreased to 20 mg bid. Of note he was seen by Cardiology and  echo done, concern fluid overload was cardiac in etiology given elevated bnp and echo findings.  As per collateral patient also on Levaquin 1 week ago for intersitial infiltrates on cxr , s/p 7 day course.   Upon arrival to ED vitals bp 92/52, hr 94, rr 16, satting well ra.  Labs notable for anemia, thrombocytopenia and  worsening AAMIR. CT abdomen pelvis pending  ed management : supra pubic cath taken out, with plans to place new one, 1L NS, 1G ceftriaxone  Pt admitted to [facility] [place] for further management (08 May 2020 16:10)    SUBJECTIVE: Mr Carrington was seen and examined at bedside today ~2pm. He was found awake and slow to respond but ultimately able to participate in examination and questioning. Reports back pain, but no other acute issues. Present at bedside Palliative Music Therapy undergoing session with patient. Had conversation with patients case management regarding the expected arrival of the patient's friend Teresa to complete a POA at the bedside later in the day ~6pm.     ROS:  DYSPNEA: No  NAUS/VOM: No	  SECRETIONS: No	  AGITATION: No  Pain (Y/N):  Yes     -Provocation/Palliation: Laying in bed causes him pain  -Quality/Quantity: Acute nonmalignant nociceptive somatic musculoskeletal pain currently uncontrolled.   -Radiating: Back  -Severity: Mild-Mod  -Timing/Frequency: Constant  -Impact on ADLs: No    OTHER REVIEW OF SYSTEMS: Denied    PEx:  T(C): 36.4 (06-01-20 @ 09:43), Max: 36.5 (06-01-20 @ 00:44)  HR: 85 (06-01-20 @ 15:00) (82 - 95)  BP: 94/51 (06-01-20 @ 15:00) (60/36 - 98/54)  RR: 11 (06-01-20 @ 15:00) (10 - 22)  SpO2: 100% (06-01-20 @ 15:00) (92% - 100%)  Wt(kg):  71.8Kg    General: AAOx1 man found laying in bed in NAD  HEENT: Dry eyes and mucosas Poor dentition and dry mucosal bleeding and eyesight   Neck: B/L central lines  CVS: RR S1S2 No M/G/R  Resp: Unlabored Non tachypneic Decreased air entry B/L anteriorly and decreased at bases  GI:  Distended Drain+ Soft NT  :  Palacio+   Musc:  No C/C Decreased strength upper extremities   Neuro: No focal deficits. Slow responses but able to follow commands and answer questions.  Psych: Calm slow but appropriate    Skin: Ecchymotic / bruised   B/L Heel and Sacrum tissue injuries.  Lymph: Edema+ all extremities    ALLERGIES: No Known Allergies    OPIATE NAÏVE (Y/N): Yes    MEDICATIONS: REVIEWED  MEDICATIONS  (STANDING):  albumin human 25% IVPB 50 milliLiter(s) IV Intermittent every 1 hour  chlorhexidine 2% Cloths 1 Application(s) Topical daily  collagenase Ointment 1 Application(s) Topical daily  DAPTOmycin IVPB 700 milliGRAM(s) IV Intermittent every 48 hours  finasteride 5 milliGRAM(s) Oral daily  hydrocortisone sodium succinate Injectable 50 milliGRAM(s) IV Push every 8 hours  insulin glargine Injectable (LANTUS) 10 Unit(s) SubCutaneous at bedtime  insulin lispro (HumaLOG) corrective regimen sliding scale   SubCutaneous Before meals and at bedtime  midodrine 15 milliGRAM(s) Oral every 8 hours  mirtazapine 7.5 milliGRAM(s) Oral every 24 hours  norepinephrine Infusion 0.045 MICROgram(s)/kG/Min (3 mL/Hr) IV Continuous <Continuous>  nystatin Cream 1 Application(s) Topical two times a day  pantoprazole  Injectable 40 milliGRAM(s) IV Push daily  psyllium Powder 1 Packet(s) Oral daily  sertraline 50 milliGRAM(s) Oral daily  sevelamer carbonate Powder 1600 milliGRAM(s) Oral three times a day with meals  sodium bicarbonate 650 milliGRAM(s) Oral three times a day  sodium zirconium cyclosilicate 10 Gram(s) Oral every 8 hours    MEDICATIONS  (PRN):  guaiFENesin   Syrup  (Sugar-Free) 200 milliGRAM(s) Oral every 6 hours PRN Cough    LABS: REVIEWED  CBC:                        8.0    4.41  )-----------( 33       ( 01 Jun 2020 05:23 )             26.2     CMP:    06-01    143  |  106  |  136<H>  ----------------------------<  226<H>  5.1   |  15<L>  |  7.81<H>    Ca    8.4      01 Jun 2020 05:22  Phos  9.9     06-01  Mg     2.2     06-01    TPro  5.1<L>  /  Alb  2.5<L>  /  TBili  0.7  /  DBili  x   /  AST  20  /  ALT  7<L>  /  AlkPhos  91  06-01  Albumin, Serum: 2.5 g/dL (06-01-20 @ 05:22)    POCT Blood Glucose.: 218 mg/dL (06.01.20 @ 11:51)    Fibrinogen Assay: 120 mg/dL (06.01.20 @ 05:23)    Creatine Kinase, Serum: 50 U/L (06.01.20 @ 05:22)    Prothrombin Time and INR, Plasma (06.01.20 @ 05:23)    Prothrombin Time, Plasma: 18.8 sec    INR: 1.62   Activated Partial Thromboplastin Time: 35.0 sec (06.01.20 @ 05:23)    IMAGING: REVIEWED    ADVANCED DIRECTIVES:          DNR DNI     No documented MOLST found on [facility]     No documented HCP form found on [facility]     Other surrogates: Friend Teresa "Venus" Matt [phone] / [phone]     No Living will / POA / Advance directives found on [facility] / [facility].     Documented GOC notes on [facility] on 5/22/2020, 5/25/2020, and 5/27/2020.    DECISION MAKER: The patient is able to participate in encounter but unable to demonstrate capacity for complex medical decision making given poor recall.  LEGAL SURROGATE: No documented HCP form found on [facility].  Alternate surrogates: Friend Teresa "Venus" Matt [phone] / [phone]    PSYCHOSOCIAL-SPIRITUAL ASSESSMENT:       Reviewed       Care plan adjusted as above	    GOALS OF CARE DISCUSSION       Palliative care info/counseling provided	           Advanced Directives addressed please see Advance Care Planning Note	           See previous Palliative Medicine Note       Documentation of GOC: DNR DNI  Continue acute level of care    REFERRALS	        Unit SW/Case Mgmt        - Declined       Speech/Swallow - Following       Patient/Family Support - Following       Music Therapy - Following       Nutrition - Following       Dietician - Following       PT/OT - Following SANIYA CARRINGTON             MRN-[id number]    CC: GOC/AD, Symptoms and support    HPI:  Patient is 73 yo M with past medical history of DM, HTN, HLD, BPH (chronic indwelling palacio placement on last admission, SERA, CKD4, and a-fib  , mds, pancytopenia, presents from [facility] with complaints of decreased po intake and weakness. Patient reports that he recently had a intense bowel regimen for constipation after which he developed loose stools. To prevent further loose stools he stopped eating . Decreased po intake also in setting of poor appetite. Patient also complains for worsening LE and abdominal swelling. As per collateral obtained from Dr Barksdale at [facility]([id number]) patient abdominal usg notable for massive ascites, and increased liver echogenicity(LFTS outpatient wnl )   and he was aggressively diuresed with  Lasix , torsemide 40 mg bid (of note also started on midodrine as his pressures would drop with iv diuresis ). After diuresis his ascites and LE edema improved however he developed worsening AAMIR and so Lasix was stopped and torsemide decreased to 20 mg bid. Of note he was seen by Cardiology and  echo done, concern fluid overload was cardiac in etiology given elevated bnp and echo findings.  As per collateral patient also on Levaquin 1 week ago for intersitial infiltrates on cxr , s/p 7 day course.   Upon arrival to ED vitals bp 92/52, hr 94, rr 16, satting well ra.  Labs notable for anemia, thrombocytopenia and  worsening AAMIR. CT abdomen pelvis pending  ed management : supra pubic cath taken out, with plans to place new one, 1L NS, 1G ceftriaxone  Pt admitted to [facility] [place] for further management (08 May 2020 16:10)    SUBJECTIVE: Mr Carrington was seen and examined at bedside today ~2pm. He was found awake and slow to respond but ultimately able to participate in examination and questioning. Reports back pain, but no other acute issues. Present at bedside Palliative Music Therapy undergoing session with patient. Had conversation with patients case management regarding the expected arrival of the patient's friend Teresa to complete a POA at the bedside later in the day ~6pm.     ROS:  DYSPNEA: No  NAUS/VOM: No	  SECRETIONS: No	  AGITATION: No  Pain (Y/N):  Yes     -Provocation/Palliation: Laying in bed causes him pain  -Quality/Quantity: Acute nonmalignant nociceptive somatic musculoskeletal pain currently uncontrolled.   -Radiating: Back  -Severity: Mild-Mod  -Timing/Frequency: Constant  -Impact on ADLs: No    OTHER REVIEW OF SYSTEMS: Denied    PEx:  T(C): 36.4 (06-01-20 @ 09:43), Max: 36.5 (06-01-20 @ 00:44)  HR: 85 (06-01-20 @ 15:00) (82 - 95)  BP: 94/51 (06-01-20 @ 15:00) (60/36 - 98/54)  RR: 11 (06-01-20 @ 15:00) (10 - 22)  SpO2: 100% (06-01-20 @ 15:00) (92% - 100%)  Wt(kg):  71.8Kg    General: AAOx2 man found laying in bed in NAD  HEENT: Dry eyes and mucosas Poor dentition and dry mucosal bleeding and eyesight   Neck: B/L central lines  CVS: RR S1S2 No M/G/R  Resp: Unlabored Non tachypneic Decreased air entry B/L anteriorly and decreased at bases  GI:  Distended Drain+ Soft NT  :  Palacio+   Musc:  No C/C Decreased strength upper extremities   Neuro: No focal deficits. Slow responses but able to follow commands and answer questions.  Psych: Calm slow but appropriate    Skin: Ecchymotic / bruised / Petechia  B/L Heel and Sacrum tissue injuries.  Lymph: Edema+ all extremities    ALLERGIES: No Known Allergies    OPIATE NAÏVE (Y/N): Yes    MEDICATIONS: REVIEWED  MEDICATIONS  (STANDING):  albumin human 25% IVPB 50 milliLiter(s) IV Intermittent every 1 hour  chlorhexidine 2% Cloths 1 Application(s) Topical daily  collagenase Ointment 1 Application(s) Topical daily  DAPTOmycin IVPB 700 milliGRAM(s) IV Intermittent every 48 hours  finasteride 5 milliGRAM(s) Oral daily  hydrocortisone sodium succinate Injectable 50 milliGRAM(s) IV Push every 8 hours  insulin glargine Injectable (LANTUS) 10 Unit(s) SubCutaneous at bedtime  insulin lispro (HumaLOG) corrective regimen sliding scale   SubCutaneous Before meals and at bedtime  midodrine 15 milliGRAM(s) Oral every 8 hours  mirtazapine 7.5 milliGRAM(s) Oral every 24 hours  norepinephrine Infusion 0.045 MICROgram(s)/kG/Min (3 mL/Hr) IV Continuous <Continuous>  nystatin Cream 1 Application(s) Topical two times a day  pantoprazole  Injectable 40 milliGRAM(s) IV Push daily  psyllium Powder 1 Packet(s) Oral daily  sertraline 50 milliGRAM(s) Oral daily  sevelamer carbonate Powder 1600 milliGRAM(s) Oral three times a day with meals  sodium bicarbonate 650 milliGRAM(s) Oral three times a day  sodium zirconium cyclosilicate 10 Gram(s) Oral every 8 hours    MEDICATIONS  (PRN):  guaiFENesin   Syrup  (Sugar-Free) 200 milliGRAM(s) Oral every 6 hours PRN Cough    LABS: REVIEWED  CBC:                        8.0    4.41  )-----------( 33       ( 01 Jun 2020 05:23 )             26.2     CMP:    06-01    143  |  106  |  136<H>  ----------------------------<  226<H>  5.1   |  15<L>  |  7.81<H>    Ca    8.4      01 Jun 2020 05:22  Phos  9.9     06-01  Mg     2.2     06-01    TPro  5.1<L>  /  Alb  2.5<L>  /  TBili  0.7  /  DBili  x   /  AST  20  /  ALT  7<L>  /  AlkPhos  91  06-01  Albumin, Serum: 2.5 g/dL (06-01-20 @ 05:22)    POCT Blood Glucose.: 218 mg/dL (06.01.20 @ 11:51)    Fibrinogen Assay: 120 mg/dL (06.01.20 @ 05:23)    Creatine Kinase, Serum: 50 U/L (06.01.20 @ 05:22)    Prothrombin Time and INR, Plasma (06.01.20 @ 05:23)    Prothrombin Time, Plasma: 18.8 sec    INR: 1.62   Activated Partial Thromboplastin Time: 35.0 sec (06.01.20 @ 05:23)    IMAGING: REVIEWED    ADVANCED DIRECTIVES:          DNR DNI     No documented MOLST found on [facility]     No documented HCP form found on [facility]     Other surrogates: Friend Teresa "Venus" Matt [phone] / [phone]     No Living will / POA / Advance directives found on [place] / [facility].     Documented GOC notes on [facility] on 5/22/2020, 5/25/2020, and 5/27/2020.    DECISION MAKER: The patient is able to participate in encounter but unable to demonstrate capacity for complex medical decision making given poor recall.  LEGAL SURROGATE: No documented HCP form found on [facility].  Alternate surrogates: Friend Teresa "Venus" Matt [phone] / [phone]    PSYCHOSOCIAL-SPIRITUAL ASSESSMENT:       Reviewed       Care plan adjusted as above	    GOALS OF CARE DISCUSSION       Palliative care info/counseling provided	           Advanced Directives addressed please see Advance Care Planning Note	           See previous Palliative Medicine Note       Documentation of GOC: DNR DNI  Continue acute level of care    REFERRALS	        Unit SW/Case Mgmt        - Declined       Speech/Swallow - Following       Patient/Family Support - Following       Music Therapy - Following       Nutrition - Following       Dietician - Following       PT/OT - Following

## 2020-06-01 NOTE — CHART NOTE - NSCHARTNOTEFT_GEN_A_CORE
PALLIATIVE MEDICINE COORDINATION OF CARE NOTE FOR SANIYA LOZANO    Patient last assessed: 5/29/2020 to manage: GOC/AD, Symptoms, and Support was recommended: Ongoing GOC and AD conversation with friend Teresa.    30 Minutes; Start: 9:30am End: 10:00am, of non-face-to-face prolonged service provided that relates to (face-to-face) care that has or will occur and ongoing patient management, including one or more of the following:   - Reviewed records from other physicians or other health care professional services, including one or more of the following: other medical records and diagnostic / radiology study results     - Other: Medication reviewed.    The patient HAS NOT used PRN's in the last 24h.    MEDICATIONS  (STANDING):  albumin human 25% IVPB 50 milliLiter(s) IV Intermittent every 1 hour  chlorhexidine 2% Cloths 1 Application(s) Topical daily  collagenase Ointment 1 Application(s) Topical daily  DAPTOmycin IVPB 700 milliGRAM(s) IV Intermittent every 48 hours  finasteride 5 milliGRAM(s) Oral daily  hydrocortisone sodium succinate Injectable 50 milliGRAM(s) IV Push every 8 hours  insulin glargine Injectable (LANTUS) 10 Unit(s) SubCutaneous at bedtime  insulin lispro (HumaLOG) corrective regimen sliding scale   SubCutaneous Before meals and at bedtime  midodrine 15 milliGRAM(s) Oral every 8 hours  mirtazapine 7.5 milliGRAM(s) Oral every 24 hours  norepinephrine Infusion 0.045 MICROgram(s)/kG/Min (3 mL/Hr) IV Continuous <Continuous>  nystatin Cream 1 Application(s) Topical two times a day  pantoprazole  Injectable 40 milliGRAM(s) IV Push daily  psyllium Powder 1 Packet(s) Oral daily  sertraline 50 milliGRAM(s) Oral daily  sevelamer carbonate Powder 1600 milliGRAM(s) Oral three times a day with meals  sodium bicarbonate 650 milliGRAM(s) Oral three times a day  sodium zirconium cyclosilicate 10 Gram(s) Oral every 8 hours    MEDICATIONS  (PRN):  guaiFENesin   Syrup  (Sugar-Free) 200 milliGRAM(s) Oral every 6 hours PRN Cough    - Other: Advanced directives     DNR DNI     No documented MOLST found on [facility]     No documented HCP form found on [facility]     Other surrogates: Friend Teresa "Venus" Matt [phone] / [phone]     No Living will / POA / Advance directives found on [facility] / [facility].     Documented GOC notes on [facility] on 5/22/2020, 5/25/2020, and 5/27/2020.    - Other: Coordination/Plan of care     2 admissions in 1 year     Current admission LOS: 24 days     LACE score: 17 ADVANCE ILLNESS PATIENT since 2/19/2020.    Events over the weekend noted as well as the patient's change in code status on 5/31/2020. Will discuss with primary team during bedside rounds later in the day.

## 2020-06-02 NOTE — PROGRESS NOTE ADULT - PROBLEM SELECTOR PLAN 2
Has been unable to undergo HD/SLED/CVVHD. Per Nephrology: will attempt IHD on pressor support.     If unable to tolerate HD once again consider comfort guided approach to plan of care. Patient would qualify for inpatient hospice services.    Management as per Nephrology

## 2020-06-02 NOTE — PROGRESS NOTE ADULT - PROBLEM SELECTOR PLAN 1
Debility and elevated BUN related.     Discontinue sedative psychotropics eg. Zoloft and Remeron  Consider trial of Ritalin or Provigil daily ~6am.

## 2020-06-02 NOTE — PROGRESS NOTE ADULT - PROBLEM SELECTOR PLAN 5
No documented HCP form found on [facility]. Current surrogates: Friend Teresa "Venus" Matt [phone] / [phone]. Patient has voiced his preference for Teresa to be involved in decision making and his ongoing care. She is willing to continue to assist as a surrogate.

## 2020-06-02 NOTE — PROGRESS NOTE ADULT - ATTENDING COMMENTS
worsening electrolytes, lethargy   on lower pressors - will attempt HD for clearance only  overall prognosis poor

## 2020-06-02 NOTE — PROGRESS NOTE ADULT - SUBJECTIVE AND OBJECTIVE BOX
OVERNIGHT EVENTS: KRYS    SUBJECTIVE / INTERVAL HPI: Patient seen and examined at bedside. Patient laying bed, responding to questions with prodding. currently AAOx2, and says he is not in pain, but is weak. He says he has no appetite, and would not like anything to eat. 12 points ROS otherwise negative.     VITAL SIGNS:  Vital Signs Last 24 Hrs  T(C): 36.2 (02 Jun 2020 09:00), Max: 36.6 (01 Jun 2020 13:00)  T(F): 97.2 (02 Jun 2020 09:00), Max: 97.8 (01 Jun 2020 13:00)  HR: 82 (02 Jun 2020 11:00) (80 - 89)  BP: 90/53 (02 Jun 2020 11:00) (82/50 - 98/54)  BP(mean): 66 (02 Jun 2020 11:00) (62 - 73)  RR: 11 (02 Jun 2020 11:00) (10 - 16)  SpO2: 100% (02 Jun 2020 11:00) (92% - 100%)  I&O's Summary    01 Jun 2020 07:01  -  02 Jun 2020 07:00  --------------------------------------------------------  IN: 56.1 mL / OUT: 35 mL / NET: 21.1 mL    02 Jun 2020 07:01  -  02 Jun 2020 11:57  --------------------------------------------------------  IN: 12 mL / OUT: 20 mL / NET: -8 mL        PHYSICAL EXAM:    General: NAD, resting comfortably in bed. Breathing well on room air  Neck: no JVD  Respiratory: diminished breath sounds at the bases, non-tachypneic, no respiratory distress   Cardiovascular: Regular rhythm/rate; S1/S2, no pericardial rub, pericardial drain removed with site clean, dry, and intact  Gastrointestinal: distended, non tender ascitic drain with appropriate drainage, site clean, dry, and intact  Extremities: WWP; 2+ bilateral dependent pitting edema up to his thigh  Neurological:  A&Ox 2 (person and place), arousable and interactive but still weak. Is able to follow simple commands and answer simple questions. CNII-XII grossly intact; no obvious focal deficits, no flapping tremor, but minimal asterixis  T/L/D: palacio present, currently anuric  Skin: petechiae diffusely     MEDICATIONS:  MEDICATIONS  (STANDING):  albumin human 25% IVPB 50 milliLiter(s) IV Intermittent every 1 hour  chlorhexidine 2% Cloths 1 Application(s) Topical daily  collagenase Ointment 1 Application(s) Topical daily  DAPTOmycin IVPB 700 milliGRAM(s) IV Intermittent every 48 hours  finasteride 5 milliGRAM(s) Oral daily  hydrocortisone sodium succinate Injectable 50 milliGRAM(s) IV Push every 8 hours  insulin glargine Injectable (LANTUS) 10 Unit(s) SubCutaneous at bedtime  insulin lispro (HumaLOG) corrective regimen sliding scale   SubCutaneous Before meals and at bedtime  midodrine 15 milliGRAM(s) Oral every 8 hours  mirtazapine 7.5 milliGRAM(s) Oral every 24 hours  norepinephrine Infusion 0.05 MICROgram(s)/kG/Min (3.37 mL/Hr) IV Continuous <Continuous>  nystatin Cream 1 Application(s) Topical two times a day  pantoprazole  Injectable 40 milliGRAM(s) IV Push daily  sertraline 50 milliGRAM(s) Oral daily  sevelamer carbonate Powder 1600 milliGRAM(s) Oral three times a day with meals  sodium bicarbonate 650 milliGRAM(s) Oral three times a day  sodium zirconium cyclosilicate 10 Gram(s) Oral every 8 hours    MEDICATIONS  (PRN):      ALLERGIES:  Allergies    No Known Allergies    Intolerances        LABS:                        7.9    3.46  )-----------( 28       ( 02 Jun 2020 05:43 )             25.7     06-02    144  |  107  |  150<H>  ----------------------------<  185<H>  5.3   |  16<L>  |  8.29<H>    Ca    8.1<L>      02 Jun 2020 05:43  Phos  9.9     06-01  Mg     2.2     06-01    TPro  5.1<L>  /  Alb  2.5<L>  /  TBili  0.7  /  DBili  x   /  AST  20  /  ALT  7<L>  /  AlkPhos  91  06-01    PT/INR - ( 02 Jun 2020 05:43 )   PT: 18.6 sec;   INR: 1.61          PTT - ( 02 Jun 2020 05:43 )  PTT:34.8 sec    CAPILLARY BLOOD GLUCOSE      POCT Blood Glucose.: 155 mg/dL (02 Jun 2020 10:57)      RADIOLOGY & ADDITIONAL TESTS: Reviewed.

## 2020-06-02 NOTE — PROGRESS NOTE ADULT - SUBJECTIVE AND OBJECTIVE BOX
SANIYA CARRINGTON             MRN-[id number]    CC: GOC/AD, Symptoms and support    HPI:  Patient is 75 yo M with past medical history of DM, HTN, HLD, BPH (chronic indwelling palacio placement on last admission, SERA, CKD4, and a-fib  , mds, pancytopenia, presents from [facility] with complaints of decreased po intake and weakness. Patient reports that he recently had a intense bowel regimen for constipation after which he developed loose stools. To prevent further loose stools he stopped eating . Decreased po intake also in setting of poor appetite. Patient also complains for worsening LE and abdominal swelling. As per collateral obtained from Dr Barksdale at [facility]([id number]) patient abdominal usg notable for massive ascites, and increased liver echogenicity(LFTS outpatient wnl )   and he was aggressively diuresed with  Lasix , torsemide 40 mg bid (of note also started on midodrine as his pressures would drop with iv diuresis ). After diuresis his ascites and LE edema improved however he developed worsening AAMIR and so Lasix was stopped and torsemide decreased to 20 mg bid. Of note he was seen by Cardiology and  echo done, concern fluid overload was cardiac in etiology given elevated bnp and echo findings.  As per collateral patient also on Levaquin 1 week ago for intersitial infiltrates on cxr , s/p 7 day course.   Upon arrival to ED vitals bp 92/52, hr 94, rr 16, satting well ra.  Labs notable for anemia, thrombocytopenia and  worsening AAMIR. CT abdomen pelvis pending  ed management : supra pubic cath taken out, with plans to place new one, 1L NS, 1G ceftriaxone  Pt admitted to [facility] for further management (08 May 2020 16:10)    SUBJECTIVE: Saw and evaluated Mr Carrington at bedside today. He was found awake and able to answer questions although slow to react initially. No family/friends at bedside. He denied any acute issues and states back pain has improved and felt comfortable. Ongoing music therapy at bedside with patient's wish of continued visits.     ROS:  DYSPNEA: No  NAUS/VOM: No	  SECRETIONS: No	  AGITATION:  No  Pain (Y/N):  No       -Provocation/Palliation: N/A  -Quality/Quantity: N/A  -Radiating: N/A  -Severity: N/A  -Timing/Frequency: N/A  -Impact on ADLs: N/A    OTHER REVIEW OF SYSTEMS: Denied    PEx:  T(C): 36.2 (06-02-20 @ 13:00), Max: 36.6 (06-02-20 @ 12:00)  HR: 80 (06-02-20 @ 14:00) (80 - 86)  BP: 97/55 (06-02-20 @ 14:00) (82/50 - 109/59)  RR: 10 (06-02-20 @ 14:00) (9 - 16)  SpO2: 100% (06-02-20 @ 14:00) (96% - 100%)  Wt(kg):  71.8Kg    General: AAOx2 man found laying in bed in NAD  HEENT: Dry eyes and mucosas Poor dentition and dry mucosal and poor eyesight   Neck: B/L central lines  CVS: RR S1S2 No M/G/R  Resp: Unlabored Non tachypneic Good air entry B/L anteriorly and decreased at bases  GI:  Distended Drain+ Soft NT  : Catheter   Musc:  No C/C Decreased strength upper extremities   Neuro: No focal deficits. Slow responses but able to follow commands and answer questions.  Psych: Calm slow but appropriate    Skin: Ecchymotic / bruised / Petechia  B/L Heel and Sacrum tissue injuries.  Lymph: Edema+ all extremities    ALLERGIES: No Known Allergies    OPIATE NAÏVE (Y/N): Yes    MEDICATIONS: REVIEWED  MEDICATIONS  (STANDING):  albumin human 25% IVPB 50 milliLiter(s) IV Intermittent every 1 hour  albumin human 25% IVPB 50 milliLiter(s) IV Intermittent every 1 hour  chlorhexidine 2% Cloths 1 Application(s) Topical daily  collagenase Ointment 1 Application(s) Topical daily  DAPTOmycin IVPB 700 milliGRAM(s) IV Intermittent every 48 hours  finasteride 5 milliGRAM(s) Oral daily  hydrocortisone sodium succinate Injectable 50 milliGRAM(s) IV Push every 8 hours  insulin glargine Injectable (LANTUS) 10 Unit(s) SubCutaneous at bedtime  insulin lispro (HumaLOG) corrective regimen sliding scale   SubCutaneous Before meals and at bedtime  midodrine 15 milliGRAM(s) Oral every 8 hours  mirtazapine 7.5 milliGRAM(s) Oral every 24 hours  norepinephrine Infusion 0.05 MICROgram(s)/kG/Min (3.37 mL/Hr) IV Continuous <Continuous>  nystatin Cream 1 Application(s) Topical two times a day  pantoprazole  Injectable 40 milliGRAM(s) IV Push daily  sertraline 50 milliGRAM(s) Oral daily  sevelamer carbonate Powder 1600 milliGRAM(s) Oral three times a day with meals  sodium bicarbonate 650 milliGRAM(s) Oral three times a day  sodium zirconium cyclosilicate 10 Gram(s) Oral every 8 hours    LABS: REVIEWED  CBC:                         7.9    3.46  )---( 28       ( 02 Jun 2020 05:43 )             25.7     CMP:    06-02  144  |  107  |  150<H>  ----------------------------<  185<H>  5.3   |  16<L>  |  8.29<H>    Ca    8.1<L>      02 Jun 2020 05:43  Phos  9.9     06-01  Mg     2.2     06-01    TPro  5.1<L>  /  Alb  2.5<L>  /  TBili  0.7  /  DBili  x   /  AST  20  /  ALT  7<L>  /  AlkPhos  91  06-01  Albumin, Serum: 2.5 g/dL (06-01-20 @ 05:22)    Hepatitis C Antibody Test (06.02.20 @ 13:52)    Hepatitis C Virus S/CO Ratio: 0.13 S/CO    Hepatitis C Virus Interpretation: Nonreact: Hepatitis C AB     Hepatitis B Surface Antigen: Nonreact (06.02.20 @ 13:52)  Hepatitis B Surface Antibody: Nonreact (06.02.20 @ 13:52)    POCT Blood Glucose.: 155: NotifiedMDClndMtr mg/dL (06.02.20 @ 10:57)    Creatine Kinase, Serum: 61 U/L (06.02.20 @ 05:43)    Haptoglobin, Serum: 115 mg/dL (06.02.20 @ 05:43)    Fibrinogen Assay: 120 mg/dL (06.02.20 @ 05:43)    Lactate Dehydrogenase, Serum: 238 U/L (06.02.20 @ 05:43)    IMAGING: REVIEWED    ADVANCED DIRECTIVES:          DNR DNI     No documented MOLST found on [facility]     No documented HCP form found on [facility]     Other surrogates: Friend Teresa "Venus" Matt [phone] / [phone]     No Living will / POA / Advance directives found on [place] / [facility].     Documented GOC notes on [place] on 5/22/2020, 5/25/2020, and 5/27/2020.    DECISION MAKER: The patient is able to participate in encounter but unable to demonstrate capacity for complex medical decision making given poor recall.  LEGAL SURROGATE: No documented HCP form found on [facility].  Alternate surrogates: Friend Teresa "Venus" Matt [phone] / [phone]    PSYCHOSOCIAL-SPIRITUAL ASSESSMENT:       Reviewed       Care plan adjusted as above	    GOALS OF CARE DISCUSSION       Palliative care info/counseling provided	           Family meeting Done       Advanced Directives addressed please see Advance Care Planning Note	           See previous Palliative Medicine Note       Documentation of GOC: DNR DNI    AGENCY CHOICE DISCUSSED:           [facility]    REFERRALS	        Unit SW/Case Mgmt        - Declined       Speech/Swallow - Following       Patient/Family Support - Following       Music Therapy - Following       Nutrition - Following       Dietician - Following       PT/OT - Following

## 2020-06-02 NOTE — PROGRESS NOTE ADULT - ATTENDING COMMENTS
seen on HD   BP lower and requiring increase in pressors-- getting albumin as well   cont rx for now but may need to end early if hypotension worsening   no UF planned

## 2020-06-02 NOTE — PROGRESS NOTE ADULT - ASSESSMENT
73 yo M with past medical history of DM, HTN, HLD, BPH (chronic indwelling palacio placement on last admission, SERA, CKD4, and a-fib  , mds, pancytopenia, presents from [facility] with complaints of decreased po intake and weakness. Patient reports that he recently had a intense bowel regimen for constipation after which he developed loose stools. To prevent further loose stools he stopped eating . Decreased po intake also in setting of poor appetite. Patient also complains for worsening LE and abdominal swelling. As per collateral obtained from Dr Barksdale at [facility]([id number]) patient abdominal usg notable for massive ascites, and increased liver echogenicity(LFTS outpatient wnl )   and he was aggressively diuresed with  Lasix , torsemide 40 mg bid (of note also started on midodrine as his pressures would drop with iv diuresis ). After diuresis his ascites and LE edema improved however he developed worsening AAMIR and so Lasix was stopped and torsemide decreased to 20 mg bid. Of note he was seen by Cardiology and  echo done, concern fluid overload was cardiac in etiology given elevated bnp and echo findings.

## 2020-06-02 NOTE — PROGRESS NOTE ADULT - ASSESSMENT
75 yo M with MDS, pancytopenia, CKD, ascites of unknown origin presents from [facility] with complaints of decreased po intake and weakness likely secondary to uremia/AAMIR from diuresis for treatment of ascites, hospital course notable for UGIB and pericardial effusion with tamponade physiology, as well as ATN, and ascites of unknown etiology, now in ICU s/p paracentesis 5/10 (2L removed), IR pericardiocentesis 5/11 (drain in place), now with anemia and thrombocytopenia with hypercoagulability 2/2 DIC, c/b unable to tolerate SLED or CVVHD.     GOC:   Patient is DNR/DNI. Will continue to have GOC discussion with HCP Venus regarding comfort care    Neuro:   #AMS likely 2/2 to uremia.  Remeron 7.5 at bedtime    Cardiology:   #Hypotension  - midodrine 15mg g1zsfmi  - continue weaning Levo as possible  - solucortef 50mg q8hrs    Pulm:   -stable on RA  -maintain head of bed 30 degrees, due to aspiration risk     GI:   #ascites  - Elevated SAAG consistent with portal HTN,   - no plan for paracentesis today    #Feeds  - decreased appetite with very limited po intake. Refuses NGT.    Renal/:   #AAMIR on CKD 2/2 atn from hypotension and ischemic injury  - on levophed, midodrine, and solumedrol  - renally adjusted meds/ ABx  - renvela 1600mg PO TID w/each meal   - Will remove palacio catheter as patient is anuric  - lokelma 10 q8 and sodium bicarb 650 TID    HEME:   #DIC  - monitor fibrinogen, haptoglobin, LDH, and platelets    Anemia:   Likely secondary to anemia from chronic disease and bone marrow suppression.   - Monitor CBC  - Transfuse for Hgb <7   - Active type and screen     #Coagulopathy:   - Monitor PT/INR     #DVT - RLE  - Eliquis held in setting DIC concern    ID:   - Persistent MRSA bacteremia  - dapto 700mg [id number] until 6/29, will follow-up with Dr. De Luna  - obtain daily CKs    ENDO  - 10 lantus  - monitor sugars    DVt ppx: SCDs. no chemo ppx due to pancytopenia  Lines: L IJ HD cath (5/15), R IJ CVC (5/15)

## 2020-06-02 NOTE — PROGRESS NOTE ADULT - SUBJECTIVE AND OBJECTIVE BOX
Patient is a 74y Male seen and evaluated at bedside.   No new complaints.  Vitals, meds, labs reviewed.  Patient noted to have a flat effect, was refusing oral meds.      Meds:    albumin human 25% IVPB 50 every 1 hour  albumin human 25% IVPB 50 every 1 hour  chlorhexidine 2% Cloths 1 daily  collagenase Ointment 1 daily  DAPTOmycin IVPB 700 every 48 hours  finasteride 5 daily  hydrocortisone sodium succinate Injectable 50 every 8 hours  insulin glargine Injectable (LANTUS) 10 at bedtime  insulin lispro (HumaLOG) corrective regimen sliding scale  Before meals and at bedtime  midodrine 15 every 8 hours  mirtazapine 7.5 every 24 hours  norepinephrine Infusion 0.05 <Continuous>  nystatin Cream 1 two times a day  pantoprazole  Injectable 40 daily  sertraline 50 daily  sevelamer carbonate Powder 1600 three times a day with meals  sodium bicarbonate 650 three times a day  sodium zirconium cyclosilicate 10 every 8 hours      Allergies    No Known Allergies    Intolerances        T(C): , Max: 36.6 (06-02-20 @ 12:00)  T(F): , Max: 97.8 (06-02-20 @ 12:00)  HR: 81 (06-02-20 @ 13:00)  BP: 103/60 (06-02-20 @ 13:00)  BP(mean): 76 (06-02-20 @ 13:00)  RR: 9 (06-02-20 @ 13:00)  SpO2: 100% (06-02-20 @ 13:00)  Wt(kg): --    06-01 @ 07:01  -  06-02 @ 07:00  --------------------------------------------------------  IN: 56.1 mL / OUT: 35 mL / NET: 21.1 mL    06-02 @ 07:01  -  06-02 @ 13:22  --------------------------------------------------------  IN: 16.8 mL / OUT: 20 mL / NET: -3.2 mL          Review of Systems:  CONSTITUTIONAL: No fever or chills, No fatigue or tiredness.  EYES: No blurred or double vision.  RESPIRATORY: No shortness of breath, cough, hemoptysis  CARDIOVASCULAR: No Chest pain or shortness of breath  GASTROINTESTINAL: NO abdominal or flank pain, No nausea or vomiting, No diarrhea  GENITOURINARY: No dysuria or urinary burning, No difficulty passing urine, No hematuria  NEUROLOGICAL: No headaches or blurred vision  SKIN: No skin rashes   MUSCULOSKELETAL: No arthralgia, Joint pain, leg edema, No muscle pains      PHYSICAL EXAM:  GENERAL:  awake, no acute distress at present  NECK: Neck supple, No JVD  CHEST/LUNG: Clear to auscultation bilaterally; No wheeze, no rales, no crepitations  HEART: Regular rate and rhythm. S1S2+  No gallop, no rub   ABDOMEN: Soft, Nontender, BS+nt, No flank tenderness.   EXTREMITIES: anasarca 2+  Neurology: awake, minimally communicative        ACCESS: right IJ    LABS:                        7.9    3.46  )-----------( 28       ( 02 Jun 2020 05:43 )             25.7     06-02    144  |  107  |  150<H>  ----------------------------<  185<H>  5.3   |  16<L>  |  8.29<H>    Ca    8.1<L>      02 Jun 2020 05:43  Phos  9.9     06-01  Mg     2.2     06-01    TPro  5.1<L>  /  Alb  2.5<L>  /  TBili  0.7  /  DBili  x   /  AST  20  /  ALT  7<L>  /  AlkPhos  91  06-01      PT/INR - ( 02 Jun 2020 05:43 )   PT: 18.6 sec;   INR: 1.61          PTT - ( 02 Jun 2020 05:43 )  PTT:34.8 sec          RADIOLOGY & ADDITIONAL STUDIES: Patient is a 74y Male seen and evaluated at bedside.   No new complaints.  Vitals, meds, labs reviewed.  Patient noted to have a flat effect, was refusing oral meds.      Meds:    albumin human 25% IVPB 50 every 1 hour  albumin human 25% IVPB 50 every 1 hour  chlorhexidine 2% Cloths 1 daily  collagenase Ointment 1 daily  DAPTOmycin IVPB 700 every 48 hours  finasteride 5 daily  hydrocortisone sodium succinate Injectable 50 every 8 hours  insulin glargine Injectable (LANTUS) 10 at bedtime  insulin lispro (HumaLOG) corrective regimen sliding scale  Before meals and at bedtime  midodrine 15 every 8 hours  mirtazapine 7.5 every 24 hours  norepinephrine Infusion 0.05 <Continuous>  nystatin Cream 1 two times a day  pantoprazole  Injectable 40 daily  sertraline 50 daily  sevelamer carbonate Powder 1600 three times a day with meals  sodium bicarbonate 650 three times a day  sodium zirconium cyclosilicate 10 every 8 hours      Allergies    No Known Allergies    Intolerances        T(C): , Max: 36.6 (06-02-20 @ 12:00)  T(F): , Max: 97.8 (06-02-20 @ 12:00)  HR: 81 (06-02-20 @ 13:00)  BP: 103/60 (06-02-20 @ 13:00)  BP(mean): 76 (06-02-20 @ 13:00)  RR: 9 (06-02-20 @ 13:00)  SpO2: 100% (06-02-20 @ 13:00)  Wt(kg): --    06-01 @ 07:01  -  06-02 @ 07:00  --------------------------------------------------------  IN: 56.1 mL / OUT: 35 mL / NET: 21.1 mL    06-02 @ 07:01  -  06-02 @ 13:22  --------------------------------------------------------  IN: 16.8 mL / OUT: 20 mL / NET: -3.2 mL          Review of Systems:  CONSTITUTIONAL: No fever or chills, has fatigue/ tiredness.  EYES: No blurred or double vision.  RESPIRATORY: No shortness of breath, cough, hemoptysis  CARDIOVASCULAR: No Chest pain or shortness of breath  GASTROINTESTINAL: NO abdominal or flank pain, No nausea or vomiting, No diarrhea  GENITOURINARY: No dysuria or urinary burning, No difficulty passing urine, No hematuria  NEUROLOGICAL: No headaches or blurred vision  SKIN: No skin rashes   MUSCULOSKELETAL: No arthralgia, Joint pain, leg edema, No muscle pains      PHYSICAL EXAM:  GENERAL:  awake, no acute distress at present  NECK: Neck supple,  JVD  CHEST/LUNG: decreased BS  No wheeze, no rales, no crepitations  HEART: Regular rate and rhythm. S1S2+  No gallop, no rub   ABDOMEN: Soft, Nontender, BS+nt, No flank tenderness.   EXTREMITIES: anasarca 2+  Neurology: awake, minimally communicative        ACCESS: right IJ    LABS:                        7.9    3.46  )-----------( 28       ( 02 Jun 2020 05:43 )             25.7     06-02    144  |  107  |  150<H>  ----------------------------<  185<H>  5.3   |  16<L>  |  8.29<H>    Ca    8.1<L>      02 Jun 2020 05:43  Phos  9.9     06-01  Mg     2.2     06-01    TPro  5.1<L>  /  Alb  2.5<L>  /  TBili  0.7  /  DBili  x   /  AST  20  /  ALT  7<L>  /  AlkPhos  91  06-01      PT/INR - ( 02 Jun 2020 05:43 )   PT: 18.6 sec;   INR: 1.61          PTT - ( 02 Jun 2020 05:43 )  PTT:34.8 sec          RADIOLOGY & ADDITIONAL STUDIES:

## 2020-06-02 NOTE — PROGRESS NOTE ADULT - PROBLEM SELECTOR PLAN 3
Known to [facility] Dr Roberts. Currently with DIC and elevated PT/PTT/INR, and thrombocytopenia.

## 2020-06-02 NOTE — CHART NOTE - NSCHARTNOTEFT_GEN_A_CORE
PALLIATIVE MEDICINE COORDINATION OF CARE NOTE FOR SANIYA LOZANO    Patient last assessed: 6/1/2020 to manage: GOC/AD, Symptoms, and Support was recommended: Ongoing GOC and AD conversation with friend Teresa.    30 Minutes; Start: 9:30am End: 10:00am, of non-face-to-face prolonged service provided that relates to (face-to-face) care that has or will occur and ongoing patient management, including one or more of the following:   - Reviewed records from other physicians or other health care professional services, including one or more of the following: other medical records and diagnostic / radiology study results     - Other: Medication reviewed.    The patient HAS NOT used PRN's in the last 24h. MME: 0mg    MEDICATIONS  (STANDING):  albumin human 25% IVPB 50 milliLiter(s) IV Intermittent every 1 hour  albumin human 25% IVPB 50 milliLiter(s) IV Intermittent every 1 hour  chlorhexidine 2% Cloths 1 Application(s) Topical daily  collagenase Ointment 1 Application(s) Topical daily  DAPTOmycin IVPB 700 milliGRAM(s) IV Intermittent every 48 hours  finasteride 5 milliGRAM(s) Oral daily  hydrocortisone sodium succinate Injectable 50 milliGRAM(s) IV Push every 8 hours  insulin glargine Injectable (LANTUS) 10 Unit(s) SubCutaneous at bedtime  insulin lispro (HumaLOG) corrective regimen sliding scale   SubCutaneous Before meals and at bedtime  midodrine 15 milliGRAM(s) Oral every 8 hours  mirtazapine 7.5 milliGRAM(s) Oral every 24 hours  norepinephrine Infusion 0.05 MICROgram(s)/kG/Min (3.37 mL/Hr) IV Continuous <Continuous>  nystatin Cream 1 Application(s) Topical two times a day  pantoprazole  Injectable 40 milliGRAM(s) IV Push daily  sertraline 50 milliGRAM(s) Oral daily  sevelamer carbonate Powder 1600 milliGRAM(s) Oral three times a day with meals  sodium bicarbonate 650 milliGRAM(s) Oral three times a day  sodium zirconium cyclosilicate 10 Gram(s) Oral every 8 hours    - Other: Advanced directives     DNR DNI     No documented MOLST found on [facility]     No documented HCP form found on [facility]     Other surrogates: Friend Teresa "Venus" Matt [phone] / [phone]     No Living will / POA / Advance directives found on [place] / [facility].     Documented GOC notes on [place] on 5/22/2020, 5/25/2020, and 5/27/2020.    - Other: Coordination/Plan of care     2 admissions in 1 year     Current admission LOS: 25 days     LACE score: 17 ADVANCE ILLNESS PATIENT since 2/19/2020.    Discussed with primary team resident. Patient's friend Teresa was able to assist the patient in having POA documentation done at bedside. Ongoing support being provided to patient and friend Teresa. Will follow up at bedside later today.

## 2020-06-02 NOTE — PROGRESS NOTE ADULT - ATTENDING COMMENTS
74 year old male with MDS with MRSA bacteremia with acute renal failure with shock (on levophed). Patient continues to refuse oral medication. Not complaint to medical management. He continues to be in shock but his bacteremia has been resolved.  Plan:  - Continue Daptomycin. Follow CK level 2 times a week  - Patient is DNR/DNI but not comfort care. He continues to refuse taking medication. HCP is aware of patient's decision. He continues to refuse NG tube.   - HD on hold due to persistent clotting of catheter and hypotension. Will try to do HD again.  - Poor prognosis

## 2020-06-02 NOTE — PROGRESS NOTE ADULT - ASSESSMENT
73 yo M with MDS, pancytopenia, CKD, ascites of unknown origin presents from [facility] with complaints of decreased po intake and weakness likely secondary to uremia/AAMIR from diuresis for treatment of ascites, hospital course notable for UGIB and pericardial effusion with tamponade physiology, as well as ATN, and ascites of unknown etiology, now in ICU s/p paracentesis 5/10 (2L removed), IR pericardiocentesis 5/11 (drain in place), now with anemia and thrombocytopenia with hypercoagulability 2/2 DIC, c/b unable to tolerate SLED or CVVHD.       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, likely ATN from hypotension and ischemic injury  - worsening renal function - /Cr 8.2  - noted to have worsening anasarca  - currently on norepi  - previous unstable on IHD   -  thrombocytopenia with hypercoagulability 2/2 DIC, c/b unable to tolerate SLED or CVVHD.   - will attempt IHD on pressor support  - electrolytes noted  - lokelma 10 mg po qd to prevent hyperkalemia  - sodium bicarb 650 mg po q8hr for metabolic acidosis  - renvela 1600 mg po q8hr   - renally adjusted meds/ ABx  - currently undergoing GOC discussion - family wants HD for now

## 2020-06-02 NOTE — PROGRESS NOTE ADULT - SUBJECTIVE AND OBJECTIVE BOX
Patient was seen and evaluated on dialysis.   HR: 85 (06-02-20 @ 16:00)  BP: 106/55 (06-02-20 @ 16:00)  Wt(kg): --  06-02    144  |  107  |  150<H>  ----------------------------<  185<H>  5.3   |  16<L>  |  8.29<H>    Ca    8.1<L>      02 Jun 2020 05:43  Phos  9.9     06-01  Mg     2.2     06-01    TPro  5.1<L>  /  Alb  2.5<L>  /  TBili  0.7  /  DBili  x   /  AST  20  /  ALT  7<L>  /  AlkPhos  91  06-01    Continue dialysis:   Dialyzer:   180    QB: 400  K bath: 2  Goal UF:     0  L   over          180     min  Patient noted to be hypotensive - pressor support increased

## 2020-06-03 NOTE — PROCEDURE NOTE - PROCEDURE DATE TIME, MLM
21-May-2020 08:36
10-May-2020 19:44
10-May-2020 19:48
14-May-2020 16:00
15-May-2020 14:00
15-May-2020 14:25
03-Jun-2020 16:15

## 2020-06-03 NOTE — PROCEDURE NOTE - NSANESTHESIA_GEN_A_CORE
1% lidocaine
1% lidocaine
2% lidocaine
1% lidocaine

## 2020-06-03 NOTE — PROCEDURE NOTE - NSSITEPREP_SKIN_A_CORE
chlorhexidine
chlorhexidine/Adherence to aseptic technique: hand hygiene prior to donning barriers (gown, gloves), don cap and mask, sterile drape over patient
Adherence to aseptic technique: hand hygiene prior to donning barriers (gown, gloves), don cap and mask, sterile drape over patient/chlorhexidine
chlorhexidine

## 2020-06-03 NOTE — PROGRESS NOTE ADULT - ASSESSMENT
73 yo M with MDS, pancytopenia, CKD, ascites of unknown origin presents from [facility] with complaints of decreased po intake and weakness likely secondary to uremia/AAMIR from diuresis for treatment of ascites, hospital course notable for UGIB and pericardial effusion with tamponade physiology, as well as ATN, and ascites of unknown etiology, now in ICU s/p paracentesis 5/10 (2L removed), IR pericardiocentesis 5/11 (drain in place), now with anemia and thrombocytopenia with hypercoagulability 2/2 DIC, c/b unable to tolerate SLED or CVVHD.       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, likely ATN from hypotension and ischemic injury  - remains on pressor support  -  thrombocytopenia with hypercoagulability 2/2 DIC, c/b unable to tolerate SLED or CVVHD.   - got 1 hr 50 min of hd on 6/2 on pressors  - /Cr 8.2 >> 112/7  - electrolytes noted  - no urgent indication for RRT today - will reassess within 24 hrs  - lokelma 10 mg po qd to prevent hyperkalemia  - sodium bicarb 650 mg po q8hr for metabolic acidosis  - renvela 1600 mg po q8hr   - renally adjusted meds/ ABx  - currently undergoing [facility] discussion - family wants HD for now

## 2020-06-03 NOTE — PROGRESS NOTE ADULT - SUBJECTIVE AND OBJECTIVE BOX
OVERNIGHT EVENTS: difficult to straight cath due to anatomy, urology replaced palacio    SUBJECTIVE / INTERVAL HPI: Patient seen and examined at bedside.     VITAL SIGNS:  Vital Signs Last 24 Hrs  T(C): 36.8 (03 Jun 2020 05:49), Max: 36.8 (03 Jun 2020 05:49)  T(F): 98.2 (03 Jun 2020 05:49), Max: 98.2 (03 Jun 2020 05:49)  HR: 88 (03 Jun 2020 10:00) (80 - 98)  BP: 100/59 (03 Jun 2020 10:00) (65/44 - 154/74)  BP(mean): 76 (03 Jun 2020 10:00) (50 - 107)  RR: 14 (03 Jun 2020 10:00) (9 - 18)  SpO2: 99% (03 Jun 2020 10:00) (98% - 100%)  I&O's Summary    02 Jun 2020 07:01  -  03 Jun 2020 07:00  --------------------------------------------------------  IN: 792.2 mL / OUT: 164 mL / NET: 628.2 mL    03 Jun 2020 07:01  -  03 Jun 2020 10:45  --------------------------------------------------------  IN: 5.4 mL / OUT: 35 mL / NET: -29.6 mL        PHYSICAL EXAM:    General: WDWN  HEENT: NC/AT; PERRL, anicteric sclera; MMM  Neck: supple  Cardiovascular: +S1/S2; RRR  Respiratory: CTA B/L; no W/R/R  Gastrointestinal: soft, NT/ND; +BSx4  Extremities: WWP; no edema, clubbing or cyanosis  Vascular: 2+ radial, DP/PT pulses B/L  Neurological: AAOx3; no focal deficits    MEDICATIONS:  MEDICATIONS  (STANDING):  albumin human 25% IVPB 50 milliLiter(s) IV Intermittent every 1 hour  chlorhexidine 2% Cloths 1 Application(s) Topical daily  collagenase Ointment 1 Application(s) Topical daily  DAPTOmycin IVPB 700 milliGRAM(s) IV Intermittent every 48 hours  finasteride 5 milliGRAM(s) Oral daily  hydrocortisone sodium succinate Injectable 50 milliGRAM(s) IV Push every 12 hours  insulin glargine Injectable (LANTUS) 10 Unit(s) SubCutaneous at bedtime  insulin lispro (HumaLOG) corrective regimen sliding scale   SubCutaneous Before meals and at bedtime  midodrine 15 milliGRAM(s) Oral every 8 hours  mirtazapine 7.5 milliGRAM(s) Oral every 24 hours  norepinephrine Infusion 0.05 MICROgram(s)/kG/Min (3.37 mL/Hr) IV Continuous <Continuous>  nystatin Cream 1 Application(s) Topical two times a day  pantoprazole  Injectable 40 milliGRAM(s) IV Push daily  sertraline 50 milliGRAM(s) Oral daily  sevelamer carbonate Powder 1600 milliGRAM(s) Oral three times a day with meals  sodium bicarbonate 650 milliGRAM(s) Oral three times a day    MEDICATIONS  (PRN):      ALLERGIES:  Allergies    No Known Allergies    Intolerances        LABS:                        7.7    5.02  )-----------( 33       ( 03 Jun 2020 05:27 )             24.6     06-03    145  |  108  |  112<H>  ----------------------------<  110<H>  4.4   |  20<L>  |  7.02<H>    Ca    8.3<L>      03 Jun 2020 05:27  Phos  8.0     06-03  Mg     2.2     06-03    TPro  5.1<L>  /  Alb  2.8<L>  /  TBili  0.7  /  DBili  x   /  AST  25  /  ALT  7<L>  /  AlkPhos  84  06-03    PT/INR - ( 03 Jun 2020 05:27 )   PT: 19.3 sec;   INR: 1.67          PTT - ( 03 Jun 2020 05:27 )  PTT:35.4 sec    CAPILLARY BLOOD GLUCOSE      POCT Blood Glucose.: 140 mg/dL (02 Jun 2020 21:04)      RADIOLOGY & ADDITIONAL TESTS: Reviewed. OVERNIGHT EVENTS: difficult to straight cath due to anatomy, urology replaced palacio    SUBJECTIVE / INTERVAL HPI: Patient seen and examined at bedside. Patient lethargic, only responds when prodded several times. moves extremities. Lacks any appetite. Denies any pain.     VITAL SIGNS:  Vital Signs Last 24 Hrs  T(C): 36.8 (03 Jun 2020 05:49), Max: 36.8 (03 Jun 2020 05:49)  T(F): 98.2 (03 Jun 2020 05:49), Max: 98.2 (03 Jun 2020 05:49)  HR: 88 (03 Jun 2020 10:00) (80 - 98)  BP: 100/59 (03 Jun 2020 10:00) (65/44 - 154/74)  BP(mean): 76 (03 Jun 2020 10:00) (50 - 107)  RR: 14 (03 Jun 2020 10:00) (9 - 18)  SpO2: 99% (03 Jun 2020 10:00) (98% - 100%)  I&O's Summary    02 Jun 2020 07:01  -  03 Jun 2020 07:00  --------------------------------------------------------  IN: 792.2 mL / OUT: 164 mL / NET: 628.2 mL    03 Jun 2020 07:01  -  03 Jun 2020 10:45  --------------------------------------------------------  IN: 5.4 mL / OUT: 35 mL / NET: -29.6 mL        PHYSICAL EXAM:    General: NAD, resting comfortably in bed. Breathing well on room air  Neck: no JVD  Respiratory: diminished breath sounds at the bases, non-tachypneic, no respiratory distress   Cardiovascular: Regular rhythm/rate; S1/S2, no pericardial rub, pericardial drain removed with site clean, dry, and intact  Gastrointestinal: distended, non tender ascitic drain with appropriate drainage, site clean, dry, and intact  Extremities: WWP; 2+ bilateral dependent pitting edema up to his thigh  Neurological:  A&Ox 2 (person and place), arousable and interactive but still weak. Is able to follow simple commands and answer simple questions. CNII-XII grossly intact; no obvious focal deficits, no flapping tremor, but minimal asterixis  T/L/D: palacio present, currently anuric  Skin: petechiae diffusely     MEDICATIONS:  MEDICATIONS  (STANDING):  albumin human 25% IVPB 50 milliLiter(s) IV Intermittent every 1 hour  chlorhexidine 2% Cloths 1 Application(s) Topical daily  collagenase Ointment 1 Application(s) Topical daily  DAPTOmycin IVPB 700 milliGRAM(s) IV Intermittent every 48 hours  finasteride 5 milliGRAM(s) Oral daily  hydrocortisone sodium succinate Injectable 50 milliGRAM(s) IV Push every 12 hours  insulin glargine Injectable (LANTUS) 10 Unit(s) SubCutaneous at bedtime  insulin lispro (HumaLOG) corrective regimen sliding scale   SubCutaneous Before meals and at bedtime  midodrine 15 milliGRAM(s) Oral every 8 hours  mirtazapine 7.5 milliGRAM(s) Oral every 24 hours  norepinephrine Infusion 0.05 MICROgram(s)/kG/Min (3.37 mL/Hr) IV Continuous <Continuous>  nystatin Cream 1 Application(s) Topical two times a day  pantoprazole  Injectable 40 milliGRAM(s) IV Push daily  sertraline 50 milliGRAM(s) Oral daily  sevelamer carbonate Powder 1600 milliGRAM(s) Oral three times a day with meals  sodium bicarbonate 650 milliGRAM(s) Oral three times a day    MEDICATIONS  (PRN):      ALLERGIES:  Allergies    No Known Allergies    Intolerances        LABS:                        7.7    5.02  )-----------( 33       ( 03 Jun 2020 05:27 )             24.6     06-03    145  |  108  |  112<H>  ----------------------------<  110<H>  4.4   |  20<L>  |  7.02<H>    Ca    8.3<L>      03 Jun 2020 05:27  Phos  8.0     06-03  Mg     2.2     06-03    TPro  5.1<L>  /  Alb  2.8<L>  /  TBili  0.7  /  DBili  x   /  AST  25  /  ALT  7<L>  /  AlkPhos  84  06-03    PT/INR - ( 03 Jun 2020 05:27 )   PT: 19.3 sec;   INR: 1.67          PTT - ( 03 Jun 2020 05:27 )  PTT:35.4 sec    CAPILLARY BLOOD GLUCOSE      POCT Blood Glucose.: 140 mg/dL (02 Jun 2020 21:04)      RADIOLOGY & ADDITIONAL TESTS: Reviewed.

## 2020-06-03 NOTE — PROCEDURE NOTE - NSINDICATIONS_GEN_A_CORE
monitoring purposes/critical patient
dialysis/CRRT
ascites
critical illness
ascites
dialysis/CRRT
hemodynamic monitoring/critical illness
hypertonic/irritant infusion/critical illness

## 2020-06-03 NOTE — PROCEDURE NOTE - ADDITIONAL PROCEDURE DETAILS
Asked to replace left IJ TLC due to TLC in for 2 weeks. Site prepped. +LS on POCUS exam before and after. Follow up CXR

## 2020-06-03 NOTE — PROGRESS NOTE ADULT - ATTENDING COMMENTS
didn't tolerate full HD rx   remians on pressors, lethargic, hypotensive  overall prognosis very poor

## 2020-06-03 NOTE — PROCEDURE NOTE - NSPOSTCAREGUIDE_GEN_A_CORE
Keep the cast/splint/dressing clean and dry
Verbal/written post procedure instructions were given to patient/caregiver
Verbal/written post procedure instructions were given to patient/caregiver
Instructed patient/caregiver regarding signs and symptoms of infection/Care for catheter as per unit/ICU protocols/Keep the cast/splint/dressing clean and dry/Instructed patient/caregiver to follow-up with primary care physician/Verbal/written post procedure instructions were given to patient/caregiver

## 2020-06-03 NOTE — PROCEDURE NOTE - NSINFORMCONSENT_GEN_A_CORE
Benefits, risks, and possible complications of procedure explained to patient/caregiver who verbalized understanding and gave written consent.
Benefits, risks, and possible complications of procedure explained to patient/caregiver who verbalized understanding and gave verbal consent.
Benefits, risks, and possible complications of procedure explained to patient/caregiver who verbalized understanding and gave written consent.
Benefits, risks, and possible complications of procedure explained to patient/caregiver who verbalized understanding and gave written consent.
Benefits, risks, and possible complications of procedure explained to patient/caregiver who verbalized understanding and gave verbal consent.

## 2020-06-03 NOTE — PROGRESS NOTE ADULT - ASSESSMENT
75 yo M with MDS, pancytopenia, CKD, ascites of unknown origin presents from [facility] with complaints of decreased po intake and weakness likely secondary to uremia/AAMIR from diuresis for treatment of ascites, hospital course notable for UGIB and pericardial effusion with tamponade physiology, as well as ATN, and ascites of unknown etiology, now in ICU s/p paracentesis 5/10 (2L removed), IR pericardiocentesis 5/11 (drain in place), now with anemia and thrombocytopenia with hypercoagulability 2/2 DIC, c/b unable to tolerate SLED or CVVHD.     GOC:   Patient is DNR/DNI. Will continue to have GOC discussion with HCP Venus regarding comfort care  - Consented to replace central line and HD cath, refused for NG tube placement    Neuro:   #AMS likely 2/2 to uremia.    Cardiology:   #Hypotension  - midodrine 15mg [id number]  - continue weaning Levo as possible  - solucortef 50mg q812 hours    Pulm:   -stable on RA  -maintain head of bed 30 degrees, due to aspiration risk     GI:   #ascites  - Elevated SAAG consistent with portal HTN,   - no plan for paracentesis today    #Feeds  - decreased appetite with very limited po intake. Refuses NGT.    Renal/:   #AAMIR on CKD 2/2 atn from hypotension and ischemic injury  - on levophed, midodrine, and solumedrol  - received HD yesterday with increase pressor requirements  - renally adjusted meds/ ABx  - renvela 1600mg PO TID w/each meal   - palacio in place  - lokelma 10 q8 and sodium bicarb 650 TID    HEME:   #DIC  - monitor fibrinogen, haptoglobin, LDH, and platelets    Anemia:   Likely secondary to anemia from chronic disease and bone marrow suppression.   - Monitor CBC  - Transfuse for Hgb <7   - Active type and screen     #Coagulopathy:   - Monitor PT/INR     #DVT - RLE  - Eliquis held in setting DIC concern    ID:   - Persistent MRSA bacteremia  - dapto 700mg w41pbcre until 6/29, will follow-up with Dr. De Luna  - obtain daily CKs    ENDO  - 10 lantus  - monitor sugars    DVt ppx: SCDs. no chemo ppx due to pancytopenia  Lines: L IJ HD cath (5/15), R IJ CVC (5/15) - will replace R IJ CVC today

## 2020-06-03 NOTE — PROCEDURE NOTE - NSICDXPROCEDURE_GEN_ALL_CORE_FT
PROCEDURES:  Paracentesis at bedside 10-May-2020 19:46:43  Niki Alvarado
PROCEDURES:  US guided placement of non-tunneled central venous catheter 10-May-2020 19:49:27  Niki Alvarado
PROCEDURES:  US guided placement of non-tunneled central venous catheter 03-Jun-2020 16:41:58  Lokesh Shah

## 2020-06-03 NOTE — PROCEDURE NOTE - NSPOSTPRCRAD_GEN_A_CORE
depth of insertion/no pneumothorax/central line located in the/central line located in the superior vena cava
post-procedure radiography performed

## 2020-06-03 NOTE — PROGRESS NOTE ADULT - SUBJECTIVE AND OBJECTIVE BOX
Patient is a 74y Male seen and evaluated at bedside.   Vitals, meds, labs reviewed.    Got 1 hr 50 mins of IHD with no UF on pressors.      Meds:    albumin human 25% IVPB 50 every 1 hour  chlorhexidine 2% Cloths 1 daily  collagenase Ointment 1 daily  DAPTOmycin IVPB 700 every 48 hours  finasteride 5 daily  hydrocortisone sodium succinate Injectable 50 every 12 hours  insulin glargine Injectable (LANTUS) 10 at bedtime  insulin lispro (HumaLOG) corrective regimen sliding scale  Before meals and at bedtime  midodrine 15 every 8 hours  mirtazapine 7.5 every 24 hours  norepinephrine Infusion 0.05 <Continuous>  nystatin Cream 1 two times a day  pantoprazole  Injectable 40 daily  sertraline 50 daily  sevelamer carbonate Powder 1600 three times a day with meals  sodium bicarbonate 650 three times a day      Allergies    No Known Allergies    Intolerances        T(C): , Max: 36.8 (06-03-20 @ 05:49)  T(F): , Max: 98.2 (06-03-20 @ 05:49)  HR: 85 (06-03-20 @ 13:00)  BP: 101/59 (06-03-20 @ 13:00)  BP(mean): 77 (06-03-20 @ 13:00)  RR: 12 (06-03-20 @ 13:00)  SpO2: 100% (06-03-20 @ 13:00)  Wt(kg): --    06-02 @ 07:01  -  06-03 @ 07:00  --------------------------------------------------------  IN: 792.2 mL / OUT: 164 mL / NET: 628.2 mL    06-03 @ 07:01  -  06-03 @ 13:53  --------------------------------------------------------  IN: 32.4 mL / OUT: 40 mL / NET: -7.6 mL          Review of Systems:  unable - lethargic      PHYSICAL EXAM:  GENERAL:  no acute distress at present  NECK: Neck supple,  JVD  CHEST/LUNG: decreased BS  No wheeze, no rales, no crepitations  HEART: Regular rate and rhythm. S1S2+  No gallop, no rub   ABDOMEN: Soft, Nontender, BS+nt, No flank tenderness.   EXTREMITIES: anasarca 2+  Neurology: lethargic, minimally communicative    ACCESS: right IJ            LABS:                        7.7    5.02  )-----------( 33       ( 03 Jun 2020 05:27 )             24.6     06-03    145  |  108  |  112<H>  ----------------------------<  110<H>  4.4   |  20<L>  |  7.02<H>    Ca    8.3<L>      03 Jun 2020 05:27  Phos  8.0     06-03  Mg     2.2     06-03    TPro  5.1<L>  /  Alb  2.8<L>  /  TBili  0.7  /  DBili  x   /  AST  25  /  ALT  7<L>  /  AlkPhos  84  06-03      PT/INR - ( 03 Jun 2020 05:27 )   PT: 19.3 sec;   INR: 1.67          PTT - ( 03 Jun 2020 05:27 )  PTT:35.4 sec          RADIOLOGY & ADDITIONAL STUDIES:

## 2020-06-03 NOTE — PROCEDURE NOTE - NSPROCDETAILS_GEN_ALL_CORE
location identified, draped/prepped, sterile technique used, needle inserted/introduced
guidewire recovered/lumen(s) aspirated and flushed/sterile dressing applied
lumen(s) aspirated and flushed/guidewire recovered/ultrasound guidance
guidewire recovered/sterile dressing applied/ultrasound guidance/lumen(s) aspirated and flushed/sterile technique, catheter placed
location identified, sterile technique used, catheter introduced, fluid drained/Seldinger technique/sterile dressing applied/ultrasound utilization
location identified, sterile technique used, catheter introduced, fluid drained
guidewire recovered/lumen(s) aspirated and flushed/sterile dressing applied/sterile technique, catheter placed/ultrasound guidance

## 2020-06-03 NOTE — PROGRESS NOTE ADULT - ATTENDING COMMENTS
74 year old male with MDS with MRSA bacteremia with acute renal failure with shock (on levophed). Patient continues to refuse oral medication. Not complaint to medical management. He continues to be in shock but his bacteremia has been resolved.  Plan:  - Patient continues to require levophed for shock. NG tube and PO midodrine along with feeding.  - Continue Daptomycin. Follow CK level 2 times a week  - Patient is DNR/DNI but not comfort care. He continues to refuse taking medication. HCP is aware of patient's decision.   - HD was done yesterday.   - Change central line today and HD catheter tomorrow.  - Poor prognosis

## 2020-06-04 NOTE — PROGRESS NOTE ADULT - SUBJECTIVE AND OBJECTIVE BOX
SANIYA CARRINGTON             MRN-[id number]    CC: GOC/AD, Symptoms and support    HPI:  Patient is 75 yo M with past medical history of DM, HTN, HLD, BPH (chronic indwelling palacio placement on last admission, SERA, CKD4, and a-fib  , mds, pancytopenia, presents from [facility] with complaints of decreased po intake and weakness. Patient reports that he recently had a intense bowel regimen for constipation after which he developed loose stools. To prevent further loose stools he stopped eating . Decreased po intake also in setting of poor appetite. Patient also complains for worsening LE and abdominal swelling. As per collateral obtained from Dr Barksdale at [facility]([id number]) patient abdominal usg notable for massive ascites, and increased liver echogenicity(LFTS outpatient wnl )   and he was aggressively diuresed with  Lasix , torsemide 40 mg bid (of note also started on midodrine as his pressures would drop with iv diuresis ). After diuresis his ascites and LE edema improved however he developed worsening AAMIR and so Lasix was stopped and torsemide decreased to 20 mg bid. Of note he was seen by Cardiology and  echo done, concern fluid overload was cardiac in etiology given elevated bnp and echo findings.  As per collateral patient also on Levaquin 1 week ago for intersitial infiltrates on cxr , s/p 7 day course.   Upon arrival to ED vitals bp 92/52, hr 94, rr 16, satting well ra.  Labs notable for anemia, thrombocytopenia and  worsening AAMIR. CT abdomen pelvis pending  ed management : supra pubic cath taken out, with plans to place new one, 1L NS, 1G ceftriaxone  Pt admitted to [facility] for further management (08 May 2020 16:10)    SUBJECTIVE: Saw and evaluated Mr Carrington at bedside. He was found more alert and awake today and able to carry on a conversation in a prolonged manner. Palliative music therapy at bedside providing support and engaging the patient as per his ongoing request. Patient voived his wishes to continue to "improve".     ROS:  DYSPNEA: No  NAUS/VOM: No	  SECRETIONS: No	  AGITATION:  No  Pain (Y/N):  No       -Provocation/Palliation: N/A  -Quality/Quantity: N/A  -Radiating: N/A  -Severity: N/A  -Timing/Frequency: N/A  -Impact on ADLs: N/A    OTHER REVIEW OF SYSTEMS: Denied     PEx:  T(C): 36.4 (06-04-20 @ 14:00), Max: 37.1 (06-04-20 @ 00:45)  HR: 81 (06-04-20 @ 15:00) (77 - 93)  BP: 92/52 (06-04-20 @ 15:00) (79/51 - 126/65)  RR: 12 (06-04-20 @ 15:00) (10 - 19)  SpO2: 100% (06-04-20 @ 15:00) (98% - 100%)  Wt(kg): 71.8Kg    General: AAOx2 man found laying in bed in NAD much more alert and awake today.  HEENT: Dry eyes and mucosas Poor dentition MOM   Neck: B/L central lines  CVS: RR S1S2 No M/G/R  Resp: Unlabored Non tachypneic Good air entry B/L anteriorly and decreased at bases  GI:  Distended Drain+ Soft NT  : Palacio+ dark yellow urine with sediment.  Musc:  No C/C Decreased strength upper extremities   Neuro: No focal deficits. Able to follow commands and answer questions.  Psych: Calm slow but appropriate    Skin: Ecchymotic / bruised / Petechia  B/L Heel and Sacrum tissue injuries.  Lymph: Edema+ all extremities    ALLERGIES: No Known Allergies    OPIATE NAÏVE (Y/N): Yes    MEDICATIONS: REVIEWED  MEDICATIONS  (STANDING):  albumin human 25% IVPB 50 milliLiter(s) IV Intermittent every 1 hour  chlorhexidine 2% Cloths 1 Application(s) Topical daily  collagenase Ointment 1 Application(s) Topical daily  DAPTOmycin IVPB 700 milliGRAM(s) IV Intermittent every 48 hours  finasteride 5 milliGRAM(s) Oral daily  insulin glargine Injectable (LANTUS) 7 Unit(s) SubCutaneous at bedtime  insulin lispro (HumaLOG) corrective regimen sliding scale   SubCutaneous Before meals and at bedtime  midodrine 15 milliGRAM(s) Oral every 8 hours  mirtazapine 7.5 milliGRAM(s) Oral every 24 hours  norepinephrine Infusion 0.05 MICROgram(s)/kG/Min (3.37 mL/Hr) IV Continuous <Continuous>  nystatin Cream 1 Application(s) Topical two times a day  pantoprazole  Injectable 40 milliGRAM(s) IV Push daily  sertraline 50 milliGRAM(s) Oral daily  sevelamer carbonate Powder 1600 milliGRAM(s) Oral three times a day with meals  sodium bicarbonate 650 milliGRAM(s) Oral three times a day    MEDICATIONS  (PRN):    LABS: REVIEWED  CBC:                        8.0    4.48  )-- -( 23       ( 04 Jun 2020 05:43 )             25.6     CMP:    06-04    143  |  107  |  121<H>  ----------------------------<  126<H>  4.5   |  18<L>  |  7.07<H>    Ca    8.3<L>      04 Jun 2020 05:43  Phos  8.6     06-04  Mg     2.1     06-04    TPro  4.9<L>  /  Alb  2.6<L>  /  TBili  0.7  /  DBili  x   /  AST  24  /  ALT  6<L>  /  AlkPhos  80  06-04  Albumin, Serum: 2.6 g/dL (06-04-20 @ 05:43)    POCT Blood Glucose.: 136: RN Notified Readback mg/dL (06.04.20 @ 12:13)    Haptoglobin, Serum: 117 mg/dL (06.04.20 @ 05:43)    Lactate Dehydrogenase, Serum: 257 U/L (06.04.20 @ 05:43)    Fibrinogen Assay: 178 mg/dL (06.04.20 @ 05:43)    Creatine Kinase, Serum: 66 U/L (06.04.20 @ 05:43)    Type + Screen (06.04.20 @ 05:30)    ABO Interpretation: O    Rh Interpretation: Positive    Antibody Screen: Negative    IMAGING: REVIEWED    EXAM:  XR CHEST PORTABLE IMMED 1V                        PROCEDURE DATE:  06/03/2020    INTERPRETATION:    PORTABLE CHEST X-RAY  Indication: Status post left IJ placement. Evaluate line placement and rule out pneumothorax.  Bedside examination of the chest dated 6/3/2020 5:21 PM is compared to the previous study of 5/26/2020.  Findings: Central line via left internal jugular vein approach has distal portion within superior vena cava. Central line via right internal jugular vein has its distal portion near cavoatrial junction. There is no pneumothorax. There is a shallow inspiration. Small bibasilar atelectasis or infiltrates again present. There may also be small bilateral pleural effusions.  Impression: Left-sided central line in satisfactory position. No pneumothorax.    ADVANCED DIRECTIVES:          DNR DNI     No documented MOLST found on [facility]     No documented HCP form found on [facility]     Other surrogates: Luis Carlos Gutierrez (Peggy) [phone] / [phone]     No Living will / POA / Advance directives found on [facility] / [facility].     Documented GOC notes on [facility] on 5/22/2020, 5/25/2020, and 5/27/2020.    DECISION MAKER: The patient is able to participate in encounter but unable to demonstrate capacity for complex medical decision making given poor recall.  LEGAL SURROGATE: No documented HCP form found on [facility].  Alternate surrogates: Luis Carlos Gutierrez (Peggy) [phone] / [phone]    PSYCHOSOCIAL-SPIRITUAL ASSESSMENT:       Reviewed       Care plan adjusted as above	    GOALS OF CARE DISCUSSION       Palliative care info/counseling provided	           Family meeting Done       Advanced Directives addressed please see Advance Care Planning Note	           See previous Palliative Medicine Note       Documentation of GOC: DNR DNI    AGENCY CHOICE DISCUSSED:           [facility]    REFERRALS	        Unit SW/Case Mgmt        - Declined       Speech/Swallow - Following       Patient/Family Support - Following       Music Therapy - Following       Nutrition - Following       Dietician - Following       PT/OT - Following

## 2020-06-04 NOTE — CHART NOTE - NSCHARTNOTEFT_GEN_A_CORE
Psychology Consultation Note    Identifying Information: Patient is 75 yo M with per record, past medical history of DM, HTN, HLD, BPH (chronic indwelling palacio placement on last admission, SERA, CKD4, and a-fib  , mds, pancytopenia, presents from [facility] with complaints of decreased po intake and weakness. On admission, patient reported that he recently had a intense bowel regimen for constipation after which he developed loose stools. To prevent further loose stools he stopped eating . Decreased po intake also in setting of poor appetite. Patient also complained for worsening LE and abdominal swelling. Psych consult was called as pt was refusing taking his medications and food intake for several days, and his medical team wanted to assess level of depression.    Chief Complaint (in the patient’s own words): “so so”  History of Present Illness: During the whole interview, pt reported that he feels “so so” and mostly non verbal. Pt denied any suicidal ideation, but was unable to gather other history. When asked how long he has been feeling “so so”, pt did not respond.   For orientation, pt was oriented to place, reported that month was May and year 2022. His alertness fluctuated throughout the session.   Past Psychiatric History: Unable to gather information, per his team, pt is taking psychotheroric medications but the date when he started is unknow.    Substance Use: n/a.   Medications at the time of Evaluation: Per medical team pt is on Mirtazapine and Sertaline, but was unable to elaborate when he started the medications.  Along with:  chlorhexidine 2% Cloths 1 Application(s) Topical daily  collagenase Ointment 1 Application(s) Topical daily  DAPTOmycin IVPB 700 milliGRAM(s) IV Intermittent every 48 hours  finasteride 5 milliGRAM(s) Oral daily  hydrocortisone sodium succinate Injectable 50 milliGRAM(s) IV Push every 6 hours  insulin glargine Injectable (LANTUS) 10 Unit(s) SubCutaneous at bedtime  insulin lispro (HumaLOG) corrective regimen sliding scale   SubCutaneous Before meals and at bedtime  midodrine 15 milliGRAM(s) Oral every 8 hours  mirtazapine 7.5 milliGRAM(s) Oral every 24 hours  norepinephrine Infusion 0.05 MICROgram(s)/kG/Min (3.37 mL/Hr) IV Continuous <Continuous>  nystatin Cream 1 Application(s) Topical two times a day  nystatin Powder 1 Application(s) Topical two times a day  pantoprazole  Injectable 40 milliGRAM(s) IV Push every 24 hours  sertraline 50 milliGRAM(s) Oral daily  sevelamer carbonate 1600 milliGRAM(s) Oral every 8 hours  Social/Developmental History: Per palliative care report  pt is single. Never . No children. Retired  and pro . Lives in [facility] for the last 40 years in [facility] apartment building. Not Faith. PT has friend Teresa "Venus" Matt [phone] / [phone] involved in his care.  Academic/Occupational History: PT is a retired   Family History: Per records,  family history of stomach cancer, breast cancer and stroke.   Medical History: DM, HTN, HLD, BPH (chronic indwelling palacio placement on last admission, SERA, CKD4, and a-fib  , mds, pancytopenia,  Mental Status Exam:     Appearance/Attitude: poor eye contact, mildly engaged  Mood: Depressed  Affect: Flat  Speech:Low tone and poverty of speech  Thought Process: unable to assess  Thought Content: denied SI.   Perceptual: Denied   Insight: Poor  Judgment: Poor  Oriented: Pt was oriented to place, month was May and year 2020. Unable to answer question regarding attention   Assessment/ Formulation (include psychological formulation, diagnosis, diagnostic rule-outs and additional considerations): Patient is 75 yo M with per record, past medical history of DM, HTN, HLD, BPH (chronic indwelling palacio placement on last admission, SERA, CKD4, and a-fib  , mds, pancytopenia, presents from [facility] with complaints of decreased po intake and weakness. On admission, patient reported that he recently had a intense bowel regimen for constipation after which he developed loose stools. To prevent further loose stools he stopped eating . Decreased po intake also in setting of poor appetite. Patient also complained for worsening LE and abdominal swelling. Psych consult was called as pt was refusing taking his medications and food intake for several days, and his medical team wanted to assess level of depression. On assessment, pts alertness, and his ability to respond to questions fluctuated, his verbal output was very limited. Plan is to assess again tomorrow to monitor any fluctuations.    Static Factors: multiple medical problems   Modifiable Factors: current medical condition, being in the hospital for a long time.   Protective Factors: Pt has no prior suicide attempts   	  Plan (including specific details about this individual’s assessment, treatment and plans for discharge):   Discuss case with psychiatry attending. Assess tomorrow.       Iman Silver, PhD  Licensed Clinical Psychologist

## 2020-06-04 NOTE — PROGRESS NOTE ADULT - ASSESSMENT
75 yo M with past medical history of DM, HTN, HLD, BPH (chronic indwelling palacio placement on last admission, SERA, CKD4, and a-fib  , mds, pancytopenia, presents from [facility] with complaints of decreased po intake and weakness. Patient reports that he recently had a intense bowel regimen for constipation after which he developed loose stools. To prevent further loose stools he stopped eating . Decreased po intake also in setting of poor appetite. Patient also complains for worsening LE and abdominal swelling. As per collateral obtained from Dr Barksdale at [facility]([id number]) patient abdominal usg notable for massive ascites, and increased liver echogenicity(LFTS outpatient wnl )   and he was aggressively diuresed with  Lasix , torsemide 40 mg bid (of note also started on midodrine as his pressures would drop with iv diuresis ). After diuresis his ascites and LE edema improved however he developed worsening AAMIR and so Lasix was stopped and torsemide decreased to 20 mg bid. Of note he was seen by Cardiology and  echo done, concern fluid overload was cardiac in etiology given elevated bnp and echo findings.

## 2020-06-04 NOTE — BEHAVIORAL HEALTH ASSESSMENT NOTE - SUMMARY
73 yo M with MDS, pancytopenia, CKD, ascites of unknown origin presents from [facility] with complaints of decreased po intake and weakness likely secondary to uremia/AAMIR from diuresis for treatment of ascites, hospital course notable for UGIB and pericardial effusion with tamponade physiology, as well as ATN, and ascites of unknown etiology, now in ICU s/p paracentesis 5/10 (2L removed), IR pericardiocentesis 5/11 (drain in place), now with anemia and thrombocytopenia with hypercoagulability 2/2 DIC, c/b unable to tolerate SLED or CVVHD. Psychiatry was consulted to evaluate the patient for depression. Patient currently presents withdrawan, disengaged, unable to participate in an interview. As per friend this is a change from baseline, suggestive of hypoactive vs mixed delirium. As per collaterals, patient also presents comorbid depression.   Plan:  -medical workup for delirium as per the primary team   -avoid benzodiazepines/ anticholinergic meds/ limit opiates as they can worsen delirium   -if the patient becomes agitated can give haldol 1mg po/im q6h prn agitation, monitor for qtc prolongation  -would stop sertraline at this time to decrease the risk of polypharmacy  -increase mirtazapine to 15 mg po qhs for treatment of depression and to target low appetite  -Cl to follow

## 2020-06-04 NOTE — PROGRESS NOTE ADULT - SUBJECTIVE AND OBJECTIVE BOX
Patient is a 74y Male seen and evaluated at bedside.   Vitals, meds, labs reviewed.      Meds:    albumin human 25% IVPB 50 every 1 hour  chlorhexidine 2% Cloths 1 daily  collagenase Ointment 1 daily  DAPTOmycin IVPB 700 every 48 hours  finasteride 5 daily  insulin glargine Injectable (LANTUS) 7 at bedtime  insulin lispro (HumaLOG) corrective regimen sliding scale  Before meals and at bedtime  midodrine 15 every 8 hours  mirtazapine 7.5 every 24 hours  norepinephrine Infusion 0.05 <Continuous>  nystatin Cream 1 two times a day  pantoprazole  Injectable 40 daily  sertraline 50 daily  sevelamer carbonate Powder 1600 three times a day with meals  sodium bicarbonate 650 three times a day      Allergies    No Known Allergies    Intolerances        T(C): , Max: 37.1 (06-04-20 @ 00:45)  T(F): , Max: 98.8 (06-04-20 @ 06:14)  HR: 81 (06-04-20 @ 17:00)  BP: 97/54 (06-04-20 @ 17:00)  BP(mean): 71 (06-04-20 @ 17:00)  RR: 10 (06-04-20 @ 17:00)  SpO2: 100% (06-04-20 @ 17:00)  Wt(kg): --    06-03 @ 07:01  -  06-04 @ 07:00  --------------------------------------------------------  IN: 203 mL / OUT: 75 mL / NET: 128 mL    06-04 @ 07:01  -  06-04 @ 17:31  --------------------------------------------------------  IN: 18.6 mL / OUT: 0 mL / NET: 18.6 mL        Review of Systems:  unable - lethargic      PHYSICAL EXAM:  GENERAL:  no acute distress at present  NECK: Neck supple,  JVD  CHEST/LUNG: decreased BS  No wheeze, no rales, no crepitations  HEART: Regular rate and rhythm. S1S2+  No gallop, no rub   ABDOMEN: Soft, Nontender, BS+nt, No flank tenderness.   EXTREMITIES: anasarca 2+  Neurology: lethargic, minimally communicative    ACCESS: right IJ          LABS:                        8.0    4.48  )-----------( 23       ( 04 Jun 2020 05:43 )             25.6     06-04    143  |  107  |  121<H>  ----------------------------<  126<H>  4.5   |  18<L>  |  7.07<H>    Ca    8.3<L>      04 Jun 2020 05:43  Phos  8.6     06-04  Mg     2.1     06-04    TPro  4.9<L>  /  Alb  2.6<L>  /  TBili  0.7  /  DBili  x   /  AST  24  /  ALT  6<L>  /  AlkPhos  80  06-04      PT/INR - ( 03 Jun 2020 05:27 )   PT: 19.3 sec;   INR: 1.67          PTT - ( 03 Jun 2020 05:27 )  PTT:35.4 sec          RADIOLOGY & ADDITIONAL STUDIES: Patient is a 74y Male seen and evaluated at bedside.   Vitals, meds, labs reviewed.      Meds:    albumin human 25% IVPB 50 every 1 hour  chlorhexidine 2% Cloths 1 daily  collagenase Ointment 1 daily  DAPTOmycin IVPB 700 every 48 hours  finasteride 5 daily  insulin glargine Injectable (LANTUS) 7 at bedtime  insulin lispro (HumaLOG) corrective regimen sliding scale  Before meals and at bedtime  midodrine 15 every 8 hours  mirtazapine 7.5 every 24 hours  norepinephrine Infusion 0.05 <Continuous>  nystatin Cream 1 two times a day  pantoprazole  Injectable 40 daily  sertraline 50 daily  sevelamer carbonate Powder 1600 three times a day with meals  sodium bicarbonate 650 three times a day      Allergies    No Known Allergies    Intolerances        T(C): , Max: 37.1 (06-04-20 @ 00:45)  T(F): , Max: 98.8 (06-04-20 @ 06:14)  HR: 81 (06-04-20 @ 17:00)  BP: 97/54 (06-04-20 @ 17:00)  BP(mean): 71 (06-04-20 @ 17:00)  RR: 10 (06-04-20 @ 17:00)  SpO2: 100% (06-04-20 @ 17:00)  Wt(kg): --    06-03 @ 07:01  -  06-04 @ 07:00  --------------------------------------------------------  IN: 203 mL / OUT: 75 mL / NET: 128 mL    06-04 @ 07:01  -  06-04 @ 17:31  --------------------------------------------------------  IN: 18.6 mL / OUT: 0 mL / NET: 18.6 mL        Review of Systems:  unable - lethargic      PHYSICAL EXAM:  GENERAL:  no acute distress at present, somnolent   NECK: Neck supple,  JVD  CHEST/LUNG: decreased BS  No wheeze, no rales, no crepitations  HEART: Regular rate and rhythm. S1S2+  No gallop, no rub   ABDOMEN: Soft, Nontender, BS+nt, No flank tenderness.   EXTREMITIES: anasarca 2+  Neurology: lethargic, minimally communicative    ACCESS: right IJ          LABS:                        8.0    4.48  )-----------( 23       ( 04 Jun 2020 05:43 )             25.6     06-04    143  |  107  |  121<H>  ----------------------------<  126<H>  4.5   |  18<L>  |  7.07<H>    Ca    8.3<L>      04 Jun 2020 05:43  Phos  8.6     06-04  Mg     2.1     06-04    TPro  4.9<L>  /  Alb  2.6<L>  /  TBili  0.7  /  DBili  x   /  AST  24  /  ALT  6<L>  /  AlkPhos  80  06-04      PT/INR - ( 03 Jun 2020 05:27 )   PT: 19.3 sec;   INR: 1.67          PTT - ( 03 Jun 2020 05:27 )  PTT:35.4 sec          RADIOLOGY & ADDITIONAL STUDIES:

## 2020-06-04 NOTE — PROGRESS NOTE ADULT - ASSESSMENT
73 yo M with MDS, pancytopenia, CKD, ascites of unknown origin presents from [facility] with complaints of decreased po intake and weakness likely secondary to uremia/AAMIR from diuresis for treatment of ascites, hospital course notable for UGIB and pericardial effusion with tamponade physiology, as well as ATN, and ascites of unknown etiology, now in ICU s/p paracentesis 5/10 (2L removed), IR pericardiocentesis 5/11 (drain in place), now with anemia and thrombocytopenia with hypercoagulability 2/2 DIC, currently tolerating HD with increased pressor requirements    GOC:   Patient is DNR/DNI. Will continue to have GOC discussion with HCP Venus regarding comfort care  - Consented to replace HD cath tomorrow, refused for NG tube placement    Neuro:   #AMS likely 2/2 to uremia.    Cardiology:   #Hypotension  - midodrine 15mg q9kaxek  - continue weaning Levo as possible  - solucortef 50mg qD    Pulm:   -stable on RA  -maintain head of bed 30 degrees, due to aspiration risk     GI:   #ascites  - Elevated SAAG consistent with portal HTN,   - no plan for paracentesis today    #Feeds  - decreased appetite with very limited po intake. Refuses NGT.    Renal/:   #AAMIR on CKD 2/2 atn from hypotension and ischemic injury  - on levophed, midodrine, and solumedrol  - HD today  - renally adjusted meds/ ABx  - renvela 1600mg PO TID w/each meal   - palacio in place  - lokelma 10 q8 and sodium bicarb 650 TID    HEME:   #DIC  - monitor fibrinogen, haptoglobin, LDH, and platelets    Anemia:   Likely secondary to anemia from chronic disease and bone marrow suppression.   - Monitor CBC  - Transfuse for Hgb <7   - Active type and screen     #Coagulopathy:   - Monitor PT/INR     #DVT - RLE  - Eliquis held in setting DIC concern    ID:   - Persistent MRSA bacteremia  - dapto 700mg [id number] until 6/29, will follow-up with Dr. De Luna  - f/u with ID if daptomycin causing thrombocytopenia and if vanc is good replacement.   - obtain CK twice a week    ENDO  - 7 lantus  - monitor sugars    DVt ppx: SCDs. no chemo ppx due to pancytopenia  Lines: L IJ HD cath (5/15), R IJ CVC (6/3) - will replace HD cath tomorrow

## 2020-06-04 NOTE — PROGRESS NOTE ADULT - SUBJECTIVE AND OBJECTIVE BOX
OVERNIGHT EVENTS: weaned levo to 0.04.     SUBJECTIVE / INTERVAL HPI: Patient seen and examined at bedside. Patient much more awake and alert this morning. Conversant. Says he has no appetite. Does not want an NG tube. not complaining of any pain. 12 point ROS negative.     VITAL SIGNS:  Vital Signs Last 24 Hrs  T(C): 36.4 (04 Jun 2020 14:00), Max: 37.1 (04 Jun 2020 00:45)  T(F): 97.6 (04 Jun 2020 14:00), Max: 98.8 (04 Jun 2020 06:14)  HR: 81 (04 Jun 2020 17:00) (77 - 91)  BP: 97/54 (04 Jun 2020 17:00) (79/51 - 126/65)  BP(mean): 71 (04 Jun 2020 17:00) (61 - 94)  RR: 10 (04 Jun 2020 17:00) (10 - 19)  SpO2: 100% (04 Jun 2020 17:00) (98% - 100%)  I&O's Summary    03 Jun 2020 07:01  -  04 Jun 2020 07:00  --------------------------------------------------------  IN: 203 mL / OUT: 75 mL / NET: 128 mL    04 Jun 2020 07:01  -  04 Jun 2020 17:17  --------------------------------------------------------  IN: 18.6 mL / OUT: 0 mL / NET: 18.6 mL        PHYSICAL EXAM:      General: NAD, resting comfortably in bed. more conversant this morning Breathing well on room air  Neck: no JVD  Respiratory: diminished breath sounds at the bases, non-tachypneic, no respiratory distress   Cardiovascular: Regular rhythm/rate; S1/S2, no pericardial rub, pericardial drain removed with site clean, dry, and intact  Gastrointestinal: distended, non tender ascitic drain with appropriate drainage, site clean, dry, and intact  Extremities: WWP; 2+ bilateral dependent pitting edema up to his thigh  Neurological:  A&Ox 2 (person and place), arousable and interactive but still weak. Is able to follow simple commands and answer simple questions. CNII-XII grossly intact; no obvious focal deficits, no flapping tremor, but minimal asterixis  T/L/D: palacio present, currently anuric  Skin: petechiae diffusely     MEDICATIONS:  MEDICATIONS  (STANDING):  albumin human 25% IVPB 50 milliLiter(s) IV Intermittent every 1 hour  chlorhexidine 2% Cloths 1 Application(s) Topical daily  collagenase Ointment 1 Application(s) Topical daily  DAPTOmycin IVPB 700 milliGRAM(s) IV Intermittent every 48 hours  finasteride 5 milliGRAM(s) Oral daily  insulin glargine Injectable (LANTUS) 7 Unit(s) SubCutaneous at bedtime  insulin lispro (HumaLOG) corrective regimen sliding scale   SubCutaneous Before meals and at bedtime  midodrine 15 milliGRAM(s) Oral every 8 hours  mirtazapine 7.5 milliGRAM(s) Oral every 24 hours  norepinephrine Infusion 0.05 MICROgram(s)/kG/Min (3.37 mL/Hr) IV Continuous <Continuous>  nystatin Cream 1 Application(s) Topical two times a day  pantoprazole  Injectable 40 milliGRAM(s) IV Push daily  sertraline 50 milliGRAM(s) Oral daily  sevelamer carbonate Powder 1600 milliGRAM(s) Oral three times a day with meals  sodium bicarbonate 650 milliGRAM(s) Oral three times a day    MEDICATIONS  (PRN):      ALLERGIES:  Allergies    No Known Allergies    Intolerances        LABS:                        8.0    4.48  )-----------( 23       ( 04 Jun 2020 05:43 )             25.6     06-04    143  |  107  |  121<H>  ----------------------------<  126<H>  4.5   |  18<L>  |  7.07<H>    Ca    8.3<L>      04 Jun 2020 05:43  Phos  8.6     06-04  Mg     2.1     06-04    TPro  4.9<L>  /  Alb  2.6<L>  /  TBili  0.7  /  DBili  x   /  AST  24  /  ALT  6<L>  /  AlkPhos  80  06-04    PT/INR - ( 03 Jun 2020 05:27 )   PT: 19.3 sec;   INR: 1.67          PTT - ( 03 Jun 2020 05:27 )  PTT:35.4 sec    CAPILLARY BLOOD GLUCOSE      POCT Blood Glucose.: 136 mg/dL (04 Jun 2020 12:13)      RADIOLOGY & ADDITIONAL TESTS: Reviewed.

## 2020-06-04 NOTE — PROGRESS NOTE ADULT - PROBLEM SELECTOR PLAN 5
No documented HCP form found on [employer]. Current surrogates: Friend Teresa "Venus" Matt [phone] / [phone]. Patient has voiced his preference for Teresa to be involved in decision making and his ongoing care. She is willing to continue to assist as a surrogate.

## 2020-06-04 NOTE — PROGRESS NOTE ADULT - ATTENDING COMMENTS
74 year old male with MDS with MRSA bacteremia with acute renal failure with shock (on levophed). Patient continues to refuse oral medication and continue to refuse eating. Not complaint to medical management. He continues to be in shock but his bacteremia has been resolved.  Plan:  - Patient continues to require levophed for shock. Patient denies NG tube and PO intake.  - Psychiatry consultation   - Continue Daptomycin. Follow CK level 2 times a week.  - Change HD catheter tomorrow.  - Poor prognosis

## 2020-06-04 NOTE — PROGRESS NOTE ADULT - PROBLEM SELECTOR PLAN 2
Underwent HD with increase of pressors and addition of Albumin; Both of which could not be done as outpatient. Goal to continue to attempt HD with decrease of Norepinephrine gtt and continued oral Midodrine.     If unable to tolerate HD once again consider comfort guided approach to plan of care. Patient would qualify for inpatient hospice services.    Management as per Nephrology

## 2020-06-04 NOTE — BEHAVIORAL HEALTH ASSESSMENT NOTE - HPI (INCLUDE ILLNESS QUALITY, SEVERITY, DURATION, TIMING, CONTEXT, MODIFYING FACTORS, ASSOCIATED SIGNS AND SYMPTOMS)
73 yo M with MDS, pancytopenia, CKD, ascites of unknown origin presents from [facility] with complaints of decreased po intake and weakness likely secondary to uremia/AAMIR from diuresis for treatment of ascites, hospital course notable for UGIB and pericardial effusion with tamponade physiology, as well as ATN, and ascites of unknown etiology, now in ICU s/p paracentesis 5/10 (2L removed), IR pericardiocentesis 5/11 (drain in place), now with anemia and thrombocytopenia with hypercoagulability 2/2 DIC, c/b unable to tolerate SLED or CVVHD. Psychiatry was consulted to evaluate the patient for depression. As per the primary team patient has been more withdrawn, refusing to take his medications and refusing to eat.   Patient seen at bedside. He presents in bed, awake, however difficult to engage. He is able to state that he has been in the hospital for 2 weeks. He does not answer when asked about his mood/ other orientation questions.   As per his friend and HCP Venus (has known the patient for 40 years), patient was able to talk to her on the phone twice per day until a few days ago. Venus states that last night she received a phone call from Manpreet that concerned her and made her think that he was very confused. As per Venus, patient was yelling that he is going to die and appeared to not remember his circumstances.   Patient is currently prescribed sertraline 50mg po daily and remeron 7.5mg po qhs (unclear when started).

## 2020-06-04 NOTE — PROGRESS NOTE ADULT - PROBLEM SELECTOR PLAN 3
Known to [facility] Dr Roberts. Hemoglobin: 8.0 g/dL (06.04.20 @ 05:43)  Platelet Count - Automated: 23 K/uL (06.04.20 @ 05:43)    Management as per Hematology.

## 2020-06-04 NOTE — PROGRESS NOTE ADULT - PROBLEM SELECTOR PLAN 4
Albumin, Serum: 2.6 g/dL (06.04.20 @ 05:43) Third spacing and anasarca. Tissue injuries present with poor healing. Per Nutrition: Meet 75% of EER via feasible route that is consistent with GOC. Recommendations: 1. Keep nutrition aligned with goals of care 2. Honor food preferences and encourage/assist w/PO intake 3. If goals of care to change to include nutrition support, please reconsult for EN recs 4. Pain regimen per team.    Was able to tolerate some PO intake today with assistance.

## 2020-06-05 NOTE — PROGRESS NOTE ADULT - ASSESSMENT
75 yo M with MDS, pancytopenia, CKD, ascites of unknown origin presents from [facility] with complaints of decreased po intake and weakness likely secondary to uremia/AAMIR from diuresis for treatment of ascites, hospital course notable for UGIB and pericardial effusion with tamponade physiology, as well as ATN, and ascites of unknown etiology, now in ICU s/p paracentesis 5/10 (2L removed), IR pericardiocentesis 5/11 (drain in place), now with anemia and thrombocytopenia with hypercoagulability 2/2 DIC, c/b unable to tolerate SLED or CVVHD.       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, likely ATN from hypotension and ischemic injury  - remains on pressor support  - thrombocytopenia with hypercoagulability 2/2 DIC, c/b unable to tolerate SLED or CVVHD  - getting HD today for clearance  - lokelma 10 mg po qd to prevent hyperkalemia  - sodium bicarb 650 mg po q8hr for metabolic acidosis  - renvela 1600 mg po q8hr   - renally adjusted meds/ ABx  - currently undergoing C discussion - family wants HD for now  - L IJ HD cath placed on 5/15, need replacement 73 yo M with MDS, pancytopenia, CKD, ascites of unknown origin presents from [facility] with complaints of decreased po intake and weakness likely secondary to uremia/AAMIR from diuresis for treatment of ascites, hospital course notable for UGIB and pericardial effusion with tamponade physiology, as well as ATN, and ascites of unknown etiology, now in ICU s/p paracentesis 5/10 (2L removed), IR pericardiocentesis 5/11 (drain in place), now with anemia and thrombocytopenia with hypercoagulability 2/2 DIC, c/b unable to tolerate SLED or CVVHD.       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, likely ATN from hypotension and ischemic injury  - remains on pressor support  - thrombocytopenia with hypercoagulability 2/2 DIC, c/b unable to tolerate SLED or CVVHD  - getting HD today for clearance  - lokelma 10 mg po prn to prevent hyperkalemia  - sodium bicarb 650 mg po q8hr for metabolic acidosis  - renvela 1600 mg po q8hr   - renally adjusted meds/ ABx  - currently undergoing C discussion - family wants HD for now  - L IJ HD cath placed on 5/15, need replacement

## 2020-06-05 NOTE — PROGRESS NOTE BEHAVIORAL HEALTH - NSBHFUPINTERVALCCFT_PSY_A_CORE
Patient awake but continues to appear to fall asleep.   Very soft spoken with possible dysarthria making understanding responses difficult.  Appears to understand simple questions.   Remembers his HCP.  Engages with Renal team who report his BP drops have made it difficult for him to get HD (BUN/Cr elevated).    Informed he is still getting Zoloft and Remeron was increased to 15mg yesterday.    Denies SI.  Aware he is in hospital.  Responds somewhat to supportive comments.

## 2020-06-05 NOTE — PROGRESS NOTE ADULT - ASSESSMENT
73 yo M with MDS, pancytopenia, CKD, ascites of unknown origin presents from [facility] with complaints of decreased po intake and weakness likely secondary to uremia/AAMIR from diuresis for treatment of ascites, hospital course notable for UGIB and pericardial effusion with tamponade physiology, as well as ATN, and ascites of unknown etiology, now in ICU s/p paracentesis 5/10 (2L removed), IR pericardiocentesis 5/11 (drain in place), now with anemia and thrombocytopenia with hypercoagulability 2/2 DIC, currently tolerating HD with increased pressor requirements    GOC:   Patient is DNR/DNI. Will continue to have GOC discussion with HCP Venus regarding comfort care  - Will need to consent patient for permacath placement on 6/8    Neuro:   #AMS likely 2/2 to uremia.  -improving    Cardiology:   #Hypotension  - midodrine 15mg [id number]  - continue weaning Levo as possible  - discontinued solucortef    Pulm:   -stable on RA  -maintain head of bed 30 degrees, due to aspiration risk     GI:   #ascites  - Elevated SAAG consistent with portal HTN,   - no plan for paracentesis today    #Feeds  - decreased appetite with very limited po intake. Refuses NGT.    Renal/:   #AAMIR on CKD 2/2 atn from hypotension and ischemic injury  - on levophed, midodrine,  - renally adjusted meds/ ABx  - renvela 1600mg PO TID w/each meal   - palacio in place  - lokelma 10 q8 and sodium bicarb 650 TID  - Spoke with IR who plans permacath placement on 6/8  - Will remove HD cath after HD today    HEME:   #DIC  - monitor fibrinogen, haptoglobin, LDH, and platelets  - improving    Anemia:   Likely secondary to anemia from chronic disease and bone marrow suppression.   - Monitor CBC  - Transfuse for Hgb <7   - Active type and screen     #Coagulopathy:   - Monitor PT/INR     #DVT - RLE  - Eliquis held in setting DIC concern    ID:   - Persistent MRSA bacteremia  - dapto 700mg [id number] until 6/29  - obtain CK twice a week    ENDO  - lantus 5  - monitor sugars    DVt ppx: SCDs. no chemo ppx due to pancytopenia  Lines: L IJ HD cath (5/15), R IJ CVC (6/3) - will remove HD cath today

## 2020-06-05 NOTE — PROGRESS NOTE ADULT - SUBJECTIVE AND OBJECTIVE BOX
OVERNIGHT EVENTS: levo 0.04    SUBJECTIVE / INTERVAL HPI: Patient seen and examined at bedside. patient less awake then yesterday, Says he continues to have no appetite. refusing midodrine today. Patient has not had a BM. 12 point ros otherwise negative.     VITAL SIGNS:  Vital Signs Last 24 Hrs  T(C): 35.6 (05 Jun 2020 12:10), Max: 36.4 (05 Jun 2020 06:00)  T(F): 96.1 (05 Jun 2020 12:10), Max: 97.5 (05 Jun 2020 06:00)  HR: 92 (05 Jun 2020 12:45) (81 - 92)  BP: 114/64 (05 Jun 2020 12:45) (72/48 - 114/64)  BP(mean): 83 (05 Jun 2020 12:45) (56 - 83)  RR: 12 (05 Jun 2020 12:45) (10 - 14)  SpO2: 100% (05 Jun 2020 12:45) (97% - 100%)  I&O's Summary    04 Jun 2020 07:01  -  05 Jun 2020 07:00  --------------------------------------------------------  IN: 90.6 mL / OUT: 45 mL / NET: 45.6 mL    05 Jun 2020 07:01  -  05 Jun 2020 14:19  --------------------------------------------------------  IN: 2 mL / OUT: 10 mL / NET: -8 mL        PHYSICAL EXAM:    General: NAD, resting comfortably in bed. more conversant this morning Breathing well on room air  Neck: no JVD  Respiratory: diminished breath sounds at the bases, non-tachypneic, no respiratory distress   Cardiovascular: Regular rhythm/rate; S1/S2, no pericardial rub, pericardial drain removed with site clean, dry, and intact  Gastrointestinal: distended, non tender ascitic drain with appropriate drainage, site clean, dry, and intact  Extremities: WWP; 2+ bilateral dependent pitting edema up to his thigh  Neurological:  A&Ox 2 (person and place), arousable and interactive but still weak. Is able to follow simple commands and answer simple questions. CNII-XII grossly intact; no obvious focal deficits, no flapping tremor, but minimal asterixis  T/L/D: palacio present, currently anuric  Skin: petechiae diffusely     MEDICATIONS:  MEDICATIONS  (STANDING):  albumin human 25% IVPB 50 milliLiter(s) IV Intermittent every 1 hour  chlorhexidine 2% Cloths 1 Application(s) Topical daily  collagenase Ointment 1 Application(s) Topical daily  DAPTOmycin IVPB 700 milliGRAM(s) IV Intermittent every 48 hours  finasteride 5 milliGRAM(s) Oral daily  insulin lispro (HumaLOG) corrective regimen sliding scale   SubCutaneous Before meals and at bedtime  midodrine 15 milliGRAM(s) Oral every 8 hours  mirtazapine 15 milliGRAM(s) Oral daily  norepinephrine Infusion 0.05 MICROgram(s)/kG/Min (3.37 mL/Hr) IV Continuous <Continuous>  nystatin Cream 1 Application(s) Topical two times a day  pantoprazole  Injectable 40 milliGRAM(s) IV Push daily  sertraline 50 milliGRAM(s) Oral daily  sevelamer carbonate Powder 1600 milliGRAM(s) Oral three times a day with meals  sodium bicarbonate 650 milliGRAM(s) Oral three times a day    MEDICATIONS  (PRN):      ALLERGIES:  Allergies    No Known Allergies    Intolerances        LABS:                        8.1    6.35  )-----------( 34       ( 05 Jun 2020 05:39 )             26.7     06-05    145  |  107  |  119<H>  ----------------------------<  88  4.4   |  19<L>  |  7.52<H>    Ca    8.2<L>      05 Jun 2020 05:39  Phos  8.8     06-05  Mg     2.1     06-05    TPro  4.7<L>  /  Alb  2.4<L>  /  TBili  0.6  /  DBili  x   /  AST  23  /  ALT  7<L>  /  AlkPhos  77  06-05        CAPILLARY BLOOD GLUCOSE      POCT Blood Glucose.: 149 mg/dL (05 Jun 2020 11:19)      RADIOLOGY & ADDITIONAL TESTS: Reviewed.

## 2020-06-05 NOTE — PROGRESS NOTE ADULT - ATTENDING COMMENTS
74 year old male with MDS with MRSA bacteremia with acute renal failure with shock (on levophed). Patient continues to refuse oral medication and continue to refuse eating. Not complaint to medical management. He continues to be in shock but his bacteremia has been resolved.  Plan:  - Patient continues to require levophed for shock. Requirement has decreased. Patient denying oral intake and medication intake.  - Psychiatry consultation was done. Dose of mirtazapine was increased.  - Continue Daptomycin. Follow CK level 2 times a week.  - Change HD done today.   - Remove HD catheter and Rodriguez today.  - Poor prognosis .

## 2020-06-05 NOTE — PROGRESS NOTE BEHAVIORAL HEALTH - SUMMARY
75 yo M with MDS, pancytopenia, CKD, ascites of unknown origin presents from [facility] with complaints of decreased po intake and weakness likely secondary to uremia/AAMIR from diuresis for treatment of ascites, hospital course notable for UGIB and pericardial effusion with tamponade physiology, as well as ATN, and ascites of unknown etiology, now in ICU s/p paracentesis 5/10 (2L removed), IR pericardiocentesis 5/11 (drain in place), now with anemia and thrombocytopenia with hypercoagulability 2/2 DIC, c/b unable to tolerate SLED or CVVHD. Psychiatry was consulted to evaluate the patient for depression. Patient currently presents withdrawan, disengaged, unable to participate in an interview. As per friend this is a change from baseline, suggestive of hypoactive vs mixed delirium. As per collaterals, patient also presents comorbid depression.   Plan:  -medical workup for delirium as per the primary team   -avoid benzodiazepines/ anticholinergic meds/ limit opiates as they can worsen delirium   -if the patient becomes agitated can give haldol 1mg po/im q6h prn agitation, monitor for qtc prolongation  -would stop sertraline at this time to decrease the risk of polypharmacy  -increase mirtazapine to 15 mg po qhs for treatment of depression and to target low appetite  -Cl to follow for support and to assess depression.  Please call for urgent issues.    (Note, pancytopenia is most likely secondary to medical issues however, Zoloft and Remeron have thrombocytopenia/pancytopenia as possible side effects.    If counts continue to decrease and/or not increase as patient is stabilized medically, consider stopping antidepressants until medically stable.   Alternative, if felt to be  medically and hemodynamically stable, team could be to try very low dose stimulant like Ritalin 2.5mg in morning to see if it will increase energy, stimulate appetite.)

## 2020-06-05 NOTE — CHART NOTE - NSCHARTNOTEFT_GEN_A_CORE
Psychology Progress Note  Subjective: "Next Day"  Objective: Met with patient for CL follow-up. Pt was more awake and responsive than prior day, but continues to be limited in verbal output, and was not responsive to most of the questions. Unable to assess his mental state . His team reported that he took his medications today.     Patient is 75 yo M with per record, past medical history of DM, HTN, HLD, BPH (chronic indwelling palacio placement on last admission, SERA, CKD4, and a-fib  , mds, pancytopenia, presents from [facility] with complaints of decreased po intake and weakness. On admission, patient reported that he recently had a intense bowel regimen for constipation after which he developed loose stools. To prevent further loose stools he stopped eating . Decreased po intake also in setting of poor appetite. Patient also complained for worsening LE and abdominal swelling. Psych consult was called as pt was refusing taking his medications and food intake for several days, and his medical team wanted to assess level of depression.    DM, HTN, HLD, BPH (chronic indwelling palacio placement on last admission, SERA, CKD4, and a-fib  , mds, pancytopenia,  Mental Status Exam:     Appearance/Attitude: poor eye contact, mildly engaged  Mood: Depressed  Affect: Flat  Speech:Low tone and poverty of speech  Thought Process: unable to assess  Thought Content: unable to assess   Perceptual:Unable to assess  Oriented: Unable to assess  Assessment/ Formulation (include psychological formulation, diagnosis, diagnostic rule-outs and additional considerations): Patient is 75 yo M with per record, past medical history of DM, HTN, HLD, BPH (chronic indwelling palacio placement on last admission, SERA, CKD4, and a-fib  , mds, pancytopenia, presents from [facility] with complaints of decreased po intake and weakness. On admission, patient reported that he recently had a intense bowel regimen for constipation after which he developed loose stools. To prevent further loose stools he stopped eating . Decreased po intake also in setting of poor appetite. Patient also complained for worsening LE and abdominal swelling. Psych consult was called as pt was refusing taking his medications and food intake for several days, and his medical team wanted to assess level of depression. Compared to yesterday, pt was slightly more aware of his surrounding, but still not verbal, and difficult to assess his level of functioning which appears to be a result of his medical condition. His team reported that he is taking medications.   Static Factors: multiple medical problems   Modifiable Factors: current medical condition, being in the hospital for a long time.   Protective Factors: Pt has no prior suicide attempts   	  Plan (including specific details about this individual’s assessment, treatment and plans for discharge):   Discuss case with psychiatry attending.       Iman Silver, PhD  Licensed Clinical Psychologist

## 2020-06-05 NOTE — PROGRESS NOTE BEHAVIORAL HEALTH - OTHER
withdrawn not assessed bedbound [place]. Answers questions in mostly 1-2 word answers unable to assess

## 2020-06-05 NOTE — PROGRESS NOTE ADULT - SUBJECTIVE AND OBJECTIVE BOX
Patient was seen and evaluated on dialysis.     Vitals:  HR: 92 (06-05-20 @ 12:45)  BP: 114/64 (06-05-20 @ 12:45)  Wt(kg): 95.8 (bed scale)      Labs:  06-05    145  |  107  |  119<H>  ----------------------------<  88  4.4   |  19<L>  |  7.52<H>    Ca    8.2<L>      05 Jun 2020 05:39  Phos  8.8     06-05  Mg     2.1     06-05    TPro  4.7<L>  /  Alb  2.4<L>  /  TBili  0.6  /  DBili  x   /  AST  23  /  ALT  7<L>  /  AlkPhos  77  06-05    Continue dialysis:   Dialyzer:   180    QB: 350  K bath: 2  Goal UF:   0    L   over       180        min  Patient is not tolerating the procedure well due to hypotension w BP down to 63/45  mmHg.  Pressor requir titrated up, albumin given.  Continue full treatment as prescribed. Patient was seen and evaluated on dialysis.  No acute events overnight.  Not in acute distress.  Not verbal.       Vitals:  HR: 92 (06-05-20 @ 12:45)  BP: 114/64 (06-05-20 @ 12:45)  Wt(kg): 95.8 (bed scale)      Labs:  06-05    145  |  107  |  119<H>  ----------------------------<  88  4.4   |  19<L>  |  7.52<H>    Ca    8.2<L>      05 Jun 2020 05:39  Phos  8.8     06-05  Mg     2.1     06-05    TPro  4.7<L>  /  Alb  2.4<L>  /  TBili  0.6  /  DBili  x   /  AST  23  /  ALT  7<L>  /  AlkPhos  77  06-05    Continue dialysis:   Dialyzer:   180    QB: 350  K bath: 2  Goal UF:   0    L   over       180        min  Patient is not tolerating the procedure well due to hypotension w BP down to 63/45  mmHg.  Pressor requir titrated up, albumin given.  Continue full treatment as prescribed. Patient was seen and evaluated on dialysis.  No acute events overnight.  Not in acute distress.  Alert, not verbal.      Vitals:  HR: 92 (06-05-20 @ 12:45)  BP: 114/64 (06-05-20 @ 12:45)  Wt(kg): 95.8 (bed scale)      PHYSICAL EXAM:  GENERAL: alert, no acute distress at present  NECK: supple, JVD  CHEST/LUNG: decreased BS   HEART: Regular rate and rhythm. S1S2+    ABDOMEN: Soft, Nontender, BS+  EXTREMITIES: anasarca 2+  Neurology: not verbal today   ACCESS: R IJ temp HD cath, needs to be replaced       Labs:  06-05    145  |  107  |  119<H>  ----------------------------<  88  4.4   |  19<L>  |  7.52<H>    Ca    8.2<L>      05 Jun 2020 05:39  Phos  8.8     06-05  Mg     2.1     06-05    TPro  4.7<L>  /  Alb  2.4<L>  /  TBili  0.6  /  DBili  x   /  AST  23  /  ALT  7<L>  /  AlkPhos  77  06-05    Continue dialysis:   Dialyzer:   180    QB: 350  K bath: 2  Goal UF:   0    L   over       180        min  Patient is not tolerating the procedure well due to hypotension w BP down to 63/45  mmHg.  Pressor requir titrated up, albumin given.  Continue full treatment as prescribed.

## 2020-06-05 NOTE — PROGRESS NOTE ADULT - ATTENDING COMMENTS
seen on HD ,agree with above  tolerating rx so far but requiring increase in pressors   cont rx as tolerates -- clearance only

## 2020-06-06 NOTE — PROGRESS NOTE BEHAVIORAL HEALTH - OTHER
not assessed bedbound not able to assess [place]. Answers questions in mostly 1-2 word answers unable to assess withdrawn

## 2020-06-06 NOTE — PROGRESS NOTE ADULT - ASSESSMENT
73 yo M with MDS, pancytopenia, CKD, ascites of unknown origin presents from [facility] with complaints of decreased po intake and weakness likely secondary to uremia/AAMIR from diuresis for treatment of ascites, hospital course notable for UGIB and pericardial effusion with tamponade physiology, as well as ATN, and ascites of unknown etiology, now in ICU s/p paracentesis 5/10 (2L removed), IR pericardiocentesis 5/11 (drain in place), now with anemia and thrombocytopenia with hypercoagulability 2/2 DIC, currently tolerating HD with increased pressor requirements    GOC:   Patient is DNR/DNI. Will continue to have GOC discussion with HCP Venus regarding comfort care  - Will need to consent patient for permacath placement on 6/8    Neuro:   #AMS likely 2/2 to uremia.  -improving    #depression   -increased mirtazapine to 15mg at night  -discontinue sertraline    Cardiology:   #Hypotension  - midodrine 15mg [id number]  - continue weaning Levo as possible  - discontinued solucortef    Pulm:   -stable on RA  -maintain head of bed 30 degrees, due to aspiration risk     GI:   #ascites  - Elevated SAAG consistent with portal HTN,   - no plan for paracentesis today    #Feeds  - decreased appetite with very limited po intake. Refuses NGT.    Renal/:   #AAMIR on CKD 2/2 atn from hypotension and ischemic injury  - on levophed, midodrine,  - renally adjusted meds/ ABx  - renvela 1600mg PO TID w/each meal   - palacio in place  - lokelma 10 q8 and sodium bicarb 650 TID  - Spoke with IR who plans permacath placement on 6/8  - Will remove HD cath after HD today    HEME:   #DIC  - monitor fibrinogen, haptoglobin, LDH, and platelets  - improving    Anemia:   Hg 6.7 this morning  -1 pRBC unit ordered  -repeat CBC stable    #Coagulopathy:   - Monitor PT/INR     #DVT - RLE  - Eliquis held in setting of DIC concern    #thrombocytopenia 2/2 DIC and possibly daptomycin  -monitor plt level  -Plt repletion goal <10    ID:   - Persistent MRSA bacteremia  - dapto 700mg [id number] until 6/29  - obtain CK twice a week    ENDO  - lantus 5  - monitor sugars    DVt ppx: SCDs. no chemo ppx due to pancytopenia  Lines: R IJ CVC (6/3)

## 2020-06-06 NOTE — PROGRESS NOTE ADULT - ATTENDING COMMENTS
I independently performed the key portions of the evaluation and management service provided. I agree with the above history, physical, and plan which I have reviewed and edited where appropriate. I find AAMIR, fluid overload, hyptotension. Cont HD. UF on HD as tolerated. Keep MAP > 65. See full note. (Patient seen earlier in day.)

## 2020-06-06 NOTE — PROGRESS NOTE ADULT - SUBJECTIVE AND OBJECTIVE BOX
Patient is a 74y Male seen and evaluated at bedside.   Lethargic.  Vitals, meds, labs reviewed.    Last hd on 6/5.      Meds:    albumin human 25% IVPB 50 every 1 hour  chlorhexidine 2% Cloths 1 daily  collagenase Ointment 1 daily  DAPTOmycin IVPB 700 every 48 hours  finasteride 5 daily  insulin lispro (HumaLOG) corrective regimen sliding scale  Before meals and at bedtime  midodrine 15 every 8 hours  norepinephrine Infusion 0.05 <Continuous>  nystatin Cream 1 two times a day  pantoprazole  Injectable 40 daily  sevelamer carbonate Powder 1600 three times a day with meals  sodium bicarbonate 650 three times a day      Allergies    No Known Allergies    Intolerances        T(C): , Max: 36.6 (06-06-20 @ 01:30)  T(F): , Max: 97.9 (06-06-20 @ 01:30)  HR: 90 (06-06-20 @ 15:00)  BP: 93/55 (06-06-20 @ 15:00)  BP(mean): 70 (06-06-20 @ 15:00)  RR: 12 (06-06-20 @ 15:00)  SpO2: 100% (06-06-20 @ 15:00)  Wt(kg): --    06-05 @ 07:01  -  06-06 @ 07:00  --------------------------------------------------------  IN: 972.8 mL / OUT: 810 mL / NET: 162.8 mL    06-06 @ 07:01  -  06-06 @ 17:30  --------------------------------------------------------  IN: 1.3 mL / OUT: 0 mL / NET: 1.3 mL          Review of Systems:  CONSTITUTIONAL: No fever or chills, No fatigue or tiredness.  EYES: No blurred or double vision.  RESPIRATORY: No shortness of breath, cough, hemoptysis  CARDIOVASCULAR: No Chest pain or shortness of breath  GASTROINTESTINAL: NO abdominal or flank pain, No nausea or vomiting, No diarrhea  GENITOURINARY: No dysuria or urinary burning, No difficulty passing urine, No hematuria  NEUROLOGICAL: No headaches or blurred vision  SKIN: No skin rashes   MUSCULOSKELETAL: No arthralgia, Joint pain, leg edema, No muscle pains      PHYSICAL EXAM:    General: NAD, resting comfortably in bed. more conversant this morning Breathing well on room air  Neck: no JVD  Respiratory: diminished breath sounds at the bases, non-tachypneic, no respiratory distress   Cardiovascular: Regular rhythm/rate; S1/S2, no pericardial rub, pericardial drain removed with site clean, dry, and intact  Gastrointestinal: distended, non tender ascitic drain with appropriate drainage, site clean, dry, and intact  Extremities: WWP; 2+ bilateral dependent pitting edema up to his thigh  Neurological:  A&Ox 2 (person and place), arousable and interactive but still weak. Is able to follow simple commands and answer simple questions. CNII-XII grossly intact; no obvious focal deficits, no flapping tremor, but minimal asterixis  T/L/D: palacio present, currently anuric  Skin: petechiae diffusely           ACCESS: R IJ temp HD cath    LABS:                        6.7    3.57  )-----------( 19       ( 06 Jun 2020 09:58 )             22.5     06-06    141  |  101  |  88<H>  ----------------------------<  113<H>  4.0   |  21<L>  |  5.69<H>    Ca    8.1<L>      06 Jun 2020 05:22  Phos  6.8     06-06  Mg     2.1     06-06    TPro  4.7<L>  /  Alb  2.8<L>  /  TBili  0.8  /  DBili  x   /  AST  23  /  ALT  6<L>  /  AlkPhos  67  06-06                RADIOLOGY & ADDITIONAL STUDIES: Patient is a 74y Male seen and evaluated at bedside.   Lethargic.  Vitals, meds, labs reviewed.    Last hd on 6/5. Hypotension requiring midodrine. Fluid overloaded.       Meds:    albumin human 25% IVPB 50 every 1 hour  chlorhexidine 2% Cloths 1 daily  collagenase Ointment 1 daily  DAPTOmycin IVPB 700 every 48 hours  finasteride 5 daily  insulin lispro (HumaLOG) corrective regimen sliding scale  Before meals and at bedtime  midodrine 15 every 8 hours  norepinephrine Infusion 0.05 <Continuous>  nystatin Cream 1 two times a day  pantoprazole  Injectable 40 daily  sevelamer carbonate Powder 1600 three times a day with meals  sodium bicarbonate 650 three times a day      Allergies    No Known Allergies    Intolerances        T(C): , Max: 36.6 (06-06-20 @ 01:30)  T(F): , Max: 97.9 (06-06-20 @ 01:30)  HR: 90 (06-06-20 @ 15:00)  BP: 93/55 (06-06-20 @ 15:00)  BP(mean): 70 (06-06-20 @ 15:00)  RR: 12 (06-06-20 @ 15:00)  SpO2: 100% (06-06-20 @ 15:00)  Wt(kg): --    06-05 @ 07:01  -  06-06 @ 07:00  --------------------------------------------------------  IN: 972.8 mL / OUT: 810 mL / NET: 162.8 mL    06-06 @ 07:01  -  06-06 @ 17:30  --------------------------------------------------------  IN: 1.3 mL / OUT: 0 mL / NET: 1.3 mL          Review of Systems:  CONSTITUTIONAL: No fever or chills, No fatigue or tiredness.  EYES: No blurred or double vision.  RESPIRATORY: No shortness of breath, cough, hemoptysis  CARDIOVASCULAR: No Chest pain or shortness of breath  GASTROINTESTINAL: NO abdominal or flank pain, No nausea or vomiting, No diarrhea  GENITOURINARY: No dysuria or urinary burning, No difficulty passing urine, No hematuria  NEUROLOGICAL: No headaches or blurred vision  SKIN: No skin rashes   MUSCULOSKELETAL: No arthralgia, Joint pain, leg edema, No muscle pains      PHYSICAL EXAM:    General: NAD, resting comfortably in bed. more conversant this morning Breathing well on room air  Neck: no JVD  Respiratory: diminished breath sounds at the bases, non-tachypneic, no respiratory distress   Cardiovascular: Regular rhythm/rate; S1/S2, no pericardial rub, pericardial drain removed with site clean, dry, and intact  Gastrointestinal: distended, non tender ascitic drain with appropriate drainage, site clean, dry, and intact  Extremities: WWP; 2+ bilateral dependent pitting edema up to his thigh  Neurological:  A&Ox 2 (person and place), arousable and interactive but still weak. Is able to follow simple commands and answer simple questions. CNII-XII grossly intact; no obvious focal deficits, no flapping tremor, but minimal asterixis  T/L/D: palacio present, currently anuric  Skin: petechiae diffusely           ACCESS: R IJ temp HD cath    LABS:                        6.7    3.57  )-----------( 19       ( 06 Jun 2020 09:58 )             22.5     06-06    141  |  101  |  88<H>  ----------------------------<  113<H>  4.0   |  21<L>  |  5.69<H>    Ca    8.1<L>      06 Jun 2020 05:22  Phos  6.8     06-06  Mg     2.1     06-06    TPro  4.7<L>  /  Alb  2.8<L>  /  TBili  0.8  /  DBili  x   /  AST  23  /  ALT  6<L>  /  AlkPhos  67  06-06                RADIOLOGY & ADDITIONAL STUDIES:

## 2020-06-06 NOTE — PROGRESS NOTE ADULT - ASSESSMENT
75 yo M with MDS, pancytopenia, CKD, ascites of unknown origin presents from [facility] with complaints of decreased po intake and weakness likely secondary to uremia/AAMIR from diuresis for treatment of ascites, hospital course notable for UGIB and pericardial effusion with tamponade physiology, as well as ATN, and ascites of unknown etiology, now in ICU s/p paracentesis 5/10 (2L removed), IR pericardiocentesis 5/11 (drain in place), now with anemia and thrombocytopenia with hypercoagulability 2/2 DIC, c/b unable to tolerate SLED or CVVHD.       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

## 2020-06-06 NOTE — PROGRESS NOTE BEHAVIORAL HEALTH - SECONDARY DX1
Current severe episode of major depressive disorder without psychotic features, unspecified whether recurrent
Current severe episode of major depressive disorder without psychotic features, unspecified whether recurrent

## 2020-06-06 NOTE — PROGRESS NOTE ADULT - SUBJECTIVE AND OBJECTIVE BOX
OVERNIGHT EVENTS: hayden at 0.03    SUBJECTIVE / INTERVAL HPI: Patient seen and examined at bedside. resting in bed. more lethargic this morning. says he has no appetite. Did not have any BM. continues to state he feels "fine."    VITAL SIGNS:  Vital Signs Last 24 Hrs  T(C): 36.6 (06 Jun 2020 13:00), Max: 36.6 (06 Jun 2020 01:30)  T(F): 97.8 (06 Jun 2020 13:00), Max: 97.9 (06 Jun 2020 01:30)  HR: 92 (06 Jun 2020 18:00) (81 - 618)  BP: 84/53 (06 Jun 2020 18:00) (82/51 - 103/56)  BP(mean): 64 (06 Jun 2020 18:00) (62 - 77)  RR: 17 (06 Jun 2020 18:00) (10 - 20)  SpO2: 100% (06 Jun 2020 18:00) (97% - 100%)  I&O's Summary    05 Jun 2020 07:01  -  06 Jun 2020 07:00  --------------------------------------------------------  IN: 972.8 mL / OUT: 810 mL / NET: 162.8 mL    06 Jun 2020 07:01  -  06 Jun 2020 19:03  --------------------------------------------------------  IN: 1.3 mL / OUT: 0 mL / NET: 1.3 mL        PHYSICAL EXAM:    General: NAD, resting comfortably in bed. more conversant this morning Breathing well on room air  Neck: no JVD  Respiratory: diminished breath sounds at the bases, non-tachypneic, no respiratory distress   Cardiovascular: Regular rhythm/rate; S1/S2, no pericardial rub, pericardial drain removed with site clean, dry, and intact  Gastrointestinal: distended, non tender ascitic drain with appropriate drainage, site clean, dry, and intact  Extremities: WWP; 2+ bilateral dependent pitting edema up to his thigh  Neurological:  A&Ox 2 (person and place), arousable and interactive but still weak. Is able to follow simple commands and answer simple questions. CNII-XII grossly intact; no obvious focal deficits, no flapping tremor, but minimal asterixis  T/L/D: palacio present, currently anuric    MEDICATIONS:  MEDICATIONS  (STANDING):  albumin human 25% IVPB 50 milliLiter(s) IV Intermittent every 1 hour  chlorhexidine 2% Cloths 1 Application(s) Topical daily  collagenase Ointment 1 Application(s) Topical daily  DAPTOmycin IVPB 700 milliGRAM(s) IV Intermittent every 48 hours  finasteride 5 milliGRAM(s) Oral daily  insulin lispro (HumaLOG) corrective regimen sliding scale   SubCutaneous Before meals and at bedtime  midodrine 15 milliGRAM(s) Oral every 8 hours  norepinephrine Infusion 0.05 MICROgram(s)/kG/Min (3.37 mL/Hr) IV Continuous <Continuous>  nystatin Cream 1 Application(s) Topical two times a day  pantoprazole  Injectable 40 milliGRAM(s) IV Push daily  sevelamer carbonate Powder 1600 milliGRAM(s) Oral three times a day with meals  sodium bicarbonate 650 milliGRAM(s) Oral three times a day    MEDICATIONS  (PRN):      ALLERGIES:  Allergies    No Known Allergies    Intolerances        LABS:                        6.7    3.57  )-----------( 19       ( 06 Jun 2020 09:58 )             22.5     06-06    141  |  101  |  88<H>  ----------------------------<  113<H>  4.0   |  21<L>  |  5.69<H>    Ca    8.1<L>      06 Jun 2020 05:22  Phos  6.8     06-06  Mg     2.1     06-06    TPro  4.7<L>  /  Alb  2.8<L>  /  TBili  0.8  /  DBili  x   /  AST  23  /  ALT  6<L>  /  AlkPhos  67  06-06        CAPILLARY BLOOD GLUCOSE      POCT Blood Glucose.: 150 mg/dL (06 Jun 2020 16:37)      RADIOLOGY & ADDITIONAL TESTS: Reviewed.

## 2020-06-06 NOTE — PROGRESS NOTE BEHAVIORAL HEALTH - NSBHFUPINTERVALHXFT_PSY_A_CORE
patient is very lethargic on assessment, appears jaundiced, is not making eye contact. He answers very minimally. He verbalizes that the year is 2020, but doesn't respond to further orientation questions, and appears too lethargic to engage in interview about his mood.  Nurse states that he isnt eating at all, and has been lethargic all day.

## 2020-06-06 NOTE — PROVIDER CONTACT NOTE (CRITICAL VALUE NOTIFICATION) - ACTION/TREATMENT ORDERED:
repeat platelet MN
Dr. Gibson made aware, MICU team will evaluate
MD adjust antibiotics.
No further intervention at this time.
no further action ordered at this time, will continue to monitor.
MICU intern Mahad made aware, will continue to monitor
provider notified
provider notified, 1u prbc ordered
MD made aware. CBC to be redrawn.

## 2020-06-06 NOTE — PROGRESS NOTE BEHAVIORAL HEALTH - SUMMARY
75 yo M with MDS, pancytopenia, CKD, ascites of unknown origin presents from [facility] with complaints of decreased po intake and weakness likely secondary to uremia/AAMIR from diuresis for treatment of ascites, hospital course notable for UGIB and pericardial effusion with tamponade physiology, as well as ATN, and ascites of unknown etiology, now in ICU s/p paracentesis 5/10 (2L removed), IR pericardiocentesis 5/11 (drain in place), now with anemia and thrombocytopenia with hypercoagulability 2/2 DIC, c/b unable to tolerate SLED or CVVHD. Psychiatry was consulted to evaluate the patient for depression. Patient currently is too lethargic to participate in interview. As per friend this is a change from baseline, suggestive of hypoactive vs mixed delirium. As per collaterals, patient also presents comorbid depression.   Plan:  -medical workup for delirium as per the primary team   -avoid benzodiazepines/ anticholinergic meds/ limit opiates as they can worsen delirium   -if the patient becomes agitated can give haldol 1mg po/im q6h prn agitation, monitor for qtc prolongation  -continue to hold sertraline at this time to decrease the risk of polypharmacy  -mirtazepine is currently not helping with po intake as intended, and patient is very lethargic. would hold next dose.     (Note, pancytopenia is most likely secondary to medical issues however, Zoloft and Remeron have thrombocytopenia/pancytopenia as possible side effects.    If counts continue to decrease and/or not increase as patient is stabilized medically, consider stopping antidepressants until medically stable.   Alternative, if felt to be  medically and hemodynamically stable, team could be to try very low dose stimulant like Ritalin 2.5mg in morning to see if it will increase energy, stimulate appetite.) 75 yo M with MDS, pancytopenia, CKD, ascites of unknown origin presents from [facility] with complaints of decreased po intake and weakness likely secondary to uremia/AAMIR from diuresis for treatment of ascites, hospital course notable for UGIB and pericardial effusion with tamponade physiology, as well as ATN, and ascites of unknown etiology, now in ICU s/p paracentesis 5/10 (2L removed), IR pericardiocentesis 5/11 (drain in place), now with anemia and thrombocytopenia with hypercoagulability 2/2 DIC, c/b unable to tolerate SLED or CVVHD. Psychiatry was consulted to evaluate the patient for depression. Patient currently is too lethargic to participate in interview. As per friend this is a change from baseline, suggestive of hypoactive vs mixed delirium. As per collaterals, patient also presents comorbid depression.   Plan:  -medical workup for delirium as per the primary team   -avoid benzodiazepines/ anticholinergic meds/ limit opiates as they can worsen delirium   -if the patient becomes agitated can give haldol 1mg po/im q6h prn agitation, monitor for qtc prolongation  -continue to hold sertraline at this time to decrease the risk of polypharmacy  -continue mirtazapine    (Note, pancytopenia is most likely secondary to medical issues however, Zoloft and Remeron have thrombocytopenia/pancytopenia as possible side effects.    If counts continue to decrease and/or not increase as patient is stabilized medically, consider stopping antidepressants until medically stable.   Alternative, if felt to be  medically and hemodynamically stable, team could be to try very low dose stimulant like Ritalin 2.5mg in morning to see if it will increase energy, stimulate appetite.) 73 yo M with MDS, pancytopenia, CKD, ascites of unknown origin presents from [facility] with complaints of decreased po intake and weakness likely secondary to uremia/AAMIR from diuresis for treatment of ascites, hospital course notable for UGIB and pericardial effusion with tamponade physiology, as well as ATN, and ascites of unknown etiology, now in ICU s/p paracentesis 5/10 (2L removed), IR pericardiocentesis 5/11 (drain in place), now with anemia and thrombocytopenia with hypercoagulability 2/2 DIC, c/b unable to tolerate SLED or CVVHD. Psychiatry was consulted to evaluate the patient for depression. Patient currently is too lethargic to participate in interview. As per friend this is a change from baseline, suggestive of hypoactive vs mixed delirium. As per collaterals, patient also presents comorbid depression.   Plan:  -medical workup for delirium as per the primary team   -avoid benzodiazepines/ anticholinergic meds/ limit opiates as they can worsen delirium   -if the patient becomes agitated can give haldol 1mg po/im q6h prn agitation, monitor for qtc prolongation  -continue to hold sertraline at this time to decrease the risk of polypharmacy  -continue mirtazapine, however change administration time from morning to evening, as this medication can increase sleepiness. patient got moring dose today, so start evening dose tomorrow     (Note, pancytopenia is most likely secondary to medical issues however, Zoloft and Remeron have thrombocytopenia/pancytopenia as possible side effects.    If counts continue to decrease and/or not increase as patient is stabilized medically, consider stopping antidepressants until medically stable.   Alternative, if felt to be  medically and hemodynamically stable, team could be to try very low dose stimulant like Ritalin 2.5mg in morning to see if it will increase energy, stimulate appetite.)

## 2020-06-07 NOTE — SWALLOW BEDSIDE ASSESSMENT ADULT - SPECIFY REASON(S)
Assess for safest, least restrictive PO diet
To reassess swallow function in setting of coughing w/ liquids per RN
to determine safest, least restrictive diet

## 2020-06-07 NOTE — SWALLOW BEDSIDE ASSESSMENT ADULT - SLP GENERAL OBSERVATIONS
Pt received awake, lethargic, oriented to self and place, intermittently confused, slow processing. Voice was hypophonic.

## 2020-06-07 NOTE — PROGRESS NOTE ADULT - SUBJECTIVE AND OBJECTIVE BOX
Patient endorsing diffuse pain this morning, otherwise denies ROS.    T(C): 36.8 (06-07-20 @ 14:52), Max: 37.2 (06-07-20 @ 01:15)  HR: 91 (06-07-20 @ 14:00) (83 - 98)  BP: 96/52 (06-07-20 @ 14:00) (81/52 - 100/57)  RR: 13 (06-07-20 @ 14:00) (11 - 17)  SpO2: 100% (06-07-20 @ 14:00) (99% - 100%)  Wt(kg): --Vital Signs Last 24 Hrs    Review of Systems:  -All other ROS negative, except those noted in HPI    PHYSICAL EXAM:  General: NAD, resting comfortably in bed. more conversant this morning Breathing well on room air  Neck: no JVD  Respiratory: diminished breath sounds at the bases, non-tachypneic, no respiratory distress   Cardiovascular: Regular rhythm/rate; S1/S2, no pericardial rub, pericardial drain removed with site clean, dry, and intact  Gastrointestinal: distended, non tender ascitic drain with appropriate drainage, site clean, dry, and intact  Extremities: WWP; 2+ bilateral dependent pitting edema up to his thigh  Neurological:  A&Ox 2 (person and place), arousable and interactive but still weak. Is able to follow simple commands and answer simple questions. CNII-XII grossly intact; no obvious focal deficits, no flapping tremor, but minimal asterixis  T/L/D: palacio present, currently anuric    acetaminophen   Tablet .. 650 milliGRAM(s) Oral every 6 hours PRN  albumin human 25% IVPB 50 milliLiter(s) IV Intermittent every 1 hour  chlorhexidine 2% Cloths 1 Application(s) Topical daily  collagenase Ointment 1 Application(s) Topical daily  DAPTOmycin IVPB 700 milliGRAM(s) IV Intermittent every 48 hours  finasteride 5 milliGRAM(s) Oral daily  insulin lispro (HumaLOG) corrective regimen sliding scale   SubCutaneous Before meals and at bedtime  midodrine 15 milliGRAM(s) Oral every 8 hours  mirtazapine 15 milliGRAM(s) Oral at bedtime  norepinephrine Infusion 0.05 MICROgram(s)/kG/Min IV Continuous <Continuous>  nystatin Cream 1 Application(s) Topical two times a day  pantoprazole  Injectable 40 milliGRAM(s) IV Push daily  sevelamer carbonate Powder 1600 milliGRAM(s) Oral three times a day with meals  sodium bicarbonate 650 milliGRAM(s) Oral three times a day      LABS:                        8.3    5.78  )-----------( 25       ( 07 Jun 2020 04:08 )             28.1     06-07    140  |  101  |  85<H>  ----------------------------<  150<H>  4.2   |  16<L>  |  6.27<H>    Ca    8.2<L>      07 Jun 2020 04:08  Phos  7.7     06-07  Mg     2.0     06-07    TPro  4.8<L>  /  Alb  2.7<L>  /  TBili  0.7  /  DBili  x   /  AST  22  /  ALT  6<L>  /  AlkPhos  76  06-07    PT/INR - ( 07 Jun 2020 04:08 )   PT: 18.5 sec;   INR: 1.60          PTT - ( 07 Jun 2020 04:08 )  PTT:36.7 sec    CAPILLARY BLOOD GLUCOSE      POCT Blood Glucose.: 142 mg/dL (07 Jun 2020 11:18)  POCT Blood Glucose.: 143 mg/dL (07 Jun 2020 05:27)  POCT Blood Glucose.: 129 mg/dL (06 Jun 2020 21:14)  POCT Blood Glucose.: 150 mg/dL (06 Jun 2020 16:37)

## 2020-06-07 NOTE — SWALLOW BEDSIDE ASSESSMENT ADULT - ASR SWALLOW ASPIRATION MONITOR
oral hygiene/position upright (90Y)/fever/pneumonia/throat clearing/change of breathing pattern/cough/gurgly voice/upper respiratory infection

## 2020-06-07 NOTE — PROGRESS NOTE ADULT - ASSESSMENT
75 yo M with MDS, pancytopenia, CKD, ascites of unknown origin presents from [facility] with complaints of decreased po intake and weakness likely secondary to uremia/AAMIR from diuresis for treatment of ascites, hospital course notable for UGIB and pericardial effusion with tamponade physiology, as well as ATN, and ascites of unknown etiology, now in ICU s/p paracentesis 5/10 (2L removed), IR pericardiocentesis 5/11 (drain in place), now with anemia and thrombocytopenia with hypercoagulability 2/2 DIC, c/b unable to tolerate SLED or CVVHD.       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, likely ATN from hypotension and ischemic injury  - remains anuric  - remains on pressor support  - thrombocytopenia with hypercoagulability 2/2 DIC, c/b unable to tolerate SLED or CVVHD  - last HD on 6/5 - for clearance  - electrolytes acceptable  - lokelma 10 g po prn, for k > 5   - continue sodium bicarb 650 mg po q8hr for metabolic acidosis whenever patient agrees to take meds  - renvela 1600 mg po q8hr when patient tolerates diet  - renally adjusted meds/ ABx  - currently undergoing GOC discussion - family wants HD for now  - L IJ HD cath placed on 5/15, need replacement   - IR  plans permacath placement on 6/8 (NPO at midnight)  - hd on 6/6

## 2020-06-07 NOTE — PROGRESS NOTE BEHAVIORAL HEALTH - NSBHCHARTREVIEWVS_PSY_A_CORE FT
Vital Signs Last 24 Hrs  T(C): 36.6 (06 Jun 2020 13:00), Max: 36.6 (06 Jun 2020 01:30)  T(F): 97.8 (06 Jun 2020 13:00), Max: 97.9 (06 Jun 2020 01:30)  HR: 90 (06 Jun 2020 15:00) (81 - 618)  BP: 93/55 (06 Jun 2020 15:00) (82/51 - 103/56)  BP(mean): 70 (06 Jun 2020 15:00) (62 - 77)  RR: 12 (06 Jun 2020 15:00) (10 - 20)  SpO2: 100% (06 Jun 2020 15:00) (97% - 100%)
Vital Signs Last 24 Hrs  T(C): 36.6 (07 Jun 2020 09:00), Max: 37.2 (07 Jun 2020 01:15)  T(F): 97.9 (07 Jun 2020 09:00), Max: 98.9 (07 Jun 2020 01:15)  HR: 90 (07 Jun 2020 13:00) (83 - 98)  BP: 89/51 (07 Jun 2020 13:00) (81/52 - 100/57)  BP(mean): 65 (07 Jun 2020 13:00) (62 - 74)  RR: 15 (07 Jun 2020 13:00) (11 - 17)  SpO2: 100% (07 Jun 2020 13:00) (99% - 100%)
Continues to be hypotensive

## 2020-06-07 NOTE — SWALLOW BEDSIDE ASSESSMENT ADULT - COMMENTS
Pt is known to this [facility] from this admission. Pt last evaluated on 5/18 w/ report of functional oropharyngeal swallow w/ recs for puree diet w/ thin liquids 2/2 poor dentition.     Per medical team, pt w/ very limited PO intake. Refusing NGT. Pt is DNR/DNI; GOC on-going re comfort care.

## 2020-06-07 NOTE — SWALLOW BEDSIDE ASSESSMENT ADULT - SWALLOW EVAL: DIAGNOSIS
Essentially functional oropharyngeal swallow with no overt signs of aspiration. Diet downgrade recommended due to poor dentition.
Eval was limited 2/2 pt declined trials. Mild oral deficits characterized primarily by bolus holding w/ liquids, suspect 2/2 AMS. Recs to continue current diet as tolerated. Maintain aspiration precautions. This SVC will f/u.
Suspect at least moderate pharyngeal dysphagia AEB inconsistent overt s/s of aspiration, multiple swallows per single bolus, and c/o odynophagia. Based on clinical presentation, hoarseness + reduced loudness, + subjective c/o a sore throat ~3-4 weeks, recommend FEES in conjunction with ENT to further assess pharynx + larynx prior to initiating a PO diet.

## 2020-06-07 NOTE — PROGRESS NOTE BEHAVIORAL HEALTH - OTHER
withdrawn not assessed bedbound not able to assess [place]. Answers questions in mostly 1-2 word answers unable to assess

## 2020-06-07 NOTE — PROGRESS NOTE BEHAVIORAL HEALTH - NSBHFUPINTERVALHXFT_PSY_A_CORE
yesterday morning remeron dose was changed to bedtime. This morning patient is more alert than yesterday, however he is still barely speaking, asks "writer how are you", but doesn't answer other questions, poor eye contact.    spoke with medical resident, who says that patient isnt eating, has very poor prognosis, and the medical team is considering comfort care

## 2020-06-07 NOTE — PROGRESS NOTE BEHAVIORAL HEALTH - SUMMARY
73 yo M with MDS, pancytopenia, CKD, ascites of unknown origin presents from [facility] with complaints of decreased po intake and weakness likely secondary to uremia/AAMIR from diuresis for treatment of ascites, hospital course notable for UGIB and pericardial effusion with tamponade physiology, as well as ATN, and ascites of unknown etiology, now in ICU s/p paracentesis 5/10 (2L removed), IR pericardiocentesis 5/11 (drain in place), now with anemia and thrombocytopenia with hypercoagulability 2/2 DIC, c/b unable to tolerate SLED or CVVHD. Psychiatry was consulted to evaluate the patient for depression. Patient currently is too lethargic to participate in interview. As per friend this is a change from baseline, suggestive of hypoactive vs mixed delirium. As per collaterals, patient also presents comorbid depression.   Plan:  -medical workup for delirium as per the primary team   -avoid benzodiazepines/ anticholinergic meds/ limit opiates as they can worsen delirium   -if the patient becomes agitated can give haldol 1mg po/im q6h prn agitation, monitor for qtc prolongation  -continue to hold sertraline at this time to decrease the risk of polypharmacy  -continue mirtazapine at bedtime    (Note, pancytopenia is most likely secondary to medical issues however, Zoloft and Remeron have thrombocytopenia/pancytopenia as possible side effects.    If counts continue to decrease and/or not increase as patient is stabilized medically, consider stopping antidepressants until medically stable.   Alternative, if felt to be  medically and hemodynamically stable, team could be to try very low dose stimulant like Ritalin 2.5mg in morning to see if it will increase energy, stimulate appetite.)

## 2020-06-07 NOTE — SWALLOW BEDSIDE ASSESSMENT ADULT - SWALLOW EVAL: RECOMMENDED FEEDING/EATING TECHNIQUES
crush medication (when feasible)/allow for swallow between intakes/maintain upright posture during/after eating for 30 mins/no straws/oral hygiene/position upright (90 degrees)/small sips/bites

## 2020-06-07 NOTE — SWALLOW BEDSIDE ASSESSMENT ADULT - ADDITIONAL RECOMMENDATIONS
Fold pillow behind Pt's head to provide support for maintaining head in a neutral position.
Frequent oral care.

## 2020-06-07 NOTE — PROGRESS NOTE ADULT - SUBJECTIVE AND OBJECTIVE BOX
Patient is a 74y Male seen and evaluated at bedside.   Lethargic. Vitals, meds, labs reviewed.    Last hd on 6/5. Hypotension requiring midodrine. Fluid overloaded.     Meds:    acetaminophen   Tablet .. 650 milliGRAM(s) Oral every 6 hours PRN  albumin human 25% IVPB 50 milliLiter(s) IV Intermittent every 1 hour  chlorhexidine 2% Cloths 1 Application(s) Topical daily  collagenase Ointment 1 Application(s) Topical daily  DAPTOmycin IVPB 700 milliGRAM(s) IV Intermittent every 48 hours  finasteride 5 milliGRAM(s) Oral daily  insulin lispro (HumaLOG) corrective regimen sliding scale   SubCutaneous Before meals and at bedtime  midodrine 15 milliGRAM(s) Oral every 8 hours  mirtazapine 15 milliGRAM(s) Oral at bedtime  norepinephrine Infusion 0.05 MICROgram(s)/kG/Min IV Continuous <Continuous>  nystatin Cream 1 Application(s) Topical two times a day  pantoprazole  Injectable 40 milliGRAM(s) IV Push daily  sevelamer carbonate Powder 1600 milliGRAM(s) Oral three times a day with meals  sodium bicarbonate 650 milliGRAM(s) Oral three times a day      T(C): 36.8 (06-07-20 @ 14:52), Max: 37.2 (06-07-20 @ 01:15)  HR: 91 (06-07-20 @ 14:00) (83 - 98)  BP: 96/52 (06-07-20 @ 14:00) (81/52 - 100/57)  RR: 13 (06-07-20 @ 14:00) (11 - 17)  SpO2: 100% (06-07-20 @ 14:00) (99% - 100%)    Input/Output      06-06-20 @ 07:01  -  06-07-20 @ 07:00  --------------------------------------------------------  IN: 131.2 mL / OUT: 1 mL / NET: 130.2 mL    06-07-20 @ 07:01  -  06-07-20 @ 15:09  --------------------------------------------------------  IN: 1.3 mL / OUT: 0 mL / NET: 1.3 mL            ROS:     unable - lethargic       PHYSICAL EXAM:    General: NAD, resting comfortably in bed. more conversant this morning Breathing well on room air  Neck: no JVD  Respiratory: diminished breath sounds at the bases, non-tachypneic, no respiratory distress   Cardiovascular: Regular rhythm/rate; S1/S2, no pericardial rub, pericardial drain removed with site clean, dry, and intact  Gastrointestinal: distended, non tender ascitic drain with appropriate drainage, site clean, dry, and intact  Extremities: WWP; 2+ bilateral dependent pitting edema up to his thigh  Neurological:  A&Ox 2 (person and place), arousable and interactive but still weak. Is able to follow simple commands and answer simple questions. CNII-XII grossly intact; no obvious focal deficits, no flapping tremor, but minimal asterixis  T/L/D: hakeem present, currently anuric  Skin: petechiae diffusely           ACCESS: R IJ temp HD cath    LABS:                                     8.3    5.78  )-----------( 25       ( 07 Jun 2020 04:08 )             28.1       06-07    140  |  101  |  85<H>  ----------------------------<  150<H>  4.2   |  16<L>  |  6.27<H>    Ca    8.2<L>      07 Jun 2020 04:08  Phos  7.7     06-07  Mg     2.0     06-07    TPro  4.8<L>  /  Alb  2.7<L>  /  TBili  0.7  /  DBili  x   /  AST  22  /  ALT  6<L>  /  AlkPhos  76  06-07      PT/INR - ( 07 Jun 2020 04:08 )   PT: 18.5 sec;   INR: 1.60          PTT - ( 07 Jun 2020 04:08 )  PTT:36.7 sec                    RADIOLOGY & ADDITIONAL STUDIES:

## 2020-06-07 NOTE — SWALLOW BEDSIDE ASSESSMENT ADULT - POSITIONING
upright (90 degrees)
upright (45 degrees)/Pt required verbal & physical support to keep head in a neutral position vs with neck extended
upright (90 degrees)

## 2020-06-07 NOTE — PROGRESS NOTE ADULT - ATTENDING COMMENTS
I independently performed the key portions of the evaluation and management service provided. I agree with the above history, physical, and plan which I have reviewed and edited where appropriate. I find AAMIR, fluid overload, hyptotension. Cont HD. UF on HD as tolerated. Keep MAP > 65. See full note. (Patient seen earlier in day.) .

## 2020-06-07 NOTE — SWALLOW BEDSIDE ASSESSMENT ADULT - SLP PERTINENT HISTORY OF CURRENT PROBLEM
75 yo M adm from [facility] w poor PO intake & weakness in setting of uremia/AAMIR. H/O MDS, pancytopenia, CKD, ascites
M with MDS, pancytopenia, CKD, ascites of unknown origin presents from [facility] with complaints of decreased po intake and weakness likely secondary to uremia/AAMIR from diuresis for treatment of ascites, hospital course notable for UGIB and pericardial effusion with tamponade physiology , as well as ATN, and ascites of unknown etiology, now in ICU s/p paracentesis, pending IR pericardiocentesis, cirrhosis workup
MDS, pancytopenia, CKD, ascites of unknown origin presents from [facility] with complaints of decreased po intake and weakness likely secondary to uremia/AAMIR from diuresis for treatment of ascites, hospital course notable for UGIB and pericardial effusion with tamponade physiology, as well as ATN, and ascites of unknown etiology, now in ICU s/p paracentesis 5/10 (2L removed), IR pericardiocentesis 5/11 (drain in place), now with anemia and thrombocytopenia with hypercoagulability 2/2 DIC, currently tolerating HD with increased pressor requirements.

## 2020-06-07 NOTE — SWALLOW BEDSIDE ASSESSMENT ADULT - NS SPL SWALLOW CLINIC TRIAL FT
Oral phase was unremarkable. Pharyngeal phase was significant for inconsistent immediate and delayed throat clearing with liquids and multiple (~3) swallows per single bolus. Pt with c/o odynophagia (6/7 on a 10 point scale).
Min intermittent anterior spillage of thin liquids via cup. Prolonged yet functional bolus formation/manipulation. Intermittent bolus holding noted. Following instance of bolus holding, pt w/ delayed cough x1, suggestive of aspiration. Hyolaryngeal elevation appreciated upon palpation. Pt declined to trial solids despite education and encouragement.
Puree & soft solid textures not presented as Pt deferred additional trials and reported his only complaint was that he could not chew soft solids d/t poor dentition.

## 2020-06-07 NOTE — PROGRESS NOTE ADULT - ASSESSMENT
75 yo M with MDS, pancytopenia, CKD, ascites of unknown origin presents from [facility] with complaints of decreased po intake and weakness likely secondary to uremia/AAMIR from diuresis for treatment of ascites, hospital course notable for UGIB and pericardial effusion with tamponade physiology, as well as ATN, and ascites of unknown etiology, now in ICU s/p paracentesis 5/10 (2L removed), IR pericardiocentesis 5/11 (drain in place), now with anemia and thrombocytopenia with hypercoagulability 2/2 DIC, currently tolerating HD with increased pressor requirements    GOC:   Patient is DNR/DNI. Will continue to have GOC discussion with HCP Venus regarding comfort care  - Will need to consent patient for permacath placement on 6/8    Neuro:   #AMS likely 2/2 to uremia.  -improving    #depression   -increased mirtazapine to 15mg at night  -discontinue sertraline    Cardiology:   #Hypotension  - midodrine 15mg [id number]  - continue weaning Levo as possible  - discontinued solucortef    Pulm:   -stable on RA  -maintain head of bed 30 degrees, due to aspiration risk     GI:   #ascites  - Elevated SAAG consistent with portal HTN,   - no plan for paracentesis today    #Feeds  - decreased appetite with very limited po intake. Refuses NGT.    Renal/:   #AAMIR on CKD 2/2 atn from hypotension and ischemic injury  - on levophed, midodrine,  - renally adjusted meds/ ABx  - renvela 1600mg PO TID w/each meal   - palacio in place  - lokelma 10 q8 and sodium bicarb 650 TID  - Spoke with IR who plans permacath placement on 6/8 (NPO at midnight)  - Will remove HD cath after HD today    HEME:   #DIC  - monitor fibrinogen, haptoglobin, LDH, and platelets  - improving    Anemia:   Hg 6.7 this morning  -1 pRBC unit ordered  -repeat CBC stable    #Coagulopathy:   - Monitor PT/INR     #DVT - RLE  - Eliquis held in setting of DIC concern    #thrombocytopenia 2/2 DIC and possibly daptomycin  -monitor plt level  -Plt repletion goal <10    ID:   - Persistent MRSA bacteremia  - dapto 700mg [id number] until 6/29  - obtain CK twice a week    ENDO  - lantus 5  - monitor sugars    DVt ppx: SCDs. no chemo ppx due to pancytopenia  Lines: R IJ CVC (6/3)

## 2020-06-07 NOTE — PROGRESS NOTE BEHAVIORAL HEALTH - NSBHCHARTREVIEWLAB_PSY_A_CORE FT
6.7    3.57  )-----------( 19       ( 06 Jun 2020 09:58 )             22.5   06-06    141  |  101  |  88<H>  ----------------------------<  113<H>  4.0   |  21<L>  |  5.69<H>    Ca    8.1<L>      06 Jun 2020 05:22  Phos  6.8     06-06  Mg     2.1     06-06    TPro  4.7<L>  /  Alb  2.8<L>  /  TBili  0.8  /  DBili  x   /  AST  23  /  ALT  6<L>  /  AlkPhos  67  06-06
8.3    5.78  )-----------( 25       ( 07 Jun 2020 04:08 )             28.1   06-07    140  |  101  |  85<H>  ----------------------------<  150<H>  4.2   |  16<L>  |  6.27<H>    Ca    8.2<L>      07 Jun 2020 04:08  Phos  7.7     06-07  Mg     2.0     06-07    TPro  4.8<L>  /  Alb  2.7<L>  /  TBili  0.7  /  DBili  x   /  AST  22  /  ALT  6<L>  /  AlkPhos  76  06-07
HCT 27, BUN/Cr 119/7.5

## 2020-06-07 NOTE — SWALLOW BEDSIDE ASSESSMENT ADULT - MUCOSAL QUALITY
Mild amount of thick oral secretions diffuse in oral cavity. Oral care attempted; pt declined despite education and encouragement.

## 2020-06-08 NOTE — PROGRESS NOTE ADULT - SUBJECTIVE AND OBJECTIVE BOX
OVERNIGHT EVENTS: levo at .02    SUBJECTIVE / INTERVAL HPI: Patient seen and examined at bedside. Patient laying in bed, mouth open, looks more lethargic this morning. continues to have no appetite due to lack of interest. He had 1 BM overnight. Remains anuric. 12 point ROS negative    VITAL SIGNS:  Vital Signs Last 24 Hrs  T(C): 35.8 (08 Jun 2020 09:00), Max: 36.8 (07 Jun 2020 14:52)  T(F): 96.5 (08 Jun 2020 09:00), Max: 98.2 (07 Jun 2020 14:52)  HR: 91 (08 Jun 2020 12:00) (84 - 95)  BP: 95/55 (08 Jun 2020 12:00) (73/47 - 110/55)  BP(mean): 69 (08 Jun 2020 12:00) (55 - 79)  RR: 11 (08 Jun 2020 12:00) (10 - 20)  SpO2: 100% (08 Jun 2020 12:00) (85% - 100%)  I&O's Summary    07 Jun 2020 07:01  -  08 Jun 2020 07:00  --------------------------------------------------------  IN: 26 mL / OUT: 0 mL / NET: 26 mL    08 Jun 2020 07:01  -  08 Jun 2020 12:59  --------------------------------------------------------  IN: 0 mL / OUT: 0 mL / NET: 0 mL        PHYSICAL EXAM:    General: NAD, resting comfortably in bed. more conversant this morning Breathing well on room air  Neck: no JVD  Respiratory: diminished breath sounds at the bases, non-tachypneic, no respiratory distress   Cardiovascular: Regular rhythm/rate; S1/S2, no pericardial rub, pericardial drain removed with site clean, dry, and intact  Gastrointestinal: distended, non tender ascitic drain with appropriate drainage, site clean, dry, and intact  Extremities: WWP; 2+ bilateral dependent pitting edema up to his thigh  Neurological:  A&Ox 2 (person and place), arousable and interactive but still weak. Is able to follow simple commands and answer simple questions. CNII-XII grossly intact; no obvious focal deficits, no flapping tremor, but minimal asterixis    MEDICATIONS:  MEDICATIONS  (STANDING):  albumin human 25% IVPB 50 milliLiter(s) IV Intermittent every 1 hour  chlorhexidine 2% Cloths 1 Application(s) Topical daily  collagenase Ointment 1 Application(s) Topical daily  DAPTOmycin IVPB 700 milliGRAM(s) IV Intermittent every 48 hours  insulin lispro (HumaLOG) corrective regimen sliding scale   SubCutaneous Before meals and at bedtime  midodrine 15 milliGRAM(s) Oral every 8 hours  mirtazapine 15 milliGRAM(s) Oral at bedtime  norepinephrine Infusion 0.05 MICROgram(s)/kG/Min (3.37 mL/Hr) IV Continuous <Continuous>  nystatin Cream 1 Application(s) Topical two times a day  pantoprazole  Injectable 40 milliGRAM(s) IV Push daily  sevelamer carbonate Powder 1600 milliGRAM(s) Oral three times a day with meals  sodium bicarbonate 650 milliGRAM(s) Oral three times a day    MEDICATIONS  (PRN):  acetaminophen   Tablet .. 650 milliGRAM(s) Oral every 6 hours PRN Moderate Pain (4 - 6)      ALLERGIES:  Allergies    No Known Allergies    Intolerances        LABS:                        8.5    6.82  )-----------( 29       ( 08 Jun 2020 04:50 )             28.4     06-08    143  |  103  |  95<H>  ----------------------------<  149<H>  4.3   |  17<L>  |  6.69<H>    Ca    8.3<L>      08 Jun 2020 04:50  Phos  7.8     06-08  Mg     2.0     06-08    TPro  4.6<L>  /  Alb  2.8<L>  /  TBili  0.6  /  DBili  x   /  AST  21  /  ALT  7<L>  /  AlkPhos  79  06-08    PT/INR - ( 07 Jun 2020 04:08 )   PT: 18.5 sec;   INR: 1.60          PTT - ( 07 Jun 2020 04:08 )  PTT:36.7 sec    CAPILLARY BLOOD GLUCOSE      POCT Blood Glucose.: 166 mg/dL (08 Jun 2020 10:20)      RADIOLOGY & ADDITIONAL TESTS: Reviewed.

## 2020-06-08 NOTE — PROGRESS NOTE ADULT - ATTENDING COMMENTS
74 year old male with MDS with MRSA bacteremia with acute renal failure with shock (on levophed). Patient continues to refuse oral medication and continue to refuse eating. Not complaint to medical management. He continues to be in shock but his bacteremia has been resolved.  Plan:  - Patient continues to require levophed for shock. Requirement has decreased. Patient denying oral intake and medication intake. Add hydrocortisone.  - Psychiatry consultation was done. Dose of mirtazapine was increased. No improvement as patient continues to deny taking oral medication.  - Continue Daptomycin. Follow CK level 2 times a week.  - Perm cath placement pending. HD planned for tomorrow.  - Poor prognosis .

## 2020-06-08 NOTE — PROGRESS NOTE ADULT - SUBJECTIVE AND OBJECTIVE BOX
Vascular & Interventional Radiology Post-Procedure Note    Pre-Procedure Diagnosis:  ESRD  Post-Procedure Diagnosis: Same as pre.  Indications for Procedure:  hemodialysis    : MD Chung  Assistant(s):    Procedure Details/Findings:   tunneled HD catheter via R IJ. 23 cm Palindrome    Complications:  none  Estimated Blood Loss: Minimal  Specimen:  none  Contrast:  none  Sedation:  25 micrograms fentayl  Patient Condition/Disposition:  ICU, stable  Plan:   catheter ready for use

## 2020-06-08 NOTE — PROGRESS NOTE ADULT - ASSESSMENT
73 yo M with MDS, pancytopenia, CKD, ascites of unknown origin presents from [facility] with complaints of decreased po intake and weakness likely secondary to uremia/AAMIR from diuresis for treatment of ascites, hospital course notable for UGIB and pericardial effusion with tamponade physiology, as well as ATN, and ascites of unknown etiology, now in ICU s/p paracentesis 5/10 (2L removed), IR pericardiocentesis 5/11 (drain in place), now with anemia and thrombocytopenia with hypercoagulability 2/2 DIC that improved, now likely MDS, currently tolerating HD with increased pressor requirements with plan for permacath this morning.     GOC:   Patient is DNR/DNI. Will continue to have GOC discussion with HCP Venus regarding comfort care  - Will need to consent patient for permacath placement on 6/8    Neuro:   #AMS likely 2/2 to uremia.  -improving    #depression   -c/w mirtazapine to 15mg at night  -discontinue sertraline  - Psych following, appreciate recs    Cardiology:   #Hypotension  - midodrine 15mg [id number]  - continue weaning Levo as possible    Pulm:   -stable on RA  -maintain head of bed 30 degrees, due to aspiration risk     GI:   #ascites  - Elevated SAAG consistent with portal HTN,   - no plan for paracentesis today    #Feeds  - decreased appetite with very limited po intake. Refuses NGT.    Renal/:   #AAMIR on CKD 2/2 atn from hypotension and ischemic injury  - on levophed, midodrine,  - renally adjusted meds/ ABx  - renvela 1600mg PO TID w/each meal   - palacio in place  - lokelma 10 q8 and sodium bicarb 650 TID  - plan permacath placement on 6/8    HEME:   #DIC  - monitor fibrinogen, haptoglobin, LDH, and platelets  - improving    Anemia:   Hg 6.7 this morning  -1 pRBC unit ordered  -repeat CBC stable    #Coagulopathy:   - Monitor PT/INR     #DVT - RLE  - Eliquis held in setting of DIC concern    #thrombocytopenia 2/2 DIC and possibly daptomycin  -monitor plt level  -Plt repletion goal <10    ID:   - Persistent MRSA bacteremia  - dapto 700mg [id number] until 6/29  - obtain CK twice a week    ENDO  - lantus 5  - monitor sugars    DVt ppx: SCDs. no chemo ppx due to pancytopenia  Lines: CAR LONGORIA CVC (6/3) 73 yo M with MDS, pancytopenia, CKD, ascites of unknown origin presents from [facility] with complaints of decreased po intake and weakness likely secondary to uremia/AAMIR from diuresis for treatment of ascites, hospital course notable for UGIB and pericardial effusion with tamponade physiology, as well as ATN, and ascites of unknown etiology, now in ICU s/p paracentesis 5/10 (2L removed), IR pericardiocentesis 5/11 (drain in place), now with anemia and thrombocytopenia with hypercoagulability 2/2 DIC that improved, now likely MDS, currently tolerating HD with increased pressor requirements with plan for permacath this morning.     GOC:   Patient is DNR/DNI. Will continue to have GOC discussion with HCP Venus regarding comfort care  - Will need to consent patient for permacath placement on 6/8    Neuro:   #AMS likely 2/2 to uremia.  -improving    #depression   -c/w mirtazapine to 15mg at night  -discontinue sertraline  - Psych following, appreciate recs    Cardiology:   #Hypotension  - midodrine 15mg [id number]  - continue weaning Levo as possible    Pulm:   -stable on RA  -maintain head of bed 30 degrees, due to aspiration risk     GI:   #ascites  - Elevated SAAG consistent with portal HTN,   - no plan for paracentesis today    #Feeds  - decreased appetite with very limited po intake. Refuses NGT.    Renal/:   #AAMIR on CKD 2/2 atn from hypotension and ischemic injury  - on levophed, midodrine,  - renally adjusted meds/ ABx  - renvela 1600mg PO TID w/each meal   - palacio in place  - lokelma 10 q8 and sodium bicarb 650 TID  - plan permacath placement on 6/8    HEME:   #DIC  - monitor fibrinogen, haptoglobin, LDH, and platelets  - improving  - plate transfusion today for permacath    Anemia:   Hg 6.7 this morning  -1 pRBC unit ordered  -repeat CBC stable    #Coagulopathy:   - Monitor PT/INR     #DVT - RLE  - Eliquis held in setting of DIC concern    #thrombocytopenia 2/2 DIC and possibly daptomycin  -monitor plt level  -Plt repletion goal <10    ID:   - Persistent MRSA bacteremia  - dapto 700mg [id number] until 6/29  - obtain CK twice a week    ENDO  - lantus 5  - monitor sugars    DVt ppx: SCDs. no chemo ppx due to pancytopenia  Lines: R IJ CVC (6/3) 73 yo M with MDS, pancytopenia, CKD, ascites of unknown origin presents from [facility] with complaints of decreased po intake and weakness likely secondary to uremia/AAMIR from diuresis for treatment of ascites, hospital course notable for UGIB and pericardial effusion with tamponade physiology, as well as ATN, and ascites of unknown etiology, now in ICU s/p paracentesis 5/10 (2L removed), IR pericardiocentesis 5/11 (drain in place), now with anemia and thrombocytopenia with hypercoagulability 2/2 DIC that improved, now likely MDS, currently tolerating HD with increased pressor requirements with plan for permacath this morning.     GOC:   Patient is DNR/DNI. Will continue to have GOC discussion with HCP Venus regarding comfort care  - Will need to consent patient for permacath placement on 6/8    Neuro:   #AMS likely 2/2 to uremia.  -improving    #depression   -c/w mirtazapine to 15mg at night  -discontinue sertraline  - Psych following, appreciate recs    Cardiology:   #Hypotension  - midodrine 15mg [id number]  - continue weaning Levo as possible    Pulm:   -stable on RA  -maintain head of bed 30 degrees, due to aspiration risk     GI:   #ascites  - Elevated SAAG consistent with portal HTN,   - no plan for paracentesis today    #Feeds  - decreased appetite with very limited po intake. Refuses NGT.    Renal/:   #AAMIR on CKD 2/2 atn from hypotension and ischemic injury  - on levophed, midodrine,  - renally adjusted meds/ ABx  - renvela 1600mg PO TID w/each meal   - palacio in place  - lokelma 10 q8 and sodium bicarb 650 TID  - plan permacath placement on 6/8    HEME:   #DIC  - monitor fibrinogen, haptoglobin, LDH, and platelets  - improving  - plate transfusion today for permacath    Anemia:   Hg 6.7 this morning  -1 pRBC unit ordered  -repeat CBC stable    #Coagulopathy:   - Monitor PT/INR     #DVT - RLE  - Eliquis held in setting of DIC concern    #thrombocytopenia 2/2 DIC/MDS and possibly daptomycin  -monitor plt level  -Plt repletion goal <10    ID:   - Persistent MRSA bacteremia  - dapto 700mg [id number] until 6/29  - obtain CK twice a week    ENDO  - monitor sugars    DVt ppx: SCDs. no chemo ppx due to pancytopenia  Lines: R IJ CVC (6/3)

## 2020-06-08 NOTE — PROGRESS NOTE ADULT - SUBJECTIVE AND OBJECTIVE BOX
Patient is a 74y Male seen and evaluated at bedside.   No new complaints. Looks lethargic. Minimally communicative. Refusing to swallow meds  Vitals, meds, labs reviewed.  Getting Psych eval.  Remains anuric.        Meds:    acetaminophen   Tablet .. 650 every 6 hours PRN  albumin human 25% IVPB 50 every 1 hour  chlorhexidine 2% Cloths 1 daily  collagenase Ointment 1 daily  DAPTOmycin IVPB 700 every 48 hours  insulin lispro (HumaLOG) corrective regimen sliding scale  Before meals and at bedtime  midodrine 15 every 8 hours  mirtazapine 15 at bedtime  norepinephrine Infusion 0.05 <Continuous>  nystatin Cream 1 two times a day  pantoprazole  Injectable 40 daily  sevelamer carbonate Powder 1600 three times a day with meals  sodium bicarbonate 650 three times a day      Allergies    No Known Allergies    Intolerances        T(C): , Max: 36.8 (06-07-20 @ 14:52)  T(F): , Max: 98.2 (06-07-20 @ 14:52)  HR: 90 (06-08-20 @ 13:00)  BP: 82/52 (06-08-20 @ 13:00)  BP(mean): 63 (06-08-20 @ 13:00)  RR: 11 (06-08-20 @ 13:00)  SpO2: 99% (06-08-20 @ 13:00)  Wt(kg): --    06-07 @ 07:01  -  06-08 @ 07:00  --------------------------------------------------------  IN: 26 mL / OUT: 0 mL / NET: 26 mL    06-08 @ 07:01  -  06-08 @ 13:20  --------------------------------------------------------  IN: 0 mL / OUT: 0 mL / NET: 0 mL          Review of Systems:  unable      PHYSICAL EXAM:  GENERAL: lethargic,  awake, no acute distress at present  NECK: Neck supple, No JVD  CHEST/LUNG: Clear to auscultation bilaterally; No wheeze, no rales, no crepitations  HEART: Regular rate and rhythm. S1S2+  No gallop, no rub   ABDOMEN: Soft, Nontender, BS+nt, No flank tenderness.   EXTREMITIES: No clubbing, cyanosis, or edema  Neurology: lethargic, flat affect        LABS:                        8.5    6.82  )-----------( 29       ( 08 Jun 2020 04:50 )             28.4     06-08    143  |  103  |  95<H>  ----------------------------<  149<H>  4.3   |  17<L>  |  6.69<H>    Ca    8.3<L>      08 Jun 2020 04:50  Phos  7.8     06-08  Mg     2.0     06-08    TPro  4.6<L>  /  Alb  2.8<L>  /  TBili  0.6  /  DBili  x   /  AST  21  /  ALT  7<L>  /  AlkPhos  79  06-08      PT/INR - ( 07 Jun 2020 04:08 )   PT: 18.5 sec;   INR: 1.60          PTT - ( 07 Jun 2020 04:08 )  PTT:36.7 sec          RADIOLOGY & ADDITIONAL STUDIES: Patient is a 74y Male seen and evaluated at bedside.   No new complaints. Looks lethargic. Minimally communicative. unable  to swallow meds  Vitals, meds, labs reviewed.  Getting Psych eval.  Remains anuric.        Meds:    acetaminophen   Tablet .. 650 every 6 hours PRN  albumin human 25% IVPB 50 every 1 hour  chlorhexidine 2% Cloths 1 daily  collagenase Ointment 1 daily  DAPTOmycin IVPB 700 every 48 hours  insulin lispro (HumaLOG) corrective regimen sliding scale  Before meals and at bedtime  midodrine 15 every 8 hours  mirtazapine 15 at bedtime  norepinephrine Infusion 0.05 <Continuous>  nystatin Cream 1 two times a day  pantoprazole  Injectable 40 daily  sevelamer carbonate Powder 1600 three times a day with meals  sodium bicarbonate 650 three times a day      Allergies    No Known Allergies    Intolerances        T(C): , Max: 36.8 (06-07-20 @ 14:52)  T(F): , Max: 98.2 (06-07-20 @ 14:52)  HR: 90 (06-08-20 @ 13:00)  BP: 82/52 (06-08-20 @ 13:00)  BP(mean): 63 (06-08-20 @ 13:00)  RR: 11 (06-08-20 @ 13:00)  SpO2: 99% (06-08-20 @ 13:00)  Wt(kg): --    06-07 @ 07:01  -  06-08 @ 07:00  --------------------------------------------------------  IN: 26 mL / OUT: 0 mL / NET: 26 mL    06-08 @ 07:01  -  06-08 @ 13:20  --------------------------------------------------------  IN: 0 mL / OUT: 0 mL / NET: 0 mL          Review of Systems:  unable      PHYSICAL EXAM:  GENERAL: lethargic,  awake, no acute distress at present on RA  NECK: Neck supple, No JVD  CHEST/LUNG: decreased BS  No wheeze, no rales, no crepitations  HEART: Regular rate and rhythm. S1S2+  No gallop, no rub   ABDOMEN: Soft, Nontender, BS+nt, No flank tenderness.   EXTREMITIES: anasarca   Neurology: lethargic, flat affect        LABS:                        8.5    6.82  )-----------( 29       ( 08 Jun 2020 04:50 )             28.4     06-08    143  |  103  |  95<H>  ----------------------------<  149<H>  4.3   |  17<L>  |  6.69<H>    Ca    8.3<L>      08 Jun 2020 04:50  Phos  7.8     06-08  Mg     2.0     06-08    TPro  4.6<L>  /  Alb  2.8<L>  /  TBili  0.6  /  DBili  x   /  AST  21  /  ALT  7<L>  /  AlkPhos  79  06-08      PT/INR - ( 07 Jun 2020 04:08 )   PT: 18.5 sec;   INR: 1.60          PTT - ( 07 Jun 2020 04:08 )  PTT:36.7 sec          RADIOLOGY & ADDITIONAL STUDIES:

## 2020-06-08 NOTE — PROGRESS NOTE ADULT - ATTENDING COMMENTS
no acute need for HD today -- will re-attempt tomorrow after access obtained  overall prognosis poor , pressor dependent it seems   cont address GOC

## 2020-06-08 NOTE — PROGRESS NOTE ADULT - ASSESSMENT
73 yo M with MDS, pancytopenia, CKD, ascites of unknown origin presents from [facility] with complaints of decreased po intake and weakness likely secondary to uremia/AAMIR from diuresis for treatment of ascites, hospital course notable for UGIB and pericardial effusion with tamponade physiology, as well as ATN, and ascites of unknown etiology, now in ICU s/p paracentesis 5/10 (2L removed), IR pericardiocentesis 5/11 (drain in place), now with anemia and thrombocytopenia with hypercoagulability 2/2 DIC, c/b unable to tolerate SLED or CVVHD.       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, likely ATN from hypotension and ischemic injury  - remains anuric  - thrombocytopenia with hypercoagulability 2/2 DIC, c/b unable to tolerate SLED or CVVHD  - last HD on 6/5 - for clearance  - volume and electrolytes acceptable  - no urgent indication for hd  - lokelma 10 g po prn, for k > 5   - continue sodium bicarb 650 mg po q8hr for metabolic acidosis whenever patient agrees to take meds  - renvela 1600 mg po q8hr when patient tolerates diet  - renally adjusted meds/ ABx  - currently undergoing GOC discussion - family wants HD for now  - planned for [facility] placement by IR today  - will consider hd on 6/9

## 2020-06-08 NOTE — CHART NOTE - NSCHARTNOTEFT_GEN_A_CORE
PALLIATIVE MEDICINE COORDINATION OF CARE NOTE FOR SANIYA LOZANO    Patient last assessed: 6/4/2002 to manage: GOC/AD, Symptoms, and Support was recommended: To discontinue sedative psychotropics eg. Zoloft and Remeron. Consider trial of Ritalin or Provigil daily ~6am.     30 Minutes; Start: 10:00am End: 10:30am, of non-face-to-face prolonged service provided that relates to (face-to-face) care that has or will occur and ongoing patient management, including one or more of the following:   - Reviewed records from other physicians or other health care professional services, including one or more of the following: other medical records and diagnostic / radiology study results     - Other: Medication reviewed.    The patient HAS NOT used PRN's in the last 24h. MME: 0mg    MEDICATIONS  (STANDING):  albumin human 25% IVPB 50 milliLiter(s) IV Intermittent every 1 hour  chlorhexidine 2% Cloths 1 Application(s) Topical daily  collagenase Ointment 1 Application(s) Topical daily  DAPTOmycin IVPB 700 milliGRAM(s) IV Intermittent every 48 hours  insulin lispro (HumaLOG) corrective regimen sliding scale   SubCutaneous Before meals and at bedtime  midodrine 15 milliGRAM(s) Oral every 8 hours  mirtazapine 15 milliGRAM(s) Oral at bedtime  norepinephrine Infusion 0.05 MICROgram(s)/kG/Min (3.37 mL/Hr) IV Continuous <Continuous>  nystatin Cream 1 Application(s) Topical two times a day  pantoprazole  Injectable 40 milliGRAM(s) IV Push daily  sevelamer carbonate Powder 1600 milliGRAM(s) Oral three times a day with meals  sodium bicarbonate 650 milliGRAM(s) Oral three times a day  MEDICATIONS  (PRN):  acetaminophen   Tablet .. 650 milliGRAM(s) Oral every 6 hours PRN Moderate Pain (4 - 6)    - Other: Advanced directives     DNR DNI     No documented MOLST found on [facility]     No documented HCP form found on [facility]     Other surrogates: Friend Teresa "Venus" Matt [phone] / [phone]     No Living will / POA / Advance directives found on [facility] / [facility].     Documented GOC notes on [facility] on 5/22/2020, 5/25/2020, and 5/27/2020.    - Other: Coordination/Plan of care     2 admissions in 1 year     Current admission LOS: 31 days     LACE score: 17 ADVANCE ILLNESS PATIENT since 2/19/2020.     Events of the weekend and Psych CL eval recommendations noted. Agree with recommendation for a stimulant and decreasing Zoloft, but would also discontinue Remeron.  Will evaluate the patient at bedside during afternoon bedside rounds. Ongoing supportive services via support to patient's friend and the patient via music therapy.

## 2020-06-08 NOTE — PROGRESS NOTE BEHAVIORAL HEALTH - SUMMARY
73 yo M with MDS, pancytopenia, CKD, ascites of unknown origin presents from [facility] with complaints of decreased po intake and weakness likely secondary to uremia/AAMIR from diuresis for treatment of ascites, hospital course notable for UGIB and pericardial effusion with tamponade physiology, as well as ATN, and ascites of unknown etiology, now in ICU s/p paracentesis 5/10 (2L removed), IR pericardiocentesis 5/11 (drain in place), now with anemia and thrombocytopenia with hypercoagulability 2/2 DIC, c/b unable to tolerate SLED or CVVHD. Psychiatry was consulted to evaluate the patient for depression. Patient's attention waxes and wanes during interview and he is disoriented As per friend this is a change from baseline, suggestive of hypoactive vs mixed delirium. As per collaterals, patient also presents comorbid depression.     Recommendations  -Patient's healthcare proxy Teresa seems to have high expectations for recovery while my understanding is that the patient has a poor prognosis. Would recommend palliative consult and/or a family meeting for goals of care discussion.  -medical workup for delirium as per the primary team   -avoid benzodiazepines/ anticholinergic meds/ limit opiates as they can worsen delirium   -if the patient becomes agitated can give haldol 1mg po/im q6h prn agitation, monitor for qtc prolongation  -continue to hold sertraline at this time to decrease the risk of polypharmacy  -continue mirtazapine at bedtime    (Note, pancytopenia is most likely secondary to medical issues however, Remeron has thrombocytopenia/pancytopenia as possible side effects. If counts continue to decrease and/or not increase as patient is stabilized medically, consider stopping antidepressants until medically stable.) 75 yo M with MDS, pancytopenia, CKD, ascites of unknown origin presents from [facility] with complaints of decreased po intake and weakness likely secondary to uremia/AAMIR from diuresis for treatment of ascites, hospital course notable for UGIB and pericardial effusion with tamponade physiology, as well as ATN, and ascites of unknown etiology, now in ICU s/p paracentesis 5/10 (2L removed), IR pericardiocentesis 5/11 (drain in place), now with anemia and thrombocytopenia with hypercoagulability 2/2 DIC, c/b unable to tolerate SLED or CVVHD. Psychiatry was consulted to evaluate the patient for depression. Patient's attention waxes and wanes during interview and he is disoriented As per friend this is a change from baseline, suggestive of hypoactive vs mixed delirium. As per collaterals, patient also presents comorbid depression.     Recommendations  -Patient's healthcare proxy Teresa seems to have high expectations for recovery while my understanding is that the patient has a poor prognosis. Would recommend palliative consult and/or a family meeting for goals of care discussion.  -medical workup for delirium as per the primary team   -avoid benzodiazepines/ anticholinergic meds/ limit opiates as they can worsen delirium   -if the patient becomes agitated can give haldol 0.5 mg po/im q6h prn agitation, monitor for qtc prolongation  -continue to hold sertraline at this time to decrease the risk of polypharmacy  -If remeron restarted would decrease dose back to 7.5 mg.

## 2020-06-08 NOTE — CHART NOTE - NSCHARTNOTEFT_GEN_A_CORE
Admitting Diagnosis:   Patient is a 74y old  Male who presents with a chief complaint of Sepsis (30 May 2020 11:24)      PAST MEDICAL & SURGICAL HISTORY:  History of pancytopenia  H/O hydrocephalus  Anemia  HLD (hyperlipidemia)  Enlarged prostate  DM (diabetes mellitus)  HTN (hypertension)  H/O prior ablation treatment      Current Nutrition Order:  NPO MN for permacath placement     PO Intake: Good (%) [   ]  Fair (50-75%) [   ] Poor (<25%) [   ]- NA NPO today, though pt has consistently had <25% intake over last 2-3 weeks     GI Issues: No complaints of N/V  BM 6/8    Pain: Pt denied pain this morning     Skin Integrity: Alexandre 13  2+ LE pitting edema  IAD  L. heel wound  B/L buttocks unstageable pressure injuries     Labs:   06-08    143  |  103  |  95<H>  ----------------------------<  149<H>  4.3   |  17<L>  |  6.69<H>    Ca    8.3<L>      08 Jun 2020 04:50  Phos  7.8     06-08  Mg     2.0     06-08    TPro  4.6<L>  /  Alb  2.8<L>  /  TBili  0.6  /  DBili  x   /  AST  21  /  ALT  7<L>  /  AlkPhos  79  06-08    CAPILLARY BLOOD GLUCOSE      POCT Blood Glucose.: 166 mg/dL (08 Jun 2020 10:20)  POCT Blood Glucose.: 133 mg/dL (07 Jun 2020 21:03)  POCT Blood Glucose.: 201 mg/dL (07 Jun 2020 16:19)      Medications:  MEDICATIONS  (STANDING):  albumin human 25% IVPB 50 milliLiter(s) IV Intermittent every 1 hour  chlorhexidine 2% Cloths 1 Application(s) Topical daily  collagenase Ointment 1 Application(s) Topical daily  DAPTOmycin IVPB 700 milliGRAM(s) IV Intermittent every 48 hours  hydrocortisone sodium succinate Injectable 50 milliGRAM(s) IV Push every 8 hours  insulin lispro (HumaLOG) corrective regimen sliding scale   SubCutaneous Before meals and at bedtime  midodrine 15 milliGRAM(s) Oral every 8 hours  mirtazapine 15 milliGRAM(s) Oral at bedtime  norepinephrine Infusion 0.05 MICROgram(s)/kG/Min (3.37 mL/Hr) IV Continuous <Continuous>  nystatin Cream 1 Application(s) Topical two times a day  pantoprazole  Injectable 40 milliGRAM(s) IV Push daily  sevelamer carbonate Powder 1600 milliGRAM(s) Oral three times a day with meals  sodium bicarbonate 650 milliGRAM(s) Oral three times a day    MEDICATIONS  (PRN):  acetaminophen   Tablet .. 650 milliGRAM(s) Oral every 6 hours PRN Moderate Pain (4 - 6)        Ht: 5'10''  pounds (75kg) +-10%   5/8 158 pounds %IBW=95% BMI 22.7   5/15 129.6 pounds   5/16 191.5 pounds  5/18 193 pounds / 194 pounds   5/27 136.9 pounds / 137 pounds   5/28 139 pounds  6/8 160 pounds (72.6kg)    Wt Change: Pt w/drastically differing weights throughout admission; has had fluid gain/loss from edema and HD. Please continue to trend daily wts    Malnutrition note placed 5/9- pt had been noted with Mild Protein Calorie Malnutrition, during this time noted with mild muscle losses to temporal region and Mild fat loss over triceps region noted.  During RD visit 5/27 pt noted with moderate wasting to orbital region, muscle loss to temple appear to be mild/moderate in natural, new malnutrition note placed today 5/27.     Estimated energy needs:   IBW (75kg) for EER d/t varying EMR wts in HD pt   Nutrient needs based on [facility] standards of care for maintenance in adults, adjusted for age, wounds, HD. Fluids per team  Calories: 25-30 kcal/kg = 8101-4110 kcal/day  Protein: ~1.2-1.4 g/kg = 90-105g protein/day  Fluids per team     Subjective:   75 yo M with MDS, DM2, afib, HTN, HLD, BPH s/p palacio, pancytopenia, CKD4, ascites of unknown origin presents from [facility] with complaints of decreased PO intake and weakness likely secondary to uremia/AAMIR from diuresis for treatment of ascites. Pt hospital course notable for UGIB, CT on 5/18 showing pancolitis and GI PCR negative; team reportes GI w/u overall negative. Pt with pericardial effusion with tamponade physiology as well as ATN and ascites of unknown etiology s/p PRBC. Pt s/p paracentesis 5/10, IR pericardiocentesis 5/11, Paracentesis 5/14. Echo 5/15 showed no pericardial effusion, Drain removed and IR signed off. Pt now with anemia of unknown etiology. Pt not a candidate for bone marrow transplant. Pt with persistent MRSA bacteremia, concern for endocarditis vs cardiac abscess. Pt s/p Gallium 5/20, suggestive of cellulitis or rib osteo. Pt now needing HD d/t AAMIR/CKD likely ATN from hypotension and ischemic injury. Started IHD 5/15, though unable to tolerate 2/2 hypotension/hemodynamic instability, so started on CVVHD from 5/20-5/21. Pt did not tolerate CVVHD 2/2 frequent clotting of HD cath, so CVVHD held. Pt trialed on SLED, though was unable to tolerate this method as well. On 5/29 trialed again on CVVHD, though unsuccessful, and future HD attempts initially deemed to be futile by nephrology team but then started on IHD for clearance w/pressors on 6/3. Pt is DNR/DNI.     Pt has continued to refuse PO intake and is refusing NGT placement. Per MD, HCP is also declining NGT per pt wishes, though pt is severely malnourished and will continue to deteriorate while receiving HD if he continues to refuse PO intake. Have discussed on multiple attempts w/pt about importance of PO intake and tried to obtain food preferences, but pt has declined. Pt seen in room, awake but lethargic and slow to respond. Discussed w/behavioral health MD and SLP this morning; pt is receiving remeron, though unlikely to make a drastic difference in appetite. Pt also noted to have been pocketing food/meds, likely 2/2 poor effort, not dysphagia. Pt currently NPO for permacath placement. MAP 59 +levophed for BP support. Pt w/o complaints of N/V or pain. Last BM 6/8. Again attempted to obtain food preferences and encourage PO intake, but pt w/o response. Phos 7.8 (H- ordered for sevelamer), BUN 95/Cr 6.69. Will continue to monitor for goals of care and will continue to follow per RD protocol.     Previous Nutrition Diagnosis: Suspected severe Malnutrition r/t intake<EER in setting of increased EER AEB poor PO (<50% >5 days), Edema (1/3+),  moderate wasting to orbital region, muscle loss to temple appear to be mild/moderate in nature.     Active [ X ] Resolved [   ]    Goal: Meet 75% of EER via feasible route that is consistent with GOC     Recommendations:  1. Keep nutrition aligned with goals of care  2. Honor food preferences and encourage/assist w/PO intake. Please stress importance of PO intake at this time if pt to continue HD.  3. If goals of care to change to include nutrition support, please reconsult for EN recs   4. Pain regimen per team     Education: Pt previously educated and uninterested at this time. Discussed w/team importance of establishing goals of care w/regards to nutrition.     Risk Level: High [    ] Moderate [ X  ] Low [   ]. Admitting Diagnosis:   Patient is a 74y old  Male who presents with a chief complaint of Sepsis (30 May 2020 11:24)      PAST MEDICAL & SURGICAL HISTORY:  History of pancytopenia  H/O hydrocephalus  Anemia  HLD (hyperlipidemia)  Enlarged prostate  DM (diabetes mellitus)  HTN (hypertension)  H/O prior ablation treatment      Current Nutrition Order:  NPO MN for permacath placement     PO Intake: Good (%) [   ]  Fair (50-75%) [   ] Poor (<25%) [   ]- NA NPO today, though pt has consistently had <25% intake over last 2-3 weeks     GI Issues: No complaints of N/V  BM 6/8    Pain: Pt denied pain this morning     Skin Integrity: Alexandre 13  2+ LE pitting edema  IAD  L. heel wound  B/L buttocks unstageable pressure injuries     Labs:   06-08    143  |  103  |  95<H>  ----------------------------<  149<H>  4.3   |  17<L>  |  6.69<H>    Ca    8.3<L>      08 Jun 2020 04:50  Phos  7.8     06-08  Mg     2.0     06-08    TPro  4.6<L>  /  Alb  2.8<L>  /  TBili  0.6  /  DBili  x   /  AST  21  /  ALT  7<L>  /  AlkPhos  79  06-08    CAPILLARY BLOOD GLUCOSE      POCT Blood Glucose.: 166 mg/dL (08 Jun 2020 10:20)  POCT Blood Glucose.: 133 mg/dL (07 Jun 2020 21:03)  POCT Blood Glucose.: 201 mg/dL (07 Jun 2020 16:19)      Medications:  MEDICATIONS  (STANDING):  albumin human 25% IVPB 50 milliLiter(s) IV Intermittent every 1 hour  chlorhexidine 2% Cloths 1 Application(s) Topical daily  collagenase Ointment 1 Application(s) Topical daily  DAPTOmycin IVPB 700 milliGRAM(s) IV Intermittent every 48 hours  hydrocortisone sodium succinate Injectable 50 milliGRAM(s) IV Push every 8 hours  insulin lispro (HumaLOG) corrective regimen sliding scale   SubCutaneous Before meals and at bedtime  midodrine 15 milliGRAM(s) Oral every 8 hours  mirtazapine 15 milliGRAM(s) Oral at bedtime  norepinephrine Infusion 0.05 MICROgram(s)/kG/Min (3.37 mL/Hr) IV Continuous <Continuous>  nystatin Cream 1 Application(s) Topical two times a day  pantoprazole  Injectable 40 milliGRAM(s) IV Push daily  sevelamer carbonate Powder 1600 milliGRAM(s) Oral three times a day with meals  sodium bicarbonate 650 milliGRAM(s) Oral three times a day    MEDICATIONS  (PRN):  acetaminophen   Tablet .. 650 milliGRAM(s) Oral every 6 hours PRN Moderate Pain (4 - 6)        Ht: 5'10''  pounds (75kg) +-10%   5/8 158 pounds %IBW=95% BMI 22.7   5/15 129.6 pounds   5/16 191.5 pounds  5/18 193 pounds / 194 pounds   5/27 136.9 pounds / 137 pounds   5/28 139 pounds  6/8 160 pounds (72.6kg)    Wt Change: Pt w/drastically differing weights throughout admission; has had fluid gain/loss from edema and HD. Please continue to trend daily wts    Malnutrition note placed 5/9- pt had been noted with Mild Protein Calorie Malnutrition, during this time noted with mild muscle losses to temporal region and Mild fat loss over triceps region noted.  During RD visit 5/27 pt noted with moderate wasting to orbital region, muscle loss to temple appear to be mild/moderate in natural, new malnutrition note placed today 5/27.     Estimated energy needs:   IBW (75kg) for EER d/t varying EMR wts in HD pt   Nutrient needs based on [facility] standards of care for maintenance in adults, adjusted for age, wounds, HD. Fluids per team  Calories: 25-30 kcal/kg = 9743-5008 kcal/day  Protein: ~1.2-1.4 g/kg = 90-105g protein/day  Fluids per team     Subjective:   75 yo M with MDS, DM2, afib, HTN, HLD, BPH s/p palacio, pancytopenia, CKD4, ascites of unknown origin presents from [facility] with complaints of decreased PO intake and weakness likely secondary to uremia/AAMIR from diuresis for treatment of ascites. Pt hospital course notable for UGIB, CT on 5/18 showing pancolitis and GI PCR negative; team reportes GI w/u overall negative. Pt with pericardial effusion with tamponade physiology as well as ATN and ascites of unknown etiology s/p PRBC. Pt s/p paracentesis 5/10, IR pericardiocentesis 5/11, Paracentesis 5/14. Echo 5/15 showed no pericardial effusion, Drain removed and IR signed off. Pt now with anemia of unknown etiology. Pt not a candidate for bone marrow transplant. Pt with persistent MRSA bacteremia, concern for endocarditis vs cardiac abscess. Pt s/p Gallium 5/20, suggestive of cellulitis or rib osteo. Pt now needing HD d/t AAMIR/CKD likely ATN from hypotension and ischemic injury. Started IHD 5/15, though unable to tolerate 2/2 hypotension/hemodynamic instability, so started on CVVHD from 5/20-5/21. Pt did not tolerate CVVHD 2/2 frequent clotting of HD cath, so CVVHD held. Pt trialed on SLED, though was unable to tolerate this method as well. On 5/29 trialed again on CVVHD, though unsuccessful, and future HD attempts initially deemed to be futile by nephrology team but then started on IHD for clearance w/pressors on 6/3. Pt is DNR/DNI.     Pt has continued to refuse PO intake and is refusing NGT placement. Per MD, HCP is also declining NGT per pt wishes, though pt is severely malnourished and will continue to deteriorate while receiving HD if he continues to refuse PO intake. Have discussed on multiple attempts w/pt about importance of PO intake and tried to obtain food preferences, but pt has declined. Pt seen in room, awake but lethargic and slow to respond. Discussed w/psychiatrist and SLP Maura this morning; pt is receiving remeron, though unlikely to make a drastic difference in appetite. Pt also noted to have been pocketing food/meds, likely 2/2 cognitive decline/lack of effort, not dysphagia. Pt currently NPO for permacath placement. MAP 59 +levophed for BP support. Pt w/o complaints of N/V or pain. Last BM 6/8. Again attempted to obtain food preferences and encourage PO intake, but pt w/o response. Phos 7.8 (H- ordered for sevelamer), BUN 95/Cr 6.69. Will continue to monitor for goals of care and will continue to follow per RD protocol.     Previous Nutrition Diagnosis: Suspected severe Malnutrition r/t intake<EER in setting of increased EER AEB poor PO (<50% >5 days), Edema (1/3+),  moderate wasting to orbital region, muscle loss to temple appear to be mild/moderate in nature.     Active [ X ] Resolved [   ]    Goal: Meet 75% of EER via feasible route that is consistent with GOC     Recommendations:  1. Keep nutrition aligned with goals of care  2. Honor food preferences and encourage/assist w/PO intake. Please stress importance of PO intake at this time if pt to continue HD.  3. If goals of care to change to include nutrition support, please reconsult for EN recs   4. Pain regimen per team     Education: Pt previously educated and uninterested at this time. Discussed w/team importance of establishing goals of care w/regards to nutrition.     Risk Level: High [    ] Moderate [ X  ] Low [   ].

## 2020-06-08 NOTE — PROGRESS NOTE BEHAVIORAL HEALTH - NSBHFUPINTERVALHXFT_PSY_A_CORE
Patient's last received dose of Remeron was 15mg on the morning of 6/6/20.    Patient's attention waxes and wanes during interview. He is oriented to place but disoriented to time, saying that it is 2006 and not knowing date, month or day of week. he is unable to say why he is in the hospital. He speaks very softly with poverty of content. He says that his appetite is poor and his mood is "tired". When the phone rings, he picks up the remote and holds it to his ear.    According to nephrology consultant, the patient has a poor prognosis. Spoke with patient's healthcare proxy Teresa on the phone, who seems to have high expectations for treatment. Patient's last received dose of Remeron was 15mg on the morning of 6/6/20.    Patient's attention waxes and wanes during interview. He is oriented to place but disoriented to time, saying that it is 2006 and not knowing date, month or day of week. he is unable to say why he is in the hospital. He speaks very softly with poverty of content. He says that his appetite is poor and his mood is "tired". When the phone rings, he picks up the remote and holds it to his ear.    According to nephrology consultant, the patient has a poor prognosis. Spoke with patient's healthcare proxy Teresa on the phone regarding herexpectations for treatment.

## 2020-06-09 NOTE — CHART NOTE - NSCHARTNOTEFT_GEN_A_CORE
PALLIATIVE MEDICINE COORDINATION OF CARE NOTE FOR SANIYA LOZANO    Patient last assessed: 6/8/2020 to manage: GOC/AD, Symptoms, and Support was recommended: Recommendation for a stimulant and decreasing Zoloft, but would also discontinue Remeron.    30 Minutes; Start: 9:30am End: 10:00am, of non-face-to-face prolonged service provided that relates to (face-to-face) care that has or will occur and ongoing patient management, including one or more of the following:   - Reviewed records from other physicians or other health care professional services, including one or more of the following: other medical records and diagnostic / radiology study results     - Other: Medication reviewed.    The patient HAS NOT used PRN's in the last 24h.    MEDICATIONS  (STANDING):  albumin human 25% IVPB 50 milliLiter(s) IV Intermittent every 1 hour  albumin human 25% IVPB 50 milliLiter(s) IV Intermittent every 1 hour  chlorhexidine 2% Cloths 1 Application(s) Topical daily  collagenase Ointment 1 Application(s) Topical daily  DAPTOmycin IVPB 700 milliGRAM(s) IV Intermittent every 48 hours  hydrocortisone sodium succinate Injectable 50 milliGRAM(s) IV Push every 8 hours  insulin lispro (HumaLOG) corrective regimen sliding scale   SubCutaneous Before meals and at bedtime  midodrine 30 milliGRAM(s) Oral every 8 hours  mirtazapine 15 milliGRAM(s) Oral at bedtime  norepinephrine Infusion 0.05 MICROgram(s)/kG/Min (3.37 mL/Hr) IV Continuous <Continuous>  nystatin Cream 1 Application(s) Topical two times a day  pantoprazole  Injectable 40 milliGRAM(s) IV Push daily  sevelamer carbonate Powder 1600 milliGRAM(s) Oral three times a day with meals  sodium bicarbonate 650 milliGRAM(s) Oral three times a day    MEDICATIONS  (PRN):  acetaminophen   Tablet .. 650 milliGRAM(s) Oral every 6 hours PRN Moderate Pain (4 - 6)    - Other: Advanced directives     DNR DNI     No documented MOLST found on [facility]     No documented HCP form found on [facility]     Other surrogates: Friend Teresa "Venus" Matt [phone] / [phone]     No Living will / POA / Advance directives found on [place] / [facility].     Documented GOC notes on [facility] on 5/22/2020, 5/25/2020, and 5/27/2020.    - Other: Coordination/Plan of care     2 admissions in 1 year     Current admission LOS: 32 days     LACE score: 17 ADVANCE ILLNESS PATIENT since 2/19/2020.    Patient had placement of catheter for continued HD. Ongoing issues with excessive somnolence fatigue and decreased mood and appetite.   Continue to recommend Ritalin 5mg PO q6am daily.   Will reassess patient during bedside rounds later today.

## 2020-06-09 NOTE — PROGRESS NOTE ADULT - SUBJECTIVE AND OBJECTIVE BOX
Patient was seen and evaluated on dialysis.   HR: 104 (06-09-20 @ 13:25)  BP: 81/48 (06-09-20 @ 13:25)  Wt(kg): --  06-09    140  |  102  |  95<H>  ----------------------------<  161<H>  4.4   |  18<L>  |  7.17<H>    Ca    8.4      09 Jun 2020 05:01  Phos  8.4     06-09  Mg     2.0     06-09    TPro  5.1<L>  /  Alb  2.8<L>  /  TBili  0.6  /  DBili  x   /  AST  18  /  ALT  8<L>  /  AlkPhos  83  06-09    Continue dialysis:   Dialyzer:   180    QB: 400  K bath: 3  Goal UF:    0   L   over         180      min   Patient cannot tolerate UF - hypotensive on pressor support.

## 2020-06-09 NOTE — PROGRESS NOTE ADULT - ASSESSMENT
73 yo M with MDS, pancytopenia, CKD, ascites of unknown origin presents from [facility] with complaints of decreased po intake and weakness likely secondary to uremia/AAMIR from diuresis for treatment of ascites, hospital course notable for UGIB and pericardial effusion with tamponade physiology, as well as ATN, and ascites of unknown etiology, now in ICU s/p paracentesis 5/10 (2L removed), IR pericardiocentesis 5/11 (drain in place), now with anemia and thrombocytopenia with hypercoagulability 2/2 DIC that improved, now likely MDS, currently tolerating HD with increased pressor requirements with plan for permacath this morning.     GOC:   Patient is DNR/DNI. Will continue to have GOC discussion with HCP Venus regarding comfort care  - Will discuss the need for NG tube with proxy today    Neuro:   #AMS likely 2/2 to uremia.  -similar to yesterday    #depression   -c/w mirtazapine to 15mg at night  - Psych following, appreciate recs    Cardiology:   #Hypotension  - midodrine 15mg [id number]  - solucortef 50mg q8  - continue weaning Levo as possible    Pulm:   -stable on RA  -maintain head of bed 30 degrees, due to aspiration risk     GI:   #ascites  - Elevated SAAG consistent with portal HTN,   - no plan for paracentesis today    #Feeds  - decreased appetite with very limited po intake. Refuses NGT at this time, will discuss with proxy    Renal/:   #AAMIR on CKD 2/2 atn from hypotension and ischemic injury  - on levophed, midodrine, solucortef  - renally adjusted meds/ ABx  - renvela 1600mg PO TID w/each meal   - lokelma 10 q8 and sodium bicarb 650 TID  - permacath placed on 6/8  - Renal will assess for HD    HEME:   #DIC  - monitor fibrinogen, haptoglobin, LDH, and platelets  - improving    Anemia:   2/2 ?MDS  Hg stable    #Coagulopathy:   2/2 MDS vs sepsis vs DIC  - Monitor PT/INR     #DVT - RLE  - Eliquis held in setting of DIC concern    #thrombocytopenia 2/2 DIC/MDS and possibly daptomycin  -monitor plt level  -Plt repletion goal <10    ID:   - Persistent MRSA bacteremia  - dapto 700mg [id number] until 6/29  - obtain CK twice a week    ENDO  - monitor sugars    DVt ppx: SCDs. no chemo ppx due to pancytopenia  Lines: R IJ CVC (6/3), L permacath (6/8) 75 yo M with MDS, pancytopenia, CKD, ascites of unknown origin presents from [facility] [place] with complaints of decreased po intake and weakness likely secondary to uremia/AAMIR from diuresis for treatment of ascites, hospital course notable for UGIB and pericardial effusion with tamponade physiology, as well as ATN, and ascites of unknown etiology, now in ICU s/p paracentesis 5/10 (2L removed), IR pericardiocentesis 5/11 (drain in place), now with anemia and thrombocytopenia with hypercoagulability 2/2 DIC that improved, now likely MDS, currently tolerating HD with increased pressor requirements with plan for permacath this morning.     GOC:   Patient is DNR/DNI. Will continue to have GOC discussion with HCP Venus regarding comfort care  - Will discuss the need for NG tube with proxy today    Neuro:   #AMS likely 2/2 to uremia.  -similar to yesterday    #depression   -c/w mirtazapine to 15mg at night  - Psych following, appreciate recs    Cardiology:   #Shock of unclear etiology.   No suspicion for septic shock as patient culture negative since 5/18 and on continued dapto treatment.    - midodrine 30mg [id number]  - solucortef 50mg q8  - continue weaning Levo as possible    Pulm:   -stable on RA  -maintain head of bed 30 degrees, due to aspiration risk     GI:   #ascites  - Elevated SAAG consistent with portal HTN,   - no plan for paracentesis today    #Feeds  - decreased appetite with very limited po intake. Refuses NGT at this time, will discuss with proxy    Renal/:   #AAMIR on CKD c/b atn from hypotension and ischemic injury  - on levophed, midodrine, solucortef  - renally adjusted meds/ ABx  - renvela 1600mg PO TID w/each meal   - lokelma 10 q8 and sodium bicarb 650 TID  - permacath placed on 6/8  - Renal consulted, appreciate recs  - No need for HD today    HEME:   #DIC  - monitor fibrinogen, haptoglobin, LDH, and platelets 2/2 to sepsis  - improving    Anemia:   2/2 ?MDS  Hg stable    #Coagulopathy:   2/2 MDS vs sepsis vs DIC  - Monitor PT/INR     #DVT - RLE  - Eliquis held in setting of DIC concern    #thrombocytopenia 2/2 DIC/MDS and possibly daptomycin  -monitor plt level  -Plt repletion goal <10    ID:   - Persistent MRSA bacteremia  - first culture negative 5/18  - dapto 700mg [id number] until 6/29  - obtain CK twice a week    ENDO  - monitor sugars    DVt ppx: SCDs. no chemo ppx due to pancytopenia  Lines: R IJ CVC (6/3), L permacath (6/8) 73 yo M with MDS, pancytopenia, CKD, ascites of unknown origin presents from [facility] with complaints of decreased po intake and weakness likely secondary to uremia/AAMIR from diuresis for treatment of ascites, hospital course notable for UGIB and pericardial effusion with tamponade physiology, as well as ATN, and ascites of unknown etiology, now in ICU s/p paracentesis 5/10 (2L removed), IR pericardiocentesis 5/11 (drain in place), now with anemia and thrombocytopenia with hypercoagulability 2/2 DIC that improved, now likely MDS, currently tolerating HD with increased pressor requirements s/p permacath    GOC:   Patient is DNR/DNI. Will continue to have GOC discussion with HCP Venus regarding comfort care  - Will discuss the need for NG tube with proxy today    Neuro:   #AMS likely 2/2 to uremia.  -similar to yesterday    #depression   -c/w mirtazapine to 15mg at night  - Psych following, appreciate recs    Cardiology:   #Shock of unclear etiology.   No suspicion for septic shock as patient culture negative since 5/18 and on continued dapto treatment.    - midodrine 30mg [id number]  - solucortef 50mg q8  - continue weaning Levo as possible    Pulm:   -stable on RA  -maintain head of bed 30 degrees, due to aspiration risk     GI:   #ascites  - Elevated SAAG consistent with portal HTN,   - no plan for paracentesis today    #Feeds  - decreased appetite with very limited po intake. Refuses NGT at this time, will discuss with proxy    Renal/:   #AAMIR on CKD c/b atn from hypotension and ischemic injury  - on levophed, midodrine, solucortef  - renally adjusted meds/ ABx  - renvela 1600mg PO TID w/each meal   - lokelma 10 q8 and sodium bicarb 650 TID  - permacath placed on 6/8  - Renal consulted, appreciate recs  - No need for HD today    HEME:   #DIC  - monitor fibrinogen, haptoglobin, LDH, and platelets 2/2 to sepsis  - improving    Anemia:   2/2 ?MDS  Hg stable    #Coagulopathy:   2/2 MDS vs sepsis vs DIC  - Monitor PT/INR     #DVT - RLE  - Eliquis held in setting of DIC concern    #thrombocytopenia 2/2 DIC/MDS and possibly daptomycin  -monitor plt level  -Plt repletion goal <10    ID:   - Persistent MRSA bacteremia  - first culture negative 5/18  - dapto 700mg [id number] until 6/29  - obtain CK twice a week    ENDO  - monitor sugars    DVt ppx: SCDs. no chemo ppx due to pancytopenia  Lines: R IJ CVC (6/3), L permacath (6/8)

## 2020-06-09 NOTE — PROGRESS NOTE ADULT - ASSESSMENT
75 yo M with MDS, pancytopenia, CKD, ascites of unknown origin presents from [facility] with complaints of decreased po intake and weakness likely secondary to uremia/AAMIR from diuresis for treatment of ascites, hospital course notable for UGIB and pericardial effusion with tamponade physiology, as well as ATN, and ascites of unknown etiology, now in ICU s/p paracentesis 5/10 (2L removed), IR pericardiocentesis 5/11 (drain in place), now with anemia and thrombocytopenia with hypercoagulability 2/2 DIC, c/b unable to tolerate SLED or CVVHD.       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

## 2020-06-09 NOTE — PROGRESS NOTE ADULT - ATTENDING COMMENTS
seen on HD with Dr Chaney, agree with above  developing worsening shock despite increased pressors  developed VT   terminate dialysis  advise hospice--not tolerating HD

## 2020-06-09 NOTE — PROGRESS NOTE ADULT - ATTENDING COMMENTS
Hx of MDS, admitted with FTT; found to have serositis with ascites, pericardial effusion s/p drainage c/b MRSA bacteremia with persistent pressor requirement. Doubt this is sepsis; ?adrenal insufficiency; trial of solucortef. Increase midodrine. C/w daptomycin. Significant hypoactive delirium versus depression; c/w redirection. Poor PO adherence (food and meds) and refusing NG tube. GOC in progress.

## 2020-06-09 NOTE — PROGRESS NOTE ADULT - SUBJECTIVE AND OBJECTIVE BOX
Patient is a 74y Male seen and evaluated at bedside.   No new complaints. Looks lethargic. Minimally communicative.   Vitals, meds, labs reviewed.  Remains anuric.      Meds:    acetaminophen   Tablet .. 650 milliGRAM(s) Oral every 6 hours PRN  albumin human 25% IVPB 50 milliLiter(s) IV Intermittent every 1 hour  chlorhexidine 2% Cloths 1 Application(s) Topical daily  collagenase Ointment 1 Application(s) Topical daily  DAPTOmycin IVPB 700 milliGRAM(s) IV Intermittent every 48 hours  hydrocortisone sodium succinate Injectable 50 milliGRAM(s) IV Push every 8 hours  insulin lispro (HumaLOG) corrective regimen sliding scale   SubCutaneous Before meals and at bedtime  midodrine 30 milliGRAM(s) Oral every 8 hours  mirtazapine 15 milliGRAM(s) Oral at bedtime  nystatin Cream 1 Application(s) Topical two times a day  pantoprazole  Injectable 40 milliGRAM(s) IV Push daily  phenylephrine    Infusion 0.007 MICROgram(s)/kG/Min IV Continuous <Continuous>  sevelamer carbonate Powder 1600 milliGRAM(s) Oral three times a day with meals  sodium bicarbonate 650 milliGRAM(s) Oral three times a day      T(C): 35.6 (06-09-20 @ 11:35), Max: 36.5 (06-09-20 @ 01:03)  HR: 104 (06-09-20 @ 13:25) (85 - 104)  BP: 81/48 (06-09-20 @ 13:25) (64/45 - 995/-)  RR: 13 (06-09-20 @ 13:25) (9 - 14)  SpO2: 96% (06-09-20 @ 13:25) (95% - 100%)    Input/Output      06-08-20 @ 07:01  -  06-09-20 @ 07:00  --------------------------------------------------------  IN: 24 mL / OUT: 0 mL / NET: 24 mL              Review of Systems:  unable      PHYSICAL EXAM:  GENERAL: lethargic,  awake, no acute distress at present on RA  NECK: Neck supple, No JVD  CHEST/LUNG: decreased BS  No wheeze, no rales, no crepitations  HEART: Regular rate and rhythm. S1S2+  No gallop, no rub   ABDOMEN: Soft, Nontender, BS+nt, No flank tenderness.   EXTREMITIES: anasarca   Neurology: lethargic, flat affect    access: thc    LABS:                                                  8.6    7.45  )-----------( 46       ( 09 Jun 2020 05:01 )             27.8       06-09    140  |  102  |  95<H>  ----------------------------<  161<H>  4.4   |  18<L>  |  7.17<H>    Ca    8.4      09 Jun 2020 05:01  Phos  8.4     06-09  Mg     2.0     06-09    TPro  5.1<L>  /  Alb  2.8<L>  /  TBili  0.6  /  DBili  x   /  AST  18  /  ALT  8<L>  /  AlkPhos  83  06-09              CARDIAC MARKERS ( 09 Jun 2020 05:01 )  x     / x     / 48 U/L / x     / x              RADIOLOGY & ADDITIONAL STUDIES:

## 2020-06-09 NOTE — PROGRESS NOTE ADULT - SUBJECTIVE AND OBJECTIVE BOX
OVERNIGHT EVENTS: levo [email or web address]    SUBJECTIVE / INTERVAL HPI: Patient seen and examined at bedside. Patient resting in bed. Says he may eat something a little later, but no appetite at the moment. Nurse says he has been storing meds in his cheek. patient otherwise denies any pain at this time. 12 point ROS negative.     VITAL SIGNS:  Vital Signs Last 24 Hrs  T(C): 36.5 (09 Jun 2020 04:50), Max: 36.5 (09 Jun 2020 01:03)  T(F): 97.7 (09 Jun 2020 04:50), Max: 97.7 (09 Jun 2020 01:03)  HR: 87 (08 Jun 2020 22:00) (85 - 93)  BP: 92/54 (08 Jun 2020 22:00) (82/52 - 117/63)  BP(mean): 67 (08 Jun 2020 22:00) (63 - 85)  RR: 10 (08 Jun 2020 22:00) (9 - 14)  SpO2: 98% (08 Jun 2020 22:00) (93% - 100%)  I&O's Summary    08 Jun 2020 07:01  -  09 Jun 2020 07:00  --------------------------------------------------------  IN: 0 mL / OUT: 0 mL / NET: 0 mL        PHYSICAL EXAM:      General: NAD, resting comfortably in bed. more conversant this morning Breathing well on room air  Neck: no JVD  Respiratory: diminished breath sounds at the bases, non-tachypneic, no respiratory distress   Cardiovascular: Regular rhythm/rate; S1/S2, no pericardial rub, pericardial drain removed with site clean, dry, and intact  Gastrointestinal: distended, non tender ascitic drain with appropriate drainage, site clean, dry, and intact  Extremities: WWP; 2+ bilateral dependent pitting edema up to his thigh  Neurological:  A&Ox 2 (person and place), arousable and interactive but still weak. Is able to follow simple commands and answer simple questions. CNII-XII grossly intact; no obvious focal deficits, no flapping tremor, but minimal asterixis    MEDICATIONS:  MEDICATIONS  (STANDING):  albumin human 25% IVPB 50 milliLiter(s) IV Intermittent every 1 hour  chlorhexidine 2% Cloths 1 Application(s) Topical daily  collagenase Ointment 1 Application(s) Topical daily  DAPTOmycin IVPB 700 milliGRAM(s) IV Intermittent every 48 hours  hydrocortisone sodium succinate Injectable 50 milliGRAM(s) IV Push every 8 hours  insulin lispro (HumaLOG) corrective regimen sliding scale   SubCutaneous Before meals and at bedtime  midodrine 15 milliGRAM(s) Oral every 8 hours  mirtazapine 15 milliGRAM(s) Oral at bedtime  norepinephrine Infusion 0.05 MICROgram(s)/kG/Min (3.37 mL/Hr) IV Continuous <Continuous>  nystatin Cream 1 Application(s) Topical two times a day  pantoprazole  Injectable 40 milliGRAM(s) IV Push daily  sevelamer carbonate Powder 1600 milliGRAM(s) Oral three times a day with meals  sodium bicarbonate 650 milliGRAM(s) Oral three times a day    MEDICATIONS  (PRN):  acetaminophen   Tablet .. 650 milliGRAM(s) Oral every 6 hours PRN Moderate Pain (4 - 6)      ALLERGIES:  Allergies    No Known Allergies    Intolerances        LABS:                        8.6    7.45  )-----------( 46       ( 09 Jun 2020 05:01 )             27.8     06-09    140  |  102  |  95<H>  ----------------------------<  161<H>  4.4   |  18<L>  |  7.17<H>    Ca    8.4      09 Jun 2020 05:01  Phos  8.4     06-09  Mg     2.0     06-09    TPro  5.1<L>  /  Alb  2.8<L>  /  TBili  0.6  /  DBili  x   /  AST  18  /  ALT  8<L>  /  AlkPhos  83  06-09        CAPILLARY BLOOD GLUCOSE      POCT Blood Glucose.: 182 mg/dL (09 Jun 2020 00:10)      RADIOLOGY & ADDITIONAL TESTS: Reviewed.

## 2020-06-10 NOTE — CONSULT NOTE ADULT - PROVIDER SPECIALTY LIST ADULT
Cardiology
Heme/Onc
Nephrology
Neurology
Critical Care
Electrophysiology
Urology
Infectious Disease
Palliative Care

## 2020-06-10 NOTE — PROGRESS NOTE BEHAVIORAL HEALTH - ORIENTATION OTHER
patient is too lethargic to respond
May, "18" (unclear if year or day of month); [facility]
patient is too lethargic to respond

## 2020-06-10 NOTE — PROGRESS NOTE BEHAVIORAL HEALTH - SUMMARY
73 yo M with MDS, pancytopenia, CKD, ascites of unknown origin presents from [facility] with complaints of decreased po intake and weakness likely secondary to uremia/AAMIR from diuresis for treatment of ascites, hospital course notable for UGIB and pericardial effusion with tamponade physiology, as well as ATN, and ascites of unknown etiology, now in ICU s/p paracentesis 5/10 (2L removed), IR pericardiocentesis 5/11 (drain in place), now with anemia and thrombocytopenia with hypercoagulability 2/2 DIC, c/b unable to tolerate SLED or CVVHD. Psychiatry was consulted to evaluate the patient for depression. Patient's attention waxes and wanes during and between interviews and he is disoriented. As per friend this is a change from baseline, suggestive of hypoactive vs mixed delirium. As per collaterals, patient also presents comorbid depression.     Recommendations  -stop mirtazapine  -agree with palliative care recommendations to start Ritalin  -medical workup for delirium as per the primary team   -avoid benzodiazepines/ anticholinergic meds/ limit opiates as they can worsen delirium   -if the patient becomes agitated can give haldol 0.5 mg po/im q6h prn agitation, monitor for qtc prolongation  -psychiatry signing off

## 2020-06-10 NOTE — PROGRESS NOTE ADULT - SUBJECTIVE AND OBJECTIVE BOX
SANIYA CARRINGTON             [id number]    CC: Somnolence    HPI:  Patient is 75 yo M with past medical history of DM, HTN, HLD, BPH (chronic indwelling palacio placement on last admission, SERA, CKD4, and a-fib  , mds, pancytopenia, presents from [facility] with complaints of decreased po intake and weakness. Patient reports that he recently had a intense bowel regimen for constipation after which he developed loose stools. To prevent further loose stools he stopped eating . Decreased po intake also in setting of poor appetite. Patient also complains for worsening LE and abdominal swelling. As per collateral obtained from Dr Barksdale at [facility]([id number]) patient abdominal usg notable for massive ascites, and increased liver echogenicity(LFTS outpatient wnl )   and he was aggressively diuresed with  Lasix , torsemide 40 mg bid (of note also started on midodrine as his pressures would drop with iv diuresis ). After diuresis his ascites and LE edema improved however he developed worsening AAMIR and so Lasix was stopped and torsemide decreased to 20 mg bid. Of note he was seen by Cardiology and  echo done, concern fluid overload was cardiac in etiology given elevated bnp and echo findings.  As per collateral patient also on Levaquin 1 week ago for intersitial infiltrates on cxr , s/p 7 day course.   Upon arrival to ED vitals bp 92/52, hr 94, rr 16, satting well ra.  Labs notable for anemia, thrombocytopenia and  worsening AAMIR. CT abdomen pelvis pending  ed management : supra pubic cath taken out, with plans to place new one, 1L NS, 1G ceftriaxone  Pt admitted to [facility] for further management (08 May 2020 16:10)    SUBJECTIVE: Saw and evaluated Mr Carrington at bedside today. He was found more awake and alert than previous days. He was able to discuss his concerns of not being able to recover the physical independence he had prior to the admission. He continues to request visits for support and Alex JACOME has been providing therapy at the bedside.     ROS:  DYSPNEA: No  NAUS/VOM: No	  SECRETIONS: No	  AGITATION:  No  Pain (Y/N):  No       -Provocation/Palliation: N/A  -Quality/Quantity: N/A  -Radiating: N/A  -Severity: N/A  -Timing/Frequency: N/A  -Impact on ADLs: N/A    OTHER REVIEW OF SYSTEMS: Denied     PEx:  T(C): 36.7 (06-10-20 @ 06:00), Max: 36.9 (06-09-20 @ 17:26)  HR: 85 (06-10-20 @ 16:00) (78 - 94)  BP: 92/50 (06-10-20 @ 16:00) (90/68 - 128/66)  RR: 8 (06-10-20 @ 16:00) (8 - 27)  SpO2: 97% (06-10-20 @ 16:00) (94% - 100%)  Wt(kg): 71.8Kg    General: AAOx3 man found laying in bed in NAD much more alert and awake today.  HEENT: NCAT EOMI Dry eyes and mucosas Poor dentition   Neck: LIJ  CVS: RR S1S2 No M/G/R Right chest HD catheter  Resp: Unlabored Non tachypneic Good air entry B/L anteriorly and decreased at bases  GI:  Distended Drain+ Soft NT  : Oliguric  Musc:  No C/C Decreased strength upper extremities   Neuro: No focal deficits. Able to follow commands and answer questions.  Psych: Calm slow but appropriate    Skin: Ecchymotic / bruised / Petechia  B/L Heel and Sacrum tissue injuries.  Lymph: Edema+ all extremities    ALLERGIES: No Known Allergies    OPIATE NAÏVE (Y/N): Yes    MEDICATIONS: REVIEWED  MEDICATIONS  (STANDING):  albumin human 25% IVPB 50 milliLiter(s) IV Intermittent every 1 hour  chlorhexidine 2% Cloths 1 Application(s) Topical daily  collagenase Ointment 1 Application(s) Topical daily  DAPTOmycin IVPB 700 milliGRAM(s) IV Intermittent every 48 hours  hydrocortisone sodium succinate Injectable 50 milliGRAM(s) IV Push every 8 hours  insulin lispro (HumaLOG) corrective regimen sliding scale   SubCutaneous Before meals and at bedtime  midodrine 30 milliGRAM(s) Oral every 8 hours  mirtazapine 15 milliGRAM(s) Oral at bedtime  nystatin Cream 1 Application(s) Topical two times a day  pantoprazole  Injectable 40 milliGRAM(s) IV Push daily  phenylephrine    Infusion 0.007 MICROgram(s)/kG/Min (0.1 mL/Hr) IV Continuous <Continuous>  sevelamer carbonate Powder 1600 milliGRAM(s) Oral three times a day with meals  sodium bicarbonate 650 milliGRAM(s) Oral three times a day    MEDICATIONS  (PRN):  acetaminophen   Tablet .. 650 milliGRAM(s) Oral every 6 hours PRN Moderate Pain (4 - 6)    LABS: REVIEWED  CBC:                        8.6    7.45  )-----------( 46       ( 09 Jun 2020 05:01 )             27.8     CMP:    06-09    140  |  102  |  95<H>  ----------------------------<  161<H>  4.4   |  18<L>  |  7.17<H>    Ca    8.4      09 Jun 2020 05:01  Phos  8.4     06-09  Mg     2.0     06-09    TPro  5.1<L>  /  Alb  2.8<L>  /  TBili  0.6  /  DBili  x   /  AST  18  /  ALT  8<L>  /  AlkPhos  83  06-09  Albumin, Serum: 2.8 g/dL (06-09-20 @ 05:01)    IMAGING: REVIEWED    EXAM:  CT BRAIN                        PROCEDURE DATE:  06/10/2020    INTERPRETATION:  PROCEDURE: CT head without intravenous contrast  INDICATION: Altered mental status  TECHNIQUE: Multiple axial images were obtained at 5 mm intervals from the skull base to the vertex. Sagittal and coronal reformatted images were obtained from the axial data set. The images were reviewed in brain and bone windows.  COMPARISON: CT brain 5/8/2020  FINDINGS: The CT examination demonstrates disproportionate enlargement of the ventricles when compared to cortical sulci which may be secondary to communicating hydrocephalus. Clinical correlation for NPH is recommended. The ventricles are stable in size. There is no midline shift or extra axial collections. The gray white differentiation appears within normal limits. There is minimum patchy hypodensity within the periventricular white matter which is nonspecific and may represent the sequela of small vessel ischemic disease in this patient. There is no intracranial hemorrhage or acute transcortical infarct.  Evaluation of the orbits demonstrates a suggestion of enophthalmos and clinical correlation is recommended. The lenses are absent which may be secondary to prior cataract surgery.  There is soft tissue opacification of a single left ethmoid air cell. The visualized paranasal sinuses are within normal limits. The mastoid air cells are well aerated.  The bony windows demonstrates no fractures.   Limited evaluation of the cervical spine demonstrates degenerative changes at C1-C2 with surrounding soft tissue which may represent pannus indenting on the cervicomedullary junction.  IMPRESSION: Stable communicating hydrocephalus and clinical correlation for NPH is recommended. No intracranial hemorrhage or acute transcortical infarct.    ADVANCED DIRECTIVES:          DNR DNI     No documented MOLST found on [facility]     No documented HCP form found on [facility]     Other surrogates: Luis Carlos Gutierrez (Peggy) [phone] / [phone]     No Living will / POA / Advance directives found on [employer] / [facility].     Documented GOC notes on [facility] on 5/22/2020, 5/25/2020, and 5/27/2020.    DECISION MAKER: The patient is able to participate in encounter but unable to demonstrate capacity for complex medical decision making given poor recall.  LEGAL SURROGATE: No documented HCP form found on [facility].  Alternate surrogates: Luis Carlos Gutierrez (Peggy) [phone] / [phone]    PSYCHOSOCIAL-SPIRITUAL ASSESSMENT:       Care plan unchanged    GOALS OF CARE DISCUSSION       Palliative care info/counseling provided	           Family meeting Done       Advanced Directives addressed please see Advance Care Planning Note	           See previous Palliative Medicine Note       Documentation of GOC: DNR DNI    AGENCY CHOICE DISCUSSED:           [facility]        Hospice        [facility]    REFERRALS	        Unit SW/Case Mgmt        - Declined       Speech/Swallow - Following       Patient/Family Support - Following       Music Therapy - Following       Nutrition - Following       Dietician - Following       PT/OT - Following

## 2020-06-10 NOTE — PROGRESS NOTE ADULT - SUBJECTIVE AND OBJECTIVE BOX
Patient is a 74y Male seen and evaluated at bedside.   No new complaints.  More lethargic today. Minimally communicative.   Vitals, meds, labs reviewed.  Remains anuric.    Stroke code called this am due to unresponsiveness.    Patient underwent HD on 6/9 - poorly tolerated - was hypotensive on pressors with occasional episodes of VTach.        Meds:    acetaminophen   Tablet .. 650 milliGRAM(s) Oral every 6 hours PRN  albumin human 25% IVPB 50 milliLiter(s) IV Intermittent every 1 hour  chlorhexidine 2% Cloths 1 Application(s) Topical daily  collagenase Ointment 1 Application(s) Topical daily  DAPTOmycin IVPB 700 milliGRAM(s) IV Intermittent every 48 hours  hydrocortisone sodium succinate Injectable 50 milliGRAM(s) IV Push every 8 hours  insulin lispro (HumaLOG) corrective regimen sliding scale   SubCutaneous Before meals and at bedtime  midodrine 30 milliGRAM(s) Oral every 8 hours  mirtazapine 15 milliGRAM(s) Oral at bedtime  nystatin Cream 1 Application(s) Topical two times a day  pantoprazole  Injectable 40 milliGRAM(s) IV Push daily  phenylephrine    Infusion 0.007 MICROgram(s)/kG/Min IV Continuous <Continuous>  sevelamer carbonate Powder 1600 milliGRAM(s) Oral three times a day with meals  sodium bicarbonate 650 milliGRAM(s) Oral three times a day      T(C): 36.7 (06-10-20 @ 06:00), Max: 36.9 (06-09-20 @ 17:26)  HR: 79 (06-10-20 @ 13:00) (78 - 105)  BP: 111/55 (06-10-20 @ 13:00) (92/54 - 128/66)  RR: 16 (06-10-20 @ 13:00) (10 - 27)  SpO2: 100% (06-10-20 @ 13:00) (94% - 100%)    Input/Output      06-09-20 @ 07:01  -  06-10-20 @ 07:00  --------------------------------------------------------  IN: 882.7 mL / OUT: 500 mL / NET: 382.7 mL    06-10-20 @ 07:01  -  06-10-20 @ 14:48  --------------------------------------------------------  IN: 96.6 mL / OUT: 0 mL / NET: 96.6 mL                        Review of Systems:  unable      PHYSICAL EXAM:  GENERAL: lethargic,  awake, no acute distress at present on RA  NECK: Neck supple, No JVD  CHEST/LUNG: decreased BS  No wheeze, no rales, no crepitations  HEART: Regular rate and rhythm. S1S2+  No gallop, no rub   ABDOMEN: Soft, Nontender, BS+nt, No flank tenderness.   EXTREMITIES: anasarca   Neurology: lethargic, flat affect    access: thc    LABS:                                                  8.6    7.45  )-----------( 46       ( 09 Jun 2020 05:01 )             27.8       06-09    140  |  102  |  95<H>  ----------------------------<  161<H>  4.4   |  18<L>  |  7.17<H>    Ca    8.4      09 Jun 2020 05:01  Phos  8.4     06-09  Mg     2.0     06-09    TPro  5.1<L>  /  Alb  2.8<L>  /  TBili  0.6  /  DBili  x   /  AST  18  /  ALT  8<L>  /  AlkPhos  83  06-09              CARDIAC MARKERS ( 09 Jun 2020 05:01 )  x     / x     / 48 U/L / x     / x                RADIOLOGY & ADDITIONAL STUDIES:

## 2020-06-10 NOTE — PROGRESS NOTE ADULT - PROBLEM SELECTOR PLAN 3
Known to [facility] Dr Roberts. Hemoglobin: 8.6 g/dL (06.09.20 @ 05:01)  Platelet Count - Automated: 46K/uL (06.09.20 @ 05:01)    Management as per Hematology.

## 2020-06-10 NOTE — PROGRESS NOTE BEHAVIORAL HEALTH - NSBHADMITCOUNSEL_PSY_A_CORE
diagnostic results/impressions and/or recommended studies
instructions for management, treatment and follow up
diagnostic results/impressions and/or recommended studies/importance of adherence to chosen treatment

## 2020-06-10 NOTE — PROGRESS NOTE ADULT - PROBLEM SELECTOR PLAN 5
No documented HCP form found on [facility]. Current surrogates: Friend Teresa "Venus" Matt [phone] / [phone]. Patient has voiced his preference for Teresa to be involved in decision making and his ongoing care. She is willing to continue to assist as a surrogate.

## 2020-06-10 NOTE — PROGRESS NOTE ADULT - ASSESSMENT
75 yo M with MDS, pancytopenia, CKD, ascites of unknown origin presents from [facility] with complaints of decreased po intake and weakness likely secondary to uremia/AAMIR from diuresis for treatment of ascites, hospital course notable for UGIB and pericardial effusion with tamponade physiology, as well as ATN, and ascites of unknown etiology, now in ICU s/p paracentesis 5/10 (2L removed), IR pericardiocentesis 5/11 (drain in place), now with anemia and thrombocytopenia with hypercoagulability 2/2 DIC that improved, now likely MDS, currently tolerating HD with increased pressor requirements s/p permacath    GOC:   Patient is DNR/DNI. Will continue to have GOC discussion with HCP Venus regarding comfort care  - Will discuss the need for NG tube with proxy today    Neuro:   #AMS likely 2/2 to uremia.  -similar to yesterday    #depression   -c/w mirtazapine to 15mg at night  - Psych following, appreciate recs    Cardiology:   #Shock of unclear etiology.   No suspicion for septic shock as patient culture negative since 5/18 and on continued dapto treatment.    - midodrine 30mg [id number]  - solucortef 50mg q8  - continue weaning Levo as possible    Pulm:   -stable on RA  -maintain head of bed 30 degrees, due to aspiration risk     GI:   #ascites  - Elevated SAAG consistent with portal HTN,   - no plan for paracentesis today    #Feeds  - decreased appetite with very limited po intake. Refuses NGT at this time, will discuss with proxy    Renal/:   #AAMIR on CKD c/b atn from hypotension and ischemic injury  - on levophed, midodrine, solucortef  - renally adjusted meds/ ABx  - renvela 1600mg PO TID w/each meal   - lokelma 10 q8 and sodium bicarb 650 TID  - permacath placed on 6/8  - Renal consulted, appreciate recs  - No need for HD today    HEME:   #DIC  - monitor fibrinogen, haptoglobin, LDH, and platelets 2/2 to sepsis  - improving    Anemia:   2/2 ?MDS  Hg stable    #Coagulopathy:   2/2 MDS vs sepsis vs DIC  - Monitor PT/INR     #DVT - RLE  - Eliquis held in setting of DIC concern    #thrombocytopenia 2/2 DIC/MDS and possibly daptomycin  -monitor plt level  -Plt repletion goal <10    ID:   - Persistent MRSA bacteremia  - first culture negative 5/18  - dapto 700mg [id number] until 6/29  - obtain CK twice a week    ENDO  - monitor sugars    DVt ppx: SCDs. no chemo ppx due to pancytopenia  Lines: R IJ CVC (6/3), L permacath (6/8) 73 yo M with MDS, pancytopenia, CKD, ascites of unknown origin presents from [facility] with complaints of decreased po intake and weakness likely secondary to uremia/AAMIR from diuresis for treatment of ascites, hospital course notable for UGIB and pericardial effusion with tamponade physiology, as well as ATN, and ascites of unknown etiology, now in ICU s/p paracentesis 5/10 (2L removed), IR pericardiocentesis 5/11 (drain in place), now with anemia and thrombocytopenia with hypercoagulability 2/2 DIC that improved, now likely MDS, had increasing pressor requirement with HD yesterday     GOC:   Patient is DNR/DNI. Will continue to have GOC discussion with HCP Venus regarding comfort care    NEURO  #AMS likely 2/2 to uremia.  -similar to yesterday  -no concern for focal deficit   -CT head 6/10 without hemorrhage or new infarct; more so suggesting clinical correlation for r/o NPH, however likely more diffuse volume loss    #depression   -c/w mirtazapine to 15mg at night  - Psych following, appreciate recs    CARDIOVASCULAR/HD  #Shock of unclear etiology.   No suspicion for septic shock as patient culture negative since 5/18 and on continued dapto treatment.    - midodrine 30mg [id number]  - solucortef 50mg q8  - continue weaning Levo as possible    PULM:  -stable on RA  -maintain head of bed 30 degrees, due to aspiration risk     GI:   #ascites  - Elevated SAAG consistent with portal HTN,   - no plan for paracentesis today    #Feeds  - decreased appetite with very limited po intake. Refuses NGT at this time, will discuss with proxy  - proxy agreed to NG tube, however pt thrashing around adamantly refusing placement of NGT   - will continue GOC discussions with proxy    RENAL/:  #AAMIR on CKD c/b ATN from hypotension and ischemic injury  - on phenylephrine, midodrine, solucortef  - renally adjusted meds/ ABx  - renvela 1600mg PO TID w/each meal   - sodium bicarb 650 TID  - permacath placed on 6/8  - Renal consulted, appreciate recs  - pt not a candidate for HD anymore as he cannot tolerate HD    HEME:   #DIC  - monitor fibrinogen, haptoglobin, LDH, and platelets 2/2 to sepsis  - improving    Anemia:   2/2 ?MDS  Hg stable    #Coagulopathy:   2/2 MDS vs sepsis vs DIC  - Monitor PT/INR     #DVT - RLE  - Eliquis held in setting of DIC concern    #thrombocytopenia 2/2 DIC/MDS and possibly daptomycin  -monitor plt level  -Plt repletion goal <10    ID:   #Persistent MRSA bacteremia  - first culture negative 5/18  - dapto 700mg [id number] until 6/29  - obtain CK twice a week    ENDO  - monitor sugars    DVt ppx: SCDs. no chemo ppx due to pancytopenia  Lines: R IJ CVC (6/3), L permacath (6/8) 75 yo M with MDS, pancytopenia, CKD, ascites of unknown origin presents from [facility] with complaints of decreased po intake and weakness likely secondary to uremia/AAMIR from diuresis for treatment of ascites, hospital course notable for UGIB and pericardial effusion with tamponade physiology, as well as ATN, and ascites of unknown etiology, now in ICU s/p paracentesis 5/10 (2L removed), IR pericardiocentesis 5/11 (drain in place), now with anemia and thrombocytopenia with hypercoagulability 2/2 DIC that improved, now likely MDS, had increasing pressor requirement with HD yesterday     GOC:   Patient is DNR/DNI. Will continue to have GOC discussion with HCP Venus regarding comfort care    NEURO  #AMS likely 2/2 to uremia.  -similar to yesterday  -no concern for focal deficit   -CT head 6/10 without hemorrhage or new infarct; more so suggesting clinical correlation for r/o NPH, however likely more diffuse volume loss    #r/o NPH   CT head showing concern for NPH, not proportional to amount of volume los  - consult neurology and f/u recs   - f/u PT to assess ambulatory status--unsteady gait?    #depression   -c/w mirtazapine to 15mg at night  - Psych following, appreciate recs    CARDIOVASCULAR/HD  #Shock of unclear etiology.   No suspicion for septic shock as patient culture negative since 5/18 and on continued dapto treatment.    - midodrine 30mg [id number]  - solucortef 50mg q8  - continue weaning Levo as possible    PULM:  -stable on RA  -maintain head of bed 30 degrees, due to aspiration risk     GI:   #ascites  - Elevated SAAG consistent with portal HTN,   - no plan for paracentesis today    #Feeds  - decreased appetite with very limited po intake. Refuses NGT at this time, will discuss with proxy  - proxy agreed to NG tube, however pt thrashing around adamantly refusing placement of NGT   - will continue GOC discussions with proxy    RENAL/:  #AAMIR on CKD c/b ATN from hypotension and ischemic injury  - on phenylephrine, midodrine, solucortef  - renally adjusted meds/ ABx  - renvela 1600mg PO TID w/each meal   - sodium bicarb 650 TID  - permacath placed on 6/8  - Renal consulted, appreciate recs  - pt not a candidate for HD anymore as he cannot tolerate HD    HEME:   #DIC  - monitor fibrinogen, haptoglobin, LDH, and platelets 2/2 to sepsis  - improving    Anemia:   2/2 ?MDS  Hg stable    #Coagulopathy:   2/2 MDS vs sepsis vs DIC  - Monitor PT/INR     #DVT - RLE  - Eliquis held in setting of DIC concern    #thrombocytopenia 2/2 DIC/MDS and possibly daptomycin  -monitor plt level  -Plt repletion goal <10    ID:   #Persistent MRSA bacteremia  - first culture negative 5/18  - dapto 700mg [id number] until 6/29  - obtain CK twice a week    ENDO  - monitor sugars    DVt ppx: SCDs. no chemo ppx due to pancytopenia  Lines: R IJ CVC (6/3), L permacath (6/8)

## 2020-06-10 NOTE — CONSULT NOTE ADULT - SUBJECTIVE AND OBJECTIVE BOX
**NEUROLOGY CONSULT NOTE**    HPI: 75 yo M with pmhx of DM, HTN, HLD, BPH (chronic indwelling palacio placement on last admission, SERA, CKD4, and a-fib, mds, pancytopenia, presented from [facility] with complaints of decreased po intake and weakness. Patient reports that he recently had a intense bowel regimen for constipation after which he developed loose stools, and stopped eating to prevent the loose stools. Patient also complains of worsening LE and abdominal swelling. Massive ascites, and increased liver echogenicity noted on ultrasound, after diuresis his ascites and LE edema improved however he developed worsening AAMIR.   melena and downtrending H&H, transfused 1L PRBC with appropriate/exaggerated response.  TTE performed 5/8 showed moderate pericardial effusion with evidence of tamponade physiology.  Patient with worsening hypotension 5/9, started on midodrine. Pt now with worsening AAMIR, however pt with complications of Vtach during dialysis.     Stroke code called on this patient this AM as primary team noted patient to be lethargic, had ?right sided upper and lower extremity weakness; not following commands focally. However, after sternal rub and more aggressive wakening, pt able to move all extremities spontaneously and to command. Stroke code called and cancelled. Went for CT head later this AM which showed no acute process, however radiology recommending clinically correlating for NPH. Neurology consulted regarding possibility of NPH.     During course, primary team explains that pt noted to have a waxing and waning mental status, at times appearing depressed and other times agitated.     Patient himself explains that at baseline, such as six months ago, he was able to walk around without using a cane and without report of gait unsteadiness. He has indwelling palacio for chronic incontinence/retention. Pt responding appropriately to coversation.       PAST MEDICAL & SURGICAL HISTORY:  History of pancytopenia  H/O hydrocephalus  Anemia  HLD (hyperlipidemia)  Enlarged prostate  DM (diabetes mellitus)  HTN (hypertension)  H/O prior ablation treatment      FAMILY HISTORY:  FH: stomach cancer  FHx: breast cancer  FHx: stroke      SOCIAL HISTORY:  Denies smoking, drinking, or drug use    ROS:  Constitutional: No fever, weight loss or fatigue  Eyes: No eye pain, visual disturbances, or discharge  ENMT:  No difficulty hearing, tinnitus, vertigo; No sinus or throat pain  Neck: No pain or stiffness  Respiratory: No cough, wheezing, chills or hemoptysis  Cardiovascular: No chest pain, palpitations, shortness of breath, dizziness or leg swelling  Gastrointestinal: No abdominal pain. No nausea, vomiting or hematemesis; No diarrhea or constipation. No hematochezia.  Genitourinary: No dysuria, frequency, hematuria or incontinence  Neurological: As per HPI  Skin: No itching, burning, rashes or lesions   Endocrine: No heat or cold intolerance; No hair loss  Musculoskeletal: No joint pain or swelling; No muscle, back or extremity pain  Psychiatric: No depression, anxiety, mood swings or difficulty sleeping  Heme/Lymph: No easy bruising or bleeding gums    MEDICATIONS  (STANDING):  albumin human 25% IVPB 50 milliLiter(s) IV Intermittent every 1 hour  chlorhexidine 2% Cloths 1 Application(s) Topical daily  collagenase Ointment 1 Application(s) Topical daily  DAPTOmycin IVPB 700 milliGRAM(s) IV Intermittent every 48 hours  hydrocortisone sodium succinate Injectable 50 milliGRAM(s) IV Push every 8 hours  insulin lispro (HumaLOG) corrective regimen sliding scale   SubCutaneous Before meals and at bedtime  midodrine 30 milliGRAM(s) Oral every 8 hours  mirtazapine 15 milliGRAM(s) Oral at bedtime  nystatin Cream 1 Application(s) Topical two times a day  pantoprazole  Injectable 40 milliGRAM(s) IV Push daily  phenylephrine    Infusion 0.007 MICROgram(s)/kG/Min (0.1 mL/Hr) IV Continuous <Continuous>  sevelamer carbonate Powder 1600 milliGRAM(s) Oral three times a day with meals  sodium bicarbonate 650 milliGRAM(s) Oral three times a day    MEDICATIONS  (PRN):  acetaminophen   Tablet .. 650 milliGRAM(s) Oral every 6 hours PRN Moderate Pain (4 - 6)      Allergies    No Known Allergies    Intolerances        Vital Signs Last 24 Hrs  T(C): 36.7 (10 Jose Luis 2020 06:00), Max: 36.9 (09 Jun 2020 17:26)  T(F): 98 (10 Jose Luis 2020 06:00), Max: 98.5 (09 Jun 2020 17:26)  HR: 83 (10 Jose Luis 2020 15:00) (78 - 94)  BP: 95/52 (10 Jose Luis 2020 15:00) (90/68 - 128/66)  BP(mean): 67 (10 Jose Luis 2020 15:00) (67 - 83)  RR: 11 (10 Jose Luis 2020 15:00) (10 - 27)  SpO2: 100% (10 Jose Luis 2020 15:00) (94% - 100%)    PHYSICAL EXAM:  Constitutional: Elderly male  Cardiovascular: RRR  Neurologic:  -Mental status: Awake, alert and oriented to person, place, and time. Follows simple commands. Speech is fluent with intact naming, repetition, and comprehension. Attention/concentration intact.  No dysarthria, no aphasia.   -Cranial nerves:  II: Visual fields full to confrontation. Pupils PERRL  III, IV, VI: extraocular movements are grossly intact, appropriate for age  VII:  no facial droop.  VIII: hearing is grossly intact  -Motor: Holds b/l UE's antigravity with minimal drift 4+/5 b/l. Lower extremities 3/5 b/l.    -Sensation: Intact to light touch in all 4 extremities.   -Reflexes: mute babinski's bilaterally    NIHSS: 6  ICH: n/a    Fingerstick Blood Glucose: CAPILLARY BLOOD GLUCOSE  175 (10 Jose Luis 2020 08:21)      POCT Blood Glucose.: 179 mg/dL (10 Jose Luis 2020 11:18)       LABS:                        8.6    7.45  )-----------( 46       ( 09 Jun 2020 05:01 )             27.8     06-09    140  |  102  |  95<H>  ----------------------------<  161<H>  4.4   |  18<L>  |  7.17<H>    Ca    8.4      09 Jun 2020 05:01  Phos  8.4     06-09  Mg     2.0     06-09    TPro  5.1<L>  /  Alb  2.8<L>  /  TBili  0.6  /  DBili  x   /  AST  18  /  ALT  8<L>  /  AlkPhos  83  06-09      CARDIAC MARKERS ( 09 Jun 2020 05:01 )  x     / x     / 48 U/L / x     / x          RADIOLOGY & ADDITIONAL STUDIES:      ASSESSMENT/PLAN:    74y Male w/ PMHx DM, HTN, HLD, BPH (chronic indwelling palacio placement on last admission, SERA, CKD4, and a-fib, mds, with course complicated by cardiac tamponade, ascites, AAMIR, VTach during dialysis, low platelets, palliative consulted, has had waxing and waning mental status. This AM, pt lethargic and ?lateralized symptoms, stroke code called but on sternal rub pt maintained alertness and had nonfocal exam, stroke code cancelled. CTH showing cerebral atrophy, with ventricular enlargement, r/o NPH.     - History per patient of no difficulty with gait or frequent falls prior to recent admissions for medical comorbidities  - Pt with bilateral leg weakness and medically acute, therefore unable to accurately assess his gait at this time  - NPH is unlikely to be contributing to waxing and waning depressive then agitated mental status  - Per primary team, pt not a candidate for high volume tap or shunt at this time. Pt now being considered for palliative care due to medical comorbidities. Pt could be considered for NPH workup in outpatient setting if course improves, would not recommend further workup at this time. **NEUROLOGY CONSULT NOTE**    HPI: 73 yo M with pmhx of DM, HTN, HLD, BPH (chronic indwelling palacio placement on last admission, SERA, CKD4, and a-fib, mds, pancytopenia, presented from [facility] with complaints of decreased po intake and weakness. Patient reports that he recently had a intense bowel regimen for constipation after which he developed loose stools, and stopped eating to prevent the loose stools. Patient also complains of worsening LE and abdominal swelling. Massive ascites, and increased liver echogenicity noted on ultrasound, after diuresis his ascites and LE edema improved however he developed worsening AAMIR.   melena and downtrending H&H, transfused 1L PRBC with appropriate/exaggerated response.  TTE performed 5/8 showed moderate pericardial effusion with evidence of tamponade physiology.  Patient with worsening hypotension 5/9, started on midodrine. Pt now with worsening AAMIR, however pt with complications of Vtach during dialysis.     Stroke code called on this patient this AM as primary team noted patient to be lethargic, had ?right sided upper and lower extremity weakness; not following commands focally. However, after sternal rub and more aggressive wakening, pt able to move all extremities spontaneously and to command. Stroke code called and cancelled. Went for CT head later this AM which showed no acute process, however radiology recommending clinically correlating for NPH. Neurology consulted regarding possibility of NPH.     During course, primary team explains that pt noted to have a waxing and waning mental status, at times appearing depressed and other times agitated.     Patient himself explains that at baseline, such as six months ago, he was able to walk around without using a cane and without report of gait unsteadiness. He has indwelling palacio for chronic incontinence/retention. Pt responding appropriately to coversation.       PAST MEDICAL & SURGICAL HISTORY:  History of pancytopenia  H/O hydrocephalus  Anemia  HLD (hyperlipidemia)  Enlarged prostate  DM (diabetes mellitus)  HTN (hypertension)  H/O prior ablation treatment      FAMILY HISTORY:  FH: stomach cancer  FHx: breast cancer  FHx: stroke      SOCIAL HISTORY:  Denies smoking, drinking, or drug use    ROS:  Constitutional: No fever, weight loss or fatigue  Eyes: No eye pain, visual disturbances, or discharge  ENMT:  No difficulty hearing, tinnitus, vertigo; No sinus or throat pain  Neck: No pain or stiffness  Respiratory: No cough, wheezing, chills or hemoptysis  Cardiovascular: No chest pain, palpitations, shortness of breath, dizziness or leg swelling  Gastrointestinal: No abdominal pain. No nausea, vomiting or hematemesis; No diarrhea or constipation. No hematochezia.  Genitourinary: No dysuria, frequency, hematuria or incontinence  Neurological: As per HPI  Skin: No itching, burning, rashes or lesions   Endocrine: No heat or cold intolerance; No hair loss  Musculoskeletal: No joint pain or swelling; No muscle, back or extremity pain  Psychiatric: No depression, anxiety, mood swings or difficulty sleeping  Heme/Lymph: No easy bruising or bleeding gums    MEDICATIONS  (STANDING):  albumin human 25% IVPB 50 milliLiter(s) IV Intermittent every 1 hour  chlorhexidine 2% Cloths 1 Application(s) Topical daily  collagenase Ointment 1 Application(s) Topical daily  DAPTOmycin IVPB 700 milliGRAM(s) IV Intermittent every 48 hours  hydrocortisone sodium succinate Injectable 50 milliGRAM(s) IV Push every 8 hours  insulin lispro (HumaLOG) corrective regimen sliding scale   SubCutaneous Before meals and at bedtime  midodrine 30 milliGRAM(s) Oral every 8 hours  mirtazapine 15 milliGRAM(s) Oral at bedtime  nystatin Cream 1 Application(s) Topical two times a day  pantoprazole  Injectable 40 milliGRAM(s) IV Push daily  phenylephrine    Infusion 0.007 MICROgram(s)/kG/Min (0.1 mL/Hr) IV Continuous <Continuous>  sevelamer carbonate Powder 1600 milliGRAM(s) Oral three times a day with meals  sodium bicarbonate 650 milliGRAM(s) Oral three times a day    MEDICATIONS  (PRN):  acetaminophen   Tablet .. 650 milliGRAM(s) Oral every 6 hours PRN Moderate Pain (4 - 6)      Allergies    No Known Allergies    Intolerances        Vital Signs Last 24 Hrs  T(C): 36.7 (10 Jose Luis 2020 06:00), Max: 36.9 (09 Jun 2020 17:26)  T(F): 98 (10 Jose Luis 2020 06:00), Max: 98.5 (09 Jun 2020 17:26)  HR: 83 (10 Jose Luis 2020 15:00) (78 - 94)  BP: 95/52 (10 Jose Luis 2020 15:00) (90/68 - 128/66)  BP(mean): 67 (10 Jose Luis 2020 15:00) (67 - 83)  RR: 11 (10 Jose Luis 2020 15:00) (10 - 27)  SpO2: 100% (10 Jose Luis 2020 15:00) (94% - 100%)    PHYSICAL EXAM:  Constitutional: Elderly male  Neurologic:  -Mental status: Awake, alert. Follows simple commands. Can name objects.  -Cranial nerves:  II: Visual fields full to confrontation. PERRL  III, IV, VI: some eye movement limitations in all directions but likely normal for age  VII:  no facial droop.  VIII: hearing is grossly intact  -Motor: Holds b/l UE's antigravity with minimal drift, at least 4+/5 b/l. Lower extremities at least antigravity b/l.    -Sensation: Intact to light touch in all 4 extremities.   -Reflexes: mute babinski's bilaterally    Fingerstick Blood Glucose: CAPILLARY BLOOD GLUCOSE  175 (10 Jose Luis 2020 08:21)    POCT Blood Glucose.: 179 mg/dL (10 Jose Luis 2020 11:18)       LABS:                        8.6    7.45  )-----------( 46       ( 09 Jun 2020 05:01 )             27.8     06-09    140  |  102  |  95<H>  ----------------------------<  161<H>  4.4   |  18<L>  |  7.17<H>    Ca    8.4      09 Jun 2020 05:01  Phos  8.4     06-09  Mg     2.0     06-09    TPro  5.1<L>  /  Alb  2.8<L>  /  TBili  0.6  /  DBili  x   /  AST  18  /  ALT  8<L>  /  AlkPhos  83  06-09      CARDIAC MARKERS ( 09 Jun 2020 05:01 )  x     / x     / 48 U/L / x     / x          RADIOLOGY & ADDITIONAL STUDIES:    Prior MRI and CTH reviewed.    ASSESSMENT/PLAN:    74y Male w/ PMHx DM, HTN, HLD, BPH (chronic indwelling palacio placement on last admission, SERA, CKD4, and a-fib, mds, with course complicated by cardiac tamponade, ascites, AAMIR, VTach during dialysis, low platelets, palliative consulted, has had waxing and waning mental status. This AM, pt lethargic and ?lateralized symptoms, stroke code called but on sternal rub pt maintained alertness and had nonfocal exam, stroke code cancelled. CTH showing cerebral atrophy and ventricular enlargement.     NPH is not the cause of pt's waxing and waning delirium, likely multifactorial related to several general medical conditions. While NPH is a possibility based on imaging, it remains a clinical diagnosis, and the clinical context is not suggestive of NPH. Ventriculomegaly is non-specific and may be related to ex-vacuo dilitation. Regardless, pt is not a candidate for  shunt consideration at this time given acute medical issues. Pt can follow up as an outpatient with neurology to consider further work up for NPH, though this remains unlikely. call with questions.

## 2020-06-10 NOTE — PROGRESS NOTE ADULT - ASSESSMENT
73 yo M with MDS, pancytopenia, CKD, ascites of unknown origin presents from [facility] with complaints of decreased po intake and weakness likely secondary to uremia/AAMIR from diuresis for treatment of ascites, hospital course notable for UGIB and pericardial effusion with tamponade physiology, as well as ATN, and ascites of unknown etiology, now in ICU s/p paracentesis 5/10 (2L removed), IR pericardiocentesis 5/11 (drain in place), now with anemia and thrombocytopenia with hypercoagulability 2/2 DIC, c/b unable to tolerate SLED or CVVHD.       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, likely ATN from hypotension and ischemic injury  - had THC placed on 6/8  - remains anuric  - thrombocytopenia with hypercoagulability 2/2 DIC, c/b unable to tolerate SLED or CVVHD  - last HD on 6/9 - poorly tolerated   - patient too hemodynamically unstable for RRT at this time  - recommend Hospice  - will discuss with nok  - electrolytes acceptable  - volume overloaded  - lokelma 10 g po prn, for k > 5   - continue sodium bicarb 650 mg po q8hr for metabolic acidosis whenever patient agrees to take meds  - renvela 1600 mg po q8hr when patient tolerates diet  - renally adjusted meds/ ABx  - currently undergoing GOC discussion

## 2020-06-10 NOTE — PROGRESS NOTE BEHAVIORAL HEALTH - NSBHATTESTSEENBY_PSY_A_CORE
Trainee with telephonic supervision from Attending Psychiatrist
Trainee with telephonic supervision from Attending Psychiatrist
Attending Psychiatrist supervising NP/Trainee, meeting pt...
attending Psychiatrist without NP/Trainee
Attending Psychiatrist supervising NP/Trainee, meeting pt...

## 2020-06-10 NOTE — PROGRESS NOTE ADULT - PROBLEM SELECTOR PLAN 4
Albumin, Serum: 2.8 g/dL (06.09.20 @ 05:01)  Third spacing and anasarca. Tissue injuries present with poor healing. Per Nutrition:  Keep nutrition aligned with goals of care. Honor food preferences and encourage/assist w/PO intake. Please stress importance of PO intake at this time if pt to continue HD.. If goals of care to change to include nutrition support, please reconsult for EN recs. Pain regimen per team

## 2020-06-10 NOTE — PROGRESS NOTE ADULT - PROBLEM SELECTOR PLAN 2
Underwent HD with increase of pressors and became unstable.     If unable to tolerate HD once again consider comfort guided approach to plan of care. Patient would qualify for inpatient hospice services with an expected prognosis of days to weeks. Patient's surrogate to discuss with Nephrology.    Management as per Nephrology

## 2020-06-10 NOTE — PROGRESS NOTE BEHAVIORAL HEALTH - NSBHFUPINTERVALHXFT_PSY_A_CORE
Patient's last received dose of Remeron was 15mg on the morning of 6/6/20.    Patient made eye contact with writer when spoken to, but did not respond verbally to questions.     According to nephrology consultant, the patient has a poor prognosis. Spoke with patient's healthcare proxy Teresa on the phone regarding herexpectations for treatment.

## 2020-06-10 NOTE — PROGRESS NOTE ADULT - ASSESSMENT
75 yo M with past medical history of DM, HTN, HLD, BPH (chronic indwelling palacio placement on last admission, SERA, CKD4, and a-fib  , mds, pancytopenia, presents from [facility] with complaints of decreased po intake and weakness. Patient reports that he recently had a intense bowel regimen for constipation after which he developed loose stools. To prevent further loose stools he stopped eating . Decreased po intake also in setting of poor appetite. Patient also complains for worsening LE and abdominal swelling. As per collateral obtained from Dr Barksdale at [facility]([id number]) patient abdominal usg notable for massive ascites, and increased liver echogenicity(LFTS outpatient wnl )   and he was aggressively diuresed with  Lasix , torsemide 40 mg bid (of note also started on midodrine as his pressures would drop with iv diuresis ). After diuresis his ascites and LE edema improved however he developed worsening AAMIR and so Lasix was stopped and torsemide decreased to 20 mg bid. Of note he was seen by Cardiology and  echo done, concern fluid overload was cardiac in etiology given elevated bnp and echo findings.

## 2020-06-10 NOTE — PROGRESS NOTE ADULT - ATTENDING COMMENTS
continued shock -- not tolerating dialysis-- almost coded with last rx   d/w HCP pts very poor prognosis and that currently no plan to dialyze unless hemodyanmics improve significantly

## 2020-06-10 NOTE — PROGRESS NOTE ADULT - ATTENDING COMMENTS
Hx of MDS, admitted with FTT; found to have serositis with ascites, pericardial effusion s/p drainage c/b MRSA bacteremia with persistent pressor requirement. Doubt this is sepsis; ?adrenal insufficiency; trial of solucortef. Increase midodrine. C/w daptomycin. Unable to tolerate dialysis secondary to NSVT and acute hypotensive episode; will stop dialysis; renal service in agreement; HCP aware. Concern for acute mental status change, led to CT head which showed concern for NPH; neurology consulted for input. GOC in progress. Prognosis poor.

## 2020-06-10 NOTE — PROGRESS NOTE ADULT - PROBLEM SELECTOR PLAN 1
Improved, but persistent debility and elevated BUN.     Consider discontinuing Remeron  Recommend Ritalin or Provigil daily ~6am.

## 2020-06-10 NOTE — PROGRESS NOTE BEHAVIORAL HEALTH - CASE SUMMARY
Can continue to hold remeron but if restarted would restart at 7.5 mg qhs.
75 y/o man with multiple medical comorbidities, with poor prognosis, currently followed by Palliative. As the patient presents sedated, likely in context of hypoactive delirium, mirtazapine should be stopped (also unclear benefit at this point from using an antidepressant. Agree with the plan of the Palliative team to use ritalin as an appetite and mood stimulant.

## 2020-06-10 NOTE — PROGRESS NOTE ADULT - SUBJECTIVE AND OBJECTIVE BOX
INTERVAL HPI/OVERNIGHT EVENTS:    SUBJECTIVE: Patient seen and examined at bedside.     OBJECTIVE:    VITAL SIGNS:  ICU Vital Signs Last 24 Hrs  T(C): 36.7 (10 Jose Luis 2020 06:00), Max: 36.9 (09 Jun 2020 17:26)  T(F): 98 (10 Jose Luis 2020 06:00), Max: 98.5 (09 Jun 2020 17:26)  HR: 78 (10 Jose Luis 2020 09:00) (78 - 116)  BP: 92/54 (10 Jose Luis 2020 09:00) (62/39 - 128/66)  BP(mean): 69 (10 Jose Luis 2020 09:00) (46 - 83)  ABP: --  ABP(mean): --  RR: 14 (10 Jose Luis 2020 09:00) (10 - 25)  SpO2: 99% (10 Jose Luis 2020 09:00) (88% - 100%)        06-09 @ 07:01  -  06-10 @ 07:00  --------------------------------------------------------  IN: 882.7 mL / OUT: 500 mL / NET: 382.7 mL    06-10 @ 07:01  -  06-10 @ 09:52  --------------------------------------------------------  IN: 32.2 mL / OUT: 0 mL / NET: 32.2 mL      CAPILLARY BLOOD GLUCOSE  175 (10 Jose Luis 2020 08:21)      POCT Blood Glucose.: 175 mg/dL (10 Jose Luis 2020 05:55)      PHYSICAL EXAM:    General: NAD, resting comfortably in bed. more conversant this morning Breathing well on room air  Neck: no JVD  Respiratory: diminished breath sounds at the bases, non-tachypneic, no respiratory distress   Cardiovascular: Regular rhythm/rate; S1/S2, no pericardial rub, pericardial drain removed with site clean, dry, and intact  Gastrointestinal: distended, non tender ascitic drain with appropriate drainage, site clean, dry, and intact  Extremities: WWP; 2+ bilateral dependent pitting edema up to his thigh  Neurological:  A&Ox 2 (person and place), arousable and interactive but still weak. Is able to follow simple commands and answer simple questions. CNII-XII grossly intact; no obvious focal deficits, no flapping tremor, but minimal asterixis    MEDICATIONS:  MEDICATIONS  (STANDING):  albumin human 25% IVPB 50 milliLiter(s) IV Intermittent every 1 hour  chlorhexidine 2% Cloths 1 Application(s) Topical daily  collagenase Ointment 1 Application(s) Topical daily  DAPTOmycin IVPB 700 milliGRAM(s) IV Intermittent every 48 hours  hydrocortisone sodium succinate Injectable 50 milliGRAM(s) IV Push every 8 hours  insulin lispro (HumaLOG) corrective regimen sliding scale   SubCutaneous Before meals and at bedtime  midodrine 30 milliGRAM(s) Oral every 8 hours  mirtazapine 15 milliGRAM(s) Oral at bedtime  nystatin Cream 1 Application(s) Topical two times a day  pantoprazole  Injectable 40 milliGRAM(s) IV Push daily  phenylephrine    Infusion 0.007 MICROgram(s)/kG/Min (0.1 mL/Hr) IV Continuous <Continuous>  sevelamer carbonate Powder 1600 milliGRAM(s) Oral three times a day with meals  sodium bicarbonate 650 milliGRAM(s) Oral three times a day    MEDICATIONS  (PRN):  acetaminophen   Tablet .. 650 milliGRAM(s) Oral every 6 hours PRN Moderate Pain (4 - 6)      ALLERGIES:  Allergies    No Known Allergies    Intolerances        LABS:                        8.6    7.45  )-----------( 46       ( 09 Jun 2020 05:01 )             27.8     06-09    140  |  102  |  95<H>  ----------------------------<  161<H>  4.4   |  18<L>  |  7.17<H>    Ca    8.4      09 Jun 2020 05:01  Phos  8.4     06-09  Mg     2.0     06-09    TPro  5.1<L>  /  Alb  2.8<L>  /  TBili  0.6  /  DBili  x   /  AST  18  /  ALT  8<L>  /  AlkPhos  83  06-09          RADIOLOGY & ADDITIONAL TESTS: Reviewed. INTERVAL HPI/OVERNIGHT EVENTS: Yesterday, Venus agreed to NG tube placement, however pt adamantly refused, agitated and thrashing around. Unable to place.    SUBJECTIVE: Patient seen and examined at bedside. This AM, pt had episode of being more lethargic, had ?right sided upper and lower extremity weakness; not following commands focally. However, after sternal rub and more aggressive wakening, pt able to move all extremities spontaneously and to command. Stroke code called and cancelled. Went for CT head later this AM which showed no acute process, however recommending clinically correlating for NPH. Venus (girlfriend) notified of poor prognosis.     OBJECTIVE:    VITAL SIGNS:  ICU Vital Signs Last 24 Hrs  T(C): 36.7 (10 Jose Luis 2020 06:00), Max: 36.9 (09 Jun 2020 17:26)  T(F): 98 (10 Jose Luis 2020 06:00), Max: 98.5 (09 Jun 2020 17:26)  HR: 78 (10 Jose Luis 2020 09:00) (78 - 116)  BP: 92/54 (10 Jose Luis 2020 09:00) (62/39 - 128/66)  BP(mean): 69 (10 Jose Luis 2020 09:00) (46 - 83)  ABP: --  ABP(mean): --  RR: 14 (10 Jose Luis 2020 09:00) (10 - 25)  SpO2: 99% (10 Jose Luis 2020 09:00) (88% - 100%)        06-09 @ 07:01  -  06-10 @ 07:00  --------------------------------------------------------  IN: 882.7 mL / OUT: 500 mL / NET: 382.7 mL    06-10 @ 07:01  -  06-10 @ 09:52  --------------------------------------------------------  IN: 32.2 mL / OUT: 0 mL / NET: 32.2 mL      CAPILLARY BLOOD GLUCOSE  175 (10 Jose Luis 2020 08:21)      POCT Blood Glucose.: 175 mg/dL (10 Jose Luis 2020 05:55)      PHYSICAL EXAM:    General: NAD, resting comfortably in bed. more conversant this morning Breathing well on room air  Neck: no JVD  Respiratory: diminished breath sounds at the bases, non-tachypneic, no respiratory distress   Cardiovascular: Regular rhythm/rate; S1/S2, no pericardial rub, pericardial drain removed with site clean, dry, and intact  Gastrointestinal: distended, non tender ascitic drain with appropriate drainage, site clean, dry, and intact  Extremities: WWP; 2+ bilateral dependent pitting edema up to his thigh  Neurological:  A&Ox 2 (person and place), arousable and interactive but still weak. Is able to follow simple commands and answer simple questions. CNII-XII grossly intact; no obvious focal deficits, no flapping tremor, but minimal asterixis    MEDICATIONS:  MEDICATIONS  (STANDING):  albumin human 25% IVPB 50 milliLiter(s) IV Intermittent every 1 hour  chlorhexidine 2% Cloths 1 Application(s) Topical daily  collagenase Ointment 1 Application(s) Topical daily  DAPTOmycin IVPB 700 milliGRAM(s) IV Intermittent every 48 hours  hydrocortisone sodium succinate Injectable 50 milliGRAM(s) IV Push every 8 hours  insulin lispro (HumaLOG) corrective regimen sliding scale   SubCutaneous Before meals and at bedtime  midodrine 30 milliGRAM(s) Oral every 8 hours  mirtazapine 15 milliGRAM(s) Oral at bedtime  nystatin Cream 1 Application(s) Topical two times a day  pantoprazole  Injectable 40 milliGRAM(s) IV Push daily  phenylephrine    Infusion 0.007 MICROgram(s)/kG/Min (0.1 mL/Hr) IV Continuous <Continuous>  sevelamer carbonate Powder 1600 milliGRAM(s) Oral three times a day with meals  sodium bicarbonate 650 milliGRAM(s) Oral three times a day    MEDICATIONS  (PRN):  acetaminophen   Tablet .. 650 milliGRAM(s) Oral every 6 hours PRN Moderate Pain (4 - 6)      ALLERGIES:  Allergies    No Known Allergies    Intolerances        LABS:                        8.6    7.45  )-----------( 46       ( 09 Jun 2020 05:01 )             27.8     06-09    140  |  102  |  95<H>  ----------------------------<  161<H>  4.4   |  18<L>  |  7.17<H>    Ca    8.4      09 Jun 2020 05:01  Phos  8.4     06-09  Mg     2.0     06-09    TPro  5.1<L>  /  Alb  2.8<L>  /  TBili  0.6  /  DBili  x   /  AST  18  /  ALT  8<L>  /  AlkPhos  83  06-09          RADIOLOGY & ADDITIONAL TESTS: Reviewed.

## 2020-06-11 NOTE — PROGRESS NOTE ADULT - PROBLEM SELECTOR PLAN 3
Known to [facility] Dr Roberts. Hemoglobin: 8.6 g/dL (06.09.20 @ 05:01)  Platelet Count - Automated: 46K/uL (06.09.20 @ 05:01)    Management as per Hematology.

## 2020-06-11 NOTE — SWALLOW VFSS/MBS ASSESSMENT ADULT - DIAGNOSTIC IMPRESSIONS
Pt p/w severe orophayrngeal dysphagia. Oral stage was significant for inefficient bolus formation and transport, resulting in premature posterior spillage and high degree of effort needed to propel the bolus. Pharyngeal stage was significant for delayed swallow initiation, blunted sensation, and overall weakness, resulting in SILENT ASPIRATION across all consistencies, as well as post-deglutitive residue (worst w pudding and HTL). Cued cough/throat-clear w secondary swallow could not clear the aspiration. Strategies were not effective in eliminating the aspiration. Given these observations, a safe PO diet cannot be recommended at this time.

## 2020-06-11 NOTE — PROGRESS NOTE ADULT - ASSESSMENT
75 yo M with past medical history of DM, HTN, HLD, BPH (chronic indwelling palacio placement on last admission, SERA, CKD4, and a-fib  , mds, pancytopenia, presents from [facility] with complaints of decreased po intake and weakness. Patient reports that he recently had a intense bowel regimen for constipation after which he developed loose stools. To prevent further loose stools he stopped eating . Decreased po intake also in setting of poor appetite. Patient also complains for worsening LE and abdominal swelling. As per collateral obtained from Dr Barksdale at [facility]([id number]) patient abdominal usg notable for massive ascites, and increased liver echogenicity(LFTS outpatient wnl )   and he was aggressively diuresed with  Lasix , torsemide 40 mg bid (of note also started on midodrine as his pressures would drop with iv diuresis ). After diuresis his ascites and LE edema improved however he developed worsening AAMIR and so Lasix was stopped and torsemide decreased to 20 mg bid. Of note he was seen by Cardiology and  echo done, concern fluid overload was cardiac in etiology given elevated bnp and echo findings.

## 2020-06-11 NOTE — SWALLOW VFSS/MBS ASSESSMENT ADULT - PHARYNGEAL PHASE COMMENTS
Swallow was triggered as the thi liq bolus spilled to the pyriforms resulting in penetration during the swallow. Pt did not attempt to clear this spontaneously. A cued cough/throat-clear and secondary swallow did not clear the penetrate, and it eventually continued to spill down the laryngeal side of the epiglottis resulting in aspiration. Decreased BOT retraction and pharyngeal squeeze resulted in post-deglutitive residue (severity worsened as viscosity of PO increased), most severely present in the valleculae and pyriforms. Residue then spilled over the arytenoids and pt aspirated posteriorly. Pt was only noted to cough as an audible aspiration response to the first aspiration event with thin liq by spoon. No audible response to any other trials/consistencies. A cued throat-clear and cough were ineffective at clearing the aspiration. Pt appeared too weak to effectively complete a chin-tuck, breath hold or effortful swallow; aspiration was unable to be avoided.

## 2020-06-11 NOTE — SWALLOW VFSS/MBS ASSESSMENT ADULT - ADDITIONAL RECOMMENDATIONS
1) Although pt w multiple medical problems, pt was reporting strong desire to continue PO. Given that pt is at risk of prandial aspiration across all consistencies, it is recommended that the pt's long-term feeding goals be further discussed. If GOC are to continue PO for QOL/pleasure despite risks of asp/PNA, then pls allow pt to continue his desired diet w safe feeding strategies including: sit upright for all PO, small bites/sips, feed slowly, alternate liquids and solids.   2) This SVC will f/u

## 2020-06-11 NOTE — CHART NOTE - NSCHARTNOTEFT_GEN_A_CORE
PALLIATIVE MEDICINE COORDINATION OF CARE NOTE FOR SANIYA LOZANO    Patient last assessed: Somnolence to manage: GOC/AD, Symptoms, and Support was recommended: Ritalin or Provigil daily ~6am.     30 Minutes; Start: 9:00am End: 9:30am, of non-face-to-face prolonged service provided that relates to (face-to-face) care that has or will occur and ongoing patient management, including one or more of the following:   - Reviewed records from other physicians or other health care professional services, including one or more of the following: other medical records and diagnostic / radiology study results     - Other: Medication reviewed.    The patient HAS NOT used PRN's in the last 24h.  MME: 0mg    MEDICATIONS  (STANDING):  albumin human 25% IVPB 50 milliLiter(s) IV Intermittent every 1 hour  chlorhexidine 2% Cloths 1 Application(s) Topical daily  collagenase Ointment 1 Application(s) Topical daily  DAPTOmycin IVPB 700 milliGRAM(s) IV Intermittent every 48 hours  hydrocortisone sodium succinate Injectable 50 milliGRAM(s) IV Push every 8 hours  insulin lispro (HumaLOG) corrective regimen sliding scale   SubCutaneous Before meals and at bedtime  midodrine 30 milliGRAM(s) Oral every 8 hours  mirtazapine 15 milliGRAM(s) Oral at bedtime  nystatin Cream 1 Application(s) Topical two times a day  pantoprazole  Injectable 40 milliGRAM(s) IV Push daily  phenylephrine    Infusion 0.007 MICROgram(s)/kG/Min (0.1 mL/Hr) IV Continuous <Continuous>  sevelamer carbonate Powder 1600 milliGRAM(s) Oral three times a day with meals  sodium bicarbonate 650 milliGRAM(s) Oral three times a day    MEDICATIONS  (PRN):  acetaminophen   Tablet .. 650 milliGRAM(s) Oral every 6 hours PRN Moderate Pain (4 - 6)    - Other: Advanced directives     DNR DNI     No documented MOLST found on [facility]     No documented HCP form found on [facility]     Other surrogates: Friend Teresa "Venus" Matt [phone] / [phone]     No Living will / POA / Advance directives found on [place] / [facility].     Documented GOC notes on [place] on 5/22/2020, 5/25/2020, and 5/27/2020.    - Other: Coordination/Plan of care     2 admissions in 1 year     Current admission LOS: 34 days     LACE score: 17 ADVANCE ILLNESS PATIENT since 2/19/2020.    Patient did not receive Remeron dose last night. Will reassess mental status during bedside rounds later today.   Please notify palliative if patient's surrogate Teresa arrives at the bedside.

## 2020-06-11 NOTE — PROGRESS NOTE ADULT - ATTENDING COMMENTS
Hx of MDS, admitted with FTT; found to have serositis with ascites, pericardial effusion s/p drainage c/b MRSA bacteremia with persistent pressor requirement. Doubt this is sepsis; ?adrenal insufficiency; trial of solucortef. Increased midodrine but is refusing PO. C/w daptomycin. Unable to tolerate dialysis secondary to NSVT and acute hypotensive episode; will stop dialysis; renal service in agreement; HCP aware. Concern for acute mental status change, led to CT head which showed concern for NPH; no intervention. Pending s/s; if fails need to consider PEG (does not cooperate with NG tube). GOC in progress.

## 2020-06-11 NOTE — CHART NOTE - NSCHARTNOTEFT_GEN_A_CORE
PGY 3 event note - Comfort feeds    Discussed with patient (intermittently disoriented, intermittently understanding situation) and decision maker Venus at bedside, patient failed swallow evaluation today at all consistencies. Patient still requesting food and water, understands risk of aspiration. Patient and Venus informed of choices available at this point including NG tube, PEG tube, and comfort feeds. Both requesting comfort feeds without NG tube (previously refused by patient) or PEG tube. Patient and Venus aware of significant risk of aspiration and subsequent risk of infection or death should patient aspirate or otherwise fail to successfully eat or drink. Both patient and Venus aware of benefits and risks of comfort feeds and request that comfort feeds be started. Patient to be started on comfort feeds.

## 2020-06-11 NOTE — PROGRESS NOTE ADULT - PROBLEM SELECTOR PLAN 2
Not tolerating HD. Consider comfort guided approach to plan of care. Patient would qualify for inpatient hospice services with an expected prognosis of days to weeks if no longer on vasopressors.     Management as per Nephrology

## 2020-06-11 NOTE — PROGRESS NOTE ADULT - ASSESSMENT
74M with MDS, pancytopenia, CKD, ascites of unknown origin presents from [facility] with complaints of decreased po intake and weakness likely secondary to uremia/AAMIR from diuresis for treatment of ascites, hospital course notable for UGIB and pericardial effusion with tamponade physiology, as well as ATN, and ascites of unknown etiology, now in ICU s/p paracentesis 5/10 (2L removed), IR pericardiocentesis 5/11 (drain in place), now with anemia and thrombocytopenia with hypercoagulability 2/2 DIC that improved, now likely MDS, had increasing pressor requirement with HD yesterday     GOC:   Patient is DNR/DNI. Will continue to have GOC discussion with HCP Venus regarding comfort care    NEURO  #AMS likely 2/2 to uremia.  - similar to yesterday, though more conversant and argumentative   - no concern for focal deficit    -CT head 6/10 without hemorrhage or new infarct; more so suggesting clinical correlation for r/o NPH, however likely more diffuse volume loss  - neuro recommending that NPH is a clinical diagnosis and no further intervention at this time needed     #r/o NPH   CT head showing concern for NPH, not proportional to amount of volume los  - consult neurology and f/u recs   - f/u PT to assess ambulatory status--unsteady gait?    #depression   - c/w mirtazapine to 15mg at night  - Psych following, appreciate recs      CARDIOVASCULAR/HD  #Shock of unclear etiology.   No suspicion for septic shock as patient culture negative since 5/18 and on continued dapto treatment.    - midodrine 30mg [id number], though has not been able to take as not cleared by speech and swallow   - solucortef 50mg q8  - taken off levo and now on phenylephrine, continue weaning pressors as possible, halfed dose of phenylephrine at 9AM     PULM:  - stable on RA  - maintain head of bed 30 degrees, due to aspiration risk     GI:   #ascites  - Elevated SAAG consistent with portal HTN   - no plan for paracentesis today    #Feeds  - decreased appetite with very limited po intake. Refuses NGT at this time, will discuss with proxy  - proxy agreed to NG tube, however pt thrashing around adamantly refusing placement of NGT   - will continue GOC discussions with proxy, patient now asking nurses about his prognosis, though proxy continues to want full escalation of care  - plan for modified barium swallow today, spoke with patient at length for why this study is necessary with the hopes he won't refuse it     #diarrhea:   New onset of diarrhea on 06/11.   - plan to send c diff given increase in WBC if patient has another episode of diarrhea     RENAL/:  #AAMIR on CKD c/b ATN from hypotension and ischemic injury  - on phenylephrine, midodrine, solucortef  - renally adjusted meds/ ABx  - renvela 1600mg PO TID w/each meal   - sodium bicarb 650 TID  - permacath placed on 6/8  - Renal consulted, appreciate recs  - pt not a candidate for HD anymore as he cannot tolerate HD    HEME:   #DIC  - monitor fibrinogen, haptoglobin, LDH, and platelets 2/2 to sepsis  - improving    Anemia:   2/2 ?MDS  - Hg stable    #Coagulopathy:   2/2 MDS vs sepsis vs DIC  - Monitor PT/INR     #DVT - RLE  - Eliquis held in setting of DIC concern    #thrombocytopenia 2/2 DIC/MDS and possibly daptomycin  - monitor plt level  - Plt repletion goal <10    ID:   #Persistent MRSA bacteremia  - first culture negative 5/18   - dapto 700mg [id number] until 6/29  - obtain CK twice a week    ENDO  - monitor sugars    DVt ppx: SCDs. no chemo ppx due to pancytopenia  Lines: R IJ CVC (6/3), L permacath (6/8)

## 2020-06-11 NOTE — PROGRESS NOTE ADULT - SUBJECTIVE AND OBJECTIVE BOX
HOSPITAL COURSE:        INTERVAL HPI/OVERNIGHT EVENTS:    SUBJECTIVE: Patient seen and examined at bedside.     CONSTITUTIONAL: No weakness, fevers or chills  EYES/ENT: No visual changes;  No vertigo or throat pain   NECK: No pain or stiffness  RESPIRATORY: No cough, wheezing, hemoptysis; No shortness of breath  CARDIOVASCULAR: No chest pain or palpitations  GASTROINTESTINAL: No abdominal or epigastric pain. No nausea, vomiting, or hematemesis; No diarrhea or constipation. No melena or hematochezia.  GENITOURINARY: No dysuria, frequency or hematuria  NEUROLOGICAL: No numbness or weakness  SKIN: No itching, rashes    OBJECTIVE:    VITAL SIGNS:  ICU Vital Signs Last 24 Hrs  T(C): 36.6 (11 Jun 2020 09:00), Max: 37.2 (11 Jun 2020 08:00)  T(F): 97.8 (11 Jun 2020 09:00), Max: 99 (11 Jun 2020 08:00)  HR: 91 (11 Jun 2020 10:20) (79 - 91)  BP: 79/47 (11 Jun 2020 10:20) (79/47 - 111/55)  BP(mean): 59 (11 Jun 2020 10:20) (59 - 77)  ABP: --  ABP(mean): --  RR: 16 (11 Jun 2020 10:20) (8 - 58)  SpO2: 99% (11 Jun 2020 10:20) (96% - 100%)        06-10 @ 07:01  -  06-11 @ 07:00  --------------------------------------------------------  IN: 290.3 mL / OUT: 0 mL / NET: 290.3 mL      CAPILLARY BLOOD GLUCOSE  175 (10 Jose Luis 2020 08:21)      POCT Blood Glucose.: 230 mg/dL (11 Jun 2020 11:20)      PHYSICAL EXAM:    General: NAD  HEENT: NC/AT; PERRL, clear conjunctiva  Neck: supple  Respiratory: CTA b/l  Cardiovascular: +S1/S2; RRR  Abdomen: soft, NT/ND; +BS x4  Extremities: WWP, 2+ peripheral pulses b/l; no LE edema  Skin: normal color and turgor; no rash  Neurological:  Lines:    MEDICATIONS:  MEDICATIONS  (STANDING):  albumin human 25% IVPB 50 milliLiter(s) IV Intermittent every 1 hour  chlorhexidine 2% Cloths 1 Application(s) Topical daily  collagenase Ointment 1 Application(s) Topical daily  DAPTOmycin IVPB 700 milliGRAM(s) IV Intermittent every 48 hours  hydrocortisone sodium succinate Injectable 50 milliGRAM(s) IV Push every 8 hours  insulin lispro (HumaLOG) corrective regimen sliding scale   SubCutaneous Before meals and at bedtime  midodrine 30 milliGRAM(s) Oral every 8 hours  mirtazapine 15 milliGRAM(s) Oral at bedtime  nystatin Cream 1 Application(s) Topical two times a day  pantoprazole  Injectable 40 milliGRAM(s) IV Push daily  phenylephrine    Infusion 0.007 MICROgram(s)/kG/Min (0.1 mL/Hr) IV Continuous <Continuous>  sevelamer carbonate Powder 1600 milliGRAM(s) Oral three times a day with meals  sodium bicarbonate 650 milliGRAM(s) Oral three times a day    MEDICATIONS  (PRN):  acetaminophen   Tablet .. 650 milliGRAM(s) Oral every 6 hours PRN Moderate Pain (4 - 6)      ALLERGIES:  Allergies    No Known Allergies    Intolerances        LABS:                        9.3    9.30  )-----------( 47       ( 11 Jun 2020 06:31 )             29.8     06-11    141  |  101  |  88<H>  ----------------------------<  212<H>  4.1   |  18<L>  |  6.25<H>    Ca    8.6      11 Jun 2020 06:31  Phos  7.5     06-11  Mg     2.0     06-11    TPro  5.0<L>  /  Alb  2.7<L>  /  TBili  0.7  /  DBili  x   /  AST  19  /  ALT  8<L>  /  AlkPhos  93  06-11          RADIOLOGY & ADDITIONAL TESTS: Reviewed. INTERVAL HPI/OVERNIGHT EVENTS:  No acute events overnight.     SUBJECTIVE: Patient seen and examined at bedside. Appreciably more alert and conversant compared to day prior. Refusing all things, however, including physical exam. Does not endorse any complaints at this time but states that he would like some water. Explained to patient that is unable to have water until he participates in the modified barium swallow and encouraged him to be cooperative and participate in speech evaluations.       OBJECTIVE:  VITAL SIGNS:  ICU Vital Signs Last 24 Hrs  T(C): 36.6 (11 Jun 2020 09:00), Max: 37.2 (11 Jun 2020 08:00)  T(F): 97.8 (11 Jun 2020 09:00), Max: 99 (11 Jun 2020 08:00)  HR: 91 (11 Jun 2020 10:20) (79 - 91)  BP: 79/47 (11 Jun 2020 10:20) (79/47 - 111/55)  BP(mean): 59 (11 Jun 2020 10:20) (59 - 77)  ABP: --  ABP(mean): --  RR: 16 (11 Jun 2020 10:20) (8 - 58)  SpO2: 99% (11 Jun 2020 10:20) (96% - 100%)        06-10 @ 07:01  -  06-11 @ 07:00  --------------------------------------------------------  IN: 290.3 mL / OUT: 0 mL / NET: 290.3 mL      CAPILLARY BLOOD GLUCOSE  175 (10 Jose Luis 2020 08:21)  POCT Blood Glucose.: 230 mg/dL (11 Jun 2020 11:20)      PHYSICAL EXAM:    General: NAD, resting comfortably in bed. more conversant this morning but argumentative/agitated, refusing all things including PE  Neck: no JVD  Respiratory: diminished breath sounds at the bases, non-tachypneic, no respiratory distress, saturating well on RA   Cardiovascular: Regular rhythm/rate; S1/S2, no pericardial rub, pericardial drain removed with site clean, dry, and intact  Gastrointestinal: distended, non tender ascitic drain with appropriate drainage, site clean, dry, and intact  Extremities: WWP; 2+ bilateral dependent pitting edema up to his thigh, UE 2+ edema b/l   Neurological:  A&Ox 2 (person and place), arousable and interactive but still weak. Is able to follow simple commands and answer simple questions. CNII-XII grossly intact; no obvious focal deficits, no flapping tremor, but minimal asterixis, agitated affect       MEDICATIONS:  MEDICATIONS  (STANDING):  albumin human 25% IVPB 50 milliLiter(s) IV Intermittent every 1 hour  chlorhexidine 2% Cloths 1 Application(s) Topical daily  collagenase Ointment 1 Application(s) Topical daily  DAPTOmycin IVPB 700 milliGRAM(s) IV Intermittent every 48 hours  hydrocortisone sodium succinate Injectable 50 milliGRAM(s) IV Push every 8 hours  insulin lispro (HumaLOG) corrective regimen sliding scale   SubCutaneous Before meals and at bedtime  midodrine 30 milliGRAM(s) Oral every 8 hours  mirtazapine 15 milliGRAM(s) Oral at bedtime  nystatin Cream 1 Application(s) Topical two times a day  pantoprazole  Injectable 40 milliGRAM(s) IV Push daily  phenylephrine    Infusion 0.007 MICROgram(s)/kG/Min (0.1 mL/Hr) IV Continuous <Continuous>  sevelamer carbonate Powder 1600 milliGRAM(s) Oral three times a day with meals  sodium bicarbonate 650 milliGRAM(s) Oral three times a day    MEDICATIONS  (PRN):  acetaminophen   Tablet .. 650 milliGRAM(s) Oral every 6 hours PRN Moderate Pain (4 - 6)      ALLERGIES:  Allergies    No Known Allergies    Intolerances        LABS:                        9.3    9.30  )-----------( 47       ( 11 Jun 2020 06:31 )             29.8     06-11    141  |  101  |  88<H>  ----------------------------<  212<H>  4.1   |  18<L>  |  6.25<H>    Ca    8.6      11 Jun 2020 06:31  Phos  7.5     06-11  Mg     2.0     06-11    TPro  5.0<L>  /  Alb  2.7<L>  /  TBili  0.7  /  DBili  x   /  AST  19  /  ALT  8<L>  /  AlkPhos  93  06-11      RADIOLOGY & ADDITIONAL TESTS:   Reviewed. No new imaging.

## 2020-06-11 NOTE — PROGRESS NOTE ADULT - SUBJECTIVE AND OBJECTIVE BOX
SANIYA CARRINGTON             MRN-[id number]    CC: Somnolence    HPI:  Patient is 75 yo M with past medical history of DM, HTN, HLD, BPH (chronic indwelling palacio placement on last admission, SERA, CKD4, and a-fib  , mds, pancytopenia, presents from [facility] with complaints of decreased po intake and weakness. Patient reports that he recently had a intense bowel regimen for constipation after which he developed loose stools. To prevent further loose stools he stopped eating . Decreased po intake also in setting of poor appetite. Patient also complains for worsening LE and abdominal swelling. As per collateral obtained from Dr Barksdale at [facility]([id number]) patient abdominal usg notable for massive ascites, and increased liver echogenicity(LFTS outpatient wnl )   and he was aggressively diuresed with  Lasix , torsemide 40 mg bid (of note also started on midodrine as his pressures would drop with iv diuresis ). After diuresis his ascites and LE edema improved however he developed worsening AAMIR and so Lasix was stopped and torsemide decreased to 20 mg bid. Of note he was seen by Cardiology and  echo done, concern fluid overload was cardiac in etiology given elevated bnp and echo findings.  As per collateral patient also on Levaquin 1 week ago for intersitial infiltrates on cxr , s/p 7 day course.   Upon arrival to ED vitals bp 92/52, hr 94, rr 16, satting well ra.  Labs notable for anemia, thrombocytopenia and  worsening AAMIR. CT abdomen pelvis pending  ed management : supra pubic cath taken out, with plans to place new one, 1L NS, 1G ceftriaxone  Pt admitted to [facility] for further management (08 May 2020 16:10)    SUBJECTIVE: Saw and evaluated Mr Carrington at bedside today. He was found much more awake, alert, and engaged in conversation today. He did not receive dose of Remeron last night.     ROS:  DYSPNEA: No  NAUS/VOM: No	  SECRETIONS: No	  AGITATION:  No  Pain (Y/N):  No       -Provocation/Palliation: N/A  -Quality/Quantity: N/A  -Radiating: N/A  -Severity: N/A  -Timing/Frequency: N/A  -Impact on ADLs: N/A    OTHER REVIEW OF SYSTEMS: Denied     PEx:  T(C): 37.1 (06-11-20 @ 14:15), Max: 37.2 (06-11-20 @ 08:00)  HR: 89 (06-11-20 @ 12:00) (83 - 91)  BP: 78/49 (06-11-20 @ 12:00) (74/47 - 107/60)  RR: 21 (06-11-20 @ 12:00) (11 - 58)  SpO2: 100% (06-11-20 @ 12:00) (96% - 100%)  Wt(kg): 71.8Kg    General: AAOx3 man found laying in bed in NAD much more alert and awake today.  HEENT: NCAT EOMI Dry eyes and mucosas Poor dentition   Neck: LIJ  CVS: RR S1S2 No M/G/R Right chest HD catheter  Resp: Unlabored Non tachypneic Good air entry B/L anteriorly and decreased at bases  GI:  Distended Drain+ Soft NT  : Oliguric  Musc:  No C/C Decreased strength upper extremities   Neuro: No focal deficits. Able to follow commands and answer questions.  Psych: Calm  much more conversant  Skin: Ecchymotic / bruised / Petechia  B/L Heel and Sacrum tissue injuries.  Lymph: Edema+ all extremities    ALLERGIES: No Known Allergies    OPIATE NAÏVE (Y/N): Yes    MEDICATIONS: REVIEWED  MEDICATIONS  (STANDING):  chlorhexidine 2% Cloths 1 Application(s) Topical daily  collagenase Ointment 1 Application(s) Topical daily  DAPTOmycin IVPB 700 milliGRAM(s) IV Intermittent every 48 hours  hydrocortisone sodium succinate Injectable 50 milliGRAM(s) IV Push every 8 hours  insulin lispro (HumaLOG) corrective regimen sliding scale   SubCutaneous Before meals and at bedtime  midodrine 30 milliGRAM(s) Oral every 8 hours  mirtazapine 15 milliGRAM(s) Oral at bedtime  nystatin Cream 1 Application(s) Topical two times a day  pantoprazole  Injectable 40 milliGRAM(s) IV Push daily  phenylephrine    Infusion 0.007 MICROgram(s)/kG/Min (0.1 mL/Hr) IV Continuous <Continuous>  sevelamer carbonate Powder 1600 milliGRAM(s) Oral three times a day with meals  sodium bicarbonate 650 milliGRAM(s) Oral three times a day    MEDICATIONS  (PRN):  acetaminophen   Tablet .. 650 milliGRAM(s) Oral every 6 hours PRN Moderate Pain (4 - 6)    LABS: REVIEWED  CBC:                        9.3    9.30  )-----------( 47       ( 11 Jun 2020 06:31 )             29.8     CMP:    06-11    141  |  101  |  88<H>  ----------------------------<  212<H>  4.1   |  18<L>  |  6.25<H>    Ca    8.6      11 Jun 2020 06:31  Phos  7.5     06-11  Mg     2.0     06-11    TPro  5.0<L>  /  Alb  2.7<L>  /  TBili  0.7  /  DBili  x   /  AST  19  /  ALT  8<L>  /  AlkPhos  93  06-11  Albumin, Serum: 2.7 g/dL (06-11-20 @ 06:31)    IMAGING: REVIEWED    ADVANCED DIRECTIVES:          DNR DNI     No documented MOLST found on [facility]     No documented HCP form found on [facility]     Other surrogates: Friend Teresa "Venus" [place] [phone] / [phone]     No Living will / POA / Advance directives found on [facility] / [facility].     Documented GOC notes on [facility] on 5/22/2020, 5/25/2020, and 5/27/2020.    DECISION MAKER: The patient is able to participate in encounter but unable to demonstrate capacity for complex medical decision making given poor recall.  LEGAL SURROGATE: No documented HCP form found on [facility].  Alternate surrogates: Friend Teresa Hay (Peggy)in [phone] / [phone]    PSYCHOSOCIAL-SPIRITUAL ASSESSMENT:       Care plan unchanged    GOALS OF CARE DISCUSSION       Palliative care info/counseling provided	           Family meeting Done       See previous Palliative Medicine Note       Documentation of GOC: DNR DNI    REFERRALS	        Unit SW/Case Mgmt        - Declined       Speech/Swallow - Following       Patient/Family Support - Following       Music Therapy - Following       Nutrition - Following       Dietician - Following       PT/OT - Following

## 2020-06-11 NOTE — SWALLOW VFSS/MBS ASSESSMENT ADULT - SLP GENERAL OBSERVATIONS
Pt was seated upright in chair. He had difficulty remaining at midline even w support of multiple pillows, and continually leaned to his R. He also had difficulty w maintaining head in neutral position, and tilted his head to the R or leaned back w head extended back.

## 2020-06-11 NOTE — PROGRESS NOTE ADULT - PROBLEM SELECTOR PLAN 5
No documented HCP form found on [facility]. Current surrogates: Friend Teresa "Venus" Matt [phone] / [phone]. Patient has voiced his preference for Teresa to be involved in decision making and his ongoing care. She is willing to continue to assist as a surrogate.

## 2020-06-11 NOTE — SWALLOW VFSS/MBS ASSESSMENT ADULT - ORAL PHASE COMMENTS
Pt was observed to tilt head back in order to facilitate A-P transport of the bolus. Even with this strategy, propulsion was effortful.

## 2020-06-11 NOTE — SWALLOW VFSS/MBS ASSESSMENT ADULT - COMMENTS
74M with MDS, pancytopenia, CKD, ascites of unknown origin presents from [facility] with complaints of decreased po intake and weakness likely secondary to uremia/AAMIR from diuresis for treatment of ascites, hospital course notable for UGIB and pericardial effusion with tamponade physiology, as well as ATN, and ascites of unknown etiology, now in ICU s/p paracentesis 5/10 (2L removed), IR pericardiocentesis 5/11 (drain in place), now with anemia and thrombocytopenia with hypercoagulability 2/2 DIC that improved, now likely MDS, had increasing pressor requirement with HD.   Patient is DNR/DNI. Per MD, pt has been refusing NGT.

## 2020-06-11 NOTE — PROGRESS NOTE ADULT - PROBLEM SELECTOR PLAN 4
Albumin, Serum: 2.7 g/dL (06.11.20 @ 06:31) Third spacing and anasarca. Tissue injuries present with poor healing.

## 2020-06-12 NOTE — PROGRESS NOTE ADULT - PROBLEM SELECTOR PLAN 3
Known to [facility] Dr Roberts. Hemoglobin: 8.6 g/dL (06.09.20 @ 05:01)  Platelet Count - Automated: 46K/uL (06.09.20 @ 05:01)    Management as per Hematology.

## 2020-06-12 NOTE — PROGRESS NOTE ADULT - SUBJECTIVE AND OBJECTIVE BOX
SANIYA CARRINGTON             MRN-[id number]    CC: Somnolence, GOC/AD, Support    HPI:  Patient is 75 yo M with past medical history of DM, HTN, HLD, BPH (chronic indwelling palacio placement on last admission, SERA, CKD4, and a-fib  , mds, pancytopenia, presents from [facility] with complaints of decreased po intake and weakness. Patient reports that he recently had a intense bowel regimen for constipation after which he developed loose stools. To prevent further loose stools he stopped eating . Decreased po intake also in setting of poor appetite. Patient also complains for worsening LE and abdominal swelling. As per collateral obtained from Dr Barksdale at [facility]([id number]) patient abdominal usg notable for massive ascites, and increased liver echogenicity(LFTS outpatient wnl )   and he was aggressively diuresed with  Lasix , torsemide 40 mg bid (of note also started on midodrine as his pressures would drop with iv diuresis ). After diuresis his ascites and LE edema improved however he developed worsening AAMIR and so Lasix was stopped and torsemide decreased to 20 mg bid. Of note he was seen by Cardiology and  echo done, concern fluid overload was cardiac in etiology given elevated bnp and echo findings.  As per collateral patient also on Levaquin 1 week ago for intersitial infiltrates on cxr , s/p 7 day course.   Upon arrival to ED vitals bp 92/52, hr 94, rr 16, satting well ra.  Labs notable for anemia, thrombocytopenia and  worsening AAMIR. CT abdomen pelvis pending  ed management : supra pubic cath taken out, with plans to place new one, 1L NS, 1G ceftriaxone  Pt admitted to [facility] for further management (08 May 2020 16:10)    SUBJECTIVE: Saw and evaluated Mr Carrington at bedside. His mental status has declined and currently minimally interactive. No friends at bedside. Patient starting to show signs of actively dying ie. mottle knees, weak radial pulse, cold extremities, mandibular breathing, and confusion. Expect patient may pass away within 24h.    ROS:  DYSPNEA: No  NAUS/VOM: No	  SECRETIONS: No	  AGITATION: No  Pain (Y/N): No       -Provocation/Palliation: N/A  -Quality/Quantity: N/A  -Radiating: N/A  -Severity: N/A  -Timing/Frequency: N/A  -Impact on ADLs: N/A    OTHER REVIEW OF SYSTEMS UNABLE TO OBTAIN  due to: AMS    PEx:  T(C): 36.2 (06-12-20 @ 09:00), Max: 36.4 (06-12-20 @ 02:09)  HR: 88 (06-12-20 @ 15:07) (76 - 95)  BP: 90/53 (06-12-20 @ 15:07) (66/38 - 121/59)  RR: 14 (06-12-20 @ 15:07) (12 - 25)  SpO2: 100% (06-12-20 @ 15:07) (82% - 100%)  Wt(kg): 71.8Kg    General: Inattentive man found laying in bed less alert and awake than yesterday.  HEENT: NCAT upwards gaze Dry eyes and mucosas Poor dentition Mandibular breathing  Neck: LIJ No secretions  CVS: RR S1S2 No M/G/R Right chest HD catheter  Resp: Unlabored Non tachypneic Decreased breath sounds B/L  GI:  Distended Drain+ Soft NT  : Oliguric  Musc:  No C/C Decreased strength upper extremities Decreased radial pulse B/L knee mottling   Neuro:  Not able to follow commands   Psych: Inattentive calm  Skin: Ecchymotic / bruised / Petechia  Cold to touch  Lymph: Edema+ all extremities    ALLERGIES: No Known Allergies    OPIATE NAÏVE (Y/N): Yes    MEDICATIONS: REVIEWED  MEDICATIONS  (STANDING):  chlorhexidine 2% Cloths 1 Application(s) Topical daily  collagenase Ointment 1 Application(s) Topical daily  DAPTOmycin IVPB 700 milliGRAM(s) IV Intermittent every 48 hours  insulin lispro (HumaLOG) corrective regimen sliding scale   SubCutaneous Before meals and at bedtime  midodrine 30 milliGRAM(s) Oral every 8 hours  modafinil 200 milliGRAM(s) Oral every 24 hours  nystatin Cream 1 Application(s) Topical two times a day  pantoprazole  Injectable 40 milliGRAM(s) IV Push daily  phenylephrine    Infusion 0.007 MICROgram(s)/kG/Min (0.1 mL/Hr) IV Continuous <Continuous>  scopolamine 1 mG/72 Hr(s) Patch 1 Patch Transdermal every 72 hours  sevelamer carbonate Powder 1600 milliGRAM(s) Oral three times a day with meals  sodium bicarbonate 650 milliGRAM(s) Oral three times a day    MEDICATIONS  (PRN):  acetaminophen   Tablet .. 650 milliGRAM(s) Oral every 6 hours PRN Moderate Pain (4 - 6)    LABS: REVIEWED  CBC:                        9.2    12.40 )-----------( 52       ( 12 Jun 2020 05:23 )             30.2     CMP:    06-12    142  |  102  |  99<H>  ----------------------------<  200<H>  4.2   |  19<L>  |  6.66<H>    Ca    8.4      12 Jun 2020 05:23  Phos  7.3     06-12  Mg     2.0     06-12    TPro  4.8<L>  /  Alb  2.7<L>  /  TBili  0.8  /  DBili  x   /  AST  20  /  ALT  8<L>  /  AlkPhos  94  06-12  Albumin, Serum: 2.7 g/dL (06-12-20 @ 05:23)    POCT Blood Glucose.: 216 mg/dL (06.12.20 @ 11:27)    Creatine Kinase, Serum: 42 U/L (06.12.20 @ 05:23)    IMAGING: REVIEWED    EXAM:  XR SWAL FUNC ALEX VID CON STDY                        PROCEDURE DATE:  06/11/2020    INTERPRETATION:  VIDEO FLUOROSCOPIC SWALLOW STUDY dated 6/11/2020 3:22 PM  INDICATION: 74 years-old Male with failed speech and swallow.  COMPARISON: None.   FINDINGS:  Thin Liquid:  Penetration: Positive  Aspiration: Positive  Eliminated with posture/technique: Not applicable.  Nectar Thick Liquid:  Penetration: Positive  Aspiration: Positive  Eliminated with posture/technique: Not applicable.  Honey Thick Liquid:  Penetration: Positive  Aspiration: Positive  Eliminated with posture/technique: Not applicable.  Puree:  Penetration: Positive  Aspiration: Positive  Eliminated with posture/technique: Not applicable.      Anatomical findings: No gross abnormalities.  IMPRESSION: As above. Please see speech pathology report in the patient's chart for complete details regarding swallow function.    ADVANCED DIRECTIVES:          DNR DNI     No documented MOLST found on [facility]     No documented HCP form found on [facility]     Other surrogates: Friend Teresa "Venus" Matt [phone] / [phone]     No Living will / POA / Advance directives found on [facility] / [facility].     Documented GOC notes on [facility] on 5/22/2020, 5/25/2020, and 5/27/2020.    DECISION MAKER: The patient is able to participate in encounter but unable to demonstrate capacity for complex medical decision making given poor recall.  LEGAL SURROGATE: No documented HCP form found on [facility].  Alternate surrogates: Friend Teresa "Venus" Matt [phone] / [phone]    PSYCHOSOCIAL-SPIRITUAL ASSESSMENT:       Care plan changed - comfort feedings    GOALS OF CARE DISCUSSION       Palliative care info/counseling provided	           Family meeting Done       See previous Palliative Medicine Note       Documentation of GOC: DNR DNI    REFERRALS	        Unit SW/Case Mgmt        - Declined       Speech/Swallow - Following       Patient/Family Support - Following       Music Therapy - Following       Nutrition - Following       Dietician - Following       PT/OT - Following

## 2020-06-12 NOTE — PROGRESS NOTE ADULT - PROBLEM SELECTOR PROBLEM 1
AAMIR (acute kidney injury)
Acute hypoactive delirium due to multiple etiologies
Failure to thrive in adult
Somnolence
AAMIR (acute kidney injury)

## 2020-06-12 NOTE — PROGRESS NOTE ADULT - ATTENDING COMMENTS
Hx of MDS, admitted with FTT; found to have serositis with ascites, pericardial effusion s/p drainage c/b MRSA bacteremia with persistent pressor requirement; shock state etiology is unclear.  C/w daptomycin. Unable to tolerate dialysis secondary to NSVT and acute hypotensive episode; will stop dialysis; renal service in agreement; HCP aware. Failed s/s; HCP and patient agreed to pleasure feeds with the understanding that a terminal aspiration event can occur. Took a few doses of midodrine and will continue to encourage. Hx of MDS, admitted with FTT; found to have serositis with ascites, pericardial effusion s/p drainage c/b MRSA bacteremia with persistent pressor requirement; shock state etiology is unclear although now with diarrhea and is pending Cdiff.  C/w daptomycin. Unable to tolerate dialysis secondary to NSVT and acute hypotensive episode; will stop dialysis; renal service in agreement; HCP aware. Failed s/s; HCP and patient agreed to pleasure feeds with the understanding that a terminal aspiration event can occur. Took a few doses of midodrine and will continue to encourage.

## 2020-06-12 NOTE — PROGRESS NOTE ADULT - PROBLEM SELECTOR PLAN 1
Currently calm. Inattentive to encounter and instructions. May represent sign of actively dying.    Recommend Haldol 1mg IV q4h PRN Terminal agitation  Recommend Ativan 1mg IV q4h PRN Terminal anxiety

## 2020-06-12 NOTE — CHART NOTE - NSCHARTNOTEFT_GEN_A_CORE
Admitting Diagnosis:   Patient is a 74y old  Male who presents with a chief complaint of Sepsis (30 May 2020 11:24)      PAST MEDICAL & SURGICAL HISTORY:  History of pancytopenia  H/O hydrocephalus  Anemia  HLD (hyperlipidemia)  Enlarged prostate  DM (diabetes mellitus)  HTN (hypertension)  H/O prior ablation treatment      Current Nutrition Order:  Dys 1 Puree /Thin Liquids  Nepro with Carb Steady TID (1275 kcal, 57.3g protein, 516 mL free H2O)   Suplena w/Carb Steady TID (1275 kcal, 31.8g protein)      PO Intake: Good (%) [   ]  Fair (50-75%) [   ] Poor (<25%) [ X  ]    GI Issues: No complaints of N/V  2 episodes diarrhea over past 24hrs    Pain: He denied pain this morning at time of visit     Skin Integrity: Alexandre 11  IAD  B/L UE 2+ pitting edema   B/L LE 2+ pitting edema up to thigh   B/L buttocks unstageable pressure injury  B/L heels unstageable pressure injury     Labs:   06-12    142  |  102  |  99<H>  ----------------------------<  200<H>  4.2   |  19<L>  |  6.66<H>    Ca    8.4      12 Jun 2020 05:23  Phos  7.3     06-12  Mg     2.0     06-12    TPro  4.8<L>  /  Alb  2.7<L>  /  TBili  0.8  /  DBili  x   /  AST  20  /  ALT  8<L>  /  AlkPhos  94  06-12    CAPILLARY BLOOD GLUCOSE      POCT Blood Glucose.: 179 mg/dL (11 Jun 2020 22:57)  POCT Blood Glucose.: 231 mg/dL (11 Jun 2020 17:42)      Medications:  MEDICATIONS  (STANDING):  chlorhexidine 2% Cloths 1 Application(s) Topical daily  collagenase Ointment 1 Application(s) Topical daily  DAPTOmycin IVPB 700 milliGRAM(s) IV Intermittent every 48 hours  hydrocortisone sodium succinate Injectable 50 milliGRAM(s) IV Push every 8 hours  insulin lispro (HumaLOG) corrective regimen sliding scale   SubCutaneous Before meals and at bedtime  midodrine 30 milliGRAM(s) Oral every 8 hours  modafinil 200 milliGRAM(s) Oral every 24 hours  nystatin Cream 1 Application(s) Topical two times a day  pantoprazole  Injectable 40 milliGRAM(s) IV Push daily  phenylephrine    Infusion 0.007 MICROgram(s)/kG/Min (0.1 mL/Hr) IV Continuous <Continuous>  sevelamer carbonate Powder 1600 milliGRAM(s) Oral three times a day with meals  sodium bicarbonate 650 milliGRAM(s) Oral three times a day    MEDICATIONS  (PRN):  acetaminophen   Tablet .. 650 milliGRAM(s) Oral every 6 hours PRN Moderate Pain (4 - 6)      Ht: 5'10''  pounds (75kg) +-10%   5/8 158 pounds %IBW=95% BMI 22.7   5/15 129.6 pounds   5/16 191.5 pounds  5/18 193 pounds / 194 pounds   5/27 136.9 pounds / 137 pounds   5/28 139 pounds  6/8 160 pounds (72.6kg)    Wt Change: Pt w/drastically differing weights throughout admission; has had fluid gain/loss from edema and HD. Please continue to trend daily wts    Malnutrition note placed 5/9- pt had been noted with Mild Protein Calorie Malnutrition, during this time noted with mild muscle losses to temporal region and Mild fat loss over triceps region noted.  During RD visit 5/27 pt noted with moderate wasting to orbital region, muscle loss to temple appear to be mild/moderate in natural, new malnutrition note placed today 5/27.     Estimated energy needs:   IBW (75kg) for EER d/t varying EMR wts in HD pt   Nutrient needs based on [facility] standards of care for maintenance in adults, adjusted for age, wounds, HD. Fluids per team  Calories: 25-30 kcal/kg = 9764-0432 kcal/day  Protein: ~1.2-1.4 g/kg = 90-105g protein/day  Fluids per team     Subjective:   73 yo M with MDS, DM2, afib, HTN, HLD, BPH s/p palacio, pancytopenia, CKD4, ascites of unknown origin presents from [facility] with complaints of decreased PO intake and weakness likely secondary to uremia/AAMIR from diuresis for treatment of ascites. Pt hospital course notable for UGIB, CT on 5/18 showing pancolitis and GI PCR negative; team reportes GI w/u overall negative. Pt with pericardial effusion with tamponade physiology as well as ATN and ascites of unknown etiology s/p PRBC. Pt s/p paracentesis 5/10, IR pericardiocentesis 5/11, Paracentesis 5/14. Echo 5/15 showed no pericardial effusion, Drain removed and IR signed off. Pt now with anemia of unknown etiology. Pt not a candidate for bone marrow transplant. Pt with persistent MRSA bacteremia, concern for endocarditis vs cardiac abscess. Pt s/p Gallium 5/20, suggestive of cellulitis or rib osteo. Pt now needing HD d/t AAMIR/CKD likely ATN from hypotension and ischemic injury. Started IHD 5/15, though unable to tolerate 2/2 hypotension/hemodynamic instability, so started on CVVHD from 5/20-5/21. Pt did not tolerate CVVHD 2/2 frequent clotting of HD cath, so CVVHD held. Pt trialed on SLED, though was unable to tolerate this method as well. On 5/29 trialed again on CVVHD, though unsuccessful, and future HD attempts initially deemed to be futile by nephrology team but then started on IHD for clearance w/pressors on 6/3. Pt is DNR/DNI. Pt has continued to refuse PO intake and is refusing NGT placement. Per MD, HCP is also declining NGT per pt wishes, though pt is severely malnourished and will continue to deteriorate while receiving HD if he continues to refuse PO intake. Discussed w/psychiatrist and SLP Maura; pt is receiving remeron, though unlikely to make a drastic difference in appetite. Pt also noted to have been pocketing food/meds, likely 2/2 cognitive decline/lack of effort, not dysphagia. Based on ongoing GOC discussion, HCP amenable to NGT though pt continues to adamantly refuse. Formal MBS on 6/11- pt w/silent aspiration across all trials/severe dysphagia/inefficient bolus formation. Pt stated that he had "strong desire" to continue PO, despite refusing food on most days over the past 2 weeks. GOC discussion on 6/11 s/p MBS- pt to pursue "comfort/pleasure" feeds w/known risk of aspiration.     Pt seen in room and discussed during MICU rounds. Last HD 6/9- unable to tolerated d/t NSVT. Again deemed to no longer be a candidate per nephrology. Pt awake, alert, lethargic. MAP 60- requiring phenylephrine and midodrine, but refused PO midodrine this AM. 0% intake at breakfast. Pt minimally verbal to RD this AM, barely shook head yes/no. Unable to provide RD w/food preferences. No complaints of N/V or pain. 2 episodes of diarrhea over 24hrs- c/f C. diff? Afebrile. Phos 7.3 (H), BUN 99/Cr 6.66, POC , 231, 230mg/dL. Will continue to monitor for goals of care, will keep nutrition aligned at all times, and will follow per RD protocol.     Previous Nutrition Diagnosis: Suspected severe Malnutrition r/t intake<EER in setting of increased EER AEB poor PO (<50% >5 days), Edema (1/3+),  moderate wasting to orbital region, muscle loss to temple appear to be mild/moderate in nature.     Active [ X ] Resolved [   ]    Goal: Meet 75% of EER via feasible route that is consistent with GOC     Recommendations:  1. Keep nutrition aligned with goals of care  2. Honor food preferences and encourage/assist w/PO intake. Consider DC supplements as pt is not drinking them   3. If goals of care to change to include nutrition support, please reconsult for EN recs   4. Pain regimen per team     Education: Pt previously educated; attempted to obtain food preferences though none provided     Risk Level: High [    ] Moderate [ X  ] Low [   ].

## 2020-06-12 NOTE — PROGRESS NOTE ADULT - SUBJECTIVE AND OBJECTIVE BOX
INTERVAL HPI/OVERNIGHT EVENTS: Able to take midodrine yesterday afternoon and night; unable to take this morning due to lethargy.    SUBJECTIVE: Patient seen and examined at bedside.     OBJECTIVE:    VITAL SIGNS:  ICU Vital Signs Last 24 Hrs  T(C): 36 (12 Jun 2020 05:25), Max: 37.2 (11 Jun 2020 08:00)  T(F): 96.8 (12 Jun 2020 05:25), Max: 99 (11 Jun 2020 08:00)  HR: 77 (12 Jun 2020 07:00) (76 - 95)  BP: 81/52 (12 Jun 2020 07:00) (70/47 - 121/59)  BP(mean): 62 (12 Jun 2020 07:00) (55 - 83)  ABP: --  ABP(mean): --  RR: 13 (12 Jun 2020 07:00) (12 - 51)  SpO2: 100% (12 Jun 2020 07:00) (93% - 100%)        06-11 @ 07:01  -  06-12 @ 07:00  --------------------------------------------------------  IN: 386 mL / OUT: 1 mL / NET: 385 mL      CAPILLARY BLOOD GLUCOSE  175 (10 Jose Luis 2020 08:21)      POCT Blood Glucose.: 179 mg/dL (11 Jun 2020 22:57)      PHYSICAL EXAM:    General: NAD, resting comfortably in bed. more conversant this morning but argumentative/agitated, refusing all things including PE  Neck: no JVD  Respiratory: diminished breath sounds at the bases, non-tachypneic, no respiratory distress, saturating well on RA   Cardiovascular: Regular rhythm/rate; S1/S2, no pericardial rub, pericardial drain removed with site clean, dry, and intact  Gastrointestinal: distended, non tender ascitic drain with appropriate drainage, site clean, dry, and intact  Extremities: WWP; 2+ bilateral dependent pitting edema up to his thigh, UE 2+ edema b/l   Neurological:  A&Ox 2 (person and place), arousable and interactive but still weak. Is able to follow simple commands and answer simple questions. CNII-XII grossly intact; no obvious focal deficits, no flapping tremor, but minimal asterixis, agitated affect     MEDICATIONS:  MEDICATIONS  (STANDING):  chlorhexidine 2% Cloths 1 Application(s) Topical daily  collagenase Ointment 1 Application(s) Topical daily  DAPTOmycin IVPB 700 milliGRAM(s) IV Intermittent every 48 hours  hydrocortisone sodium succinate Injectable 50 milliGRAM(s) IV Push every 8 hours  insulin lispro (HumaLOG) corrective regimen sliding scale   SubCutaneous Before meals and at bedtime  midodrine 30 milliGRAM(s) Oral every 8 hours  nystatin Cream 1 Application(s) Topical two times a day  pantoprazole  Injectable 40 milliGRAM(s) IV Push daily  phenylephrine    Infusion 0.007 MICROgram(s)/kG/Min (0.1 mL/Hr) IV Continuous <Continuous>  sevelamer carbonate Powder 1600 milliGRAM(s) Oral three times a day with meals  sodium bicarbonate 650 milliGRAM(s) Oral three times a day    MEDICATIONS  (PRN):  acetaminophen   Tablet .. 650 milliGRAM(s) Oral every 6 hours PRN Moderate Pain (4 - 6)      ALLERGIES:  Allergies    No Known Allergies    Intolerances        LABS:                        9.2    12.40 )-----------( 52       ( 12 Jun 2020 05:23 )             30.2     06-12    142  |  102  |  99<H>  ----------------------------<  200<H>  4.2   |  19<L>  |  6.66<H>    Ca    8.4      12 Jun 2020 05:23  Phos  7.3     06-12  Mg     2.0     06-12    TPro  4.8<L>  /  Alb  2.7<L>  /  TBili  0.8  /  DBili  x   /  AST  20  /  ALT  8<L>  /  AlkPhos  94  06-12          RADIOLOGY & ADDITIONAL TESTS: Reviewed. INTERVAL HPI/OVERNIGHT EVENTS: Able to take midodrine yesterday afternoon and night; unable to take this morning due to lethargy. Yesterday, started comfort feeds.    SUBJECTIVE: Patient seen and examined at bedside. Pt awake this AM, however not responding to questions; does sigh and raises arms up and back down intermittently. Took some po yesterday for comfort (apple sauce for meds yesterday). Pt with another episode of diarrhea this AM    OBJECTIVE:    VITAL SIGNS:  ICU Vital Signs Last 24 Hrs  T(C): 36 (12 Jun 2020 05:25), Max: 37.2 (11 Jun 2020 08:00)  T(F): 96.8 (12 Jun 2020 05:25), Max: 99 (11 Jun 2020 08:00)  HR: 77 (12 Jun 2020 07:00) (76 - 95)  BP: 81/52 (12 Jun 2020 07:00) (70/47 - 121/59)  BP(mean): 62 (12 Jun 2020 07:00) (55 - 83)  ABP: --  ABP(mean): --  RR: 13 (12 Jun 2020 07:00) (12 - 51)  SpO2: 100% (12 Jun 2020 07:00) (93% - 100%)        06-11 @ 07:01  -  06-12 @ 07:00  --------------------------------------------------------  IN: 386 mL / OUT: 1 mL / NET: 385 mL      CAPILLARY BLOOD GLUCOSE  175 (10 Jose Luis 2020 08:21)      POCT Blood Glucose.: 179 mg/dL (11 Jun 2020 22:57)      PHYSICAL EXAM:    General: NAD, resting comfortably in bed. nonconversant this AM, not making eye contact, however awake  Neck: no JVD  Respiratory: diminished breath sounds at the bases, non-tachypneic, no respiratory distress, saturating well on RA   Cardiovascular: Regular rhythm/rate; S1/S2, no pericardial rub, pericardial drain removed with site clean, dry, and intact  Gastrointestinal: distended, non tender ascitic drain with appropriate drainage, site clean, dry, and intact  Extremities: WWP; 2+ bilateral dependent pitting edema up to his thigh, UE 2+ edema b/l   Neurological:  A&Ox 2 (person and place), arousable and interactive but still weak. Is able to follow simple commands and answer simple questions. CNII-XII grossly intact; no obvious focal deficits, no flapping tremor, but minimal asterixis, agitated affect     MEDICATIONS:  MEDICATIONS  (STANDING):  chlorhexidine 2% Cloths 1 Application(s) Topical daily  collagenase Ointment 1 Application(s) Topical daily  DAPTOmycin IVPB 700 milliGRAM(s) IV Intermittent every 48 hours  hydrocortisone sodium succinate Injectable 50 milliGRAM(s) IV Push every 8 hours  insulin lispro (HumaLOG) corrective regimen sliding scale   SubCutaneous Before meals and at bedtime  midodrine 30 milliGRAM(s) Oral every 8 hours  nystatin Cream 1 Application(s) Topical two times a day  pantoprazole  Injectable 40 milliGRAM(s) IV Push daily  phenylephrine    Infusion 0.007 MICROgram(s)/kG/Min (0.1 mL/Hr) IV Continuous <Continuous>  sevelamer carbonate Powder 1600 milliGRAM(s) Oral three times a day with meals  sodium bicarbonate 650 milliGRAM(s) Oral three times a day    MEDICATIONS  (PRN):  acetaminophen   Tablet .. 650 milliGRAM(s) Oral every 6 hours PRN Moderate Pain (4 - 6)      ALLERGIES:  Allergies    No Known Allergies    Intolerances        LABS:                        9.2    12.40 )-----------( 52       ( 12 Jun 2020 05:23 )             30.2     06-12    142  |  102  |  99<H>  ----------------------------<  200<H>  4.2   |  19<L>  |  6.66<H>    Ca    8.4      12 Jun 2020 05:23  Phos  7.3     06-12  Mg     2.0     06-12    TPro  4.8<L>  /  Alb  2.7<L>  /  TBili  0.8  /  DBili  x   /  AST  20  /  ALT  8<L>  /  AlkPhos  94  06-12          RADIOLOGY & ADDITIONAL TESTS: Reviewed.

## 2020-06-12 NOTE — PROGRESS NOTE ADULT - ASSESSMENT
73 yo M with past medical history of DM, HTN, HLD, BPH (chronic indwelling palacio placement on last admission, SERA, CKD4, and a-fib  , mds, pancytopenia, presents from [facility] with complaints of decreased po intake and weakness. Patient reports that he recently had a intense bowel regimen for constipation after which he developed loose stools. To prevent further loose stools he stopped eating . Decreased po intake also in setting of poor appetite. Patient also complains for worsening LE and abdominal swelling. As per collateral obtained from Dr Barksdale at [facility]([id number]) patient abdominal usg notable for massive ascites, and increased liver echogenicity(LFTS outpatient wnl )   and he was aggressively diuresed with  Lasix , torsemide 40 mg bid (of note also started on midodrine as his pressures would drop with iv diuresis ). After diuresis his ascites and LE edema improved however he developed worsening AAMIR and so Lasix was stopped and torsemide decreased to 20 mg bid. Of note he was seen by Cardiology and  echo done, concern fluid overload was cardiac in etiology given elevated bnp and echo findings.

## 2020-06-12 NOTE — PROGRESS NOTE ADULT - PROBLEM SELECTOR PLAN 6
End of life signs to look for:  Mandibular breathing (Present) Once present prognosis of 24-48h.  Increased secretions (Absent) Once present prognosis of 24-48h.  Terminal fevers (Absent) Once present prognosis of 24-48h.  Loss of radial pulse (Present) Once present prognosis would be of hours to 1 day.  Skin mottling (Present) Once present prognosis would be of hours to <1 day.  Cold extremities (Present) Once present prognosis would be of hours  to <1 day.    Recommend    -Scopolamine patch to be placed now  -Dilaudid 0.5mg IV q2h PRN Dyspnea/Air hunger/RR>25/Increased work of breathing  -Dilaudid 1mg IV q2h PRN RR>35/Excessive work of breathing/Resp distress  If >3 PRN in initial 2hours then start Dilaudid drip at 0.5mg/hr with continued PRN's  -Atropine 1% solution 2 sublingual drops [id number] PRN excessive secretions  Consider comfort measures:  Nasal canula at 2LPM  Vital signs once per 8hour shift

## 2020-06-12 NOTE — CHART NOTE - NSCHARTNOTEFT_GEN_A_CORE
PALLIATIVE MEDICINE COORDINATION OF CARE NOTE FOR SANIYA LOZANO    Patient last assessed: 6/11/2020 to manage: GOC/AD, Symptoms, and Support was recommended: DC Remeron and start Provigil daily ~6am.     30 Minutes; Start: 9:30am End: 10:00am, of non-face-to-face prolonged service provided that relates to (face-to-face) care that has or will occur and ongoing patient management, including one or more of the following:   - Reviewed records from other physicians or other health care professional services, including one or more of the following: other medical records and diagnostic / radiology study results     - Other: Medication reviewed.    The patient HAS used PRN's in the last 24h. Patient received 3 doses of APAP, 2 doses of Midodrine in the last 24h. Did not receive Remeron. Pending dose of Provigil at 10am. MME: 0mg    MEDICATIONS  (STANDING):  chlorhexidine 2% Cloths 1 Application(s) Topical daily  collagenase Ointment 1 Application(s) Topical daily  DAPTOmycin IVPB 700 milliGRAM(s) IV Intermittent every 48 hours  hydrocortisone sodium succinate Injectable 50 milliGRAM(s) IV Push every 8 hours  insulin lispro (HumaLOG) corrective regimen sliding scale   SubCutaneous Before meals and at bedtime  midodrine 30 milliGRAM(s) Oral every 8 hours  modafinil 200 milliGRAM(s) Oral every 24 hours  nystatin Cream 1 Application(s) Topical two times a day  pantoprazole  Injectable 40 milliGRAM(s) IV Push daily  phenylephrine    Infusion 0.007 MICROgram(s)/kG/Min (0.1 mL/Hr) IV Continuous <Continuous>  sevelamer carbonate Powder 1600 milliGRAM(s) Oral three times a day with meals  sodium bicarbonate 650 milliGRAM(s) Oral three times a day    MEDICATIONS  (PRN):  acetaminophen   Tablet .. 650 milliGRAM(s) Oral every 6 hours PRN Moderate Pain (4 - 6)    - Other: Advanced directives     DNR DNI     No documented MOLST found on [facility]     No documented HCP form found on [facility]     Other surrogates: Friend Teresa "Venus" Matt [phone] / [phone]     No Living will / POA / Advance directives found on [facility] / [facility].     Documented GOC notes on [facility] on 5/22/2020, 5/25/2020, and 5/27/2020.    - Other: Coordination/Plan of care     2 admissions in 1 year     Current admission LOS: 35 days     LACE score: 17 ADVANCE ILLNESS PATIENT since 2/19/2020.    Discussed with primary team and reviewed SLP swallow assessment where patient showed "Pharyngeal stage was significant for delayed swallow initiation, blunted sensation, and overall weakness, resulting in SILENT ASPIRATION across all consistencies, as well as post-deglutitive residue (worst w pudding and HTL). Cued cough/throat-clear w secondary swallow could not clear the aspiration. Strategies were not effective in eliminating the aspiration. Given these observations, a safe PO diet cannot be recommended at this time.". Upon primary team conversation with patient and surrogate they have decided to pursue comfort feedings with accepting of possible aspiration complications.  Will be present at the bedside later today to continue ongoing GOC conversations with patient that were now able to be done given that his mental status improved over the last 36hours.   Recommend Provigil be given at 6am rather than 10am.

## 2020-06-12 NOTE — PROGRESS NOTE ADULT - ASSESSMENT
74M with MDS, pancytopenia, CKD, ascites of unknown origin presents from [facility] with complaints of decreased po intake and weakness likely secondary to uremia/AAMIR from diuresis for treatment of ascites, hospital course notable for UGIB and pericardial effusion with tamponade physiology, as well as ATN, and ascites of unknown etiology, now in ICU s/p paracentesis 5/10 (2L removed), IR pericardiocentesis 5/11 (drain in place), now with anemia and thrombocytopenia with hypercoagulability 2/2 DIC that improved, now likely MDS, had increasing pressor requirement with HD yesterday     GOC:   Patient is DNR/DNI. Will continue to have GOC discussion with HCP Venus regarding comfort care    NEURO  #AMS likely 2/2 to uremia.  - similar to yesterday, though more conversant and argumentative   - no concern for focal deficit    -CT head 6/10 without hemorrhage or new infarct; more so suggesting clinical correlation for r/o NPH, however likely more diffuse volume loss  - neuro recommending that NPH is a clinical diagnosis and no further intervention at this time needed     #r/o NPH   CT head showing concern for NPH, not proportional to amount of volume los  - consult neurology and f/u recs   - f/u PT to assess ambulatory status--unsteady gait?    #depression   - c/w mirtazapine to 15mg at night  - Psych following, appreciate recs      CARDIOVASCULAR/HD  #Shock of unclear etiology.   No suspicion for septic shock as patient culture negative since 5/18 and on continued dapto treatment.    - midodrine 30mg [id number], though has not been able to take as not cleared by speech and swallow   - solucortef 50mg q8  - taken off levo and now on phenylephrine, continue weaning pressors as possible, halfed dose of phenylephrine at 9AM     PULM:  - stable on RA  - maintain head of bed 30 degrees, due to aspiration risk     GI:   #ascites  - Elevated SAAG consistent with portal HTN   - no plan for paracentesis today    #Feeds  - decreased appetite with very limited po intake. Refuses NGT at this time, will discuss with proxy  - proxy agreed to NG tube, however pt thrashing around adamantly refusing placement of NGT   - will continue GOC discussions with proxy, patient now asking nurses about his prognosis, though proxy continues to want full escalation of care  - plan for modified barium swallow today, spoke with patient at length for why this study is necessary with the hopes he won't refuse it     #diarrhea:   New onset of diarrhea on 06/11.   - plan to send c diff given increase in WBC if patient has another episode of diarrhea     RENAL/:  #AAMIR on CKD c/b ATN from hypotension and ischemic injury  - on phenylephrine, midodrine, solucortef  - renally adjusted meds/ ABx  - renvela 1600mg PO TID w/each meal   - sodium bicarb 650 TID  - permacath placed on 6/8  - Renal consulted, appreciate recs  - pt not a candidate for HD anymore as he cannot tolerate HD    HEME:   #DIC  - monitor fibrinogen, haptoglobin, LDH, and platelets 2/2 to sepsis  - improving    Anemia:   2/2 ?MDS  - Hg stable    #Coagulopathy:   2/2 MDS vs sepsis vs DIC  - Monitor PT/INR     #DVT - RLE  - Eliquis held in setting of DIC concern    #thrombocytopenia 2/2 DIC/MDS and possibly daptomycin  - monitor plt level  - Plt repletion goal <10    ID:   #Persistent MRSA bacteremia  - first culture negative 5/18   - dapto 700mg [id number] until 6/29  - obtain CK twice a week    ENDO  - monitor sugars    DVt ppx: SCDs. no chemo ppx due to pancytopenia  Lines: R IJ CVC (6/3), L permacath (6/8) 74M with MDS, pancytopenia, CKD, ascites of unknown origin presents from [facility] with complaints of decreased po intake and weakness likely secondary to uremia/AAMIR from diuresis for treatment of ascites, hospital course notable for UGIB and pericardial effusion with tamponade physiology, as well as ATN, and ascites of unknown etiology, now in ICU s/p paracentesis 5/10 (2L removed), IR pericardiocentesis 5/11 (drain in place), now with anemia and thrombocytopenia with hypercoagulability 2/2 DIC that improved, now likely MDS, had increasing pressor requirement with HD yesterday     GOC:   Patient is DNR/DNI. Will continue to have GOC discussion with HCP Venus regarding comfort care    NEURO  #AMS likely 2/2 to uremia.  - mental status waxes and wanes depending on time of die, likely hypoactive delirium   - start modafinil 200mg qAM  - no concern for focal deficit    -CT head 6/10 without hemorrhage or new infarct; more so suggesting clinical correlation for r/o NPH, however likely more diffuse volume loss  - neuro recommending that NPH is a clinical diagnosis and no further intervention at this time needed     #r/o NPH   CT head showing concern for NPH, not proportional to amount of volume loss  - consult neurology and f/u recs     #depression   - c/w mirtazapine to 15mg at night  - Psych following, appreciate recs      CARDIOVASCULAR/HD  #Shock of unclear etiology.   No suspicion for septic shock as patient culture negative since 5/18 and on continued daptomycin treatment.    - midodrine 30mg f1ydoge po as we have now started comfort feeds  - d/c solucortef  - taken off levo and now on phenylephrine, continue weaning pressors as possible  - phenylephrine increasing, however goes up and down; will try to continue to wean as pt takes midodrine    PULM:  - stable on RA  - maintain head of bed 30 degrees, due to aspiration risk     GI:   #ascites  - Elevated SAAG consistent with portal HTN   - no plan for paracentesis today    #comfort feeds  - decreased appetite with very limited po intake. Refuses NGT at this time, will discuss with proxy  - proxy agreed to NG tube, however pt thrashing around adamantly refusing placement of NGT   - will continue GOC discussions with proxy, patient now asking nurses about his prognosis, though proxy continues to want full escalation of care  - failed modified barium swallow, however started comfort feeds and pt intermittently takes po     #diarrhea:   New onset of diarrhea on 06/11.   - f/u c diff    RENAL/:  #AAMIR on CKD c/b ATN from hypotension and ischemic injury  - on phenylephrine, midodrine, solucortef  - renally adjusted meds/ ABx  - renvela 1600mg PO TID w/each meal   - sodium bicarb 650 TID  - permacath placed on 6/8  - Renal consulted, appreciate recs  - pt not a candidate for HD anymore as he cannot tolerate HD    HEME:   #DIC  - monitor fibrinogen, haptoglobin, LDH, and platelets 2/2 to sepsis  - improving    Anemia:   2/2 ?MDS  - Hg stable    #Coagulopathy:   2/2 MDS vs sepsis vs DIC  - Monitor PT/INR     #DVT - RLE  - Eliquis held in setting of DIC concern    #thrombocytopenia 2/2 DIC/MDS and possibly daptomycin  - monitor plt level  - Plt repletion goal <10    ID:   #Persistent MRSA bacteremia  - first culture negative 5/18   - continue daptomycin 700mg [id number] until 6/29  - obtain CK twice a week    ENDO  - monitor sugars    DVt ppx: SCDs. no chemo ppx due to pancytopenia  Lines: R IJ CVC (6/3), L permacath (6/8)

## 2020-06-13 NOTE — PROGRESS NOTE ADULT - SUBJECTIVE AND OBJECTIVE BOX
OVERNIGHT EVENTS: refused midodrine, phenylephrine increased    SUBJECTIVE: Patient seen and examined at bedside. He appears comfortable lying with flat affect. His response to question are delayed.    Vital Signs Last 12 Hrs  T(F): 97.1 (06-13-20 @ 00:00), Max: 99.6 (06-12-20 @ 21:34)  HR: 94 (06-13-20 @ 08:00) (83 - 94)  BP: 90/58 (06-13-20 @ 08:00) (75/47 - 102/53)  BP(mean): 68 (06-13-20 @ 08:00) (55 - 97)  RR: 11 (06-13-20 @ 08:00) (10 - 19)  SpO2: 100% (06-13-20 @ 08:00) (92% - 100%)  I&O's Summary    12 Jun 2020 07:01  -  13 Jun 2020 07:00  --------------------------------------------------------  IN: 663.6 mL / OUT: 0 mL / NET: 663.6 mL    13 Jun 2020 07:01  -  13 Jun 2020 08:28  --------------------------------------------------------  IN: 51 mL / OUT: 0 mL / NET: 51 mL        PHYSICAL EXAM:  Constitutional: NAD, comfortable in bed.  HEENT: NC/AT, PERRLA, EOMI, no conjunctival pallor or scleral icterus, MMM  Neck: Supple, no JVD  Respiratory: CTA B/L. No w/r/r.   Cardiovascular: RRR, normal S1 and S2, no m/r/g.   Gastrointestinal: +BS, soft NTND, no guarding or rebound tenderness, no palpable masses   Extremities: wwp; no cyanosis, clubbing or edema.   Vascular: Pulses equal and strong throughout.   Neurological: AAOx3, no CN deficits, strength and sensation intact throughout.   Skin: No gross skin abnormalities or rashes        LABS:                        9.6    12.72 )-----------( 27       ( 13 Jun 2020 04:46 )             31.1     06-13    144  |  104  |  102<H>  ----------------------------<  185<H>  3.9   |  19<L>  |  6.70<H>    Ca    8.4      13 Jun 2020 04:46  Phos  7.2     06-13  Mg     2.0     06-13    TPro  4.8<L>  /  Alb  2.7<L>  /  TBili  0.8  /  DBili  x   /  AST  20  /  ALT  8<L>  /  AlkPhos  94  06-12            RADIOLOGY & ADDITIONAL TESTS:    MEDICATIONS  (STANDING):  chlorhexidine 2% Cloths 1 Application(s) Topical daily  collagenase Ointment 1 Application(s) Topical daily  DAPTOmycin IVPB 700 milliGRAM(s) IV Intermittent every 48 hours  insulin lispro (HumaLOG) corrective regimen sliding scale   SubCutaneous Before meals and at bedtime  midodrine 30 milliGRAM(s) Oral every 8 hours  modafinil 200 milliGRAM(s) Oral every 24 hours  nystatin Cream 1 Application(s) Topical two times a day  pantoprazole  Injectable 40 milliGRAM(s) IV Push daily  phenylephrine    Infusion 0.007 MICROgram(s)/kG/Min (0.1 mL/Hr) IV Continuous <Continuous>  scopolamine 1 mG/72 Hr(s) Patch 1 Patch Transdermal every 72 hours  sevelamer carbonate Powder 1600 milliGRAM(s) Oral three times a day with meals  sodium bicarbonate 650 milliGRAM(s) Oral three times a day    MEDICATIONS  (PRN):  acetaminophen   Tablet .. 650 milliGRAM(s) Oral every 6 hours PRN Moderate Pain (4 - 6) OVERNIGHT EVENTS: refused midodrine, phenylephrine increased    SUBJECTIVE: Patient seen and examined at bedside. He appears comfortable lying with flat affect. His response to questions and ability to follow commands are delayed. Full ROS cannot be assessed given his mental status.    Vital Signs Last 12 Hrs  T(F): 97.1 (06-13-20 @ 00:00), Max: 99.6 (06-12-20 @ 21:34)  HR: 94 (06-13-20 @ 08:00) (83 - 94)  BP: 90/58 (06-13-20 @ 08:00) (75/47 - 102/53)  BP(mean): 68 (06-13-20 @ 08:00) (55 - 97)  RR: 11 (06-13-20 @ 08:00) (10 - 19)  SpO2: 100% (06-13-20 @ 08:00) (92% - 100%)  I&O's Summary    12 Jun 2020 07:01  -  13 Jun 2020 07:00  --------------------------------------------------------  IN: 663.6 mL / OUT: 0 mL / NET: 663.6 mL    13 Jun 2020 07:01  -  13 Jun 2020 08:28  --------------------------------------------------------  IN: 51 mL / OUT: 0 mL / NET: 51 mL      PHYSICAL EXAM:  General: NAD, resting comfortably in bed. nonconversant this AM, not making eye contact, however awake and arousabile on command  Neck: no JVD  Respiratory: diminished breath sounds at the bases, non-tachypneic, no respiratory distress, saturating well on RA   Cardiovascular: Regular rhythm/rate; S1/S2, no pericardial rub, pericardial drain removed with site clean, dry, and intact  Gastrointestinal: distended, non tender ascitic drain with appropriate drainage, site clean, dry, and intact  Extremities: WWP; 2+ bilateral dependent pitting edema up to his thigh, UE 2+ edema b/l   Neurological:  A&Ox 2 (person and place), arousable and interactive but still weak. Is able to follow simple commands and answer simple questions. CNII-XII grossly intact; no obvious focal deficits, no flapping tremor, but minimal asterixis, agitated affect         LABS:                        9.6    12.72 )-----------( 27       ( 13 Jun 2020 04:46 )             31.1     06-13    144  |  104  |  102<H>  ----------------------------<  185<H>  3.9   |  19<L>  |  6.70<H>    Ca    8.4      13 Jun 2020 04:46  Phos  7.2     06-13  Mg     2.0     06-13    TPro  4.8<L>  /  Alb  2.7<L>  /  TBili  0.8  /  DBili  x   /  AST  20  /  ALT  8<L>  /  AlkPhos  94  06-12            RADIOLOGY & ADDITIONAL TESTS:    MEDICATIONS  (STANDING):  chlorhexidine 2% Cloths 1 Application(s) Topical daily  collagenase Ointment 1 Application(s) Topical daily  DAPTOmycin IVPB 700 milliGRAM(s) IV Intermittent every 48 hours  insulin lispro (HumaLOG) corrective regimen sliding scale   SubCutaneous Before meals and at bedtime  midodrine 30 milliGRAM(s) Oral every 8 hours  modafinil 200 milliGRAM(s) Oral every 24 hours  nystatin Cream 1 Application(s) Topical two times a day  pantoprazole  Injectable 40 milliGRAM(s) IV Push daily  phenylephrine    Infusion 0.007 MICROgram(s)/kG/Min (0.1 mL/Hr) IV Continuous <Continuous>  scopolamine 1 mG/72 Hr(s) Patch 1 Patch Transdermal every 72 hours  sevelamer carbonate Powder 1600 milliGRAM(s) Oral three times a day with meals  sodium bicarbonate 650 milliGRAM(s) Oral three times a day    MEDICATIONS  (PRN):  acetaminophen   Tablet .. 650 milliGRAM(s) Oral every 6 hours PRN Moderate Pain (4 - 6)

## 2020-06-13 NOTE — PROGRESS NOTE ADULT - ASSESSMENT
74M with MDS, pancytopenia, CKD, ascites of unknown origin presents from [facility] with complaints of decreased po intake and weakness likely secondary to uremia/AAMIR from diuresis for treatment of ascites, hospital course notable for UGIB and pericardial effusion with tamponade physiology, as well as ATN, and ascites of unknown etiology, now in ICU s/p paracentesis 5/10 (2L removed), IR pericardiocentesis 5/11 (drain in place), now with anemia and thrombocytopenia with hypercoagulability 2/2 DIC that improved, now likely MDS, had increasing pressor requirement with HD yesterday     GOC:   Patient is DNR/DNI. Will continue to have GOC discussion with HCP Venus regarding comfort care    NEURO  #AMS likely 2/2 to uremia.  - mental status waxes and wanes depending on time of die, likely hypoactive delirium   - start modafinil 200mg qAM  - no concern for focal deficit    -CT head 6/10 without hemorrhage or new infarct; more so suggesting clinical correlation for r/o NPH, however likely more diffuse volume loss  - neuro recommending that NPH is a clinical diagnosis and no further intervention at this time needed     #r/o NPH   CT head showing concern for NPH, not proportional to amount of volume loss  - consult neurology and f/u recs     #depression   - c/w mirtazapine to 15mg at night  - Psych following, appreciate recs      CARDIOVASCULAR/HD  #Shock of unclear etiology.   No suspicion for septic shock as patient culture negative since 5/18 and on continued daptomycin treatment.    - midodrine 30mg [id number] po as we have now started comfort feeds  - d/c solucortef  - taken off levo and now on phenylephrine, continue weaning pressors as possible  - phenylephrine increasing, however goes up and down; will try to continue to wean as pt takes midodrine    PULM:  - stable on RA  - maintain head of bed 30 degrees, due to aspiration risk     GI:   #ascites  - Elevated SAAG consistent with portal HTN   - no plan for paracentesis today    #comfort feeds  - decreased appetite with very limited po intake. Refuses NGT at this time, will discuss with proxy  - proxy agreed to NG tube, however pt thrashing around adamantly refusing placement of NGT   - will continue GOC discussions with proxy, patient now asking nurses about his prognosis, though proxy continues to want full escalation of care  - failed modified barium swallow, however started comfort feeds and pt intermittently takes po     #diarrhea:   New onset of diarrhea on 06/11.   - f/u c diff    RENAL/:  #AAMIR on CKD c/b ATN from hypotension and ischemic injury  - on phenylephrine, midodrine, solucortef  - renally adjusted meds/ ABx  - renvela 1600mg PO TID w/each meal   - sodium bicarb 650 TID  - permacath placed on 6/8  - Renal consulted, appreciate recs  - pt not a candidate for HD anymore as he cannot tolerate HD    HEME:   #DIC  - monitor fibrinogen, haptoglobin, LDH, and platelets 2/2 to sepsis  - improving    Anemia:   2/2 ?MDS  - Hg stable    #Coagulopathy:   2/2 MDS vs sepsis vs DIC  - Monitor PT/INR     #DVT - RLE  - Eliquis held in setting of DIC concern    #thrombocytopenia 2/2 DIC/MDS and possibly daptomycin  - monitor plt level  - Plt repletion goal <10    ID:   #Persistent MRSA bacteremia  - first culture negative 5/18   - continue daptomycin 700mg [id number] until 6/29  - obtain CK twice a week    ENDO  - monitor sugars    DVt ppx: SCDs. no chemo ppx due to pancytopenia  Lines: R IJ CVC (6/3), L permacath (6/8) 74M with MDS, pancytopenia, CKD, ascites of unknown origin presents from [facility] with complaints of decreased po intake and weakness likely secondary to uremia/AAMIR from diuresis for treatment of ascites, hospital course notable for UGIB and pericardial effusion with tamponade physiology, as well as ATN, and ascites of unknown etiology, now in ICU s/p paracentesis 5/10 (2L removed), IR pericardiocentesis 5/11 (drain in place), now with anemia and thrombocytopenia with hypercoagulability 2/2 DIC that improved, now likely MDS, had increasing pressor requirement with HD yesterday     GOC:   Patient is DNR/DNI. Will continue to have GOC discussion with HCP Venus regarding comfort care    NEURO  #AMS likely 2/2 to uremia.  - mental status waxes and wanes depending on time of die, likely hypoactive delirium   - modafinil 200mg qAM  - no concern for focal deficit    -CT head 6/10 without hemorrhage or new infarct; more so suggesting clinical correlation for r/o NPH, however likely more diffuse volume loss  - neuro recommending that NPH is a clinical diagnosis and no further intervention at this time needed     #r/o NPH   CT head showing concern for NPH, not proportional to amount of volume loss  - consult neurology and f/u recs     #depression   - c/w mirtazapine to 15mg at night  - Psych following, appreciate recs      CARDIOVASCULAR/HD  #Shock of unclear etiology.   No suspicion for septic shock as patient culture negative since 5/18 and on continued daptomycin treatment.    - midodrine 30mg [id number] po as we have now started comfort feeds  - d/c solucortef  - taken off levo and now on phenylephrine (capping at MAP 55), continue weaning pressors as possible  - phenylephrine increasing, however goes up and down; will try to continue to wean as pt takes midodrine (has been refusing)    PULM:  - stable on RA  - maintain head of bed 30 degrees, due to aspiration risk     GI:   #ascites  - Elevated SAAG consistent with portal HTN   - no plan for paracentesis today    #comfort feeds  - decreased appetite with very limited po intake. Refuses NGT at this time, will discuss with proxy  - proxy agreed to NG tube, however pt thrashing around adamantly refusing placement of NGT   - will continue GOC discussions with proxy, patient now asking nurses about his prognosis, though proxy continues to want full escalation of care  - failed modified barium swallow, however started comfort feeds and pt intermittently takes po     #diarrhea:   New onset of diarrhea on 06/11.   - f/u c diff    RENAL/:  #AAMIR on CKD c/b ATN from hypotension and ischemic injury  - on phenylephrine, midodrine  - renally adjusted meds/ ABx  - renvela 1600mg PO TID w/each meal   - sodium bicarb 650 TID  - permacath placed on 6/8  - Renal consulted, appreciate recs  - pt not a candidate for HD anymore as he cannot tolerate HD    HEME:   #DIC  - monitor fibrinogen, haptoglobin, LDH, and platelets 2/2 to sepsis  - improving    Anemia:   2/2 ?MDS  - Hg stable    #Coagulopathy:   2/2 MDS vs sepsis vs DIC  - Monitor PT/INR     #DVT - RLE  - Eliquis held in setting of DIC concern    #thrombocytopenia 2/2 DIC/MDS and possibly daptomycin  - monitor plt level  - Plt repletion goal <10    ID:   #Persistent MRSA bacteremia  - first culture negative 5/18   - continue daptomycin 700mg [id number] until 6/29  - obtain CK twice a week    ENDO  - monitor sugars    DVt ppx: SCDs. no chemo ppx due to pancytopenia  Lines: R IJ CVC (6/3), L permacath (6/8) 74M with MDS, pancytopenia, CKD, ascites of unknown origin presents from [facility] with complaints of decreased po intake and weakness likely secondary to uremia/AAMIR from diuresis for treatment of ascites, hospital course notable for UGIB and pericardial effusion with tamponade physiology, as well as ATN, and ascites of unknown etiology, now in ICU s/p paracentesis 5/10 (2L removed), IR pericardiocentesis 5/11 (drain in place), now with anemia and thrombocytopenia with hypercoagulability 2/2 DIC that improved, now likely MDS, had increasing pressor requirement with HD yesterday     GOC:   Patient is DNR/DNI. Will continue to have GOC discussion with HCP Venus regarding comfort care    NEURO  #AMS likely 2/2 to uremia.  - mental status waxes and wanes depending on time of die, likely hypoactive delirium   - modafinil 200mg qAM  - no concern for focal deficit    -CT head 6/10 without hemorrhage or new infarct; more so suggesting clinical correlation for r/o NPH, however likely more diffuse volume loss  - neuro recommending that NPH is a clinical diagnosis and no further intervention at this time needed     #r/o NPH   CT head showing concern for NPH, not proportional to amount of volume loss  - consult neurology and f/u recs     #depression   - c/w mirtazapine to 15mg at night  - Psych following, appreciate recs      CARDIOVASCULAR/HD  #Shock of unclear etiology.   No suspicion for septic shock as patient culture negative since 5/18 and on continued daptomycin treatment.    - midodrine 30mg [id number] po as we have now started comfort feeds  - d/c solucortef  - taken off levo and now on phenylephrine (capping at MAP 55), continue weaning pressors as possible  - phenylephrine increasing, however goes up and down; will try to continue to wean as pt takes midodrine (has been refusing)    PULM:  - stable on RA  - maintain head of bed 30 degrees, due to aspiration risk     GI:   #ascites  - Elevated SAAG consistent with portal HTN   - no plan for paracentesis today    #comfort feeds  - decreased appetite with very limited po intake. Refuses NGT at this time, will discuss with proxy  - proxy agreed to NG tube, however pt thrashing around adamantly refusing placement of NGT   - will continue GOC discussions with proxy, patient now asking nurses about his prognosis, though proxy continues to want full escalation of care  - failed modified barium swallow, however started comfort feeds and pt intermittently takes po     #diarrhea:   New onset of diarrhea on 06/11.   - f/u c diff    RENAL/:  #AAMIR on CKD c/b ATN from hypotension and ischemic injury  - on phenylephrine, midodrine  - renally adjusted meds/ ABx  - renvela 1600mg PO TID w/each meal   - sodium bicarb 650 TID  - permacath placed on 6/8  - Renal consulted, appreciate recs  - pt not a candidate for HD anymore as he cannot tolerate HD    HEME:   #DIC  - monitor fibrinogen, haptoglobin, LDH, and platelets 2/2 to sepsis  - improving    Anemia:   2/2 ?MDS  - Hg stable    #Coagulopathy:   2/2 MDS vs sepsis vs DIC  - Monitor PT/INR     #DVT - RLE  - Eliquis held in setting of DIC concern    #thrombocytopenia 2/2 DIC/MDS and possibly daptomycin  - monitor plt level  - Plt repletion goal <10    ID:   #Persistent MRSA bacteremia  - first culture negative 5/18   - continue daptomycin 700mg [id number] until 6/29  - obtain CK twice a week    ENDO  - monitor sugars    DVt ppx: SCDs. no chemo ppx due to pancytopenia  Lines: R IJ CVC (6/3), L permacath (6/8)    CCM ATTENDING  ATTESTATION  Patient seen and discussed with House Officer/Resident  Chart and history reviewed  Exam, labs, and radiology as noted above   Assessment and Plans as outlined  Ventilating and oxygenating adequately without increased work of breathing  Hemodynamically is clinically perfusing adequately, albeit with increasing Vasopressor requirements   Aim to maintain MAP >= 55 mmHg, U/O failing HD due to hypotension and Vtach, pulse oximetry OxSat >= 92%   Metabolic demands along with any abnormal blood chemistries, and fluid requirements being addressed    Sedation and/or pain meds as needed to reduce metabolic demand and maintain comfort   Very poor prognosis with Uremia/Renal Failure not to be dialyzed again, poor PO intake, and metabolic derangements, sepsis, encephalopathy, and MDS ---> would be medically futile to further escalate current vasoactive drips as it would change overall prognosis

## 2020-06-14 NOTE — PROGRESS NOTE ADULT - ASSESSMENT
74M with MDS, pancytopenia, CKD, ascites of unknown origin presents from [facility] with complaints of decreased po intake and weakness likely secondary to uremia/AAMIR from diuresis for treatment of ascites, hospital course notable for UGIB and pericardial effusion with tamponade physiology, as well as ATN, and ascites of unknown etiology, now in ICU s/p paracentesis 5/10 (2L removed), IR pericardiocentesis 5/11 (drain in place), now with anemia and thrombocytopenia with hypercoagulability 2/2 DIC that improved, now likely MDS, had increasing pressor requirement with HD yesterday     GOC:   Patient is DNR/DNI. Will continue to have GOC discussion with HCP Venus regarding comfort care    NEURO  #AMS likely 2/2 to uremia.  - mental status waxes and wanes depending on time of die, likely hypoactive delirium   - modafinil 200mg qAM  - no concern for focal deficit    -CT head 6/10 without hemorrhage or new infarct; more so suggesting clinical correlation for r/o NPH, however likely more diffuse volume loss  - neuro recommending that NPH is a clinical diagnosis and no further intervention at this time needed     #r/o NPH   CT head showing concern for NPH, not proportional to amount of volume loss  - consult neurology and f/u recs     #depression   - c/w mirtazapine to 15mg at night  - Psych following, appreciate recs      CARDIOVASCULAR/HD  #Shock of unclear etiology.   No suspicion for septic shock as patient culture negative since 5/18 and on continued daptomycin treatment.    - midodrine 30mg [id number] po as we have now started comfort feeds  - d/c solucortef  - taken off levo and now on phenylephrine, capping at current dose (max'ed), MAPs dropping to 40s  - phenylephrine increasing, however goes up and down; will try to continue to wean as pt takes midodrine (has been refusing)    PULM:  - stable on RA  - maintain head of bed 30 degrees, due to aspiration risk     GI:   #ascites  - Elevated SAAG consistent with portal HTN   - no plan for paracentesis today    #comfort feeds  - decreased appetite with very limited po intake. Refuses NGT at this time, will discuss with proxy  - proxy agreed to NG tube, however pt thrashing around adamantly refusing placement of NGT   - will continue GOC discussions with proxy, patient now asking nurses about his prognosis, though proxy continues to want full escalation of care  - failed modified barium swallow, however started comfort feeds and pt intermittently takes po     #diarrhea:   New onset of diarrhea on 06/11.   - f/u c diff    RENAL/:  #AAMIR on CKD c/b ATN from hypotension and ischemic injury  - on phenylephrine, midodrine  - renally adjusted meds/ ABx  - renvela 1600mg PO TID w/each meal   - sodium bicarb 650 TID  - permacath placed on 6/8  - Renal consulted, appreciate recs  - pt not a candidate for HD anymore as he cannot tolerate HD    HEME:   #DIC  - monitor fibrinogen, haptoglobin, LDH, and platelets 2/2 to sepsis  - improving    Anemia:   2/2 ?MDS  - Hg stable    #Coagulopathy:   2/2 MDS vs sepsis vs DIC  - Monitor PT/INR     #DVT - RLE  - Eliquis held in setting of DIC concern    #thrombocytopenia 2/2 DIC/MDS and possibly daptomycin  - monitor plt level  - Plt repletion goal <10    ID:   #Persistent MRSA bacteremia  - first culture negative 5/18   - continue daptomycin 700mg [id number] until 6/29  - obtain CK twice a week    ENDO  - monitor sugars    DVt ppx: SCDs. no chemo ppx due to pancytopenia  Lines: R IJ CVC (6/3), L permacath (6/8) 74M with MDS, pancytopenia, CKD, ascites of unknown origin presents from [facility] with complaints of decreased po intake and weakness likely secondary to uremia/AAMIR from diuresis for treatment of ascites, hospital course notable for UGIB and pericardial effusion with tamponade physiology, as well as ATN, and ascites of unknown etiology, now in ICU s/p paracentesis 5/10 (2L removed), IR pericardiocentesis 5/11 (drain in place), now with anemia and thrombocytopenia with hypercoagulability 2/2 DIC that improved, now likely MDS, had increasing pressor requirement with HD yesterday     GOC:   Patient is DNR/DNI. Will continue to have GOC discussion with HCP Venus regarding comfort care    NEURO  #AMS likely 2/2 to uremia.  - mental status waxes and wanes depending on time of die, likely hypoactive delirium   - modafinil 200mg qAM  - no concern for focal deficit    -CT head 6/10 without hemorrhage or new infarct; more so suggesting clinical correlation for r/o NPH, however likely more diffuse volume loss  - neuro recommending that NPH is a clinical diagnosis and no further intervention at this time needed     #r/o NPH   CT head showing concern for NPH, not proportional to amount of volume loss  - consult neurology and f/u recs     #depression   - c/w mirtazapine to 15mg at night  - Psych following, appreciate recs      CARDIOVASCULAR/HD  #Shock of unclear etiology.   No suspicion for septic shock as patient culture negative since 5/18 and on continued daptomycin treatment.    - midodrine 30mg [id number] po as we have now started comfort feeds  - d/c solucortef  - taken off levo and now on phenylephrine, capping at current dose (max'ed), MAPs dropping to 40s   - add vasopressin to maintain MAPs 55, per HCP's wishes  - phenylephrine increasing, however goes up and down; will try to continue to wean as pt takes midodrine (has been refusing)    PULM:  - stable on RA  - maintain head of bed 30 degrees, due to aspiration risk     GI:   #ascites  - Elevated SAAG consistent with portal HTN   - no plan for paracentesis today    #comfort feeds  - decreased appetite with very limited po intake. Refuses NGT at this time, will discuss with proxy  - proxy agreed to NG tube, however pt thrashing around adamantly refusing placement of NGT   - will continue GOC discussions with proxy, patient now asking nurses about his prognosis, though proxy continues to want full escalation of care  - failed modified barium swallow, however started comfort feeds and pt intermittently takes po     #diarrhea:   New onset of diarrhea on 06/11.   - f/u c diff    RENAL/:  #AAMIR on CKD c/b ATN from hypotension and ischemic injury  - on phenylephrine, midodrine  - renally adjusted meds/ ABx  - renvela 1600mg PO TID w/each meal   - sodium bicarb 650 TID  - permacath placed on 6/8  - Renal consulted, appreciate recs  - pt not a candidate for HD anymore as he cannot tolerate HD    HEME:   #DIC  - monitor fibrinogen, haptoglobin, LDH, and platelets 2/2 to sepsis  - improving    Anemia:   2/2 ?MDS  - Hg stable    #Coagulopathy:   2/2 MDS vs sepsis vs DIC  - Monitor PT/INR     #DVT - RLE  - Eliquis held in setting of DIC concern    #thrombocytopenia 2/2 DIC/MDS and possibly daptomycin  - monitor plt level  - Plt repletion goal <10    ID:   #Persistent MRSA bacteremia  - first culture negative 5/18   - continue daptomycin 700mg [id number] until 6/29  - obtain CK twice a week    ENDO  - monitor sugars    DVt ppx: SCDs. no chemo ppx due to pancytopenia  Lines: R IJ CVC (6/3), L permacath (6/8)

## 2020-06-14 NOTE — PROGRESS NOTE ADULT - SUBJECTIVE AND OBJECTIVE BOX
INTERVAL HPI/OVERNIGHT EVENTS: 0.5mg dilaudid given x2 for pain. Phenylephrine increased.    SUBJECTIVE: Patient seen and examined at bedside. Pt not very responsive this AM. Phenylephrine maxed out. Blinks to threat, however not answering questions.    OBJECTIVE:    VITAL SIGNS:  ICU Vital Signs Last 24 Hrs  T(C): 35.1 (14 Jun 2020 09:00), Max: 36.6 (14 Jun 2020 01:23)  T(F): 95.2 (14 Jun 2020 09:00), Max: 97.8 (14 Jun 2020 01:23)  HR: 85 (14 Jun 2020 10:00) (82 - 92)  BP: 58/34 (14 Jun 2020 10:00) (58/29 - 88/51)  BP(mean): 43 (14 Jun 2020 10:00) (39 - 67)  ABP: --  ABP(mean): --  RR: 6 (14 Jun 2020 10:00) (5 - 14)  SpO2: 98% (14 Jun 2020 10:00) (94% - 100%)        06-13 @ 07:01 - 06-14 @ 07:00  --------------------------------------------------------  IN: 1315.8 mL / OUT: 0 mL / NET: 1315.8 mL    06-14 @ 07:01  - 06-14 @ 10:18  --------------------------------------------------------  IN: 262.5 mL / OUT: 0 mL / NET: 262.5 mL      CAPILLARY BLOOD GLUCOSE      POCT Blood Glucose.: 151 mg/dL (14 Jun 2020 06:51)      PHYSICAL EXAM:    General: NAD, resting comfortably in bed. nonconversant this AM, not making eye contact, however awake and arousabile on command  Neck: no JVD  Respiratory: diminished breath sounds at the bases, non-tachypneic, no respiratory distress, saturating well on RA   Cardiovascular: Regular rhythm/rate; S1/S2, no pericardial rub, pericardial drain removed with site clean, dry, and intact  Gastrointestinal: distended, non tender ascitic drain with appropriate drainage, site clean, dry, and intact  Extremities: WWP; 2+ bilateral dependent pitting edema up to his thigh, UE 2+ edema b/l   Neurological:  A&Ox 2 (person and place), arousable and interactive but still weak. Is able to follow simple commands and answer simple questions. CNII-XII grossly intact; no obvious focal deficits, no flapping tremor, but minimal asterixis, agitated affect     MEDICATIONS:  MEDICATIONS  (STANDING):  chlorhexidine 2% Cloths 1 Application(s) Topical daily  collagenase Ointment 1 Application(s) Topical daily  DAPTOmycin IVPB 700 milliGRAM(s) IV Intermittent every 48 hours  insulin lispro (HumaLOG) corrective regimen sliding scale   SubCutaneous Before meals and at bedtime  midodrine 30 milliGRAM(s) Oral every 8 hours  modafinil 200 milliGRAM(s) Oral every 24 hours  nystatin Cream 1 Application(s) Topical two times a day  pantoprazole  Injectable 40 milliGRAM(s) IV Push daily  phenylephrine    Infusion 0.3 MICROgram(s)/kG/Min (4.04 mL/Hr) IV Continuous <Continuous>  scopolamine 1 mG/72 Hr(s) Patch 1 Patch Transdermal every 72 hours  sevelamer carbonate Powder 1600 milliGRAM(s) Oral three times a day with meals  sodium bicarbonate 650 milliGRAM(s) Oral three times a day    MEDICATIONS  (PRN):  acetaminophen   Tablet .. 650 milliGRAM(s) Oral every 6 hours PRN Moderate Pain (4 - 6)      ALLERGIES:  Allergies    No Known Allergies    Intolerances        LABS:                        9.6    12.40 )-----------( 30       ( 14 Jun 2020 05:19 )             32.0     06-14    144  |  105  |  104<H>  ----------------------------<  170<H>  4.1   |  15<L>  |  6.74<H>    Ca    8.0<L>      14 Jun 2020 05:19  Phos  7.6     06-14  Mg     1.9     06-14    TPro  4.6<L>  /  Alb  2.3<L>  /  TBili  0.8  /  DBili  x   /  AST  20  /  ALT  7<L>  /  AlkPhos  101  06-14          RADIOLOGY & ADDITIONAL TESTS: Reviewed.

## 2020-06-15 NOTE — PROGRESS NOTE ADULT - SUBJECTIVE AND OBJECTIVE BOX
INTERVAL HPI/OVERNIGHT EVENTS: NAEO    SUBJECTIVE: Patient seen and examined at bedside.     OBJECTIVE:    VITAL SIGNS:  ICU Vital Signs Last 24 Hrs  T(C): 36.5 (15 Jose Luis 2020 06:37), Max: 36.5 (15 Jose Luis 2020 06:37)  T(F): 97.7 (15 Jose Luis 2020 06:37), Max: 97.7 (15 Jose Luis 2020 06:37)  HR: 93 (15 Jose Luis 2020 08:00) (85 - 105)  BP: 78/42 (15 Jose Luis 2020 07:00) (58/29 - 97/51)  BP(mean): 54 (15 Jose Luis 2020 07:00) (39 - 66)  ABP: --  ABP(mean): --  RR: 20 (15 Jose Luis 2020 08:00) (6 - 21)  SpO2: 91% (15 Jose Luis 2020 08:00) (89% - 98%)        06-14 @ 07:01  -  06-15 @ 07:00  --------------------------------------------------------  IN: 1911.3 mL / OUT: 0 mL / NET: 1911.3 mL    06-15 @ 07:01  -  06-15 @ 08:11  --------------------------------------------------------  IN: 137.4 mL / OUT: 0 mL / NET: 137.4 mL      CAPILLARY BLOOD GLUCOSE      POCT Blood Glucose.: 181 mg/dL (15 Jose Luis 2020 07:20)      PHYSICAL EXAM:    General: NAD, resting comfortably in bed. nonconversant this AM, not making eye contact, however awake and arousabile on command  Neck: no JVD  Respiratory: diminished breath sounds at the bases, non-tachypneic, no respiratory distress, saturating well on RA   Cardiovascular: Regular rhythm/rate; S1/S2, no pericardial rub, pericardial drain removed with site clean, dry, and intact  Gastrointestinal: distended, non tender ascitic drain with appropriate drainage, site clean, dry, and intact  Extremities: WWP; 2+ bilateral dependent pitting edema up to his thigh, UE 2+ edema b/l   Neurological:  A&Ox 2 (person and place), arousable and interactive but still weak. Is able to follow simple commands and answer simple questions. CNII-XII grossly intact; no obvious focal deficits, no flapping tremor, but minimal asterixis, agitated affect     MEDICATIONS:  MEDICATIONS  (STANDING):  chlorhexidine 2% Cloths 1 Application(s) Topical daily  collagenase Ointment 1 Application(s) Topical daily  DAPTOmycin IVPB 700 milliGRAM(s) IV Intermittent every 48 hours  insulin lispro (HumaLOG) corrective regimen sliding scale   SubCutaneous Before meals and at bedtime  midodrine 30 milliGRAM(s) Oral every 8 hours  modafinil 200 milliGRAM(s) Oral every 24 hours  nystatin Cream 1 Application(s) Topical two times a day  pantoprazole  Injectable 40 milliGRAM(s) IV Push daily  phenylephrine    Infusion 10.028 MICROgram(s)/kG/Min (135 mL/Hr) IV Continuous <Continuous>  scopolamine 1 mG/72 Hr(s) Patch 1 Patch Transdermal every 72 hours  sevelamer carbonate Powder 1600 milliGRAM(s) Oral three times a day with meals  sodium bicarbonate 650 milliGRAM(s) Oral three times a day  vasopressin Infusion 0.04 Unit(s)/Min (2.4 mL/Hr) IV Continuous <Continuous>  zinc oxide 40% Ointment 1 Application(s) Topical two times a day    MEDICATIONS  (PRN):  acetaminophen   Tablet .. 650 milliGRAM(s) Oral every 6 hours PRN Moderate Pain (4 - 6)      ALLERGIES:  Allergies    No Known Allergies    Intolerances        LABS:                        9.6    12.40 )-----------( 30       ( 14 Jun 2020 05:19 )             32.0     06-14    144  |  105  |  104<H>  ----------------------------<  170<H>  4.1   |  15<L>  |  6.74<H>    Ca    8.0<L>      14 Jun 2020 05:19  Phos  7.6     06-14  Mg     1.9     06-14    TPro  4.6<L>  /  Alb  2.3<L>  /  TBili  0.8  /  DBili  x   /  AST  20  /  ALT  7<L>  /  AlkPhos  101  06-14          RADIOLOGY & ADDITIONAL TESTS: Reviewed. INTERVAL HPI/OVERNIGHT EVENTS: Yesterday PM, MAPs continued to drop despite max dose of phenylephrine; vasopressin added and increased.    SUBJECTIVE: Patient seen and examined at bedside. This AM, pt not responding to questions and not following commands; eyes opened and breathing spontaneously although intermittently apneic breathing.     OBJECTIVE:    VITAL SIGNS:  ICU Vital Signs Last 24 Hrs  T(C): 36.5 (15 Jose Luis 2020 06:37), Max: 36.5 (15 Jose Luis 2020 06:37)  T(F): 97.7 (15 Jose Luis 2020 06:37), Max: 97.7 (15 Jose Luis 2020 06:37)  HR: 93 (15 Jose Luis 2020 08:00) (85 - 105)  BP: 78/42 (15 Jose Luis 2020 07:00) (58/29 - 97/51)  BP(mean): 54 (15 Jose Luis 2020 07:00) (39 - 66)  ABP: --  ABP(mean): --  RR: 20 (15 Jose Luis 2020 08:00) (6 - 21)  SpO2: 91% (15 Jose Luis 2020 08:00) (89% - 98%)        06-14 @ 07:01  -  06-15 @ 07:00  --------------------------------------------------------  IN: 1911.3 mL / OUT: 0 mL / NET: 1911.3 mL    06-15 @ 07:01  -  06-15 @ 08:11  --------------------------------------------------------  IN: 137.4 mL / OUT: 0 mL / NET: 137.4 mL      CAPILLARY BLOOD GLUCOSE      POCT Blood Glucose.: 181 mg/dL (15 Jose Luis 2020 07:20)      PHYSICAL EXAM:    General: NAD, resting comfortably in bed. nonconversant this AM, not making eye contact, does not follow command  Neck: no JVD  Respiratory: diminished breath sounds at the bases, non-tachypneic, no respiratory distress, saturating well on RA   Cardiovascular: Regular rhythm/rate; S1/S2, no pericardial rub, pericardial drain removed with site clean, dry, and intact  Gastrointestinal: distended, non tender ascitic drain with appropriate drainage, site clean, dry, and intact  Extremities: WWP; 2+ bilateral dependent pitting edema up to his thigh, UE 2+ edema b/l   Neurological:  Arousable and interactive but still weak. Is able to follow simple commands and answer simple questions. CNII-XII grossly intact; no obvious focal deficits, no flapping tremor, but minimal asterixis, agitated affect     MEDICATIONS:  MEDICATIONS  (STANDING):  chlorhexidine 2% Cloths 1 Application(s) Topical daily  collagenase Ointment 1 Application(s) Topical daily  DAPTOmycin IVPB 700 milliGRAM(s) IV Intermittent every 48 hours  insulin lispro (HumaLOG) corrective regimen sliding scale   SubCutaneous Before meals and at bedtime  midodrine 30 milliGRAM(s) Oral every 8 hours  modafinil 200 milliGRAM(s) Oral every 24 hours  nystatin Cream 1 Application(s) Topical two times a day  pantoprazole  Injectable 40 milliGRAM(s) IV Push daily  phenylephrine    Infusion 10.028 MICROgram(s)/kG/Min (135 mL/Hr) IV Continuous <Continuous>  scopolamine 1 mG/72 Hr(s) Patch 1 Patch Transdermal every 72 hours  sevelamer carbonate Powder 1600 milliGRAM(s) Oral three times a day with meals  sodium bicarbonate 650 milliGRAM(s) Oral three times a day  vasopressin Infusion 0.04 Unit(s)/Min (2.4 mL/Hr) IV Continuous <Continuous>  zinc oxide 40% Ointment 1 Application(s) Topical two times a day    MEDICATIONS  (PRN):  acetaminophen   Tablet .. 650 milliGRAM(s) Oral every 6 hours PRN Moderate Pain (4 - 6)      ALLERGIES:  Allergies    No Known Allergies    Intolerances        LABS:                        9.6    12.40 )-----------( 30       ( 14 Jun 2020 05:19 )             32.0     06-14    144  |  105  |  104<H>  ----------------------------<  170<H>  4.1   |  15<L>  |  6.74<H>    Ca    8.0<L>      14 Jun 2020 05:19  Phos  7.6     06-14  Mg     1.9     06-14    TPro  4.6<L>  /  Alb  2.3<L>  /  TBili  0.8  /  DBili  x   /  AST  20  /  ALT  7<L>  /  AlkPhos  101  06-14          RADIOLOGY & ADDITIONAL TESTS: Reviewed.

## 2020-06-15 NOTE — PROGRESS NOTE ADULT - ATTENDING COMMENTS
Hx of MDS, admitted with FTT; found to have serositis with ascites, pericardial effusion s/p drainage c/b MRSA bacteremia with persistent pressor requirement.  Shock state etiology is unclear. Unable to tolerate dialysis secondary to NSVT and acute recurrent hypotensive episodes; dialysis stopped last week. Has not been doing well over the weekend with pressors capped after long GOC discussion with HCP. Death imminent.

## 2020-06-15 NOTE — DISCHARGE NOTE FOR THE EXPIRED PATIENT - HOSPITAL COURSE
73 yo M with MDS, pancytopenia, CKD, ascites of unknown origin presents from [facility] on 5/8 with complaints of decreased po intake and weakness likely secondary to uremia/AAMIR from diuresis for treatment of ascites, hospital course notable for UGIB and pericardial effusion with tamponade physiology, as well as ATN, and ascites of unknown etiology, now in ICU s/p paracentesis 5/10 (2L removed), IR pericardiocentesis 5/11 (drain removed). His ascites were consistent with an elevated SAAG indicating portal hypertension, although no clot was identified on the ultrasound. His course was complicated by MRSA bacteremia with last positive blood culture on 5/16 on daptomycin and ceftaroline. Cardiology was consulted for a TTE but deemed him not a good candidate. A gallium scan was performed to find a source of infection and was suggestive of cellulitis or rib osteo although there is no clinical correlation on physical exam and on chest CT. The likely source of his MRSA is attributed to urine given his history of having it in the past. During his course he required intermittent HD and then was placed on CVVHD but could not tolerate it due to persistent clotting of his HD cath while on a heparin drip. His course was complicated with a concern for DIC with labs showing anemia and thrombocytopenia with low fibrinogen requiring multiple units of platelet and cryo transfusions. Dr. Roberts (his hematologist) was made aware of the findings and recommended no further workup involving bone marrow biopsy. Throughout his admission, he required levophed for pressure support with additional midodrine and steroids. He has an indwelling palacio which urology is recommending since his ultrasound showed mild bilateral hydronephrosis with bladder outlet obstruction. Pt's hospital course also c/b ATN requiring HD, however pt could not tolerate HD due to levophed requirements. Pt switched to phenylephrine. Pt not taking in po, failed speech and swallow; chronically aspirating. Pt offered NG tube, Venus consented to NG tube, however pt thrashing, agitated, and adamantly refused NG tube. Renal determined that pt not a candidate for HD anymore. Phenylephrine was max'ed and vasopressin added, per Venus's wishes. On 6/15 AM, pt's MAPs dropped to 40s; without a pulse or electrical activity. Pt pronounced at 9:38am 75 yo M with MDS, pancytopenia, CKD, ascites of unknown origin presents from [facility] on 5/8 with complaints of decreased po intake and weakness likely secondary to uremia/AAMIR from diuresis for treatment of ascites, hospital course notable for UGIB and pericardial effusion with tamponade physiology, as well as ATN, and ascites of unknown etiology, now in ICU s/p paracentesis 5/10 (2L removed), IR pericardiocentesis 5/11 (drain removed). His ascites were consistent with an elevated SAAG indicating portal hypertension, although no clot was identified on the ultrasound. His course was complicated by MRSA bacteremia with last positive blood culture on 5/16 on daptomycin and ceftaroline. Cardiology was consulted for a TTE but deemed him not a good candidate. A gallium scan was performed to find a source of infection and was suggestive of cellulitis or rib osteo although there is no clinical correlation on physical exam and on chest CT. The likely source of his MRSA is attributed to urine given his history of having it in the past. During his course he required intermittent HD and then was placed on CVVHD but could not tolerate it due to persistent clotting of his HD cath while on a heparin drip. His course was complicated with a concern for DIC with labs showing anemia and thrombocytopenia with low fibrinogen requiring multiple units of platelet and cryo transfusions. Dr. Roberts (his hematologist) was made aware of the findings and recommended no further workup involving bone marrow biopsy. Throughout his admission, he required levophed for pressure support with additional midodrine and steroids. He has an indwelling palacio which urology is recommending since his ultrasound showed mild bilateral hydronephrosis with bladder outlet obstruction. Pt's hospital course also c/b ATN requiring HD, however pt could not tolerate HD due to levophed requirements. Pt switched to phenylephrine. Pt not taking in po, failed speech and swallow; chronically aspirating. Pt offered NG tube, Venus consented to NG tube, however pt thrashing, agitated, and adamantly refused NG tube. Renal determined that pt not a candidate for HD anymore. Phenylephrine was max'ed and vasopressin added, per Venus's wishes. On 6/15 AM, pt's MAPs dropped to 40s; without a pulse or electrical activity. Pt pronounced at 9:38am.    Attending Addendum  Hx of MDS, admitted with FTT; found to have serositis with ascites, pericardial effusion s/p drainage c/b MRSA bacteremia with persistent pressor requirement.  Shock state etiology is unclear. Unable to tolerate dialysis secondary to NSVT and acute recurrent hypotensive episodes; dialysis stopped last week. Has not been doing well over the weekend with pressors capped after long GOC discussion with HCP. Death imminent.  Dot HERNANDEZ

## 2020-06-15 NOTE — PROGRESS NOTE ADULT - MINUTES
45
25
35
45
35
40
45
45
35
45
35
40
40
45
30
35
35
45
45

## 2020-06-15 NOTE — PROGRESS NOTE ADULT - ASSESSMENT
74M with MDS, pancytopenia, CKD, ascites of unknown origin presents from [facility] with complaints of decreased po intake and weakness likely secondary to uremia/AAMIR from diuresis for treatment of ascites, hospital course notable for UGIB and pericardial effusion with tamponade physiology, as well as ATN, and ascites of unknown etiology, now in ICU s/p paracentesis 5/10 (2L removed), IR pericardiocentesis 5/11 (drain in place), now with anemia and thrombocytopenia with hypercoagulability 2/2 DIC that improved, now likely MDS, had increasing pressor requirement with HD yesterday     GOC:   Patient is DNR/DNI. Will continue to have GOC discussion with HCP Venus regarding comfort care    NEURO  #AMS likely 2/2 to uremia.  - mental status waxes and wanes depending on time of die, likely hypoactive delirium   - modafinil 200mg qAM  - no concern for focal deficit    -CT head 6/10 without hemorrhage or new infarct; more so suggesting clinical correlation for r/o NPH, however likely more diffuse volume loss  - neuro recommending that NPH is a clinical diagnosis and no further intervention at this time needed     #r/o NPH   CT head showing concern for NPH, not proportional to amount of volume loss  - consult neurology and f/u recs     #depression   - c/w mirtazapine to 15mg at night  - Psych following, appreciate recs      CARDIOVASCULAR/HD  #Shock of unclear etiology.   No suspicion for septic shock as patient culture negative since 5/18 and on continued daptomycin treatment.    - midodrine 30mg [id number] po as we have now started comfort feeds  - d/c solucortef  - taken off levo and now on phenylephrine, capping at current dose (max'ed), MAPs dropping to 40s   - add vasopressin to maintain MAPs 55, per HCP's wishes  - phenylephrine increasing, however goes up and down; will try to continue to wean as pt takes midodrine (has been refusing)    PULM:  - stable on RA  - maintain head of bed 30 degrees, due to aspiration risk     GI:   #ascites  - Elevated SAAG consistent with portal HTN   - no plan for paracentesis today    #comfort feeds  - decreased appetite with very limited po intake. Refuses NGT at this time, will discuss with proxy  - proxy agreed to NG tube, however pt thrashing around adamantly refusing placement of NGT   - will continue GOC discussions with proxy, patient now asking nurses about his prognosis, though proxy continues to want full escalation of care  - failed modified barium swallow, however started comfort feeds and pt intermittently takes po     #diarrhea:   New onset of diarrhea on 06/11.   - f/u c diff    RENAL/:  #AAMIR on CKD c/b ATN from hypotension and ischemic injury  - on phenylephrine, midodrine  - renally adjusted meds/ ABx  - renvela 1600mg PO TID w/each meal   - sodium bicarb 650 TID  - permacath placed on 6/8  - Renal consulted, appreciate recs  - pt not a candidate for HD anymore as he cannot tolerate HD    HEME:   #DIC  - monitor fibrinogen, haptoglobin, LDH, and platelets 2/2 to sepsis  - improving    Anemia:   2/2 ?MDS  - Hg stable    #Coagulopathy:   2/2 MDS vs sepsis vs DIC  - Monitor PT/INR     #DVT - RLE  - Eliquis held in setting of DIC concern    #thrombocytopenia 2/2 DIC/MDS and possibly daptomycin  - monitor plt level  - Plt repletion goal <10    ID:   #Persistent MRSA bacteremia  - first culture negative 5/18   - continue daptomycin 700mg [id number] until 6/29  - obtain CK twice a week    ENDO  - monitor sugars    DVt ppx: SCDs. no chemo ppx due to pancytopenia  Lines: R IJ CVC (6/3), L permacath (6/8)

## 2020-06-15 NOTE — CHART NOTE - NSCHARTNOTEFT_GEN_A_CORE
PALLIATIVE MEDICINE COORDINATION OF CARE NOTE FOR SANIYA LOZANO    Patient last assessed: 6/12/2020 to manage: EOL care was recommended: Scopolamine patch to be placed on 6/12/2020, -Dilaudid 0.5mg IV q2h PRN Dyspnea/Air hunger/RR>25/Increased work of breathing-Dilaudid 1mg IV q2h PRN RR>35/Excessive work of breathing/Resp distress If >3 PRN in initial 2hours then start Dilaudid drip at 0.5mg/hr with continued PRN's -Atropine 1% solution 2 sublingual drops [id number] PRN excessive secretions Consider comfort measures: Nasal canula at 2LPM Vital signs once per 8hour shift.    30 Minutes; Start: 9:00am End: 9:30am, of non-face-to-face prolonged service provided that relates to (face-to-face) care that has or will occur and ongoing patient management, including one or more of the following:   - Reviewed records from other physicians or other health care professional services, including one or more of the following: other medical records and diagnostic / radiology study results     - Other: Medication reviewed.    The patient HAS used PRN's in the last 24h. Patient received 1 dose of APAP, 1 dose of Dilaudid 0.25mg IV and 1 dose of Scopolamine patch in the last 24h. MME: 5mg    MEDICATIONS  (STANDING):  chlorhexidine 2% Cloths 1 Application(s) Topical daily  collagenase Ointment 1 Application(s) Topical daily  DAPTOmycin IVPB 700 milliGRAM(s) IV Intermittent every 48 hours  insulin lispro (HumaLOG) corrective regimen sliding scale   SubCutaneous Before meals and at bedtime  midodrine 30 milliGRAM(s) Oral every 8 hours  modafinil 200 milliGRAM(s) Oral every 24 hours  nystatin Cream 1 Application(s) Topical two times a day  pantoprazole  Injectable 40 milliGRAM(s) IV Push daily  phenylephrine    Infusion 10.028 MICROgram(s)/kG/Min (135 mL/Hr) IV Continuous <Continuous>  scopolamine 1 mG/72 Hr(s) Patch 1 Patch Transdermal every 72 hours  sevelamer carbonate Powder 1600 milliGRAM(s) Oral three times a day with meals  sodium bicarbonate 650 milliGRAM(s) Oral three times a day  vasopressin Infusion 0.04 Unit(s)/Min (2.4 mL/Hr) IV Continuous <Continuous>  zinc oxide 40% Ointment 1 Application(s) Topical two times a day    MEDICATIONS  (PRN):  acetaminophen   Tablet .. 650 milliGRAM(s) Oral every 6 hours PRN Moderate Pain (4 - 6)    - Other: Advanced directives     DNR DNI     No documented MOLST found on [facility]     No documented HCP form found on [facility]     Other surrogates: Friend Teresa "Venus" Matt [phone] / [phone]     No Living will / POA / Advance directives found on [facility] / [facility].     Documented GOC notes on [facility] on 5/22/2020, 5/25/2020, and 5/27/2020.    - Other: Coordination/Plan of care  BP: 37/20  P: 28  RR: 0   O2 sat: N/A  Tc: 97.6  Tm: 97.6  Patient first showed signs of actively dying process on Friday afternoon. Since then pressors were increased and added, both max doses reached prolonging the patient imminently dying process. Prognosis minutes.

## 2020-06-27 DIAGNOSIS — B95.62 METHICILLIN RESISTANT STAPHYLOCOCCUS AUREUS INFECTION AS THE CAUSE OF DISEASES CLASSIFIED ELSEWHERE: ICD-10-CM

## 2020-06-27 DIAGNOSIS — Z66 DO NOT RESUSCITATE: ICD-10-CM

## 2020-06-27 DIAGNOSIS — R62.7 ADULT FAILURE TO THRIVE: ICD-10-CM

## 2020-06-27 DIAGNOSIS — D65 DISSEMINATED INTRAVASCULAR COAGULATION [DEFIBRINATION SYNDROME]: ICD-10-CM
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
## 2020-06-27 DIAGNOSIS — R39.15 URGENCY OF URINATION: ICD-10-CM

## 2020-06-27 DIAGNOSIS — R57.9 SHOCK, UNSPECIFIED: ICD-10-CM

## 2020-06-27 DIAGNOSIS — I31.4 CARDIAC TAMPONADE: ICD-10-CM

## 2020-06-27 DIAGNOSIS — I82.721 CHRONIC EMBOLISM AND THROMBOSIS OF DEEP VEINS OF RIGHT UPPER EXTREMITY: ICD-10-CM

## 2020-06-27 DIAGNOSIS — K80.20 CALCULUS OF GALLBLADDER WITHOUT CHOLECYSTITIS WITHOUT OBSTRUCTION: ICD-10-CM

## 2020-06-27 DIAGNOSIS — E87.70 FLUID OVERLOAD, UNSPECIFIED: ICD-10-CM

## 2020-06-27 DIAGNOSIS — K74.60 UNSPECIFIED CIRRHOSIS OF LIVER: ICD-10-CM

## 2020-06-27 DIAGNOSIS — K72.90 HEPATIC FAILURE, UNSPECIFIED WITHOUT COMA: ICD-10-CM

## 2020-06-27 DIAGNOSIS — G91.2 (IDIOPATHIC) NORMAL PRESSURE HYDROCEPHALUS: ICD-10-CM

## 2020-06-27 DIAGNOSIS — R32 UNSPECIFIED URINARY INCONTINENCE: ICD-10-CM

## 2020-06-27 DIAGNOSIS — N13.30 UNSPECIFIED HYDRONEPHROSIS: ICD-10-CM

## 2020-06-27 DIAGNOSIS — Z99.2 DEPENDENCE ON RENAL DIALYSIS: ICD-10-CM

## 2020-06-27 DIAGNOSIS — Z11.59 ENCOUNTER FOR SCREENING FOR OTHER VIRAL DISEASES: ICD-10-CM

## 2020-06-27 DIAGNOSIS — E43 UNSPECIFIED SEVERE PROTEIN-CALORIE MALNUTRITION: ICD-10-CM

## 2020-06-27 DIAGNOSIS — E78.5 HYPERLIPIDEMIA, UNSPECIFIED: ICD-10-CM

## 2020-06-27 DIAGNOSIS — I48.91 UNSPECIFIED ATRIAL FIBRILLATION: ICD-10-CM

## 2020-06-27 DIAGNOSIS — D50.9 IRON DEFICIENCY ANEMIA, UNSPECIFIED: ICD-10-CM

## 2020-06-27 DIAGNOSIS — F03.90 UNSPECIFIED DEMENTIA WITHOUT BEHAVIORAL DISTURBANCE: ICD-10-CM

## 2020-06-27 DIAGNOSIS — T83.511A INFECTION AND INFLAMMATORY REACTION DUE TO INDWELLING URETHRAL CATHETER, INITIAL ENCOUNTER: ICD-10-CM

## 2020-06-27 DIAGNOSIS — L89.150 PRESSURE ULCER OF SACRAL REGION, UNSTAGEABLE: ICD-10-CM

## 2020-06-27 DIAGNOSIS — I95.9 HYPOTENSION, UNSPECIFIED: ICD-10-CM

## 2020-06-27 DIAGNOSIS — K92.2 GASTROINTESTINAL HEMORRHAGE, UNSPECIFIED: ICD-10-CM

## 2020-06-27 DIAGNOSIS — D46.9 MYELODYSPLASTIC SYNDROME, UNSPECIFIED: ICD-10-CM

## 2020-06-27 DIAGNOSIS — R13.10 DYSPHAGIA, UNSPECIFIED: ICD-10-CM

## 2020-06-27 DIAGNOSIS — K76.6 PORTAL HYPERTENSION: ICD-10-CM

## 2020-06-27 DIAGNOSIS — K51.00 ULCERATIVE (CHRONIC) PANCOLITIS WITHOUT COMPLICATIONS: ICD-10-CM

## 2020-06-27 DIAGNOSIS — R78.81 BACTEREMIA: ICD-10-CM

## 2020-06-27 DIAGNOSIS — T82.868A THROMBOSIS DUE TO VASCULAR PROSTHETIC DEVICES, IMPLANTS AND GRAFTS, INITIAL ENCOUNTER: ICD-10-CM

## 2020-06-27 DIAGNOSIS — N40.1 BENIGN PROSTATIC HYPERPLASIA WITH LOWER URINARY TRACT SYMPTOMS: ICD-10-CM

## 2020-06-27 DIAGNOSIS — N18.6 END STAGE RENAL DISEASE: ICD-10-CM
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
## 2020-06-27 DIAGNOSIS — I31.3 PERICARDIAL EFFUSION (NONINFLAMMATORY): ICD-10-CM

## 2020-06-27 DIAGNOSIS — R18.8 OTHER ASCITES: ICD-10-CM

## 2020-06-27 DIAGNOSIS — I47.2 VENTRICULAR TACHYCARDIA: ICD-10-CM

## 2020-06-27 DIAGNOSIS — R33.8 OTHER RETENTION OF URINE: ICD-10-CM

## 2020-06-27 DIAGNOSIS — R19.7 DIARRHEA, UNSPECIFIED: ICD-10-CM

## 2020-06-27 DIAGNOSIS — N32.0 BLADDER-NECK OBSTRUCTION: ICD-10-CM

## 2020-06-27 DIAGNOSIS — R16.1 SPLENOMEGALY, NOT ELSEWHERE CLASSIFIED: ICD-10-CM

## 2020-06-27 DIAGNOSIS — E11.22 TYPE 2 DIABETES MELLITUS WITH DIABETIC CHRONIC KIDNEY DISEASE: ICD-10-CM

## 2020-06-27 DIAGNOSIS — N17.9 ACUTE KIDNEY FAILURE, UNSPECIFIED: ICD-10-CM

## 2020-06-27 DIAGNOSIS — D61.818 OTHER PANCYTOPENIA: ICD-10-CM

## 2020-06-27 DIAGNOSIS — I12.0 HYPERTENSIVE CHRONIC KIDNEY DISEASE WITH STAGE 5 CHRONIC KIDNEY DISEASE OR END STAGE RENAL DISEASE: ICD-10-CM

## 2020-06-27 DIAGNOSIS — F32.9 MAJOR DEPRESSIVE DISORDER, SINGLE EPISODE, UNSPECIFIED: ICD-10-CM

## 2020-06-27 DIAGNOSIS — N13.9 OBSTRUCTIVE AND REFLUX UROPATHY, UNSPECIFIED: ICD-10-CM

## 2020-06-27 DIAGNOSIS — N17.0 ACUTE KIDNEY FAILURE WITH TUBULAR NECROSIS: ICD-10-CM

## 2020-10-28 NOTE — DISCHARGE NOTE NURSING/CASE MANAGEMENT/SOCIAL WORK - HAS THE PATIENT RECEIVED THE INFLUENZA VACCINE THIS SEASON?
2/2 to possible  SSS ,As per family members  and [facility] PCP Dr Stewart  -private cardiologist Dr THURMAN in a past stated that  patient is a   poor candidate for PPM .Dr MONSALVE doesnt advice PPM placement this admission yes...

## 2020-10-30 NOTE — DIETITIAN INITIAL EVALUATION ADULT. - PROBLEM SELECTOR PROBLEM 1
Admit for septic workup and ID evaluation,send blood and urine cx,serial lactate levels,monitor vitals closley,ivfs hydration,monitor urine output and renal profile,iv abx as per id cons AAMIR (acute kidney injury)

## 2020-11-02 NOTE — ED ADULT NURSE NOTE - PAIN: BODY LOCATION
ankle/calf/Right: Double O-Z Plasty Text: The defect edges were debeveled with a #15 scalpel blade.  Given the location of the defect, shape of the defect and the proximity to free margins a Double O-Z plasty (double transposition flap) was deemed most appropriate.  Using a sterile surgical marker, the appropriate transposition flaps were drawn incorporating the defect and placing the expected incisions within the relaxed skin tension lines where possible. The area thus outlined was incised deep to adipose tissue with a #15 scalpel blade.  The skin margins were undermined to an appropriate distance in all directions utilizing iris scissors.  Hemostasis was achieved with electrocautery.  The flaps were then transposed into place, one clockwise and the other counterclockwise, and anchored with interrupted buried subcutaneous sutures.

## 2020-11-29 NOTE — H&P ADULT - NSHPLABSRESULTS_GEN_ALL_CORE
c/o frequent urination  and also c/o changes in vision 9.1    7.8   )-----------( 137      ( 10 Apr 2018 23:08 )             27.0   04-11    129<L>  |  90<L>  |  76<H>  ----------------------------<  214<H>  3.0<L>   |  17<L>  |  3.19<H>    Ca    8.0<L>      11 Apr 2018 04:17    TPro  7.2  /  Alb  3.7  /  TBili  0.4  /  DBili  x   /  AST  32  /  ALT  40  /  AlkPhos  85  04-10    PT/INR - ( 10 Apr 2018 23:08 )   PT: 14.6 sec;   INR: 1.31     PTT - ( 10 Apr 2018 23:08 )  PTT:28.4 sec    Lactate 1.1  B-hydroxybutyrate 1.7    < from: CT Abdomen and Pelvis w/ Oral Cont (04.11.18 @ 01:53) >    FINDINGS:    Lower chest: Grossly clear lungs. Partially imaged trace pericardial  effusion. Partially imaged aortic valve calcifications    Liver: Smooth in contour. No definite focal lesion.    Biliary system: Gallbladder is normal in size. Few small gallstones. No   biliary ductal dilatation.    Pancreas: Atrophic. No main duct dilatation. No definite pancreatic mass   on this noncontrast study.    Spleen: Borderline splenomegaly with a length of 14 cm. No definite focal   lesion.    Adrenal glands: Unremarkable.    Kidneys: Severe bilateral hydroureteronephrosis. Mild bilateral   perinephric stranding. No renal or ureteral stone. 2.6 cm exophytic cyst   upper pole right kidney.     Urinary Bladder: Markedly distended urinary bladder.    Reproductive organs: Marked prostatomegaly with lobular contour   superiorly whichdeforms the bladder floor. Prostate measures   approximately 4.2 x 5.2 x 6.1 cm for volume of 70 cc. Grossly symmetric   seminal vesicles.    Bowel/Peritoneum: Marked diffuse bowel wall thickening is seen throughout   the colon, more pronounced in the rectum and sigmoid. Borderline terminal   ileal wall thickening. No adjacent fluid collection. No bowel   obstruction. Normal appendix. No ascites or extraluminal gas.     Lymph nodes: No lymphadenopathy.    Aorta/IVC: Normal caliber. Mild aortoiliac calcifications.    Abdominal wall: Small bilateral fat-containing inguinal hernias.    Bones/Soft tissues: No acute abnormality. Mild degenerative osseous   changes.      IMPRESSION:     Pancolitis of infectious or inflammatory etiology.    Bilateral severe hydroureteronephrosis, as well as markedly distended   urinary bladder. Findings consistent with chronic outlet obstruction.   Marked prostatomegaly.    No biliary dilatation.      < end of copied text >

## 2021-03-09 NOTE — PROGRESS NOTE ADULT - PROBLEM SELECTOR PLAN 4
Albumin, Serum: 2.5 g/dL (06.01.20 @ 05:22)  Third spacing and anasarca. Tissue injuries present with poor healing. Per Nutrition: Meet 75% of EER via feasible route that is consistent with GOC. Recommendations: 1. Keep nutrition aligned with goals of care 2. Honor food preferences and encourage/assist w/PO intake 3. If goals of care to change to include nutrition support, please reconsult for EN recs 4. Pain regimen per team Constitutional: Well Developed, Well Groomed, Well Nourished, No Distress    Eyes: PERRL, EOMI, conjunctiva clear    Ears: Normal    Mouth & Gums: Normal, moist    Pharynx: No tenderness, discharge, or peritonsillar abscess    Tonsils: No Redness, discharge, tenderness, or swelling    Neck: Supple, no JVD, normal thyroid glands, no carotid bruits, no cervical vertebral or paraspinal tenderness    Breast: Normal shape, no masses, no tenderness, nipples normal, no nipple discharge    Back: Normal shape, ROM intact, strength intact, no vertebral tenderness    Respiratory: Airway patent, breath sounds equal, good air movement, respiration non-labored, clear to auscultation bilateral, no chest wall tenderness, no intercostal retractions, no rales, no wheezes, no rhonchi, no subcutaneous emphysema    Cardiovascular:  Regular rate and rhythm, no rubs or murmur, normal PMI    Gastrointestinal: Soft, non-tender, non distention, no masses palpable, bowel sound normal, no bruit, no rebound tenderness    Extremities: No clubbing, cyanosis, or pedal edema    Vascular:  Carotid Pulse normal , Radial Pulse normal, Femoral Pulse normal, DP pulse normal, PT pulse normal    Neurological: alert & oriented x 3, sensation intact, deep reflexes intact, cranial nerve intact, normal strength    Skin: warm and dry, normal color    Lymph Nodes: normal posterior cervical lymph node, normal anterior cervical lymph node, normal supraclavicular lymph node, normal axillary lymph node, normal inguinal lymph node, normal femoral lymph node    Musculoskeletal: BLE mild edema  ROM intact, warmth, or calf tenderness. Normal strength    Psychiatric: normal affect, normal behavior

## 2021-04-24 NOTE — DISCHARGE NOTE PROVIDER - CARE PROVIDER_API CALL
24-Apr-2021 12:38
Olivia Roberts)  Medicine  [address] [place] [address], [address]rd [place] [address]  Phone: [phone]  Fax: [phone]  Established Patient  Follow Up Time: 2 weeks

## 2021-07-06 NOTE — PROGRESS NOTE BEHAVIORAL HEALTH - BODY HABITUS
Detail Level: Detailed
Was A Bandage Applied: Yes
Punch Size In Mm: 4
Biopsy Type: H and E
Anesthesia Type: 1% lidocaine with epinephrine
Anesthesia Volume In Cc (Will Not Render If 0): 0.5
Additional Anesthesia Volume In Cc (Will Not Render If 0): 0
Hemostasis: None
Epidermal Sutures: 4-0 Ethilon
Wound Care: Petrolatum
Dressing: bandage
Suture Removal: 14 days
Patient Will Remove Sutures At Home?: No
Consent: Written consent was obtained and risks were reviewed including but not limited to scarring, infection, bleeding, scabbing, incomplete removal, nerve damage and allergy to anesthesia.
Post-Care Instructions: I reviewed with the patient in detail post-care instructions. Patient is to keep the biopsy site dry overnight, and then apply bacitracin twice daily until healed. Patient may apply hydrogen peroxide soaks to remove any crusting.
Home Suture Removal Text: Patient was provided a home suture removal kit and will remove their sutures at home.  If they have any questions or difficulties they will call the office.
Notification Instructions: Patient will be notified of biopsy results. However, patient instructed to call the office if not contacted within 2 weeks.
Billing Type: Third-Party Bill
Information: Selecting Yes will display possible errors in your note based on the variables you have selected. This validation is only offered as a suggestion for you. PLEASE NOTE THAT THE VALIDATION TEXT WILL BE REMOVED WHEN YOU FINALIZE YOUR NOTE. IF YOU WANT TO FAX A PRELIMINARY NOTE YOU WILL NEED TO TOGGLE THIS TO 'NO' IF YOU DO NOT WANT IT IN YOUR FAXED NOTE.
Obese
Overweight
Obese

## 2021-07-29 NOTE — ED PROVIDER NOTE - CPE EDP RESP NORM
S: He has minimal back pain and no leg pain. He is asking to void. He finished breakfast.  He walked last night    Inspection:  Awake alert No acute distress.  No difficulty breathing     Blood pressure 137/69, pulse 76, temperature 98.2 °F (36.8 °C), tem normal...

## 2021-08-11 NOTE — PROGRESS NOTE ADULT - PROBLEM/PLAN-1
DISPLAY PLAN FREE TEXT
Quality 226: Preventive Care And Screening: Tobacco Use: Screening And Cessation Intervention: Tobacco Screening not Performed for Medical Reasons
DISPLAY PLAN FREE TEXT
Quality 130: Documentation Of Current Medications In The Medical Record: Current Medications Documented
Detail Level: Detailed
Quality 431: Preventive Care And Screening: Unhealthy Alcohol Use - Screening: Patient screened for unhealthy alcohol use using a single question and scores 2 or greater episodes per year and brief intervention did not occur
Quality 110: Preventive Care And Screening: Influenza Immunization: Influenza Immunization Administered during Influenza season

## 2021-12-10 NOTE — PROGRESS NOTE ADULT - PROBLEM SELECTOR PLAN 5
- Pt presenting with general feelings of illness and malaise  - Likely in the setting of azotemia, decreased PO intake, and electrolyte abnormalities  - Continue to monitor  - Daily BMP  - PT consult  - On exam without any deficits, mm strength 5/5 throughout Yes - the patient is able to be screened

## 2022-01-07 NOTE — ED ADULT NURSE NOTE - EXTENSIONS OF SELF_ADULT
January 7, 2022      Jenny Stephen  [address]   [facility] [id number]        To Whom it May Concern,    I saw Ms. Breann Stephen at the [facility] for an annual appointment today, January 7th 2022. She has received two COVID19 vaccines, on 8/9/21 and 12/29/21. She will have a COVID test prior to her flight. She appears to be in good health without any concerning symptoms on my exam today.    Please contact me with any questions or concerns.        Sincerely,          Tanisha Easley, DO   None

## 2022-04-15 NOTE — ED PROVIDER NOTE - EKG ADDITIONAL QUESTION - PERFORMED INDEPENDENT VISUALIZATION
Yes Billing Type: Third-Party Bill Bill For Surgical Tray: no Performing Laboratory: [id number] Expected Date Of Service: 02/28/2022

## 2022-09-27 NOTE — PROGRESS NOTE ADULT - PROVIDER SPECIALTY LIST ADULT
[facility]  Wilfredo Kaufman MD   09/27/2022

## 2022-11-26 NOTE — PHYSICAL THERAPY INITIAL EVALUATION ADULT - PHYSICAL ASSIST/NONPHYSICAL ASSIST: SIT/STAND, REHAB EVAL
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  · CT abd/pelvis (11/21): Findings suggesting colitis  Colonic bowel wall thickening is most pronounced at the site of surgical sutures in the region of the mid transverse colon  Local recurrence should be excluded  Small bowel wall thickening may be due to enteritis, however, could be related to ascites and edema  There is no bowel obstruction  There is colonic diverticulosis without definite evidence of acute diverticulitis  The liver demonstrates cirrhotic morphology  There are findings of portal hypertension, including splenomegaly, ascites, varices     · S/p Paracentesis with 2 2L removed; no evidence of SBP  · Will not begin Lasix/Aldactone at this time due to relatively low blood pressure  · GI following; will need further outpatient follow-up with Hepatology 1 person assist/nonverbal cues (demo/gestures)

## 2023-09-13 NOTE — DIETITIAN INITIAL EVALUATION ADULT. - FACTORS AFF FOOD INTAKE
persistent lack of appetite Chonodrocutaneous Helical Advancement Flap Text: The defect edges were debeveled with a #15 scalpel blade. Given the location of the defect and the proximity to free margins a chondrocutaneous helical advancement flap was deemed most appropriate. Using a sterile surgical marker, the appropriate advancement flap was drawn incorporating the defect and placing the expected incisions within the relaxed skin tension lines where possible. The area thus outlined was incised deep to adipose tissue with a #15 scalpel blade. The skin margins were undermined to an appropriate distance in all directions utilizing iris scissors. Following this, the designed flap was advanced and carried over into the primary defect and sutured into place.

## 2023-10-23 NOTE — PATIENT PROFILE ADULT - PROVIDER NAME
Dr. Quintero Purse String (Intermediate) Text: Given the location of the defect and the characteristics of the surrounding skin a purse string intermediate closure was deemed most appropriate.  Undermining was performed circumferentially around the surgical defect.  A purse string suture was then placed and tightened.

## 2023-11-27 NOTE — ED ADULT NURSE NOTE - PMH
Anemia    DM (diabetes mellitus)    Enlarged prostate    H/O hydrocephalus    History of pancytopenia    HLD (hyperlipidemia)    HTN (hypertension)
Alert and oriented to person, place and time

## 2024-02-22 NOTE — PROGRESS NOTE ADULT - PROBLEM SELECTOR PLAN 4
Anion gap metabolic acidosis from uremia + starvation ketosis  -trend BMP  -c/w IVF.  Encourage PO.    -monitor FS.  SSI wife

## 2024-02-24 NOTE — PROVIDER CONTACT NOTE (CRITICAL VALUE NOTIFICATION) - DATE AND TIME:
22-May-2020 08:27 Health Maintenance Due   Topic Date Due   • Shingles Vaccine (1 of 2) Never done   • COVID-19 Vaccine (3 - Booster for Pfizer series) 09/23/2021   • Diabetes Eye Exam  01/13/2022       Patient is due for topics as listed above but is not proceeding with Immunization(s) COVID-19 and Shingles at this time.    22-May-2020 08:28 no

## 2024-04-04 NOTE — CONSULT NOTE ADULT - NSPROBSELRECBLANK_5_GEN
4/4- Pt new admit, cm to assess.     4/11-     Cm met with pt at bedside to introduce role and complete cm open. Pt lives in trailer home with sons, 3 louisa. Pt's son transport pt to appts, work etc. Pt works PT as . Pt has hx of slvna, no op no str. Prior to admission, pt was indpt with adls iadls. Pt has shower chair and grab bars. PCP is Tanika Nieto, preferred pharmacy is [facility] [place]. The patient was educated that other members of the patient's support are able to ask questions and observe as the patient wished. The patient was educated on the rehabilitation process including therapy program, the interdisciplinary team, and weekly team meetings. Estimated length of stay was reviewed with the patient as well as expectations of discussions of discharge planning. The role of the  was reviewed including providing care coordination, discharge planning and discharge facilitation. IMM was reviewed with the patient and a copy was provided for their reference. The patient verbalized understanding of the information provided and denied any further questions at this time. CM will continue to follow and assist the patient throughout their rehabilitation stay.           
DISPLAY PLAN FREE TEXT

## 2025-02-20 NOTE — PATIENT PROFILE ADULT - NSPROEDAABILITYLEARNOTHER_GEN_A_NUR
normal/ROM intact/normal gait/strength 5/5 bilateral upper extremities/strength 5/5 bilateral lower extremities none details…

## 2025-04-17 NOTE — PHYSICAL THERAPY INITIAL EVALUATION ADULT - IMPAIRED TRANSFERS: SIT/STAND, REHAB EVAL
Refill request received through pharmacy.  The requested medication(s) have been refilled. The refill request(s) was within refill protocol.  Patient has a pending appointment.   
impaired balance/decreased strength